# Patient Record
Sex: FEMALE | Race: WHITE | NOT HISPANIC OR LATINO | Employment: UNEMPLOYED | URBAN - METROPOLITAN AREA
[De-identification: names, ages, dates, MRNs, and addresses within clinical notes are randomized per-mention and may not be internally consistent; named-entity substitution may affect disease eponyms.]

---

## 2019-08-20 ENCOUNTER — OFFICE VISIT (OUTPATIENT)
Dept: FAMILY MEDICINE CLINIC | Facility: CLINIC | Age: 54
End: 2019-08-20
Payer: COMMERCIAL

## 2019-08-20 VITALS
RESPIRATION RATE: 16 BRPM | OXYGEN SATURATION: 96 % | HEART RATE: 60 BPM | WEIGHT: 107.2 LBS | SYSTOLIC BLOOD PRESSURE: 118 MMHG | DIASTOLIC BLOOD PRESSURE: 80 MMHG | TEMPERATURE: 98.2 F | HEIGHT: 60 IN | BODY MASS INDEX: 21.05 KG/M2

## 2019-08-20 DIAGNOSIS — Z00.00 WELL ADULT EXAM: ICD-10-CM

## 2019-08-20 DIAGNOSIS — Z13.9 ENCOUNTER FOR HEALTH-RELATED SCREENING: ICD-10-CM

## 2019-08-20 DIAGNOSIS — M19.90 ARTHRITIS: ICD-10-CM

## 2019-08-20 DIAGNOSIS — Z12.11 SCREEN FOR COLON CANCER: Primary | ICD-10-CM

## 2019-08-20 DIAGNOSIS — Z12.39 SCREENING FOR BREAST CANCER: ICD-10-CM

## 2019-08-20 DIAGNOSIS — G56.03 BILATERAL CARPAL TUNNEL SYNDROME: ICD-10-CM

## 2019-08-20 PROBLEM — Z98.890 STATUS POST LEFT BREAST LUMPECTOMY: Status: ACTIVE | Noted: 2019-08-20

## 2019-08-20 PROBLEM — K59.01 SLOW TRANSIT CONSTIPATION: Status: ACTIVE | Noted: 2019-08-20

## 2019-08-20 PROBLEM — F31.81 BIPOLAR 2 DISORDER, MAJOR DEPRESSIVE EPISODE (HCC): Status: ACTIVE | Noted: 2019-08-20

## 2019-08-20 PROCEDURE — 99214 OFFICE O/P EST MOD 30 MIN: CPT | Performed by: NURSE PRACTITIONER

## 2019-08-20 PROCEDURE — 99396 PREV VISIT EST AGE 40-64: CPT | Performed by: NURSE PRACTITIONER

## 2019-08-20 PROCEDURE — 3725F SCREEN DEPRESSION PERFORMED: CPT | Performed by: NURSE PRACTITIONER

## 2019-08-20 RX ORDER — IBUPROFEN 400 MG/1
1 TABLET ORAL EVERY 8 HOURS PRN
COMMUNITY
End: 2020-01-21 | Stop reason: HOSPADM

## 2019-08-20 RX ORDER — SENNA AND DOCUSATE SODIUM 50; 8.6 MG/1; MG/1
3 TABLET, FILM COATED ORAL AS NEEDED
COMMUNITY

## 2019-08-20 NOTE — PROGRESS NOTES
Assessment/Plan:    Bilateral carpal tunnel syndrome  Has a past diagnosis of carpal tunnel syndrome  Currently the symptoms have increased and she would like to be retested and referred  Will order a EMG and upon results will refer to Ortho or neuro  Arthritis  Strong family history of arthritis in her family  Has notable arthritis it both hands, pain in knees and hips  Will do autoimmune testing and will order ibuprofen 400 mg  Instructed to avoid stress her excessive movement of the hands and joints, keep joints warm and to consider physical therapy  Encounter for health-related screening  Has not had labs in several years  Will order routine screenings such as CMP, TSH, lipid panel and CBC  She is to return for results  Also discussed mammogram and Pap smear  Well adult exam  Fairly healthy 53-year-old female that has widespread arthritis and anxiety  Currently she is not in Psychiatry for bipolar but hopes to return soon  Discussed healthy diet exercise and routine health maintenance  She is to avoid risky behaviors and use safety around the home  She is to follow up following tests  Screen for colon cancer  Ordered stool for occult blood for screening  Screening for breast cancer  Will order mammogram for screening  Has a history of left breast lumpectomy  Diagnoses and all orders for this visit:    Screen for colon cancer  -     Ambulatory referral to Colorectal Surgery; Future    Screening for breast cancer  -     Mammo screening bilateral w cad; Future    Bilateral carpal tunnel syndrome  -     EMG 2 Limb Upper Extremity; Future    Encounter for health-related screening  -     TSH, 3rd generation with Free T4 reflex; Future  -     CBC and differential; Future  -     Lipid Panel with Direct LDL reflex; Future  -     Comprehensive metabolic panel; Future    Well adult exam    Arthritis  -     C-reactive protein; Future  -     RF Screen w/ Reflex to Titer;  Future  -     FERNANDO Screen w/ Reflex to Titer/Pattern; Future    Other orders  -     senna-docusate sodium (SENOKOT-S) 8 6-50 mg per tablet; Take 3 tablets by mouth daily at bedtime  -     ibuprofen (MOTRIN) 400 mg tablet; Take 1 tablet by mouth every 8 (eight) hours as needed          Subjective:      Patient ID: Jinny Mast is a 48 y o  female  HPI  Presents to become established with [facility]  She has a history of bipolar disorder and does not take any medication  She currently has carpel tunnel  The hands feel hot and prickly  The pain radiates up her arms to the elbows  She drops things, holds things but can't feel it  Her sensation is distorted and feels like a hum  The following portions of the patient's history were reviewed and updated as appropriate:   She  has no past medical history on file  She   Patient Active Problem List    Diagnosis Date Noted    Screening for breast cancer 08/21/2019    Screen for colon cancer 08/21/2019    Well adult exam 08/20/2019    Bipolar 2 disorder, major depressive episode ([facility] [address] ) 08/20/2019    Bilateral carpal tunnel syndrome 08/20/2019    Status post left breast lumpectomy 08/20/2019    Arthritis 08/20/2019    Slow transit constipation 08/20/2019    Encounter for health-related screening 08/20/2019     She  has no past surgical history on file  Her family history is not on file  She  reports that she has been smoking  She has been smoking about 0 50 packs per day  She has never used smokeless tobacco  She reports that she drank alcohol  She reports that she has current or past drug history  Drug: Marijuana  Current Outpatient Medications   Medication Sig Dispense Refill    ibuprofen (MOTRIN) 400 mg tablet Take 1 tablet by mouth every 8 (eight) hours as needed      senna-docusate sodium (SENOKOT-S) 8 6-50 mg per tablet Take 3 tablets by mouth daily at bedtime       No current facility-administered medications for this visit        Current Outpatient Medications on File Prior to Visit   Medication Sig    ibuprofen (MOTRIN) 400 mg tablet Take 1 tablet by mouth every 8 (eight) hours as needed    senna-docusate sodium (SENOKOT-S) 8 6-50 mg per tablet Take 3 tablets by mouth daily at bedtime     No current facility-administered medications on file prior to visit  She has No Known Allergies       Review of Systems   Constitutional: Negative  HENT: Negative  Eyes: Negative  Respiratory: Negative  Cardiovascular: Negative  Gastrointestinal: Positive for constipation  Genitourinary: Negative  Musculoskeletal: Positive for arthralgias  Bilateral hand numbness with pain  Skin: Negative  Allergic/Immunologic: Negative  Neurological: Positive for weakness and numbness  Hematological: Negative  Psychiatric/Behavioral: The patient is nervous/anxious  Objective:      /80   Pulse 60   Temp 98 2 °F (36 8 °C) (Tympanic)   Resp 16   Ht 5' (1 524 m)   Wt 48 6 kg (107 lb 3 2 oz)   SpO2 96%   BMI 20 94 kg/m²          Physical Exam   Constitutional: She is oriented to person, place, and time  She appears well-developed and well-nourished  HENT:   Head: Normocephalic  Right Ear: External ear normal    Left Ear: External ear normal    Nose: Nose normal    Mouth/Throat: Oropharynx is clear and moist    Eyes: Conjunctivae and EOM are normal    Neck: Normal range of motion  Neck supple  No thyromegaly present  Cardiovascular: Normal rate, regular rhythm, normal heart sounds and intact distal pulses  Pulmonary/Chest: Effort normal and breath sounds normal    Abdominal: Soft  Bowel sounds are normal    Musculoskeletal: Normal range of motion  She exhibits tenderness (hands, knees and hips) and deformity (hands)  Lymphadenopathy:     She has no cervical adenopathy  Neurological: She is alert and oriented to person, place, and time  She has normal strength and normal reflexes  A sensory deficit is present     Positive Tinsel Test  Skin: Skin is warm and dry  Capillary refill takes less than 2 seconds  Psychiatric: Judgment and thought content normal  Her mood appears anxious  Her speech is rapid and/or pressured  She is hyperactive   Cognition and memory are normal

## 2019-08-21 PROBLEM — Z12.11 SCREEN FOR COLON CANCER: Status: ACTIVE | Noted: 2019-08-21

## 2019-08-21 PROBLEM — Z12.39 SCREENING FOR BREAST CANCER: Status: ACTIVE | Noted: 2019-08-21

## 2019-08-21 NOTE — ASSESSMENT & PLAN NOTE
Fairly healthy 51-year-old female that has widespread arthritis and anxiety  Currently she is not in Psychiatry for bipolar but hopes to return soon  Discussed healthy diet exercise and routine health maintenance  She is to avoid risky behaviors and use safety around the home  She is to follow up following tests

## 2019-08-21 NOTE — ASSESSMENT & PLAN NOTE
Has not had labs in several years  Will order routine screenings such as CMP, TSH, lipid panel and CBC  She is to return for results  Also discussed mammogram and Pap smear

## 2019-08-21 NOTE — ASSESSMENT & PLAN NOTE
Strong family history of arthritis in her family  Has notable arthritis it both hands, pain in knees and hips  Will do autoimmune testing and will order ibuprofen 400 mg  Instructed to avoid stress her excessive movement of the hands and joints, keep joints warm and to consider physical therapy

## 2019-08-21 NOTE — ASSESSMENT & PLAN NOTE
Has a past diagnosis of carpal tunnel syndrome  Currently the symptoms have increased and she would like to be retested and referred  Will order a EMG and upon results will refer to Ortho or neuro

## 2019-08-30 ENCOUNTER — TELEPHONE (OUTPATIENT)
Dept: FAMILY MEDICINE CLINIC | Facility: CLINIC | Age: 54
End: 2019-08-30

## 2019-08-30 NOTE — TELEPHONE ENCOUNTER
Pt called and left message stating she seen Krystin Miranda two weeks ago and is scheduled for an EMG on 09/11/19 for her hands  However she is still having the burning/fire feeling that is not stopping  She is not sure what she can do  Pt also stated she needs a referral for psych

## 2019-09-03 DIAGNOSIS — G56.03 BILATERAL CARPAL TUNNEL SYNDROME: ICD-10-CM

## 2019-09-03 DIAGNOSIS — F31.81 BIPOLAR 2 DISORDER, MAJOR DEPRESSIVE EPISODE (HCC): Primary | ICD-10-CM

## 2019-09-03 RX ORDER — GABAPENTIN 100 MG/1
100 CAPSULE ORAL 3 TIMES DAILY
Qty: 90 CAPSULE | Refills: 0 | Status: SHIPPED | OUTPATIENT
Start: 2019-09-03 | End: 2019-10-31 | Stop reason: SDUPTHER

## 2019-09-05 ENCOUNTER — TELEPHONE (OUTPATIENT)
Dept: FAMILY MEDICINE CLINIC | Facility: CLINIC | Age: 54
End: 2019-09-05

## 2019-09-11 ENCOUNTER — PROCEDURE VISIT (OUTPATIENT)
Dept: NEUROLOGY | Facility: CLINIC | Age: 54
End: 2019-09-11
Payer: COMMERCIAL

## 2019-09-11 DIAGNOSIS — G56.03 BILATERAL CARPAL TUNNEL SYNDROME: ICD-10-CM

## 2019-09-11 PROCEDURE — 95886 MUSC TEST DONE W/N TEST COMP: CPT | Performed by: PHYSICAL MEDICINE & REHABILITATION

## 2019-09-11 PROCEDURE — 95911 NRV CNDJ TEST 9-10 STUDIES: CPT | Performed by: PHYSICAL MEDICINE & REHABILITATION

## 2019-09-11 NOTE — PROGRESS NOTES
EMG 2 Limb Upper Extremity     Date/Time 9/11/2019 11:12 AM     Performed by  Joon Cortez MD     Authorized by CAROLINE Bhatti      Universal Protocol Consent: Verbal consent obtained  Risks and benefits: risks, benefits and alternatives were discussed  Consent given by: patient  Patient understanding: patient states understanding of the procedure being performed  Patient consent: the patient's understanding of the procedure matches consent given  Patient identity confirmed: verbally with patient             EMG REPORT   82-year-old female presents with  bilateral hand numbness  and tingling  radiating up her arms to the elbows  The left   median motor terminal latency was prolonged at 5 7 milliseconds with normal compound motor action potential amplitude and normal conduction velocity across the wrist   The  right median motor terminal latency was prolonged at 6 7 milliseconds with a normal compound motor action potential amplitude and a normal conduction velocity across the wrist   The bilateral ulnar motor conduction velocities and compound muscle action potentials were normal with normal distal latencies across the wrists  The left  median sensory peak latency was prolonged at 5 0 milliseconds with a low sensory action potential amplitude of 6 microvolt  The right median  sensory peak latency was prolonged at 6 5 milliseconds with a low sensory action potential amplitude of 4 microvolt  The bilateral radial and ulnar sensory conduction velocity and sensory action potentials were also normal with normal distal latencies across the wrists  The right median palmar evoked response was prolonged by 3 4 milliseconds as compared to the right ulnar palmar evoked response to the same distance  The left median palmar evoked response was prolonged by 0 8 milliseconds as compared to the left ulnar palmar evoked response at the same distance      The left and right median and ulnar F wave latencies were within normal limits  Concentric needle EMG  was performed on various proximal and distal muscles of the bilateral upper extremities including deltoid, biceps, triceps, pronator teres, apb, FDI and low cervical paraspinals  There was no evidence of active denervation any other muscles tested  Moderate decreased recruitment was noted in the bilateral abductor pollicis brevis  Early recruited motor units appear normal  with recruitment patterns being full or full for effort  in the remaining muscles tested  INTERPRETATION: There  is electrophysiologic evidence of a:    1  Bilateral moderate median nerve compression neuropathy at the wrist with demyelinative changes, consistent with a diagnosis of carpal tunnel syndrome  2   There is no evidence of a cervical radiculopathy or ulnar neuropathy bilaterally  Clinical correlation is recommended       VERNON Burns

## 2019-09-12 ENCOUNTER — TELEPHONE (OUTPATIENT)
Dept: FAMILY MEDICINE CLINIC | Facility: CLINIC | Age: 54
End: 2019-09-12

## 2019-09-13 NOTE — TELEPHONE ENCOUNTER
Patient's results of EMG is bilateral carpal tunnel syndrome  She can be referred to either neurosurgeon or Orthopedics or a hand surgeon to repair this    Put in referral for whichever the patient wants to go to thank you

## 2019-09-13 NOTE — TELEPHONE ENCOUNTER
Tried calling pt and was unable to lm because no voicemail was set up  I called her  listed as alternate pt contact and lm for her to return our call   I didn't give no other medical results

## 2019-09-30 ENCOUNTER — TELEPHONE (OUTPATIENT)
Dept: FAMILY MEDICINE CLINIC | Facility: CLINIC | Age: 54
End: 2019-09-30

## 2019-09-30 ENCOUNTER — APPOINTMENT (OUTPATIENT)
Dept: LAB | Age: 54
End: 2019-09-30
Payer: COMMERCIAL

## 2019-09-30 DIAGNOSIS — M19.90 ARTHRITIS: ICD-10-CM

## 2019-09-30 DIAGNOSIS — Z13.9 ENCOUNTER FOR HEALTH-RELATED SCREENING: ICD-10-CM

## 2019-09-30 DIAGNOSIS — G56.03 BILATERAL CARPAL TUNNEL SYNDROME: Primary | ICD-10-CM

## 2019-09-30 LAB
BASOPHILS # BLD AUTO: 0.06 THOUSANDS/ΜL (ref 0–0.1)
BASOPHILS NFR BLD AUTO: 1 % (ref 0–1)
CRP SERPL QL: <3 MG/L
EOSINOPHIL # BLD AUTO: 0.17 THOUSAND/ΜL (ref 0–0.61)
EOSINOPHIL NFR BLD AUTO: 2 % (ref 0–6)
ERYTHROCYTE [DISTWIDTH] IN BLOOD BY AUTOMATED COUNT: 14.6 % (ref 11.6–15.1)
HCT VFR BLD AUTO: 41.8 % (ref 34.8–46.1)
HGB BLD-MCNC: 13.3 G/DL (ref 11.5–15.4)
IMM GRANULOCYTES # BLD AUTO: 0.01 THOUSAND/UL (ref 0–0.2)
IMM GRANULOCYTES NFR BLD AUTO: 0 % (ref 0–2)
LYMPHOCYTES # BLD AUTO: 2.38 THOUSANDS/ΜL (ref 0.6–4.47)
LYMPHOCYTES NFR BLD AUTO: 30 % (ref 14–44)
MCH RBC QN AUTO: 30.3 PG (ref 26.8–34.3)
MCHC RBC AUTO-ENTMCNC: 31.8 G/DL (ref 31.4–37.4)
MCV RBC AUTO: 95 FL (ref 82–98)
MONOCYTES # BLD AUTO: 0.58 THOUSAND/ΜL (ref 0.17–1.22)
MONOCYTES NFR BLD AUTO: 7 % (ref 4–12)
NEUTROPHILS # BLD AUTO: 4.66 THOUSANDS/ΜL (ref 1.85–7.62)
NEUTS SEG NFR BLD AUTO: 60 % (ref 43–75)
NRBC BLD AUTO-RTO: 0 /100 WBCS
PLATELET # BLD AUTO: 247 THOUSANDS/UL (ref 149–390)
PMV BLD AUTO: 11.2 FL (ref 8.9–12.7)
RBC # BLD AUTO: 4.39 MILLION/UL (ref 3.81–5.12)
TSH SERPL DL<=0.05 MIU/L-ACNC: 1.98 UIU/ML (ref 0.36–3.74)
WBC # BLD AUTO: 7.86 THOUSAND/UL (ref 4.31–10.16)

## 2019-09-30 PROCEDURE — 86038 ANTINUCLEAR ANTIBODIES: CPT

## 2019-09-30 PROCEDURE — 85025 COMPLETE CBC W/AUTO DIFF WBC: CPT

## 2019-09-30 PROCEDURE — 36415 COLL VENOUS BLD VENIPUNCTURE: CPT

## 2019-09-30 PROCEDURE — 86430 RHEUMATOID FACTOR TEST QUAL: CPT

## 2019-09-30 PROCEDURE — 86140 C-REACTIVE PROTEIN: CPT

## 2019-09-30 PROCEDURE — 84443 ASSAY THYROID STIM HORMONE: CPT

## 2019-09-30 NOTE — TELEPHONE ENCOUNTER
Has appt October 30  Needs doctor to doctor referral faxed to  [phone]    She will call around to see where she can get wrist splints and will let us know

## 2019-09-30 NOTE — TELEPHONE ENCOUNTER
Patient returned call regarding her EMG  Gave her results and she had a name of Dr Tricia Garza she wanted to see      She had no further info on him so I obtained phone and address and she will call for appt and see if [facility]    If so then she will call with fax number and we will fax doctor to doctor referral

## 2019-10-01 LAB — RHEUMATOID FACT SER QL LA: NEGATIVE

## 2019-10-02 LAB — RYE IGE QN: NEGATIVE

## 2019-10-30 ENCOUNTER — OFFICE VISIT (OUTPATIENT)
Dept: OBGYN CLINIC | Facility: HOSPITAL | Age: 54
End: 2019-10-30
Payer: COMMERCIAL

## 2019-10-30 VITALS
WEIGHT: 104 LBS | SYSTOLIC BLOOD PRESSURE: 147 MMHG | DIASTOLIC BLOOD PRESSURE: 90 MMHG | BODY MASS INDEX: 20.42 KG/M2 | HEIGHT: 60 IN | HEART RATE: 69 BPM

## 2019-10-30 DIAGNOSIS — G56.01 CARPAL TUNNEL SYNDROME OF RIGHT WRIST: Primary | ICD-10-CM

## 2019-10-30 DIAGNOSIS — G56.02 CARPAL TUNNEL SYNDROME ON LEFT: ICD-10-CM

## 2019-10-30 PROCEDURE — 99243 OFF/OP CNSLTJ NEW/EST LOW 30: CPT | Performed by: ORTHOPAEDIC SURGERY

## 2019-10-30 RX ORDER — LIDOCAINE HYDROCHLORIDE AND EPINEPHRINE 10; 10 MG/ML; UG/ML
20 INJECTION, SOLUTION INFILTRATION; PERINEURAL ONCE
Status: CANCELLED | OUTPATIENT
Start: 2019-10-30 | End: 2019-10-30

## 2019-10-30 NOTE — PROGRESS NOTES
ASSESSMENT/PLAN:    Assessment:   Carpal Tunnel Syndrome  Bilateral   Right worse than left  Plan:   Patient has tried and failed conservative treatments up to this time and she wishes to proceed with surgical procedure due to continued symptoms  She wishes to perform right-sided carpal tunnel release performed left due to worsening symptoms  Follow Up: After Surgery    To Do Next Visit:  Sutures out and Begin therapy    Operative Discussions:  Endoscopic Carpal Tunnel Release: The anatomy and physiology of carpal tunnel syndrome was discussed with the patient today  Increase pressure localized under the transverse carpal ligament can cause pain, numbness, tingling, or dysesthesias within the median nerve distribution as well as feelings of fatigue, clumsiness, or awkwardness  These symptoms typically occur at night and worse in the morning upon waking  Eventually, untreated carpal tunnel syndrome can result in weakness and permanent loss of muscle within the thenar compartment of the hand  Treatment options were discussed with the patient  Conservative treatment includes nocturnal resting splints to keep the nerve in a neutral position, ergonomic changes within the work or home environment, activity modification, and tendon gliding exercises  Steroid injections within the carpal canal can help a majority of patients, however this is often self-limited in a majority of patients  Surgical intervention to divide the transverse carpal ligament typically results in a long-lasting relief of the patient's complaints, with the recurrence rate of less than 1%  The patient has elected to undergo an endoscopic carpal tunnel release  The 2 incision technique was discussed with the patient, which results in approximately a two-week less recovery time, and less wound complications    In the postoperative period, light activities are allowed immediately, driving is allowed when narcotic medication has stopped, and the bandages may be removed and incision may get wet after 2 days  Heavy activities (lifting more than approximately 10 pounds) will be allowed after follow up appointment in 1-2 weeks  While the pain and discomfort in the hands generally improves rapidly, the numbness and tingling as well as the strength will slowly improve over weeks to months depending on the severity of the carpal tunnel syndrome  Pillar pain was discussed with the patient, which is typically a common but self-limiting condition  The risks of bleeding and infection from the surgery are less than 1%  Risk of recurrence is approximately 0 5%  The risks of nerve injury or nerve damage or damage to the blood vessels is approximately 1 in 1200  The patient has an understanding of the above mentioned discussion  The risks and benefits of the procedure were explained to the patient, which include, but are not limited to: Bleeding, infection, recurrence, pain, scar, damage to tendons, damage to nerves, and damage to blood vessels, failure to give desired results and complications related to anesthesia   These risks, along with alternative conservative treatment options, and postoperative protocols were voiced back and understood by the patient   All questions were answered to the patient's satisfaction   The patient agrees to comply with a standard postoperative protocol, and is willing to proceed   Education was provided via written and auditory forms   There were no barriers to learning   Written handouts regarding wound care, incision and scar care, and general preoperative information was provided to the patient   Prior to surgery, the patient may be requested to stop all anti-inflammatory medications   Prophylactic aspirin, Plavix, and Coumadin may be allowed to be continued   Medications including vitamin E , ginkgo, and fish oil are requested to be stopped approximately one week prior to surgery   Hypertensive medications and beta blockers, if taken, should be continued  _____________________________________________________  CHIEF COMPLAINT:  Chief Complaint   Patient presents with    Right Hand - Pain, Numbness    Left Hand - Pain, Numbness         SUBJECTIVE:  Lisa Wadsworth is a 48y o  year old female who presents with Pain  Moderate  Intermittant  Electric and Burning and Numbness to the bilateral wrist, hand, index finger, ring finger and thumb  Right worse than Left This started several years ago as Chronic  Radiation: None  Previous Treatments: Resume activities as tolerated, NSAIDs, activity modification and bracing with only partial relief  Associated symptoms: None    PAST MEDICAL HISTORY:  History reviewed  No pertinent past medical history  PAST SURGICAL HISTORY:  History reviewed  No pertinent surgical history  FAMILY HISTORY:  History reviewed  No pertinent family history  SOCIAL HISTORY:  Social History     Tobacco Use    Smoking status: Current Every Day Smoker     Packs/day: 0 50    Smokeless tobacco: Never Used   Substance Use Topics    Alcohol use: Not Currently    Drug use: Yes     Types: Marijuana     Comment: CBD OIL       MEDICATIONS:    Current Outpatient Medications:     gabapentin (NEURONTIN) 100 mg capsule, Take 1 capsule (100 mg total) by mouth 3 (three) times a day, Disp: 90 capsule, Rfl: 0    ibuprofen (MOTRIN) 400 mg tablet, Take 1 tablet by mouth every 8 (eight) hours as needed, Disp: , Rfl:     senna-docusate sodium (SENOKOT-S) 8 6-50 mg per tablet, Take 3 tablets by mouth daily at bedtime, Disp: , Rfl:     ALLERGIES:  No Known Allergies    REVIEW OF SYSTEMS:  Pertinent items are noted in HPI  A comprehensive review of systems was negative      LABS:  HgA1c: No results found for: HGBA1C  BMP:   Lab Results   Component Value Date    CALCIUM 9 2 06/15/2018     06/15/2018    K 3 9 06/15/2018    CO2 24 06/15/2018     06/15/2018    BUN 14 06/15/2018    CREATININE 0 60 06/15/2018 _____________________________________________________  PHYSICAL EXAMINATION:  General: well developed and well nourished, alert, oriented times 3 and appears comfortable  Psychiatric: Normal  HEENT: Trachea Midline, No torticollis  Cardiovascular: No discernable arrhythmia  Pulmonary: No wheezing or stridor  Skin: No masses, erythema, lacerations, fluctation, ulcerations  Neurovascular: Decreased Sensation to  the Median Nerve, Motor Intact to the Median, Ulnar, Radial Nerve and Pulses Intact    MUSCULOSKELETAL EXAMINATION:  Bilateral hands: 5/5 intrinsics, 5/5 AIN, 5/5 wrist extension (full ROM), 5/5 wrist flexion (full ROM)  4/5 APB  Positive Phalen's test, Positive Tinel's test, Positive Durkins compression test    _____________________________________________________  STUDIES REVIEWED:  EMG:   Bilateral moderate median nerve compression neuropathy at the wrist consistent with a diagnosis carpal tunnel syndrome  There is no evidence of cervical radiculopathy or ulnar neuropathy bilaterally        PROCEDURES PERFORMED:  Procedures  No Procedures performed today     Scribe Attestation    I,:   Edwina Armendariz am acting as a scribe while in the presence of the attending physician :        I,:   Geronimo Gaytan MD personally performed the services described in this documentation    as scribed in my presence :

## 2019-10-30 NOTE — H&P
ASSESSMENT/PLAN:    Assessment:   Carpal Tunnel Syndrome  Bilateral   Right worse than left  Plan:   Patient has tried and failed conservative treatments up to this time and she wishes to proceed with surgical procedure due to continued symptoms  She wishes to perform right-sided carpal tunnel release performed left due to worsening symptoms  Follow Up: After Surgery    To Do Next Visit:  Sutures out and Begin therapy    Operative Discussions:  Endoscopic Carpal Tunnel Release: The anatomy and physiology of carpal tunnel syndrome was discussed with the patient today  Increase pressure localized under the transverse carpal ligament can cause pain, numbness, tingling, or dysesthesias within the median nerve distribution as well as feelings of fatigue, clumsiness, or awkwardness  These symptoms typically occur at night and worse in the morning upon waking  Eventually, untreated carpal tunnel syndrome can result in weakness and permanent loss of muscle within the thenar compartment of the hand  Treatment options were discussed with the patient  Conservative treatment includes nocturnal resting splints to keep the nerve in a neutral position, ergonomic changes within the work or home environment, activity modification, and tendon gliding exercises  Steroid injections within the carpal canal can help a majority of patients, however this is often self-limited in a majority of patients  Surgical intervention to divide the transverse carpal ligament typically results in a long-lasting relief of the patient's complaints, with the recurrence rate of less than 1%  The patient has elected to undergo an endoscopic carpal tunnel release  The 2 incision technique was discussed with the patient, which results in approximately a two-week less recovery time, and less wound complications    In the postoperative period, light activities are allowed immediately, driving is allowed when narcotic medication has stopped, and the bandages may be removed and incision may get wet after 2 days  Heavy activities (lifting more than approximately 10 pounds) will be allowed after follow up appointment in 1-2 weeks  While the pain and discomfort in the hands generally improves rapidly, the numbness and tingling as well as the strength will slowly improve over weeks to months depending on the severity of the carpal tunnel syndrome  Pillar pain was discussed with the patient, which is typically a common but self-limiting condition  The risks of bleeding and infection from the surgery are less than 1%  Risk of recurrence is approximately 0 5%  The risks of nerve injury or nerve damage or damage to the blood vessels is approximately 1 in 1200  The patient has an understanding of the above mentioned discussion  The risks and benefits of the procedure were explained to the patient, which include, but are not limited to: Bleeding, infection, recurrence, pain, scar, damage to tendons, damage to nerves, and damage to blood vessels, failure to give desired results and complications related to anesthesia   These risks, along with alternative conservative treatment options, and postoperative protocols were voiced back and understood by the patient   All questions were answered to the patient's satisfaction   The patient agrees to comply with a standard postoperative protocol, and is willing to proceed   Education was provided via written and auditory forms   There were no barriers to learning   Written handouts regarding wound care, incision and scar care, and general preoperative information was provided to the patient   Prior to surgery, the patient may be requested to stop all anti-inflammatory medications   Prophylactic aspirin, Plavix, and Coumadin may be allowed to be continued   Medications including vitamin E , ginkgo, and fish oil are requested to be stopped approximately one week prior to surgery   Hypertensive medications and beta blockers, if taken, should be continued  _____________________________________________________  CHIEF COMPLAINT:  Chief Complaint   Patient presents with    Right Hand - Pain, Numbness    Left Hand - Pain, Numbness         SUBJECTIVE:  Terry Frankel is a 48y o  year old female who presents with Pain  Moderate  Intermittant  Electric and Burning and Numbness to the bilateral wrist, hand, index finger, ring finger and thumb  Right worse than Left This started several years ago as Chronic  Radiation: None  Previous Treatments: Resume activities as tolerated, NSAIDs, activity modification and bracing with only partial relief  Associated symptoms: None    PAST MEDICAL HISTORY:  History reviewed  No pertinent past medical history  PAST SURGICAL HISTORY:  History reviewed  No pertinent surgical history  FAMILY HISTORY:  History reviewed  No pertinent family history  SOCIAL HISTORY:  Social History     Tobacco Use    Smoking status: Current Every Day Smoker     Packs/day: 0 50    Smokeless tobacco: Never Used   Substance Use Topics    Alcohol use: Not Currently    Drug use: Yes     Types: Marijuana     Comment: CBD OIL       MEDICATIONS:    Current Outpatient Medications:     gabapentin (NEURONTIN) 100 mg capsule, Take 1 capsule (100 mg total) by mouth 3 (three) times a day, Disp: 90 capsule, Rfl: 0    ibuprofen (MOTRIN) 400 mg tablet, Take 1 tablet by mouth every 8 (eight) hours as needed, Disp: , Rfl:     senna-docusate sodium (SENOKOT-S) 8 6-50 mg per tablet, Take 3 tablets by mouth daily at bedtime, Disp: , Rfl:     ALLERGIES:  No Known Allergies    REVIEW OF SYSTEMS:  Pertinent items are noted in HPI  A comprehensive review of systems was negative      LABS:  HgA1c: No results found for: HGBA1C  BMP:   Lab Results   Component Value Date    CALCIUM 9 2 06/15/2018     06/15/2018    K 3 9 06/15/2018    CO2 24 06/15/2018     06/15/2018    BUN 14 06/15/2018    CREATININE 0 60 06/15/2018 _____________________________________________________  PHYSICAL EXAMINATION:  General: well developed and well nourished, alert, oriented times 3 and appears comfortable  Psychiatric: Normal  HEENT: Trachea Midline, No torticollis  Cardiovascular: No discernable arrhythmia  Pulmonary: No wheezing or stridor  Skin: No masses, erythema, lacerations, fluctation, ulcerations  Neurovascular: Decreased Sensation to  the Median Nerve, Motor Intact to the Median, Ulnar, Radial Nerve and Pulses Intact    MUSCULOSKELETAL EXAMINATION:  Bilateral hands: 5/5 intrinsics, 5/5 AIN, 5/5 wrist extension (full ROM), 5/5 wrist flexion (full ROM)  4/5 APB  Positive Phalen's test, Positive Tinel's test, Positive Durkins compression test    _____________________________________________________  STUDIES REVIEWED:  EMG:   Bilateral moderate median nerve compression neuropathy at the wrist consistent with a diagnosis carpal tunnel syndrome  There is no evidence of cervical radiculopathy or ulnar neuropathy bilaterally        PROCEDURES PERFORMED:  Procedures  No Procedures performed today     Scribe Attestation    I,:   Melvin Devries am acting as a scribe while in the presence of the attending physician :        I,:   Eri Lawler MD personally performed the services described in this documentation    as scribed in my presence :

## 2019-10-31 DIAGNOSIS — G56.03 BILATERAL CARPAL TUNNEL SYNDROME: ICD-10-CM

## 2019-11-05 RX ORDER — GABAPENTIN 100 MG/1
100 CAPSULE ORAL 3 TIMES DAILY
Qty: 90 CAPSULE | Refills: 0 | Status: SHIPPED | OUTPATIENT
Start: 2019-11-05 | End: 2019-11-27 | Stop reason: SDUPTHER

## 2019-11-25 ENCOUNTER — TELEPHONE (OUTPATIENT)
Dept: FAMILY MEDICINE CLINIC | Facility: CLINIC | Age: 54
End: 2019-11-25

## 2019-11-25 NOTE — TELEPHONE ENCOUNTER
PC from pt, states she has a broken rib & would like to know what she can do for this? Is there a belt or something she can get for support? I suggested the pt sched an appt  She did not want to, she just wanted me to send a message to the nurses  Please advise pt [phone]

## 2019-11-25 NOTE — TELEPHONE ENCOUNTER
I spoke with pt and advise her to be seen either in our office , urgent care or ER if severe pain or SOB  Pt states the pain is not that severe and will try to find a ride and call tomorrow to sched appt

## 2019-11-27 DIAGNOSIS — G56.03 BILATERAL CARPAL TUNNEL SYNDROME: ICD-10-CM

## 2019-11-27 NOTE — TELEPHONE ENCOUNTER
Patient called requesting a refill on gabapentin 100mg to [facility] in [place]  Patient stated she is suppose to take 1 cap TID but she increased it herself to 2 cap BID   Please advise  Patient is having carpal tunnel 1/21/20  Last seen 8/20/19

## 2019-11-29 ENCOUNTER — APPOINTMENT (OUTPATIENT)
Dept: RADIOLOGY | Age: 54
End: 2019-11-29
Payer: COMMERCIAL

## 2019-11-29 ENCOUNTER — OFFICE VISIT (OUTPATIENT)
Dept: URGENT CARE | Age: 54
End: 2019-11-29
Payer: COMMERCIAL

## 2019-11-29 VITALS
TEMPERATURE: 97.7 F | HEART RATE: 96 BPM | OXYGEN SATURATION: 97 % | HEIGHT: 60 IN | WEIGHT: 105 LBS | DIASTOLIC BLOOD PRESSURE: 86 MMHG | BODY MASS INDEX: 20.62 KG/M2 | RESPIRATION RATE: 22 BRPM | SYSTOLIC BLOOD PRESSURE: 152 MMHG

## 2019-11-29 DIAGNOSIS — Z76.0 MEDICATION REFILL: ICD-10-CM

## 2019-11-29 DIAGNOSIS — R07.81 RIB PAIN: Primary | ICD-10-CM

## 2019-11-29 DIAGNOSIS — R07.81 RIB PAIN: ICD-10-CM

## 2019-11-29 PROCEDURE — 99203 OFFICE O/P NEW LOW 30 MIN: CPT | Performed by: PHYSICIAN ASSISTANT

## 2019-11-29 PROCEDURE — G0382 LEV 3 HOSP TYPE B ED VISIT: HCPCS | Performed by: PHYSICIAN ASSISTANT

## 2019-11-29 PROCEDURE — 71111 X-RAY EXAM RIBS/CHEST4/> VWS: CPT

## 2019-11-29 PROCEDURE — 99283 EMERGENCY DEPT VISIT LOW MDM: CPT | Performed by: PHYSICIAN ASSISTANT

## 2019-11-29 RX ORDER — GABAPENTIN 100 MG/1
100 CAPSULE ORAL 3 TIMES DAILY
Qty: 15 CAPSULE | Refills: 0 | Status: SHIPPED | OUTPATIENT
Start: 2019-11-29 | End: 2020-04-28 | Stop reason: SDUPTHER

## 2019-11-29 NOTE — PROGRESS NOTES
[facility]        NAME: Rosalba Rao is a 48 y o  female  : 1965    MRN: [id number]  DATE: 2019  TIME: 2:41 PM    /86   Pulse 96   Temp 97 7 °F (36 5 °C) (Tympanic)   Resp 22   Ht 5' (1 524 m)   Wt 47 6 kg (105 lb)   SpO2 97%   BMI 20 51 kg/m²     Assessment and Plan   Rib pain [R07 81]  1  Rib pain  gabapentin (NEURONTIN) 100 mg capsule    CANCELED: XR ribs bilateral 3 vw   2  Medication refill  gabapentin (NEURONTIN) 100 mg capsule         Patient Instructions       Follow up with PCP in 3-5 days  Proceed to  ER if symptoms worsen  Chief Complaint     Chief Complaint   Patient presents with    Chest Pain     Pt reports injuring her right ribs 3 weeks ago while raking leaves  Pt reports injuring left sided ribs while vacuuming and c/o left-sided rib pain  Pt has been taking Motrin for the pain  History of Present Illness       Pt with history of left rib pain since waling into a vacuum several days ago  Pt with right rib pain from fall several weeks ago     Chest Pain    This is a new problem  The current episode started in the past 7 days  The problem occurs constantly  The problem has been unchanged  The pain is present in the lateral region  The pain is at a severity of 3/10  The pain is mild  The quality of the pain is described as sharp  The pain does not radiate  Pertinent negatives include no abdominal pain, back pain, claudication, cough, diaphoresis, dizziness, exertional chest pressure, fever, headaches, hemoptysis, irregular heartbeat, leg pain, lower extremity edema, malaise/fatigue, nausea, near-syncope, numbness, orthopnea, palpitations, PND, shortness of breath, sputum production, syncope, vomiting or weakness  The pain is aggravated by nothing  She has tried nothing for the symptoms  The treatment provided no relief  There are no known risk factors  Review of Systems   Review of Systems   Constitutional: Negative    Negative for diaphoresis, fever and malaise/fatigue  HENT: Negative  Eyes: Negative  Respiratory: Negative  Negative for cough, hemoptysis, sputum production and shortness of breath  Cardiovascular: Positive for chest pain  Negative for palpitations, orthopnea, claudication, syncope, PND and near-syncope  Gastrointestinal: Negative  Negative for abdominal pain, nausea and vomiting  Endocrine: Negative  Genitourinary: Negative  Musculoskeletal: Negative  Negative for back pain  Skin: Negative  Allergic/Immunologic: Negative  Neurological: Negative  Negative for dizziness, weakness, numbness and headaches  Hematological: Negative  Psychiatric/Behavioral: Negative  All other systems reviewed and are negative  Current Medications       Current Outpatient Medications:     gabapentin (NEURONTIN) 100 mg capsule, Take 1 capsule (100 mg total) by mouth 3 (three) times a day, Disp: 90 capsule, Rfl: 0    gabapentin (NEURONTIN) 100 mg capsule, Take 1 capsule (100 mg total) by mouth 3 (three) times a day, Disp: 15 capsule, Rfl: 0    ibuprofen (MOTRIN) 400 mg tablet, Take 1 tablet by mouth every 8 (eight) hours as needed, Disp: , Rfl:     senna-docusate sodium (SENOKOT-S) 8 6-50 mg per tablet, Take 3 tablets by mouth daily at bedtime, Disp: , Rfl:     Current Allergies     Allergies as of 11/29/2019    (No Known Allergies)            The following portions of the patient's history were reviewed and updated as appropriate: allergies, current medications, past family history, past medical history, past social history, past surgical history and problem list      History reviewed  No pertinent past medical history  History reviewed  No pertinent surgical history  History reviewed  No pertinent family history  Medications have been verified          Objective   /86   Pulse 96   Temp 97 7 °F (36 5 °C) (Tympanic)   Resp 22   Ht 5' (1 524 m)   Wt 47 6 kg (105 lb)   SpO2 97% BMI 20 51 kg/m²        Physical Exam     Physical Exam   Constitutional: She is oriented to person, place, and time  She appears well-developed and well-nourished  HENT:   Head: Normocephalic and atraumatic  Right Ear: External ear normal    Left Ear: External ear normal    Nose: Nose normal    Mouth/Throat: Oropharynx is clear and moist    Eyes: Pupils are equal, round, and reactive to light  Conjunctivae and EOM are normal    Neck: Normal range of motion  Neck supple  Cardiovascular: Normal rate, regular rhythm, normal heart sounds and normal pulses  Pulmonary/Chest: Effort normal and breath sounds normal    Right lower ribs  Mid axillary  no ecchymosis no swelling     Left lower rib mid clavicular line  No luq no ruq pain    Abdominal: Soft  Bowel sounds are normal    Musculoskeletal: Normal range of motion  Neurological: She is alert and oriented to person, place, and time  Skin: Skin is warm  Capillary refill takes less than 2 seconds  Psychiatric: She has a normal mood and affect  Her behavior is normal    Nursing note and vitals reviewed

## 2019-11-29 NOTE — PATIENT INSTRUCTIONS
Medicine Refill   WHAT YOU NEED TO KNOW:   You may have been given a prescription in the emergency department for a few days of your medicine  It is important to refill your medicine before you completely run out  You need to follow up with your healthcare provider for a full prescription within the next few days  You will not be given additional refills in the emergency department  DISCHARGE INSTRUCTIONS:   Follow up with your healthcare provider:  Contact your healthcare provider before  you are completely out of medicine  Write down your questions so you remember to ask them during your visits  Refill tips:  Your medicine will treat your condition if you take the medicine regularly  Prevent missed doses by doing the following:  · Keep a chart of your medicine  Include all of your current medicines  Write down the name and strength of each medicine, the prescription number, and the number of refills  Also write down the dates of your refills  Ask your pharmacy or insurance provider for other ways to help you keep track of your medicines  · Refill medicines a few days before you run out  This will decrease any problems that will prevent you from getting your medicines on time  Problems include a closed pharmacy, or the pharmacy may have to contact your healthcare provider  · If you know you are going to be traveling, refill your medicines before you leave  You may not be able to get refills if you do not use your local pharmacy  You may need to call your insurance provider to make them aware of your travels  © 2017 [address] [employer] Information is for End User's use only and may not be sold, redistributed or otherwise used for commercial purposes  All illustrations and images included in CareNotes® are the copyrighted property of [employer]  or [employer]  The above information is an  only   It is not intended as medical advice for individual conditions or treatments  Talk to your doctor, nurse or pharmacist before following any medical regimen to see if it is safe and effective for you  Rib Contusion   WHAT YOU NEED TO KNOW:   A rib contusion is a bruise on one or more of your ribs  DISCHARGE INSTRUCTIONS:   Return to the emergency department if:   · You have increased chest pain  · You have shortness of breath  · You start to cough up blood  · Your pain does not improve with pain medicine  Contact your healthcare provider if:   · You have a cough  · You have a fever  · You have questions or concerns about your condition or care  Medicines: You may need any of the following:  · NSAIDs , such as ibuprofen, help decrease swelling, pain, and fever  This medicine is available with or without a doctor's order  NSAIDs can cause stomach bleeding or kidney problems in certain people  If you take blood thinner medicine, always ask if NSAIDs are safe for you  Always read the medicine label and follow directions  Do not give these medicines to children under 10months of age without direction from your child's healthcare provider  · Prescription pain medicine  may be given  Ask how to take this medicine safely  · Take your medicine as directed  Contact your healthcare provider if you think your medicine is not helping or if you have side effects  Tell him of her if you are allergic to any medicine  Keep a list of the medicines, vitamins, and herbs you take  Include the amounts, and when and why you take them  Bring the list or the pill bottles to follow-up visits  Carry your medicine list with you in case of an emergency  Deep breathing:   · To help prevent pneumonia, take 10 deep breaths every hour, even when you wake up during the night  Brace your ribs with your hands or a pillow while you take deep breaths or cough  This will help decrease your pain  · You may need to use an incentive spirometer to help you take deeper breaths   Put the plastic piece into your mouth and take a very deep breath  Hold your breath as long as you can  Then let out your breath  Do this 10 times in a row every hour while you are awake  Rest:  Rest your ribs to decrease swelling and allow the injury to heal faster  Avoid activities that may cause more pain or damage to your ribs  As your pain decreases, begin movements slowly  Ice:  Ice helps decrease swelling and pain  Ice may also help prevent tissue damage  Use an ice pack or put crushed ice in a plastic bag  Cover it with a towel and place it on your bruised area for 15 to 20 minutes every hour as directed  Follow up with your healthcare provider as directed:  Write down your questions so you remember to ask them during your visits  © 2017 [address] [facility] Information is for End User's use only and may not be sold, redistributed or otherwise used for commercial purposes  All illustrations and images included in CareNotes® are the copyrighted property of A D [employer] M [employer] Inc  or [employer]  The above information is an  only  It is not intended as medical advice for individual conditions or treatments  Talk to your doctor, nurse or pharmacist before following any medical regimen to see if it is safe and effective for you

## 2019-11-30 RX ORDER — GABAPENTIN 100 MG/1
200 CAPSULE ORAL 2 TIMES DAILY
Qty: 112 CAPSULE | Refills: 1 | Status: SHIPPED | OUTPATIENT
Start: 2019-11-30 | End: 2020-08-18

## 2019-12-30 ENCOUNTER — TELEPHONE (OUTPATIENT)
Dept: OBGYN CLINIC | Facility: HOSPITAL | Age: 54
End: 2019-12-30

## 2019-12-30 NOTE — TELEPHONE ENCOUNTER
Patient is calling because she has 2 surgeries scheduled on 1/21/20 & 1/28/20  Patient is stating that she scheduled the surgeries so that she will be awake but now she is worried that she will feel it too much  She doesn't know how dead Dr Benji Hall is going to make her hand  Patient is stating that she needs a date that she can change her mind if she needs to because she is starting to worry too much that she is going be in a lot of pain during the surgery  Patient is stating that she knows that she would then have to get blood work if she decides to be put to sleep instead  Please advise      [phone]

## 2020-01-03 ENCOUNTER — TELEPHONE (OUTPATIENT)
Dept: FAMILY MEDICINE CLINIC | Facility: CLINIC | Age: 55
End: 2020-01-03

## 2020-01-03 ENCOUNTER — HOSPITAL ENCOUNTER (EMERGENCY)
Facility: HOSPITAL | Age: 55
Discharge: HOME | End: 2020-01-03
Attending: EMERGENCY MEDICINE
Payer: COMMERCIAL

## 2020-01-03 VITALS
SYSTOLIC BLOOD PRESSURE: 141 MMHG | HEART RATE: 87 BPM | RESPIRATION RATE: 18 BRPM | OXYGEN SATURATION: 99 % | BODY MASS INDEX: 21.2 KG/M2 | DIASTOLIC BLOOD PRESSURE: 92 MMHG | TEMPERATURE: 98.6 F | HEIGHT: 60 IN | WEIGHT: 108 LBS

## 2020-01-03 DIAGNOSIS — R09.89 SENSATION OF FOREIGN BODY IN THROAT: Primary | ICD-10-CM

## 2020-01-03 PROCEDURE — 99283 EMERGENCY DEPT VISIT LOW MDM: CPT

## 2020-01-03 PROCEDURE — 99283 EMERGENCY DEPT VISIT LOW MDM: CPT | Performed by: PHYSICIAN ASSISTANT

## 2020-01-03 RX ORDER — FAMOTIDINE 20 MG/1
20 TABLET, FILM COATED ORAL ONCE
Status: COMPLETED | OUTPATIENT
Start: 2020-01-03 | End: 2020-01-03

## 2020-01-03 RX ORDER — ONDANSETRON 4 MG/1
4 TABLET, ORALLY DISINTEGRATING ORAL ONCE
Status: COMPLETED | OUTPATIENT
Start: 2020-01-03 | End: 2020-01-03

## 2020-01-03 RX ORDER — MAGNESIUM HYDROXIDE/ALUMINUM HYDROXICE/SIMETHICONE 120; 1200; 1200 MG/30ML; MG/30ML; MG/30ML
30 SUSPENSION ORAL ONCE
Status: COMPLETED | OUTPATIENT
Start: 2020-01-03 | End: 2020-01-03

## 2020-01-03 RX ORDER — LIDOCAINE HYDROCHLORIDE 20 MG/ML
15 SOLUTION OROPHARYNGEAL ONCE
Status: COMPLETED | OUTPATIENT
Start: 2020-01-03 | End: 2020-01-03

## 2020-01-03 RX ADMIN — FAMOTIDINE 20 MG: 20 TABLET ORAL at 11:32

## 2020-01-03 RX ADMIN — ONDANSETRON 4 MG: 4 TABLET, ORALLY DISINTEGRATING ORAL at 11:32

## 2020-01-03 RX ADMIN — ALUMINUM HYDROXIDE, MAGNESIUM HYDROXIDE, AND SIMETHICONE 30 ML: 200; 200; 20 SUSPENSION ORAL at 11:33

## 2020-01-03 RX ADMIN — LIDOCAINE HYDROCHLORIDE 15 ML: 20 SOLUTION ORAL; TOPICAL at 11:34

## 2020-01-03 NOTE — ED NOTES
Patient is resting comfortably   Pt standing by door, awaiting discharge as he needs to get to an appointment upstairs     Frank Saunders RN  01/03/20 [id number]

## 2020-01-03 NOTE — ED PROVIDER NOTES
History  Chief Complaint   Patient presents with    Medical Problem     patient reports swaling hard pretzles on 12/31, states that it cut her throat all the way down and pain radiates into her esophagus  reports she is able to eat and drink without issue,      Patient is a 49 y/o female presenting to the ED for evaluation of foreign body sensation in throat  Pt states on 12/31 she ate a hard pretzels when she felt it scratch her throat  Pt still with sensation x2 days later, but has been able to tolerate liquids and solids without difficulty or pain  Patient states she has never had anything like this before  Pt has not tried anything for sx  Pt without chest pain, SOB, drooling, difficulty swallowing, abdominal pain, nausea, vomiting  Prior to Admission Medications   Prescriptions Last Dose Informant Patient Reported? Taking?   gabapentin (NEURONTIN) 100 mg capsule 1/3/2020 at Unknown time  No Yes   Sig: Take 2 capsules (200 mg total) by mouth 2 (two) times a day   gabapentin (NEURONTIN) 100 mg capsule   No No   Sig: Take 1 capsule (100 mg total) by mouth 3 (three) times a day   ibuprofen (MOTRIN) 400 mg tablet 1/2/2020 at Unknown time  Yes Yes   Sig: Take 1 tablet by mouth every 8 (eight) hours as needed   senna-docusate sodium (SENOKOT-S) 8 6-50 mg per tablet   Yes No   Sig: Take 3 tablets by mouth daily at bedtime      Facility-Administered Medications: None       Past Medical History:   Diagnosis Date    Hypertension        History reviewed  No pertinent surgical history  History reviewed  No pertinent family history  I have reviewed and agree with the history as documented      Social History     Tobacco Use    Smoking status: Current Every Day Smoker     Packs/day: 0 50    Smokeless tobacco: Never Used   Substance Use Topics    Alcohol use: Not Currently    Drug use: Yes     Types: Marijuana     Comment: CBD OIL        Review of Systems   HENT:        Foreign body sensation   All other systems reviewed and are negative  Physical Exam  Physical Exam   Constitutional: She is oriented to person, place, and time  She appears well-developed and well-nourished  HENT:   Head: Normocephalic and atraumatic  Right Ear: External ear normal    Left Ear: External ear normal    Nose: Nose normal    Eyes: Conjunctivae are normal    Neck: Normal range of motion  Neck supple  Cardiovascular: Normal rate, regular rhythm and intact distal pulses  Pulmonary/Chest: Effort normal and breath sounds normal  No respiratory distress  She has no wheezes  She has no rales  Abdominal: Soft  Normal appearance and bowel sounds are normal  She exhibits no distension  There is no tenderness  There is no rigidity, no rebound and no guarding  Musculoskeletal: Normal range of motion  She exhibits no tenderness  Neurological: She is alert and oriented to person, place, and time  Skin: Skin is warm and dry  No rash noted  Psychiatric: She has a normal mood and affect   Her behavior is normal        Vital Signs  ED Triage Vitals [01/03/20 0954]   Temperature Pulse Respirations Blood Pressure SpO2   98 6 °F (37 °C) 97 20 147/89 99 %      Temp src Heart Rate Source Patient Position - Orthostatic VS BP Location FiO2 (%)   -- Monitor Sitting Left arm --      Pain Score       8           Vitals:    01/03/20 0954 01/03/20 1114   BP: 147/89 141/92   Pulse: 97 87   Patient Position - Orthostatic VS: Sitting Sitting         Visual Acuity      ED Medications  Medications   ondansetron (ZOFRAN-ODT) dispersible tablet 4 mg (4 mg Oral Given 1/3/20 1132)   famotidine (PEPCID) tablet 20 mg (20 mg Oral Given 1/3/20 1132)   aluminum-magnesium hydroxide-simethicone (MYLANTA) 200-200-20 mg/5 mL oral suspension 30 mL (30 mL Oral Given 1/3/20 1133)   Lidocaine Viscous HCl (XYLOCAINE) 2 % mucosal solution 15 mL (15 mL Swish & Swallow Given 1/3/20 1134)       Diagnostic Studies  Results Reviewed     None                 No orders to display              Procedures  Procedures         ED Course                               MDM  Number of Diagnoses or Management Options  Sensation of foreign body in throat: new and requires workup  Diagnosis management comments: Patient is a 47 y/o female presenting to the ED for evaluation of foreign body sensation in throat  Pt states on 12/31 she ate a hard pretzels when she felt it scratch her throat  Pt still with sensation x2 days later, but has been able to tolerate liquids and solids without difficulty or pain  No focal findings on examination  Zofran, viscous lidocaine, pepcid and mylanta given in ED with improvement in symptoms  Information given to patient for outpatient GI follow-up for foreign body sensation  Pt very well appearing, non-toxic, afebrile, well hydrated, tolerating secretions, liquids and solids and oxygen saturation 99% on RA  Strict return precautions given  Pt verbalizes understanding and agrees with plan  The management plan was discussed in detail with the patient at bedside and all questions were answered  Prior to discharge, I provided both verbal and written instructions  I discussed with the patient the signs and symptoms for which to return to the emergency department  All questions were answered and patient was comfortable with the plan of care and discharged to home  The patient verbalized understanding of our discussion and plan of care, and agrees to return to the Emergency Department for concerns and progression of illness            Disposition  Final diagnoses:   Sensation of foreign body in throat     Time reflects when diagnosis was documented in both MDM as applicable and the Disposition within this note     Time User Action Codes Description Comment    1/3/2020 11:37 AM Bouchra Cervantes Add [R09 89] Sensation of foreign body in throat       ED Disposition     ED Disposition Condition Date/Time Comment    Discharge Stable Fri Jackson 3, 2020 11:37 AM Kelsey Downing discharge to home/self care  Follow-up Information     Follow up With Specialties Details Why Contact Info Additional [facility] Schedule an appointment as soon as possible for a visit   [address] [id number]  [facility], [place] [address], [address], [place], [place], [address]          Discharge Medication List as of 1/3/2020 11:38 AM      CONTINUE these medications which have NOT CHANGED    Details   !! gabapentin (NEURONTIN) 100 mg capsule Take 2 capsules (200 mg total) by mouth 2 (two) times a day, Starting Sat 11/30/2019, Normal      ibuprofen (MOTRIN) 400 mg tablet Take 1 tablet by mouth every 8 (eight) hours as needed, Historical Med      !! gabapentin (NEURONTIN) 100 mg capsule Take 1 capsule (100 mg total) by mouth 3 (three) times a day, Starting Fri 11/29/2019, Print      senna-docusate sodium (SENOKOT-S) 8 6-50 mg per tablet Take 3 tablets by mouth daily at bedtime, Historical Med       !! - Potential duplicate medications found  Please discuss with provider  No discharge procedures on file      ED Provider  Electronically Signed by           Sherine David PA-C  01/03/20 3148

## 2020-01-03 NOTE — ED NOTES
States that she still has pain and feels like something is lodged in her stomach  States that she feels pain in her adomen and the discomfort shifts from side to side       Stef Lind RN  01/03/20 5746

## 2020-01-03 NOTE — TELEPHONE ENCOUNTER
Pc from pt states she feels like she has a pretzel lodged in her chest  I advised her to go to the ER  Pt agreed

## 2020-01-06 ENCOUNTER — TELEPHONE (OUTPATIENT)
Dept: PSYCHIATRY | Facility: CLINIC | Age: 55
End: 2020-01-06
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
## 2020-01-06 NOTE — TELEPHONE ENCOUNTER
----- Message from Tejal Mckeon sent at 11/21/2019 11:22 AM EST -----  Regarding: intake has pa medical assistance  Unable to schedule with dr Nancy Rodriguez due to insurance  Can intake please reach out to Gail Hylton and schedule an appointment please

## 2020-01-07 ENCOUNTER — TRANSCRIBE ORDERS (OUTPATIENT)
Dept: ADMINISTRATIVE | Facility: HOSPITAL | Age: 55
End: 2020-01-07

## 2020-01-07 ENCOUNTER — OFFICE VISIT (OUTPATIENT)
Dept: FAMILY MEDICINE CLINIC | Facility: CLINIC | Age: 55
End: 2020-01-07
Payer: COMMERCIAL

## 2020-01-07 ENCOUNTER — HOSPITAL ENCOUNTER (OUTPATIENT)
Dept: RADIOLOGY | Facility: IMAGING CENTER | Age: 55
Discharge: HOME | End: 2020-01-07
Payer: COMMERCIAL

## 2020-01-07 VITALS
DIASTOLIC BLOOD PRESSURE: 60 MMHG | OXYGEN SATURATION: 97 % | SYSTOLIC BLOOD PRESSURE: 118 MMHG | WEIGHT: 107 LBS | RESPIRATION RATE: 16 BRPM | HEIGHT: 60 IN | BODY MASS INDEX: 21.01 KG/M2 | HEART RATE: 80 BPM | TEMPERATURE: 97.7 F

## 2020-01-07 DIAGNOSIS — R06.2 WHEEZING: ICD-10-CM

## 2020-01-07 DIAGNOSIS — Z00.00 WELL ADULT EXAM: Primary | ICD-10-CM

## 2020-01-07 PROCEDURE — 71046 X-RAY EXAM CHEST 2 VIEWS: CPT

## 2020-01-07 PROCEDURE — 99396 PREV VISIT EST AGE 40-64: CPT | Performed by: FAMILY MEDICINE

## 2020-01-07 RX ORDER — ALBUTEROL SULFATE 90 UG/1
2 AEROSOL, METERED RESPIRATORY (INHALATION) EVERY 6 HOURS PRN
Qty: 1 INHALER | Refills: 2 | Status: SHIPPED | OUTPATIENT
Start: 2020-01-07 | End: 2020-08-24 | Stop reason: SDUPTHER

## 2020-01-07 NOTE — ASSESSMENT & PLAN NOTE
She was given prescription for ProAir inhaler as needed  I will check chest x-ray  I will consider pulmonary function test and referral to pulmonologist for possible COPD  It was discussed about smoking cessation

## 2020-01-07 NOTE — PROGRESS NOTES
Assessment/Plan:  Well adult exam  It was discussed about immunizations, diet, exercise and safety measures  Wheezing  She was given prescription for ProAir inhaler as needed  I will check chest x-ray  I will consider pulmonary function test and referral to pulmonologist for possible COPD  It was discussed about smoking cessation  Diagnoses and all orders for this visit:    Well adult exam    Wheezing  -     XR chest pa & lateral; Future  -     albuterol (PROAIR HFA) 90 mcg/act inhaler; Inhale 2 puffs every 6 (six) hours as needed for wheezing          There are no Patient Instructions on file for this visit  Return in about 6 months (around 7/7/2020)  Subjective:      Patient ID: Elisha Horn is a 48 y o  female  Chief Complaint   Patient presents with    Physical Exam    Follow-up     [facility]       She is here today for physical   She has history of carpal tunnel and she is getting surgery at the end of the month  She is on Neurontin for neuropathy  She is a smoker  She complained of wheezing  The following portions of the patient's history were reviewed and updated as appropriate: allergies, current medications, past family history, past medical history, past social history, past surgical history and problem list     Review of Systems   Constitutional: Negative for chills and fever  HENT: Negative for trouble swallowing  Eyes: Negative for visual disturbance  Respiratory: Positive for wheezing  Negative for cough and shortness of breath  Cardiovascular: Negative for chest pain, palpitations and leg swelling  Gastrointestinal: Negative for abdominal pain, constipation and diarrhea  Endocrine: Negative for cold intolerance and heat intolerance  Genitourinary: Negative for difficulty urinating and dysuria  Musculoskeletal: Negative for gait problem  Skin: Negative for rash  Neurological: Negative for dizziness, tremors, seizures and headaches  Hematological: Negative for adenopathy  Psychiatric/Behavioral: Negative for behavioral problems  Current Outpatient Medications   Medication Sig Dispense Refill    gabapentin (NEURONTIN) 100 mg capsule Take 2 capsules (200 mg total) by mouth 2 (two) times a day 112 capsule 1    ibuprofen (MOTRIN) 400 mg tablet Take 1 tablet by mouth every 8 (eight) hours as needed      senna-docusate sodium (SENOKOT-S) 8 6-50 mg per tablet Take 3 tablets by mouth daily at bedtime      albuterol (PROAIR HFA) 90 mcg/act inhaler Inhale 2 puffs every 6 (six) hours as needed for wheezing 1 Inhaler 2    gabapentin (NEURONTIN) 100 mg capsule Take 1 capsule (100 mg total) by mouth 3 (three) times a day (Patient not taking: Reported on 1/7/2020) 15 capsule 0     No current facility-administered medications for this visit  Objective:    /60 (BP Location: Left arm, Patient Position: Sitting, Cuff Size: Adult)   Pulse 80   Temp 97 7 °F (36 5 °C) (Tympanic)   Resp 16   Ht 5' (1 524 m)   Wt 48 5 kg (107 lb)   SpO2 97%   BMI 20 90 kg/m²        Physical Exam   Constitutional: She is oriented to person, place, and time  She appears well-developed and well-nourished  HENT:   Head: Normocephalic and atraumatic  Eyes: Pupils are equal, round, and reactive to light  EOM are normal    Neck: Normal range of motion  Neck supple  Cardiovascular: Normal rate, regular rhythm and normal heart sounds  Pulmonary/Chest: Effort normal  She has wheezes  Abdominal: Soft  Bowel sounds are normal    Musculoskeletal: Normal range of motion  She exhibits no edema  Lymphadenopathy:     She has no cervical adenopathy  Neurological: She is alert and oriented to person, place, and time  No cranial nerve deficit  Skin: Skin is warm  Psychiatric: She has a normal mood and affect  Nursing note and vitals reviewed               Kenyatta Chappell MD

## 2020-01-08 ENCOUNTER — TELEPHONE (OUTPATIENT)
Dept: FAMILY MEDICINE CLINIC | Facility: CLINIC | Age: 55
End: 2020-01-08

## 2020-01-08 NOTE — TELEPHONE ENCOUNTER
I went on pdmp web site and there is no record of her being on this from 1/7/18 to present   I did call [facility] to see if she ever had this filled and they also did not have any record of this medication on her profile

## 2020-01-08 NOTE — TELEPHONE ENCOUNTER
Pc from pts had an appt yesterday for got to ask for a script for Ativan as needed [facility] [employer]  Pt also asking about cxr resutls  Pt ph #[phone]

## 2020-01-08 NOTE — LETTER
January 14, 2020     [address] [facility] [address]      Dear Ms Sanchezanderson:    Below are the results from your recent visit:    No results found from the In [employer]  The test results show that your current treatment is working  Please {Therapies; lab letter directions:77876}  We recommend that you repeat the above test(s) in {Numbers; 1-10:08308} {Time; units w/plural:11}  If you have any questions or concerns, please don't hesitate to call           Sincerely,        Amor Matthews

## 2020-01-08 NOTE — LETTER
January 14, 2020     [address] [facility] [address]      Dear Ms Portillo: We tried contacting you by phone but were unable to leave message  Your recent chest x-ray results were normal     If you have any questions or concerns, please don't hesitate to call           Sincerely,        Sylwia Mejia MD

## 2020-01-09 ENCOUNTER — APPOINTMENT (OUTPATIENT)
Dept: LAB | Facility: CLINIC | Age: 55
End: 2020-01-09
Payer: COMMERCIAL

## 2020-01-09 DIAGNOSIS — Z13.9 ENCOUNTER FOR HEALTH-RELATED SCREENING: ICD-10-CM

## 2020-01-09 LAB
ALBUMIN SERPL BCP-MCNC: 3.9 G/DL (ref 3.5–5)
ALP SERPL-CCNC: 60 U/L (ref 46–116)
ALT SERPL W P-5'-P-CCNC: 29 U/L (ref 12–78)
ANION GAP SERPL CALCULATED.3IONS-SCNC: 5 MMOL/L (ref 4–13)
AST SERPL W P-5'-P-CCNC: 10 U/L (ref 5–45)
BILIRUB SERPL-MCNC: 0.43 MG/DL (ref 0.2–1)
BUN SERPL-MCNC: 14 MG/DL (ref 5–25)
CALCIUM SERPL-MCNC: 9.4 MG/DL (ref 8.3–10.1)
CHLORIDE SERPL-SCNC: 112 MMOL/L (ref 100–108)
CHOLEST SERPL-MCNC: 194 MG/DL (ref 50–200)
CO2 SERPL-SCNC: 29 MMOL/L (ref 21–32)
CREAT SERPL-MCNC: 0.69 MG/DL (ref 0.6–1.3)
GFR SERPL CREATININE-BSD FRML MDRD: 100 ML/MIN/1.73SQ M
GLUCOSE P FAST SERPL-MCNC: 111 MG/DL (ref 65–99)
HDLC SERPL-MCNC: 75 MG/DL
LDLC SERPL CALC-MCNC: 100 MG/DL (ref 0–100)
POTASSIUM SERPL-SCNC: 3.6 MMOL/L (ref 3.5–5.3)
PROT SERPL-MCNC: 7.2 G/DL (ref 6.4–8.2)
SODIUM SERPL-SCNC: 146 MMOL/L (ref 136–145)
TRIGL SERPL-MCNC: 93 MG/DL

## 2020-01-09 PROCEDURE — 36415 COLL VENOUS BLD VENIPUNCTURE: CPT

## 2020-01-09 PROCEDURE — 80053 COMPREHEN METABOLIC PANEL: CPT

## 2020-01-09 PROCEDURE — 80061 LIPID PANEL: CPT
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
## 2020-01-14 ENCOUNTER — APPOINTMENT (OUTPATIENT)
Dept: RADIOLOGY | Facility: CLINIC | Age: 55
End: 2020-01-14
Payer: COMMERCIAL

## 2020-01-14 ENCOUNTER — OFFICE VISIT (OUTPATIENT)
Dept: URGENT CARE | Facility: CLINIC | Age: 55
End: 2020-01-14
Payer: COMMERCIAL

## 2020-01-14 VITALS
SYSTOLIC BLOOD PRESSURE: 144 MMHG | RESPIRATION RATE: 18 BRPM | TEMPERATURE: 100.9 F | DIASTOLIC BLOOD PRESSURE: 102 MMHG | HEART RATE: 107 BPM | HEIGHT: 60 IN | OXYGEN SATURATION: 98 % | BODY MASS INDEX: 20.81 KG/M2 | WEIGHT: 106 LBS

## 2020-01-14 DIAGNOSIS — R05.9 COUGH: ICD-10-CM

## 2020-01-14 DIAGNOSIS — R68.89 FLU-LIKE SYMPTOMS: Primary | ICD-10-CM

## 2020-01-14 PROCEDURE — 99203 OFFICE O/P NEW LOW 30 MIN: CPT | Performed by: PHYSICIAN ASSISTANT

## 2020-01-14 PROCEDURE — 71046 X-RAY EXAM CHEST 2 VIEWS: CPT

## 2020-01-14 PROCEDURE — 94640 AIRWAY INHALATION TREATMENT: CPT | Performed by: PHYSICIAN ASSISTANT

## 2020-01-14 PROCEDURE — G0382 LEV 3 HOSP TYPE B ED VISIT: HCPCS | Performed by: PHYSICIAN ASSISTANT

## 2020-01-14 PROCEDURE — 99283 EMERGENCY DEPT VISIT LOW MDM: CPT | Performed by: PHYSICIAN ASSISTANT

## 2020-01-14 RX ORDER — OSELTAMIVIR PHOSPHATE 75 MG/1
75 CAPSULE ORAL 2 TIMES DAILY
Qty: 10 CAPSULE | Refills: 0 | Status: SHIPPED | OUTPATIENT
Start: 2020-01-14 | End: 2020-01-19

## 2020-01-14 RX ORDER — IPRATROPIUM BROMIDE AND ALBUTEROL SULFATE 2.5; .5 MG/3ML; MG/3ML
3 SOLUTION RESPIRATORY (INHALATION) ONCE
Status: COMPLETED | OUTPATIENT
Start: 2020-01-14 | End: 2020-01-14

## 2020-01-14 RX ORDER — PREDNISONE 10 MG/1
TABLET ORAL
Qty: 26 TABLET | Refills: 0 | Status: SHIPPED | OUTPATIENT
Start: 2020-01-14 | End: 2020-04-16

## 2020-01-14 RX ADMIN — IPRATROPIUM BROMIDE AND ALBUTEROL SULFATE 3 ML: 2.5; .5 SOLUTION RESPIRATORY (INHALATION) at 15:49

## 2020-01-14 NOTE — PROGRESS NOTES
[facility]    NAME: Francia Scheuermann is a 48 y o  female  : 1965    MRN: [id number]  DATE: 2020  TIME: 3:56 PM    Assessment and Plan   Flu-like symptoms [R68 89]  1  Flu-like symptoms  oseltamivir (TAMIFLU) 75 mg capsule    predniSONE 10 mg tablet   2  Cough  ipratropium-albuterol (DUO-NEB) 0 5-2 5 mg/3 mL inhalation solution 3 mL    XR chest pa & lateral   Mini neb  Performed by: Moises Piña PA-C  Authorized by: Moises Piña PA-C     Number of treatments:  1  Treatment 1:   Pre-Procedure     Symptoms:  Cough and wheezing    Lung Sounds:  Wheezing    SP02:  98    Medication Administered:  Duoneb - Albuterol 2 5 mg/Atrovent 0 5 mg  Post-Procedure     Symptoms:  Wheezing and cough    Lung sounds:  Wheezing left lower lobe    SP02:  99          Patient Instructions   Patient Instructions   Infection appears viral   Recommend symptomatic treatment  Can take ibuprofen or tylenol as needed for pain or fever  Over the counter cough and cold medications to help with symptoms  Use salt water gargles for sore throat and throat lozenges  Cough drops as needed  Wash hands frequently to prevent the spread of infection  If not improving over the next 7-10 days, follow up with PCP  Symptoms may persist for 10-14 days  Increase fluid intake  Prednisone and tamiflu as directed  Albuterol inhaler as needed  Chief Complaint     Chief Complaint   Patient presents with    Cough     Pt c/o cough, chest congestion, fever, chills, nausea, headache, and body aches x 2 days  History of Present Illness   53-year-old female here with complaint of flu-like symptoms for the last 2 days  Has body aches, fever, chills  Dry Cough and chest tightness  No sore throat  Does report having nasal congestion  Review of Systems   Review of Systems   Constitutional: Positive for chills and fever  Negative for appetite change  HENT: Positive for congestion   Negative for ear discharge, ear pain, facial swelling, postnasal drip, sinus pressure, sneezing and sore throat  Respiratory: Positive for cough and wheezing  Negative for shortness of breath  Gastrointestinal: Positive for nausea  Negative for diarrhea and vomiting  Musculoskeletal: Positive for myalgias  Neurological: Positive for headaches  Current Medications     Current Outpatient Medications:     gabapentin (NEURONTIN) 100 mg capsule, Take 2 capsules (200 mg total) by mouth 2 (two) times a day, Disp: 112 capsule, Rfl: 1    albuterol (PROAIR HFA) 90 mcg/act inhaler, Inhale 2 puffs every 6 (six) hours as needed for wheezing (Patient not taking: Reported on 1/14/2020), Disp: 1 Inhaler, Rfl: 2    gabapentin (NEURONTIN) 100 mg capsule, Take 1 capsule (100 mg total) by mouth 3 (three) times a day (Patient not taking: Reported on 1/14/2020), Disp: 15 capsule, Rfl: 0    ibuprofen (MOTRIN) 400 mg tablet, Take 1 tablet by mouth every 8 (eight) hours as needed, Disp: , Rfl:     oseltamivir (TAMIFLU) 75 mg capsule, Take 1 capsule (75 mg total) by mouth 2 (two) times a day for 5 days, Disp: 10 capsule, Rfl: 0    predniSONE 10 mg tablet, Take 3 tabs BID X 2 days, 2 tabs BID X 2 days, 1 tab BID X 2 days, 1 tab daily X 2 days, Disp: 26 tablet, Rfl: 0    senna-docusate sodium (SENOKOT-S) 8 6-50 mg per tablet, Take 3 tablets by mouth daily at bedtime, Disp: , Rfl:   No current facility-administered medications for this visit  Current Allergies     Allergies as of 01/14/2020    (No Known Allergies)          The following portions of the patient's history were reviewed and updated as appropriate: allergies, current medications, past family history, past medical history, past social history, past surgical history and problem list    Past Medical History:   Diagnosis Date    Hypertension      History reviewed  No pertinent surgical history  History reviewed  No pertinent family history    Social History     Socioeconomic History  Marital status: /Civil Union     Spouse name: Not on file    Number of children: Not on file    Years of education: Not on file    Highest education level: Not on file   Occupational History    Not on file   Social Needs    Financial resource strain: Not on file    Food insecurity:     Worry: Not on file     Inability: Not on file    Transportation needs:     Medical: Not on file     Non-medical: Not on file   Tobacco Use    Smoking status: Current Every Day Smoker     Packs/day: 0 50     Types: Cigarettes    Smokeless tobacco: Never Used   Substance and Sexual Activity    Alcohol use: Not Currently    Drug use: Yes     Types: Marijuana     Comment: CBD OIL    Sexual activity: Yes     Partners: Male   Lifestyle    Physical activity:     Days per week: Not on file     Minutes per session: Not on file    Stress: Not on file   Relationships    Social connections:     Talks on phone: Not on file     Gets together: Not on file     Attends Gnosticism service: Not on file     Active member of club or organization: Not on file     Attends meetings of clubs or organizations: Not on file     Relationship status: Not on file    Intimate partner violence:     Fear of current or ex partner: Not on file     Emotionally abused: Not on file     Physically abused: Not on file     Forced sexual activity: Not on file   Other Topics Concern    Not on file   Social History Narrative    Not on file     Medications have been verified  Objective   BP (!) 144/102 (BP Location: Right arm, Patient Position: Sitting, Cuff Size: Standard)   Pulse (!) 107   Temp (!) 100 9 °F (38 3 °C) (Tympanic)   Resp 18   Ht 5' (1 524 m)   Wt 48 1 kg (106 lb)   SpO2 98%   BMI 20 70 kg/m²      Physical Exam   Physical Exam   Constitutional: She appears well-developed and well-nourished  No distress  HENT:   Head: Normocephalic and atraumatic     Right Ear: Tympanic membrane normal    Left Ear: Tympanic membrane normal  Nose: Mucosal edema present  No rhinorrhea  Right sinus exhibits no maxillary sinus tenderness and no frontal sinus tenderness  Left sinus exhibits no maxillary sinus tenderness and no frontal sinus tenderness  Mouth/Throat: Oropharynx is clear and moist  No oropharyngeal exudate, posterior oropharyngeal edema or posterior oropharyngeal erythema  Eyes: Conjunctivae are normal    Cardiovascular: Normal rate, regular rhythm and normal heart sounds  No murmur heard  Pulmonary/Chest: Effort normal  She has wheezes  Nursing note and vitals reviewed

## 2020-01-14 NOTE — PATIENT INSTRUCTIONS
Infection appears viral   Recommend symptomatic treatment  Can take ibuprofen or tylenol as needed for pain or fever  Over the counter cough and cold medications to help with symptoms  Use salt water gargles for sore throat and throat lozenges  Cough drops as needed  Wash hands frequently to prevent the spread of infection  If not improving over the next 7-10 days, follow up with PCP  Symptoms may persist for 10-14 days  Increase fluid intake  Prednisone and tamiflu as directed  Albuterol inhaler as needed

## 2020-01-21 ENCOUNTER — HOSPITAL ENCOUNTER (OUTPATIENT)
Facility: HOSPITAL | Age: 55
Setting detail: OUTPATIENT SURGERY
Discharge: HOME | End: 2020-01-21
Attending: ORTHOPAEDIC SURGERY | Admitting: ORTHOPAEDIC SURGERY
Payer: COMMERCIAL

## 2020-01-21 VITALS
BODY MASS INDEX: 20.42 KG/M2 | HEIGHT: 60 IN | SYSTOLIC BLOOD PRESSURE: 146 MMHG | DIASTOLIC BLOOD PRESSURE: 88 MMHG | TEMPERATURE: 99.2 F | HEART RATE: 98 BPM | WEIGHT: 104 LBS | RESPIRATION RATE: 20 BRPM | OXYGEN SATURATION: 96 %

## 2020-01-21 DIAGNOSIS — G56.01 CARPAL TUNNEL SYNDROME OF RIGHT WRIST: Primary | ICD-10-CM

## 2020-01-21 PROCEDURE — 29848 WRIST ENDOSCOPY/SURGERY: CPT | Performed by: ORTHOPAEDIC SURGERY

## 2020-01-21 PROCEDURE — 99024 POSTOP FOLLOW-UP VISIT: CPT | Performed by: ORTHOPAEDIC SURGERY

## 2020-01-21 RX ORDER — MAGNESIUM HYDROXIDE 1200 MG/15ML
LIQUID ORAL AS NEEDED
Status: DISCONTINUED | OUTPATIENT
Start: 2020-01-21 | End: 2020-01-21 | Stop reason: HOSPADM

## 2020-01-21 RX ORDER — HYDROCODONE BITARTRATE AND ACETAMINOPHEN 5; 325 MG/1; MG/1
1 TABLET ORAL EVERY 6 HOURS PRN
Qty: 5 TABLET | Refills: 0 | Status: SHIPPED | OUTPATIENT
Start: 2020-01-21 | End: 2020-01-26

## 2020-01-21 RX ORDER — SENNOSIDES 8.6 MG
650 CAPSULE ORAL EVERY 8 HOURS
Qty: 15 TABLET | Refills: 0 | Status: ON HOLD | OUTPATIENT
Start: 2020-01-21 | End: 2020-01-28 | Stop reason: SDUPTHER

## 2020-01-21 RX ORDER — NAPROXEN SODIUM 220 MG
220 TABLET ORAL 2 TIMES DAILY WITH MEALS
Qty: 10 TABLET | Refills: 0 | Status: ON HOLD | OUTPATIENT
Start: 2020-01-21 | End: 2020-01-28 | Stop reason: SDUPTHER

## 2020-01-21 RX ADMIN — SODIUM BICARBONATE: 84 INJECTION, SOLUTION INTRAVENOUS at 11:52

## 2020-01-21 NOTE — OP NOTE
OPERATIVE REPORT  PATIENT NAME: Jp Lou  :  1965  MRN: [id number]  Pt Location: [facility]    SURGERY DATE: 20    Surgeon(s) and Role:     * Jorge Soni MD - Primary     * Mali Bridges MD - Assisting    Pre-Op Diagnosis:  Carpal tunnel syndrome of right wrist [G56 01]    Post-Op Diagnosis Codes:     * Carpal tunnel syndrome of right wrist [G56 01]    Procedure(s):  RELEASE CARPAL TUNNEL ENDOSCOPIC (Right)    Specimen(s):  * No orders in the log *    Estimated Blood Loss:   Minimal      Anesthesia Type:   Local    Operative Indications: The patient has a history of Carpal Tunnel Syndrome  right that was recalcitrant to conservative management  The decision was made to bring the patient to the operating room for Endoscopic Carpal Tunnel Release  right  Risks of the procedure were explained which include, but are not limited to bleeding; infection; damage to nerves, arteries,veins, tendons; scar; pain; need for reoperation; failure to give desired result; and risks of anaesthesia  All questions were answered to satisfaction and they were willing to proceed  Operative Findings:  Right carpal tunnel syndrome    Complications:   None    Procedure and Technique:  After the patient, site, and procedure were identified, the patient was brought into the operating room in a supine position  Local anaesthesia was adminstered in the preoperative holding area  A tourniquet was not used  The  right upper extremity was then prepped and drapped in a normal, sterile, orthopedic fashion  After reconfirmation of the patient, site, and surgical procedure, which was agreed upon by the entire surgical team, attention was turned to the right wrist   The sites of the proximal and distal incisions were marked    The rosie of the proximal incision was placed horizontally at the midline of the wrist   The distal incision rosie was longitudinal extending distally from the point of intersection of the line between the long finger and ring finger and the line along the distal border of the fully abducted thumb  The proximal incision was performed  Subcutaneous tissues were dissected  Then the transverse volar antebrachial fascia was perforated with a scalpel  The edges of the skin incision where retracted and the forearm fascia was incised for approximately 1 5 cm proximally with care taken to identify and protect the median nerve  Retractors were used to inspect the transverse carpal ligament distally  A curved Bailon dissector was used to glide under the transverse carpal ligament and superficial to the median nerve with confirmation via the washboard feeling  Then the curved Bailon was pushed into the palm toward the distal incision site  When the location of the distal skin rosie was adequate, the distal incision was made  Then with retraction of the skin, further dissection and perforation of the palmar fascia was performed with the use of tenotomy scissors  The curved Bailon was guided from proximal to distal out the distal incisions without any twisting to allow for dilation of the tract  The curved Bailon was removed, and the cannula for the camera was inserted along the same tract, making sure to keep the alignment post on the cannula perpendicular to the plane of the hand without twisting  Then while keeping the wrist in extension, and holding the cannula of the camera in place, the wrist was placed on the hyperextension board  The scope was inserted distally, and a cotton-tip applicator was used proximally to clean the tract as well as the scope  A curved cutting knife was introduced from proximal to distal while keeping visualization with the use of the camera  Without twisting of the canula, the knife was used to cut the transverse carpal ligament completely, making sure there were no remnant fibers  Then after this was accomplished, the hand was removed from the extension block    Three maneuvers were used to confirm the full release of the transverse carpal ligament  First, the ease of twisting the trocar of the camera confirmed the release of the ligament  Second, the curved Bailon was introduced to make sure there were no remnant fibers that could be felt palmarly  Third, the scope was introduced again to visualize that the whole ligament was released proximally to distally  Additional confirmation of full release included retraction and inspection in the distal and proximal incisions to make sure there were no remnant fibers distally or proximally respectively  At the completion of the procedure, hemostasis was obtained with cautery and direct pressure  The wounds were copiously irrigated with sterile solution  The wounds were closed with Prolene  Sterile dressings were applied, including Xeroform, gauze, tweeners, webril, ACE  Please note, all sponge, needle, and instrument counts were correct prior to closure  Loupe magnification was utilized  The patient tolerated the procedure well       I was present for the entire procedure    Patient Disposition:  APU and hemodynamically stable    SIGNATURE: Lilly Savage MD  DATE: 01/21/20  TIME: 12:39 PM

## 2020-01-21 NOTE — H&P
H&P Exam - Orthopedics   Adelaida Myers 48 y o  female MRN: [id number]  Unit/Bed#:     Assessment/Plan   Assessment:  B/L CTS  Plan:  R ECTR today followed by L ECTR to be performed at a later date    History of Present Illness   HPI:  Adelaida Myers is a 48 y o  female who presents with continued c/o b/l hand n/t to the thumb, index and ring fingers for several years  Right> Left  NSAIDs and bracing have only provided partial relief  Historical Information  Review Of Systems:   · Skin: Normal  · Neuro: See HPI  · Musculoskeletal: See HPI  · 14 point review of systems negative except as stated above     Past Medical History:   Past Medical History:   Diagnosis Date    Hypertension        Past Surgical History:   No past surgical history on file  Family History:  Family history reviewed and non-contributory  No family history on file      Social History:  Social History     Socioeconomic History    Marital status: /Civil Union     Spouse name: Not on file    Number of children: Not on file    Years of education: Not on file    Highest education level: Not on file   Occupational History    Not on file   Social Needs    Financial resource strain: Not on file    Food insecurity:     Worry: Not on file     Inability: Not on file    Transportation needs:     Medical: Not on file     Non-medical: Not on file   Tobacco Use    Smoking status: Current Every Day Smoker     Packs/day: 0 50     Types: Cigarettes    Smokeless tobacco: Never Used   Substance and Sexual Activity    Alcohol use: Not Currently    Drug use: Yes     Types: Marijuana     Comment: CBD OIL    Sexual activity: Yes     Partners: Male   Lifestyle    Physical activity:     Days per week: Not on file     Minutes per session: Not on file    Stress: Not on file   Relationships    Social connections:     Talks on phone: Not on file     Gets together: Not on file     Attends Taoist service: Not on file     Active member of club or organization: Not on file     Attends meetings of clubs or organizations: Not on file     Relationship status: Not on file    Intimate partner violence:     Fear of current or ex partner: Not on file     Emotionally abused: Not on file     Physically abused: Not on file     Forced sexual activity: Not on file   Other Topics Concern    Not on file   Social History Narrative    Not on file       Allergies:   No Known Allergies        Labs:  0   Lab Value Date/Time    HCT 41 8 09/30/2019 1136    HCT 42 0 06/15/2018 2220    HGB 13 3 09/30/2019 1136    HGB 14 6 06/15/2018 2220    WBC 7 86 09/30/2019 1136    WBC 8 0 06/15/2018 2220    CRP <3 0 09/30/2019 1136       Meds:  No current facility-administered medications for this encounter  Current Outpatient Medications:     albuterol (PROAIR HFA) 90 mcg/act inhaler, Inhale 2 puffs every 6 (six) hours as needed for wheezing (Patient not taking: Reported on 1/14/2020), Disp: 1 Inhaler, Rfl: 2    gabapentin (NEURONTIN) 100 mg capsule, Take 2 capsules (200 mg total) by mouth 2 (two) times a day, Disp: 112 capsule, Rfl: 1    gabapentin (NEURONTIN) 100 mg capsule, Take 1 capsule (100 mg total) by mouth 3 (three) times a day (Patient not taking: Reported on 1/14/2020), Disp: 15 capsule, Rfl: 0    ibuprofen (MOTRIN) 400 mg tablet, Take 1 tablet by mouth every 8 (eight) hours as needed, Disp: , Rfl:     predniSONE 10 mg tablet, Take 3 tabs BID X 2 days, 2 tabs BID X 2 days, 1 tab BID X 2 days, 1 tab daily X 2 days, Disp: 26 tablet, Rfl: 0    senna-docusate sodium (SENOKOT-S) 8 6-50 mg per tablet, Take 3 tablets by mouth daily at bedtime, Disp: , Rfl:     Blood Culture:   No results found for: BLOODCX    Wound Culture:   No results found for: WOUNDCULT    Ins and Outs:  No intake/output data recorded  Physical Exam  There were no vitals taken for this visit  There were no vitals taken for this visit    Gen: Alert and oriented to person, place, time  HEENT: EOMI, eyes clear, moist mucus membranes, hearing intact  Respiratory: Bilateral chest rise   No audible wheezing found  Cardiovascular: Regular Rate and Rhythm  Abdomen: soft nontender/nondistended  Ortho Exam: FROM b/l wrists  Neuro Exam: B/L carpal tunnels with + Tinel's, + Durkan's compression test, +Phalen's test, + APB weakness    Lab Results: Reviewed  Imaging: Reviewed

## 2020-01-21 NOTE — H&P (VIEW-ONLY)
H&P Exam - Orthopedics   Geneva Harmon 48 y o  female MRN: [id number]  Unit/Bed#:     Assessment/Plan   Assessment:  B/L CTS  Plan:  R ECTR today followed by L ECTR to be performed at a later date    History of Present Illness   HPI:  Geneva Harmon is a 48 y o  female who presents with continued c/o b/l hand n/t to the thumb, index and ring fingers for several years  Right> Left  NSAIDs and bracing have only provided partial relief  Historical Information  Review Of Systems:   · Skin: Normal  · Neuro: See HPI  · Musculoskeletal: See HPI  · 14 point review of systems negative except as stated above     Past Medical History:   Past Medical History:   Diagnosis Date    Hypertension        Past Surgical History:   No past surgical history on file  Family History:  Family history reviewed and non-contributory  No family history on file      Social History:  Social History     Socioeconomic History    Marital status: /Civil Union     Spouse name: Not on file    Number of children: Not on file    Years of education: Not on file    Highest education level: Not on file   Occupational History    Not on file   Social Needs    Financial resource strain: Not on file    Food insecurity:     Worry: Not on file     Inability: Not on file    Transportation needs:     Medical: Not on file     Non-medical: Not on file   Tobacco Use    Smoking status: Current Every Day Smoker     Packs/day: 0 50     Types: Cigarettes    Smokeless tobacco: Never Used   Substance and Sexual Activity    Alcohol use: Not Currently    Drug use: Yes     Types: Marijuana     Comment: CBD OIL    Sexual activity: Yes     Partners: Male   Lifestyle    Physical activity:     Days per week: Not on file     Minutes per session: Not on file    Stress: Not on file   Relationships    Social connections:     Talks on phone: Not on file     Gets together: Not on file     Attends Caodaism service: Not on file     Active member of club or organization: Not on file     Attends meetings of clubs or organizations: Not on file     Relationship status: Not on file    Intimate partner violence:     Fear of current or ex partner: Not on file     Emotionally abused: Not on file     Physically abused: Not on file     Forced sexual activity: Not on file   Other Topics Concern    Not on file   Social History Narrative    Not on file       Allergies:   No Known Allergies        Labs:  0   Lab Value Date/Time    HCT 41 8 09/30/2019 1136    HCT 42 0 06/15/2018 2220    HGB 13 3 09/30/2019 1136    HGB 14 6 06/15/2018 2220    WBC 7 86 09/30/2019 1136    WBC 8 0 06/15/2018 2220    CRP <3 0 09/30/2019 1136       Meds:  No current facility-administered medications for this encounter  Current Outpatient Medications:     albuterol (PROAIR HFA) 90 mcg/act inhaler, Inhale 2 puffs every 6 (six) hours as needed for wheezing (Patient not taking: Reported on 1/14/2020), Disp: 1 Inhaler, Rfl: 2    gabapentin (NEURONTIN) 100 mg capsule, Take 2 capsules (200 mg total) by mouth 2 (two) times a day, Disp: 112 capsule, Rfl: 1    gabapentin (NEURONTIN) 100 mg capsule, Take 1 capsule (100 mg total) by mouth 3 (three) times a day (Patient not taking: Reported on 1/14/2020), Disp: 15 capsule, Rfl: 0    ibuprofen (MOTRIN) 400 mg tablet, Take 1 tablet by mouth every 8 (eight) hours as needed, Disp: , Rfl:     predniSONE 10 mg tablet, Take 3 tabs BID X 2 days, 2 tabs BID X 2 days, 1 tab BID X 2 days, 1 tab daily X 2 days, Disp: 26 tablet, Rfl: 0    senna-docusate sodium (SENOKOT-S) 8 6-50 mg per tablet, Take 3 tablets by mouth daily at bedtime, Disp: , Rfl:     Blood Culture:   No results found for: BLOODCX    Wound Culture:   No results found for: WOUNDCULT    Ins and Outs:  No intake/output data recorded  Physical Exam  There were no vitals taken for this visit  There were no vitals taken for this visit    Gen: Alert and oriented to person, place, time  HEENT: EOMI, eyes clear, moist mucus membranes, hearing intact  Respiratory: Bilateral chest rise   No audible wheezing found  Cardiovascular: Regular Rate and Rhythm  Abdomen: soft nontender/nondistended  Ortho Exam: FROM b/l wrists  Neuro Exam: B/L carpal tunnels with + Tinel's, + Durkan's compression test, +Phalen's test, + APB weakness    Lab Results: Reviewed  Imaging: Reviewed

## 2020-01-28 ENCOUNTER — TELEPHONE (OUTPATIENT)
Dept: OBGYN CLINIC | Facility: HOSPITAL | Age: 55
End: 2020-01-28

## 2020-01-28 ENCOUNTER — HOSPITAL ENCOUNTER (OUTPATIENT)
Facility: HOSPITAL | Age: 55
Setting detail: OUTPATIENT SURGERY
Discharge: HOME | End: 2020-01-28
Attending: ORTHOPAEDIC SURGERY | Admitting: ORTHOPAEDIC SURGERY
Payer: COMMERCIAL

## 2020-01-28 VITALS
HEART RATE: 98 BPM | WEIGHT: 104 LBS | DIASTOLIC BLOOD PRESSURE: 92 MMHG | RESPIRATION RATE: 18 BRPM | BODY MASS INDEX: 20.42 KG/M2 | OXYGEN SATURATION: 97 % | TEMPERATURE: 97 F | SYSTOLIC BLOOD PRESSURE: 145 MMHG | HEIGHT: 60 IN

## 2020-01-28 DIAGNOSIS — G56.01 CARPAL TUNNEL SYNDROME OF RIGHT WRIST: ICD-10-CM

## 2020-01-28 DIAGNOSIS — G56.02 CARPAL TUNNEL SYNDROME ON LEFT: Primary | ICD-10-CM

## 2020-01-28 PROCEDURE — 29848 WRIST ENDOSCOPY/SURGERY: CPT | Performed by: ORTHOPAEDIC SURGERY

## 2020-01-28 RX ORDER — LIDOCAINE HYDROCHLORIDE AND EPINEPHRINE 10; 10 MG/ML; UG/ML
20 INJECTION, SOLUTION INFILTRATION; PERINEURAL ONCE
Status: DISCONTINUED | OUTPATIENT
Start: 2020-01-28 | End: 2020-01-28 | Stop reason: HOSPADM

## 2020-01-28 RX ORDER — NAPROXEN SODIUM 220 MG
220 TABLET ORAL 2 TIMES DAILY WITH MEALS
Qty: 10 TABLET | Refills: 0 | Status: SHIPPED | OUTPATIENT
Start: 2020-01-28 | End: 2020-04-16

## 2020-01-28 RX ORDER — SENNOSIDES 8.6 MG
650 CAPSULE ORAL EVERY 8 HOURS
Qty: 15 TABLET | Refills: 0 | Status: SHIPPED | OUTPATIENT
Start: 2020-01-28 | End: 2021-04-09

## 2020-01-28 RX ORDER — HYDROCODONE BITARTRATE AND ACETAMINOPHEN 5; 325 MG/1; MG/1
1 TABLET ORAL EVERY 6 HOURS PRN
Status: ON HOLD | COMMUNITY
End: 2020-01-28 | Stop reason: SDUPTHER

## 2020-01-28 RX ORDER — HYDROCODONE BITARTRATE AND ACETAMINOPHEN 5; 325 MG/1; MG/1
1 TABLET ORAL EVERY 6 HOURS PRN
Qty: 5 TABLET | Refills: 0 | Status: SHIPPED | OUTPATIENT
Start: 2020-01-28 | End: 2020-02-07

## 2020-01-28 RX ADMIN — SODIUM BICARBONATE: 84 INJECTION, SOLUTION INTRAVENOUS at 07:48

## 2020-01-28 NOTE — TELEPHONE ENCOUNTER
No     Norco one pill only once every 6 hours as needed is my protocol  They only get 5 pills  Never 2  Most hand surgeons never give narcotics for carpal tunnel - 2 every 4 is a huge overkill  Naproxen 220 mg every 12 hours for 5 days even if no pain  Tylenol 650 extended release every 8 hours for 5 days even if no pain  This is given to patients prior to surgery and explained by the nurses and written down in post op instructions  I    I agree with the elevation and ice  Please call patient and correct      Greta Pritchard

## 2020-01-28 NOTE — OP NOTE
OPERATIVE REPORT  PATIENT NAME: Iban Goins  :  1965  MRN: [id number]  Pt Location: [facility]    SURGERY DATE: 20    Surgeon(s) and Role:     * Stephen Brock MD - Primary    Pre-Op Diagnosis:  Carpal tunnel syndrome on left [G56 02]    Post-Op Diagnosis Codes:     * Carpal tunnel syndrome on left [G56 02]    Procedure(s):  RELEASE CARPAL TUNNEL ENDOSCOPIC (Left)    Specimen(s):  * No orders in the log *    Estimated Blood Loss:   Minimal      Anesthesia Type:   Local    Operative Indications: The patient has a history of Carpal Tunnel Syndrome  left that was recalcitrant to conservative management  The decision was made to bring the patient to the operating room for Endoscopic Carpal Tunnel Release  left  Risks of the procedure were explained which include, but are not limited to bleeding; infection; damage to nerves, arteries,veins, tendons; scar; pain; need for reoperation; failure to give desired result; and risks of anaesthesia  All questions were answered to satisfaction and they were willing to proceed  Operative Findings:  Left carpal tunnel syndrome    Complications:   None    Procedure and Technique:  After the patient, site, and procedure were identified, the patient was brought into the operating room in a supine position  Local anaesthesia was adminstered in the preoperative holding area  A tourniquet was not used  The  left upper extremity was then prepped and drapped in a normal, sterile, orthopedic fashion  After reconfirmation of the patient, site, and surgical procedure, which was agreed upon by the entire surgical team, attention was turned to the left wrist   The sites of the proximal and distal incisions were marked    The rosie of the proximal incision was placed horizontally at the midline of the wrist   The distal incision rosie was longitudinal extending distally from the point of intersection of the line between the long finger and ring finger and the line along the distal border of the fully abducted thumb  The proximal incision was performed  Subcutaneous tissues were dissected  Then the transverse volar antebrachial fascia was perforated with a scalpel  The edges of the skin incision where retracted and the forearm fascia was incised for approximately 1 5 cm proximally with care taken to identify and protect the median nerve  Retractors were used to inspect the transverse carpal ligament distally  A curved Bailon dissector was used to glide under the transverse carpal ligament and superficial to the median nerve with confirmation via the washboard feeling  Then the curved Bailon was pushed into the palm toward the distal incision site  When the location of the distal skin rosie was adequate, the distal incision was made  Then with retraction of the skin, further dissection and perforation of the palmar fascia was performed with the use of tenotomy scissors  The curved Bailon was guided from proximal to distal out the distal incisions without any twisting to allow for dilation of the tract  The curved Bailon was removed, and the cannula for the camera was inserted along the same tract, making sure to keep the alignment post on the cannula perpendicular to the plane of the hand without twisting  Then while keeping the wrist in extension, and holding the cannula of the camera in place, the wrist was placed on the hyperextension board  The scope was inserted distally, and a cotton-tip applicator was used proximally to clean the tract as well as the scope  A curved cutting knife was introduced from proximal to distal while keeping visualization with the use of the camera  Without twisting of the canula, the knife was used to cut the transverse carpal ligament completely, making sure there were no remnant fibers  Then after this was accomplished, the hand was removed from the extension block    Three maneuvers were used to confirm the full release of the transverse carpal ligament  First, the ease of twisting the trocar of the camera confirmed the release of the ligament  Second, the curved Bailon was introduced to make sure there were no remnant fibers that could be felt palmarly  Third, the scope was introduced again to visualize that the whole ligament was released proximally to distally  Additional confirmation of full release included retraction and inspection in the distal and proximal incisions to make sure there were no remnant fibers distally or proximally respectively  At the completion of the procedure, hemostasis was obtained with cautery and direct pressure  The wounds were copiously irrigated with sterile solution  The wounds were closed with Prolene  Sterile dressings were applied, including Xeroform, gauze, tweeners, webril, ACE  Please note, all sponge, needle, and instrument counts were correct prior to closure  Loupe magnification was utilized  The patient tolerated the procedure well       I was present for the entire procedure and A qualified resident physician was not available    Patient Disposition:  APU and hemodynamically stable    SIGNATURE: Lilly Savage MD  DATE: 01/28/20  TIME: 8:24 AM

## 2020-01-28 NOTE — INTERVAL H&P NOTE
H&P reviewed  After examining the patient I find no changes in the patients condition since the H&P had been written  Patient doing well after right endoscopic carpal tunnel release  Numbness and tingling has improved   Patient for left endoscopic carpal tunnel release today under local

## 2020-01-28 NOTE — TELEPHONE ENCOUNTER
Dr Conor Parr patient - Krystyna Rocha today    Patient calling that her palmar area to base of thumb is hurting her  Patient states she did not start pain medication yet because she did not have any pain with her last surgery  I advised that she should start her pain medication regime as directed  Advised to check cap refill and WNL, ice 20 min on 20 min off, loosen outer ace wrap if seems too tight, elevate above the heart  Call office if pain level does not improve  Advised okay to take Norco 2 tabs every 4 hrs x 2 doses then go back to 1 every 4 hrs  Patient verbalized understanding

## 2020-01-29 NOTE — TELEPHONE ENCOUNTER
I spoke to patient and she states her pain is at a 4-5 now, it has calmed down  Had a large amount of serrous drainage from wrist incision yesterday which has resolved  Dressing is dry and intact  She is taking the Naproxen BID with food and tylenol 650mg every 8 hrs  This is what she is using for pain control  She has taken only 1/2 Norco 2 x since yesterday  She is only taking this for sleep  She will not and did not double up  Pain is controlled

## 2020-02-04 ENCOUNTER — OFFICE VISIT (OUTPATIENT)
Dept: CARDIOLOGY CLINIC | Facility: CLINIC | Age: 55
End: 2020-02-04
Payer: COMMERCIAL

## 2020-02-04 VITALS
BODY MASS INDEX: 20.03 KG/M2 | WEIGHT: 102 LBS | HEIGHT: 60 IN | DIASTOLIC BLOOD PRESSURE: 70 MMHG | HEART RATE: 102 BPM | SYSTOLIC BLOOD PRESSURE: 122 MMHG

## 2020-02-04 DIAGNOSIS — R00.2 PALPITATIONS: Primary | ICD-10-CM

## 2020-02-04 PROCEDURE — 99243 OFF/OP CNSLTJ NEW/EST LOW 30: CPT | Performed by: INTERNAL MEDICINE

## 2020-02-04 PROCEDURE — 93000 ELECTROCARDIOGRAM COMPLETE: CPT | Performed by: INTERNAL MEDICINE

## 2020-02-04 NOTE — PROGRESS NOTES
Cardiology Follow Up    Geneva Harmon  1965  [id number]  [facility] [place]  [address] [facility] [address]  [phone]  [phone]    No diagnosis found  Discussion: History of palpitations without syncope or near-syncope  ECG notable for possible WPW (short MS, NSST changes, no obvious delta wave)  Echocardiogram in 48 hour Holter monitor was ordered  She was urged to discontinue smoking  Cardiovascular History:  Ms Kailee Laws has a longstanding history of palpitations  There is no history of syncope or near-syncope  There is no history of angina or congestive heart failure  At the time of her initial visit in 1/20, ECG showed possible WPW, with a short MS (112 msec) increased voltage and NSST-T changes without a delta wave  She does have probable COPD  At the time of her initial visit in 2/20, a 48 hour Holter monitor and an echocardiogram were ordered  Atherosclerotic risk factors are notable for a long-standing smoking history  She does not have diabetes, hypertension, or dyslipidemia  A lipid panel in 1/20 disclosed total cholesterol 194, , HDL 75, triglycerides 93       Patient Active Problem List   Diagnosis    Well adult exam    Bipolar 2 disorder, major depressive episode ([facility] [address] )    Bilateral carpal tunnel syndrome    Status post left breast lumpectomy    Arthritis    Slow transit constipation    Encounter for health-related screening    Screening for breast cancer    Screen for colon cancer    Carpal tunnel syndrome of right wrist    Carpal tunnel syndrome on left    Wheezing     Past Medical History:   Diagnosis Date    Hypertension      Social History     Socioeconomic History    Marital status: /Civil Union     Spouse name: Not on file    Number of children: Not on file    Years of education: Not on file    Highest education level: Not on file   Occupational History    Not on file   Social Needs    Financial resource strain: Not on file    Food insecurity:     Worry: Not on file     Inability: Not on file    Transportation needs:     Medical: Not on file     Non-medical: Not on file   Tobacco Use    Smoking status: Current Every Day Smoker     Packs/day: 0 50     Types: Cigarettes    Smokeless tobacco: Never Used   Substance and Sexual Activity    Alcohol use: Not Currently    Drug use: Yes     Types: Marijuana     Comment: CBD OIL    Sexual activity: Yes     Partners: Male   Lifestyle    Physical activity:     Days per week: Not on file     Minutes per session: Not on file    Stress: Not on file   Relationships    Social connections:     Talks on phone: Not on file     Gets together: Not on file     Attends Church service: Not on file     Active member of club or organization: Not on file     Attends meetings of clubs or organizations: Not on file     Relationship status: Not on file    Intimate partner violence:     Fear of current or ex partner: Not on file     Emotionally abused: Not on file     Physically abused: Not on file     Forced sexual activity: Not on file   Other Topics Concern    Not on file   Social History Narrative    Not on file      No family history on file    Past Surgical History:   Procedure Laterality Date    IL WRIST Chris Revering LIG Right 1/21/2020    Procedure: RELEASE CARPAL TUNNEL ENDOSCOPIC;  Surgeon: Alison Posey MD;  Location: [facility];  Service: Orthopedics    IL WRIST Chris Revering LIG Left 1/28/2020    Procedure: RELEASE CARPAL TUNNEL ENDOSCOPIC;  Surgeon: Alison Posey MD;  Location: [facility];  Service: Orthopedics       Current Outpatient Medications:     acetaminophen (TYLENOL 8 HOUR) 650 mg CR tablet, Take 1 tablet (650 mg total) by mouth every 8 (eight) hours, Disp: 15 tablet, Rfl: 0    albuterol (PROAIR HFA) 90 mcg/act inhaler, Inhale 2 puffs every 6 (six) hours as needed for wheezing, Disp: 1 Inhaler, Rfl: 2    gabapentin (NEURONTIN) 100 mg capsule, Take 2 capsules (200 mg total) by mouth 2 (two) times a day, Disp: 112 capsule, Rfl: 1    gabapentin (NEURONTIN) 100 mg capsule, Take 1 capsule (100 mg total) by mouth 3 (three) times a day (Patient not taking: Reported on 1/14/2020), Disp: 15 capsule, Rfl: 0    HYDROcodone-acetaminophen (NORCO) 5-325 mg per tablet, Take 1 tablet by mouth every 6 (six) hours as needed for pain for up to 10 daysMax Daily Amount: 4 tablets, Disp: 5 tablet, Rfl: 0    naproxen sodium (ALEVE) 220 MG tablet, Take 1 tablet (220 mg total) by mouth 2 (two) times a day with meals for 5 days, Disp: 10 tablet, Rfl: 0    predniSONE 10 mg tablet, Take 3 tabs BID X 2 days, 2 tabs BID X 2 days, 1 tab BID X 2 days, 1 tab daily X 2 days, Disp: 26 tablet, Rfl: 0    senna-docusate sodium (SENOKOT-S) 8 6-50 mg per tablet, Take 3 tablets by mouth daily at bedtime, Disp: , Rfl:   No Known Allergies    Labs:  Appointment on 01/09/2020   Component Date Value    Cholesterol 01/09/2020 194     Triglycerides 01/09/2020 93     HDL, Direct 01/09/2020 75     LDL Calculated 01/09/2020 100     Sodium 01/09/2020 146*    Potassium 01/09/2020 3 6     Chloride 01/09/2020 112*    CO2 01/09/2020 29     ANION GAP 01/09/2020 5     BUN 01/09/2020 14     Creatinine 01/09/2020 0 69     Glucose, Fasting 01/09/2020 111*    Calcium 01/09/2020 9 4     AST 01/09/2020 10     ALT 01/09/2020 29     Alkaline Phosphatase 01/09/2020 60     Total Protein 01/09/2020 7 2     Albumin 01/09/2020 3 9     Total Bilirubin 01/09/2020 0 43     eGFR 01/09/2020 100    Appointment on 09/30/2019   Component Date Value    TSH 3RD GENERATON 09/30/2019 1 980     WBC 09/30/2019 7 86     RBC 09/30/2019 4 39     Hemoglobin 09/30/2019 13 3     Hematocrit 09/30/2019 41 8     MCV 09/30/2019 95     MCH 09/30/2019 30 3     MCHC 09/30/2019 31 8     RDW 09/30/2019 14 6     MPV 09/30/2019 11 2     Platelets 29/80/5042 247     nRBC 09/30/2019 0     Neutrophils Relative 09/30/2019 60     Immat GRANS % 09/30/2019 0     Lymphocytes Relative 09/30/2019 30     Monocytes Relative 09/30/2019 7     Eosinophils Relative 09/30/2019 2     Basophils Relative 09/30/2019 1     Neutrophils Absolute 09/30/2019 4 66     Immature Grans Absolute 09/30/2019 0 01     Lymphocytes Absolute 09/30/2019 2 38     Monocytes Absolute 09/30/2019 0 58     Eosinophils Absolute 09/30/2019 0 17     Basophils Absolute 09/30/2019 0 06     CRP 09/30/2019 <3 0     Rheumatoid Factor 09/30/2019 Negative     FERNANDO 09/30/2019 Negative      Imaging: Xr Chest Pa & Lateral    Result Date: 1/14/2020  Narrative: CHEST INDICATION:   Productive cough and chest tightness with wheezing  Shortness of breath and fever for 1 5 days  COMPARISON:  1/7/2020 EXAM PERFORMED/VIEWS:  XR CHEST PA & LATERAL  The frontal view was performed utilizing dual energy radiographic technique  FINDINGS: Lung volumes are normal   No focal consolidation  No effusion  No pneumothorax  Cardiomediastinal silhouette is normal   Normal pulmonary vasculature  Bones are unremarkable  Impression: Normal chest  Workstation performed: [id number]     Xr Chest Pa & Lateral    Result Date: 1/9/2020  Narrative: CHEST INDICATION:   R06 2: Wheezing  COMPARISON:  Chest radiograph from 11/29/2019 as part of a rib series  EXAM PERFORMED/VIEWS:  XR CHEST PA & LATERAL FINDINGS: Cardiomediastinal silhouette appears unremarkable  The lungs are clear  No pneumothorax or pleural effusion  Osseous structures appear within normal limits for patient age  Impression: No acute cardiopulmonary disease  Workstation performed: [id number]       Review of Systems:  Review of Systems   Constitution: Negative  HENT: Negative  Eyes: Negative  Cardiovascular: Negative  Respiratory: Negative  Endocrine: Negative  Hematologic/Lymphatic: Negative  Skin: Negative  Musculoskeletal: Negative  Recent bilateral carpal tunnel surgery  Gastrointestinal: Negative  Genitourinary: Negative  Neurological: Negative  Psychiatric/Behavioral: Negative  Allergic/Immunologic: Negative  All other systems reviewed and are negative  There were no vitals filed for this visit  Weight (last 2 days)     None          Physical Exam:  Physical Exam   Constitutional: She is oriented to person, place, and time  She appears well-developed and well-nourished  No distress  HENT:   Head: Normocephalic and atraumatic  Eyes: Conjunctivae and EOM are normal  No scleral icterus  Neck: Normal range of motion  Neck supple  No JVD present  No tracheal deviation present  Cardiovascular: Normal rate, regular rhythm, normal heart sounds and intact distal pulses  Exam reveals no gallop and no friction rub  No murmur heard  Pulmonary/Chest: Effort normal and breath sounds normal  No stridor  No respiratory distress  She has no wheezes  She has no rales  She exhibits no tenderness  Abdominal: Soft  Bowel sounds are normal  She exhibits no distension  There is no tenderness  Musculoskeletal: Normal range of motion  She exhibits no edema or tenderness  Neurological: She is alert and oriented to person, place, and time  No cranial nerve deficit  Coordination normal    Skin: Skin is warm and dry  She is not diaphoretic  No erythema  Psychiatric: She has a normal mood and affect  Her behavior is normal  Judgment and thought content normal    Vitals reviewed        Yaa Clayton MD

## 2020-02-05 ENCOUNTER — TELEPHONE (OUTPATIENT)
Dept: OBGYN CLINIC | Facility: HOSPITAL | Age: 55
End: 2020-02-05
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
## 2020-02-26 ENCOUNTER — TELEPHONE (OUTPATIENT)
Dept: PSYCHIATRY | Facility: CLINIC | Age: 55
End: 2020-02-26

## 2020-03-03 ENCOUNTER — CLINICAL SUPPORT (OUTPATIENT)
Dept: CARDIOLOGY CLINIC | Facility: CLINIC | Age: 55
End: 2020-03-03
Payer: COMMERCIAL

## 2020-03-03 DIAGNOSIS — R00.2 PALPITATIONS: ICD-10-CM

## 2020-03-03 PROCEDURE — 0298T PR EXT ECG > 48HR TO 21 DAY REVIEW AND INTERPRETATN: CPT | Performed by: INTERNAL MEDICINE
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
## 2020-03-13 ENCOUNTER — OFFICE VISIT (OUTPATIENT)
Dept: PSYCHIATRY | Facility: CLINIC | Age: 55
End: 2020-03-13
Payer: COMMERCIAL

## 2020-03-13 VITALS
SYSTOLIC BLOOD PRESSURE: 148 MMHG | HEIGHT: 60 IN | HEART RATE: 83 BPM | BODY MASS INDEX: 20.37 KG/M2 | DIASTOLIC BLOOD PRESSURE: 91 MMHG

## 2020-03-13 DIAGNOSIS — F41.1 GENERALIZED ANXIETY DISORDER: ICD-10-CM

## 2020-03-13 DIAGNOSIS — F41.0 PANIC ATTACKS: ICD-10-CM

## 2020-03-13 DIAGNOSIS — F43.12 CHRONIC POST-TRAUMATIC STRESS DISORDER (PTSD): ICD-10-CM

## 2020-03-13 DIAGNOSIS — F31.81 MODERATE MIXED BIPOLAR II DISORDER (HCC): Primary | ICD-10-CM

## 2020-03-13 DIAGNOSIS — F10.11 ALCOHOL ABUSE, IN REMISSION: ICD-10-CM

## 2020-03-13 DIAGNOSIS — F12.10 MILD TETRAHYDROCANNABINOL (THC) ABUSE: ICD-10-CM

## 2020-03-13 PROCEDURE — 90791 PSYCH DIAGNOSTIC EVALUATION: CPT | Performed by: PHYSICIAN ASSISTANT

## 2020-03-13 NOTE — BH TREATMENT PLAN
TREATMENT PLAN (Medication Management Only)        [facility]    Name and Date of Birth:  Elisha Horn 48 y o  1965  Date of Treatment Plan: March 13, 2020  Diagnosis/Diagnoses:    1  Moderate mixed bipolar II disorder (HCC)    2  Generalized anxiety disorder    3  Chronic post-traumatic stress disorder (PTSD)    4  Panic attacks    5  Mild tetrahydrocannabinol (THC) abuse    6  Alcohol abuse, in remission      Strengths/Personal Resources for Self-Care: "I'm compassionate, Friendly, Good conversate"  Area/Areas of need (in own words): "I'm into something and I can't stop, it's a craft"  1  Long Term Goal: "Happiness, being with my family"  Target Date: 2 months - 5/13/2020  Person/Persons responsible for completion of goal: Alcira Day   2  Short Term Objective (s) - How will we reach this goal?:   A  Provider new recommended medication/dosage changes and/or continue medication(s): Pt is having moderate to severe Bipolar II mixed Sxs, CAROL, PTSD and THC abuse with ETOH abuse in remission  Tx options discussed and Pt adamantly states she does not want any medicine Tx at this time and only ever wanted psychotherapy when she came here  I advised she pursue THC legally which she intends to do in the near future  I strongly advised against THC from the street  She adamantly denies any SI intent or plan and verbally contracts for safety with safety plan that she will talk to her friends and utilized crisis and warm lines--(#s given)  Pt is refusing medications against medical advice and I explained my rationale for wanting to medicate her  She was polite and verbalized understanding and will consider it  She is willing to follow up for at least a few more visits to monitor her status and will then make further decision on medication and further follow up in the future    Pt does not appear a danger to self or others at present and does not fit criteria for involuntary admission  She declines the PHP due to transportation  Pt states she has [place] service but does not want to use it for this  She is on probation for her DUI but anticipates getting her 's license back soon  Treatment plan done and Pt accepts the plan  No medications at present  F/U intake for psychotherapy --as sched 3/24/2020  Get CBC with diff, TSH, UDS  Return 9-12 weeks, the sooner available appt (availability is tight)  Call me in 1 month to let me know how she is doing  Can call any time sooner prn     Target Date: 3-6 months  Person/Persons Responsible for Completion of Goal: Willie Roberson   Progress Towards Goals: stable, starting treatment  Treatment Modality: Psychotherapy is scheduled  Review due 90 to 120 days from date of this plan: 4-6 months  Expected length of service: ongoing treatment  My Physician/PA/NP and I have developed this plan together and I agree to work on the goals and objectives  I understand the treatment goals that were developed for my treatment

## 2020-04-01 ENCOUNTER — TELEPHONE (OUTPATIENT)
Dept: CARDIOLOGY CLINIC | Facility: CLINIC | Age: 55
End: 2020-04-01

## 2020-04-07 ENCOUNTER — TELEMEDICINE (OUTPATIENT)
Dept: CARDIOLOGY CLINIC | Facility: CLINIC | Age: 55
End: 2020-04-07
Payer: COMMERCIAL

## 2020-04-07 VITALS
BODY MASS INDEX: 20.37 KG/M2 | DIASTOLIC BLOOD PRESSURE: 79 MMHG | HEART RATE: 97 BPM | HEIGHT: 60 IN | SYSTOLIC BLOOD PRESSURE: 145 MMHG

## 2020-04-07 DIAGNOSIS — R00.2 PALPITATIONS: Primary | ICD-10-CM

## 2020-04-07 PROCEDURE — 99213 OFFICE O/P EST LOW 20 MIN: CPT | Performed by: INTERNAL MEDICINE

## 2020-04-16 ENCOUNTER — OFFICE VISIT (OUTPATIENT)
Dept: FAMILY MEDICINE CLINIC | Facility: CLINIC | Age: 55
End: 2020-04-16
Payer: COMMERCIAL

## 2020-04-16 VITALS
SYSTOLIC BLOOD PRESSURE: 148 MMHG | HEIGHT: 60 IN | HEART RATE: 96 BPM | RESPIRATION RATE: 16 BRPM | TEMPERATURE: 97.9 F | BODY MASS INDEX: 21.68 KG/M2 | DIASTOLIC BLOOD PRESSURE: 100 MMHG | OXYGEN SATURATION: 98 % | WEIGHT: 110.4 LBS

## 2020-04-16 DIAGNOSIS — M25.512 CHRONIC LEFT SHOULDER PAIN: Primary | ICD-10-CM

## 2020-04-16 DIAGNOSIS — M25.542 ARTHRALGIA OF BOTH HANDS: ICD-10-CM

## 2020-04-16 DIAGNOSIS — R73.9 HYPERGLYCEMIA: ICD-10-CM

## 2020-04-16 DIAGNOSIS — E78.49 OTHER HYPERLIPIDEMIA: ICD-10-CM

## 2020-04-16 DIAGNOSIS — I10 ESSENTIAL HYPERTENSION: ICD-10-CM

## 2020-04-16 DIAGNOSIS — G89.29 CHRONIC LEFT SHOULDER PAIN: Primary | ICD-10-CM

## 2020-04-16 DIAGNOSIS — M25.541 ARTHRALGIA OF BOTH HANDS: ICD-10-CM

## 2020-04-16 PROCEDURE — 99214 OFFICE O/P EST MOD 30 MIN: CPT | Performed by: FAMILY MEDICINE

## 2020-04-16 PROCEDURE — 3077F SYST BP >= 140 MM HG: CPT | Performed by: FAMILY MEDICINE

## 2020-04-16 PROCEDURE — 3008F BODY MASS INDEX DOCD: CPT | Performed by: FAMILY MEDICINE

## 2020-04-16 PROCEDURE — 3080F DIAST BP >= 90 MM HG: CPT | Performed by: FAMILY MEDICINE

## 2020-04-16 RX ORDER — METHOCARBAMOL 750 MG/1
750 TABLET, FILM COATED ORAL
Qty: 30 TABLET | Refills: 0 | Status: SHIPPED | OUTPATIENT
Start: 2020-04-16 | End: 2020-08-18

## 2020-04-16 RX ORDER — NAPROXEN 500 MG/1
500 TABLET ORAL 2 TIMES DAILY WITH MEALS
Qty: 60 TABLET | Refills: 0 | Status: SHIPPED | OUTPATIENT
Start: 2020-04-16 | End: 2020-08-18

## 2020-04-16 RX ORDER — METHOCARBAMOL 750 MG/1
750 TABLET, FILM COATED ORAL
Qty: 30 TABLET | Refills: 0 | Status: SHIPPED | OUTPATIENT
Start: 2020-04-16 | End: 2020-04-16 | Stop reason: SDUPTHER

## 2020-04-16 RX ORDER — NAPROXEN 500 MG/1
500 TABLET ORAL 2 TIMES DAILY WITH MEALS
Qty: 60 TABLET | Refills: 0 | Status: SHIPPED | OUTPATIENT
Start: 2020-04-16 | End: 2020-04-16 | Stop reason: SDUPTHER

## 2020-04-17 ENCOUNTER — TELEPHONE (OUTPATIENT)
Dept: FAMILY MEDICINE CLINIC | Facility: CLINIC | Age: 55
End: 2020-04-17

## 2020-04-22 ENCOUNTER — APPOINTMENT (OUTPATIENT)
Dept: RADIOLOGY | Facility: CLINIC | Age: 55
End: 2020-04-22
Payer: COMMERCIAL

## 2020-04-22 DIAGNOSIS — M25.512 CHRONIC LEFT SHOULDER PAIN: ICD-10-CM

## 2020-04-22 DIAGNOSIS — G89.29 CHRONIC LEFT SHOULDER PAIN: ICD-10-CM

## 2020-04-22 PROCEDURE — 73030 X-RAY EXAM OF SHOULDER: CPT

## 2020-04-27 ENCOUNTER — TELEPHONE (OUTPATIENT)
Dept: FAMILY MEDICINE CLINIC | Facility: CLINIC | Age: 55
End: 2020-04-27

## 2020-04-28 ENCOUNTER — TELEPHONE (OUTPATIENT)
Dept: FAMILY MEDICINE CLINIC | Facility: CLINIC | Age: 55
End: 2020-04-28

## 2020-04-28 DIAGNOSIS — R07.81 RIB PAIN: ICD-10-CM

## 2020-04-28 DIAGNOSIS — Z76.0 MEDICATION REFILL: ICD-10-CM

## 2020-04-28 RX ORDER — GABAPENTIN 100 MG/1
100 CAPSULE ORAL 3 TIMES DAILY
Qty: 30 CAPSULE | Refills: 1 | Status: SHIPPED | OUTPATIENT
Start: 2020-04-28 | End: 2020-08-24 | Stop reason: SDUPTHER

## 2020-06-11 ENCOUNTER — DOCUMENTATION (OUTPATIENT)
Dept: PSYCHIATRY | Facility: CLINIC | Age: 55
End: 2020-06-11

## 2020-08-11 ENCOUNTER — HOSPITAL ENCOUNTER (OUTPATIENT)
Dept: NON INVASIVE DIAGNOSTICS | Facility: HOSPITAL | Age: 55
Discharge: HOME | End: 2020-08-11
Payer: COMMERCIAL

## 2020-08-11 DIAGNOSIS — R00.2 PALPITATIONS: ICD-10-CM

## 2020-08-11 PROCEDURE — 93306 TTE W/DOPPLER COMPLETE: CPT

## 2020-08-11 PROCEDURE — 93306 TTE W/DOPPLER COMPLETE: CPT | Performed by: INTERNAL MEDICINE

## 2020-08-18 ENCOUNTER — OFFICE VISIT (OUTPATIENT)
Dept: FAMILY MEDICINE CLINIC | Facility: CLINIC | Age: 55
End: 2020-08-18
Payer: COMMERCIAL

## 2020-08-18 VITALS
OXYGEN SATURATION: 98 % | SYSTOLIC BLOOD PRESSURE: 134 MMHG | WEIGHT: 99.9 LBS | TEMPERATURE: 98.3 F | HEART RATE: 84 BPM | RESPIRATION RATE: 18 BRPM | DIASTOLIC BLOOD PRESSURE: 82 MMHG | HEIGHT: 60 IN | BODY MASS INDEX: 19.61 KG/M2

## 2020-08-18 DIAGNOSIS — R73.9 HYPERGLYCEMIA: ICD-10-CM

## 2020-08-18 DIAGNOSIS — Z12.39 SCREENING FOR BREAST CANCER: ICD-10-CM

## 2020-08-18 DIAGNOSIS — J02.9 SORE THROAT: ICD-10-CM

## 2020-08-18 DIAGNOSIS — Z83.49 FAMILY HISTORY OF THYROID DISEASE: ICD-10-CM

## 2020-08-18 DIAGNOSIS — E78.49 OTHER HYPERLIPIDEMIA: ICD-10-CM

## 2020-08-18 DIAGNOSIS — Z12.11 SCREEN FOR COLON CANCER: ICD-10-CM

## 2020-08-18 DIAGNOSIS — K21.9 GASTROESOPHAGEAL REFLUX DISEASE WITHOUT ESOPHAGITIS: Primary | ICD-10-CM

## 2020-08-18 DIAGNOSIS — F41.1 GENERALIZED ANXIETY DISORDER: ICD-10-CM

## 2020-08-18 DIAGNOSIS — F43.12 CHRONIC POST-TRAUMATIC STRESS DISORDER (PTSD): ICD-10-CM

## 2020-08-18 PROBLEM — G56.03 BILATERAL CARPAL TUNNEL SYNDROME: Status: RESOLVED | Noted: 2019-08-20 | Resolved: 2020-08-18

## 2020-08-18 PROBLEM — G56.01 CARPAL TUNNEL SYNDROME OF RIGHT WRIST: Status: RESOLVED | Noted: 2019-10-30 | Resolved: 2020-08-18

## 2020-08-18 PROBLEM — G56.02 CARPAL TUNNEL SYNDROME ON LEFT: Status: RESOLVED | Noted: 2019-10-30 | Resolved: 2020-08-18

## 2020-08-18 PROCEDURE — 3725F SCREEN DEPRESSION PERFORMED: CPT | Performed by: NURSE PRACTITIONER

## 2020-08-18 PROCEDURE — 3008F BODY MASS INDEX DOCD: CPT | Performed by: NURSE PRACTITIONER

## 2020-08-18 PROCEDURE — 3079F DIAST BP 80-89 MM HG: CPT | Performed by: NURSE PRACTITIONER

## 2020-08-18 PROCEDURE — 3075F SYST BP GE 130 - 139MM HG: CPT | Performed by: NURSE PRACTITIONER

## 2020-08-18 PROCEDURE — 99203 OFFICE O/P NEW LOW 30 MIN: CPT | Performed by: NURSE PRACTITIONER

## 2020-08-18 RX ORDER — OMEPRAZOLE 20 MG/1
20 CAPSULE, DELAYED RELEASE ORAL
Qty: 30 CAPSULE | Refills: 5 | Status: SHIPPED | OUTPATIENT
Start: 2020-08-18 | End: 2021-05-11 | Stop reason: SDUPTHER
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
## 2020-08-20 ENCOUNTER — APPOINTMENT (OUTPATIENT)
Dept: RADIOLOGY | Facility: MEDICAL CENTER | Age: 55
End: 2020-08-20
Payer: COMMERCIAL

## 2020-08-20 ENCOUNTER — APPOINTMENT (OUTPATIENT)
Dept: LAB | Facility: MEDICAL CENTER | Age: 55
End: 2020-08-20
Payer: COMMERCIAL

## 2020-08-20 ENCOUNTER — CONSULT (OUTPATIENT)
Dept: RHEUMATOLOGY | Facility: CLINIC | Age: 55
End: 2020-08-20
Payer: COMMERCIAL

## 2020-08-20 VITALS
WEIGHT: 99 LBS | HEIGHT: 60 IN | BODY MASS INDEX: 19.44 KG/M2 | TEMPERATURE: 99.3 F | SYSTOLIC BLOOD PRESSURE: 140 MMHG | HEART RATE: 89 BPM | DIASTOLIC BLOOD PRESSURE: 90 MMHG

## 2020-08-20 DIAGNOSIS — S39.92XS INJURY OF COCCYX, SEQUELA: ICD-10-CM

## 2020-08-20 DIAGNOSIS — M25.541 ARTHRALGIA OF BOTH HANDS: ICD-10-CM

## 2020-08-20 DIAGNOSIS — Z83.49 FAMILY HISTORY OF THYROID DISEASE: ICD-10-CM

## 2020-08-20 DIAGNOSIS — M25.542 ARTHRALGIA OF BOTH HANDS: ICD-10-CM

## 2020-08-20 DIAGNOSIS — M79.10 MYALGIA: ICD-10-CM

## 2020-08-20 DIAGNOSIS — R73.9 HYPERGLYCEMIA: ICD-10-CM

## 2020-08-20 DIAGNOSIS — M19.042 OSTEOARTHRITIS OF BOTH HANDS, UNSPECIFIED OSTEOARTHRITIS TYPE: Primary | ICD-10-CM

## 2020-08-20 DIAGNOSIS — M19.041 OSTEOARTHRITIS OF BOTH HANDS, UNSPECIFIED OSTEOARTHRITIS TYPE: Primary | ICD-10-CM

## 2020-08-20 LAB
25(OH)D3 SERPL-MCNC: 30.9 NG/ML (ref 30–100)
ALBUMIN SERPL BCP-MCNC: 4.1 G/DL (ref 3.5–5)
ALP SERPL-CCNC: 45 U/L (ref 46–116)
ALT SERPL W P-5'-P-CCNC: 23 U/L (ref 12–78)
ANION GAP SERPL CALCULATED.3IONS-SCNC: 4 MMOL/L (ref 4–13)
AST SERPL W P-5'-P-CCNC: 15 U/L (ref 5–45)
BILIRUB SERPL-MCNC: 0.38 MG/DL (ref 0.2–1)
BUN SERPL-MCNC: 12 MG/DL (ref 5–25)
CALCIUM SERPL-MCNC: 9.1 MG/DL (ref 8.3–10.1)
CHLORIDE SERPL-SCNC: 109 MMOL/L (ref 100–108)
CO2 SERPL-SCNC: 29 MMOL/L (ref 21–32)
CREAT SERPL-MCNC: 0.61 MG/DL (ref 0.6–1.3)
ERYTHROCYTE [SEDIMENTATION RATE] IN BLOOD: 4 MM/HOUR (ref 0–29)
GFR SERPL CREATININE-BSD FRML MDRD: 105 ML/MIN/1.73SQ M
GLUCOSE SERPL-MCNC: 87 MG/DL (ref 65–140)
POTASSIUM SERPL-SCNC: 4 MMOL/L (ref 3.5–5.3)
PROT SERPL-MCNC: 7.1 G/DL (ref 6.4–8.2)
SODIUM SERPL-SCNC: 142 MMOL/L (ref 136–145)
TSH SERPL DL<=0.05 MIU/L-ACNC: 1.32 UIU/ML (ref 0.36–3.74)

## 2020-08-20 PROCEDURE — 86200 CCP ANTIBODY: CPT | Performed by: INTERNAL MEDICINE

## 2020-08-20 PROCEDURE — 72220 X-RAY EXAM SACRUM TAILBONE: CPT

## 2020-08-20 PROCEDURE — 82306 VITAMIN D 25 HYDROXY: CPT | Performed by: INTERNAL MEDICINE

## 2020-08-20 PROCEDURE — 80053 COMPREHEN METABOLIC PANEL: CPT

## 2020-08-20 PROCEDURE — 73130 X-RAY EXAM OF HAND: CPT

## 2020-08-20 PROCEDURE — 36415 COLL VENOUS BLD VENIPUNCTURE: CPT | Performed by: INTERNAL MEDICINE

## 2020-08-20 PROCEDURE — 84443 ASSAY THYROID STIM HORMONE: CPT

## 2020-08-20 PROCEDURE — 3008F BODY MASS INDEX DOCD: CPT | Performed by: NURSE PRACTITIONER

## 2020-08-20 PROCEDURE — 85652 RBC SED RATE AUTOMATED: CPT | Performed by: INTERNAL MEDICINE

## 2020-08-20 PROCEDURE — 99244 OFF/OP CNSLTJ NEW/EST MOD 40: CPT | Performed by: INTERNAL MEDICINE

## 2020-08-20 NOTE — PATIENT INSTRUCTIONS
Continue over the counter ibuprofen or naproxen as needed  Do labs today  Do hand x-rays today    Return to clinic as needed    Osteoarthritis, Ambulatory Care   GENERAL INFORMATION:   Osteoarthritis  occurs when cartilage (tissue that cushions a joint) wears away slowly and causes the bones to rub together  Osteoarthritis (OA) is a long-term condition that often affects the hands, neck, lower back, knees, and hips  OA is also called arthrosis or degenerative joint disease  Common symptoms include the following:   · Joint pain that gets worse when you move the joint     · Joint stiffness that decreases after you move the joint     · Decreased range of movement     · Hard, bony enlargement on your fingers or toes    · A grinding or cracking sound when you move your joint  Seek immediate care for the following symptoms:   · Severe pain    · Not able to move your joint  Treatment for osteoarthritis  may include any of the following:  · Acetaminophen  is used to decrease pain  It is available without a doctor's order  Ask how much to take and how often to take it  Follow directions  Acetaminophen can cause liver damage if not taken correctly  · NSAIDs  help decrease swelling and pain or fever  This medicine is available with or without a doctor's order  NSAIDs can cause stomach bleeding or kidney problems in certain people  If you take blood thinner medicine, always ask your healthcare provider if NSAIDs are safe for you  Always read the medicine label and follow directions  · Capsaicin cream  may help decrease pain in your joint  · Prescription pain medicine  may be given to decrease severe pain if other medicines do not work  Take the medicine as directed  Do not wait until the pain is severe before you take your medicine  · A steroid injection  may be given if your symptoms get worse  · Physical therapy  may be ordered by your healthcare provider   A physical therapist teaches you exercises to help improve movement and strength, and to decrease pain in your joints  · Surgery  may be needed if other treatments do not work  Manage osteoarthritis   · Stay active  Physical activity may reduce your pain and improve your ability to do daily activities  Avoid activities that cause pain  Ask your healthcare provider what type of exercise would be best for you  · Maintain a healthy weight  This helps decrease the strain on the joints in your back, hips, knees, ankles, and feet  Ask your healthcare provider how much you should weigh  Ask him to help you create a weight loss plan if you are overweight  · Use heat or ice  on your joints as directed  Heat and ice help decrease pain, swelling, and muscle spasms  Use a heating pad on a low setting or take a warm bath  Use an ice pack, or put crushed ice in a plastic bag  Cover it with a towel  · Massage  the muscles around the joint to relieve pain and stiffness  · Use a cane, crutches, or a walker  to protect and relieve pressure on your ankle, knee, and hip joints  You may also be prescribed shoe inserts to decrease pressure in your joints  · Wear flat or low-heeled shoes  This will help decrease pain and reduce pressure on your ankle, knee, and hip joints  Follow up with your healthcare provider as directed:  Write down your questions so you remember to ask them during your visits  CARE AGREEMENT:   You have the right to help plan your care  Learn about your health condition and how it may be treated  Discuss treatment options with your caregivers to decide what care you want to receive  You always have the right to refuse treatment  The above information is an  only  It is not intended as medical advice for individual conditions or treatments  Talk to your doctor, nurse or pharmacist before following any medical regimen to see if it is safe and effective for you    © 2014 [employer] is for End User's use only and may not be sold, redistributed or otherwise used for commercial purposes  All illustrations and images included in CareNotes® are the copyrighted property of [employer]  or [employer]

## 2020-08-20 NOTE — PROGRESS NOTES
Assessment and Plan: Denzil Kocher is a 48 y o   female who presents as a Rheumatology consult referred by Flip Flores MD for evaluation of bilateral hand pain  Ordered bilateral hand x-rays, which show significant OA changes at several DIPs bilaterally  Further inflammatory arthritis work-up ordered below returned unremarkable  Patient mainly has severe osteoarthritis of her hands and does not seem to have inflammatory arthritis; can continue OTC ibuprofen or naproxen as needed  Prescription NSAIDs can be prescribed if her joint pain becomes uncontrolled in the future  Her sacrum x-rays ordered show mild SI joint degenerative changes, and no tailbone fracture  Plan:  Diagnoses and all orders for this visit:    Osteoarthritis of both hands, unspecified osteoarthritis type    Arthralgia of both hands  -     Ambulatory referral to Rheumatology  -     Sedimentation rate, automated  -     Cancel: Vitamin D 25 hydroxy  -     Cyclic citrul peptide antibody, IgG  -     XR hand 3+ vw right; Future  -     XR hand 3+ vw left; Future  -     Vitamin D 25 hydroxy    Injury of coccyx, sequela  -     XR sacrum and coccyx; Future    Myalgia  -     Vitamin D 25 hydroxy    Follow-up plan: Return to clinic as needed      HPI  Sriram Ruelas is a 50 y o   female who presents as a Rheumatology consult referred by Flip Flores MD for evaluation of bilateral hand pain  Patient had bilateral carpal tunnel surgery in 2/2020  Has been using OTC ibuprofen or naproxen as needed for hand pain  Review of Systems  Review of Systems   Constitutional: Negative for chills, fatigue, fever and unexpected weight change  HENT: Negative for mouth sores and trouble swallowing  Eyes: Negative for pain and visual disturbance  Respiratory: Negative for cough and shortness of breath  Cardiovascular: Negative for chest pain and leg swelling     Gastrointestinal: Negative for abdominal pain, blood in stool, constipation, diarrhea and nausea  Musculoskeletal: Positive for arthralgias, back pain, joint swelling and neck pain  Negative for myalgias  Skin: Negative for color change and rash  Neurological: Negative for weakness and numbness  Hematological: Negative for adenopathy  Psychiatric/Behavioral: Negative for sleep disturbance  Allergies  Allergies   Allergen Reactions    Ativan [Lorazepam] Confusion      Patient stated her friend told her  She wasn't herself         Home Medications    Current Outpatient Medications:     acetaminophen (TYLENOL 8 HOUR) 650 mg CR tablet, Take 1 tablet (650 mg total) by mouth every 8 (eight) hours, Disp: 15 tablet, Rfl: 0    omeprazole (PriLOSEC) 20 mg delayed release capsule, Take 1 capsule (20 mg total) by mouth daily before breakfast, Disp: 30 capsule, Rfl: 5    senna-docusate sodium (SENOKOT-S) 8 6-50 mg per tablet, Take 3 tablets by mouth daily at bedtime, Disp: , Rfl:     albuterol (ProAir HFA) 90 mcg/act inhaler, Inhale 2 puffs every 6 (six) hours as needed for wheezing, Disp: 1 Inhaler, Rfl: 2    gabapentin (NEURONTIN) 100 mg capsule, Take 1 capsule (100 mg total) by mouth 3 (three) times a day, Disp: 180 capsule, Rfl: 1    Past Medical History  Past Medical History:   Diagnosis Date    Bilateral carpal tunnel syndrome 8/20/2019    Carpal tunnel syndrome of right wrist 10/30/2019    Added automatically from request for surgery [id number]    Hypertension        Past Surgical History   Past Surgical History:   Procedure Laterality Date    IA WRIST ARTHROSCOP,RELEASE Dalbert Stewart LIG Right 1/21/2020    Procedure: RELEASE CARPAL TUNNEL ENDOSCOPIC;  Surgeon: Stephen Brock MD;  Location: [facility];  Service: Orthopedics    IA WRIST Tyrone Adams LIG Left 1/28/2020    Procedure: RELEASE CARPAL TUNNEL ENDOSCOPIC;  Surgeon: Stephen Brock MD;  Location: [facility];  Service: Orthopedics       Family History    Family History   Problem Relation Age of Onset    Alcohol abuse Mother     Eating disorder Mother         anorexia and bulimia per Pt    Hodgkin's lymphoma Father     Colon cancer Maternal Grandmother     Alcohol abuse Maternal Grandfather     Colon cancer Paternal Grandmother     Anxiety disorder Paternal Aunt     Multiple sclerosis Cousin    maternal cousin - multiple sclerosis  Maternal grandmother - Rheumatoid arthritis      Social History  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[employer], 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; stopped working in 2018  Social History     Substance and Sexual Activity   Alcohol Use Not Currently    Comment: H/O ETOH abuse in approx 2010--for a period of 2 years  She quit on her own without any h/o withdrawal or rehab  Social History     Substance and Sexual Activity   Drug Use Yes    Types: Marijuana    Comment: Smokes THC several times a day  Also uses CBD OIL at times  Used cocaine and methamphetamine  fairly steadily for a period of approx 1 year approx 36 years ago  Tried LSD, Mushrooms  No h/o rehab     Social History     Tobacco Use   Smoking Status Current Every Day Smoker    Packs/day: 0 50    Types: Cigarettes   Smokeless Tobacco Never Used   currently smokes 15 cigarettes a day, has smoked for 40 years; also smokes weed      Objective:  Vitals:    08/20/20 1338 08/20/20 1401   BP: (!) 161/102 140/90   BP Location: Left arm Left arm   Patient Position: Sitting Sitting   Cuff Size: Standard Standard   Pulse: 89    Temp: 99 3 °F (37 4 °C)    TempSrc: Temporal    Weight: 44 9 kg (99 lb)    Height: 5' (1 524 m)        Physical Exam  Constitutional:       General: She is not in acute distress  Appearance: She is well-developed  HENT:      Head: Normocephalic and atraumatic  Eyes:      General: Lids are normal  No scleral icterus  Conjunctiva/sclera: Conjunctivae normal    Neck:      Musculoskeletal: Neck supple  No muscular tenderness  Thyroid: No thyromegaly  Cardiovascular:      Rate and Rhythm: Normal rate and regular rhythm  Heart sounds: S1 normal and S2 normal  No murmur  No friction rub  Pulmonary:      Effort: Pulmonary effort is normal  No tachypnea or respiratory distress  Breath sounds: Normal breath sounds  No wheezing, rhonchi or rales  Musculoskeletal:         General: Deformity present  No tenderness  Comments: Heberden's nodes present   Lymphadenopathy:      Head:      Right side of head: No submental or submandibular adenopathy  Left side of head: No submental or submandibular adenopathy  Cervical: No cervical adenopathy  Skin:     General: Skin is warm and dry  Findings: No rash  Nails: There is no clubbing  Neurological:      Mental Status: She is alert  Sensory: No sensory deficit  Psychiatric:         Behavior: Behavior normal  Behavior is cooperative  Reviewed labs and imaging  Imaging:   Bilateral Hand x-rays 8/20/20 - severe DIP OA changes bilaterally    Sacrum and Coccyx x-rays 8/20/20  Mild bilateral sacroiliac joint degenerative change  Labs:   Appointment on 08/20/2020   Component Date Value Ref Range Status    TSH 3RD GENERATON 08/20/2020 1 320  0 358 - 3 740 uIU/mL Final    The recommended reference ranges for TSH during pregnancy are as follows:   First trimester 0 1 to 2 5 uIU/mL   Second trimester  0 2 to 3 0 uIU/mL   Third trimester 0 3 to 3 0 uIU/m    Note: Normal ranges may not apply to patients who are transgender, non-binary, or whose legal sex, sex at birth, and gender identity differ  Using supplements with high doses of biotin 20 to more than 300 times greater than the adequate daily intake for adults of 30 mcg/day as established by the [place] [employer], can cause falsely depress results      Sodium 08/20/2020 142  136 - 145 mmol/L Final    Potassium 08/20/2020 4 0  3 5 - 5 3 mmol/L Final    Chloride 08/20/2020 109* 100 - 108 mmol/L Final    CO2 08/20/2020 29  21 - 32 mmol/L Final    ANION GAP 08/20/2020 4  4 - 13 mmol/L Final  BUN 08/20/2020 12  5 - 25 mg/dL Final    Creatinine 08/20/2020 0 61  0 60 - 1 30 mg/dL Final    Standardized to IDMS reference method    Glucose 08/20/2020 87  65 - 140 mg/dL Final    If the patient is fasting, the ADA then defines impaired fasting glucose as > 100 mg/dL and diabetes as > or equal to 123 mg/dL  Specimen collection should occur prior to Sulfasalazine administration due to the potential for falsely depressed results  Specimen collection should occur prior to Sulfapyridine administration due to the potential for falsely elevated results   Calcium 08/20/2020 9 1  8 3 - 10 1 mg/dL Final    AST 08/20/2020 15  5 - 45 U/L Final    Specimen collection should occur prior to Sulfasalazine administration due to the potential for falsely depressed results   ALT 08/20/2020 23  12 - 78 U/L Final    Specimen collection should occur prior to Sulfasalazine and/or Sulfapyridine administration due to the potential for falsely depressed results   Alkaline Phosphatase 08/20/2020 45* 46 - 116 U/L Final    Total Protein 08/20/2020 7 1  6 4 - 8 2 g/dL Final    Albumin 08/20/2020 4 1  3 5 - 5 0 g/dL Final    Total Bilirubin 08/20/2020 0 38  0 20 - 1 00 mg/dL Final    Use of this assay is not recommended for patients undergoing treatment with eltrombopag due to the potential for falsely elevated results      eGFR 08/20/2020 105  ml/min/1 73sq m Final   Consult on 08/20/2020   Component Date Value Ref Range Status    Sed Rate 08/20/2020 4  0 - 29 mm/hour Final    Cyclic Citrullin Peptide Ab 08/20/2020 3  0 - 19 units Final                              Negative               <20                            Weak positive      20 - 39                            Moderate positive  40 - 59                            Strong positive        >59    Vit D, 25-Hydroxy 08/20/2020 30 9  30 0 - 100 0 ng/mL Final   Appointment on 01/09/2020   Component Date Value Ref Range Status    Cholesterol 01/09/2020 194  50 - 200 mg/dL Final      Cholesterol:       Desirable         <200 mg/dl       Borderline         200-239 mg/dl       High              >239           Triglycerides 01/09/2020 93  <=150 mg/dL Final      Triglyceride:     Normal          <150 mg/dl     Borderline High 150-199 mg/dl     High            200-499 mg/dl        Very High       >499 mg/dl    Specimen collection should occur prior to N-Acetylcysteine or Metamizole administration due to the potential for falsely depressed results   HDL, Direct 01/09/2020 75  >=40 mg/dL Final      HDL Cholesterol:       Low     <41 mg/dL  Specimen collection should occur prior to Metamizole administration due to the potential for falsley depressed results   LDL Calculated 01/09/2020 100  0 - 100 mg/dL Final      This screening LDL is a calculated result  It does not have the accuracy of the Direct Measured LDL in the monitoring of patients with hyperlipidemia and/or statin therapy  Direct Measure LDL (QVN722) must be ordered separately in these patients  LDL Cholesterol:     Optimal           <100 mg/dl     Near Optimal      100-129 mg/dl     Above Optimal       Borderline High 130-159 mg/dl       High            160-189 mg/dl       Very High       >189 mg/dl           Sodium 01/09/2020 146* 136 - 145 mmol/L Final    Potassium 01/09/2020 3 6  3 5 - 5 3 mmol/L Final    Chloride 01/09/2020 112* 100 - 108 mmol/L Final    CO2 01/09/2020 29  21 - 32 mmol/L Final    ANION GAP 01/09/2020 5  4 - 13 mmol/L Final    BUN 01/09/2020 14  5 - 25 mg/dL Final    Creatinine 01/09/2020 0 69  0 60 - 1 30 mg/dL Final    Standardized to IDMS reference method    Glucose, Fasting 01/09/2020 111* 65 - 99 mg/dL Final      Specimen collection should occur prior to Sulfasalazine administration due to the potential for falsely depressed results  Specimen collection should occur prior to Sulfapyridine administration due to the potential for falsely elevated results      Calcium 01/09/2020 9 4  8 3 - 10 1 mg/dL Final    AST 01/09/2020 10  5 - 45 U/L Final      Specimen collection should occur prior to Sulfasalazine administration due to the potential for falsely depressed results   ALT 01/09/2020 29  12 - 78 U/L Final      Specimen collection should occur prior to Sulfasalazine and/or Sulfapyridine administration due to the potential for falsely depressed results   Alkaline Phosphatase 01/09/2020 60  46 - 116 U/L Final    Total Protein 01/09/2020 7 2  6 4 - 8 2 g/dL Final    Albumin 01/09/2020 3 9  3 5 - 5 0 g/dL Final    Total Bilirubin 01/09/2020 0 43  0 20 - 1 00 mg/dL Final    eGFR 01/09/2020 100  ml/min/1 73sq m Final   Appointment on 09/30/2019   Component Date Value Ref Range Status    TSH 3RD GENERATON 09/30/2019 1 980  0 358 - 3 740 uIU/mL Final      The recommended reference ranges for TSH during pregnancy are as follows:   First trimester 0 1 to 2 5 uIU/mL   Second trimester  0 2 to 3 0 uIU/mL   Third trimester 0 3 to 3 0 uIU/m    Note: Normal ranges may not apply to patients who are transgender, non-binary, or whose legal sex, sex at birth, and gender identity differ  Using supplements with high doses of biotin 20 to more than 300 times greater than the adequate daily intake for adults of 30 mcg/day as established by the [employer] [employer], can cause falsely depress results      WBC 09/30/2019 7 86  4 31 - 10 16 Thousand/uL Final    RBC 09/30/2019 4 39  3 81 - 5 12 Million/uL Final    Hemoglobin 09/30/2019 13 3  11 5 - 15 4 g/dL Final    Hematocrit 09/30/2019 41 8  34 8 - 46 1 % Final    MCV 09/30/2019 95  82 - 98 fL Final    MCH 09/30/2019 30 3  26 8 - 34 3 pg Final    MCHC 09/30/2019 31 8  31 4 - 37 4 g/dL Final    RDW 09/30/2019 14 6  11 6 - 15 1 % Final    MPV 09/30/2019 11 2  8 9 - 12 7 fL Final    Platelets 28/01/8912 247  149 - 390 Thousands/uL Final    nRBC 09/30/2019 0  /100 WBCs Final    Neutrophils Relative 09/30/2019 60  43 - 75 % Final    Immat GRANS % 09/30/2019 0  0 - 2 % Final    Lymphocytes Relative 09/30/2019 30  14 - 44 % Final    Monocytes Relative 09/30/2019 7  4 - 12 % Final    Eosinophils Relative 09/30/2019 2  0 - 6 % Final    Basophils Relative 09/30/2019 1  0 - 1 % Final    Neutrophils Absolute 09/30/2019 4 66  1 85 - 7 62 Thousands/µL Final    Immature Grans Absolute 09/30/2019 0 01  0 00 - 0 20 Thousand/uL Final    Lymphocytes Absolute 09/30/2019 2 38  0 60 - 4 47 Thousands/µL Final    Monocytes Absolute 09/30/2019 0 58  0 17 - 1 22 Thousand/µL Final    Eosinophils Absolute 09/30/2019 0 17  0 00 - 0 61 Thousand/µL Final    Basophils Absolute 09/30/2019 0 06  0 00 - 0 10 Thousands/µL Final    CRP 09/30/2019 <3 0  <3 0 mg/L Final    Rheumatoid Factor 09/30/2019 Negative  Negative Final    FERNANDO 09/30/2019 Negative  Negative Final

## 2020-08-22 LAB — CCP IGA+IGG SERPL IA-ACNC: 3 UNITS (ref 0–19)

## 2020-08-24 DIAGNOSIS — R07.81 RIB PAIN: ICD-10-CM

## 2020-08-24 DIAGNOSIS — Z76.0 MEDICATION REFILL: ICD-10-CM

## 2020-08-24 DIAGNOSIS — R06.2 WHEEZING: ICD-10-CM

## 2020-08-24 RX ORDER — ALBUTEROL SULFATE 90 UG/1
2 AEROSOL, METERED RESPIRATORY (INHALATION) EVERY 6 HOURS PRN
Qty: 1 INHALER | Refills: 2 | Status: SHIPPED | OUTPATIENT
Start: 2020-08-24 | End: 2021-05-11 | Stop reason: SDUPTHER

## 2020-08-24 RX ORDER — GABAPENTIN 100 MG/1
100 CAPSULE ORAL 3 TIMES DAILY
Qty: 180 CAPSULE | Refills: 1 | Status: SHIPPED | OUTPATIENT
Start: 2020-08-24 | End: 2021-05-11 | Stop reason: SDUPTHER

## 2020-09-09 ENCOUNTER — APPOINTMENT (OUTPATIENT)
Dept: LAB | Facility: CLINIC | Age: 55
End: 2020-09-09
Payer: COMMERCIAL

## 2020-09-09 DIAGNOSIS — R73.9 HYPERGLYCEMIA: ICD-10-CM

## 2020-09-09 DIAGNOSIS — E78.49 OTHER HYPERLIPIDEMIA: ICD-10-CM

## 2020-09-09 LAB
CHOLEST SERPL-MCNC: 181 MG/DL (ref 50–200)
EST. AVERAGE GLUCOSE BLD GHB EST-MCNC: 114 MG/DL
HBA1C MFR BLD: 5.6 %
HDLC SERPL-MCNC: 88 MG/DL
LDLC SERPL CALC-MCNC: 87 MG/DL (ref 0–100)
TRIGL SERPL-MCNC: 31 MG/DL

## 2020-09-09 PROCEDURE — 83036 HEMOGLOBIN GLYCOSYLATED A1C: CPT

## 2020-09-09 PROCEDURE — 80061 LIPID PANEL: CPT

## 2020-09-09 PROCEDURE — 36415 COLL VENOUS BLD VENIPUNCTURE: CPT

## 2020-09-10 ENCOUNTER — TELEPHONE (OUTPATIENT)
Dept: FAMILY MEDICINE CLINIC | Facility: CLINIC | Age: 55
End: 2020-09-10

## 2020-09-16 ENCOUNTER — TELEPHONE (OUTPATIENT)
Dept: PSYCHIATRY | Facility: CLINIC | Age: 55
End: 2020-09-16

## 2020-10-29 ENCOUNTER — HOSPITAL ENCOUNTER (OUTPATIENT)
Dept: ULTRASOUND IMAGING | Facility: HOSPITAL | Age: 55
Discharge: HOME | End: 2020-10-29
Payer: COMMERCIAL

## 2020-10-29 DIAGNOSIS — J02.9 SORE THROAT: ICD-10-CM

## 2020-10-29 PROCEDURE — 76536 US EXAM OF HEAD AND NECK: CPT

## 2020-11-19 ENCOUNTER — TELEMEDICINE (OUTPATIENT)
Dept: FAMILY MEDICINE CLINIC | Facility: CLINIC | Age: 55
End: 2020-11-19
Payer: COMMERCIAL

## 2020-11-19 DIAGNOSIS — I10 ESSENTIAL HYPERTENSION: ICD-10-CM

## 2020-11-19 DIAGNOSIS — R73.01 ELEVATED FASTING GLUCOSE: Primary | ICD-10-CM

## 2020-11-19 DIAGNOSIS — J02.9 SORE THROAT: ICD-10-CM

## 2020-11-19 DIAGNOSIS — M19.90 ARTHRITIS: ICD-10-CM

## 2020-11-19 PROCEDURE — 99213 OFFICE O/P EST LOW 20 MIN: CPT | Performed by: NURSE PRACTITIONER

## 2021-04-09 ENCOUNTER — OFFICE VISIT (OUTPATIENT)
Dept: FAMILY MEDICINE CLINIC | Facility: CLINIC | Age: 56
End: 2021-04-09
Payer: COMMERCIAL

## 2021-04-09 ENCOUNTER — APPOINTMENT (OUTPATIENT)
Dept: LAB | Facility: CLINIC | Age: 56
End: 2021-04-09
Payer: COMMERCIAL

## 2021-04-09 VITALS
OXYGEN SATURATION: 100 % | TEMPERATURE: 97.7 F | WEIGHT: 112.2 LBS | RESPIRATION RATE: 20 BRPM | HEIGHT: 60 IN | DIASTOLIC BLOOD PRESSURE: 88 MMHG | BODY MASS INDEX: 22.03 KG/M2 | SYSTOLIC BLOOD PRESSURE: 138 MMHG | HEART RATE: 102 BPM

## 2021-04-09 DIAGNOSIS — M19.042 PRIMARY OSTEOARTHRITIS OF BOTH HANDS: ICD-10-CM

## 2021-04-09 DIAGNOSIS — F41.1 GENERALIZED ANXIETY DISORDER: ICD-10-CM

## 2021-04-09 DIAGNOSIS — R73.01 ELEVATED FASTING GLUCOSE: ICD-10-CM

## 2021-04-09 DIAGNOSIS — Z00.00 ANNUAL PHYSICAL EXAM: Primary | ICD-10-CM

## 2021-04-09 DIAGNOSIS — M19.041 PRIMARY OSTEOARTHRITIS OF BOTH HANDS: ICD-10-CM

## 2021-04-09 DIAGNOSIS — I73.00 RAYNAUD'S DISEASE WITHOUT GANGRENE: ICD-10-CM

## 2021-04-09 DIAGNOSIS — J31.2 CHRONIC SORE THROAT: ICD-10-CM

## 2021-04-09 DIAGNOSIS — K21.9 GASTROESOPHAGEAL REFLUX DISEASE WITHOUT ESOPHAGITIS: ICD-10-CM

## 2021-04-09 LAB
ALBUMIN SERPL BCP-MCNC: 3.8 G/DL (ref 3.5–5)
ALP SERPL-CCNC: 56 U/L (ref 46–116)
ALT SERPL W P-5'-P-CCNC: 21 U/L (ref 12–78)
ANION GAP SERPL CALCULATED.3IONS-SCNC: 4 MMOL/L (ref 4–13)
AST SERPL W P-5'-P-CCNC: 17 U/L (ref 5–45)
BILIRUB SERPL-MCNC: 0.38 MG/DL (ref 0.2–1)
BUN SERPL-MCNC: 14 MG/DL (ref 5–25)
CALCIUM SERPL-MCNC: 9 MG/DL (ref 8.3–10.1)
CHLORIDE SERPL-SCNC: 107 MMOL/L (ref 100–108)
CO2 SERPL-SCNC: 29 MMOL/L (ref 21–32)
CREAT SERPL-MCNC: 0.62 MG/DL (ref 0.6–1.3)
EST. AVERAGE GLUCOSE BLD GHB EST-MCNC: 120 MG/DL
GFR SERPL CREATININE-BSD FRML MDRD: 103 ML/MIN/1.73SQ M
GLUCOSE SERPL-MCNC: 106 MG/DL (ref 65–140)
HBA1C MFR BLD: 5.8 %
POTASSIUM SERPL-SCNC: 4 MMOL/L (ref 3.5–5.3)
PROT SERPL-MCNC: 6.8 G/DL (ref 6.4–8.2)
SODIUM SERPL-SCNC: 140 MMOL/L (ref 136–145)

## 2021-04-09 PROCEDURE — 99396 PREV VISIT EST AGE 40-64: CPT | Performed by: NURSE PRACTITIONER

## 2021-04-09 PROCEDURE — 80053 COMPREHEN METABOLIC PANEL: CPT

## 2021-04-09 PROCEDURE — 36415 COLL VENOUS BLD VENIPUNCTURE: CPT

## 2021-04-09 PROCEDURE — 4004F PT TOBACCO SCREEN RCVD TLK: CPT | Performed by: NURSE PRACTITIONER

## 2021-04-09 PROCEDURE — 99214 OFFICE O/P EST MOD 30 MIN: CPT | Performed by: NURSE PRACTITIONER

## 2021-04-09 PROCEDURE — 3008F BODY MASS INDEX DOCD: CPT | Performed by: NURSE PRACTITIONER

## 2021-04-09 PROCEDURE — 3725F SCREEN DEPRESSION PERFORMED: CPT | Performed by: NURSE PRACTITIONER

## 2021-04-09 PROCEDURE — 83036 HEMOGLOBIN GLYCOSYLATED A1C: CPT

## 2021-04-09 NOTE — PROGRESS NOTES
ADULT ANNUAL [facility]    NAME: Morris Asher  AGE: 50 y o  SEX: female  : 1965     DATE: 2021     Assessment and Plan:     Problem List Items Addressed This Visit        Musculoskeletal and Integument    Primary osteoarthritis of both hands      Other Visit Diagnoses     Annual physical exam    -  Primary    Raynaud's disease without gangrene        Chronic sore throat        Relevant Orders    Ambulatory Referral to Otolaryngology          Immunizations and preventive care screenings were discussed with patient today  Appropriate education was printed on patient's after visit summary  Counseling:  Alcohol/drug use: discussed moderation in alcohol intake, the recommendations for healthy alcohol use, and avoidance of illicit drug use  Dental Health: discussed importance of regular tooth brushing, flossing, and dental visits  Sexual health: discussed sexually transmitted diseases, partner selection, use of condoms, avoidance of unintended pregnancy, and contraceptive alternatives  · Exercise: the importance of regular exercise/physical activity was discussed  Recommend exercise 3-5 times per week for at least 30 minutes  BMI Counseling: Body mass index is 21 91 kg/m²  The BMI is above normal  Nutrition recommendations include encouraging healthy choices of fruits and vegetables, consuming healthier snacks, limiting drinks that contain sugar, moderation in carbohydrate intake, increasing intake of lean protein, reducing intake of saturated and trans fat and reducing intake of cholesterol  Exercise recommendations include exercising 3-5 times per week  Return in 1 year (on 2022)  Chief Complaint:     Chief Complaint   Patient presents with    Annual Exam      History of Present Illness:     Adult Annual Physical   Patient here for a comprehensive physical exam  The patient reports no problems      Diet and Physical Activity  · Diet/Nutrition: poor diet, frequent junk food, consuming 3-5 servings of fruits/vegetables daily, adequate fiber intake and adequate whole grain intake  · Exercise: walking and less than 30 minutes on average  Depression Screening  PHQ-9 Depression Screening    PHQ-9:   Frequency of the following problems over the past two weeks:      Little interest or pleasure in doing things: 3 - nearly every day  Feeling down, depressed, or hopeless: 2 - more than half the days  Trouble falling or staying asleep, or sleeping too much: 3 - nearly every day  Feeling tired or having little energy: 1 - several days  Poor appetite or overeatin - more than half the days  Feeling bad about yourself - or that you are a failure or have let yourself or your family down: 3 - nearly every day  Trouble concentrating on things, such as reading the newspaper or watching television: 3 - nearly every day  Moving or speaking so slowly that other people could have noticed  Or the opposite - being so fidgety or restless that you have been moving around a lot more than usual: 1 - several days  Thoughts that you would be better off dead, or of hurting yourself in some way: 0 - not at all  PHQ-2 Score: 5  PHQ-9 Score: 18       General Health  · Sleep: sleeps well and gets 4-6 hours of sleep on average  · Hearing: normal - bilateral   · Vision: vision problems: worsening vision- wears bifocal contacts and glasses, most recent eye exam <1 year ago and wears glasses and contacts  · Dental: regular dental visits  /GYN Health  · Patient is: postmenopausal  · Last menstrual period: last period was 11 years ago  Review of Systems:     Review of Systems   Constitutional: Negative for activity change, appetite change, chills, fatigue and fever  HENT: Positive for sore throat  Negative for congestion, ear pain, nosebleeds and rhinorrhea  Eyes: Negative for photophobia, pain, redness and visual disturbance  Respiratory: Negative for cough, shortness of breath and wheezing  Cardiovascular: Negative  Negative for chest pain  Gastrointestinal: Positive for nausea  Negative for abdominal pain, constipation, diarrhea and vomiting  Endocrine: Negative  Genitourinary: Negative for difficulty urinating, dysuria and flank pain  Musculoskeletal: Negative  Skin: Negative for color change and rash  Neurological: Negative for dizziness, weakness, numbness and headaches  Hematological: Negative for adenopathy  Psychiatric/Behavioral: Negative for agitation and confusion  The patient is not nervous/anxious         Past Medical History:     Past Medical History:   Diagnosis Date    Bilateral carpal tunnel syndrome 8/20/2019    Carpal tunnel syndrome of right wrist 10/30/2019    Added automatically from request for surgery [id number]    Hypertension       Past Surgical History:     Past Surgical History:   Procedure Laterality Date    DC WRIST [place] LIG Right 1/21/2020    Procedure: RELEASE CARPAL TUNNEL ENDOSCOPIC;  Surgeon: Andre Orellana MD;  [facility];  Service: Orthopedics    DC WRIST [place] LIG Left 1/28/2020    Procedure: RELEASE CARPAL TUNNEL ENDOSCOPIC;  Surgeon: Andre Orellana MD;  Location[facility];  Service: Orthopedics      Social History:        Social History     Socioeconomic History    Marital status: Legally      Spouse name: None    Number of children: 2    Years of education: None    Highest education level: None   Occupational History    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     Comment: from 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, then again in 2017 but had to leave both times due to emotional reasons   Social Needs    Financial resource strain: None    Food insecurity     Worry: None     Inability: None    Transportation needs     Medical: None     Non-medical: None   Tobacco Use    Smoking status: Current Every Day Smoker     Packs/day: 0 50 Types: Cigarettes    Smokeless tobacco: Never Used   Substance and Sexual Activity    Alcohol use: Not Currently     Comment: H/O ETOH abuse in approx 2010--for a period of 2 years  She quit on her own without any h/o withdrawal or rehab   Drug use: Yes     Types: Marijuana     Comment: Smokes THC several times a day  Also uses CBD OIL at times  Used cocaine and methamphetamine  fairly steadily for a period of approx 1 year approx 36 years ago  Tried LSD, Mushrooms  No h/o rehab    Sexual activity: Yes     Partners: Male   Lifestyle    Physical activity     Days per week: None     Minutes per session: None    Stress: None   Relationships    Social connections     Talks on phone: None     Gets together: None     Attends Zoroastrianism service: None     Active member of club or organization: None     Attends meetings of clubs or organizations: None     Relationship status: None    Intimate partner violence     Fear of current or ex partner: None     Emotionally abused: None     Physically abused: None     Forced sexual activity: None   Other Topics Concern    None   Social History Narrative    Home: lives with 2 best friends  Education:    Pt states she always got Ds and Fs in school and skipped classes often, but there were no formally diagnosed learning disabilities  Pt was in alternative schooling  She learned "Nothing" there and they didn't teach much  Pt was then placed back in regular classes but could not keep up  Pt reached childhood milestones on time as far as she knows       Highest grade completed 11th grade    Had some training through [facility] but no actual certificate      Family History:     Family History   Problem Relation Age of Onset    Alcohol abuse Mother     Eating disorder Mother         anorexia and bulimia per Pt    Hodgkin's lymphoma Father     Colon cancer Maternal Grandmother     Alcohol abuse Maternal Grandfather     Colon cancer Paternal Grandmother     Anxiety disorder Paternal Aunt     Multiple sclerosis Cousin       Current Medications:     Current Outpatient Medications   Medication Sig Dispense Refill    acetaminophen (TYLENOL 8 HOUR) 650 mg CR tablet Take 1 tablet (650 mg total) by mouth every 8 (eight) hours (Patient taking differently: Take 650 mg by mouth as needed ) 15 tablet 0    albuterol (ProAir HFA) 90 mcg/act inhaler Inhale 2 puffs every 6 (six) hours as needed for wheezing 1 Inhaler 2    omeprazole (PriLOSEC) 20 mg delayed release capsule Take 1 capsule (20 mg total) by mouth daily before breakfast 30 capsule 5    senna-docusate sodium (SENOKOT-S) 8 6-50 mg per tablet Take 3 tablets by mouth daily at bedtime      gabapentin (NEURONTIN) 100 mg capsule Take 1 capsule (100 mg total) by mouth 3 (three) times a day (Patient not taking: Reported on 4/9/2021) 180 capsule 1     No current facility-administered medications for this visit  Allergies: Allergies   Allergen Reactions    Ativan [Lorazepam] Confusion      Patient stated her friend told her  She wasn't herself  Physical Exam:     /88   Pulse 102   Temp 97 7 °F (36 5 °C)   Resp 20   Ht 5' (1 524 m)   Wt 50 9 kg (112 lb 3 2 oz)   SpO2 100%   BMI 21 91 kg/m²     Physical Exam  Vitals signs and nursing note reviewed  Constitutional:       General: She is not in acute distress  Appearance: She is well-developed  She is not ill-appearing or diaphoretic  HENT:      Head: Normocephalic and atraumatic  Right Ear: Hearing, tympanic membrane and ear canal normal       Left Ear: Hearing, tympanic membrane and ear canal normal       Nose: Nose normal       Mouth/Throat:      Pharynx: Uvula midline  Eyes:      General: Lids are normal       Conjunctiva/sclera: Conjunctivae normal    Cardiovascular:      Rate and Rhythm: Normal rate and regular rhythm  Heart sounds: Normal heart sounds, S1 normal and S2 normal  No murmur  No gallop      Pulmonary:      Effort: Pulmonary effort is normal  No respiratory distress  Breath sounds: Normal breath sounds  Musculoskeletal: Normal range of motion  General: No tenderness  Lymphadenopathy:      Cervical: No cervical adenopathy  Skin:     General: Skin is warm  Capillary Refill: Capillary refill takes less than 2 seconds  Findings: No rash  Neurological:      Mental Status: She is alert  Psychiatric:         Behavior: Behavior normal  Behavior is cooperative  Thought Content:  Thought content normal          Judgment: Judgment normal           CAROLINE Kennedy  [facility]

## 2021-04-09 NOTE — PROGRESS NOTES
[facility]        NAME: Maury Paz is a 50 y o  female  : 1965    MRN: [id number]  DATE: 2021  TIME: 2:08 PM    Assessment and Plan   Annual physical exam [D93 28]  1  Annual physical exam     2  Primary osteoarthritis of both hands     3  Raynaud's disease without gangrene     4  Chronic sore throat  Ambulatory Referral to Otolaryngology   5  Generalized anxiety disorder     6  Gastroesophageal reflux disease without esophagitis       1  Annual physical exam      2  Primary osteoarthritis of both hands  Tylenol as needed    3  Raynaud's disease without gangrene  Keep hands warm  Call the office if worsening    4  Chronic sore throat  See ENT  - Ambulatory Referral to Otolaryngology; Future    5  Generalized anxiety disorder  Follow up with psychiatry  6  Gastroesophageal reflux disease without esophagitis  Continue omeprazole daily  Patient Instructions     Patient Instructions       Wellness Visit for Adults   AMBULATORY CARE:   A wellness visit  is when you see your healthcare provider to get screened for health problems  Your healthcare provider will also give you advice on how to stay healthy  Write down your questions so you remember to ask them  Ask your healthcare provider how often you should have a wellness visit  What happens at a wellness visit:  Your healthcare provider will ask about your health, and your family history of health problems  This includes high blood pressure, heart disease, and cancer  He or she will ask if you have symptoms that concern you, if you smoke, and about your mood  You may also be asked about your intake of medicines, supplements, food, and alcohol  Any of the following may be done:  · Your weight  will be checked  Your height may also be checked so your body mass index (BMI) can be calculated  Your BMI shows if you are at a healthy weight  · Your blood pressure  and heart rate will be checked   Your temperature may also be checked  · Blood and urine tests  may be done  Blood tests may be done to check your cholesterol levels  Abnormal cholesterol levels increase your risk for heart disease and stroke  You may also need a blood or urine test to check for diabetes if you are at increased risk  Urine tests may be done to look for signs of an infection or kidney disease  · A physical exam  includes checking your heartbeat and lungs with a stethoscope  Your healthcare provider may also check your skin to look for sun damage  · Screening tests  may be recommended  A screening test is done to check for diseases that may not cause symptoms  The screening tests you may need depend on your age, gender, family history, and lifestyle habits  For example, colorectal screening may be recommended if you are 48years old or older  Screening tests you need if you are a woman:   · A Pap smear  is used to screen for cervical cancer  Pap smears are usually done every 3 to 5 years depending on your age  You may need them more often if you have had abnormal Pap smear test results in the past  Ask your healthcare provider how often you should have a Pap smear  · A mammogram  is an x-ray of your breasts to screen for breast cancer  Experts recommend mammograms every 2 years starting at age 48 years  You may need a mammogram at age 52 years or younger if you have an increased risk for breast cancer  Talk to your healthcare provider about when you should start having mammograms and how often you need them  Vaccines you may need:   · Get an influenza vaccine  every year  The influenza vaccine protects you from the flu  Several types of viruses cause the flu  The viruses change over time, so new vaccines are made each year  · Get a tetanus-diphtheria (Td) booster vaccine  every 10 years  This vaccine protects you against tetanus and diphtheria  Tetanus is a severe infection that may cause painful muscle spasms and lockjaw   Diphtheria is a severe bacterial infection that causes a thick covering in the back of your mouth and throat  · Get a human papillomavirus (HPV) vaccine  if you are female and aged 23 to 32 or male 23 to 24 and never received it  This vaccine protects you from HPV infection  HPV is the most common infection spread by sexual contact  HPV may also cause vaginal, penile, and anal cancers  · Get a pneumococcal vaccine  if you are aged 72 years or older  The pneumococcal vaccine is an injection given to protect you from pneumococcal disease  Pneumococcal disease is an infection caused by pneumococcal bacteria  The infection may cause pneumonia, meningitis, or an ear infection  · Get a shingles vaccine  if you are 60 or older, even if you have had shingles before  The shingles vaccine is an injection to protect you from the varicella-zoster virus  This is the same virus that causes chickenpox  Shingles is a painful rash that develops in people who had chickenpox or have been exposed to the virus  How to eat healthy:  My Plate is a model for planning healthy meals  It shows the types and amounts of foods that should go on your plate  Fruits and vegetables make up about half of your plate, and grains and protein make up the other half  A serving of dairy is included on the side of your plate  The amount of calories and serving sizes you need depends on your age, gender, weight, and height  Examples of healthy foods are listed below:  · Eat a variety of vegetables  such as dark green, red, and orange vegetables  You can also include canned vegetables low in sodium (salt) and frozen vegetables without added butter or sauces  · Eat a variety of fresh fruits , canned fruit in 100% juice, frozen fruit, and dried fruit  · Include whole grains  At least half of the grains you eat should be whole grains   Examples include whole-wheat bread, wheat pasta, brown rice, and whole-grain cereals such as oatmeal     · Eat a variety of protein foods such as seafood (fish and shellfish), lean meat, and poultry without skin (turkey and chicken)  Examples of lean meats include pork leg, shoulder, or tenderloin, and beef round, sirloin, tenderloin, and extra lean ground beef  Other protein foods include eggs and egg substitutes, beans, peas, soy products, nuts, and seeds  · Choose low-fat dairy products such as skim or 1% milk or low-fat yogurt, cheese, and cottage cheese  · Limit unhealthy fats  such as butter, hard margarine, and shortening  Exercise:  Exercise at least 30 minutes per day on most days of the week  Some examples of exercise include walking, biking, dancing, and swimming  You can also fit in more physical activity by taking the stairs instead of the elevator or parking farther away from stores  Include muscle strengthening activities 2 days each week  Regular exercise provides many health benefits  It helps you manage your weight, and decreases your risk for type 2 diabetes, heart disease, stroke, and high blood pressure  Exercise can also help improve your mood  Ask your healthcare provider about the best exercise plan for you  General health and safety guidelines:   · Do not smoke  Nicotine and other chemicals in cigarettes and cigars can cause lung damage  Ask your healthcare provider for information if you currently smoke and need help to quit  E-cigarettes or smokeless tobacco still contain nicotine  Talk to your healthcare provider before you use these products  · Limit alcohol  A drink of alcohol is 12 ounces of beer, 5 ounces of wine, or 1½ ounces of liquor  · Lose weight, if needed  Being overweight increases your risk of certain health conditions  These include heart disease, high blood pressure, type 2 diabetes, and certain types of cancer  · Protect your skin  Do not sunbathe or use tanning beds  Use sunscreen with a SPF 15 or higher  Apply sunscreen at least 15 minutes before you go outside  Reapply sunscreen every 2 hours  Wear protective clothing, hats, and sunglasses when you are outside  · Drive safely  Always wear your seatbelt  Make sure everyone in your car wears a seatbelt  A seatbelt can save your life if you are in an accident  Do not use your cell phone when you are driving  This could distract you and cause an accident  Pull over if you need to make a call or send a text message  · Practice safe sex  Use latex condoms if are sexually active and have more than one partner  Your healthcare provider may recommend screening tests for sexually transmitted infections (STIs)  · Wear helmets, lifejackets, and protective gear  Always wear a helmet when you ride a bike or motorcycle, go skiing, or play sports that could cause a head injury  Wear protective equipment when you play sports  Wear a lifejacket when you are on a boat or doing water sports  © Copyright [address] Information is for End User's use only and may not be sold, redistributed or otherwise used for commercial purposes  All illustrations and images included in CareNotes® are the copyrighted property of A D A M , Inc  or [address] [place] [address]  The above information is an  only  It is not intended as medical advice for individual conditions or treatments  Talk to your doctor, nurse or pharmacist before following any medical regimen to see if it is safe and effective for you  Chief Complaint     Chief Complaint   Patient presents with    Annual Exam         History of Present Illness       Patient here for annual physical with multiple complaints  Cold white fingers  Reports that when it is cold out either her fingers or toe with turn white at times and sometimes just one of them and take awhile to turn back to the Normal pink color  Reports that she has dealt with a chronic sore throat but it has been getting worse over the last few months  Denies worsening in the last few months  Patient reports that is feels like a sea urchin in her throat that gets worse as the day goes on, does smoke every day  Reports nodules on her finger joints of both hands, does state that she has hand pain but it is tolerable  Continues with anxiety and PTSD, has not found a psychologist, reports the [facility] office does not take her insurance  Wondering about ADHD  States she needs a psychiatrist and psychologist  Has not looked since her visit 8 months ago  After I was done with the visit the patient asked the MA if I would send in Ativan  Patient has allergy to ativan  I am referring her to psychiatry at this time  She will call with name of psychiatist that is covered by her insurance  Review of Systems   Review of Systems   Constitutional: Negative for fatigue and fever  Respiratory: Negative  Negative for chest tightness, shortness of breath and wheezing  Cardiovascular: Negative  Negative for chest pain and palpitations  Gastrointestinal: Negative  Negative for abdominal distention, abdominal pain, diarrhea and nausea  Musculoskeletal: Negative for arthralgias and myalgias  Skin: Positive for color change (in fingers and toes  )  Neurological: Negative  Negative for dizziness, light-headedness, numbness and headaches  Psychiatric/Behavioral: Negative  Negative for decreased concentration and dysphoric mood         PHQ-9 Depression Screening    PHQ-9:   Frequency of the following problems over the past two weeks:      Little interest or pleasure in doing things: 3 - nearly every day  Feeling down, depressed, or hopeless: 2 - more than half the days  Trouble falling or staying asleep, or sleeping too much: 3 - nearly every day  Feeling tired or having little energy: 1 - several days  Poor appetite or overeatin - more than half the days  Feeling bad about yourself - or that you are a failure or have let yourself or your family down: 3 - nearly every day  Trouble concentrating on things, such as reading the newspaper or watching television: 3 - nearly every day  Moving or speaking so slowly that other people could have noticed   Or the opposite - being so fidgety or restless that you have been moving around a lot more than usual: 1 - several days  Thoughts that you would be better off dead, or of hurting yourself in some way: 0 - not at all  PHQ-2 Score: 5  PHQ-9 Score: 18        Current Medications       Current Outpatient Medications:     albuterol (ProAir HFA) 90 mcg/act inhaler, Inhale 2 puffs every 6 (six) hours as needed for wheezing, Disp: 1 Inhaler, Rfl: 2    omeprazole (PriLOSEC) 20 mg delayed release capsule, Take 1 capsule (20 mg total) by mouth daily before breakfast, Disp: 30 capsule, Rfl: 5    senna-docusate sodium (SENOKOT-S) 8 6-50 mg per tablet, Take 3 tablets by mouth daily at bedtime, Disp: , Rfl:     gabapentin (NEURONTIN) 100 mg capsule, Take 1 capsule (100 mg total) by mouth 3 (three) times a day (Patient not taking: Reported on 4/9/2021), Disp: 180 capsule, Rfl: 1    Current Allergies     Allergies as of 04/09/2021 - Reviewed 04/09/2021   Allergen Reaction Noted    Ativan [lorazepam] Confusion 04/07/2020            The following portions of the patient's history were reviewed and updated as appropriate: allergies, current medications, past family history, past medical history, past social history, past surgical history and problem list      Past Medical History:   Diagnosis Date    Bilateral carpal tunnel syndrome 8/20/2019    Carpal tunnel syndrome of right wrist 10/30/2019    Added automatically from request for surgery [id number]    Hypertension        Past Surgical History:   Procedure Laterality Date    [place] WRIST [place] LIG Right 1/21/2020    Procedure: RELEASE CARPAL TUNNEL ENDOSCOPIC;  Surgeon: Kael Mccollum MD;  [facility];  Service: Orthopedics    [place] WRIST [place] LIG Left 1/28/2020    Procedure: RELEASE CARPAL TUNNEL ENDOSCOPIC;  Surgeon: Gwen Em MD;  [facility];  Service: Orthopedics       Family History   Problem Relation Age of Onset    Alcohol abuse Mother     Eating disorder Mother         anorexia and bulimia per Pt    Hodgkin's lymphoma Father     Colon cancer Maternal Grandmother     Alcohol abuse Maternal Grandfather     Colon cancer Paternal Grandmother     Anxiety disorder Paternal Aunt     Multiple sclerosis Cousin          Medications have been verified          Objective   /88   Pulse 102   Temp 97 7 °F (36 5 °C)   Resp 20   Ht 5' (1 524 m)   Wt 50 9 kg (112 lb 3 2 oz)   SpO2 100%   BMI 21 91 kg/m²        Physical Exam     Physical Exam see annual exam

## 2021-05-11 DIAGNOSIS — K21.9 GASTROESOPHAGEAL REFLUX DISEASE WITHOUT ESOPHAGITIS: ICD-10-CM

## 2021-05-11 DIAGNOSIS — Z76.0 MEDICATION REFILL: ICD-10-CM

## 2021-05-11 DIAGNOSIS — R07.81 RIB PAIN: ICD-10-CM

## 2021-05-11 DIAGNOSIS — R06.2 WHEEZING: ICD-10-CM

## 2021-05-11 RX ORDER — GABAPENTIN 100 MG/1
100 CAPSULE ORAL 3 TIMES DAILY
Qty: 180 CAPSULE | Refills: 1 | Status: SHIPPED | OUTPATIENT
Start: 2021-05-11 | End: 2022-01-03

## 2021-05-11 RX ORDER — OMEPRAZOLE 20 MG/1
20 CAPSULE, DELAYED RELEASE ORAL
Qty: 30 CAPSULE | Refills: 5 | Status: SHIPPED | OUTPATIENT
Start: 2021-05-11 | End: 2022-01-13 | Stop reason: SDUPTHER

## 2021-05-11 RX ORDER — ALBUTEROL SULFATE 90 UG/1
2 AEROSOL, METERED RESPIRATORY (INHALATION) EVERY 6 HOURS PRN
Qty: 18 G | Refills: 2 | Status: SHIPPED | OUTPATIENT
Start: 2021-05-11 | End: 2022-04-19

## 2021-05-11 NOTE — TELEPHONE ENCOUNTER
Patient requesting refill(s) of:  Albuterol Inhaler     Last filled: 1 inhaler 2 refills   Last appt: 4/9/21  Next appt: 4/15/22  Pharmacy: [facility]     Patient requesting refill(s) of: Gabapentin     Last filled: 8/24/20 180 capsules 1 refill   Last appt: 4/9/21  Next appt: 4/15/22  Pharmacy: [facility]     Patient requesting refill(s) of: Omeprazole     Last filled: 8/18/20 30 capsule 5 refills   Last appt: 4/9/21  Next appt: 4/15/22  Pharmacy: [facility]

## 2021-05-18 ENCOUNTER — OFFICE VISIT (OUTPATIENT)
Dept: FAMILY MEDICINE CLINIC | Facility: CLINIC | Age: 56
End: 2021-05-18
Payer: COMMERCIAL

## 2021-05-18 ENCOUNTER — APPOINTMENT (OUTPATIENT)
Dept: RADIOLOGY | Facility: CLINIC | Age: 56
End: 2021-05-18
Payer: COMMERCIAL

## 2021-05-18 VITALS
OXYGEN SATURATION: 99 % | SYSTOLIC BLOOD PRESSURE: 140 MMHG | TEMPERATURE: 98.5 F | WEIGHT: 110.6 LBS | DIASTOLIC BLOOD PRESSURE: 80 MMHG | BODY MASS INDEX: 21.71 KG/M2 | HEIGHT: 60 IN | HEART RATE: 85 BPM | RESPIRATION RATE: 19 BRPM

## 2021-05-18 DIAGNOSIS — M94.0 COSTOCHONDRITIS, ACUTE: ICD-10-CM

## 2021-05-18 DIAGNOSIS — F41.1 GENERALIZED ANXIETY DISORDER: ICD-10-CM

## 2021-05-18 DIAGNOSIS — R73.03 PRE-DIABETES: ICD-10-CM

## 2021-05-18 DIAGNOSIS — R07.81 RIB PAIN ON RIGHT SIDE: ICD-10-CM

## 2021-05-18 DIAGNOSIS — Z12.4 SCREENING FOR CERVICAL CANCER: Primary | ICD-10-CM

## 2021-05-18 PROCEDURE — 71101 X-RAY EXAM UNILAT RIBS/CHEST: CPT

## 2021-05-18 PROCEDURE — 99214 OFFICE O/P EST MOD 30 MIN: CPT | Performed by: NURSE PRACTITIONER

## 2021-05-18 PROCEDURE — 3725F SCREEN DEPRESSION PERFORMED: CPT | Performed by: NURSE PRACTITIONER

## 2021-05-18 NOTE — PROGRESS NOTES
[facility]        NAME: Padmaja Fletcher is a 50 y o  female  : 1965    MRN: [id number]  DATE: May 18, 2021  TIME: 2:36 PM    Assessment and Plan   Screening for cervical cancer [Z12 4]  1  Screening for cervical cancer  Ambulatory referral to Obstetrics / Gynecology   2  Rib pain on right side  XR ribs right w pa chest min 3 views   3  Pre-diabetes  HEMOGLOBIN A1C W/ EAG ESTIMATION    Comprehensive metabolic panel   4  Costochondritis, acute     5  Generalized anxiety disorder       1  Screening for cervical cancer   Will schedule visit  - Ambulatory referral to Obstetrics / Gynecology; Future    2  Rib pain on right side  Patient anxious and wants xray  Suspect costochondritis  - XR ribs right w pa chest min 3 views; Future    3  Pre-diabetes  Last A1c was 5 8  Will have patient follow up in 6 months, get labs before next visit to assess her prediabetes  Will get mammo scheduled, did not want the office to schedule for her    Patient Instructions     There are no Patient Instructions on file for this visit  Chief Complaint     Chief Complaint   Patient presents with    Follow-up     Pain in lung         History of Present Illness       Patient here today for an acute visit with c/o pain in her lung starting 5 days ago  Reports she had her lighter in her bra and had pain  In the anterior right chest pain comes and goes lasts about 1-2 min  Has not the pain today  Denies SOB,dizzness, lightheadedness, palpitations  Does not get worse with anything  Smokes about 9-12 cigarettes a day  Uses albuterol inhaler every 2-3 days  Has smoked since she was in 6th grade and about 1/2 PPD  Reports that someone went to Rehab and was tested for TB     has been raking and sweeping  Review of Systems   Review of Systems   Constitutional: Negative  Negative for fatigue and fever  Respiratory: Negative  Negative for chest tightness and shortness of breath  Cardiovascular: Negative  Negative for chest pain and palpitations  Neurological: Positive for dizziness  Negative for light-headedness and headaches  PHQ-9 Depression Screening    PHQ-9:   Frequency of the following problems over the past two weeks:      Little interest or pleasure in doing things: 3 - nearly every day  Feeling down, depressed, or hopeless: 3 - nearly every day  Trouble falling or staying asleep, or sleeping too much: 2 - more than half the days  Feeling tired or having little energy: 2 - more than half the days  Poor appetite or overeatin - more than half the days  Feeling bad about yourself - or that you are a failure or have let yourself or your family down: 3 - nearly every day  Trouble concentrating on things, such as reading the newspaper or watching television: 1 - several days  Moving or speaking so slowly that other people could have noticed   Or the opposite - being so fidgety or restless that you have been moving around a lot more than usual: 2 - more than half the days  Thoughts that you would be better off dead, or of hurting yourself in some way: 3 - nearly every day  PHQ-2 Score: 6  PHQ-9 Score: 21        Current Medications       Current Outpatient Medications:     albuterol (ProAir HFA) 90 mcg/act inhaler, Inhale 2 puffs every 6 (six) hours as needed for wheezing, Disp: 18 g, Rfl: 2    gabapentin (NEURONTIN) 100 mg capsule, Take 1 capsule (100 mg total) by mouth 3 (three) times a day, Disp: 180 capsule, Rfl: 1    omeprazole (PriLOSEC) 20 mg delayed release capsule, Take 1 capsule (20 mg total) by mouth daily before breakfast, Disp: 30 capsule, Rfl: 5    senna-docusate sodium (SENOKOT-S) 8 6-50 mg per tablet, Take 3 tablets by mouth daily at bedtime, Disp: , Rfl:     Current Allergies     Allergies as of 2021 - Reviewed 2021   Allergen Reaction Noted    Ativan [lorazepam] Confusion 2020            The following portions of the patient's history were reviewed and updated as appropriate: allergies, current medications, past family history, past medical history, past social history, past surgical history and problem list      Past Medical History:   Diagnosis Date    Bilateral carpal tunnel syndrome 8/20/2019    Carpal tunnel syndrome of right wrist 10/30/2019    Added automatically from request for surgery [id number]    Hypertension        Past Surgical History:   Procedure Laterality Date    [place] WRIST [place] LIG Right 1/21/2020    Procedure: RELEASE CARPAL TUNNEL ENDOSCOPIC;  Surgeon: Elisabeth Castro MD;  [facility];  Service: Orthopedics    PA WRIST [place] LIG Left 1/28/2020    Procedure: RELEASE CARPAL TUNNEL ENDOSCOPIC;  Surgeon: Elisabeth Castro MD;  [facility];  Service: Orthopedics       Family History   Problem Relation Age of Onset    Alcohol abuse Mother     Eating disorder Mother         anorexia and bulimia per Pt    Hodgkin's lymphoma Father     Colon cancer Maternal Grandmother     Alcohol abuse Maternal Grandfather     Colon cancer Paternal Grandmother     Anxiety disorder Paternal Aunt     Multiple sclerosis Cousin          Medications have been verified  Objective   /80 (BP Location: Left arm, Patient Position: Sitting, Cuff Size: Standard)   Pulse 85   Temp 98 5 °F (36 9 °C)   Resp 19   Ht 5' (1 524 m)   Wt 50 2 kg (110 lb 9 6 oz)   SpO2 99%   BMI 21 60 kg/m²        Physical Exam     Physical Exam  Vitals signs and nursing note reviewed  Constitutional:       General: She is not in acute distress  Appearance: She is well-developed  She is not ill-appearing  HENT:      Head: Normocephalic and atraumatic  Neck:      Musculoskeletal: No neck rigidity  Trachea: No tracheal deviation  Cardiovascular:      Rate and Rhythm: Normal rate and regular rhythm     Pulmonary:      Effort: Pulmonary effort is normal  No tachypnea, accessory muscle usage or respiratory distress  Breath sounds: No stridor  Rhonchi present  No decreased breath sounds or wheezing  Musculoskeletal:        Arms:    Neurological:      Mental Status: She is alert and oriented to person, place, and time  Psychiatric:         Speech: Speech normal          Behavior: Behavior normal  Behavior is cooperative

## 2021-05-27 ENCOUNTER — TELEPHONE (OUTPATIENT)
Dept: FAMILY MEDICINE CLINIC | Facility: CLINIC | Age: 56
End: 2021-05-27

## 2021-05-27 NOTE — TELEPHONE ENCOUNTER
Patient called looking for results on her Xrays  She said she is still having pain in her lungs  Please advice

## 2021-05-27 NOTE — TELEPHONE ENCOUNTER
See xray results  Xray is normal  O2 level was normal when she was in the office and did not have any wheezing  Would recommend stopping smoking

## 2021-06-01 NOTE — TELEPHONE ENCOUNTER
Attempted to contact patient  No answer and VM not set up  This is 3rd attempt to contact patient without success  Will mail letter to patient's home

## 2021-06-02 ENCOUNTER — TELEPHONE (OUTPATIENT)
Dept: FAMILY MEDICINE CLINIC | Facility: CLINIC | Age: 56
End: 2021-06-02

## 2021-06-02 NOTE — TELEPHONE ENCOUNTER
Patient called and spoke with Thad Humphrey over [place]  She wanted debby to now that her Cough is now productive and yellow in color      She ask if she need to be seen or send over an antibiotic to the pharmacy    Please advise    Phone: [phone]

## 2021-06-02 NOTE — TELEPHONE ENCOUNTER
He xray was normal and yellow mucous does not always mean a bacterial infection  I would have her take mucinex

## 2021-06-11 ENCOUNTER — OFFICE VISIT (OUTPATIENT)
Dept: OBGYN CLINIC | Facility: CLINIC | Age: 56
End: 2021-06-11
Payer: COMMERCIAL

## 2021-06-11 VITALS
HEIGHT: 60 IN | WEIGHT: 112 LBS | DIASTOLIC BLOOD PRESSURE: 86 MMHG | BODY MASS INDEX: 21.99 KG/M2 | SYSTOLIC BLOOD PRESSURE: 148 MMHG

## 2021-06-11 DIAGNOSIS — N89.8 VAGINAL DRYNESS: ICD-10-CM

## 2021-06-11 DIAGNOSIS — N89.8 VAGINAL DISCHARGE: ICD-10-CM

## 2021-06-11 DIAGNOSIS — F41.1 GENERALIZED ANXIETY DISORDER: ICD-10-CM

## 2021-06-11 DIAGNOSIS — Z12.4 SCREENING FOR MALIGNANT NEOPLASM OF CERVIX: ICD-10-CM

## 2021-06-11 DIAGNOSIS — Z01.419 ROUTINE GYNECOLOGICAL EXAMINATION: Primary | ICD-10-CM

## 2021-06-11 DIAGNOSIS — Z12.4 SCREENING FOR CERVICAL CANCER: ICD-10-CM

## 2021-06-11 PROCEDURE — 99386 PREV VISIT NEW AGE 40-64: CPT | Performed by: ADVANCED PRACTICE MIDWIFE

## 2021-06-11 PROCEDURE — 87491 CHLMYD TRACH DNA AMP PROBE: CPT | Performed by: ADVANCED PRACTICE MIDWIFE

## 2021-06-11 PROCEDURE — 87591 N.GONORRHOEAE DNA AMP PROB: CPT | Performed by: ADVANCED PRACTICE MIDWIFE

## 2021-06-11 PROCEDURE — 3008F BODY MASS INDEX DOCD: CPT | Performed by: ADVANCED PRACTICE MIDWIFE

## 2021-06-11 PROCEDURE — 87624 HPV HI-RISK TYP POOLED RSLT: CPT | Performed by: ADVANCED PRACTICE MIDWIFE

## 2021-06-11 PROCEDURE — 4004F PT TOBACCO SCREEN RCVD TLK: CPT | Performed by: ADVANCED PRACTICE MIDWIFE

## 2021-06-11 PROCEDURE — 0503F POSTPARTUM CARE VISIT: CPT | Performed by: ADVANCED PRACTICE MIDWIFE

## 2021-06-11 PROCEDURE — G0145 SCR C/V CYTO,THINLAYER,RESCR: HCPCS | Performed by: ADVANCED PRACTICE MIDWIFE

## 2021-06-11 NOTE — PATIENT INSTRUCTIONS
Vaginal Atrophy   WHAT YOU NEED TO KNOW:   Vaginal atrophy is a condition that causes thinning, drying, and inflammation of vaginal tissue  This condition is caused by decreased levels of estrogen (a female sex hormone)  Vaginal atrophy can increase your risk for vaginal and urinary tract infections  Vaginal atrophy can worsen over time if not treated  DISCHARGE INSTRUCTIONS:   Contact your healthcare provider if:   · You have a foul-smelling odor coming from your vagina  · You have a thick, cheese-like discharge from your vagina  · You have itching, swelling, or redness in your vagina  · You have pain or burning when you urinate  · Your urine smells bad  · Your symptoms do not improve, or they get worse  · You have questions or concerns about your condition or care  Medicines:   · Over-the counter vaginal moisturizers  can help reduce dryness  Your healthcare provider may recommend that you use a vaginal moisturizer several times each week and during sex  Only use creams that are made for vaginal use  Do  not  use petroleum jelly  Lubricants can be used during sex to decrease pain and discomfort  · Estrogen  may help decrease dryness  It may also lower your risk of vaginal infections if you are going through menopause  It can also help to relieve urinary symptoms  Estrogen may be prescribed in the form of a cream, tablet, or ring  These medicines can be applied or inserted into the vagina  Estrogen can also be prescribed in the form of a pill  · Take your medicine as directed  Contact your healthcare provider if you think your medicine is not helping or if you have side effects  Tell him of her if you are allergic to any medicine  Keep a list of the medicines, vitamins, and herbs you take  Include the amounts, and when and why you take them  Bring the list or the pill bottles to follow-up visits  Carry your medicine list with you in case of an emergency      Follow up with your healthcare provider as directed:  Write down your questions so you remember to ask them during your visits  © Copyright [address] Information is for End User's use only and may not be sold, redistributed or otherwise used for commercial purposes  All illustrations and images included in CareNotes® are the copyrighted property of [employer]  or [employer]   The above information is an  only  It is not intended as medical advice for individual conditions or treatments  Talk to your doctor, nurse or pharmacist before following any medical regimen to see if it is safe and effective for you      Replens or YES vaginal moisturizer - at night 1-3 times per week  Coconut Oil nightly to outer area
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
## 2021-06-12 LAB
C TRACH DNA SPEC QL NAA+PROBE: NEGATIVE
N GONORRHOEA DNA SPEC QL NAA+PROBE: NEGATIVE

## 2021-06-15 LAB
HPV HR 12 DNA CVX QL NAA+PROBE: POSITIVE
HPV16 DNA CVX QL NAA+PROBE: NEGATIVE
HPV18 DNA CVX QL NAA+PROBE: NEGATIVE

## 2021-06-16 LAB
LAB AP GYN PRIMARY INTERPRETATION: NORMAL
Lab: NORMAL

## 2021-06-17 ENCOUNTER — TELEPHONE (OUTPATIENT)
Dept: OBGYN CLINIC | Facility: MEDICAL CENTER | Age: 56
End: 2021-06-17

## 2021-06-22 ENCOUNTER — TELEPHONE (OUTPATIENT)
Dept: FAMILY MEDICINE CLINIC | Facility: CLINIC | Age: 56
End: 2021-06-22

## 2021-06-22 NOTE — TELEPHONE ENCOUNTER
Patient called wanting to ask Yesika Jordan about a rash she is getting under her Arm, ( thinks its from Her Deodorant) red dot rash and very itchy and burns when she itches  Not sure what to do, offered appointment but wanted me to put note in first any questions call her at [phone]

## 2021-06-22 NOTE — TELEPHONE ENCOUNTER
Spoke with patient, she will stop using the deoderant, and will wash and apply cool compress  She will call us tomorrow if it gets any worse

## 2021-06-22 NOTE — TELEPHONE ENCOUNTER
If she thinks its from the deodorant then she should stop using this  Its hard to say if it really is a contact dermatitis from the deodorant without seeing it  Would wash with warm water and soap and apply cool compresses  If worsening or not improving then I would recommend she come in for a visit

## 2021-07-14 ENCOUNTER — OFFICE VISIT (OUTPATIENT)
Dept: FAMILY MEDICINE CLINIC | Facility: CLINIC | Age: 56
End: 2021-07-14
Payer: COMMERCIAL

## 2021-07-14 VITALS
SYSTOLIC BLOOD PRESSURE: 134 MMHG | RESPIRATION RATE: 20 BRPM | HEART RATE: 94 BPM | OXYGEN SATURATION: 98 % | WEIGHT: 109.6 LBS | TEMPERATURE: 98.1 F | BODY MASS INDEX: 21.52 KG/M2 | DIASTOLIC BLOOD PRESSURE: 86 MMHG | HEIGHT: 60 IN

## 2021-07-14 DIAGNOSIS — F31.81 MODERATE MIXED BIPOLAR II DISORDER (HCC): Primary | ICD-10-CM

## 2021-07-14 DIAGNOSIS — F41.0 PANIC ATTACKS: ICD-10-CM

## 2021-07-14 DIAGNOSIS — F41.1 GENERALIZED ANXIETY DISORDER: ICD-10-CM

## 2021-07-14 DIAGNOSIS — F43.12 CHRONIC POST-TRAUMATIC STRESS DISORDER (PTSD): ICD-10-CM

## 2021-07-14 PROCEDURE — 3725F SCREEN DEPRESSION PERFORMED: CPT | Performed by: NURSE PRACTITIONER

## 2021-07-14 PROCEDURE — 99213 OFFICE O/P EST LOW 20 MIN: CPT | Performed by: NURSE PRACTITIONER

## 2021-07-14 PROCEDURE — 4004F PT TOBACCO SCREEN RCVD TLK: CPT | Performed by: NURSE PRACTITIONER

## 2021-07-14 PROCEDURE — 3008F BODY MASS INDEX DOCD: CPT | Performed by: NURSE PRACTITIONER

## 2021-07-14 NOTE — PROGRESS NOTES
[facility]        NAME: Abrahan Horton is a 50 y o  female  : 1965    MRN: [id number]  DATE: 2021  TIME: 2:59 PM    Assessment and Plan   Moderate mixed bipolar II disorder ([facility] [address] ) [F31 [id number]]  1  Moderate mixed bipolar II disorder [facility])  Ambulatory referral to Psychiatry   2  Chronic post-traumatic stress disorder (PTSD)  Ambulatory referral to Psychiatry   3  Generalized anxiety disorder  Ambulatory referral to Psychiatry   4  Panic attacks  Ambulatory referral to Psychiatry       1  Moderate mixed bipolar II disorder [facility])  Needs evaluation by a psychiatrist    Has SSI papers  Discussed that I do not fill these out for psychiatric conditions  - Ambulatory referral to Psychiatry; Future    2  Chronic post-traumatic stress disorder (PTSD)    - Ambulatory referral to Psychiatry; Future    3  Generalized anxiety disorder    - Ambulatory referral to Psychiatry; Future    4  Panic attacks    - Ambulatory referral to Psychiatry; Future    Patient Instructions     There are no Patient Instructions on file for this visit  Chief Complaint     Chief Complaint   Patient presents with    discuss SSI papers         History of Present Illness       Patient here for acute visit to discuss SSI  Wants SSI forms for her psychiatric conditions  Does not currently have a psychiatrist   Has had a psychiatrist in the past but reports she cannot return to that psychiatrist  Dose have run on thoughts  Very anxious  Review of Systems   Review of Systems   Constitutional: Negative for fatigue, fever and unexpected weight change  Psychiatric/Behavioral: Positive for decreased concentration and dysphoric mood  Negative for behavioral problems and sleep disturbance  The patient is nervous/anxious and is hyperactive          PHQ-9 Depression Screening    PHQ-9:   Frequency of the following problems over the past two weeks:      Little interest or pleasure in doing things: 3 - nearly every day  Feeling down, depressed, or hopeless: 3 - nearly every day  Trouble falling or staying asleep, or sleeping too much: 3 - nearly every day  Feeling tired or having little energy: 3 - nearly every day  Poor appetite or overeating: 3 - nearly every day  Feeling bad about yourself - or that you are a failure or have let yourself or your family down: 3 - nearly every day  Trouble concentrating on things, such as reading the newspaper or watching television: 3 - nearly every day  Moving or speaking so slowly that other people could have noticed   Or the opposite - being so fidgety or restless that you have been moving around a lot more than usual: 3 - nearly every day  Thoughts that you would be better off dead, or of hurting yourself in some way: 3 - nearly every day  PHQ-2 Score: 6  PHQ-9 Score: 27        Current Medications       Current Outpatient Medications:     albuterol (ProAir HFA) 90 mcg/act inhaler, Inhale 2 puffs every 6 (six) hours as needed for wheezing, Disp: 18 g, Rfl: 2    Cetirizine HCl 10 MG TBDP, , Disp: , Rfl:     gabapentin (NEURONTIN) 100 mg capsule, Take 1 capsule (100 mg total) by mouth 3 (three) times a day, Disp: 180 capsule, Rfl: 1    omeprazole (PriLOSEC) 20 mg delayed release capsule, Take 1 capsule (20 mg total) by mouth daily before breakfast, Disp: 30 capsule, Rfl: 5    senna-docusate sodium (SENOKOT-S) 8 6-50 mg per tablet, Take 3 tablets by mouth daily at bedtime, Disp: , Rfl:     Current Allergies     Allergies as of 07/14/2021    (No Known Allergies)            The following portions of the patient's history were reviewed and updated as appropriate: allergies, current medications, past family history, past medical history, past social history, past surgical history and problem list      Past Medical History:   Diagnosis Date    Bilateral carpal tunnel syndrome 8/20/2019    Carpal tunnel syndrome of right wrist 10/30/2019    Added automatically from request for surgery [id number]    Hypertension        Past Surgical History:   Procedure Laterality Date    BREAST BIOPSY      OOPHORECTOMY Right     MT WRIST Josue [place] LIG Right 1/21/2020    Procedure: RELEASE CARPAL TUNNEL ENDOSCOPIC;  Surgeon: Chauncey Anne MD;  [facility];  Service: Orthopedics    MT WRIST Josue Boozer LIG Left 1/28/2020    Procedure: RELEASE CARPAL TUNNEL ENDOSCOPIC;  Surgeon: Chauncey Anne MD;  [facility];  Service: Orthopedics       Family History   Problem Relation Age of Onset    Alcohol abuse Mother     Eating disorder Mother         anorexia and bulimia per Pt    Hodgkin's lymphoma Father     Colon cancer Maternal Grandmother     Alcohol abuse Maternal Grandfather     Colon cancer Paternal Grandmother     Anxiety disorder Paternal Aunt     Multiple sclerosis Cousin          Medications have been verified  Objective   /86   Pulse 94   Temp 98 1 °F (36 7 °C)   Resp 20   Ht 5' (1 524 m)   Wt 49 7 kg (109 lb 9 6 oz)   LMP  (LMP Unknown)   SpO2 98%   BMI 21 40 kg/m²        Physical Exam     Physical Exam  Vitals and nursing note reviewed  Constitutional:       General: She is not in acute distress  Appearance: She is well-developed  She is not ill-appearing  HENT:      Head: Normocephalic and atraumatic  Neck:      Trachea: No tracheal deviation  Pulmonary:      Effort: Pulmonary effort is normal  No tachypnea, accessory muscle usage or respiratory distress  Breath sounds: No stridor  Musculoskeletal:      Cervical back: No rigidity  Neurological:      Mental Status: She is alert and oriented to person, place, and time  Psychiatric:         Mood and Affect: Mood is anxious  Speech: Speech is tangential          Behavior: Behavior is hyperactive  Behavior is cooperative  Thought Content:  Thought content normal

## 2021-07-20 ENCOUNTER — TELEPHONE (OUTPATIENT)
Dept: PSYCHIATRY | Facility: CLINIC | Age: 56
End: 2021-07-20

## 2021-08-09 ENCOUNTER — TELEPHONE (OUTPATIENT)
Dept: PSYCHIATRY | Facility: CLINIC | Age: 56
End: 2021-08-09

## 2021-08-09 NOTE — TELEPHONE ENCOUNTER
Pt called to say it was over 3 weeks since she called  I spoke to [facility] and she was going to take the call   I placed Seun Grey on hold

## 2021-09-03 ENCOUNTER — HOSPITAL ENCOUNTER (OUTPATIENT)
Dept: MAMMOGRAPHY | Facility: HOSPITAL | Age: 56
Discharge: HOME | End: 2021-09-03
Payer: COMMERCIAL

## 2021-09-03 VITALS — BODY MASS INDEX: 21.4 KG/M2 | WEIGHT: 109 LBS | HEIGHT: 60 IN

## 2021-09-03 DIAGNOSIS — Z12.39 SCREENING FOR BREAST CANCER: ICD-10-CM

## 2021-09-03 PROCEDURE — 77067 SCR MAMMO BI INCL CAD: CPT

## 2021-09-03 PROCEDURE — 77063 BREAST TOMOSYNTHESIS BI: CPT

## 2021-09-17 ENCOUNTER — TELEPHONE (OUTPATIENT)
Dept: PULMONOLOGY | Facility: CLINIC | Age: 56
End: 2021-09-17

## 2021-09-20 ENCOUNTER — CONSULT (OUTPATIENT)
Dept: PULMONOLOGY | Facility: CLINIC | Age: 56
End: 2021-09-20
Payer: COMMERCIAL

## 2021-09-20 VITALS
RESPIRATION RATE: 18 BRPM | HEART RATE: 94 BPM | DIASTOLIC BLOOD PRESSURE: 72 MMHG | SYSTOLIC BLOOD PRESSURE: 136 MMHG | WEIGHT: 108.8 LBS | OXYGEN SATURATION: 99 % | TEMPERATURE: 98.4 F | BODY MASS INDEX: 21.36 KG/M2 | HEIGHT: 60 IN

## 2021-09-20 DIAGNOSIS — R06.00 DYSPNEA ON EXERTION: ICD-10-CM

## 2021-09-20 DIAGNOSIS — Z72.0 TOBACCO ABUSE: Primary | ICD-10-CM

## 2021-09-20 DIAGNOSIS — R06.2 WHEEZING: Primary | ICD-10-CM

## 2021-09-20 DIAGNOSIS — R06.2 WHEEZING: ICD-10-CM

## 2021-09-20 PROCEDURE — 99244 OFF/OP CNSLTJ NEW/EST MOD 40: CPT | Performed by: INTERNAL MEDICINE

## 2021-09-20 RX ORDER — TIOTROPIUM BROMIDE INHALATION SPRAY 3.12 UG/1
2 SPRAY, METERED RESPIRATORY (INHALATION) DAILY
Qty: 4 G | Refills: 5 | Status: SHIPPED | OUTPATIENT
Start: 2021-09-20 | End: 2022-04-04

## 2021-09-20 RX ORDER — NICOTINE 21 MG/24HR
1 PATCH, TRANSDERMAL 24 HOURS TRANSDERMAL EVERY 24 HOURS
Qty: 28 PATCH | Refills: 0 | Status: SHIPPED | OUTPATIENT
Start: 2021-09-20 | End: 2022-03-09

## 2021-09-20 NOTE — PROGRESS NOTES
Pulmonary Consultation   Mary Singh 50 y o  female MRN: [id number]  9/20/2021      Assessment:  Wheezing/dyspnea on exertion   · Likely due to obstructive lung disease/COPD given wheezing on exam with deep breathing, significant tobacco abuse history   · No prior history of COPD or asthma as a formal diagnosis   · Smokes marijuana daily, 1 pack per day currently    Plan:   · Start [place] therapy with [facility]  · Reviewed the proper inhaler use techniques  · Continue p r n  albuterol prescribed by PCP  · Will obtain complete pulmonary function test/bronchodilator response and 6mwt  · Check TTE, last 1 year ago reported mildly reduced EF   · Will check noncontrast CT chest given the ongoing/chronic cough/STEPHEN      Active marijuana/tobacco abuse  · Counseled extensively  · Agreeable to start NRT, ordered nicotine patches 14 mg   · Referral to smoking cessation program      Return in about 3 months (around 12/20/2021)  History of Present Illness     New Consult for:  Concern for dyspnea on exertion/wheezing      Background  50 y o  female with a h/o HTN, reflux, bipolar disorder active tobacco abuse, marijuana use, osteoarthritis  Patient presented today for evaluation by Pulmonary for wheezing/dyspnea on exertion and cough  Symptoms started approximately 3 years ago, slowly progressive dyspnea on exertion, such as walking up hill/1 flight of stairs  Almost all with associated with wheezing/chest congestion and minimally productive cough of whitish sputum  Also symptoms with stress/anxiety attacks  She smoked 42 pack year, currently 1 pack per day  Admits to daily use of marijuana/by smoking for about 25 years  States that it helps her relief in her an anxiety  She used to be heavy drinker for 1 year, quit heavy alcohol intake 10 years ago  No history of lung disorders, no prior formal diagnosis of COPD or asthma  No worsening of symptoms with exposures to fumes, dusts, or pollen    No history of hospitalization or steroid use  She underwent a TTE 1 year ago that showed mildly reduced EF, no other abnormalities reported  Denies prior history of CAD or MI  Review of Systems  As per hpi, all other systems reviewed and were negative  Social history  Lives and [place] with roommates  Born and raised in [place]  Forty-two pack year tobacco abuse history, currently smokes 1 pack per day  Smokes marijuana daily for 25 years  Does not drink alcohol current  Used to work as a home health care, 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   Currently on disability since 2012 due to psychiatric issues  Studies:  Imaging and other studies: I have personally reviewed pertinent films in PACS      Pulmonary function testing:   Non in file    EKG, Pathology, and Other Studies: I have personally reviewed pertinent reports  TTE 08/11/2020-EF 45-50%/mildly reduced    Normal RV size and function      Historical Information   Past Medical History:   Diagnosis Date    Bilateral carpal tunnel syndrome 8/20/2019    Carpal tunnel syndrome of right wrist 10/30/2019    Added automatically from request for surgery [id number]    Hypertension      Past Surgical History:   Procedure Laterality Date    BREAST BIOPSY Left 20yrs ago benign    OOPHORECTOMY Right     CT WRIST [place] LIG Right 1/21/2020    Procedure: RELEASE CARPAL TUNNEL ENDOSCOPIC;  Surgeon: Adeola Melara MD;  [facility];  Service: Orthopedics    CT WRIST [place] LIG Left 1/28/2020    Procedure: RELEASE CARPAL TUNNEL ENDOSCOPIC;  Surgeon: Adeola Melara MD;  [facility];  Service: Orthopedics     Family History   Problem Relation Age of Onset    Alcohol abuse Mother     Eating disorder Mother         anorexia and bulimia per Pt    Hodgkin's lymphoma Father     No Known Problems Sister     Colon cancer Maternal Grandmother     Alcohol abuse Maternal Grandfather     Colon cancer Paternal Grandmother     Anxiety disorder Paternal Aunt     Multiple sclerosis Cousin     No Known Problems Daughter     Breast cancer Maternal Aunt     No Known Problems Maternal Aunt     No Known Problems Maternal Aunt     No Known Problems Paternal Aunt          Medications/Allergies: Reviewed    Vitals: Blood pressure 136/72, pulse 94, temperature 98 4 °F (36 9 °C), temperature source Tympanic, resp  rate 18, height 5' (1 524 m), weight 49 4 kg (108 lb 12 8 oz), SpO2 99 %  Body mass index is 21 25 kg/m²  Oxygen Therapy  SpO2: 99 %  Oxygen Therapy: None (Room air)      Physical Exam  Body mass index is 21 25 kg/m²  Gen: not in acute distress, thin  Neck/Eyes: supple, no adenopathy, PERRLA  Ear: normal appearance, no significant hearing impairment  Nose:  normal nasal mucosa, no drainage  Mouth:  unremarkable/normal appearance of lips, teeth and gums  Oropharynx: mucosa is moist, no focal lesions or erythema  Salivary glands: soft nontender  Chest: normal respiratory efforts, bilateral end expiratory wheeze with deep breathing, more on the right up to mid lung field  CV: RRR, no murmurs appreciated, no edema  Abdomen: soft, non tender, normal bowel sounds  Extremities:  No observed deformity, no digital clubbing  Skin: unremarkable  Neuro: AAO X3, no focal motor deficit        Labs:  Lab Results   Component Value Date    WBC 7 86 09/30/2019    HGB 13 3 09/30/2019    HCT 41 8 09/30/2019    MCV 95 09/30/2019     09/30/2019     Lab Results   Component Value Date    CALCIUM 9 0 04/09/2021     06/15/2018    K 4 0 04/09/2021    CO2 29 04/09/2021     04/09/2021    BUN 14 04/09/2021    CREATININE 0 62 04/09/2021     No results found for: IGE  Lab Results   Component Value Date    ALT 21 04/09/2021    AST 17 04/09/2021    ALKPHOS 56 04/09/2021    BILITOT 0 3 06/15/2018           Portions of the record may have been created with voice recognition software    Occasional wrong word or "sound a like" substitutions may have occurred due to the inherent limitations of voice recognition software  Read the chart carefully and recognize, using context, where substitutions have occurred    [facility]

## 2021-10-20 ENCOUNTER — OFFICE VISIT (OUTPATIENT)
Dept: PSYCHIATRY | Facility: CLINIC | Age: 56
End: 2021-10-20
Payer: COMMERCIAL

## 2021-10-20 DIAGNOSIS — F31.61 BIPOLAR DISORDER, CURRENT EPISODE MIXED, MILD (HCC): Primary | ICD-10-CM

## 2021-10-20 DIAGNOSIS — F43.10 PTSD (POST-TRAUMATIC STRESS DISORDER): ICD-10-CM

## 2021-10-20 DIAGNOSIS — F41.1 GENERALIZED ANXIETY DISORDER: ICD-10-CM

## 2021-10-20 PROCEDURE — 90792 PSYCH DIAG EVAL W/MED SRVCS: CPT | Performed by: PSYCHIATRY & NEUROLOGY

## 2021-10-20 RX ORDER — RISPERIDONE 0.25 MG/1
0.25 TABLET, FILM COATED ORAL 2 TIMES DAILY
Qty: 30 TABLET | Refills: 2 | Status: SHIPPED | OUTPATIENT
Start: 2021-10-20 | End: 2022-01-02

## 2021-10-20 RX ORDER — LAMOTRIGINE 25 MG/1
25 TABLET ORAL DAILY
Qty: 70 TABLET | Refills: 1 | Status: SHIPPED | OUTPATIENT
Start: 2021-10-20 | End: 2022-01-02

## 2021-10-25 ENCOUNTER — HOSPITAL ENCOUNTER (OUTPATIENT)
Dept: NON INVASIVE DIAGNOSTICS | Facility: HOSPITAL | Age: 56
Discharge: HOME | End: 2021-10-25
Attending: INTERNAL MEDICINE
Payer: COMMERCIAL

## 2021-10-25 VITALS
WEIGHT: 108 LBS | HEART RATE: 90 BPM | DIASTOLIC BLOOD PRESSURE: 72 MMHG | BODY MASS INDEX: 21.2 KG/M2 | HEIGHT: 60 IN | SYSTOLIC BLOOD PRESSURE: 136 MMHG

## 2021-10-25 DIAGNOSIS — R06.2 WHEEZING: ICD-10-CM

## 2021-10-25 DIAGNOSIS — R06.00 DYSPNEA ON EXERTION: ICD-10-CM

## 2021-10-25 DIAGNOSIS — Z72.0 TOBACCO ABUSE: ICD-10-CM

## 2021-10-25 LAB
AORTIC ROOT: 2.7 CM
APICAL FOUR CHAMBER EJECTION FRACTION: 57 %
ASCENDING AORTA: 3.1 CM
E WAVE DECELERATION TIME: 128 MS
FRACTIONAL SHORTENING: 32 % (ref 28–44)
INTERVENTRICULAR SEPTUM IN DIASTOLE (PARASTERNAL SHORT AXIS VIEW): 1 CM
LEFT INTERNAL DIMENSION IN SYSTOLE: 3.2 CM (ref 2.1–4)
LEFT VENTRICULAR INTERNAL DIMENSION IN DIASTOLE: 4.7 CM (ref 3.59–5.34)
LEFT VENTRICULAR POSTERIOR WALL IN END DIASTOLE: 1 CM
LEFT VENTRICULAR STROKE VOLUME: 61 ML
LV EF: 54 %
MV E'TISSUE VEL-SEP: 6 CM/S
MV PEAK A VEL: 0.69 M/S
MV PEAK E VEL: 57 CM/S
MV STENOSIS PRESSURE HALF TIME: 0 MS
RIGHT VENTRICLE ID DIMENSION: 3 CM
SL CV PED ECHO LEFT VENTRICLE DIASTOLIC VOLUME (MOD BIPLANE) 2D: 100 ML
SL CV PED ECHO LEFT VENTRICLE SYSTOLIC VOLUME (MOD BIPLANE) 2D: 40 ML
TR PEAK VELOCITY: 1.7 M/S
TRICUSPID VALVE PEAK REGURGITATION VELOCITY: 1.71 M/S
TRICUSPID VALVE S': 0.6 CM/S
TV PEAK GRADIENT: 12 MMHG
Z-SCORE OF LEFT VENTRICULAR DIMENSION IN END SYSTOLE: 0.71

## 2021-10-25 PROCEDURE — 93306 TTE W/DOPPLER COMPLETE: CPT

## 2021-10-25 PROCEDURE — 93356 MYOCRD STRAIN IMG SPCKL TRCK: CPT

## 2021-10-25 PROCEDURE — 93306 TTE W/DOPPLER COMPLETE: CPT | Performed by: INTERNAL MEDICINE

## 2021-10-27 ENCOUNTER — OFFICE VISIT (OUTPATIENT)
Dept: OBGYN CLINIC | Facility: CLINIC | Age: 56
End: 2021-10-27
Payer: COMMERCIAL

## 2021-10-27 VITALS
WEIGHT: 117 LBS | DIASTOLIC BLOOD PRESSURE: 70 MMHG | BODY MASS INDEX: 22.09 KG/M2 | SYSTOLIC BLOOD PRESSURE: 130 MMHG | HEIGHT: 61 IN

## 2021-10-27 DIAGNOSIS — N89.8 VAGINAL DRYNESS: ICD-10-CM

## 2021-10-27 DIAGNOSIS — R87.810 PAPANICOLAOU SMEAR OF CERVIX WITH POSITIVE HIGH RISK HUMAN PAPILLOMA VIRUS (HPV) TEST: Primary | ICD-10-CM

## 2021-10-27 PROCEDURE — 99213 OFFICE O/P EST LOW 20 MIN: CPT | Performed by: ADVANCED PRACTICE MIDWIFE

## 2021-10-29 ENCOUNTER — TELEPHONE (OUTPATIENT)
Dept: PULMONOLOGY | Facility: CLINIC | Age: 56
End: 2021-10-29

## 2021-10-29 DIAGNOSIS — R06.00 DYSPNEA ON EXERTION: Primary | ICD-10-CM

## 2021-11-01 ENCOUNTER — TELEPHONE (OUTPATIENT)
Dept: PULMONOLOGY | Facility: CLINIC | Age: 56
End: 2021-11-01

## 2021-11-01 DIAGNOSIS — R06.2 WHEEZING: ICD-10-CM

## 2021-11-01 DIAGNOSIS — R06.00 DYSPNEA ON EXERTION: Primary | ICD-10-CM

## 2021-11-02 ENCOUNTER — TELEPHONE (OUTPATIENT)
Dept: PSYCHIATRY | Facility: CLINIC | Age: 56
End: 2021-11-02

## 2021-11-02 ENCOUNTER — APPOINTMENT (OUTPATIENT)
Dept: NON INVASIVE DIAGNOSTICS | Facility: HOSPITAL | Age: 56
End: 2021-11-02
Attending: INTERNAL MEDICINE
Payer: COMMERCIAL

## 2021-11-02 ENCOUNTER — APPOINTMENT (OUTPATIENT)
Dept: RADIOLOGY | Facility: CLINIC | Age: 56
End: 2021-11-02
Payer: COMMERCIAL

## 2021-11-02 ENCOUNTER — APPOINTMENT (OUTPATIENT)
Dept: CT IMAGING | Facility: HOSPITAL | Age: 56
End: 2021-11-02
Attending: INTERNAL MEDICINE
Payer: COMMERCIAL

## 2021-11-02 DIAGNOSIS — R06.2 WHEEZING: ICD-10-CM

## 2021-11-02 PROCEDURE — 71046 X-RAY EXAM CHEST 2 VIEWS: CPT

## 2021-11-15 ENCOUNTER — TELEPHONE (OUTPATIENT)
Dept: PSYCHIATRY | Facility: CLINIC | Age: 56
End: 2021-11-15

## 2021-11-22 ENCOUNTER — HOSPITAL ENCOUNTER (OUTPATIENT)
Dept: CT IMAGING | Facility: HOSPITAL | Age: 56
Discharge: HOME | End: 2021-11-22
Attending: INTERNAL MEDICINE
Payer: COMMERCIAL

## 2021-11-22 DIAGNOSIS — R06.00 DYSPNEA ON EXERTION: ICD-10-CM

## 2021-11-22 PROCEDURE — G1004 CDSM NDSC: HCPCS

## 2021-11-22 PROCEDURE — 71250 CT THORAX DX C-: CPT

## 2021-12-17 ENCOUNTER — OFFICE VISIT (OUTPATIENT)
Dept: PULMONOLOGY | Facility: CLINIC | Age: 56
End: 2021-12-17
Payer: COMMERCIAL

## 2021-12-17 VITALS
BODY MASS INDEX: 23.07 KG/M2 | HEIGHT: 61 IN | HEART RATE: 89 BPM | SYSTOLIC BLOOD PRESSURE: 142 MMHG | RESPIRATION RATE: 16 BRPM | WEIGHT: 122.2 LBS | OXYGEN SATURATION: 97 % | DIASTOLIC BLOOD PRESSURE: 72 MMHG | TEMPERATURE: 98.2 F

## 2021-12-17 DIAGNOSIS — F17.210 CIGARETTE NICOTINE DEPENDENCE WITHOUT COMPLICATION: ICD-10-CM

## 2021-12-17 DIAGNOSIS — J43.2 CENTRILOBULAR EMPHYSEMA (HCC): Primary | ICD-10-CM

## 2021-12-17 DIAGNOSIS — R91.8 PULMONARY NODULES: ICD-10-CM

## 2021-12-17 PROCEDURE — 99214 OFFICE O/P EST MOD 30 MIN: CPT | Performed by: PHYSICIAN ASSISTANT

## 2021-12-21 ENCOUNTER — TELEPHONE (OUTPATIENT)
Dept: PULMONOLOGY | Facility: CLINIC | Age: 56
End: 2021-12-21

## 2021-12-28 ENCOUNTER — HOSPITAL ENCOUNTER (OUTPATIENT)
Dept: PULMONOLOGY | Facility: HOSPITAL | Age: 56
Discharge: HOME | End: 2021-12-28
Attending: INTERNAL MEDICINE
Payer: COMMERCIAL

## 2021-12-28 DIAGNOSIS — R06.2 WHEEZING: ICD-10-CM

## 2021-12-28 DIAGNOSIS — R06.00 DYSPNEA ON EXERTION: ICD-10-CM

## 2021-12-28 DIAGNOSIS — Z72.0 TOBACCO ABUSE: ICD-10-CM

## 2021-12-28 PROCEDURE — 94060 EVALUATION OF WHEEZING: CPT

## 2021-12-28 PROCEDURE — 94729 DIFFUSING CAPACITY: CPT | Performed by: INTERNAL MEDICINE

## 2021-12-28 PROCEDURE — 94761 N-INVAS EAR/PLS OXIMETRY MLT: CPT

## 2021-12-28 PROCEDURE — 94729 DIFFUSING CAPACITY: CPT

## 2021-12-28 PROCEDURE — 94060 EVALUATION OF WHEEZING: CPT | Performed by: INTERNAL MEDICINE

## 2021-12-28 PROCEDURE — 94726 PLETHYSMOGRAPHY LUNG VOLUMES: CPT | Performed by: INTERNAL MEDICINE

## 2021-12-28 PROCEDURE — 94618 PULMONARY STRESS TESTING: CPT | Performed by: INTERNAL MEDICINE

## 2021-12-28 PROCEDURE — 94760 N-INVAS EAR/PLS OXIMETRY 1: CPT

## 2021-12-28 PROCEDURE — 94726 PLETHYSMOGRAPHY LUNG VOLUMES: CPT

## 2021-12-28 RX ORDER — ALBUTEROL SULFATE 2.5 MG/3ML
2.5 SOLUTION RESPIRATORY (INHALATION) ONCE
Status: COMPLETED | OUTPATIENT
Start: 2021-12-28 | End: 2021-12-28

## 2021-12-28 RX ADMIN — ALBUTEROL SULFATE 2.5 MG: 2.5 SOLUTION RESPIRATORY (INHALATION) at 13:52

## 2022-01-02 DIAGNOSIS — F31.61 BIPOLAR DISORDER, CURRENT EPISODE MIXED, MILD (HCC): ICD-10-CM

## 2022-01-02 DIAGNOSIS — Z76.0 MEDICATION REFILL: ICD-10-CM

## 2022-01-02 DIAGNOSIS — R07.81 RIB PAIN: ICD-10-CM

## 2022-01-02 RX ORDER — LAMOTRIGINE 25 MG/1
TABLET ORAL
Qty: 70 TABLET | Refills: 1 | Status: SHIPPED | OUTPATIENT
Start: 2022-01-02 | End: 2022-01-12

## 2022-01-02 RX ORDER — RISPERIDONE 0.25 MG/1
TABLET, FILM COATED ORAL
Qty: 30 TABLET | Refills: 2 | Status: SHIPPED | OUTPATIENT
Start: 2022-01-02 | End: 2022-01-12 | Stop reason: SDUPTHER

## 2022-01-03 RX ORDER — GABAPENTIN 100 MG/1
CAPSULE ORAL
Qty: 180 CAPSULE | Refills: 1 | Status: SHIPPED | OUTPATIENT
Start: 2022-01-03

## 2022-01-03 NOTE — TELEPHONE ENCOUNTER
Patient requesting refill(s) of: Gabapentin     Last filled: 5/11/21 180 caps 1 refill   Last appt: 7/14/21  Next appt: 1/13/22  Pharmacy: [facility]
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
## 2022-01-12 ENCOUNTER — OFFICE VISIT (OUTPATIENT)
Dept: PSYCHIATRY | Facility: CLINIC | Age: 57
End: 2022-01-12
Payer: COMMERCIAL

## 2022-01-12 VITALS — BODY MASS INDEX: 23.92 KG/M2 | WEIGHT: 126.6 LBS | SYSTOLIC BLOOD PRESSURE: 140 MMHG | DIASTOLIC BLOOD PRESSURE: 73 MMHG

## 2022-01-12 DIAGNOSIS — F31.61 BIPOLAR DISORDER, CURRENT EPISODE MIXED, MILD (HCC): ICD-10-CM

## 2022-01-12 DIAGNOSIS — F41.1 GENERALIZED ANXIETY DISORDER: Primary | ICD-10-CM

## 2022-01-12 DIAGNOSIS — F10.11 ALCOHOL ABUSE, IN REMISSION: ICD-10-CM

## 2022-01-12 DIAGNOSIS — F12.10 MILD TETRAHYDROCANNABINOL (THC) ABUSE: ICD-10-CM

## 2022-01-12 DIAGNOSIS — F43.10 PTSD (POST-TRAUMATIC STRESS DISORDER): ICD-10-CM

## 2022-01-12 PROBLEM — M25.541 ARTHRALGIA OF BOTH HANDS: Status: RESOLVED | Noted: 2020-04-16 | Resolved: 2022-01-12

## 2022-01-12 PROBLEM — M25.542 ARTHRALGIA OF BOTH HANDS: Status: RESOLVED | Noted: 2020-04-16 | Resolved: 2022-01-12

## 2022-01-12 PROBLEM — F41.0 PANIC ATTACKS: Status: RESOLVED | Noted: 2020-03-13 | Resolved: 2022-01-12

## 2022-01-12 PROBLEM — K59.01 SLOW TRANSIT CONSTIPATION: Status: RESOLVED | Noted: 2019-08-20 | Resolved: 2022-01-12

## 2022-01-12 PROBLEM — Z12.39 SCREENING FOR BREAST CANCER: Status: RESOLVED | Noted: 2019-08-21 | Resolved: 2022-01-12

## 2022-01-12 PROBLEM — R00.2 PALPITATIONS: Status: RESOLVED | Noted: 2020-02-04 | Resolved: 2022-01-12

## 2022-01-12 PROBLEM — Z13.9 ENCOUNTER FOR HEALTH-RELATED SCREENING: Status: RESOLVED | Noted: 2019-08-20 | Resolved: 2022-01-12

## 2022-01-12 PROBLEM — R73.9 HYPERGLYCEMIA: Status: RESOLVED | Noted: 2020-04-16 | Resolved: 2022-01-12

## 2022-01-12 PROBLEM — M25.512 CHRONIC LEFT SHOULDER PAIN: Status: RESOLVED | Noted: 2020-04-16 | Resolved: 2022-01-12

## 2022-01-12 PROBLEM — J02.9 SORE THROAT: Status: RESOLVED | Noted: 2020-08-18 | Resolved: 2022-01-12

## 2022-01-12 PROBLEM — M19.041 PRIMARY OSTEOARTHRITIS OF BOTH HANDS: Status: RESOLVED | Noted: 2019-08-20 | Resolved: 2022-01-12

## 2022-01-12 PROBLEM — M19.042 PRIMARY OSTEOARTHRITIS OF BOTH HANDS: Status: RESOLVED | Noted: 2019-08-20 | Resolved: 2022-01-12

## 2022-01-12 PROBLEM — Z12.11 SCREEN FOR COLON CANCER: Status: RESOLVED | Noted: 2019-08-21 | Resolved: 2022-01-12

## 2022-01-12 PROBLEM — G89.29 CHRONIC LEFT SHOULDER PAIN: Status: RESOLVED | Noted: 2020-04-16 | Resolved: 2022-01-12

## 2022-01-12 PROBLEM — Z00.00 WELL ADULT EXAM: Status: RESOLVED | Noted: 2019-08-20 | Resolved: 2022-01-12

## 2022-01-12 PROBLEM — R06.2 WHEEZING: Status: RESOLVED | Noted: 2020-01-07 | Resolved: 2022-01-12

## 2022-01-12 PROCEDURE — 99214 OFFICE O/P EST MOD 30 MIN: CPT | Performed by: PSYCHIATRY & NEUROLOGY

## 2022-01-12 RX ORDER — RISPERIDONE 0.25 MG/1
0.25 TABLET, FILM COATED ORAL 2 TIMES DAILY
Qty: 30 TABLET | Refills: 2 | Status: SHIPPED | OUTPATIENT
Start: 2022-01-12 | End: 2022-03-09

## 2022-01-12 RX ORDER — LAMOTRIGINE 25 MG/1
75 TABLET ORAL 2 TIMES DAILY
Qty: 70 TABLET | Refills: 1 | Status: SHIPPED | OUTPATIENT
Start: 2022-01-12 | End: 2022-01-21 | Stop reason: SDUPTHER

## 2022-01-12 NOTE — PATIENT INSTRUCTIONS
The following are recommendations to improve your sleep:  - decrease the amount of coffee and/or caffeinated drinks you have and to not take any after 1:00 p m   - if you smoke, decrease her nicotine use in the afternoon  - avoid using electronics at bedtime   - avoid using electronics when in bed  - go to bed at the same time consistently every day and do your best to wake up at the same time every morning, even if you do not have school or work the next day  - get in bed only when you are tired enough and ready to sleep   - avoid watching TV in bed  - get in bed only for sleep, and avoid eating in bed or doing other activities that may impact your sleep  - we encourage you to use white noise or apps such as Edai" or meditation apps at bedtime  - if you get anxious at night about things you need to get done the next day, write it on a piece of paper and dedicate time in the morning to think about these tasks  * avoid the use of alcohol and /or marijuana

## 2022-01-13 ENCOUNTER — OFFICE VISIT (OUTPATIENT)
Dept: FAMILY MEDICINE CLINIC | Facility: CLINIC | Age: 57
End: 2022-01-13
Payer: COMMERCIAL

## 2022-01-13 VITALS
DIASTOLIC BLOOD PRESSURE: 80 MMHG | TEMPERATURE: 97.6 F | HEART RATE: 91 BPM | HEIGHT: 61 IN | BODY MASS INDEX: 23.94 KG/M2 | OXYGEN SATURATION: 100 % | SYSTOLIC BLOOD PRESSURE: 130 MMHG | WEIGHT: 126.8 LBS | RESPIRATION RATE: 18 BRPM

## 2022-01-13 DIAGNOSIS — M25.561 ACUTE PAIN OF RIGHT KNEE: Primary | ICD-10-CM

## 2022-01-13 DIAGNOSIS — Z23 ENCOUNTER FOR IMMUNIZATION: ICD-10-CM

## 2022-01-13 DIAGNOSIS — K21.9 GASTROESOPHAGEAL REFLUX DISEASE WITHOUT ESOPHAGITIS: ICD-10-CM

## 2022-01-13 PROCEDURE — 99214 OFFICE O/P EST MOD 30 MIN: CPT | Performed by: NURSE PRACTITIONER

## 2022-01-13 PROCEDURE — 90682 RIV4 VACC RECOMBINANT DNA IM: CPT

## 2022-01-13 PROCEDURE — 90471 IMMUNIZATION ADMIN: CPT

## 2022-01-13 RX ORDER — OMEPRAZOLE 20 MG/1
20 CAPSULE, DELAYED RELEASE ORAL 2 TIMES DAILY
Qty: 60 CAPSULE | Refills: 5 | Status: SHIPPED | OUTPATIENT
Start: 2022-01-13

## 2022-01-13 NOTE — PROGRESS NOTES
[facility]        NAME: Saundra Bell is a 49 y o  female  : 1965    MRN: [id number]  DATE: 2022  TIME: 1:34 PM    Assessment and Plan   Acute pain of right knee [M25 561]  1  Acute pain of right knee     2  Gastroesophageal reflux disease without esophagitis  omeprazole (PriLOSEC) 20 mg delayed release capsule   1  Gastroesophageal reflux disease without esophagitis  Avoid triggers  Increase omeprazole to 20mg BID  Taking daily right now  - omeprazole (PriLOSEC) 20 mg delayed release capsule; Take 1 capsule (20 mg total) by mouth 2 (two) times a day  Dispense: 60 capsule; Refill: 5    2  Acute pain of right knee   Will rest, try ibuprofen in the morning  Pain improves the more she moves  Can try tylenol  Patient Instructions     There are no Patient Instructions on file for this visit  Chief Complaint     Chief Complaint   Patient presents with    GERD    knee pian    Flu Vaccine     pt wants to know about covid booster         History of Present Illness       Patient here with c/o stomach issues and acid reflux  has been getting nausea daily, feels acidi reflux, "throwing up in her mouth and feels like acid" happened when she bent forward  Reports she avoids red sauce mostly, drinking some carbonated beverages but drinking everyday,orange juice, nausea happens in the evening  Knee pain- right knee started hurting about 4 days ago  Sits cross legged in her bed and when she gets up she initially has pain  Usually worse in the morning  Review of Systems   Review of Systems   Constitutional: Negative  Negative for fatigue and fever  Respiratory: Negative for shortness of breath and wheezing  Cardiovascular: Negative for chest pain and palpitations  Gastrointestinal: Positive for nausea  Negative for abdominal pain and vomiting  Musculoskeletal: Positive for arthralgias (right knee pain)  Skin: Negative    Negative for rash        PHQ-2/9 Depression Screening    Little interest or pleasure in doing things: 3 - nearly every day  Feeling down, depressed, or hopeless: 3 - nearly every day  Trouble falling or staying asleep, or sleeping too much: 1 - several days  Feeling tired or having little energy: 1 - several days  Poor appetite or overeatin - several days  Feeling bad about yourself - or that you are a failure or have let yourself or your family down: 3 - nearly every day  Trouble concentrating on things, such as reading the newspaper or watching television: 3 - nearly every day  Moving or speaking so slowly that other people could have noticed   Or the opposite - being so fidgety or restless that you have been moving around a lot more than usual: 2 - more than half the days  Thoughts that you would be better off dead, or of hurting yourself in some way: 3 - nearly every day  PHQ-2 Score: 6  PHQ-2 Interpretation: POSITIVE depression screen  PHQ-9 Score: 20   PHQ-9 Interpretation: Severe depression         Current Medications       Current Outpatient Medications:     albuterol (ProAir HFA) 90 mcg/act inhaler, Inhale 2 puffs every 6 (six) hours as needed for wheezing, Disp: 18 g, Rfl: 2    Cetirizine HCl 10 MG TBDP, , Disp: , Rfl:     gabapentin (NEURONTIN) 100 mg capsule, take 1 capsule by mouth three times a day, Disp: 180 capsule, Rfl: 1    lamoTRIgine (LaMICtal) 25 mg tablet, Take 3 tablets (75 mg total) by mouth 2 (two) times a day Take 75 mg twice a day for 1 week, and then increase to 100 mg twice a day, Disp: 70 tablet, Rfl: 1    omeprazole (PriLOSEC) 20 mg delayed release capsule, Take 1 capsule (20 mg total) by mouth 2 (two) times a day, Disp: 60 capsule, Rfl: 5    risperiDONE (RisperDAL) 0 25 mg tablet, Take 1 tablet (0 25 mg total) by mouth 2 (two) times a day, Disp: 30 tablet, Rfl: 2    senna-docusate sodium (SENOKOT-S) 8 6-50 mg per tablet, Take 3 tablets by mouth daily at bedtime, Disp: , Rfl:    tiotropium (Spiriva Respimat) 2 5 MCG/ACT AERS inhaler, Inhale 2 puffs daily, Disp: 4 g, Rfl: 5    nicotine (NICODERM CQ) 14 mg/24hr TD 24 hr patch, Place 1 patch on the skin every 24 hours (Patient not taking: Reported on 12/17/2021 ), Disp: 28 patch, Rfl: 0    Current Allergies     Allergies as of 01/13/2022    (No Known Allergies)            The following portions of the patient's history were reviewed and updated as appropriate: allergies, current medications, past family history, past medical history, past social history, past surgical history and problem list      Past Medical History:   Diagnosis Date    Bilateral carpal tunnel syndrome 8/20/2019    Carpal tunnel syndrome of right wrist 10/30/2019    Added automatically from request for surgery [id number]    Hypertension        Past Surgical History:   Procedure Laterality Date    BREAST BIOPSY Left 20yrs ago benign    OOPHORECTOMY Right     DE WRIST ARTHROSCOP,RELEASE Lavonne Heaps LIG Right 1/21/2020    Procedure: RELEASE CARPAL TUNNEL ENDOSCOPIC;  Surgeon: Navin Mckeon MD;  Location: [facility];  Service: Orthopedics    DE WRIST Kathy Endow LIG Left 1/28/2020    Procedure: RELEASE CARPAL TUNNEL ENDOSCOPIC;  Surgeon: Navin Mckeon MD;  Location: [facility];  Service: Orthopedics       Family History   Problem Relation Age of Onset    Alcohol abuse Mother     Eating disorder Mother         anorexia and bulimia per Pt    Hodgkin's lymphoma Father     No Known Problems Sister     Colon cancer Maternal Grandmother     Alcohol abuse Maternal Grandfather     Colon cancer Paternal Grandmother     Anxiety disorder Paternal Aunt     Multiple sclerosis Cousin     No Known Problems Daughter     Breast cancer Maternal Aunt     No Known Problems Maternal Aunt     No Known Problems Maternal Aunt     No Known Problems Paternal Aunt          Medications have been verified          Objective   /80   Pulse 91   Temp 97 6 °F (36 4 °C)   Resp 18   Ht 5' 1" (1 549 m)   Wt 57 5 kg (126 lb 12 8 oz)   LMP  (LMP Unknown)   SpO2 100%   BMI 23 96 kg/m²        Physical Exam     Physical Exam  Vitals and nursing note reviewed  Constitutional:       General: She is not in acute distress  Appearance: She is well-developed  She is not ill-appearing  HENT:      Head: Normocephalic and atraumatic  Neck:      Trachea: No tracheal deviation  Pulmonary:      Effort: Pulmonary effort is normal  No tachypnea, accessory muscle usage or respiratory distress  Breath sounds: No stridor  Abdominal:      General: Bowel sounds are normal  There is no distension  Palpations: Abdomen is soft  Tenderness: There is no abdominal tenderness  Musculoskeletal:      Cervical back: No rigidity  Right knee: No swelling or deformity  Normal range of motion  No tenderness  Left knee: Normal  No swelling or deformity  Normal range of motion  No tenderness  Comments: + pain with movement after sitting for long periods of time   Neurological:      Mental Status: She is alert and oriented to person, place, and time  Psychiatric:         Speech: Speech normal          Behavior: Behavior normal  Behavior is cooperative

## 2022-01-18 ENCOUNTER — TELEPHONE (OUTPATIENT)
Dept: FAMILY MEDICINE CLINIC | Facility: CLINIC | Age: 57
End: 2022-01-18

## 2022-01-18 DIAGNOSIS — R52 BODY ACHES: ICD-10-CM

## 2022-01-18 DIAGNOSIS — R43.0 LOSS OF SMELL: ICD-10-CM

## 2022-01-18 DIAGNOSIS — R09.81 CONGESTION OF NASAL SINUS: Primary | ICD-10-CM

## 2022-01-18 DIAGNOSIS — R53.83 OTHER FATIGUE: ICD-10-CM

## 2022-01-18 DIAGNOSIS — R05.9 COUGH: ICD-10-CM

## 2022-01-18 DIAGNOSIS — R51.9 NONINTRACTABLE HEADACHE, UNSPECIFIED CHRONICITY PATTERN, UNSPECIFIED HEADACHE TYPE: ICD-10-CM

## 2022-01-18 DIAGNOSIS — R43.2 LOSS OF TASTE: ICD-10-CM

## 2022-01-18 NOTE — TELEPHONE ENCOUNTER
Pt called and has the following symptoms    Sinus congestion (cannot breath through her nose at all) so blocked  Cannot taste or smell    No fever, no sore throat  Yes fatigue  Massive headache   Neck pain  Body aches  Cough (copd)    Taking OTC     Please advise    Phone [phone]

## 2022-01-18 NOTE — TELEPHONE ENCOUNTER
Called and spoke with patient  States in the last two days, she started with congestion, loss of taste and smell, headache, body aches, and cough  Denies any other symptoms  Reports she is vaccinated for covid with booster  Agreeable to covid test  Will come tomorrow as she does not have ride today       Informed of instructions upon arrival

## 2022-01-19 PROCEDURE — U0003 INFECTIOUS AGENT DETECTION BY NUCLEIC ACID (DNA OR RNA); SEVERE ACUTE RESPIRATORY SYNDROME CORONAVIRUS 2 (SARS-COV-2) (CORONAVIRUS DISEASE [COVID-19]), AMPLIFIED PROBE TECHNIQUE, MAKING USE OF HIGH THROUGHPUT TECHNOLOGIES AS DESCRIBED BY CMS-2020-01-R: HCPCS | Performed by: NURSE PRACTITIONER

## 2022-01-19 PROCEDURE — U0005 INFEC AGEN DETEC AMPLI PROBE: HCPCS | Performed by: NURSE PRACTITIONER

## 2022-01-20 LAB — SARS-COV-2 RNA RESP QL NAA+PROBE: NEGATIVE

## 2022-01-21 DIAGNOSIS — F31.61 BIPOLAR DISORDER, CURRENT EPISODE MIXED, MILD (HCC): ICD-10-CM

## 2022-01-21 RX ORDER — LAMOTRIGINE 25 MG/1
75 TABLET ORAL 2 TIMES DAILY
Qty: 70 TABLET | Refills: 1 | Status: SHIPPED | OUTPATIENT
Start: 2022-01-21 | End: 2022-02-15

## 2022-01-21 NOTE — TELEPHONE ENCOUNTER
Was able to speak with Meli Lew  Reviewed the pharmacy did process the medication with a small co-pay and Dr Irene Reed would like her to continue taking 75 mg BID until next appointment  She did ask if the dose could be one pill  Informed Meli Lew her current dose is not available in 1 pill  She verbalized understanding

## 2022-01-21 NOTE — TELEPHONE ENCOUNTER
Pt stated that provider increased dosage of medication during last visit on 1/12 and needs a refill due to being out  Pharmacy stated that Pt cannot receive a refill until 1/29         lamoTRIgine (LaMICtal) 25 mg tablet

## 2022-02-09 ENCOUNTER — TELEPHONE (OUTPATIENT)
Dept: PSYCHIATRY | Facility: CLINIC | Age: 57
End: 2022-02-09

## 2022-02-15 DIAGNOSIS — F31.61 BIPOLAR DISORDER, CURRENT EPISODE MIXED, MILD (HCC): ICD-10-CM

## 2022-02-15 RX ORDER — LAMOTRIGINE 25 MG/1
TABLET ORAL
Qty: 70 TABLET | Refills: 1 | Status: SHIPPED | OUTPATIENT
Start: 2022-02-15 | End: 2022-03-09

## 2022-02-28 NOTE — PSYCH
MEDICATION MANAGEMENT NOTE        [facility]      Name and Date of Birth:  Neetu Sandoval 49 y o  1965 MRN: [id number]    Date of Visit: March 9, 2022    Reason for Visit: Follow-up visit for medication management     SUBJECTIVE:    Abby  a 49 y  o  female with a past psychiatric history significant for Anxiety, Bipolar d/o, OCD, eating disorder and PTSD, who was under the care of a psychiatrist 5-8 years ago  She  a year ago after being  for 38 years  Patient has Cluster B personality traits evidenced on our first visit  Patient states continues using THC daily  She denies alcohol in 4 years      Patient was started on Lamictal, working on being tapered and up to 50 mg BID, and started on Risperidone 0 25 mg BID  Patient was offered Prazosin, however, was resistant to being on medications  After inquiring about BZD, she was clarified that we would not support BZD prescriptions treatment for her at this time      Patient reports is on the following medications:  - Gabapentin: 200 mg Qam and 100 mg QHS for neuropathy  - Lamictal: 75 mg BID and prescribed tapering to 100 mg BID   - Risperidone: 0 25 mg BID    She was recommended ot pursue DBT, and reports has not been called yet  Patient describes her mood as "ok" and "not upset about things as much; I have things under better control"  She states she is estranged from her daughter and grandkids, but that she finds she I able to 'not cry' as much about their situation compared to before  She states she is sleeping 6 hours/d, and can fall back asleep when she wakes up  She adds she takes a nap during the day too, even though we recommended her not to  She states she is concerned about gaining weight despite not having a change in appetite      Neetu Sandoval is tolerating current medical regimen at current dose, and relays is experiencing feeling sleepy/sedated since started on Risperidone  Disability paperwork was filled and faxed on our prior visit  Smitha Willis is tolerating current medical regimen at current dose, and denies side effects to current medications  Patient relays passive death wishes intermittently, and follows by stating she does not want to die, and is scared of pain  Patient denies suicidal ideation since last time seen in the office  Patient denies symptoms consistent with dashawn, panic attacks or PTSD  Current Rating Scores:       CAROL-7 Flowsheet Screening      Most Recent Value   Over the last 2 weeks, how often have you been bothered by any of the following problems? Feeling nervous, anxious, or on edge 3   Not being able to stop or control worrying 3   Worrying too much about different things 3   Trouble relaxing 2   Being so restless that it is hard to sit still 1   Becoming easily annoyed or irritable 2   Feeling afraid as if something awful might happen 3   CAROL-7 Total Score 17           PHQ-9 Depression Screening    Little interest or pleasure in doing things: 2 - more than half the days  Feeling down, depressed, or hopeless: 3 - nearly every day  Trouble falling or staying asleep, or sleeping too much: 2 - more than half the days  Feeling tired or having little energy: 1 - several days  Poor appetite or overeatin - more than half the days  Feeling bad about yourself - or that you are a failure or have let yourself or your family down: 3 - nearly every day  Trouble concentrating on things, such as reading the newspaper or watching television: 3 - nearly every day  Moving or speaking so slowly that other people could have noticed   Or the opposite - being so fidgety or restless that you have been moving around a lot more than usual: 2 - more than half the days  Thoughts that you would be better off dead, or of hurting yourself in some way: 2 - more than half the days  PHQ-9 Score: 20  Score Interpretation: Severe depression          Review Of Systems:      Constitutional negative   ENT negative   Cardiovascular negative   Respiratory negative   Gastrointestinal negative   Genitourinary negative   Musculoskeletal negative   Integumentary negative   Neurological negative   Endocrine negative   Other Symptoms none, all other systems are negative       Past Psychiatric History: Unchanged from prior evaluation  - Information that is bolded has been updated  Substance Abuse History:   Information that is bolded has been updated  Traumatic History: unchanged information from previous note  - Information that is bolded has been updated  Past Medical History:    Past Medical History:   Diagnosis Date    Bilateral carpal tunnel syndrome 8/20/2019    Carpal tunnel syndrome of right wrist 10/30/2019    Added automatically from request for surgery [id number]    Hypertension      Past Medical History Pertinent Negatives:   Diagnosis Date Noted    Abnormal Pap smear of cervix 06/11/2021    Cancer ([facility] [address] ) 01/14/2020     Past Surgical History:   Procedure Laterality Date    BREAST BIOPSY Left 20yrs ago benign    OOPHORECTOMY Right     ID WRIST Eppie Ku LIG Right 1/21/2020    Procedure: RELEASE CARPAL TUNNEL ENDOSCOPIC;  Surgeon: Rossy Collins MD;  [facility]: [facility];  Service: Orthopedics    ID WRIST Eppie Ku LIG Left 1/28/2020    Procedure: RELEASE CARPAL TUNNEL ENDOSCOPIC;  Surgeon: Rossy Collins MD;  [facility];  Service: Orthopedics     No Known Allergies    Substance Abuse History:    Social History     Substance and Sexual Activity   Alcohol Use Not Currently    Comment: H/O ETOH abuse in approx 2010--for a period of 2 years  She quit on her own without any h/o withdrawal or rehab  Social History     Substance and Sexual Activity   Drug Use Yes    Types: Marijuana    Comment: Smokes THC several times a day  Also uses CBD OIL at times     Used cocaine and methamphetamine fairly steadily for a period of approx 1 year approx 36 years ago  Tried LSD, Mushrooms  No h/o rehab       Social History:    Social History     Socioeconomic History    Marital status: Legally      Spouse name: Not on file    Number of children: 2    Years of education: Not on file    Highest education level: Not on file   Occupational History    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     Comment: from 2000 - 2012, then again in 2017 but had to leave both times due to emotional reasons   Tobacco Use    Smoking status: Current Every Day Smoker     Packs/day: 0 50     Years: 42 00     Pack years: 21 00     Types: Cigarettes     Start date: 1979    Smokeless tobacco: Never Used   Vaping Use    Vaping Use: Never used   Substance and Sexual Activity    Alcohol use: Not Currently     Comment: H/O ETOH abuse in approx 2010--for a period of 2 years  She quit on her own without any h/o withdrawal or rehab   Drug use: Yes     Types: Marijuana     Comment: Smokes THC several times a day  Also uses CBD OIL at times  Used cocaine and methamphetamine  fairly steadily for a period of approx 1 year approx 36 years ago  Tried LSD, Mushrooms  No h/o rehab    Sexual activity: Yes     Partners: Male   Other Topics Concern    Not on file   Social History Narrative    Home: lives with 2 best friends  Education:    Pt states she always got Ds and Fs in school and skipped classes often, but there were no formally diagnosed learning disabilities  Pt was in alternative schooling  She learned "Nothing" there and they didn't teach much  Pt was then placed back in regular classes but could not keep up  Pt reached childhood milestones on time as far as she knows       Highest grade completed 11th grade    Had some training through [employer] [facility] [employer] but no actual certificate     Social Determinants of Health     Financial Resource Strain: Not on file   Food Insecurity: Not on file   Transportation Needs: Not on file Physical Activity: Not on file   Stress: Not on file   Social Connections: Not on file   Intimate Partner Violence: Not on file   Housing Stability: Not on file       unchanged since last evaluation  Family Psychiatric History:     Unchanged from initial evaluation     History Review: The following portions of the patient's history were reviewed and updated as appropriate: allergies, current medications, past family history, past medical history, past social history, past surgical history and problem list          OBJECTIVE:     Vital signs in last 24 hours: There were no vitals filed for this visit      Mental Status Evaluation:    Appearance age appropriate, casually dressed, looks stated age, piercings, tattooed   Behavior normal, pleasant, cooperative, appears anxious   Speech normal rate, normal volume, normal pitch, fluent, clear, coherent, hypertalkative   Mood anxious   Affect labile   Thought Processes organized, logical, normal rate of thoughts, normal abstract reasoning   Associations circumstantial associations   Thought Content no overt delusions   Perceptual Disturbances: no auditory hallucinations, no visual hallucinations   Abnormal Thoughts  Risk Potential Suicidal ideation - None  Homicidal ideation - None  Potential for aggression - No   Orientation oriented to person, place, time/date and situation   Memory recent and remote memory grossly intact   Consciousness alert and awake   Attention Span Concentration Span attention span and concentration are age appropriate   Intellect appears to be of average intelligence   Insight partial   Judgement partial   Muscle Strength and  Gait normal muscle strength and normal muscle tone, normal gait and normal balance   Motor activity no abnormal movements   Language no difficulty naming common objects, no difficulty repeating a phrase, no difficulty writing a sentence   Fund of Knowledge adequate knowledge of current events  adequate fund of knowledge regarding past history  adequate fund of knowledge regarding vocabulary    Pain none   Pain Scale 0       Laboratory Results: I have personally reviewed all pertinent laboratory/tests results    Most Recent Labs:   Lab Results   Component Value Date    WBC 7 86 09/30/2019    RBC 4 39 09/30/2019    HGB 13 3 09/30/2019    HCT 41 8 09/30/2019     09/30/2019    RDW 14 6 09/30/2019    NEUTROABS 4 66 09/30/2019     06/15/2018    K 4 0 04/09/2021     04/09/2021    CO2 29 04/09/2021    BUN 14 04/09/2021    CREATININE 0 62 04/09/2021    CALCIUM 9 0 04/09/2021    AST 17 04/09/2021    ALT 21 04/09/2021    ALKPHOS 56 04/09/2021    PROT 6 4 06/15/2018    BILITOT 0 3 06/15/2018    CHOLESTEROL 181 09/09/2020    TRIG 31 09/09/2020    HDL 88 09/09/2020    LDLCALC 87 09/09/2020    FZJ5WPEJSJKC 1 320 08/20/2020       Suicide/Homicide Risk Assessment:     Risk of Harm to Self:  · The following ratings are based on assessment at the time of the interview  · Demographic risk factors include: ,  status, age: over 48 or older  · Historical Risk Factors include: chronic psychiatric problems, substance use, history of abuse, history of impulsive behaviors, history of traumatic experiences  · Recent Specific Risk Factors include: diagnosis of mood disorder, current anxiety symptoms, passive death wishes  · Protective Factors: no current suicidal ideation, able to manage anger well, access to mental health treatment, being a parent, contact with caregivers, cultural beliefs discouraging suicide, good health, having a desire to live, healthy fear of risky behaviors and pain, restricted access to lethal means  · Weapons: none   The following steps have been taken to ensure weapons are properly secured: not applicable  · Based on today's assessment, Marie presents the following risk of harm to self: low  · Upon direct inquiry denies suicidal ideation at this time      Risk of Harm to Others:  · The following ratings are based on assessment at the time of the interview  · Demographic Risk Factors include: unemployed  · Historical Risk Factors include: none  · Recent Specific Risk Factors include: noncompliance with treatment, abusing substances, concomitant mood disorder, multiple stressors  · Protective Factors: no current homicidal ideation, ability to adapt to change, being a parent, compliant with mental health treatment, contact with caregivers, cultural beliefs, moral system, personal beliefs, resilience, restricted access to lethal means, safe and stable living environment  · Weapons: none  The following steps have been taken to ensure weapons are properly secured: not applicable  · Based on today's assessment, Marie presents the following risk of harm to others: low     The following interventions are recommended: contracts for safety at present - agrees to go to ED if feeling unsafe, contracts for safety at present - agrees to call Crisis Intervention Service if feeling unsafe  Although patient's acute lethality risk is LOW, long-term/chronic lethality risk is mildly elevated given multiple stressors, financial strain and being isolated from her family  In addition, she presents mood d/o and marked anxiety, and low self esteem  However, at the current moment, Marie is future-oriented, forward-thinking, and demonstrates ability to act in a self-preserving manner as evidenced by volitionally presenting to the clinic today, seeking treatment  Additionally, Marie sits throughout the assessment wearing personal protective gear (ie mask) in the context of an ongoing viral pandemic, suggesting a will and desire to live  At this juncture, inpatient hospitalization is not currently warranted  To mitigate future risk, patient should adhere to treatment recommendations, avoid alcohol/illicit substance use, utilize community-based resources and familiar support, and prioritize mental health treatment        DSM-5 Diagnoses:      1  ) Bipolar d/o mixed type  2 ) Anxiety generalized  3 ) PTSD  4 ) cluster B traits  5 ) THC use       Assessment/Plan:     Katie Chang a 50 y  o  female with a past psychiatric history significant for Anxiety, Bipolar d/o, OCD, eating disorder and PTSD, who was under the care of a psychiatrist 5-8 years ago  She  a year ago after being  for 38 years  Patient has Cluster B personality traits evidenced on our first visit  Patient states continues using THC daily  She denies alcohol in 4 years      Patient was started on Lamictal, working on being tapered and up to 50 mg BID, and started on Risperidone 0 25 mg BID  Patient was offered Prazosin, however, was resistant to being on medications  After inquiring about BZD, she was clarified that we would not support BZD prescriptions treatment for her at this time      Patient reports is on the following medications:  - Gabapentin: 200 mg Qam and 100 mg QHS for neuropathy  - Lamictal: 75 mg BID and prescribed tapering to 100 mg BID   - Risperidone: 0 25 mg BID    She was recommended ot pursue DBT, and reports has not been called yet  Patient describes her mood as "ok" and "not upset about things as much; I have things under better control"  She states she is estranged from her daughter and grandkids, but that she finds she I able to 'not cry' as much about their situation compared to before  She states she is sleeping 6 hours/d, and can fall back asleep when she wakes up  She adds she takes a nap during the day too, even though we recommended her not to  She states she is concerned about gaining weight despite not having a change in appetite  Yossi Nielson is tolerating current medical regimen at current dose, and relays is experiencing feeling sleepy/sedated since started on Risperidone  Disability paperwork was filled and faxed on our prior visit      Yossi Nielson is tolerating current medical regimen at current dose, and denies side effects to current medications  Patient relays passive death wishes intermittently, and follows by stating she does not want to die, and is scared of pain  Patient denies suicidal ideation since last time seen in the office  Patient denies symptoms consistent with dashawn, panic attacks or PTSD  Treatment Recommendations/Precautions:    Patient was seen and visited with Dr Margo Thompson  Discussed treatment options and appropriaste therapeutic doses with patient  Will increase Risperidone to 1 mg QHS  PARQ completed including sedation, agitation, akathisia, TD, dystonic reactions, NMS, EPS, GI distress, dizziness, risk of metabolic syndrome, orthostatic hypotension and cardiovascular risks such as QT prolongation, increased prolactin    Will increase Lamictal to 200 mg/d  Lamotrigine PARQ completed including dizziness, headaches, ataxia, vision problems, somnolence, sleep changes, cognitive difficulties, rash (including Smart-Eugene rash), and others, risk of teratogenicity for females  Will start patient on Fluoxetine ot address mood, anxiety, PTSD and reassured patient that Fluoxetine has the most weight neurtral properties  PARQ completed including serotonin syndrome, SIADH, worsening depression, suicidality, induction of dashawn, GI upset, headaches, activation, sexual side effects, sedation, potential drug interactions, and others  Discussed at length the influence of THC and mood and anxiety, as well as it's psychotic properties  Encouraged patient to discontinue THC use  Patient was not receptive to education at this time  Provided patient a referral for Smoking Cessation  Staff messaged Linda Toussaint to address if patient is a good candidate for the intensive DBT program with her      Medication management every 4 weeks  Referral for individual psychotherapy  Aware of need to follow up with family physician for medical issues  Aware of 24 hour and weekend coverage for urgent situations accessed by calling [facility] main practice number    Medications Risks/Benefits      Risks, Benefits And Possible Side Effects Of Medications:    Risks, benefits, and possible side effects of medications explained to Ana Maria Low and she verbalizes understanding and agreement for treatment  Controlled Medication Discussion:     Not applicable    Psychotherapy Provided:     Individual psychotherapy provided: Yes  Counseling was provided during the session today for 18 minutes  Medication changes discussed with Ana Maria Low  Medication education provided to Ana Maria Low       Treatment Plan:    Completed and signed during the session: Not applicable - Treatment Plan not due at this session      Mark Espinoza MD 03/09/22

## 2022-03-09 ENCOUNTER — OFFICE VISIT (OUTPATIENT)
Dept: PSYCHIATRY | Facility: CLINIC | Age: 57
End: 2022-03-09
Payer: COMMERCIAL

## 2022-03-09 DIAGNOSIS — F31.61 BIPOLAR DISORDER, CURRENT EPISODE MIXED, MILD (HCC): ICD-10-CM

## 2022-03-09 DIAGNOSIS — F43.10 PTSD (POST-TRAUMATIC STRESS DISORDER): ICD-10-CM

## 2022-03-09 DIAGNOSIS — F17.219 CIGARETTE NICOTINE DEPENDENCE WITH NICOTINE-INDUCED DISORDER: ICD-10-CM

## 2022-03-09 DIAGNOSIS — F10.11 ALCOHOL ABUSE, IN REMISSION: ICD-10-CM

## 2022-03-09 DIAGNOSIS — F41.1 GENERALIZED ANXIETY DISORDER: ICD-10-CM

## 2022-03-09 DIAGNOSIS — F31.81 MODERATE MIXED BIPOLAR II DISORDER (HCC): Primary | ICD-10-CM

## 2022-03-09 DIAGNOSIS — F12.10 MILD TETRAHYDROCANNABINOL (THC) ABUSE: ICD-10-CM

## 2022-03-09 PROCEDURE — 99214 OFFICE O/P EST MOD 30 MIN: CPT | Performed by: STUDENT IN AN ORGANIZED HEALTH CARE EDUCATION/TRAINING PROGRAM

## 2022-03-09 RX ORDER — LAMOTRIGINE 200 MG/1
200 TABLET ORAL DAILY
Qty: 90 TABLET | Refills: 0 | Status: SHIPPED | OUTPATIENT
Start: 2022-03-09 | End: 2022-07-06

## 2022-03-09 RX ORDER — FLUOXETINE 10 MG/1
10 CAPSULE ORAL DAILY
Qty: 30 CAPSULE | Refills: 1 | Status: SHIPPED | OUTPATIENT
Start: 2022-03-09 | End: 2022-04-27

## 2022-03-09 RX ORDER — RISPERIDONE 1 MG/1
1 TABLET, FILM COATED ORAL
Qty: 30 TABLET | Refills: 1 | Status: SHIPPED | OUTPATIENT
Start: 2022-03-09 | End: 2022-05-16

## 2022-03-10 ENCOUNTER — TELEPHONE (OUTPATIENT)
Dept: FAMILY MEDICINE CLINIC | Facility: CLINIC | Age: 57
End: 2022-03-10

## 2022-03-10 ENCOUNTER — TELEPHONE (OUTPATIENT)
Dept: PSYCHIATRY | Facility: CLINIC | Age: 57
End: 2022-03-10

## 2022-03-10 NOTE — TELEPHONE ENCOUNTER
----- Message from Neto Gastelum [facility] sent at 3/10/2022 10:25 AM EST -----  Regarding:  Pt  Morning, Aaron [employer]! Chava Macias wants to start the Brief Intervention Program and chooses Wed 2pm to be her slot. Please, give her a call in the afternoon, she asks, as she has a call before noon to attend to. She will let you know when exactly is she is ready to start. Thanks so much!   JODY

## 2022-03-11 ENCOUNTER — TELEPHONE (OUTPATIENT)
Dept: FAMILY MEDICINE CLINIC | Facility: CLINIC | Age: 57
End: 2022-03-11

## 2022-03-11 NOTE — TELEPHONE ENCOUNTER
Attempted to contact patient to obtain more information / schedule appointment but no answer and VM is not set up

## 2022-03-11 NOTE — TELEPHONE ENCOUNTER
PT called and stated that she has not improvement in her sinuses    She is so congested that she cant breath through her nose  She has used all OTC medication and nothing is helping    She was asking if some medication can be call in    covid negative 3 weeks ago    On[facility] [employer]    Please advise    Phone: [phone]

## 2022-03-16 ENCOUNTER — TELEPHONE (OUTPATIENT)
Dept: PSYCHIATRY | Facility: CLINIC | Age: 57
End: 2022-03-16

## 2022-03-16 NOTE — TELEPHONE ENCOUNTER
I just cancelled the appointment- there is not enough time for the assessment, but I will call her to help her relax!

## 2022-03-16 NOTE — TELEPHONE ENCOUNTER
PT called very upset, stating she is having trouble getting into the visit and she really needs this appointment  Informed pt that is might be in email, pt checked email and it was asking for a code but pt did not have a code  Pt is also checking text message   Please call for assistant

## 2022-03-18 ENCOUNTER — OFFICE VISIT (OUTPATIENT)
Dept: PULMONOLOGY | Facility: CLINIC | Age: 57
End: 2022-03-18
Payer: COMMERCIAL

## 2022-03-18 VITALS
DIASTOLIC BLOOD PRESSURE: 88 MMHG | RESPIRATION RATE: 16 BRPM | BODY MASS INDEX: 24.56 KG/M2 | HEART RATE: 78 BPM | OXYGEN SATURATION: 98 % | WEIGHT: 130 LBS | SYSTOLIC BLOOD PRESSURE: 148 MMHG

## 2022-03-18 DIAGNOSIS — F17.210 CIGARETTE NICOTINE DEPENDENCE WITHOUT COMPLICATION: ICD-10-CM

## 2022-03-18 DIAGNOSIS — J44.1 COPD WITH ACUTE EXACERBATION (HCC): ICD-10-CM

## 2022-03-18 DIAGNOSIS — J43.2 CENTRILOBULAR EMPHYSEMA (HCC): Primary | ICD-10-CM

## 2022-03-18 DIAGNOSIS — R09.82 PND (POST-NASAL DRIP): ICD-10-CM

## 2022-03-18 DIAGNOSIS — R91.8 PULMONARY NODULES: ICD-10-CM

## 2022-03-18 PROCEDURE — 99215 OFFICE O/P EST HI 40 MIN: CPT | Performed by: PHYSICIAN ASSISTANT

## 2022-03-18 RX ORDER — FLUTICASONE PROPIONATE 50 MCG
1 SPRAY, SUSPENSION (ML) NASAL DAILY
Qty: 16 G | Refills: 2 | Status: SHIPPED | OUTPATIENT
Start: 2022-03-18

## 2022-03-18 RX ORDER — POLYETHYLENE GLYCOL 3350 17 G
4 POWDER IN PACKET (EA) ORAL AS NEEDED
Qty: 100 EACH | Refills: 5 | Status: SHIPPED | OUTPATIENT
Start: 2022-03-18

## 2022-03-18 RX ORDER — PREDNISONE 10 MG/1
40 TABLET ORAL DAILY
Qty: 20 TABLET | Refills: 0 | Status: SHIPPED | OUTPATIENT
Start: 2022-03-18 | End: 2022-04-14

## 2022-03-18 RX ORDER — OMEGA-3S/DHA/EPA/FISH OIL/D3 300MG-1000
400 CAPSULE ORAL DAILY
COMMUNITY

## 2022-03-18 RX ORDER — AZITHROMYCIN 250 MG/1
TABLET, FILM COATED ORAL
Qty: 6 TABLET | Refills: 0 | Status: SHIPPED | OUTPATIENT
Start: 2022-03-18 | End: 2022-03-22

## 2022-03-18 NOTE — ASSESSMENT & PLAN NOTE
Smoking cessation encouraged  Discussed pharmacotherapies in behavioral modifications for approximately 10 minutes during office visit  Recommend patient use Nicoderm patch Q 24 hours transdermally  Side effects reviewed  For breakthrough cravings recommended addition nicotine replacement lozenges 4 mg p o  Q 2 hour p r n     Will be due for CT chest lung nodule follow-up in November 2022  After as long as everything is stable would initiate lung cancer screening CT annually

## 2022-03-18 NOTE — ASSESSMENT & PLAN NOTE
In addition to the above, will add prednisone 40 mg p o  Daily x5 days and Z-Agustin given active wheezing heard on exam today  Patient was instructed to call if she does not note significant improvement after completion steroids and antibiotics

## 2022-03-18 NOTE — ASSESSMENT & PLAN NOTE
Very mild centrilobular emphysema seen on CT imaging  No chronic obstruction on pulmonary function test   Despite this will continue to treat with Spiriva 2 5 mcg 2 puffs daily  No refills needed at this time  Encouraged increase use of albuterol HFA 2 puffs q 6 hours p r n  While acutely ill  Up-to-date on COVID vaccinations  Encouraged COVID booster

## 2022-03-18 NOTE — ASSESSMENT & PLAN NOTE
Bilateral pulmonary nodules seen on CT chest are measuring sub 5 mm  Given high risk status will continue follow-up with noncontrast CT chest in 1 years time due November 2022

## 2022-03-18 NOTE — PROGRESS NOTES
Pulmonary Follow Up Note   Nina Murrieta 49 y o  female MRN: [id number]  3/18/2022      Assessment:    Centrilobular emphysema ([facility] )  Very mild centrilobular emphysema seen on CT imaging  No chronic obstruction on pulmonary function test   Despite this will continue to treat with Spiriva 2 5 mcg 2 puffs daily  No refills needed at this time  Encouraged increase use of albuterol HFA 2 puffs q 6 hours p r n  While acutely ill  Up-to-date on COVID vaccinations  Encouraged COVID booster  COPD with acute exacerbation ([facility] )  In addition to the above, will add prednisone 40 mg p o  Daily x5 days and Z-Agustin given active wheezing heard on exam today  Patient was instructed to call if she does not note significant improvement after completion steroids and antibiotics  PND (post-nasal drip)  Trial Flonase 1 spray each nostril daily  Continue cetirizine 10 mg p o  Daily before bed  Cigarette nicotine dependence without complication  Smoking cessation encouraged  Discussed pharmacotherapies in behavioral modifications for approximately 10 minutes during office visit  Recommend patient use Nicoderm patch Q 24 hours transdermally  Side effects reviewed  For breakthrough cravings recommended addition nicotine replacement lozenges 4 mg p o  Q 2 hour p r n     Will be due for CT chest lung nodule follow-up in November 2022  After as long as everything is stable would initiate lung cancer screening CT annually  Pulmonary nodules  Bilateral pulmonary nodules seen on CT chest are measuring sub 5 mm  Given high risk status will continue follow-up with noncontrast CT chest in 1 years time due November 2022  Plan:    Diagnoses and all orders for this visit:    Centrilobular emphysema ([facility] )    COPD with acute exacerbation ([facility] )  -     azithromycin (ZITHROMAX) 250 mg tablet; Take 2 tablets today then 1 tablet daily x 4 days  -     predniSONE 10 mg tablet;  Take 4 tablets (40 mg total) by mouth daily    Cigarette nicotine dependence without complication  -     nicotine polacrilex (COMMIT) 4 MG lozenge; Apply 1 lozenge (4 mg total) to the mouth or throat as needed for smoking cessation    PND (post-nasal drip)  -     fluticasone (FLONASE) 50 mcg/act nasal spray; 1 spray into each nostril daily    Pulmonary nodules    Other orders  -     cholecalciferol (VITAMIN D3) 400 units tablet; Take 400 Units by mouth daily        No follow-ups on file  History of Present Illness   HPI:  Yahir Cortes is a 49 y o  female who presents the office today for routine follow-up  Patient was last seen in our office in December for centrilobular emphysema, nicotine dependence, nodules  In that time frame she has not required systemic steroids, antibiotics or been hospitalized for solid illness  CC is sore throat for the last 2 months  Other pertinent symptoms include rhinorrhea, congestion, PND, hoarse voice  Using Mucinex and Sinex with only minimal relief  Continues to complain of SOB, STEPHEN, cough productive of tan sputum  Wheeze and cough are slightly worse in the last two weeks  Smokes 12-13 cigarettes/day  Patient is maintained on Spiriva 2 5 mcg 2 puffs daily  Uses her rescue inhaler very rarely  Review of Systems   All other systems reviewed and are negative        Historical Information   Past Medical History:   Diagnosis Date    Bilateral carpal tunnel syndrome 8/20/2019    Carpal tunnel syndrome of right wrist 10/30/2019    Added automatically from request for surgery [id number]    Hypertension      Past Surgical History:   Procedure Laterality Date    BREAST BIOPSY Left 20yrs ago benign    OOPHORECTOMY Right     [place] WRIST Eleazar Charles LIG Right 1/21/2020    Procedure: RELEASE CARPAL TUNNEL ENDOSCOPIC;  Surgeon: Dorene Bob MD;  [facility];  Service: Orthopedics    AK WRIST Eleazar Charles LIG Left 1/28/2020    Procedure: RELEASE CARPAL TUNNEL ENDOSCOPIC;  Surgeon: Otis Young MD;  [facility];  Service: Orthopedics     Family History   Problem Relation Age of Onset    Alcohol abuse Mother     Eating disorder Mother         anorexia and bulimia per Pt    Hodgkin's lymphoma Father     No Known Problems Sister     Colon cancer Maternal Grandmother     Alcohol abuse Maternal Grandfather     Colon cancer Paternal Grandmother     Anxiety disorder Paternal Aunt     Multiple sclerosis Cousin     No Known Problems Daughter     Breast cancer Maternal Aunt     No Known Problems Maternal Aunt     No Known Problems Maternal Aunt     No Known Problems Paternal Aunt        Social History     Tobacco Use   Smoking Status Current Every Day Smoker    Packs/day: 0 50    Years: 42 00    Pack years: 21 00    Types: Cigarettes    Start date: 1979   Smokeless Tobacco Never Used         Meds/Allergies     Current Outpatient Medications:     albuterol (ProAir HFA) 90 mcg/act inhaler, Inhale 2 puffs every 6 (six) hours as needed for wheezing, Disp: 18 g, Rfl: 2    Cetirizine HCl 10 MG TBDP, , Disp: , Rfl:     cholecalciferol (VITAMIN D3) 400 units tablet, Take 400 Units by mouth daily, Disp: , Rfl:     FLUoxetine (PROzac) 10 mg capsule, Take 1 capsule (10 mg total) by mouth daily, Disp: 30 capsule, Rfl: 1    gabapentin (NEURONTIN) 100 mg capsule, take 1 capsule by mouth three times a day, Disp: 180 capsule, Rfl: 1    lamoTRIgine (LaMICtal) 200 MG tablet, Take 1 tablet (200 mg total) by mouth daily, Disp: 90 tablet, Rfl: 0    omeprazole (PriLOSEC) 20 mg delayed release capsule, Take 1 capsule (20 mg total) by mouth 2 (two) times a day, Disp: 60 capsule, Rfl: 5    risperiDONE (RisperDAL) 1 mg tablet, Take 1 tablet (1 mg total) by mouth daily at bedtime, Disp: 30 tablet, Rfl: 1    senna-docusate sodium (SENOKOT-S) 8 6-50 mg per tablet, Take 3 tablets by mouth daily at bedtime, Disp: , Rfl:     tiotropium (Spiriva Respimat) 2 5 MCG/ACT AERS inhaler, Inhale 2 puffs daily, Disp: 4 g, Rfl: 5    azithromycin (ZITHROMAX) 250 mg tablet, Take 2 tablets today then 1 tablet daily x 4 days, Disp: 6 tablet, Rfl: 0    fluticasone (FLONASE) 50 mcg/act nasal spray, 1 spray into each nostril daily, Disp: 16 g, Rfl: 2    nicotine polacrilex (COMMIT) 4 MG lozenge, Apply 1 lozenge (4 mg total) to the mouth or throat as needed for smoking cessation, Disp: 100 each, Rfl: 5    predniSONE 10 mg tablet, Take 4 tablets (40 mg total) by mouth daily, Disp: 20 tablet, Rfl: 0  No Known Allergies    Vitals: Blood pressure 148/88, pulse 78, resp  rate 16, weight 59 kg (130 lb), SpO2 98 %  Body mass index is 24 56 kg/m²  Oxygen Therapy  SpO2: 98 %    Physical Exam  Physical Exam  Vitals reviewed  Constitutional:       Appearance: Normal appearance  She is well-developed  HENT:      Head: Normocephalic and atraumatic  Nose: Nose normal       Mouth/Throat:      Mouth: Mucous membranes are moist       Pharynx: Oropharynx is clear  Eyes:      Extraocular Movements: Extraocular movements intact  Cardiovascular:      Rate and Rhythm: Normal rate and regular rhythm  Pulses: Normal pulses  Heart sounds: Normal heart sounds  No murmur heard  Pulmonary:      Effort: Pulmonary effort is normal  No respiratory distress  Breath sounds: No stridor  Wheezing present  No rhonchi or rales  Abdominal:      Palpations: Abdomen is soft  Tenderness: There is no abdominal tenderness  Musculoskeletal:         General: No swelling or tenderness  Normal range of motion  Cervical back: Normal range of motion and neck supple  Skin:     General: Skin is warm and dry  Capillary Refill: Capillary refill takes less than 2 seconds  Neurological:      General: No focal deficit present  Mental Status: She is alert and oriented to person, place, and time  Mental status is at baseline     Psychiatric:         Mood and Affect: Mood normal          Behavior: Behavior normal  Thought Content: Thought content normal          Judgment: Judgment normal          Labs: I have personally reviewed pertinent lab results  , ABG: No results found for: PHART, CKV6JWX, PO2ART, QPS3YOQ, J9YQHNPR, BEART, SOURCE, BNP: No results found for: BNP, CBC: No results found for: WBC, HGB, HCT, MCV, PLT, ADJUSTEDWBC, MCH, MCHC, RDW, MPV, NRBC, CMP: No results found for: SODIUM, K, CL, CO2, ANIONGAP, BUN, CREATININE, GLUCOSE, CALCIUM, AST, ALT, ALKPHOS, PROT, BILITOT, EGFR, PT/INR: No results found for: PT, INR, Troponin: No results found for: TROPONINI  Lab Results   Component Value Date    WBC 7 86 09/30/2019    HGB 13 3 09/30/2019    HCT 41 8 09/30/2019    MCV 95 09/30/2019     09/30/2019     Lab Results   Component Value Date    CALCIUM 9 0 04/09/2021     06/15/2018    K 4 0 04/09/2021    CO2 29 04/09/2021     04/09/2021    BUN 14 04/09/2021    CREATININE 0 62 04/09/2021     No results found for: IGE  Lab Results   Component Value Date    ALT 21 04/09/2021    AST 17 04/09/2021    ALKPHOS 56 04/09/2021    BILITOT 0 3 06/15/2018       Imaging and other studies: I have personally reviewed pertinent reports  and I have personally reviewed pertinent films in PACS     CT chest without contrast 11/22/2021  Tiny sub 5 mm pulmonary nodules bilaterally noted on CT  Recommend repeat CT chest in 1 year  Mild apical predominant centrilobular emphysema  Mild bronchial wall thickening  Pulmonary function testing:  Performed 11/28/2021  FEV1/FVC ratio 75%   FEV1 89% predicted  FVC 95% predicted  no response to bronchodilators  TLC 85 % predicted  RV 67% predicted  DLCO corrected for hemoglobin 102 % predicted  Normal spirometry per in no significant response to bronchodilators  Normal lung volumes  Normal diffusion capacity  6 minute walk:  Patient ambulated for 488 m in 6 minutes without desaturations

## 2022-03-23 ENCOUNTER — TELEPHONE (OUTPATIENT)
Dept: PSYCHIATRY | Facility: CLINIC | Age: 57
End: 2022-03-23

## 2022-03-23 ENCOUNTER — TELEMEDICINE (OUTPATIENT)
Dept: BEHAVIORAL/MENTAL HEALTH CLINIC | Facility: CLINIC | Age: 57
End: 2022-03-23
Payer: COMMERCIAL

## 2022-03-23 DIAGNOSIS — F17.210 CIGARETTE NICOTINE DEPENDENCE WITHOUT COMPLICATION: ICD-10-CM

## 2022-03-23 DIAGNOSIS — F10.11 ALCOHOL ABUSE, IN REMISSION: ICD-10-CM

## 2022-03-23 DIAGNOSIS — F31.81 MODERATE MIXED BIPOLAR II DISORDER (HCC): Primary | ICD-10-CM

## 2022-03-23 DIAGNOSIS — F41.1 GENERALIZED ANXIETY DISORDER: ICD-10-CM

## 2022-03-23 DIAGNOSIS — F43.10 PTSD (POST-TRAUMATIC STRESS DISORDER): ICD-10-CM

## 2022-03-23 PROCEDURE — 90791 PSYCH DIAGNOSTIC EVALUATION: CPT | Performed by: COUNSELOR

## 2022-03-23 NOTE — TELEPHONE ENCOUNTER
PT lvm stating she was unable to connect with provider  Reached back out to pt and she stated she was able to connect
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
## 2022-03-27 DIAGNOSIS — R06.00 DYSPNEA ON EXERTION: ICD-10-CM

## 2022-03-27 DIAGNOSIS — R06.2 WHEEZING: ICD-10-CM

## 2022-03-27 DIAGNOSIS — Z72.0 TOBACCO ABUSE: ICD-10-CM

## 2022-03-30 ENCOUNTER — TELEMEDICINE (OUTPATIENT)
Dept: BEHAVIORAL/MENTAL HEALTH CLINIC | Facility: CLINIC | Age: 57
End: 2022-03-30
Payer: COMMERCIAL

## 2022-03-30 DIAGNOSIS — F43.10 PTSD (POST-TRAUMATIC STRESS DISORDER): ICD-10-CM

## 2022-03-30 DIAGNOSIS — F10.11 ALCOHOL ABUSE, IN REMISSION: ICD-10-CM

## 2022-03-30 DIAGNOSIS — F31.81 MODERATE MIXED BIPOLAR II DISORDER (HCC): ICD-10-CM

## 2022-03-30 DIAGNOSIS — F41.1 GENERALIZED ANXIETY DISORDER: Primary | ICD-10-CM

## 2022-03-30 PROCEDURE — 90834 PSYTX W PT 45 MINUTES: CPT | Performed by: COUNSELOR
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
## 2022-04-04 RX ORDER — TIOTROPIUM BROMIDE INHALATION SPRAY 3.12 UG/1
SPRAY, METERED RESPIRATORY (INHALATION)
Qty: 4 G | Refills: 5 | Status: SHIPPED | OUTPATIENT
Start: 2022-04-04 | End: 2022-07-18

## 2022-04-06 ENCOUNTER — TELEMEDICINE (OUTPATIENT)
Dept: BEHAVIORAL/MENTAL HEALTH CLINIC | Facility: CLINIC | Age: 57
End: 2022-04-06
Payer: COMMERCIAL

## 2022-04-06 DIAGNOSIS — F43.10 PTSD (POST-TRAUMATIC STRESS DISORDER): ICD-10-CM

## 2022-04-06 DIAGNOSIS — F41.1 GENERALIZED ANXIETY DISORDER: ICD-10-CM

## 2022-04-06 DIAGNOSIS — F31.81 MODERATE MIXED BIPOLAR II DISORDER (HCC): Primary | ICD-10-CM

## 2022-04-06 PROCEDURE — 90834 PSYTX W PT 45 MINUTES: CPT | Performed by: COUNSELOR

## 2022-04-06 NOTE — PSYCH
Virtual Regular Visit    Verification of patient location:    Patient is located in the following state in which I hold an active license [place]      Assessment/Plan:    Problem List Items Addressed This Visit        Other    Moderate mixed bipolar II disorder (HCC) - Primary    Generalized anxiety disorder    PTSD (post-traumatic stress disorder)          Goals addressed in session: Goal 1 and Goal 2          Reason for visit is No chief complaint on file  [facility]    Provider located at [address] [place] [id number] [phone]      Recent Visits  Date Type Provider Dept   03/30/22 [address] [place] [address] [facility]   Showing recent visits within past 7 days and meeting all other requirements  Future Appointments  No visits were found meeting these conditions  Showing future appointments within next 150 days and meeting all other requirements       The patient was identified by name and date of birth  Sriram Ruelas was informed that this is a telemedicine visit and that the visit is being conducted through M-[employer] and patient was informed this is a secure, HIPAA-complaint platform  She agrees to proceed     My office door was closed  No one else was in the room  She acknowledged consent and understanding of privacy and security of the video platform  The patient has agreed to participate and understands they can discontinue the visit at any time  Patient is aware this is a billable service  Subjective  Sriram Ruelas is a 49 y o  female  Data: Tiara Ross could not work through establishing connections with [employer] and was directed through Teams again, where she also had some difficulties   Tiara Ross discussed the recommendation about more intensive level of care due to her emotions challenging her with high intensity on a daily base recently and her need to talk to someone a lot more often than Outpatient program's availability  She refused by saying she does not drive, has no vehicle, and has nobody to drive her/cannot afford paying for transportation  Virginia Andrade also admitted that she did not take much notes the last time and did not practice the skills presented then  The Mindfulness part was necessary to be repeated  Virginia Andrade was observably difficult to motivate to consider a different approach to emotions  She made an effort, but she made a request to stay in therapy after the 6 individual sessions  Assessment: Virginia Andrade was very emotional and talkative  She was resentful towards new approach to interpersonal communication  Plan: Virginia Andrade agrees to practice the [employer] skills and document        HPI     Past Medical History:   Diagnosis Date    Bilateral carpal tunnel syndrome 8/20/2019    Carpal tunnel syndrome of right wrist 10/30/2019    Added automatically from request for surgery [id number]    Hypertension        Past Surgical History:   Procedure Laterality Date    BREAST BIOPSY Left 20yrs ago benign    OOPHORECTOMY Right     [place] WRIST Linda Camp LIG Right 1/21/2020    Procedure: RELEASE CARPAL TUNNEL ENDOSCOPIC;  Surgeon: Favio Gordon MD;  [facility];  Service: Orthopedics    [place] WRIST Linda Camp LIG Left 1/28/2020    Procedure: RELEASE CARPAL TUNNEL ENDOSCOPIC;  Surgeon: Favio Gordon MD;  [facility];  Service: Orthopedics       Current Outpatient Medications   Medication Sig Dispense Refill    albuterol (ProAir HFA) 90 mcg/act inhaler Inhale 2 puffs every 6 (six) hours as needed for wheezing 18 g 2    Cetirizine HCl 10 MG TBDP       cholecalciferol (VITAMIN D3) 400 units tablet Take 400 Units by mouth daily      FLUoxetine (PROzac) 10 mg capsule Take 1 capsule (10 mg total) by mouth daily 30 capsule 1    fluticasone (FLONASE) 50 mcg/act nasal spray 1 spray into each nostril daily 16 g 2    gabapentin (NEURONTIN) 100 mg capsule take 1 capsule by mouth three times a day 180 capsule 1    lamoTRIgine (LaMICtal) 200 MG tablet Take 1 tablet (200 mg total) by mouth daily 90 tablet 0    nicotine polacrilex (COMMIT) 4 MG lozenge Apply 1 lozenge (4 mg total) to the mouth or throat as needed for smoking cessation 100 each 5    omeprazole (PriLOSEC) 20 mg delayed release capsule Take 1 capsule (20 mg total) by mouth 2 (two) times a day 60 capsule 5    predniSONE 10 mg tablet Take 4 tablets (40 mg total) by mouth daily 20 tablet 0    risperiDONE (RisperDAL) 1 mg tablet Take 1 tablet (1 mg total) by mouth daily at bedtime 30 tablet 1    senna-docusate sodium (SENOKOT-S) 8 6-50 mg per tablet Take 3 tablets by mouth daily at bedtime      Spiriva Respimat 2 5 MCG/ACT AERS inhaler inhale 2 puffs by mouth and INTO THE LUNGS once daily 4 g 5     No current facility-administered medications for this visit  No Known Allergies    Review of Systems    Video Exam    There were no vitals filed for this visit  Physical Exam     I spent 48 minutes directly with the patient during this visit    VIRTUAL VISIT DISCLAIMER    Megan Marroquin verbally agrees to participate in [employer]  Pt is aware that [employer] could be limited without vital signs or the ability to perform a full hands-on physical Elina Gilbert understands she or the provider may request at any time to terminate the video visit and request the patient to seek care or treatment in person

## 2022-04-13 ENCOUNTER — TELEMEDICINE (OUTPATIENT)
Dept: BEHAVIORAL/MENTAL HEALTH CLINIC | Facility: CLINIC | Age: 57
End: 2022-04-13
Payer: COMMERCIAL

## 2022-04-13 DIAGNOSIS — F43.10 PTSD (POST-TRAUMATIC STRESS DISORDER): ICD-10-CM

## 2022-04-13 DIAGNOSIS — F41.1 GENERALIZED ANXIETY DISORDER: ICD-10-CM

## 2022-04-13 DIAGNOSIS — F31.81 MODERATE MIXED BIPOLAR II DISORDER (HCC): Primary | ICD-10-CM

## 2022-04-13 PROCEDURE — 90834 PSYTX W PT 45 MINUTES: CPT | Performed by: COUNSELOR

## 2022-04-13 NOTE — PSYCH
Virtual Regular Visit    Verification of patient location:    Patient is located in the following state in which I hold an active license [place]      Assessment/Plan:    Problem List Items Addressed This Visit        Other    Moderate mixed bipolar II disorder (HCC) - Primary    Generalized anxiety disorder    PTSD (post-traumatic stress disorder)          Goals addressed in session: Goal 1 and Goal 2          Reason for visit is No chief complaint on file  Encounter provider Alo Engel    Provider located at [address] [place] [id number] [phone]      Recent Visits  Date Type Provider Dept   04/06/22 [address] [place] [address] [facility]   Showing recent visits within past 7 days and meeting all other requirements  Future Appointments  No visits were found meeting these conditions  Showing future appointments within next 150 days and meeting all other requirements       The patient was identified by name and date of birth  Niurka Rao was informed that this is a telemedicine visit and that the visit is being conducted through[employer] Teams and patient was informed that this is a secure, HIPAA-compliant platform  She agrees to proceed     My office door was closed  No one else was in the room  She acknowledged consent and understanding of privacy and security of the video platform  The patient has agreed to participate and understands they can discontinue the visit at any time  Patient is aware this is a billable service  Subjective  Niurka Rao is a 49 y o  female  Data: Caleb Bustillo reported anger issues but also could not remember to describe it  She discussed being mad and handling it through practicing Core Mindfulness skills  She could not use any of the Epic links and could not answer her phone for awhile  She used her Teams link   She received corrective feedback and the next portion of the BIP- Intro to Distress Tolerance, Avoidance, and Distraction  A higher level of care was recommended, and Samara Church provided her permission to address this with her PCP  Samara Church said that her roommate and herself see the same PCP, and that her roommate is preparing for a Partial MH Program that they may both attend, so that Samara Church could use the transportation(she does not drive, has no vehicle and cannot afford paying a taxi)  Assessment: Samara Church was as usual emotional and with difficulties focusing and comprehending the radically different approach  She was over-talkative yet managed to document the information and the skills and showed commitment to practice  Plan: Samara Church will practice distraction skills and document her experience with turning it into avoidance and will document to be able to discuss any struggle  She will continue receiving the portions of the BIP during weekly sessions        HPI     Past Medical History:   Diagnosis Date    Bilateral carpal tunnel syndrome 8/20/2019    Carpal tunnel syndrome of right wrist 10/30/2019    Added automatically from request for surgery [id number]    Hypertension        Past Surgical History:   Procedure Laterality Date    BREAST BIOPSY Left 20yrs ago benign    OOPHORECTOMY Right     [place] WRIST Cliffton Abida LIG Right 1/21/2020    Procedure: RELEASE CARPAL TUNNEL ENDOSCOPIC;  Surgeon: Felicity Cranker, MD;  [facility];  Service: Orthopedics    [place] WRIST Clif[place]on Abida LIG Left 1/28/2020    Procedure: RELEASE CARPAL TUNNEL ENDOSCOPIC;  Surgeon: Felicity Cranker, MD;  [facility];  Service: Orthopedics       Current Outpatient Medications   Medication Sig Dispense Refill    albuterol (ProAir HFA) 90 mcg/act inhaler Inhale 2 puffs every 6 (six) hours as needed for wheezing 18 g 2    Cetirizine HCl 10 MG TBDP       cholecalciferol (VITAMIN D3) 400 units tablet Take 400 Units by mouth daily      FLUoxetine (PROzac) 10 mg capsule Take 1 capsule (10 mg total) by mouth daily 30 capsule 1    fluticasone (FLONASE) 50 mcg/act nasal spray 1 spray into each nostril daily 16 g 2    gabapentin (NEURONTIN) 100 mg capsule take 1 capsule by mouth three times a day 180 capsule 1    lamoTRIgine (LaMICtal) 200 MG tablet Take 1 tablet (200 mg total) by mouth daily 90 tablet 0    nicotine polacrilex (COMMIT) 4 MG lozenge Apply 1 lozenge (4 mg total) to the mouth or throat as needed for smoking cessation 100 each 5    omeprazole (PriLOSEC) 20 mg delayed release capsule Take 1 capsule (20 mg total) by mouth 2 (two) times a day 60 capsule 5    predniSONE 10 mg tablet Take 4 tablets (40 mg total) by mouth daily 20 tablet 0    risperiDONE (RisperDAL) 1 mg tablet Take 1 tablet (1 mg total) by mouth daily at bedtime 30 tablet 1    senna-docusate sodium (SENOKOT-S) 8 6-50 mg per tablet Take 3 tablets by mouth daily at bedtime      Spiriva Respimat 2 5 MCG/ACT AERS inhaler inhale 2 puffs by mouth and INTO THE LUNGS once daily 4 g 5     No current facility-administered medications for this visit  No Known Allergies    Review of Systems    Video Exam    There were no vitals filed for this visit  Physical Exam     I spent 45 minutes directly with the patient during this visit    VIRTUAL VISIT DISCLAIMER    Jennifer Romo verbally agrees to participate in [employer]  Pt is aware that [employer] could be limited without vital signs or the ability to perform a full hands-on physical Safia Tameka understands she or the provider may request at any time to terminate the video visit and request the patient to seek care or treatment in person

## 2022-04-15 ENCOUNTER — OFFICE VISIT (OUTPATIENT)
Dept: FAMILY MEDICINE CLINIC | Facility: CLINIC | Age: 57
End: 2022-04-15
Payer: COMMERCIAL

## 2022-04-15 VITALS
WEIGHT: 128 LBS | TEMPERATURE: 97.8 F | OXYGEN SATURATION: 99 % | HEART RATE: 87 BPM | DIASTOLIC BLOOD PRESSURE: 90 MMHG | BODY MASS INDEX: 24.17 KG/M2 | HEIGHT: 61 IN | SYSTOLIC BLOOD PRESSURE: 150 MMHG

## 2022-04-15 DIAGNOSIS — M25.561 CHRONIC PAIN OF RIGHT KNEE: ICD-10-CM

## 2022-04-15 DIAGNOSIS — M54.2 NECK PAIN: ICD-10-CM

## 2022-04-15 DIAGNOSIS — J30.2 SEASONAL ALLERGIES: ICD-10-CM

## 2022-04-15 DIAGNOSIS — G89.29 CHRONIC PAIN OF RIGHT KNEE: ICD-10-CM

## 2022-04-15 DIAGNOSIS — Z00.00 ANNUAL PHYSICAL EXAM: Primary | ICD-10-CM

## 2022-04-15 DIAGNOSIS — R20.0 NUMBNESS IN RIGHT LEG: ICD-10-CM

## 2022-04-15 PROCEDURE — 99396 PREV VISIT EST AGE 40-64: CPT | Performed by: NURSE PRACTITIONER

## 2022-04-15 PROCEDURE — 99213 OFFICE O/P EST LOW 20 MIN: CPT | Performed by: NURSE PRACTITIONER

## 2022-04-15 RX ORDER — PREDNISONE 10 MG/1
TABLET ORAL
Qty: 12 TABLET | Refills: 0 | Status: SHIPPED | OUTPATIENT
Start: 2022-04-15 | End: 2022-04-21

## 2022-04-15 RX ORDER — LEVOCETIRIZINE DIHYDROCHLORIDE 5 MG/1
5 TABLET, FILM COATED ORAL EVERY EVENING
Qty: 30 TABLET | Refills: 4 | Status: SHIPPED | OUTPATIENT
Start: 2022-04-15

## 2022-04-15 NOTE — PATIENT INSTRUCTIONS

## 2022-04-15 NOTE — PROGRESS NOTES
ADULT ANNUAL [facility]    NAME: Stephanie Agarwal  AGE: 49 y o  SEX: female  : 1965     DATE: 4/15/2022     Assessment and Plan:     Problem List Items Addressed This Visit     None      Visit Diagnoses     Annual physical exam    -  Primary    Numbness in right leg        Chronic pain of right knee              Immunizations and preventive care screenings were discussed with patient today  Appropriate education was printed on patient's after visit summary  Counseling:  Alcohol/drug use: discussed moderation in alcohol intake, the recommendations for healthy alcohol use, and avoidance of illicit drug use  · Exercise: the importance of regular exercise/physical activity was discussed  Recommend exercise 3-5 times per week for at least 30 minutes  Return in 1 year (on 4/15/2023)  Chief Complaint:     Chief Complaint   Patient presents with    Annual Exam     Pt has complaints of allergies  Sore throat   Leg Pain     Right leg pain   Cough     Has pain in head when coughing, feels like shes in a wind tunnel  History of Present Illness:     Adult Annual Physical   Patient here for a comprehensive physical exam  The patient reports no problems  Diet and Physical Activity  · Diet/Nutrition: well balanced diet, frequent junk food and consuming 3-5 servings of fruits/vegetables daily  · Exercise: walking and 3-4 times a week on average        Depression Screening  PHQ-2/9 Depression Screening    Little interest or pleasure in doing things: 3 - nearly every day  Feeling down, depressed, or hopeless: 3 - nearly every day  Trouble falling or staying asleep, or sleeping too much: 3 - nearly every day  Feeling tired or having little energy: 0 - not at all  Poor appetite or overeatin - more than half the days  Feeling bad about yourself - or that you are a failure or have let yourself or your family down: 3 - nearly every day  Trouble concentrating on things, such as reading the newspaper or watching television: 3 - nearly every day  Moving or speaking so slowly that other people could have noticed  Or the opposite - being so fidgety or restless that you have been moving around a lot more than usual: 3 - nearly every day  Thoughts that you would be better off dead, or of hurting yourself in some way: 0 - not at all  PHQ-2 Score: 6  PHQ-2 Interpretation: POSITIVE depression screen  PHQ-9 Score: 20   PHQ-9 Interpretation: Severe depression        General Health  · Sleep: sleeps well and gets 4-6 hours of sleep on average  · Hearing: normal - bilateral   · Vision: goes for regular eye exams, most recent eye exam <1 year ago, wears glasses and wears contacts  · Dental:  full dentures  /GYN Health  · Patient is: postmenopausal       Review of Systems:   See acute visit  Review of Systems   Constitutional: Negative         Past Medical History:     Past Medical History:   Diagnosis Date    Bilateral carpal tunnel syndrome 8/20/2019    Carpal tunnel syndrome of right wrist 10/30/2019    Added automatically from request for surgery [id number]    Hypertension       Past Surgical History:     Past Surgical History:   Procedure Laterality Date    BREAST BIOPSY Left 20yrs ago benign    OOPHORECTOMY Right     TN WRIST Chris Revering LIG Right 1/21/2020    Procedure: RELEASE CARPAL TUNNEL ENDOSCOPIC;  Surgeon: Alison Posey MD;  [facility];  Service: Orthopedics    TN WRIST Chris Revering LIG Left 1/28/2020    Procedure: RELEASE CARPAL TUNNEL ENDOSCOPIC;  Surgeon: Alison Posey MD;  [facility];  Service: Orthopedics      Social History:     Social History     Socioeconomic History    Marital status:      Spouse name: None    Number of children: 2    Years of education: None    Highest education level: None   Occupational History    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     Comment: from 2000 - 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, then again in 2017 but had to leave both times due to emotional reasons   Tobacco Use    Smoking status: Current Every Day Smoker     Packs/day: 0 50     Years: 42 00     Pack years: 21 00     Types: Cigarettes     Start date: 1979    Smokeless tobacco: Never Used   Vaping Use    Vaping Use: Never used   Substance and Sexual Activity    Alcohol use: Not Currently     Comment: H/O ETOH abuse in approx 2010--for a period of 2 years  She quit on her own without any h/o withdrawal or rehab   Drug use: Yes     Types: Marijuana     Comment: Smokes THC several times a day  Also uses CBD OIL at times  Used cocaine and methamphetamine  fairly steadily for a period of approx 1 year approx 36 years ago  Tried LSD, Mushrooms  No h/o rehab    Sexual activity: Yes     Partners: Male   Other Topics Concern    None   Social History Narrative    Home: lives with 2 best friends  Education:    Pt states she always got Ds and Fs in school and skipped classes often, but there were no formally diagnosed learning disabilities  Pt was in alternative schooling  She learned "Nothing" there and they didn't teach much  Pt was then placed back in regular classes but could not keep up  Pt reached childhood milestones on time as far as she knows       Highest grade completed 11th grade    Had some training through [employer] but no actual certificate     Social Determinants of Health     Financial Resource Strain: Not on file   Food Insecurity: Not on file   Transportation Needs: Not on file   Physical Activity: Not on file   Stress: Not on file   Social Connections: Not on file   Intimate Partner Violence: Not on file   Housing Stability: Not on file      Family History:     Family History   Problem Relation Age of Onset    Alcohol abuse Mother     Eating disorder Mother         anorexia and bulimia per Pt    Hodgkin's lymphoma Father     No Known Problems Sister     Colon cancer Maternal Grandmother     Alcohol abuse Maternal Grandfather     Colon cancer Paternal Grandmother     Anxiety disorder Paternal Aunt     Multiple sclerosis Cousin     No Known Problems Daughter     Breast cancer Maternal Aunt     No Known Problems Maternal Aunt     No Known Problems Maternal Aunt     No Known Problems Paternal Aunt       Current Medications:     Current Outpatient Medications   Medication Sig Dispense Refill    albuterol (ProAir HFA) 90 mcg/act inhaler Inhale 2 puffs every 6 (six) hours as needed for wheezing 18 g 2    Cetirizine HCl 10 MG TBDP       cholecalciferol (VITAMIN D3) 400 units tablet Take 400 Units by mouth daily      FLUoxetine (PROzac) 10 mg capsule Take 1 capsule (10 mg total) by mouth daily 30 capsule 1    fluticasone (FLONASE) 50 mcg/act nasal spray 1 spray into each nostril daily 16 g 2    gabapentin (NEURONTIN) 100 mg capsule take 1 capsule by mouth three times a day 180 capsule 1    lamoTRIgine (LaMICtal) 200 MG tablet Take 1 tablet (200 mg total) by mouth daily 90 tablet 0    nicotine polacrilex (COMMIT) 4 MG lozenge Apply 1 lozenge (4 mg total) to the mouth or throat as needed for smoking cessation 100 each 5    omeprazole (PriLOSEC) 20 mg delayed release capsule Take 1 capsule (20 mg total) by mouth 2 (two) times a day 60 capsule 5    risperiDONE (RisperDAL) 1 mg tablet Take 1 tablet (1 mg total) by mouth daily at bedtime 30 tablet 1    senna-docusate sodium (SENOKOT-S) 8 6-50 mg per tablet Take 3 tablets by mouth daily at bedtime      Spiriva Respimat 2 5 MCG/ACT AERS inhaler inhale 2 puffs by mouth and INTO THE LUNGS once daily 4 g 5     No current facility-administered medications for this visit        Allergies:     No Known Allergies   Physical Exam:     /90   Pulse 87   Temp 97 8 °F (36 6 °C)   Ht 5' 1" (1 549 m)   Wt 58 1 kg (128 lb)   LMP  (LMP Unknown)   SpO2 99%   BMI 24 19 kg/m²     Physical Exam see acute visit    Renetta Sellers [address] [facility]

## 2022-04-15 NOTE — PROGRESS NOTES
[facility]        NAME: Shirley Leija is a 49 y o  female  : 1965    MRN: [id number]  DATE: April 15, 2022  TIME: 4:36 PM    Assessment and Plan   Annual physical exam [V12 04]  0  Annual physical exam     2  Numbness in right leg  predniSONE 10 mg tablet   3  Chronic pain of right knee  XR knee 3 vw right non injury   4  Neck pain  XR spine cervical complete 4 or 5 vw non injury   5  Seasonal allergies  levocetirizine (XYZAL) 5 MG tablet   1  Annual physical exam      2  Numbness in right leg  Suspect nerve pain to posterior leg  Will trial prednisone  - predniSONE 10 mg tablet; Take 3 tablets (30 mg total) by mouth daily for 2 days, THEN 2 tablets (20 mg total) daily for 2 days, THEN 1 tablet (10 mg total) daily for 2 days  Dispense: 12 tablet; Refill: 0    3  Chronic pain of right knee   imaging of right knee pain as it has been ongoing for months  - XR knee 3 vw right non injury; Future    4  Neck pain  Worsening neck pain intermittently  would like an xray  - XR spine cervical complete 4 or 5 vw non injury; Future    5  Seasonal allergies  Stop allegra and start xyzal   Continue flonase    - levocetirizine (XYZAL) 5 MG tablet; Take 1 tablet (5 mg total) by mouth every evening  Dispense: 30 tablet; Refill: 4        Patient Instructions     Patient Instructions       Wellness Visit for Adults   AMBULATORY CARE:   A wellness visit  is when you see your healthcare provider to get screened for health problems  Your healthcare provider will also give you advice on how to stay healthy  Write down your questions so you remember to ask them  Ask your healthcare provider how often you should have a wellness visit  What happens at a wellness visit:  Your healthcare provider will ask about your health, and your family history of health problems  This includes high blood pressure, heart disease, and cancer   He or she will ask if you have symptoms that concern you, if you smoke, and about your mood  You may also be asked about your intake of medicines, supplements, food, and alcohol  Any of the following may be done:  · Your weight  will be checked  Your height may also be checked so your body mass index (BMI) can be calculated  Your BMI shows if you are at a healthy weight  · Your blood pressure  and heart rate will be checked  Your temperature may also be checked  · Blood and urine tests  may be done  Blood tests may be done to check your cholesterol levels  Abnormal cholesterol levels increase your risk for heart disease and stroke  You may also need a blood or urine test to check for diabetes if you are at increased risk  Urine tests may be done to look for signs of an infection or kidney disease  · A physical exam  includes checking your heartbeat and lungs with a stethoscope  Your healthcare provider may also check your skin to look for sun damage  · Screening tests  may be recommended  A screening test is done to check for diseases that may not cause symptoms  The screening tests you may need depend on your age, gender, family history, and lifestyle habits  For example, colorectal screening may be recommended if you are 48years old or older  Screening tests you need if you are a woman:   · A Pap smear  is used to screen for cervical cancer  Pap smears are usually done every 3 to 5 years depending on your age  You may need them more often if you have had abnormal Pap smear test results in the past  Ask your healthcare provider how often you should have a Pap smear  · A mammogram  is an x-ray of your breasts to screen for breast cancer  Experts recommend mammograms every 2 years starting at age 48 years  You may need a mammogram at age 52 years or younger if you have an increased risk for breast cancer  Talk to your healthcare provider about when you should start having mammograms and how often you need them      Vaccines you may need:   · Get an influenza vaccine  every year  The influenza vaccine protects you from the flu  Several types of viruses cause the flu  The viruses change over time, so new vaccines are made each year  · Get a tetanus-diphtheria (Td) booster vaccine  every 10 years  This vaccine protects you against tetanus and diphtheria  Tetanus is a severe infection that may cause painful muscle spasms and lockjaw  Diphtheria is a severe bacterial infection that causes a thick covering in the back of your mouth and throat  · Get a human papillomavirus (HPV) vaccine  if you are female and aged 23 to 32 or male 23 to 24 and never received it  This vaccine protects you from HPV infection  HPV is the most common infection spread by sexual contact  HPV may also cause vaginal, penile, and anal cancers  · Get a pneumococcal vaccine  if you are aged 72 years or older  The pneumococcal vaccine is an injection given to protect you from pneumococcal disease  Pneumococcal disease is an infection caused by pneumococcal bacteria  The infection may cause pneumonia, meningitis, or an ear infection  · Get a shingles vaccine  if you are 60 or older, even if you have had shingles before  The shingles vaccine is an injection to protect you from the varicella-zoster virus  This is the same virus that causes chickenpox  Shingles is a painful rash that develops in people who had chickenpox or have been exposed to the virus  How to eat healthy:  My Plate is a model for planning healthy meals  It shows the types and amounts of foods that should go on your plate  Fruits and vegetables make up about half of your plate, and grains and protein make up the other half  A serving of dairy is included on the side of your plate  The amount of calories and serving sizes you need depends on your age, gender, weight, and height  Examples of healthy foods are listed below:  · Eat a variety of vegetables  such as dark green, red, and orange vegetables   You can also include canned vegetables low in sodium (salt) and frozen vegetables without added butter or sauces  · Eat a variety of fresh fruits , canned fruit in 100% juice, frozen fruit, and dried fruit  · Include whole grains  At least half of the grains you eat should be whole grains  Examples include whole-wheat bread, wheat pasta, brown rice, and whole-grain cereals such as oatmeal     · Eat a variety of protein foods such as seafood (fish and shellfish), lean meat, and poultry without skin (turkey and chicken)  Examples of lean meats include pork leg, shoulder, or tenderloin, and beef round, sirloin, tenderloin, and extra lean ground beef  Other protein foods include eggs and egg substitutes, beans, peas, soy products, nuts, and seeds  · Choose low-fat dairy products such as skim or 1% milk or low-fat yogurt, cheese, and cottage cheese  · Limit unhealthy fats  such as butter, hard margarine, and shortening  Exercise:  Exercise at least 30 minutes per day on most days of the week  Some examples of exercise include walking, biking, dancing, and swimming  You can also fit in more physical activity by taking the stairs instead of the elevator or parking farther away from stores  Include muscle strengthening activities 2 days each week  Regular exercise provides many health benefits  It helps you manage your weight, and decreases your risk for type 2 diabetes, heart disease, stroke, and high blood pressure  Exercise can also help improve your mood  Ask your healthcare provider about the best exercise plan for you  General health and safety guidelines:   · Do not smoke  Nicotine and other chemicals in cigarettes and cigars can cause lung damage  Ask your healthcare provider for information if you currently smoke and need help to quit  E-cigarettes or smokeless tobacco still contain nicotine  Talk to your healthcare provider before you use these products  · Limit alcohol    A drink of alcohol is 12 ounces of beer, 5 ounces of wine, or 1½ ounces of liquor  · Lose weight, if needed  Being overweight increases your risk of certain health conditions  These include heart disease, high blood pressure, type 2 diabetes, and certain types of cancer  · Protect your skin  Do not sunbathe or use tanning beds  Use sunscreen with a SPF 15 or higher  Apply sunscreen at least 15 minutes before you go outside  Reapply sunscreen every 2 hours  Wear protective clothing, hats, and sunglasses when you are outside  · Drive safely  Always wear your seatbelt  Make sure everyone in your car wears a seatbelt  A seatbelt can save your life if you are in an accident  Do not use your cell phone when you are driving  This could distract you and cause an accident  Pull over if you need to make a call or send a text message  · Practice safe sex  Use latex condoms if are sexually active and have more than one partner  Your healthcare provider may recommend screening tests for sexually transmitted infections (STIs)  · Wear helmets, lifejackets, and protective gear  Always wear a helmet when you ride a bike or motorcycle, go skiing, or play sports that could cause a head injury  Wear protective equipment when you play sports  Wear a lifejacket when you are on a boat or doing water sports  © Copyright [employer] 2022 Information is for End User's use only and may not be sold, redistributed or otherwise used for commercial purposes  All illustrations and images included in CareNotes® are the copyrighted property of A D A M , Inc  or [facility]   The above information is an  only  It is not intended as medical advice for individual conditions or treatments  Talk to your doctor, nurse or pharmacist before following any medical regimen to see if it is safe and effective for you  Chief Complaint     Chief Complaint   Patient presents with    Annual Exam     Pt has complaints of allergies  Sore throat       Leg Pain Right leg pain   Cough     Has pain in head when coughing, feels like shes in a wind tunnel  History of Present Illness       Patient here for annual physical with c/o right posterior thigh numbness from buttock to knee  Reports that she thinks it happened after she fell asleep "Lorna style"  Bent over her legs  Started 2 weeks ago and has not gotten better or worse  Neck pain ongoing for many months  Has neck pain when coughing  Reports there is a sound in her head, sharp pain when she moves her neck in a certain way, then hears wind in her ears  Review of Systems   Review of Systems   Constitutional: Negative for fatigue and fever  Respiratory: Positive for cough  Negative for shortness of breath and wheezing  Cardiovascular: Negative  Negative for chest pain and palpitations  Musculoskeletal: Positive for arthralgias and neck pain  Knee pain   Neurological: Negative  Negative for dizziness and light-headedness  PHQ-2/9 Depression Screening    Little interest or pleasure in doing things: 3 - nearly every day  Feeling down, depressed, or hopeless: 3 - nearly every day  Trouble falling or staying asleep, or sleeping too much: 3 - nearly every day  Feeling tired or having little energy: 0 - not at all  Poor appetite or overeatin - more than half the days  Feeling bad about yourself - or that you are a failure or have let yourself or your family down: 3 - nearly every day  Trouble concentrating on things, such as reading the newspaper or watching television: 3 - nearly every day  Moving or speaking so slowly that other people could have noticed   Or the opposite - being so fidgety or restless that you have been moving around a lot more than usual: 3 - nearly every day  Thoughts that you would be better off dead, or of hurting yourself in some way: 0 - not at all  PHQ-2 Score: 6  PHQ-2 Interpretation: POSITIVE depression screen  PHQ-9 Score: 20   PHQ-9 Interpretation: Severe depression         Current Medications       Current Outpatient Medications:     albuterol (ProAir HFA) 90 mcg/act inhaler, Inhale 2 puffs every 6 (six) hours as needed for wheezing, Disp: 18 g, Rfl: 2    cholecalciferol (VITAMIN D3) 400 units tablet, Take 400 Units by mouth daily, Disp: , Rfl:     FLUoxetine (PROzac) 10 mg capsule, Take 1 capsule (10 mg total) by mouth daily, Disp: 30 capsule, Rfl: 1    fluticasone (FLONASE) 50 mcg/act nasal spray, 1 spray into each nostril daily, Disp: 16 g, Rfl: 2    gabapentin (NEURONTIN) 100 mg capsule, take 1 capsule by mouth three times a day, Disp: 180 capsule, Rfl: 1    lamoTRIgine (LaMICtal) 200 MG tablet, Take 1 tablet (200 mg total) by mouth daily, Disp: 90 tablet, Rfl: 0    nicotine polacrilex (COMMIT) 4 MG lozenge, Apply 1 lozenge (4 mg total) to the mouth or throat as needed for smoking cessation, Disp: 100 each, Rfl: 5    omeprazole (PriLOSEC) 20 mg delayed release capsule, Take 1 capsule (20 mg total) by mouth 2 (two) times a day, Disp: 60 capsule, Rfl: 5    risperiDONE (RisperDAL) 1 mg tablet, Take 1 tablet (1 mg total) by mouth daily at bedtime, Disp: 30 tablet, Rfl: 1    senna-docusate sodium (SENOKOT-S) 8 6-50 mg per tablet, Take 3 tablets by mouth daily at bedtime, Disp: , Rfl:     Spiriva Respimat 2 5 MCG/ACT AERS inhaler, inhale 2 puffs by mouth and INTO THE LUNGS once daily, Disp: 4 g, Rfl: 5    levocetirizine (XYZAL) 5 MG tablet, Take 1 tablet (5 mg total) by mouth every evening, Disp: 30 tablet, Rfl: 4    predniSONE 10 mg tablet, Take 3 tablets (30 mg total) by mouth daily for 2 days, THEN 2 tablets (20 mg total) daily for 2 days, THEN 1 tablet (10 mg total) daily for 2 days  , Disp: 12 tablet, Rfl: 0    Current Allergies     Allergies as of 04/15/2022    (No Known Allergies)            The following portions of the patient's history were reviewed and updated as appropriate: allergies, current medications, past family history, past medical history, past social history, past surgical history and problem list      Past Medical History:   Diagnosis Date    Bilateral carpal tunnel syndrome 8/20/2019    Carpal tunnel syndrome of right wrist 10/30/2019    Added automatically from request for surgery [id number]    Hypertension        Past Surgical History:   Procedure Laterality Date    BREAST BIOPSY Left 20yrs ago benign    OOPHORECTOMY Right     GA WRIST ARTHROSCOP,RELEASE Anmol Bass LIG Right 1/21/2020    Procedure: RELEASE CARPAL TUNNEL ENDOSCOPIC;  Surgeon: Donal Munroe MD;  [facility];  Service: Orthopedics    GA WRIST Elier Lucian LIG Left 1/28/2020    Procedure: RELEASE CARPAL TUNNEL ENDOSCOPIC;  Surgeon: Donal Munroe MD;  [facility];  Service: Orthopedics       Family History   Problem Relation Age of Onset    Alcohol abuse Mother     Eating disorder Mother         anorexia and bulimia per Pt    Hodgkin's lymphoma Father     No Known Problems Sister     Colon cancer Maternal Grandmother     Alcohol abuse Maternal Grandfather     Colon cancer Paternal Grandmother     Anxiety disorder Paternal Aunt     Multiple sclerosis Cousin     No Known Problems Daughter     Breast cancer Maternal Aunt     No Known Problems Maternal Aunt     No Known Problems Maternal Aunt     No Known Problems Paternal Aunt          Medications have been verified  Objective   /90   Pulse 87   Temp 97 8 °F (36 6 °C)   Ht 5' 1" (1 549 m)   Wt 58 1 kg (128 lb)   LMP  (LMP Unknown)   SpO2 99%   BMI 24 19 kg/m²        Physical Exam     Physical Exam  Vitals and nursing note reviewed  Constitutional:       General: She is not in acute distress  Appearance: She is well-developed  She is not ill-appearing  HENT:      Head: Normocephalic and atraumatic  Neck:      Trachea: No tracheal deviation  Cardiovascular:      Rate and Rhythm: Normal rate and regular rhythm     Pulmonary: Effort: Pulmonary effort is normal  No tachypnea, accessory muscle usage or respiratory distress  Breath sounds: No stridor  Musculoskeletal:      Cervical back: Normal range of motion  No rigidity  Muscular tenderness present  Right knee: Normal       Left knee: Normal    Neurological:      Mental Status: She is alert and oriented to person, place, and time  Psychiatric:         Speech: Speech normal          Behavior: Behavior normal  Behavior is cooperative

## 2022-04-18 DIAGNOSIS — R06.2 WHEEZING: ICD-10-CM

## 2022-04-19 RX ORDER — ALBUTEROL SULFATE 90 UG/1
AEROSOL, METERED RESPIRATORY (INHALATION)
Qty: 8.5 G | Refills: 2 | Status: SHIPPED | OUTPATIENT
Start: 2022-04-19

## 2022-04-20 ENCOUNTER — TELEMEDICINE (OUTPATIENT)
Dept: BEHAVIORAL/MENTAL HEALTH CLINIC | Facility: CLINIC | Age: 57
End: 2022-04-20
Payer: COMMERCIAL

## 2022-04-20 DIAGNOSIS — F31.81 MODERATE MIXED BIPOLAR II DISORDER (HCC): Primary | ICD-10-CM

## 2022-04-20 DIAGNOSIS — F41.1 GENERALIZED ANXIETY DISORDER: ICD-10-CM

## 2022-04-20 DIAGNOSIS — F43.10 PTSD (POST-TRAUMATIC STRESS DISORDER): ICD-10-CM

## 2022-04-20 PROCEDURE — 90834 PSYTX W PT 45 MINUTES: CPT | Performed by: COUNSELOR

## 2022-04-20 NOTE — PSYCH
Virtual Regular Visit    Verification of patient location:    Patient is located in the following state in which I hold an active license [place]      Assessment/Plan:    Problem List Items Addressed This Visit        Other    Moderate mixed bipolar II disorder (HCC) - Primary    Generalized anxiety disorder    PTSD (post-traumatic stress disorder)          Goals addressed in session: Goal 1 and Goal 2          Reason for visit is No chief complaint on file  Encounter provider Mellie Rubinstein, [facility]    Provider located at [address] [place] [id number] [phone]      Recent Visits  Date Type Provider Dept   04/15/22 Office Visit [facility]   04/13/22 [address] [facility] Therapist [facility]   Showing recent visits within past 7 days and meeting all other requirements  Future Appointments  No visits were found meeting these conditions  Showing future appointments within next 150 days and meeting all other requirements       The patient was identified by name and date of birth  Karime Montero was informed that this is a telemedicine visit and that the visit is being conducted throughMicrosoft Teams and patient was informed that this is a secure, HIPAA-compliant platform  She agrees to proceed     My office door was closed  No one else was in the room  She acknowledged consent and understanding of privacy and security of the video platform  The patient has agreed to participate and understands they can discontinue the visit at any time  Patient is aware this is a billable service  Subjective  Karime Montero is a 49 y o  female  Data: Gold Curtis discussed her inability to use technology in a manner different than emotional and received corrective feedback  She shared her experience in thinking about distraction and avoidance   She then received the next designated portion of the BIP- ACCEPTS and Self-Soothing crisis survival skill  Acronym and the pertinent skills under its description  Assessment: Guy Collazo was again stressed from using technology to attend her session, and the need of higher level of care was discussed again with some resistance from her part  Plan: Guy Collazo agrees to practice the seven acceptance skills and to share her experience the next session before received the IMPROVE set of skills + another crisis survival skill  HPI     Past Medical History:   Diagnosis Date    Bilateral carpal tunnel syndrome 8/20/2019    Carpal tunnel syndrome of right wrist 10/30/2019    Added automatically from request for surgery [id number]    Hypertension        Past Surgical History:   Procedure Laterality Date    BREAST BIOPSY Left 20yrs ago benign    OOPHORECTOMY Right     IN WRIST Ofelia Rands LIG Right 1/21/2020    Procedure: RELEASE CARPAL TUNNEL ENDOSCOPIC;  Surgeon: Oleksandr Sage MD;  [facility];  Service: Orthopedics    IN WRIST Ofelia Rands LIG Left 1/28/2020    Procedure: RELEASE CARPAL TUNNEL ENDOSCOPIC;  Surgeon: Oleksandr Sage MD;  [facility];  Service: Orthopedics       Current Outpatient Medications   Medication Sig Dispense Refill    albuterol (PROVENTIL HFA,VENTOLIN HFA) 90 mcg/act inhaler inhale 2 puffs by mouth and INTO THE LUNGS every 6 hours IF NEEDED FOR WHEEZING   8 5 g 2    cholecalciferol (VITAMIN D3) 400 units tablet Take 400 Units by mouth daily      FLUoxetine (PROzac) 10 mg capsule Take 1 capsule (10 mg total) by mouth daily 30 capsule 1    fluticasone (FLONASE) 50 mcg/act nasal spray 1 spray into each nostril daily 16 g 2    gabapentin (NEURONTIN) 100 mg capsule take 1 capsule by mouth three times a day 180 capsule 1    lamoTRIgine (LaMICtal) 200 MG tablet Take 1 tablet (200 mg total) by mouth daily 90 tablet 0    levocetirizine (XYZAL) 5 MG tablet Take 1 tablet (5 mg total) by mouth every evening 30 tablet 4    nicotine polacrilex (COMMIT) 4 MG lozenge Apply 1 lozenge (4 mg total) to the mouth or throat as needed for smoking cessation 100 each 5    omeprazole (PriLOSEC) 20 mg delayed release capsule Take 1 capsule (20 mg total) by mouth 2 (two) times a day 60 capsule 5    predniSONE 10 mg tablet Take 3 tablets (30 mg total) by mouth daily for 2 days, THEN 2 tablets (20 mg total) daily for 2 days, THEN 1 tablet (10 mg total) daily for 2 days  12 tablet 0    risperiDONE (RisperDAL) 1 mg tablet Take 1 tablet (1 mg total) by mouth daily at bedtime 30 tablet 1    senna-docusate sodium (SENOKOT-S) 8 6-50 mg per tablet Take 3 tablets by mouth daily at bedtime      Spiriva Respimat 2 5 MCG/ACT AERS inhaler inhale 2 puffs by mouth and INTO THE LUNGS once daily 4 g 5     No current facility-administered medications for this visit  No Known Allergies    Review of Systems    Video Exam    There were no vitals filed for this visit  Physical Exam     I spent 50 minutes directly with the patient during this visit    VIRTUAL VISIT DISCLAIMER    Justuskm Trish verbally agrees to participate in [facility]  Pt is aware that [facility] could be limited without vital signs or the ability to perform a full hands-on physical Julian Moustapha understands she or the provider may request at any time to terminate the video visit and request the patient to seek care or treatment in person

## 2022-04-21 NOTE — PSYCH
MEDICATION MANAGEMENT NOTE        [facility]      Name and Date of Birth:  Dora Long 49 y o  1965 MRN: [id number]    Date of Visit: 2022    Reason for Visit: Follow-up visit for medication management     SUBJECTIVE:    Jonny Kennedy a 49 y  o  female with a past psychiatric history significant for Anxiety, Bipolar d/o, OCD, eating disorder and PTSD, who was under the care of a psychiatrist 5-8 years ago  She  1,5 years ago after being  for 45 years  Patient has Cluster B personality traits evidenced on our first visit  Patient states continues using THC daily  She denies alcohol in 4 years      Patient was started on Lamictal, and started on Risperidone  Patient was offered Prazosin, however, was resistant to being on medications  After inquiring about BZD, it was clarified that we would not support BZD prescriptions treatment for her at this time  Patient's Risperidone was optimized, and patient was started on Fluoxetine for mood and anxiety      Patient reports is on the following medications:  - Gabapentin: 100 mg Qam and 200 mg QHS for neuropathy  - Lamictal: 200 mg/d  - Risperidone: 1 mg QHS  - Prozac: 10 mg/d     She was recommended ot pursue DBT, which she completed with Elliot Diaz  She reports she has increased sleep and increased appetite  She reports is feeling depressed since yesterday  She relays she ruminates over being estranged from her daughter and grandchildren  She also often thinks about the loss of her son, who  in a MVA in   In addition, her G-dmother  a few weeks ago       Patient reports she struggles with thoughts of being estranged from her daughter and grandkids, especially during the holiday season, such as Easter, Anatoly, [place], etc  She constantly worries about "getting worse in summertime because I used ot spend so much time with them, and now I am not allowed to see them, my ex- is saying horrible things about me to my daughter"  Patient reports passive death wishes, but states she would "never do it" and that "I am scared to die" and "I don't want to die"  On direct inquiry, patient denies suicidal ideation since last time seen in the office  Patient denies symptoms consistent with dashawn, panic attacks or PTSD  Current Rating Scores:       CAROL-7 Flowsheet Screening      Most Recent Value   Over the last 2 weeks, how often have you been bothered by any of the following problems? Feeling nervous, anxious, or on edge 3   Not being able to stop or control worrying 2   Worrying too much about different things 3   Trouble relaxing 3   Being so restless that it is hard to sit still 2   Becoming easily annoyed or irritable 3   Feeling afraid as if something awful might happen 3   CAROL-7 Total Score 19           PHQ-9 Depression Screening    Little interest or pleasure in doing things: 3 - nearly every day  Feeling down, depressed, or hopeless: 3 - nearly every day  Trouble falling or staying asleep, or sleeping too much: 2 - more than half the days  Feeling tired or having little energy: 2 - more than half the days  Poor appetite or overeating: 3 - nearly every day  Feeling bad about yourself - or that you are a failure or have let yourself or your family down: 3 - nearly every day  Trouble concentrating on things, such as reading the newspaper or watching television: 3 - nearly every day  Moving or speaking so slowly that other people could have noticed   Or the opposite - being so fidgety or restless that you have been moving around a lot more than usual: 2 - more than half the days  Thoughts that you would be better off dead, or of hurting yourself in some way: 3 - nearly every day  PHQ-9 Score: 24  Score Interpretation: Severe depression          Review Of Systems:      Constitutional negative   ENT negative   Cardiovascular negative   Respiratory negative Gastrointestinal negative   Genitourinary negative   Musculoskeletal negative   Integumentary negative   Neurological negative   Endocrine negative   Other Symptoms none, all other systems are negative       Past Psychiatric History: Unchanged from prior evaluation  - Information that is bolded has been updated  Substance Abuse History:   Information that is bolded has been updated  Traumatic History: unchanged information from previous note  - Information that is bolded has been updated  Past Medical History:    Past Medical History:   Diagnosis Date    Bilateral carpal tunnel syndrome 8/20/2019    Carpal tunnel syndrome of right wrist 10/30/2019    Added automatically from request for surgery [id number]    Hypertension      Past Medical History Pertinent Negatives:   Diagnosis Date Noted    Abnormal Pap smear of cervix 06/11/2021    Cancer ([facility] [address] ) 01/14/2020     Past Surgical History:   Procedure Laterality Date    BREAST BIOPSY Left 20yrs ago benign    OOPHORECTOMY Right     DC WRIST Chyrl Reji LIG Right 1/21/2020    Procedure: RELEASE CARPAL TUNNEL ENDOSCOPIC;  Surgeon: Volodymyr Bird MD;  [facility];  Service: Orthopedics    DC WRIST Chyrl Reji LIG Left 1/28/2020    Procedure: RELEASE CARPAL TUNNEL ENDOSCOPIC;  Surgeon: Volodymyr Bird MD;  [facility];  Service: Orthopedics     No Known Allergies    Substance Abuse History:    Social History     Substance and Sexual Activity   Alcohol Use Not Currently    Comment: H/O ETOH abuse in approx 2010--for a period of 2 years  She quit on her own without any h/o withdrawal or rehab  Social History     Substance and Sexual Activity   Drug Use Yes    Types: Marijuana    Comment: Smokes THC several times a day  Also uses CBD OIL at times  Used cocaine and methamphetamine  fairly steadily for a period of approx 1 year approx 36 years ago  Tried LSD, Mushrooms    No h/o rehab Social History:    Social History     Socioeconomic History    Marital status:      Spouse name: Not on file    Number of children: 2    Years of education: Not on file    Highest education level: Not on file   Occupational History    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     Comment: from 2000 - 2012, then again in 2017 but had to leave both times due to emotional reasons   Tobacco Use    Smoking status: Current Every Day Smoker     Packs/day: 0 50     Years: 42 00     Pack years: 21 00     Types: Cigarettes     Start date: 1979    Smokeless tobacco: Never Used   Vaping Use    Vaping Use: Never used   Substance and Sexual Activity    Alcohol use: Not Currently     Comment: H/O ETOH abuse in approx 2010--for a period of 2 years  She quit on her own without any h/o withdrawal or rehab   Drug use: Yes     Types: Marijuana     Comment: Smokes THC several times a day  Also uses CBD OIL at times  Used cocaine and methamphetamine  fairly steadily for a period of approx 1 year approx 36 years ago  Tried LSD, Mushrooms  No h/o rehab    Sexual activity: Yes     Partners: Male   Other Topics Concern    Not on file   Social History Narrative    Home: lives with 2 best friends  Education:    Pt states she always got Ds and Fs in school and skipped classes often, but there were no formally diagnosed learning disabilities  Pt was in alternative schooling  She learned "Nothing" there and they didn't teach much  Pt was then placed back in regular classes but could not keep up  Pt reached childhood milestones on time as far as she knows       Highest grade completed 11th grade    Had some training through [employer] but no actual certificate     Social Determinants of Health     Financial Resource Strain: Not on file   Food Insecurity: Not on file   Transportation Needs: Not on file   Physical Activity: Not on file   Stress: Not on file   Social Connections: Not on file   Intimate Partner Violence: Not on file   Housing Stability: Not on file       Unchanged since last evaluation  Family Psychiatric History:     Unchanged from initial evaluation     History Review: The following portions of the patient's history were reviewed and updated as appropriate: allergies, current medications, past family history, past medical history, past social history, past surgical history and problem list          OBJECTIVE:     Vital signs in last 24 hours: There were no vitals filed for this visit      Mental Status Evaluation:    Appearance age appropriate, casually dressed, looks older than stated age   Behavior pleasant, cooperative, appears anxious   Speech normal rate, normal volume, normal pitch, fluent, clear, coherent, hypertalkative   Mood depressed   Affect mood-congruent   Thought Processes normal rate of thoughts, normal abstract reasoning, circumstantial   Associations circumstantial associations   Thought Content no overt delusions   Perceptual Disturbances: no auditory hallucinations, no visual hallucinations   Abnormal Thoughts  Risk Potential Suicidal ideation - None  Homicidal ideation - None  Potential for aggression - No   Orientation oriented to person, place, time/date and situation   Memory recent and remote memory grossly intact   Consciousness alert and awake   Attention Span Concentration Span attention span and concentration are age appropriate   Intellect appears to be of average intelligence   Insight fair   Judgement fair   Muscle Strength and  Gait normal muscle strength and normal muscle tone, normal gait and normal balance   Motor activity no abnormal movements   Language no difficulty naming common objects, no difficulty repeating a phrase, no difficulty writing a sentence   Fund of Knowledge adequate knowledge of current events  adequate fund of knowledge regarding past history  adequate fund of knowledge regarding vocabulary    Pain none   Pain Scale 0       Laboratory Results: I have personally reviewed all pertinent laboratory/tests results    Most Recent Labs:   Lab Results   Component Value Date    WBC 7 86 09/30/2019    RBC 4 39 09/30/2019    HGB 13 3 09/30/2019    HCT 41 8 09/30/2019     09/30/2019    RDW 14 6 09/30/2019    NEUTROABS 4 66 09/30/2019     06/15/2018    K 4 0 04/09/2021     04/09/2021    CO2 29 04/09/2021    BUN 14 04/09/2021    CREATININE 0 62 04/09/2021    CALCIUM 9 0 04/09/2021    AST 17 04/09/2021    ALT 21 04/09/2021    ALKPHOS 56 04/09/2021    PROT 6 4 06/15/2018    BILITOT 0 3 06/15/2018    CHOLESTEROL 181 09/09/2020    TRIG 31 09/09/2020    HDL 88 09/09/2020    LDLCALC 87 09/09/2020    SXP7IYHIZVTG 1 320 08/20/2020         Suicide/Homicide Risk Assessment:     Risk of Harm to Self:  · The following ratings are based on assessment at the time of the interview  · Demographic risk factors include: ,  status, age: over 48 or older  · Historical Risk Factors include: chronic psychiatric problems, substance use, history of abuse, history of impulsive behaviors, history of traumatic experiences  · Recent Specific Risk Factors include: diagnosis of mood disorder, current anxiety symptoms, passive death wishes  · Protective Factors: no current suicidal ideation, able to manage anger well, access to mental health treatment, being a parent, contact with caregivers, cultural beliefs discouraging suicide, good health, having a desire to live, healthy fear of risky behaviors and pain, restricted access to lethal means  · Weapons: none  The following steps have been taken to ensure weapons are properly secured: not applicable  · Based on today's assessment, Marie presents the following risk of harm to self: low  · Upon direct inquiry denies suicidal ideation at this time      Risk of Harm to Others:  · The following ratings are based on assessment at the time of the interview  · Demographic Risk Factors include: unemployed    · Historical Risk Factors include: none  · Recent Specific Risk Factors include: noncompliance with treatment, abusing substances, concomitant mood disorder, multiple stressors  · Protective Factors: no current homicidal ideation, ability to adapt to change, being a parent, compliant with mental health treatment, contact with caregivers, cultural beliefs, moral system, personal beliefs, resilience, restricted access to lethal means, safe and stable living environment  · Weapons: none  The following steps have been taken to ensure weapons are properly secured: not applicable  · Based on today's assessment, Marie presents the following risk of harm to others: low     The following interventions are recommended: contracts for safety at present - agrees to go to ED if feeling unsafe, contracts for safety at present - agrees to call Crisis Intervention Service if feeling unsafe  Although patient's acute lethality risk is LOW, long-term/chronic lethality risk is mildly elevated given multiple stressors, financial strain and being isolated from her family  In addition, she presents mood d/o and marked anxiety, and low self esteem  However, at the current moment, Marie is future-oriented, forward-thinking, and demonstrates ability to act in a self-preserving manner as evidenced by volitionally presenting to the clinic today, seeking treatment  Additionally, Marie sits throughout the assessment wearing personal protective gear (ie mask) in the context of an ongoing viral pandemic, suggesting a will and desire to live  At this juncture, inpatient hospitalization is not currently warranted   To mitigate future risk, patient should adhere to treatment recommendations, avoid alcohol/illicit substance use, utilize community-based resources and familiar support, and prioritize mental health treatment          DSM-5 Diagnoses:      1 ) Mood disorder, major depressive disorder, severe, recurrent  2 ) Anxiety generalized  3 ) PTSD  4 ) cluster B traits  5 ) THC use      Assessment/Plan:     Da Rea a 49 y  o  female with a past psychiatric history significant for Anxiety, Bipolar d/o, OCD, eating disorder and PTSD, who was under the care of a psychiatrist 5-8 years ago  She  1,5 years ago after being  for 45 years  Patient has Cluster B personality traits evidenced on our first visit  Patient states continues using THC daily  She denies alcohol in 4 years      Patient was started on Lamictal, and started on Risperidone 0 25 mg BID  Patient was offered Prazosin, however, was resistant to being on medications  After inquiring about BZD, she was clarified that we would not support BZD prescriptions treatment for her at this time  Patient's Risperidone was optimized, and patient was started on Fluoxetine for mood and anxiety      Patient reports is on the following medications:  - Gabapentin: 100 mg Qam and 200 mg QHS for neuropathy  - Lamictal: 200 mg/d  - Risperidone: 1 mg QHS  - Prozac: 10 mg/d     She was recommended ot pursue DBT, which she completed with Sabino Alvarenga  She reports she has increased sleep and increased appetite  She reports is feeling depressed since yesterday  She relays she ruminates over being estranged from her daughter and grandchildren  She also often thinks about the loss of her son, who  in a MVA in   In addition, her G-dmother  a few weeks ago  Patient reports she struggles with thoughts of being estranged from her daughter and grandkids, especially during the holiday season, such as Easter, Thanksgiving, [place], etc  She constantly worries about "getting worse in summertime because I used ot spend so much time with them, and now I am not allowed to see them, my ex- is saying horrible things about me to my daughter"  Patient reports passive death wishes, but states she would "never do it" and that "I am scared to die" and "I don't want to die"      On direct inquiry, patient denies suicidal ideation since last time seen in the office  Patient denies symptoms consistent with dashawn, panic attacks or PTSD  Treatment Recommendations/Precautions:    Patient was discussed and visited with Dr Evelin Marsh  Will increase Fluoxetine to 20 mg/d to address mood, anxiety, PTSD and reassured patient that Fluoxetine has the most weight neurtral properties  PARQ completed including serotonin syndrome, SIADH, worsening depression, suicidality, induction of dashawn, GI upset, headaches, activation, sexual side effects, sedation, potential drug interactions, and others  Will continue Lamictal at 200 mg/d  Lamotrigine PARQ completed including dizziness, headaches, ataxia, vision problems, somnolence, sleep changes, cognitive difficulties, rash (including Smart-Eugene rash), and others, risk of teratogenicity for females  Will increase Risperidone to 1 mg QHS  PARQ completed including sedation, agitation, akathisia, TD, dystonic reactions, NMS, EPS, GI distress, dizziness, risk of metabolic syndrome, orthostatic hypotension and cardiovascular risks such as QT prolongation, increased prolactin  Discussed at length the influence of THC and mood and anxiety, as well as it's psychotic properties  Encouraged patient to discontinue THC use  Patient was not receptive to education at this time        Patient was offered PHP referral, which patient states she will think about  Staff message sent Alem Alfaro for referral     Discussed safety plan at length          Medication management every 6 weeks  Referral for individual psychotherapy  No longer sees a therapist  Follows with family physician for glucose and lipid monitoring due to current therapy with antipsychotic medication  Aware of 24 hour and weekend coverage for urgent situations accessed by calling [facility] main practice number  Referral to Partial Hospitalization Program    Medications Risks/Benefits      Risks, Benefits And Possible Side Effects Of Medications:    Risks, benefits, and possible side effects of medications explained to Jacob and she verbalizes understanding and agreement for treatment  Controlled Medication Discussion:     Not applicable    Psychotherapy Provided:     Individual psychotherapy provided: Yes     Treatment Plan:    Completed and signed during the session: Not applicable - Treatment Plan not due at this session    Note Share Disclaimer:      This note was not shared with the patient due to this is a psychotherapy note    Micah Vega MD 04/27/22

## 2022-04-27 ENCOUNTER — OFFICE VISIT (OUTPATIENT)
Dept: PSYCHIATRY | Facility: CLINIC | Age: 57
End: 2022-04-27

## 2022-04-27 DIAGNOSIS — F41.1 GENERALIZED ANXIETY DISORDER: ICD-10-CM

## 2022-04-27 DIAGNOSIS — F31.81 MODERATE MIXED BIPOLAR II DISORDER (HCC): Primary | ICD-10-CM

## 2022-04-27 DIAGNOSIS — F43.10 PTSD (POST-TRAUMATIC STRESS DISORDER): ICD-10-CM

## 2022-04-27 RX ORDER — FLUOXETINE HYDROCHLORIDE 20 MG/1
20 CAPSULE ORAL DAILY
Qty: 30 CAPSULE | Refills: 2 | Status: SHIPPED | OUTPATIENT
Start: 2022-04-27 | End: 2022-07-26 | Stop reason: SDUPTHER

## 2022-04-28 ENCOUNTER — TELEPHONE (OUTPATIENT)
Dept: PSYCHIATRY | Facility: CLINIC | Age: 57
End: 2022-04-28

## 2022-04-28 ENCOUNTER — TELEPHONE (OUTPATIENT)
Dept: PSYCHOLOGY | Facility: CLINIC | Age: 57
End: 2022-04-28

## 2022-04-28 ENCOUNTER — TELEPHONE (OUTPATIENT)
Dept: FAMILY MEDICINE CLINIC | Facility: CLINIC | Age: 57
End: 2022-04-28

## 2022-04-28 DIAGNOSIS — M79.89 LEG SWELLING: Primary | ICD-10-CM

## 2022-04-28 NOTE — TELEPHONE ENCOUNTER
Tried to call patient to offer PHP but noct picking up and no voicemail option  Can try later      Corrinne April

## 2022-04-28 NOTE — TELEPHONE ENCOUNTER
Spoke with patient  She does have some leg swelling  She was sitting with legs crossed and can still see the in dent  SOB is slightly worse then normal but she thinks it is anxiety  Haven recommends elevating legs, drinking water, avoiding salt and having labs done tomorrow  Notified patient  She verbalized understanding

## 2022-04-29 ENCOUNTER — APPOINTMENT (OUTPATIENT)
Dept: LAB | Facility: CLINIC | Age: 57
End: 2022-04-29
Payer: COMMERCIAL

## 2022-04-29 ENCOUNTER — DOCUMENTATION (OUTPATIENT)
Dept: BEHAVIORAL/MENTAL HEALTH CLINIC | Facility: CLINIC | Age: 57
End: 2022-04-29

## 2022-04-29 DIAGNOSIS — F31.61 BIPOLAR DISORDER, CURRENT EPISODE MIXED, MILD (HCC): ICD-10-CM

## 2022-04-29 DIAGNOSIS — F41.1 GENERALIZED ANXIETY DISORDER: ICD-10-CM

## 2022-04-29 DIAGNOSIS — R73.03 PRE-DIABETES: ICD-10-CM

## 2022-04-29 DIAGNOSIS — M79.89 LEG SWELLING: ICD-10-CM

## 2022-04-29 LAB
25(OH)D3 SERPL-MCNC: 50.4 NG/ML (ref 30–100)
ALBUMIN SERPL BCP-MCNC: 3.5 G/DL (ref 3.5–5)
ALP SERPL-CCNC: 53 U/L (ref 46–116)
ALT SERPL W P-5'-P-CCNC: 23 U/L (ref 12–78)
ANION GAP SERPL CALCULATED.3IONS-SCNC: 1 MMOL/L (ref 4–13)
AST SERPL W P-5'-P-CCNC: 14 U/L (ref 5–45)
BILIRUB SERPL-MCNC: 0.25 MG/DL (ref 0.2–1)
BUN SERPL-MCNC: 16 MG/DL (ref 5–25)
CALCIUM SERPL-MCNC: 8.9 MG/DL (ref 8.3–10.1)
CHLORIDE SERPL-SCNC: 111 MMOL/L (ref 100–108)
CHOLEST SERPL-MCNC: 175 MG/DL
CO2 SERPL-SCNC: 28 MMOL/L (ref 21–32)
CREAT SERPL-MCNC: 0.7 MG/DL (ref 0.6–1.3)
EST. AVERAGE GLUCOSE BLD GHB EST-MCNC: 117 MG/DL
GFR SERPL CREATININE-BSD FRML MDRD: 98 ML/MIN/1.73SQ M
GLUCOSE P FAST SERPL-MCNC: 104 MG/DL (ref 65–99)
HBA1C MFR BLD: 5.7 %
HDLC SERPL-MCNC: 67 MG/DL
LDLC SERPL CALC-MCNC: 96 MG/DL (ref 0–100)
NONHDLC SERPL-MCNC: 108 MG/DL
NT-PROBNP SERPL-MCNC: 107 PG/ML
POTASSIUM SERPL-SCNC: 3.9 MMOL/L (ref 3.5–5.3)
PROT SERPL-MCNC: 6.4 G/DL (ref 6.4–8.2)
SODIUM SERPL-SCNC: 140 MMOL/L (ref 136–145)
TRIGL SERPL-MCNC: 60 MG/DL
TSH SERPL DL<=0.05 MIU/L-ACNC: 2.32 UIU/ML (ref 0.45–4.5)

## 2022-04-29 PROCEDURE — 80061 LIPID PANEL: CPT

## 2022-04-29 PROCEDURE — 36415 COLL VENOUS BLD VENIPUNCTURE: CPT

## 2022-04-29 PROCEDURE — 84443 ASSAY THYROID STIM HORMONE: CPT

## 2022-04-29 PROCEDURE — 83880 ASSAY OF NATRIURETIC PEPTIDE: CPT

## 2022-04-29 PROCEDURE — 83036 HEMOGLOBIN GLYCOSYLATED A1C: CPT

## 2022-04-29 PROCEDURE — 80053 COMPREHEN METABOLIC PANEL: CPT

## 2022-04-29 PROCEDURE — 82306 VITAMIN D 25 HYDROXY: CPT

## 2022-04-29 NOTE — PROGRESS NOTES
Bekah Bronson was given calls, yesterday and today, to attempt to contact her and to inform her that she still has appointments within her [facility] Program  Her phone is not set up to receive calls  The message said  Bekah Audie refused Partial MH programs several times after strong recommendations and notes to her PCP after Patient's permission

## 2022-05-02 ENCOUNTER — TELEPHONE (OUTPATIENT)
Dept: FAMILY MEDICINE CLINIC | Facility: CLINIC | Age: 57
End: 2022-05-02

## 2022-05-02 NOTE — TELEPHONE ENCOUNTER
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

## 2022-05-03 NOTE — TELEPHONE ENCOUNTER
We need more information and need to know why she is requesting disability  I also need to discuss this paperwork with Shawn Henderson and see if she will complete it  Attempted to contact patient  No answer and VM not set up  Form placed in [place]'s folder at [facility]

## 2022-05-04 ENCOUNTER — TELEPHONE (OUTPATIENT)
Dept: FAMILY MEDICINE CLINIC | Facility: CLINIC | Age: 57
End: 2022-05-04

## 2022-05-04 ENCOUNTER — TELEMEDICINE (OUTPATIENT)
Dept: BEHAVIORAL/MENTAL HEALTH CLINIC | Facility: CLINIC | Age: 57
End: 2022-05-04
Payer: COMMERCIAL

## 2022-05-04 DIAGNOSIS — F31.81 MODERATE MIXED BIPOLAR II DISORDER (HCC): Primary | ICD-10-CM

## 2022-05-04 DIAGNOSIS — F43.10 PTSD (POST-TRAUMATIC STRESS DISORDER): ICD-10-CM

## 2022-05-04 DIAGNOSIS — F41.1 GENERALIZED ANXIETY DISORDER: ICD-10-CM

## 2022-05-04 PROCEDURE — 90834 PSYTX W PT 45 MINUTES: CPT | Performed by: COUNSELOR

## 2022-05-04 NOTE — PSYCH
Virtual Regular Visit    Verification of patient location:    Patient is located in the following state in which I hold an active license [place]      Assessment/Plan:    Problem List Items Addressed This Visit        Other    Moderate mixed bipolar II disorder (HCC) - Primary    Generalized anxiety disorder    PTSD (post-traumatic stress disorder)          Goals addressed in session: Goal 1 and Goal 2          Reason for visit is No chief complaint on file  [facility]    Provider located at [address] [place] [id number] [phone]      Recent Visits  No visits were found meeting these conditions  Showing recent visits within past 7 days and meeting all other requirements  Future Appointments  No visits were found meeting these conditions  Showing future appointments within next 150 days and meeting all other requirements       The patient was identified by name and date of birth  Niurka Rao was informed that this is a telemedicine visit and that the visit is being conducted throughMicrosoft Teams and patient was informed that this is a secure, HIPAA-compliant platform  She agrees to proceed     My office door was closed  No one else was in the room  She acknowledged consent and understanding of privacy and security of the video platform  The patient has agreed to participate and understands they can discontinue the visit at any time  Patient is aware this is a billable service  Subjective  Niurka Rao is a 49 y o  female  Data: Caleb Bustillo started earlier her session and was welcomed back after misunderstanding that she completed her program  She conveyed recognition of the issue and shared her experience from the last two weeks   She then documented the next designated portion of the psychoeducation - IMPROVE acronym and Pros&Cons crisis survival skill were introduced and was observed taking notes  Assessment: Guy Collazo was talkative, a bit defensive yet understanding and managed to listen and follow up  She had very relevant questions and understanding after feedback and more information with examples that helped her with insight  Plan: Guy Collazo will attend the last individual session next week to receive another skills from Distress Tolerance (Acceptance & Turning the Mind) and the Intro to Emotion Regulation along with PLEASE acronym and Acting Opposite  HPI     Past Medical History:   Diagnosis Date    Bilateral carpal tunnel syndrome 8/20/2019    Carpal tunnel syndrome of right wrist 10/30/2019    Added automatically from request for surgery [id number]    Hypertension        Past Surgical History:   Procedure Laterality Date    BREAST BIOPSY Left 20yrs ago benign    OOPHORECTOMY Right     MI WRIST Ofelia Rands LIG Right 1/21/2020    Procedure: RELEASE CARPAL TUNNEL ENDOSCOPIC;  Surgeon: Oleksandr Sage MD;  [facility];  Service: Orthopedics    MI WRIST Ofelia Rands LIG Left 1/28/2020    Procedure: RELEASE CARPAL TUNNEL ENDOSCOPIC;  Surgeon: Oleksandr Sage MD;  [facility];  Service: Orthopedics       Current Outpatient Medications   Medication Sig Dispense Refill    albuterol (PROVENTIL HFA,VENTOLIN HFA) 90 mcg/act inhaler inhale 2 puffs by mouth and INTO THE LUNGS every 6 hours IF NEEDED FOR WHEEZING   8 5 g 2    cholecalciferol (VITAMIN D3) 400 units tablet Take 400 Units by mouth daily      FLUoxetine (PROzac) 20 mg capsule Take 1 capsule (20 mg total) by mouth daily 30 capsule 2    fluticasone (FLONASE) 50 mcg/act nasal spray 1 spray into each nostril daily 16 g 2    gabapentin (NEURONTIN) 100 mg capsule take 1 capsule by mouth three times a day 180 capsule 1    lamoTRIgine (LaMICtal) 200 MG tablet Take 1 tablet (200 mg total) by mouth daily 90 tablet 0    levocetirizine (XYZAL) 5 MG tablet Take 1 tablet (5 mg total) by mouth every evening 30 tablet 4    nicotine polacrilex (COMMIT) 4 MG lozenge Apply 1 lozenge (4 mg total) to the mouth or throat as needed for smoking cessation 100 each 5    omeprazole (PriLOSEC) 20 mg delayed release capsule Take 1 capsule (20 mg total) by mouth 2 (two) times a day 60 capsule 5    risperiDONE (RisperDAL) 1 mg tablet Take 1 tablet (1 mg total) by mouth daily at bedtime 30 tablet 1    senna-docusate sodium (SENOKOT-S) 8 6-50 mg per tablet Take 3 tablets by mouth daily at bedtime      Spiriva Respimat 2 5 MCG/ACT AERS inhaler inhale 2 puffs by mouth and INTO THE LUNGS once daily 4 g 5     No current facility-administered medications for this visit  No Known Allergies    Review of Systems    Video Exam    There were no vitals filed for this visit  Physical Exam     I spent 47 minutes directly with the patient during this visit    VIRTUAL VISIT DISCLAIMER    Enrique Murphy verbally agrees to participate in [employer]  Pt is aware that [employer] could be limited without vital signs or the ability to perform a full hands-on physical Olga Thomas understands she or the provider may request at any time to terminate the video visit and request the patient to seek care or treatment in person

## 2022-05-04 NOTE — PROGRESS NOTES
Cardiology Follow Up    Jimmy Arenas  1965  [id number]  [facility]  [facility]  [facility] [address] [place], [place] [address]   [phone]    1  Leg edema     2  Tobacco abuse     3  IFG (impaired fasting glucose)         Interval History:  Ms Jimmy Arenas presents to our office with a  History of jovan LE edema after sitting [facility] style and leaning over  According to Omar Barajas one week ago she was sitting "[facility] style" for a period of time  Omar Barajas went to get up and noted jovan Tibial edema R> L  Edema resolved on its own in 3 days  Omar Barajas admits to palpitations couple of times a week  She denies CP, lightheadedness or dizziness  Medical History  Palpitations  Pre DM 5 7 on 4/29/22 4/29/22 , TG 60, HDL 67, LDL 96  Tobacco abuse 12- 13 cigarettes a day     3/03/20 2 day 3 hours Extended Holter monitor: non sustained PSVT  10/25/21 TTE: LV Cavity size mildly increased wall thickness, low normal to borderline reduced left ventricular systolic function with mild global hypokinesis  LVEF 50%  Normal RV size systolic function  No significant valvular disease    Patient Active Problem List   Diagnosis    Moderate mixed bipolar II disorder (HCC)    Status post left breast lumpectomy    Generalized anxiety disorder    PTSD (post-traumatic stress disorder)    Alcohol abuse, in remission    Mild tetrahydrocannabinol (THC) abuse    Essential hypertension    Other hyperlipidemia    Gastroesophageal reflux disease without esophagitis    Centrilobular emphysema (HCC)    Cigarette nicotine dependence without complication    Pulmonary nodules    COPD with acute exacerbation ([address] )    PND (post-nasal drip)     Past Medical History:   Diagnosis Date    Bilateral carpal tunnel syndrome 8/20/2019    Carpal tunnel syndrome of right wrist 10/30/2019    Added automatically from request for surgery [id number]    Hypertension Social History     Socioeconomic History    Marital status:      Spouse name: Not on file    Number of children: 2    Years of education: Not on file    Highest education level: Not on file   Occupational History    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     Comment: from 2000 - 2012, then again in 2017 but had to leave both times due to emotional reasons   Tobacco Use    Smoking status: Current Every Day Smoker     Packs/day: 0 50     Years: 42 00     Pack years: 21 00     Types: Cigarettes     Start date: 1979    Smokeless tobacco: Never Used   Vaping Use    Vaping Use: Never used   Substance and Sexual Activity    Alcohol use: Not Currently     Comment: H/O ETOH abuse in approx 2010--for a period of 2 years  She quit on her own without any h/o withdrawal or rehab   Drug use: Yes     Types: Marijuana     Comment: Smokes THC several times a day  Also uses CBD OIL at times  Used cocaine and methamphetamine  fairly steadily for a period of approx 1 year approx 36 years ago  Tried LSD, Mushrooms  No h/o rehab    Sexual activity: Yes     Partners: Male   Other Topics Concern    Not on file   Social History Narrative    Home: lives with 2 best friends  Education:    Pt states she always got Ds and Fs in school and skipped classes often, but there were no formally diagnosed learning disabilities  Pt was in alternative schooling  She learned "Nothing" there and they didn't teach much  Pt was then placed back in regular classes but could not keep up  Pt reached childhood milestones on time as far as she knows       Highest grade completed 11th grade    Had some training through [employer] [facility] [employer] [facility] but no actual certificate     Social Determinants of Health     Financial Resource Strain: Not on file   Food Insecurity: Not on file   Transportation Needs: Not on file   Physical Activity: Not on file   Stress: Not on file   Social Connections: Not on file   Intimate Partner Violence: Not on file Housing Stability: Not on file      Family History   Problem Relation Age of Onset    Alcohol abuse Mother     Eating disorder Mother         anorexia and bulimia per Pt    Hodgkin's lymphoma Father     No Known Problems Sister     Colon cancer Maternal Grandmother     Alcohol abuse Maternal Grandfather     Colon cancer Paternal Grandmother     Anxiety disorder Paternal Aunt     Multiple sclerosis Cousin     No Known Problems Daughter     Breast cancer Maternal Aunt     No Known Problems Maternal Aunt     No Known Problems Maternal Aunt     No Known Problems Paternal Aunt      Past Surgical History:   Procedure Laterality Date    BREAST BIOPSY Left 20yrs ago benign    OOPHORECTOMY Right     MN WRIST ARTHROSCOP,RELEASE Niru Chang LIG Right 1/21/2020    Procedure: RELEASE CARPAL TUNNEL ENDOSCOPIC;  Surgeon: Patricia Garcia MD;  [facility];  Service: Orthopedics    MN WRIST Enrique Ha LIG Left 1/28/2020    Procedure: RELEASE CARPAL TUNNEL ENDOSCOPIC;  Surgeon: Patricia Garcia MD;  [facility];  Service: Orthopedics       Current Outpatient Medications:     albuterol (PROVENTIL HFA,VENTOLIN HFA) 90 mcg/act inhaler, inhale 2 puffs by mouth and INTO THE LUNGS every 6 hours IF NEEDED FOR WHEEZING , Disp: 8 5 g, Rfl: 2    cholecalciferol (VITAMIN D3) 400 units tablet, Take 400 Units by mouth daily, Disp: , Rfl:     FLUoxetine (PROzac) 20 mg capsule, Take 1 capsule (20 mg total) by mouth daily, Disp: 30 capsule, Rfl: 2    fluticasone (FLONASE) 50 mcg/act nasal spray, 1 spray into each nostril daily, Disp: 16 g, Rfl: 2    gabapentin (NEURONTIN) 100 mg capsule, take 1 capsule by mouth three times a day, Disp: 180 capsule, Rfl: 1    lamoTRIgine (LaMICtal) 200 MG tablet, Take 1 tablet (200 mg total) by mouth daily, Disp: 90 tablet, Rfl: 0    levocetirizine (XYZAL) 5 MG tablet, Take 1 tablet (5 mg total) by mouth every evening, Disp: 30 tablet, Rfl: 4    nicotine polacrilex (COMMIT) 4 MG lozenge, Apply 1 lozenge (4 mg total) to the mouth or throat as needed for smoking cessation, Disp: 100 each, Rfl: 5    omeprazole (PriLOSEC) 20 mg delayed release capsule, Take 1 capsule (20 mg total) by mouth 2 (two) times a day, Disp: 60 capsule, Rfl: 5    risperiDONE (RisperDAL) 1 mg tablet, Take 1 tablet (1 mg total) by mouth daily at bedtime, Disp: 30 tablet, Rfl: 1    senna-docusate sodium (SENOKOT-S) 8 6-50 mg per tablet, Take 3 tablets by mouth daily at bedtime, Disp: , Rfl:     Spiriva Respimat 2 5 MCG/ACT AERS inhaler, inhale 2 puffs by mouth and INTO THE LUNGS once daily, Disp: 4 g, Rfl: 5  No Known Allergies    Labs:  Appointment on 04/29/2022   Component Date Value    Hemoglobin A1C 04/29/2022 5 7*    EAG 04/29/2022 117     Sodium 04/29/2022 140     Potassium 04/29/2022 3 9     Chloride 04/29/2022 111*    CO2 04/29/2022 28     ANION GAP 04/29/2022 1*    BUN 04/29/2022 16     Creatinine 04/29/2022 0 70     Glucose, Fasting 04/29/2022 104*    Calcium 04/29/2022 8 9     AST 04/29/2022 14     ALT 04/29/2022 23     Alkaline Phosphatase 04/29/2022 53     Total Protein 04/29/2022 6 4     Albumin 04/29/2022 3 5     Total Bilirubin 04/29/2022 0 25     eGFR 04/29/2022 98     TSH 3RD GENERATON 04/29/2022 2 320     Vit D, 25-Hydroxy 04/29/2022 50 4     Cholesterol 04/29/2022 175     Triglycerides 04/29/2022 60     HDL, Direct 04/29/2022 67     LDL Calculated 04/29/2022 96     Non-HDL-Chol (CHOL-HDL) 04/29/2022 108     NT-proBNP 04/29/2022 107      Imaging: No results found  Review of Systems:  Review of Systems   Psychiatric/Behavioral: The patient is nervous/anxious  All other systems reviewed and are negative  Physical Exam:  Physical Exam  Vitals reviewed  Constitutional:       Appearance: Normal appearance  HENT:      Head: Normocephalic  Cardiovascular:      Rate and Rhythm: Regular rhythm  Tachycardia present     Pulmonary:      Effort: Pulmonary effort is normal       Breath sounds: Normal breath sounds  Abdominal:      General: Bowel sounds are normal       Palpations: Abdomen is soft  Musculoskeletal:         General: Normal range of motion  Cervical back: Normal range of motion and neck supple  Right lower leg: No edema  Left lower leg: No edema  Skin:     General: Skin is warm and dry  Capillary Refill: Capillary refill takes less than 2 seconds  Neurological:      General: No focal deficit present  Mental Status: She is alert and oriented to person, place, and time  Psychiatric:         Mood and Affect: Mood normal          Behavior: Behavior normal          Discussion/Summary:  1  Benjamin LE edema reported now resolved on PE   LE edema possibly due to poor circulation sitting [facility] style  Possible eating Pizza the night prior  BP and HR controlled,  LVEF 50% with grade 1 DD  Instructed on a 2gm sodium diet, avoid sitting [facility] style and call our office if benjamin LE edema returns  2  Tobacco abuse 12- 13 cigarettes a day   3   IFG/ Pre DM 5 7 on 4/29/22 instructed to avoid concentrated sweets and limit carbohydrates

## 2022-05-04 NOTE — TELEPHONE ENCOUNTER
Patient returned phone call  Informed we are unable to fill out forms but will be able to send over records  Verbalized understanding

## 2022-05-04 NOTE — TELEPHONE ENCOUNTER
Pt called office requesting review of lab results done 4/29/2022  Pt informed PCP out of office until the morning but will send note

## 2022-05-04 NOTE — TELEPHONE ENCOUNTER
Attempted to contact patient again  No answer and unable to leave voicemail  I will attempt to reach out [email or web address]  We need to inform patient we can send medical records but Melisawaqas Brayden will not be filling out paperwork that was sent over by Hallie Otero as she is  unable to  This paperwork needs to be filled out by a specialist who is able to support diagnosis

## 2022-05-05 ENCOUNTER — OFFICE VISIT (OUTPATIENT)
Dept: CARDIOLOGY CLINIC | Facility: CLINIC | Age: 57
End: 2022-05-05
Payer: COMMERCIAL

## 2022-05-05 VITALS
DIASTOLIC BLOOD PRESSURE: 86 MMHG | BODY MASS INDEX: 24.55 KG/M2 | HEIGHT: 61 IN | WEIGHT: 130 LBS | SYSTOLIC BLOOD PRESSURE: 141 MMHG | HEART RATE: 102 BPM | OXYGEN SATURATION: 99 %

## 2022-05-05 DIAGNOSIS — R73.01 IFG (IMPAIRED FASTING GLUCOSE): ICD-10-CM

## 2022-05-05 DIAGNOSIS — Z72.0 TOBACCO ABUSE: ICD-10-CM

## 2022-05-05 DIAGNOSIS — R60.0 LEG EDEMA: Primary | ICD-10-CM

## 2022-05-05 PROCEDURE — 99215 OFFICE O/P EST HI 40 MIN: CPT | Performed by: NURSE PRACTITIONER

## 2022-05-05 NOTE — TELEPHONE ENCOUNTER
Records request was sent to [facility] 5/1/22

## 2022-05-09 ENCOUNTER — TELEPHONE (OUTPATIENT)
Dept: CARDIOLOGY CLINIC | Facility: CLINIC | Age: 57
End: 2022-05-09

## 2022-05-09 NOTE — TELEPHONE ENCOUNTER
Pt called on Friday 5/6/22 asking if it was ok for her to take   D-Ribos supplement, advised her that you are back on Monday, and would sent a message  Please advise

## 2022-05-11 ENCOUNTER — TELEMEDICINE (OUTPATIENT)
Dept: BEHAVIORAL/MENTAL HEALTH CLINIC | Facility: CLINIC | Age: 57
End: 2022-05-11
Payer: COMMERCIAL

## 2022-05-11 DIAGNOSIS — F41.1 GENERALIZED ANXIETY DISORDER: ICD-10-CM

## 2022-05-11 DIAGNOSIS — F43.10 PTSD (POST-TRAUMATIC STRESS DISORDER): ICD-10-CM

## 2022-05-11 DIAGNOSIS — F31.81 MODERATE MIXED BIPOLAR II DISORDER (HCC): Primary | ICD-10-CM

## 2022-05-11 PROCEDURE — 90834 PSYTX W PT 45 MINUTES: CPT | Performed by: COUNSELOR

## 2022-05-11 NOTE — PSYCH
Virtual Regular Visit    Verification of patient location:    Patient is located in the following state in which I hold an active license [place]      Assessment/Plan:    Problem List Items Addressed This Visit        Other    Moderate mixed bipolar II disorder (HCC) - Primary    Generalized anxiety disorder    PTSD (post-traumatic stress disorder)          Goals addressed in session: Goal 1          Reason for visit is No chief complaint on file  Encounter provider Cecy Salguero [facility]    Provider located at [address] [place] [id number] [phone]      Recent Visits  Date Type Provider Dept   05/04/22 [address] [address] [facility] Therapist Heriberto   Showing recent visits within past 7 days and meeting all other requirements  Future Appointments  No visits were found meeting these conditions  Showing future appointments within next 150 days and meeting all other requirements       The patient was identified by name and date of birth  Tawny Ivory was informed that this is a telemedicine visit and that the visit is being conducted throughMicrosoft Teams and patient was informed that this is a secure, HIPAA-compliant platform  She agrees to proceed     My office door was closed  No one else was in the room  She acknowledged consent and understanding of privacy and security of the video platform  The patient has agreed to participate and understands they can discontinue the visit at any time  Patient is aware this is a billable service  Subjective  Tawny Ivory is a 49 y o  female  Data: David Reina used Teams link to join her session due to inability to manage the other links and follow the directions to use them   David Reina discussed using the introduced skills and received the last sets of skills from Distress Tolerance and Emotion Regulation modules designated for the individual part of the program  She documented Radical Acceptance,     Assessment: Marino Bumpers appeared and two skills to help using it as well as PLEASE from Emotion Regulation along with Acting Opposite  Plan: Marino Bumpers wants to stay and work on her emotions through regular therapy and won't join any group sessions due to feeling uncomfortable among others  She will work on a new [place] and goals twice a week for some time  HPI     Past Medical History:   Diagnosis Date    Bilateral carpal tunnel syndrome 8/20/2019    Carpal tunnel syndrome of right wrist 10/30/2019    Added automatically from request for surgery [id number]    Hypertension        Past Surgical History:   Procedure Laterality Date    BREAST BIOPSY Left 20yrs ago benign    OOPHORECTOMY Right     VT WRIST Mikael Heritage LIG Right 1/21/2020    Procedure: RELEASE CARPAL TUNNEL ENDOSCOPIC;  Surgeon: Fady Baugh MD;  [facility];  Service: Orthopedics    VT WRIST [place] Heritage LIG Left 1/28/2020    Procedure: RELEASE CARPAL TUNNEL ENDOSCOPIC;  Surgeon: Fady Baugh MD;  [facility];  Service: Orthopedics       Current Outpatient Medications   Medication Sig Dispense Refill    albuterol (PROVENTIL HFA,VENTOLIN HFA) 90 mcg/act inhaler inhale 2 puffs by mouth and INTO THE LUNGS every 6 hours IF NEEDED FOR WHEEZING   8 5 g 2    cholecalciferol (VITAMIN D3) 400 units tablet Take 400 Units by mouth daily      FLUoxetine (PROzac) 20 mg capsule Take 1 capsule (20 mg total) by mouth daily 30 capsule 2    fluticasone (FLONASE) 50 mcg/act nasal spray 1 spray into each nostril daily 16 g 2    gabapentin (NEURONTIN) 100 mg capsule take 1 capsule by mouth three times a day 180 capsule 1    lamoTRIgine (LaMICtal) 200 MG tablet Take 1 tablet (200 mg total) by mouth daily 90 tablet 0    levocetirizine (XYZAL) 5 MG tablet Take 1 tablet (5 mg total) by mouth every evening 30 tablet 4    nicotine polacrilex (COMMIT) 4 MG lozenge Apply 1 lozenge (4 mg total) to the mouth or throat as needed for smoking cessation (Patient not taking: Reported on 5/5/2022 ) 100 each 5    omeprazole (PriLOSEC) 20 mg delayed release capsule Take 1 capsule (20 mg total) by mouth 2 (two) times a day 60 capsule 5    risperiDONE (RisperDAL) 1 mg tablet Take 1 tablet (1 mg total) by mouth daily at bedtime 30 tablet 1    senna-docusate sodium (SENOKOT-S) 8 6-50 mg per tablet Take 3 tablets by mouth as needed        Spiriva Respimat 2 5 MCG/ACT AERS inhaler inhale 2 puffs by mouth and INTO THE LUNGS once daily 4 g 5     No current facility-administered medications for this visit  No Known Allergies    Review of Systems    Video Exam    There were no vitals filed for this visit  Physical Exam     I spent 45 minutes directly with the patient during this visit    VIRTUAL VISIT DISCLAIMER    Chelsy Ac verbally agrees to participate in [employer]  Pt is aware that [employer] could be limited without vital signs or the ability to perform a full hands-on physical Saadia Cljanuary understands she or the provider may request at any time to terminate the video visit and request the patient to seek care or treatment in person

## 2022-05-15 DIAGNOSIS — F31.61 BIPOLAR DISORDER, CURRENT EPISODE MIXED, MILD (HCC): ICD-10-CM

## 2022-05-16 ENCOUNTER — TELEPHONE (OUTPATIENT)
Dept: PSYCHIATRY | Facility: CLINIC | Age: 57
End: 2022-05-16

## 2022-05-16 RX ORDER — RISPERIDONE 1 MG/1
TABLET, FILM COATED ORAL
Qty: 30 TABLET | Refills: 1 | Status: SHIPPED | OUTPATIENT
Start: 2022-05-16 | End: 2022-07-14

## 2022-05-16 NOTE — TELEPHONE ENCOUNTER
Medical record request was received from [address] [place] [address] for time frame of 10/1/2021 to present  Will be placed in resident mailbox by the end of the day to be completed by Dr Eder Arenas

## 2022-05-19 ENCOUNTER — TELEMEDICINE (OUTPATIENT)
Dept: BEHAVIORAL/MENTAL HEALTH CLINIC | Facility: CLINIC | Age: 57
End: 2022-05-19
Payer: COMMERCIAL

## 2022-05-19 DIAGNOSIS — F31.81 MODERATE MIXED BIPOLAR II DISORDER (HCC): Primary | ICD-10-CM

## 2022-05-19 DIAGNOSIS — F41.1 GENERALIZED ANXIETY DISORDER: ICD-10-CM

## 2022-05-19 DIAGNOSIS — F43.10 PTSD (POST-TRAUMATIC STRESS DISORDER): ICD-10-CM

## 2022-05-19 PROCEDURE — 90834 PSYTX W PT 45 MINUTES: CPT | Performed by: COUNSELOR

## 2022-05-19 NOTE — PSYCH
Virtual Regular Visit    Verification of patient location:    Patient is located in the following state in which I hold an active license [place]      Assessment/Plan:    Problem List Items Addressed This Visit        Other    Moderate mixed bipolar II disorder (HCC) - Primary    Generalized anxiety disorder    PTSD (post-traumatic stress disorder)          Goals addressed in session: Goal 1          Reason for visit is No chief complaint on file  [facility]    Provider located at [address] [place] [address] Alysha Teresa [facility] [id number] [phone]      Recent Visits  No visits were found meeting these conditions  Showing recent visits within past 7 days and meeting all other requirements  Today's Visits  Date Type Provider Dept   05/19/22 229 [facility] today's visits and meeting all other requirements  Future Appointments  No visits were found meeting these conditions  Showing future appointments within next 150 days and meeting all other requirements       The patient was identified by name and date of birth  Jacky Rascon was informed that this is a telemedicine visit and that the visit is being conducted throughMicrosoft Teams and patient was informed that this is a secure, HIPAA-compliant platform  She agrees to proceed     My office door was closed  No one else was in the room  She acknowledged consent and understanding of privacy and security of the video platform  The patient has agreed to participate and understands they can discontinue the visit at any time  Patient is aware this is a billable service  Subjective  Jacky Rascon is a 49 y o  female  Data: Kandice Perla battled again with [employer] links and could not make it  She got upset again, like before, and was offered the TEAMS link   She could not not make her audio to work and used her phone for audio  She discussed her ex-susband's birthday that brought her turmoil  She then discussed her new [place]  Person-Centered, CBT, and DBT-based techniques were used throughout the session to support Pt's effort to plan her new therapeutic goals  Assessment: Danitza Ramsay appeared again very emotional but managed to listen and to focus on positive things- the friends that have taken her in and provide a roof over her head  Plan: Danitza Ramsay agrees to work on her emotions through thought replacement and positive self-affirmations  She will communicate with friends on a daily basis and concentrate on intresting stories that keep her focused on things she likes  She will continue trying to use her Core Mindfulness skills to separate from emotions when possible  Psychotherapy Provided: Individual Psychotherapy 42 minutes     Length of time in session: 42 minutes, follow up in 1 week    Encounter Diagnosis     ICD-10-CM    1  Moderate mixed bipolar II disorder (HCC)  F31 81    2  PTSD (post-traumatic stress disorder)  F43 10    3  Generalized anxiety disorder  F41 1        Goals addressed in session: Goal 1 and Goal 2     Pain:      none    0    Current suicide risk : Low       Behavioral Health Treatment Plan [facility]: Diagnosis and Treatment Plan explained to Mallory Lee relates understanding diagnosis and is agreeable to Treatment Plan  Yes      Date of Initial Treatment Plan: 3/23/22   Date of Current Treatment Plan: 5/19/22     Treatment Plan Number 2      Strengths/Personal Resources for Self Care: Danitza Ramsay is willing to continue working on reducing anxiety and depression, as results from her traumatic past and current family estrangements        Diagnosis:   1  Moderate mixed bipolar II disorder (HCC)      2  Generalized anxiety disorder      3  PTSD (post-traumatic stress disorder)      4  Alcohol abuse, in remission      5   Cigarette nicotine dependence without complication            Area of Needs: Mood        Long Term Goal 1: Adecrease depression to better daily functioning, rate through [facility] monthly for evidence of symptom change      Target Date: Nov 18, 2022   Completion Date: TBD         Short Term Objectives for Goal 1: Rolando more coping skills from CBT and DBT to build on her learned skills, responsible for which is Marie, Psychiatrist, and Therapist      Long Term Goal 2: decrease anxiety to better daily functioning, rate thorugh GAD7 monthly for evidence of symptom change      Target Date: Nov 18, 2022  Completion Date: as TBD     Short Term Objectives for Goal 2: Brigidaarkiersten more coping skills to handle emotions from CBT and DBT, repsonsible for which is Marie, Psychiatrist, and Therapist           Long Term Goal # 3: N/A      Target Date: N/A  Completion Date: N/A     Short Term Objectives for Goal 3: N/A     GOAL 1: Modality: Individual 4x per month   Completion Date TBD     GOAL 2: Modality: Individual 4x per month   Completion Date TBD      GOAL 3: Modality: Individual NAx per month   Completion Date BRANDO Driver, 1965,   Psychotherapy Provided: Individual Psychotherapy 44 minutes     Length of time in session: 44 minutes, follow up in 1 week    Encounter Diagnosis     ICD-10-CM    1  Moderate mixed bipolar II disorder (HCC)  F31 81    2  PTSD (post-traumatic stress disorder)  F43 10    3  Generalized anxiety disorder  F41 1        Goals addressed in session: Goal 1     Pain:      none    0    Current suicide risk : Low     Behavioral Health Treatment Plan [facility]: Diagnosis and Treatment Plan explained to [facility] relates understanding diagnosis and is agreeable to Treatment Plan  Yes provided her verbal agreement to this [address] at 10:49 am, on May 19, 2022, due to virtual format         HPI     Past Medical History:   Diagnosis Date    Bilateral carpal tunnel syndrome 8/20/2019    Carpal tunnel syndrome of right wrist 10/30/2019    Added automatically from request for surgery [id number]    Hypertension        Past Surgical History:   Procedure Laterality Date    BREAST BIOPSY Left 20yrs ago benign    OOPHORECTOMY Right     [place] WRIST Ebbie Aw LIG Right 1/21/2020    Procedure: RELEASE CARPAL TUNNEL ENDOSCOPIC;  Surgeon: Dash Ramirez MD;  [facility];  Service: Orthopedics    [place] WRIST Ebbie Aw LIG Left 1/28/2020    Procedure: RELEASE CARPAL TUNNEL ENDOSCOPIC;  Surgeon: Dash Ramirez MD;  [facility];  Service: Orthopedics       Current Outpatient Medications   Medication Sig Dispense Refill    albuterol (PROVENTIL HFA,VENTOLIN HFA) 90 mcg/act inhaler inhale 2 puffs by mouth and INTO THE LUNGS every 6 hours IF NEEDED FOR WHEEZING   8 5 g 2    cholecalciferol (VITAMIN D3) 400 units tablet Take 400 Units by mouth daily      FLUoxetine (PROzac) 20 mg capsule Take 1 capsule (20 mg total) by mouth daily 30 capsule 2    fluticasone (FLONASE) 50 mcg/act nasal spray 1 spray into each nostril daily 16 g 2    gabapentin (NEURONTIN) 100 mg capsule take 1 capsule by mouth three times a day 180 capsule 1    lamoTRIgine (LaMICtal) 200 MG tablet Take 1 tablet (200 mg total) by mouth daily 90 tablet 0    levocetirizine (XYZAL) 5 MG tablet Take 1 tablet (5 mg total) by mouth every evening 30 tablet 4    nicotine polacrilex (COMMIT) 4 MG lozenge Apply 1 lozenge (4 mg total) to the mouth or throat as needed for smoking cessation (Patient not taking: Reported on 5/5/2022 ) 100 each 5    omeprazole (PriLOSEC) 20 mg delayed release capsule Take 1 capsule (20 mg total) by mouth 2 (two) times a day 60 capsule 5    risperiDONE (RisperDAL) 1 mg tablet take 1 tablet by mouth at bedtime 30 tablet 1    senna-docusate sodium (SENOKOT-S) 8 6-50 mg per tablet Take 3 tablets by mouth as needed        Spiriva Respimat 2 5 MCG/ACT AERS inhaler inhale 2 puffs by mouth and INTO THE LUNGS once daily 4 g 5     No current facility-administered medications for this visit  No Known Allergies    Review of Systems    Video Exam    There were no vitals filed for this visit  Physical Exam     I spent 42 minutes directly with the patient during this visit    VIRTUAL VISIT DISCLAIMER    Jennifer Demetrius verbally agrees to participate in [employer]  Pt is aware that [employer] could be limited without vital signs or the ability to perform a full hands-on physical Montgomery Tameka understands she or the provider may request at any time to terminate the video visit and request the patient to seek care or treatment in person

## 2022-05-24 ENCOUNTER — APPOINTMENT (OUTPATIENT)
Dept: RADIOLOGY | Facility: CLINIC | Age: 57
End: 2022-05-24
Payer: COMMERCIAL

## 2022-05-24 DIAGNOSIS — G89.29 CHRONIC PAIN OF RIGHT KNEE: ICD-10-CM

## 2022-05-24 DIAGNOSIS — M54.2 NECK PAIN: ICD-10-CM

## 2022-05-24 DIAGNOSIS — M25.561 CHRONIC PAIN OF RIGHT KNEE: ICD-10-CM

## 2022-05-24 PROCEDURE — 72050 X-RAY EXAM NECK SPINE 4/5VWS: CPT

## 2022-05-24 PROCEDURE — 73562 X-RAY EXAM OF KNEE 3: CPT

## 2022-05-25 ENCOUNTER — TELEMEDICINE (OUTPATIENT)
Dept: BEHAVIORAL/MENTAL HEALTH CLINIC | Facility: CLINIC | Age: 57
End: 2022-05-25
Payer: COMMERCIAL

## 2022-05-25 DIAGNOSIS — F31.81 MODERATE MIXED BIPOLAR II DISORDER (HCC): Primary | ICD-10-CM

## 2022-05-25 DIAGNOSIS — F41.1 GENERALIZED ANXIETY DISORDER: ICD-10-CM

## 2022-05-25 DIAGNOSIS — F43.10 PTSD (POST-TRAUMATIC STRESS DISORDER): ICD-10-CM

## 2022-05-25 PROCEDURE — 90834 PSYTX W PT 45 MINUTES: CPT | Performed by: COUNSELOR

## 2022-05-25 NOTE — PSYCH
Virtual Regular Visit    Verification of patient location:    Patient is located in the following state in which I hold an active license [place]      Assessment/Plan:    Problem List Items Addressed This Visit        Other    Moderate mixed bipolar II disorder (HCC) - Primary    Generalized anxiety disorder    PTSD (post-traumatic stress disorder)          Goals addressed in session: Goal 1 and Goal 2          Reason for visit is No chief complaint on file  Encounter provider Sabino Alvarenga [facility]    Provider located at [address] [place] [id number] [phone]      Recent Visits  Date Type Provider Dept   05/19/22 229 [place] [address] [facility]   Showing recent visits within past 7 days and meeting all other requirements  Future Appointments  No visits were found meeting these conditions  Showing future appointments within next 150 days and meeting all other requirements       The patient was identified by name and date of birth  Niurka Roa was informed that this is a telemedicine visit and that the visit is being conducted throughEpic Embedded and patient was informed this is a secure, HIPAA-complaint platform  She agrees to proceed     My office door was closed  No one else was in the room  She acknowledged consent and understanding of privacy and security of the video platform  The patient has agreed to participate and understands they can discontinue the visit at any time  Patient is aware this is a billable service  Subjective  Niurka Rao is a 49 y o  female      Data: Caleb Bustilol discussed her conflict about her daughter and ex - she shared that she wants "this resolved" and "cannot talk about this" due to being very upset  Person-Centered, DBT-based, and choice/control methods were used to encourage Pt's effort to think outside her habits and routine  Assessment: Tiara Ross appeared talkative, emotional, and attentive  She maintained sufficient eye contact  Tiara Ross was upset with accessing her traumatic memories at first but after education about muscle relaxation and pairing it with traumatic memories  Plan: Tiara Ross will attend weekly therapy and share her feelings, express her anger, and process her conflicts  She will use muscle relaxation before accessing her traumatic memories, so that her nervous system can learn a different approach that would provide a better physical response to trauma  HPI     Past Medical History:   Diagnosis Date    Bilateral carpal tunnel syndrome 8/20/2019    Carpal tunnel syndrome of right wrist 10/30/2019    Added automatically from request for surgery [id number]    Hypertension        Past Surgical History:   Procedure Laterality Date    BREAST BIOPSY Left 20yrs ago benign    OOPHORECTOMY Right     [place] WRIST Belinda Eisenmenger LIG Right 1/21/2020    Procedure: RELEASE CARPAL TUNNEL ENDOSCOPIC;  Surgeon: Rhett Carolina MD;  [facility];  Service: Orthopedics    [place] WRIST Belinda Eisenmenger LIG Left 1/28/2020    Procedure: RELEASE CARPAL TUNNEL ENDOSCOPIC;  Surgeon: Rhett Carolina MD;  [facility];  Service: Orthopedics       Current Outpatient Medications   Medication Sig Dispense Refill    albuterol (PROVENTIL HFA,VENTOLIN HFA) 90 mcg/act inhaler inhale 2 puffs by mouth and INTO THE LUNGS every 6 hours IF NEEDED FOR WHEEZING   8 5 g 2    cholecalciferol (VITAMIN D3) 400 units tablet Take 400 Units by mouth daily      FLUoxetine (PROzac) 20 mg capsule Take 1 capsule (20 mg total) by mouth daily 30 capsule 2    fluticasone (FLONASE) 50 mcg/act nasal spray 1 spray into each nostril daily 16 g 2    gabapentin (NEURONTIN) 100 mg capsule take 1 capsule by mouth three times a day 180 capsule 1    lamoTRIgine (LaMICtal) 200 MG tablet Take 1 tablet (200 mg total) by mouth daily 90 tablet 0    levocetirizine (XYZAL) 5 MG tablet Take 1 tablet (5 mg total) by mouth every evening 30 tablet 4    nicotine polacrilex (COMMIT) 4 MG lozenge Apply 1 lozenge (4 mg total) to the mouth or throat as needed for smoking cessation (Patient not taking: Reported on 5/5/2022 ) 100 each 5    omeprazole (PriLOSEC) 20 mg delayed release capsule Take 1 capsule (20 mg total) by mouth 2 (two) times a day 60 capsule 5    risperiDONE (RisperDAL) 1 mg tablet take 1 tablet by mouth at bedtime 30 tablet 1    senna-docusate sodium (SENOKOT-S) 8 6-50 mg per tablet Take 3 tablets by mouth as needed        Spiriva Respimat 2 5 MCG/ACT AERS inhaler inhale 2 puffs by mouth and INTO THE LUNGS once daily 4 g 5     No current facility-administered medications for this visit  No Known Allergies    Review of Systems    Video Exam    There were no vitals filed for this visit  Physical Exam     I spent 45 minutes directly with the patient during this visit    VIRTUAL VISIT DISCLAIMER    Shaila Martínez verbally agrees to participate in [employer]  Pt is aware that [employer] could be limited without vital signs or the ability to perform a full hands-on physical Torrey Gomez understands she or the provider may request at any time to terminate the video visit and request the patient to seek care or treatment in person

## 2022-05-27 ENCOUNTER — TELEMEDICINE (OUTPATIENT)
Dept: BEHAVIORAL/MENTAL HEALTH CLINIC | Facility: CLINIC | Age: 57
End: 2022-05-27
Payer: COMMERCIAL

## 2022-05-27 DIAGNOSIS — F31.81 MODERATE MIXED BIPOLAR II DISORDER (HCC): ICD-10-CM

## 2022-05-27 DIAGNOSIS — F41.1 GENERALIZED ANXIETY DISORDER: ICD-10-CM

## 2022-05-27 DIAGNOSIS — F43.10 PTSD (POST-TRAUMATIC STRESS DISORDER): Primary | ICD-10-CM

## 2022-05-27 PROCEDURE — 90853 GROUP PSYCHOTHERAPY: CPT | Performed by: COUNSELOR

## 2022-05-27 NOTE — PSYCH
Data: Gala Nam attended the Relaxation group and received psychoeducation first about deferent breathing techniques and the benefits to the wellbeing and the nervous systems response  All attendees were shown how to include their diaphragms in their breathing and given clues that they could pay attention to make sure they are including that important part  Apart of psychoeducation and DBT-based approach, a group approach was used to introduce all attendees, break the ice, and construct a good atmosphere for the purpose  Assessment: Gala Nam was attentive, present, focused, and dedicated  Followed through guidance through the exercises and benefitted from them, evidence for which was the given feedback  Gala Nam was emotional though did not dominate with much time over others during the session  Plan: Gala Nam will practice deep breathing or some of the alternative techniques presented to counter anxiety  She wants to attend to next group and be able to take part in the combined breathing exercises with muscle constriction and relaxation and be able to learn progressive muscle relaxation to build on the simple breathing techniques learned in the beginning  Virtual Regular Visit    Verification of patient location:    Patient is located in the following state in which I hold an active license [place]      Assessment/Plan:    Problem List Items Addressed This Visit        Other    Moderate mixed bipolar II disorder (HCC)    Generalized anxiety disorder    PTSD (post-traumatic stress disorder) - Primary          Goals addressed in session: Goal 1 and Goal 2          Reason for visit is No chief complaint on file         Encounter provider Keiry Warren, [address] [facility]    Provider located at [address] [place] [id number] [phone]      Recent Visits  Date Type Provider Dept   05/25/22 Telemedicine Keiry Warren, [facility]   Showing recent visits within past 7 days and meeting all other requirements  Future Appointments  No visits were found meeting these conditions  Showing future appointments within next 150 days and meeting all other requirements       The patient was identified by name and date of birth  Joe Nelson was informed that this is a telemedicine visit and that the visit is being conducted throughMicrosoft Teams and patient was informed that this is a secure, HIPAA-compliant platform  She agrees to proceed     My office door was closed  No one else was in the room  She acknowledged consent and understanding of privacy and security of the video platform  The patient has agreed to participate and understands they can discontinue the visit at any time  Patient is aware this is a billable service  Subjective  Joe Nelson is a 49 y o  female  HPI     Past Medical History:   Diagnosis Date    Bilateral carpal tunnel syndrome 8/20/2019    Carpal tunnel syndrome of right wrist 10/30/2019    Added automatically from request for surgery [id number]    Hypertension        Past Surgical History:   Procedure Laterality Date    BREAST BIOPSY Left 20yrs ago benign    OOPHORECTOMY Right     [place] WRIST Reyna Cornelio LIG Right 1/21/2020    Procedure: RELEASE CARPAL TUNNEL ENDOSCOPIC;  Surgeon: Senthil Corey MD;  [facility];  Service: Orthopedics    [place] WRIST Reyna Cornelio LIG Left 1/28/2020    Procedure: RELEASE CARPAL TUNNEL ENDOSCOPIC;  Surgeon: Senthil Corey MD;  [facility];  Service: Orthopedics       Current Outpatient Medications   Medication Sig Dispense Refill    albuterol (PROVENTIL HFA,VENTOLIN HFA) 90 mcg/act inhaler inhale 2 puffs by mouth and INTO THE LUNGS every 6 hours IF NEEDED FOR WHEEZING   8 5 g 2    cholecalciferol (VITAMIN D3) 400 units tablet Take 400 Units by mouth daily      FLUoxetine (PROzac) 20 mg capsule Take 1 capsule (20 mg total) by mouth daily 30 capsule 2    fluticasone (FLONASE) 50 mcg/act nasal spray 1 spray into each nostril daily 16 g 2    gabapentin (NEURONTIN) 100 mg capsule take 1 capsule by mouth three times a day 180 capsule 1    lamoTRIgine (LaMICtal) 200 MG tablet Take 1 tablet (200 mg total) by mouth daily 90 tablet 0    levocetirizine (XYZAL) 5 MG tablet Take 1 tablet (5 mg total) by mouth every evening 30 tablet 4    nicotine polacrilex (COMMIT) 4 MG lozenge Apply 1 lozenge (4 mg total) to the mouth or throat as needed for smoking cessation (Patient not taking: Reported on 5/5/2022 ) 100 each 5    omeprazole (PriLOSEC) 20 mg delayed release capsule Take 1 capsule (20 mg total) by mouth 2 (two) times a day 60 capsule 5    risperiDONE (RisperDAL) 1 mg tablet take 1 tablet by mouth at bedtime 30 tablet 1    senna-docusate sodium (SENOKOT-S) 8 6-50 mg per tablet Take 3 tablets by mouth as needed        Spiriva Respimat 2 5 MCG/ACT AERS inhaler inhale 2 puffs by mouth and INTO THE LUNGS once daily 4 g 5     No current facility-administered medications for this visit  No Known Allergies    Review of Systems    Video Exam    There were no vitals filed for this visit  Physical Exam     I spent 55 minutes directly with the patient during this visit    VIRTUAL VISIT DISCLAIMER    Chelsy Ac verbally agrees to participate in [employer]  Pt is aware that [employer] could be limited without vital signs or the ability to perform a full hands-on physical Saadia Prasad understands she or the provider may request at any time to terminate the video visit and request the patient to seek care or treatment in person

## 2022-06-01 ENCOUNTER — TELEMEDICINE (OUTPATIENT)
Dept: BEHAVIORAL/MENTAL HEALTH CLINIC | Facility: CLINIC | Age: 57
End: 2022-06-01
Payer: COMMERCIAL

## 2022-06-01 ENCOUNTER — TELEPHONE (OUTPATIENT)
Dept: PSYCHIATRY | Facility: CLINIC | Age: 57
End: 2022-06-01

## 2022-06-01 DIAGNOSIS — F43.10 PTSD (POST-TRAUMATIC STRESS DISORDER): ICD-10-CM

## 2022-06-01 DIAGNOSIS — F41.1 GENERALIZED ANXIETY DISORDER: ICD-10-CM

## 2022-06-01 DIAGNOSIS — F31.81 MODERATE MIXED BIPOLAR II DISORDER (HCC): Primary | ICD-10-CM

## 2022-06-01 PROCEDURE — 90834 PSYTX W PT 45 MINUTES: CPT | Performed by: COUNSELOR

## 2022-06-01 NOTE — PSYCH
Virtual Regular Visit    Verification of patient location:    Patient is located in the following state in which I hold an active license [place]      Assessment/Plan:    Problem List Items Addressed This Visit        Other    Moderate mixed bipolar II disorder (HCC) - Primary    Generalized anxiety disorder    PTSD (post-traumatic stress disorder)          Goals addressed in session: Goal 1 and Goal 2          Reason for visit is No chief complaint on file  Encounter provider Marge Aiken [facility]    Provider located at [address] [place] [id number] [phone]      Recent Visits  Date Type Provider Dept   05/27/22 229 [facility], [address] [place] [address]   05/25/22 229 [facility], [address] [facility] Therapist [facility]   Showing recent visits within past 7 days and meeting all other requirements  Future Appointments  No visits were found meeting these conditions  Showing future appointments within next 150 days and meeting all other requirements       The patient was identified by name and date of birth  Lor Brown was informed that this is a telemedicine visit and that the visit is being conducted throughMicrosoft Teams and patient was informed that this is a secure, HIPAA-compliant platform  She agrees to proceed     My office door was closed  No one else was in the room  She acknowledged consent and understanding of privacy and security of the video platform  The patient has agreed to participate and understands they can discontinue the visit at any time  Patient is aware this is a billable service  Subjective  Lor Brown is a 49 y o  female      Data: Rudy Gongora discussed her holiday long weekend and her Person-Centered, DBT-based, and TF-CBT techniques were used to allow the Pt to express herself and support rewiring process- she was given psychoeducation about due to having clarifying questions about learned skills and needed to boost her understanding  Jacob spoke about her sister and her wrongdoing she believes she did in her life lying about Jacob and her issues  She reported using Radical Acceptance is the most difficult when it comes to her loved ones' biases  Assessment: Jacob was notably emotional and better regulating her emotions- reportedly, she managed to think about her Core Mindfulness skills and use them, include thoughts and awareness about avoidance and incorporate the insight into her routine  It appears sister's past attitudes and behaviors are triggers for Jacob  Plan: Jacob wants to continue with the Relaxation group and with her weekly individual therapy where she will express her conflicts with her daughter and ex  as well as will process further the loss of her son  She will use Mindfulness and her other learned DBT skill sets that are relevant to her needs  HPI     Past Medical History:   Diagnosis Date    Bilateral carpal tunnel syndrome 8/20/2019    Carpal tunnel syndrome of right wrist 10/30/2019    Added automatically from request for surgery [id number]    Hypertension        Past Surgical History:   Procedure Laterality Date    BREAST BIOPSY Left 20yrs ago benign    OOPHORECTOMY Right     [place] WRIST [place]  LIG Right 1/21/2020    Procedure: RELEASE CARPAL TUNNEL ENDOSCOPIC;  Surgeon: Alana Colon MD;  [facility];  Service: Orthopedics    [place] WRIST [place]  LIG Left 1/28/2020    Procedure: RELEASE CARPAL TUNNEL ENDOSCOPIC;  Surgeon: Alana Colon MD;  [facility];  Service: Orthopedics       Current Outpatient Medications   Medication Sig Dispense Refill    albuterol (PROVENTIL HFA,VENTOLIN HFA) 90 mcg/act inhaler inhale 2 puffs by mouth and INTO THE LUNGS every 6 hours IF NEEDED FOR WHEEZING   8 5 g 2    cholecalciferol (VITAMIN D3) 400 units tablet Take 400 Units by mouth daily      FLUoxetine (PROzac) 20 mg capsule Take 1 capsule (20 mg total) by mouth daily 30 capsule 2    fluticasone (FLONASE) 50 mcg/act nasal spray 1 spray into each nostril daily 16 g 2    gabapentin (NEURONTIN) 100 mg capsule take 1 capsule by mouth three times a day 180 capsule 1    lamoTRIgine (LaMICtal) 200 MG tablet Take 1 tablet (200 mg total) by mouth daily 90 tablet 0    levocetirizine (XYZAL) 5 MG tablet Take 1 tablet (5 mg total) by mouth every evening 30 tablet 4    nicotine polacrilex (COMMIT) 4 MG lozenge Apply 1 lozenge (4 mg total) to the mouth or throat as needed for smoking cessation (Patient not taking: Reported on 5/5/2022 ) 100 each 5    omeprazole (PriLOSEC) 20 mg delayed release capsule Take 1 capsule (20 mg total) by mouth 2 (two) times a day 60 capsule 5    risperiDONE (RisperDAL) 1 mg tablet take 1 tablet by mouth at bedtime 30 tablet 1    senna-docusate sodium (SENOKOT-S) 8 6-50 mg per tablet Take 3 tablets by mouth as needed        Spiriva Respimat 2 5 MCG/ACT AERS inhaler inhale 2 puffs by mouth and INTO THE LUNGS once daily 4 g 5     No current facility-administered medications for this visit  No Known Allergies    Review of Systems    Video Exam    There were no vitals filed for this visit  Physical Exam     I spent 45 minutes directly with the patient during this visit    VIRTUAL VISIT DISCLAIMER    Yair Forbes verbally agrees to participate in [facility]  Pt is aware that [facility] could be limited without vital signs or the ability to perform a full hands-on physical [facility] understands she or the provider may request at any time to terminate the video visit and request the patient to seek care or treatment in person

## 2022-06-03 ENCOUNTER — TELEPHONE (OUTPATIENT)
Dept: PSYCHIATRY | Facility: CLINIC | Age: 57
End: 2022-06-03

## 2022-06-03 ENCOUNTER — TELEMEDICINE (OUTPATIENT)
Dept: BEHAVIORAL/MENTAL HEALTH CLINIC | Facility: CLINIC | Age: 57
End: 2022-06-03

## 2022-06-03 DIAGNOSIS — F31.81 MODERATE MIXED BIPOLAR II DISORDER (HCC): ICD-10-CM

## 2022-06-03 DIAGNOSIS — F43.10 PTSD (POST-TRAUMATIC STRESS DISORDER): Primary | ICD-10-CM

## 2022-06-03 DIAGNOSIS — F41.1 GENERALIZED ANXIETY DISORDER: ICD-10-CM

## 2022-06-03 NOTE — TELEPHONE ENCOUNTER
Hi, this patient called and wanted to reschedule her appt 6/9/22  Please give a call back @ [phone]   Thank you

## 2022-06-08 ENCOUNTER — TELEMEDICINE (OUTPATIENT)
Dept: BEHAVIORAL/MENTAL HEALTH CLINIC | Facility: CLINIC | Age: 57
End: 2022-06-08
Payer: COMMERCIAL

## 2022-06-08 DIAGNOSIS — F31.81 MODERATE MIXED BIPOLAR II DISORDER (HCC): Primary | ICD-10-CM

## 2022-06-08 DIAGNOSIS — F43.10 PTSD (POST-TRAUMATIC STRESS DISORDER): ICD-10-CM

## 2022-06-08 DIAGNOSIS — F41.1 GENERALIZED ANXIETY DISORDER: ICD-10-CM

## 2022-06-08 PROCEDURE — 90834 PSYTX W PT 45 MINUTES: CPT | Performed by: COUNSELOR

## 2022-06-08 NOTE — PSYCH
Virtual Regular Visit    Verification of patient location:    Patient is located in the following state in which I hold an active license [place]      Assessment/Plan:    Problem List Items Addressed This Visit        Other    Moderate mixed bipolar II disorder (HCC) - Primary    Generalized anxiety disorder    PTSD (post-traumatic stress disorder)          Goals addressed in session: Goal 1 and Goal 2          Reason for visit is No chief complaint on file  Encounter provider Karyl Dave, [address] [facility]    Provider located at [address] [place] [address] [phone]      Recent Visits  Date Type Provider Dept   06/01/22 229 [facility] Therapist [place]   Showing recent visits within past 7 days and meeting all other requirements  Future Appointments  No visits were found meeting these conditions  Showing future appointments within next 150 days and meeting all other requirements       The patient was identified by name and date of birth  Chelsy Ac was informed that this is a telemedicine visit and that the visit is being conducted throughMicrosoft Teams and patient was informed that this is a secure, HIPAA-compliant platform  She agrees to proceed     My office door was closed  No one else was in the room  She acknowledged consent and understanding of privacy and security of the video platform  The patient has agreed to participate and understands they can discontinue the visit at any time  Patient is aware this is a billable service  Subjective  Chelsy Ac is a 49 y o  female      Data: Rakel León discussed her being unusually tired and was educated about depression's symptoms and how it creates the downward spiral ad feed itself, etc  Person-Centered, DBT-based, Positive Psychology, and CBT techniques are most of the time the best approaches to meet Pt's need to learn and repeat and persist in countering her symptoms and prevailing over their long-time cycle  Katy Mcwilliams marked moderate depression this time, though it felt for her severe  Assessment: Katy Mcwilliams appeared very emotional, expressive, descriptive, and animated  She managed to focus on the brought up topics and gain insight about her life's path and the traumatic events she got through  Katy Mcwilliams maintained sufficient eye contact and had relevant thought content  Plan: Katy Mcwilliams will continue focus on the positive interactions with her best friend and her family everyday, will apply the gratitude prayer in the beginning of every morning, listing a few things that went well before sleep  HPI     Past Medical History:   Diagnosis Date    Bilateral carpal tunnel syndrome 8/20/2019    Carpal tunnel syndrome of right wrist 10/30/2019    Added automatically from request for surgery [id number]    Hypertension        Past Surgical History:   Procedure Laterality Date    BREAST BIOPSY Left 20yrs ago benign    OOPHORECTOMY Right     [place] WRIST [place] LIG Right 1/21/2020    Procedure: RELEASE CARPAL TUNNEL ENDOSCOPIC;  Surgeon: Denise Beverly MD;  [facility];  Service: Orthopedics    GA WRIST [place] LIG Left 1/28/2020    Procedure: RELEASE CARPAL TUNNEL ENDOSCOPIC;  Surgeon: Denise Beverly MD;  [facility];  Service: Orthopedics       Current Outpatient Medications   Medication Sig Dispense Refill    albuterol (PROVENTIL HFA,VENTOLIN HFA) 90 mcg/act inhaler inhale 2 puffs by mouth and INTO THE LUNGS every 6 hours IF NEEDED FOR WHEEZING   8 5 g 2    cholecalciferol (VITAMIN D3) 400 units tablet Take 400 Units by mouth daily      FLUoxetine (PROzac) 20 mg capsule Take 1 capsule (20 mg total) by mouth daily 30 capsule 2    fluticasone (FLONASE) 50 mcg/act nasal spray 1 spray into each nostril daily 16 g 2    gabapentin (NEURONTIN) 100 mg capsule take 1 capsule by mouth three times a day 180 capsule 1    lamoTRIgine (LaMICtal) 200 MG tablet Take 1 tablet (200 mg total) by mouth daily 90 tablet 0    levocetirizine (XYZAL) 5 MG tablet Take 1 tablet (5 mg total) by mouth every evening 30 tablet 4    nicotine polacrilex (COMMIT) 4 MG lozenge Apply 1 lozenge (4 mg total) to the mouth or throat as needed for smoking cessation (Patient not taking: Reported on 5/5/2022 ) 100 each 5    omeprazole (PriLOSEC) 20 mg delayed release capsule Take 1 capsule (20 mg total) by mouth 2 (two) times a day 60 capsule 5    risperiDONE (RisperDAL) 1 mg tablet take 1 tablet by mouth at bedtime 30 tablet 1    senna-docusate sodium (SENOKOT-S) 8 6-50 mg per tablet Take 3 tablets by mouth as needed        Spiriva Respimat 2 5 MCG/ACT AERS inhaler inhale 2 puffs by mouth and INTO THE LUNGS once daily 4 g 5     No current facility-administered medications for this visit  No Known Allergies    Review of Systems    Video Exam    There were no vitals filed for this visit  Physical Exam     I spent 45 minutes directly with the patient during this visit    VIRTUAL VISIT DISCLAIMER    Perez Zimmerman verbally agrees to participate in [employer]  Pt is aware that [employer] could be limited without vital signs or the ability to perform a full hands-on physical Broderick Boast understands she or the provider may request at any time to terminate the video visit and request the patient to seek care or treatment in person

## 2022-06-10 ENCOUNTER — TELEMEDICINE (OUTPATIENT)
Dept: BEHAVIORAL/MENTAL HEALTH CLINIC | Facility: CLINIC | Age: 57
End: 2022-06-10
Payer: COMMERCIAL

## 2022-06-10 DIAGNOSIS — F31.81 MODERATE MIXED BIPOLAR II DISORDER (HCC): ICD-10-CM

## 2022-06-10 DIAGNOSIS — F43.10 PTSD (POST-TRAUMATIC STRESS DISORDER): Primary | ICD-10-CM

## 2022-06-10 DIAGNOSIS — F41.1 GENERALIZED ANXIETY DISORDER: ICD-10-CM

## 2022-06-10 PROCEDURE — 90853 GROUP PSYCHOTHERAPY: CPT | Performed by: COUNSELOR

## 2022-06-10 NOTE — PSYCH
Virtual Regular Visit    Verification of patient location:    Patient is located in the following state in which I hold an active license [place]      Assessment/Plan:    Problem List Items Addressed This Visit        Other    Moderate mixed bipolar II disorder (HCC)    Generalized anxiety disorder    PTSD (post-traumatic stress disorder) - Primary          Goals addressed in session: Goal 1 and Goal 2          Reason for visit is No chief complaint on file  [facility]    Provider located at [address] [place] [id number] [phone]      Recent Visits  Date Type Provider Dept   06/08/22 229 [place] [address] [facility] Therapist [facility]   Showing recent visits within past 7 days and meeting all other requirements  Today's Visits  Date Type Provider Dept   06/10/22 229 [facility], [address] [place] [address]   Showing today's visits and meeting all other requirements  Future Appointments  No visits were found meeting these conditions  Showing future appointments within next 150 days and meeting all other requirements       The patient was identified by name and date of birth  Sujata Joseph was informed that this is a telemedicine visit and that the visit is being conducted throughMicrosoft Teams and patient was informed that this is a secure, HIPAA-compliant platform  She agrees to proceed     My office door was closed  No one else was in the room  She acknowledged consent and understanding of privacy and security of the video platform  The patient has agreed to participate and understands they can discontinue the visit at any time  Patient is aware this is a billable service  Subjective  Sujata Joseph is a 49 y o  female       Data: Danitza Ramsay attended the Relaxation group and received psychoeducation about body scan, breathing, muscle relaxation, and the benefits to the wellbeing and the nervous systems response  Attendees were guided through a body scan and participated in the exercise willingly, after thy shared they experiences with stress and countering stress throughout the last week  Apart of psychoeducation and DBT-based approach, a group approach was used to support the cohesion and foster an authentic, safe, serene environment for the purpose  Assessment: Wally Guerrero was attentive, present, focused, and dedicated  Followed through guidance through the exercises and benefitted from them, evidence for which was the given feedback  Plan: Wally Guerrreo will practice deep breathing or some of the alternative breathing techniques presented to counter anxiety along with muscle constriction and relaxation with inhalation and exhalation, respectively  She will include body scan when possible to identify her body signs, She will to attend to next group and be able to take part in guided meditation to build on the breathing techniques learned in the beginning            HPI     Past Medical History:   Diagnosis Date    Bilateral carpal tunnel syndrome 8/20/2019    Carpal tunnel syndrome of right wrist 10/30/2019    Added automatically from request for surgery [id number]    Hypertension        Past Surgical History:   Procedure Laterality Date    BREAST BIOPSY Left 20yrs ago benign    OOPHORECTOMY Right     [place] WRIST Colie Risen LIG Right 1/21/2020    Procedure: RELEASE CARPAL TUNNEL ENDOSCOPIC;  Surgeon: Abrahan Serrano MD;  [facility];  Service: Orthopedics    RI WRIST Colie Risen LIG Left 1/28/2020    Procedure: RELEASE CARPAL TUNNEL ENDOSCOPIC;  Surgeon: Abrahan Serrano MD;  [facility];  Service: Orthopedics       Current Outpatient Medications   Medication Sig Dispense Refill    albuterol (PROVENTIL HFA,VENTOLIN HFA) 90 mcg/act inhaler inhale 2 puffs by mouth and INTO THE LUNGS every 6 hours IF NEEDED FOR WHEEZING  8 5 g 2    cholecalciferol (VITAMIN D3) 400 units tablet Take 400 Units by mouth daily      FLUoxetine (PROzac) 20 mg capsule Take 1 capsule (20 mg total) by mouth daily 30 capsule 2    fluticasone (FLONASE) 50 mcg/act nasal spray 1 spray into each nostril daily 16 g 2    gabapentin (NEURONTIN) 100 mg capsule take 1 capsule by mouth three times a day 180 capsule 1    lamoTRIgine (LaMICtal) 200 MG tablet Take 1 tablet (200 mg total) by mouth daily 90 tablet 0    levocetirizine (XYZAL) 5 MG tablet Take 1 tablet (5 mg total) by mouth every evening 30 tablet 4    nicotine polacrilex (COMMIT) 4 MG lozenge Apply 1 lozenge (4 mg total) to the mouth or throat as needed for smoking cessation (Patient not taking: Reported on 5/5/2022 ) 100 each 5    omeprazole (PriLOSEC) 20 mg delayed release capsule Take 1 capsule (20 mg total) by mouth 2 (two) times a day 60 capsule 5    risperiDONE (RisperDAL) 1 mg tablet take 1 tablet by mouth at bedtime 30 tablet 1    senna-docusate sodium (SENOKOT-S) 8 6-50 mg per tablet Take 3 tablets by mouth as needed        Spiriva Respimat 2 5 MCG/ACT AERS inhaler inhale 2 puffs by mouth and INTO THE LUNGS once daily 4 g 5     No current facility-administered medications for this visit  No Known Allergies    Review of Systems    Video Exam    There were no vitals filed for this visit  Physical Exam     I spent 55 minutes directly with the patient during this visit    VIRTUAL VISIT DISCLAIMER    Aysha Cook verbally agrees to participate in [employer]  Pt is aware that [employer] could be limited without vital signs or the ability to perform a full hands-on physical Darien Lanes understands she or the provider may request at any time to terminate the video visit and request the patient to seek care or treatment in person

## 2022-06-14 NOTE — PSYCH
No Call  No Show  No Charge    Jennifer Romo no showed 06/14/22 appointment , this dorcas was tentative  Treatment Plan not due at this session

## 2022-06-16 ENCOUNTER — TELEMEDICINE (OUTPATIENT)
Dept: BEHAVIORAL/MENTAL HEALTH CLINIC | Facility: CLINIC | Age: 57
End: 2022-06-16
Payer: COMMERCIAL

## 2022-06-16 DIAGNOSIS — F43.10 PTSD (POST-TRAUMATIC STRESS DISORDER): Primary | ICD-10-CM

## 2022-06-16 DIAGNOSIS — F41.1 GENERALIZED ANXIETY DISORDER: ICD-10-CM

## 2022-06-16 DIAGNOSIS — F31.81 MODERATE MIXED BIPOLAR II DISORDER (HCC): ICD-10-CM

## 2022-06-16 PROCEDURE — 90834 PSYTX W PT 45 MINUTES: CPT | Performed by: COUNSELOR

## 2022-06-16 NOTE — PSYCH
Virtual Regular Visit    Verification of patient location:    Patient is located in the following state in which I hold an active license [place]      Assessment/Plan:    Problem List Items Addressed This Visit        Other    Moderate mixed bipolar II disorder (HCC)    Generalized anxiety disorder    PTSD (post-traumatic stress disorder) - Primary          Goals addressed in session: Goal 1 and Goal 2          Reason for visit is No chief complaint on file  [facility]    Provider located at [address] [place] [address] [phone]      Recent Visits  Date Type Provider Dept   06/10/22 229 [facility] Therapist [place]   Showing recent visits within past 7 days and meeting all other requirements  Future Appointments  No visits were found meeting these conditions  Showing future appointments within next 150 days and meeting all other requirements       The patient was identified by name and date of birth  Shirley Leija was informed that this is a telemedicine visit and that the visit is being conducted throughMicrosoft Teams and patient was informed that this is a secure, HIPAA-compliant platform  She agrees to proceed     My office door was closed  No one else was in the room  She acknowledged consent and understanding of privacy and security of the video platform  The patient has agreed to participate and understands they can discontinue the visit at any time  Patient is aware this is a billable service  Subjective  Shirley Leija is a 49 y o  female  Data: Jesus Antoine discussed having a yard sale, her medications, and her mood lately   Person-Centered, Mindfulness, and CBT techniques were combined to help Jesus Antoine work on reshaping her mindset and strengthen her positive self-talk, keep her grounded in the here-and-now when she gest upset and sinking down  Assessment: Reynaldo Ramirez was very positive, talkative, friendly, and expressive  She was able to focus on the brought up topics and had relevant thought content  She was able to express some past experience and limit her dwelling in it  Plan: Reynaldo Ramirez will continue weekly individual and group therapy to continue learning different response to negative experience  She will use her Core Mindfulness skills and persist when her symptoms persist, to substitute negative thoughts with affirmations to strengthen her positive mindset  HPI     Past Medical History:   Diagnosis Date    Bilateral carpal tunnel syndrome 8/20/2019    Carpal tunnel syndrome of right wrist 10/30/2019    Added automatically from request for surgery [id number]    Hypertension        Past Surgical History:   Procedure Laterality Date    BREAST BIOPSY Left 20yrs ago benign    OOPHORECTOMY Right     [place] WRIST Ebbie Aw LIG Right 1/21/2020    Procedure: RELEASE CARPAL TUNNEL ENDOSCOPIC;  Surgeon: Dash Ramirez MD;  [facility];  Service: Orthopedics    [place] WRIST Ebbie Aw LIG Left 1/28/2020    Procedure: RELEASE CARPAL TUNNEL ENDOSCOPIC;  Surgeon: Dash Ramirez MD;  [facility];  Service: Orthopedics       Current Outpatient Medications   Medication Sig Dispense Refill    albuterol (PROVENTIL HFA,VENTOLIN HFA) 90 mcg/act inhaler inhale 2 puffs by mouth and INTO THE LUNGS every 6 hours IF NEEDED FOR WHEEZING   8 5 g 2    cholecalciferol (VITAMIN D3) 400 units tablet Take 400 Units by mouth daily      FLUoxetine (PROzac) 20 mg capsule Take 1 capsule (20 mg total) by mouth daily 30 capsule 2    fluticasone (FLONASE) 50 mcg/act nasal spray 1 spray into each nostril daily 16 g 2    gabapentin (NEURONTIN) 100 mg capsule take 1 capsule by mouth three times a day 180 capsule 1    lamoTRIgine (LaMICtal) 200 MG tablet Take 1 tablet (200 mg total) by mouth daily 90 tablet 0    levocetirizine (XYZAL) 5 MG tablet Take 1 tablet (5 mg total) by mouth every evening 30 tablet 4    nicotine polacrilex (COMMIT) 4 MG lozenge Apply 1 lozenge (4 mg total) to the mouth or throat as needed for smoking cessation (Patient not taking: Reported on 5/5/2022 ) 100 each 5    omeprazole (PriLOSEC) 20 mg delayed release capsule Take 1 capsule (20 mg total) by mouth 2 (two) times a day 60 capsule 5    risperiDONE (RisperDAL) 1 mg tablet take 1 tablet by mouth at bedtime 30 tablet 1    senna-docusate sodium (SENOKOT-S) 8 6-50 mg per tablet Take 3 tablets by mouth as needed        Spiriva Respimat 2 5 MCG/ACT AERS inhaler inhale 2 puffs by mouth and INTO THE LUNGS once daily 4 g 5     No current facility-administered medications for this visit  No Known Allergies    Review of Systems    Video Exam    There were no vitals filed for this visit  Physical Exam     I spent 45 minutes directly with the patient during this visit    VIRTUAL VISIT DISCLAIMER    Birgit Reyes verbally agrees to participate in [employer]  Pt is aware that [employer] could be limited without vital signs or the ability to perform a full hands-on physical Danisha Stone understands she or the provider may request at any time to terminate the video visit and request the patient to seek care or treatment in person

## 2022-06-17 ENCOUNTER — TELEMEDICINE (OUTPATIENT)
Dept: BEHAVIORAL/MENTAL HEALTH CLINIC | Facility: CLINIC | Age: 57
End: 2022-06-17
Payer: COMMERCIAL

## 2022-06-17 DIAGNOSIS — F41.1 GENERALIZED ANXIETY DISORDER: Primary | ICD-10-CM

## 2022-06-17 DIAGNOSIS — F31.81 MODERATE MIXED BIPOLAR II DISORDER (HCC): ICD-10-CM

## 2022-06-17 DIAGNOSIS — F43.10 PTSD (POST-TRAUMATIC STRESS DISORDER): ICD-10-CM

## 2022-06-17 PROCEDURE — 90853 GROUP PSYCHOTHERAPY: CPT | Performed by: COUNSELOR

## 2022-06-17 NOTE — PSYCH
Virtual Regular Visit    Verification of patient location:    Patient is located in the following state in which I hold an active license [place]      Assessment/Plan:    Problem List Items Addressed This Visit    None         Goals addressed in session: Goal 1 and Goal 2          Reason for visit is No chief complaint on file  Encounter provider Estiven Barajas, [address]     Provider located at [address] [place] [id number] [phone]      Recent Visits  Date Type Provider Dept   06/16/22 229 [facility],  [address]   06/10/22 229 [facility], [facility] The[facility]ist [facility]   Showing recent visits within past 7 days and meeting all other requirements  Future Appointments  No visits were found meeting these conditions  Showing future appointments within next 150 days and meeting all other requirements       The patient was identified by name and date of birth  Megan Marroquin was informed that this is a telemedicine visit and that the visit is being conducted throughMicrosoft Teams and patient was informed that this is a secure, HIPAA-compliant platform  She agrees to proceed     My office door was closed  No one else was in the room  She acknowledged consent and understanding of privacy and security of the video platform  The patient has agreed to participate and understands they can discontinue the visit at any time  Patient is aware this is a billable service  Subjective  Megan Marroquin is a 49 y o  female  Data: Tray Ng was delayed due to yard sale but attended the Relaxation group and received psychoeducation about guided meditation and imagery, and the benefits to the wellbeing and the nervous systems response   Attendees were guided through several exercises and participated willingly, after they shared their experiences with stress and countering stress throughout the last week  Apart of psychoeducation and DBT-based approach, a group approach was used to support the cohesion and foster an authentic, safe, serene environment for the purpose  Assessment: Olman Fernandez was the most communicative and expressing of all attendees  She was attentive, present, focused, and dedicated  Followed through guidance through the exercises and benefitted from them, evidence for which was the given feedback  Plan: Olman Fernandez will practice deep breathing and other breathing techniques presented to counter anxiety along with progressive muscle relaxation  She will include body scan when possible to identify physical signs and will to attend the next group and be able to take part in further guided exercises on chosen topics and specific focus to build on the breathing techniques learned in the beginning  HPI     Past Medical History:   Diagnosis Date    Bilateral carpal tunnel syndrome 8/20/2019    Carpal tunnel syndrome of right wrist 10/30/2019    Added automatically from request for surgery [id number]    Hypertension        Past Surgical History:   Procedure Laterality Date    BREAST BIOPSY Left 20yrs ago benign    OOPHORECTOMY Right     [place] WRIST [place] LIG Right 1/21/2020    Procedure: RELEASE CARPAL TUNNEL ENDOSCOPIC;  Surgeon: Bridget Coates MD;  [facility];  Service: Orthopedics    [place] WRIST [place] LIG Left 1/28/2020    Procedure: RELEASE CARPAL TUNNEL ENDOSCOPIC;  Surgeon: Bridget Coates MD;  [facility];  Service: Orthopedics       Current Outpatient Medications   Medication Sig Dispense Refill    albuterol (PROVENTIL HFA,VENTOLIN HFA) 90 mcg/act inhaler inhale 2 puffs by mouth and INTO THE LUNGS every 6 hours IF NEEDED FOR WHEEZING   8 5 g 2    cholecalciferol (VITAMIN D3) 400 units tablet Take 400 Units by mouth daily      FLUoxetine (PROzac) 20 mg capsule Take 1 capsule (20 mg total) by mouth daily 30 capsule 2    fluticasone (FLONASE) 50 mcg/act nasal spray 1 spray into each nostril daily 16 g 2    gabapentin (NEURONTIN) 100 mg capsule take 1 capsule by mouth three times a day 180 capsule 1    lamoTRIgine (LaMICtal) 200 MG tablet Take 1 tablet (200 mg total) by mouth daily 90 tablet 0    levocetirizine (XYZAL) 5 MG tablet Take 1 tablet (5 mg total) by mouth every evening 30 tablet 4    nicotine polacrilex (COMMIT) 4 MG lozenge Apply 1 lozenge (4 mg total) to the mouth or throat as needed for smoking cessation (Patient not taking: Reported on 5/5/2022 ) 100 each 5    omeprazole (PriLOSEC) 20 mg delayed release capsule Take 1 capsule (20 mg total) by mouth 2 (two) times a day 60 capsule 5    risperiDONE (RisperDAL) 1 mg tablet take 1 tablet by mouth at bedtime 30 tablet 1    senna-docusate sodium (SENOKOT-S) 8 6-50 mg per tablet Take 3 tablets by mouth as needed        Spiriva Respimat 2 5 MCG/ACT AERS inhaler inhale 2 puffs by mouth and INTO THE LUNGS once daily 4 g 5     No current facility-administered medications for this visit  No Known Allergies    Review of Systems    Video Exam    There were no vitals filed for this visit  Physical Exam     I spent 50 minutes directly with the patient during this visit    VIRTUAL VISIT DISCLAIMER    Sriram Ruelas verbally agrees to participate in [employer]  Pt is aware that [employer] could be limited without vital signs or the ability to perform a full hands-on physical Tracey Fernandez understands she or the provider may request at any time to terminate the video visit and request the patient to seek care or treatment in person

## 2022-06-21 ENCOUNTER — TELEPHONE (OUTPATIENT)
Dept: PSYCHIATRY | Facility: CLINIC | Age: 57
End: 2022-06-21

## 2022-06-23 ENCOUNTER — TELEMEDICINE (OUTPATIENT)
Dept: BEHAVIORAL/MENTAL HEALTH CLINIC | Facility: CLINIC | Age: 57
End: 2022-06-23
Payer: COMMERCIAL

## 2022-06-23 DIAGNOSIS — F31.81 MODERATE MIXED BIPOLAR II DISORDER (HCC): ICD-10-CM

## 2022-06-23 DIAGNOSIS — F41.1 GENERALIZED ANXIETY DISORDER: Primary | ICD-10-CM

## 2022-06-23 DIAGNOSIS — F43.10 PTSD (POST-TRAUMATIC STRESS DISORDER): ICD-10-CM

## 2022-06-23 PROCEDURE — 90834 PSYTX W PT 45 MINUTES: CPT | Performed by: COUNSELOR

## 2022-06-23 NOTE — PSYCH
Virtual Regular Visit    Verification of patient location:    Patient is located in the following state in which I hold an active license [place]      Assessment/Plan:    Problem List Items Addressed This Visit        Other    Moderate mixed bipolar II disorder (HCC)    Generalized anxiety disorder - Primary    PTSD (post-traumatic stress disorder)          Goals addressed in session: Goal 1 and Goal 2          Reason for visit is No chief complaint on file  [facility]    Provider located at [address] [place] [address]  190 [facility] [address] [place] [id number] [phone]      Recent Visits  Date Type Provider Dept   06/17/22 229 [facility], [address] [place] [address]   06/16/22 229 [facility], [address] [facility]ist Her[facility]   Showing recent visits within past 7 days and meeting all other requirements  Future Appointments  No visits were found meeting these conditions  Showing future appointments within next 150 days and meeting all other requirements       The patient was identified by name and date of birth  Willie Lofton was informed that this is a telemedicine visit and that the visit is being conducted throughMicrosoft Teams and patient was informed that this is a secure, HIPAA-compliant platform  She agrees to proceed     My office door was closed  No one else was in the room  She acknowledged consent and understanding of privacy and security of the video platform  The patient has agreed to participate and understands they can discontinue the visit at any time  Patient is aware this is a billable service  Subjective  Willie Lofton is a 49 y o  female  Data: Alex Deleon discussed her forgetfulness and concerns about diminished focus and prevailing inattention   She was reminded how many details she already learned during her sessions and was allowed to express herself  Guy Collazo spoke about her daughter and her ex she lives with still and strongly believes that they are against her  Person-Centered, DBT-based, and TF-CBT techniques were combined to support Pt's effort to express her emotional pain in safe ways and help her narrative gets a shape that serves her better through better insight and deeper understanding  Assessment: Guy Collazo appeared talkative, positive, and friendly  She was able to stay on some topics but had difficulties focusing on brought up subjects that were out of her painful memories  Guy Collazo was not easily redirected  Plan: Guy Collazo to attend Relaxation group tomorrow, as a part of her learning coping skills to reduce stress impact on her everyday life  She will continue weekly therapy where she will continue expressing her conflicts from her trauma and work on resolving them  HPI     Past Medical History:   Diagnosis Date    Bilateral carpal tunnel syndrome 8/20/2019    Carpal tunnel syndrome of right wrist 10/30/2019    Added automatically from request for surgery [id number]    Hypertension        Past Surgical History:   Procedure Laterality Date    BREAST BIOPSY Left 20yrs ago benign    OOPHORECTOMY Right     [place] WRIST Ofelia [place] LIG Right 1/21/2020    Procedure: RELEASE CARPAL TUNNEL ENDOSCOPIC;  Surgeon: Oleksandr Sage MD;  [facility];  Service: Orthopedics    [place] WRIST Ofelia [place] LIG Left 1/28/2020    Procedure: RELEASE CARPAL TUNNEL ENDOSCOPIC;  Surgeon: Oleksandr Sage MD;  [facility];  Service: Orthopedics       Current Outpatient Medications   Medication Sig Dispense Refill    albuterol (PROVENTIL HFA,VENTOLIN HFA) 90 mcg/act inhaler inhale 2 puffs by mouth and INTO THE LUNGS every 6 hours IF NEEDED FOR WHEEZING   8 5 g 2    cholecalciferol (VITAMIN D3) 400 units tablet Take 400 Units by mouth daily      FLUoxetine (PROzac) 20 mg capsule Take 1 capsule (20 mg total) by mouth daily 30 capsule 2    fluticasone (FLONASE) 50 mcg/act nasal spray 1 spray into each nostril daily 16 g 2    gabapentin (NEURONTIN) 100 mg capsule take 1 capsule by mouth three times a day 180 capsule 1    lamoTRIgine (LaMICtal) 200 MG tablet Take 1 tablet (200 mg total) by mouth daily 90 tablet 0    levocetirizine (XYZAL) 5 MG tablet Take 1 tablet (5 mg total) by mouth every evening 30 tablet 4    nicotine polacrilex (COMMIT) 4 MG lozenge Apply 1 lozenge (4 mg total) to the mouth or throat as needed for smoking cessation (Patient not taking: Reported on 5/5/2022 ) 100 each 5    omeprazole (PriLOSEC) 20 mg delayed release capsule Take 1 capsule (20 mg total) by mouth 2 (two) times a day 60 capsule 5    risperiDONE (RisperDAL) 1 mg tablet take 1 tablet by mouth at bedtime 30 tablet 1    senna-docusate sodium (SENOKOT-S) 8 6-50 mg per tablet Take 3 tablets by mouth as needed        Spiriva Respimat 2 5 MCG/ACT AERS inhaler inhale 2 puffs by mouth and INTO THE LUNGS once daily 4 g 5     No current facility-administered medications for this visit  No Known Allergies    Review of Systems    Video Exam    There were no vitals filed for this visit  Physical Exam     I spent 44 minutes directly with the patient during this visit    VIRTUAL VISIT DISCLAIMER    Elham Aj verbally agrees to participate in [facility]  Pt is aware that [facility] could be limited without vital signs or the ability to perform a full hands-on physical Gin Nelson understands she or the provider may request at any time to terminate the video visit and request the patient to seek care or treatment in person

## 2022-06-24 ENCOUNTER — TELEMEDICINE (OUTPATIENT)
Dept: BEHAVIORAL/MENTAL HEALTH CLINIC | Facility: CLINIC | Age: 57
End: 2022-06-24
Payer: COMMERCIAL

## 2022-06-24 DIAGNOSIS — F43.10 PTSD (POST-TRAUMATIC STRESS DISORDER): ICD-10-CM

## 2022-06-24 DIAGNOSIS — F31.81 MODERATE MIXED BIPOLAR II DISORDER (HCC): ICD-10-CM

## 2022-06-24 DIAGNOSIS — F41.1 GENERALIZED ANXIETY DISORDER: Primary | ICD-10-CM

## 2022-06-24 PROCEDURE — 90853 GROUP PSYCHOTHERAPY: CPT | Performed by: COUNSELOR

## 2022-06-24 NOTE — PSYCH
Virtual Regular Visit    Verification of patient location:    Patient is located in the following state in which I hold an active license [place]      Assessment/Plan:    Problem List Items Addressed This Visit        Other    Moderate mixed bipolar II disorder (HCC)    Generalized anxiety disorder - Primary    PTSD (post-traumatic stress disorder)          Goals addressed in session: Goal 1 and Goal 2          Reason for visit is No chief complaint on file  [facility]    Provider located at [address] [place] [id number] [phone]      Recent Visits  Date Type Provider Dept   06/23/22 229 [facility], [address] [place] [address]   06/17/22 229 [facility], [address] [facility] Therapist Heriberto   Showing recent visits within past 7 days and meeting all other requirements  Today's Visits  Date Type Provider Dept   06/24/22 229 [facility], [address] [place] [address]   Showing today's visits and meeting all other requirements  Future Appointments  No visits were found meeting these conditions  Showing future appointments within next 150 days and meeting all other requirements       The patient was identified by name and date of birth  Yair Forbes was informed that this is a telemedicine visit and that the visit is being conducted throughMicrosoft Teams and patient was informed that this is a secure, HIPAA-compliant platform  She agrees to proceed     My office door was closed  The patient was notified the following individuals were present in the room other group members  She acknowledged consent and understanding of privacy and security of the video platform  The patient has agreed to participate and understands they can discontinue the visit at any time      Patient is aware this is a billable service  Subjective  Shirley Leija is a 49 y o  female      Data: Jesus Antoine attended the Relaxation group and received psychoeducation about the used methods, and the benefits to the wellbeing and the nervous systems response  Attendees were guided through several exercises, and they shared their experiences with stress and countering stress throughout week and overall  Apart of CBT and DBT-based approach, a group approach was used to support the cohesion and maintain the authentic, safe, serene environment for the exercises purpose  Assessment: Jesus Antoine was the most talkative and personable member  She followed through guidance and completed the exercises and showed willingness to continue learning and build on the learned was expressed  Plan: Pt will practice deep breathing and other breathing techniques presented to counter anxiety along with progressive muscle relaxation, will include body scan when possible to identify physical signs and will attend the next group to be able to take part in further guided exercises on chosen topics and specific focus to build on the breathing techniques learned in the beginning           HPI     Past Medical History:   Diagnosis Date    Bilateral carpal tunnel syndrome 8/20/2019    Carpal tunnel syndrome of right wrist 10/30/2019    Added automatically from request for surgery [id number]    Hypertension        Past Surgical History:   Procedure Laterality Date    BREAST BIOPSY Left 20yrs ago benign    OOPHORECTOMY Right     [place] WRIST Enrique Bail[place] LIG Right 1/21/2020    Procedure: RELEASE CARPAL TUNNEL ENDOSCOPIC;  Surgeon: Patricia Garcia MD;  [facility];  Service: Orthopedics    [place] WRIST Enrique Bailer LIG Left 1/28/2020    Procedure: RELEASE CARPAL TUNNEL ENDOSCOPIC;  Surgeon: Patricia Garcia MD;  [facility];  Service: Orthopedics       Current Outpatient Medications   Medication Sig Dispense Refill    albuterol (PROVENTIL HFA,VENTOLIN HFA) 90 mcg/act inhaler inhale 2 puffs by mouth and INTO THE LUNGS every 6 hours IF NEEDED FOR WHEEZING  8 5 g 2    cholecalciferol (VITAMIN D3) 400 units tablet Take 400 Units by mouth daily      FLUoxetine (PROzac) 20 mg capsule Take 1 capsule (20 mg total) by mouth daily 30 capsule 2    fluticasone (FLONASE) 50 mcg/act nasal spray 1 spray into each nostril daily 16 g 2    gabapentin (NEURONTIN) 100 mg capsule take 1 capsule by mouth three times a day 180 capsule 1    lamoTRIgine (LaMICtal) 200 MG tablet Take 1 tablet (200 mg total) by mouth daily 90 tablet 0    levocetirizine (XYZAL) 5 MG tablet Take 1 tablet (5 mg total) by mouth every evening 30 tablet 4    nicotine polacrilex (COMMIT) 4 MG lozenge Apply 1 lozenge (4 mg total) to the mouth or throat as needed for smoking cessation (Patient not taking: Reported on 5/5/2022 ) 100 each 5    omeprazole (PriLOSEC) 20 mg delayed release capsule Take 1 capsule (20 mg total) by mouth 2 (two) times a day 60 capsule 5    risperiDONE (RisperDAL) 1 mg tablet take 1 tablet by mouth at bedtime 30 tablet 1    senna-docusate sodium (SENOKOT-S) 8 6-50 mg per tablet Take 3 tablets by mouth as needed        Spiriva Respimat 2 5 MCG/ACT AERS inhaler inhale 2 puffs by mouth and INTO THE LUNGS once daily 4 g 5     No current facility-administered medications for this visit  No Known Allergies    Review of Systems    Video Exam    There were no vitals filed for this visit  Physical Exam     I spent 59 minutes directly with the patient during this visit    VIRTUAL VISIT DISCLAIMER    Shaila Martínez verbally agrees to participate in [employer]  Pt is aware that [employer] could be limited without vital signs or the ability to perform a full hands-on physical Torrey Gomez understands she or the provider may request at any time to terminate the video visit and request the patient to seek care or treatment in person

## 2022-06-30 ENCOUNTER — TELEMEDICINE (OUTPATIENT)
Dept: BEHAVIORAL/MENTAL HEALTH CLINIC | Facility: CLINIC | Age: 57
End: 2022-06-30
Payer: COMMERCIAL

## 2022-06-30 DIAGNOSIS — F31.81 MODERATE MIXED BIPOLAR II DISORDER (HCC): ICD-10-CM

## 2022-06-30 DIAGNOSIS — F43.10 PTSD (POST-TRAUMATIC STRESS DISORDER): ICD-10-CM

## 2022-06-30 DIAGNOSIS — F41.1 GENERALIZED ANXIETY DISORDER: Primary | ICD-10-CM

## 2022-06-30 PROCEDURE — 90834 PSYTX W PT 45 MINUTES: CPT | Performed by: COUNSELOR

## 2022-06-30 NOTE — PSYCH
Virtual Regular Visit    Verification of patient location:    Patient is located in the following state in which I hold an active license [place]      Assessment/Plan:    Problem List Items Addressed This Visit        Other    Moderate mixed bipolar II disorder (HCC)    Generalized anxiety disorder - Primary    PTSD (post-traumatic stress disorder)          Goals addressed in session: Goal 1 and Goal 2          Reason for visit is No chief complaint on file  Encounter provider Estiven Barajas [facility]    Provider located at [address] [place] [id number] [phone]      Recent Visits  Date Type Provider Dept   06/24/22 229 [facility], [address] [place] [address]   06/23/22 229 [facility], [facility] Therapist Heriberto   Showing recent visits within past 7 days and meeting all other requirements  Today's Visits  Date Type Provider Dept   06/30/22 229 [facility], [address] [place] [address]   Showing today's visits and meeting all other requirements  Future Appointments  No visits were found meeting these conditions  Showing future appointments within next 150 days and meeting all other requirements       The patient was identified by name and date of birth  Megan Marroquin was informed that this is a telemedicine visit and that the visit is being conducted throughMicrosoft Teams and patient was informed that this is a secure, HIPAA-compliant platform  She agrees to proceed     My office door was closed  No one else was in the room  She acknowledged consent and understanding of privacy and security of the video platform  The patient has agreed to participate and understands they can discontinue the visit at any time  Patient is aware this is a billable service  Subjective  Megan Marroquin is a 49 y o  female      Data: Katy Mcwilliams discussed "pity-parties" but ending feeling down  She was allowed to express herself  Person-Centered, DBT-based, Positive Psychology, and CBT methods were combined to aid Pt's work on learned techniques to regulate emotions and remind her to teach her nevous system on a different response persistently, with repetition, and with patience  Assessment: Katy Mcwilliams appeared positive, talkative, emotional, and expressive  She maintained sufficient eye contact and had very valid fear sand concerns  Katy Mcwilliams showed great deal of deep insight about her emotional development and the detrimental effects of her ex relastionship  Plan: Katy Mcwilliams will continue with the Relaxation group on Fridays and weekly sessions to express her conflicts form her traumatic relationship with her ex-  She will continue processing her son's loss and work on acceptance and self-forgiveness  HPI     Past Medical History:   Diagnosis Date    Bilateral carpal tunnel syndrome 8/20/2019    Carpal tunnel syndrome of right wrist 10/30/2019    Added automatically from request for surgery [id number]    Hypertension        Past Surgical History:   Procedure Laterality Date    BREAST BIOPSY Left 20yrs ago benign    OOPHORECTOMY Right     [place] WRIST Akira S[place] LIG Right 1/21/2020    Procedure: RELEASE CARPAL TUNNEL ENDOSCOPIC;  Surgeon: Denise Beverly MD;  [facility];  Service: Orthopedics    [place] WRIST Akira Sole LIG Left 1/28/2020    Procedure: RELEASE CARPAL TUNNEL ENDOSCOPIC;  Surgeon: Denise Beverly MD;  [facility];  Service: Orthopedics       Current Outpatient Medications   Medication Sig Dispense Refill    albuterol (PROVENTIL HFA,VENTOLIN HFA) 90 mcg/act inhaler inhale 2 puffs by mouth and INTO THE LUNGS every 6 hours IF NEEDED FOR WHEEZING   8 5 g 2    cholecalciferol (VITAMIN D3) 400 units tablet Take 400 Units by mouth daily      FLUoxetine (PROzac) 20 mg capsule Take 1 capsule (20 mg total) by mouth daily 30 capsule 2    fluticasone (FLONASE) 50 mcg/act nasal spray 1 spray into each nostril daily 16 g 2    gabapentin (NEURONTIN) 100 mg capsule take 1 capsule by mouth three times a day 180 capsule 1    lamoTRIgine (LaMICtal) 200 MG tablet Take 1 tablet (200 mg total) by mouth daily 90 tablet 0    levocetirizine (XYZAL) 5 MG tablet Take 1 tablet (5 mg total) by mouth every evening 30 tablet 4    nicotine polacrilex (COMMIT) 4 MG lozenge Apply 1 lozenge (4 mg total) to the mouth or throat as needed for smoking cessation (Patient not taking: Reported on 5/5/2022 ) 100 each 5    omeprazole (PriLOSEC) 20 mg delayed release capsule Take 1 capsule (20 mg total) by mouth 2 (two) times a day 60 capsule 5    risperiDONE (RisperDAL) 1 mg tablet take 1 tablet by mouth at bedtime 30 tablet 1    senna-docusate sodium (SENOKOT-S) 8 6-50 mg per tablet Take 3 tablets by mouth as needed        Spiriva Respimat 2 5 MCG/ACT AERS inhaler inhale 2 puffs by mouth and INTO THE LUNGS once daily 4 g 5     No current facility-administered medications for this visit  No Known Allergies    Review of Systems    Video Exam    There were no vitals filed for this visit  Physical Exam     I spent 44 minutes directly with the patient during this visit    VIRTUAL VISIT DISCLAIMER    Jennifer Demetrius verbally agrees to participate in [employer]  Pt is aware that [employer] could be limited without vital signs or the ability to perform a full hands-on physical Safia Tameka understands she or the provider may request at any time to terminate the video visit and request the patient to seek care or treatment in person

## 2022-07-01 ENCOUNTER — TELEMEDICINE (OUTPATIENT)
Dept: BEHAVIORAL/MENTAL HEALTH CLINIC | Facility: CLINIC | Age: 57
End: 2022-07-01
Payer: COMMERCIAL

## 2022-07-01 DIAGNOSIS — F41.1 GENERALIZED ANXIETY DISORDER: ICD-10-CM

## 2022-07-01 DIAGNOSIS — F43.10 PTSD (POST-TRAUMATIC STRESS DISORDER): ICD-10-CM

## 2022-07-01 DIAGNOSIS — F31.81 MODERATE MIXED BIPOLAR II DISORDER (HCC): Primary | ICD-10-CM

## 2022-07-01 PROCEDURE — 90853 GROUP PSYCHOTHERAPY: CPT | Performed by: COUNSELOR

## 2022-07-01 NOTE — PSYCH
Virtual Regular Visit    Verification of patient location:    Patient is located in the following state in which I hold an active license [place]      Assessment/Plan:    Problem List Items Addressed This Visit        Other    Moderate mixed bipolar II disorder (HCC) - Primary    Generalized anxiety disorder    PTSD (post-traumatic stress disorder)          Goals addressed in session: Goal 1          Reason for visit is No chief complaint on file  Encounter provider Pipe Aguillon [facility]    Provider located at [address] [place] [id number] [phone]      Recent Visits  Date Type Provider Dept   06/30/22 229 [facility], [address] [place] [address]   06/24/22 229 [facility] [place] [address] [facility] The[facility]ist [facility]   Showing recent visits within past 7 days and meeting all other requirements  Today's Visits  Date Type Provider Dept   07/01/22 229 [facility], [address] [place] [address]   Showing today's visits and meeting all other requirements  Future Appointments  No visits were found meeting these conditions  Showing future appointments within next 150 days and meeting all other requirements       The patient was identified by name and date of birth  Cinthya Martins was informed that this is a telemedicine visit and that the visit is being conducted throughMicrosoft Teams and patient was informed that this is a secure, HIPAA-compliant platform  She agrees to proceed     Other methods to assure confidentiality were taken inform about group settings  The patient was notified the following individuals were present in the room other group participants  She acknowledged consent and understanding of privacy and security of the video platform   The patient has agreed to participate and understands they can discontinue the visit at any time     Patient is aware this is a billable service  Subjective  Catalina Spencer is a 49 y o  female   Data: Emeterio Barker attended the Relaxation group and received more psychoeducation about the used methods, and the benefits to the wellbeing and the nervous systems response  Attendees were guided through several exercises, and they shared their experiences with stress and countering anxiety throughout last week and overall  Apart of CBT and DBT-based approach, a group approach was used to support the cohesion and maintain the authentic, safe, serene environment for the exercises purpose  Assessment: Emeterio Barker was attentive, present, focused  Followed through guidance and completed the exercises  Showed willingness to continue learning and build on the learned was expressed  Plan: Emeterio Barker will practice deep breathing and other breathing techniques presented to counter anxiety along with muscle relaxation, will include body scan when possible to identify physical signs and will attend the next group to be able to take part in further guided exercises on chosen topics and specific focus to build on the breathing techniques learned in the beginning        HPI     Past Medical History:   Diagnosis Date    Bilateral carpal tunnel syndrome 8/20/2019    Carpal tunnel syndrome of right wrist 10/30/2019    Added automatically from request for surgery [id number]    Hypertension        Past Surgical History:   Procedure Laterality Date    BREAST BIOPSY Left 20yrs ago benign    OOPHORECTOMY Right     [place] WRIST Kayce Small LIG Right 1/21/2020    Procedure: RELEASE CARPAL TUNNEL ENDOSCOPIC;  Surgeon: Kelly Mcclelland MD;  [facility];  Service: Orthopedics    SD WRIST Kayce Small LIG Left 1/28/2020    Procedure: RELEASE CARPAL TUNNEL ENDOSCOPIC;  Surgeon: Kelly Mcclelland MD;  [facility];  Service: Orthopedics       Current Outpatient Medications   Medication Sig Dispense Refill    albuterol (PROVENTIL HFA,VENTOLIN HFA) 90 mcg/act inhaler inhale 2 puffs by mouth and INTO THE LUNGS every 6 hours IF NEEDED FOR WHEEZING  8 5 g 2    cholecalciferol (VITAMIN D3) 400 units tablet Take 400 Units by mouth daily      FLUoxetine (PROzac) 20 mg capsule Take 1 capsule (20 mg total) by mouth daily 30 capsule 2    fluticasone (FLONASE) 50 mcg/act nasal spray 1 spray into each nostril daily 16 g 2    gabapentin (NEURONTIN) 100 mg capsule take 1 capsule by mouth three times a day 180 capsule 1    lamoTRIgine (LaMICtal) 200 MG tablet Take 1 tablet (200 mg total) by mouth daily 90 tablet 0    levocetirizine (XYZAL) 5 MG tablet Take 1 tablet (5 mg total) by mouth every evening 30 tablet 4    nicotine polacrilex (COMMIT) 4 MG lozenge Apply 1 lozenge (4 mg total) to the mouth or throat as needed for smoking cessation (Patient not taking: Reported on 5/5/2022 ) 100 each 5    omeprazole (PriLOSEC) 20 mg delayed release capsule Take 1 capsule (20 mg total) by mouth 2 (two) times a day 60 capsule 5    risperiDONE (RisperDAL) 1 mg tablet take 1 tablet by mouth at bedtime 30 tablet 1    senna-docusate sodium (SENOKOT-S) 8 6-50 mg per tablet Take 3 tablets by mouth as needed        Spiriva Respimat 2 5 MCG/ACT AERS inhaler inhale 2 puffs by mouth and INTO THE LUNGS once daily 4 g 5     No current facility-administered medications for this visit  No Known Allergies    Review of Systems    Video Exam    There were no vitals filed for this visit  Physical Exam     I spent 51 minutes directly with the patient during this visit    VIRTUAL VISIT DISCLAIMER    Roma Sonya verbally agrees to participate in [employer]   Pt is aware that [employer] could be limited without vital signs or the ability to perform a full hands-on physical Checo Asencio understands she or the provider may request at any time to terminate the video visit and request the patient to seek care or treatment in person

## 2022-07-02 DIAGNOSIS — F31.61 BIPOLAR DISORDER, CURRENT EPISODE MIXED, MILD (HCC): ICD-10-CM

## 2022-07-05 ENCOUNTER — TELEPHONE (OUTPATIENT)
Dept: PSYCHIATRY | Facility: CLINIC | Age: 57
End: 2022-07-05

## 2022-07-05 DIAGNOSIS — F31.61 BIPOLAR DISORDER, CURRENT EPISODE MIXED, MILD (HCC): ICD-10-CM

## 2022-07-05 NOTE — TELEPHONE ENCOUNTER
Pt needs a call back to reschedule the appt she just made she can not due 7/21/22 with Edu Hunter thank you

## 2022-07-06 RX ORDER — LAMOTRIGINE 200 MG/1
TABLET ORAL
Qty: 90 TABLET | Refills: 0 | Status: SHIPPED | OUTPATIENT
Start: 2022-07-06 | End: 2022-08-25 | Stop reason: SDUPTHER

## 2022-07-06 RX ORDER — LAMOTRIGINE 200 MG/1
200 TABLET ORAL DAILY
Qty: 90 TABLET | Refills: 0 | OUTPATIENT
Start: 2022-07-06 | End: 2022-10-04

## 2022-07-07 ENCOUNTER — TELEMEDICINE (OUTPATIENT)
Dept: BEHAVIORAL/MENTAL HEALTH CLINIC | Facility: CLINIC | Age: 57
End: 2022-07-07
Payer: COMMERCIAL

## 2022-07-07 DIAGNOSIS — F43.10 PTSD (POST-TRAUMATIC STRESS DISORDER): Primary | ICD-10-CM

## 2022-07-07 DIAGNOSIS — F41.1 GENERALIZED ANXIETY DISORDER: ICD-10-CM

## 2022-07-07 DIAGNOSIS — F31.81 MODERATE MIXED BIPOLAR II DISORDER (HCC): ICD-10-CM

## 2022-07-07 PROCEDURE — 90834 PSYTX W PT 45 MINUTES: CPT | Performed by: COUNSELOR

## 2022-07-07 NOTE — BH TREATMENT PLAN
Arnold Hem  1965       Date of Initial Treatment Plan: 3/23/22   Date of Current Treatment Plan: 7/7/22     Treatment Plan Number 2      Strengths/Personal Resources for Self Care: Jose Lincoln is willing to work on reducing anxiety and depression as well as processing her trauma in safer ways, learning another nervous system response with muscle relaxation  Josekristen Lincoln is very responsible and dedicated to her mental health work       Diagnosis:   1  Moderate mixed bipolar II disorder (HCC)      2  Generalized anxiety disorder      3  PTSD (post-traumatic stress disorder)                       Area of Needs: Mood, emotion regulation         Long Term Goal 1: A decrease depression to improve daily functioning, rate through PHQ9 monthly for evidence of symptom change  I want to be able to enjoy my life       Target Date: Jan 6, 2023  Completion Date: TBD         Short Term Objectives for Goal 1: A learn to practice and be consistent with DBT skills application, report exercising at least Core Mindfulness "beware of judgment" everyday, along with current med managament, responsible for which is Jose Lincoln, Paychiatrist, and Therapist        Long Term Goal 2: decrease anxiety to improve daily functioning, rate thorugh GAD7 monthly for evidence of symptom change   I want to be able to relax and stay that way for longer, she shares       Target Date: Jan 6, 2023  Completion Date: TBD     Short Term Objectives for Goal 2: A learn more sets of skills from Freddy Valdez and CBT, practice at least one everyday, along with current med management, responsible for which is Jose Lincoln, Psychiatrist, and Therapist            Long Term Goal # 3: N/A      Target Date: N/A  Completion Date: N/A     Short Term Objectives for Goal 3: N/A     GOAL 1: Modality: Individual 4x per month   Completion Date TBD     GOAL 2: Modality: Individual 4x per month   Completion Date TBD     GOAL 3: Modality: Individual NAx per month   Completion Date NA        Behavioral Health Treatment Plan [facility]: Diagnosis and Treatment Plan explained to Marlyn Kumar relates understanding diagnosis and is agreeable to Treatment Plan          Client Comments : Please share your thoughts, feelings, need and/or experiences regarding your treatment plan: Agreeable to follow through and stay committed to her goals and agreements  Arnold Alonso, 1965, actively participated in the review and update of this treatment plan during a virtual session, using the [employer] Teams platform   Arnold Alonso  provided verbal consent on 7/7/2022 at 3;44 PM  The treatment plan was transcribed into the [employer] Record at a later time

## 2022-07-07 NOTE — PSYCH
Virtual Regular Visit    Verification of patient location:    Patient is located in the following state in which I hold an active license [place]      Assessment/Plan:    Problem List Items Addressed This Visit        Other    Moderate mixed bipolar II disorder (HCC)    Generalized anxiety disorder    PTSD (post-traumatic stress disorder) - Primary          Goals addressed in session: Goal 1 and Goal 2          Reason for visit is No chief complaint on file  Encounter provider FARH[facility]    Provider located at [address] [place] [id number] [phone]      Recent Visits  Date Type Provider Dept   07/01/22 229 [facility], [address] [place] [address]   06/30/22 229 [facility], [facility] Therapist Heriberto   Showing recent visits within past 7 days and meeting all other requirements  Today's Visits  Date Type Provider Dept   07/07/22 229 [facility], [address] [place] [address]   Showing today's visits and meeting all other requirements  Future Appointments  No visits were found meeting these conditions  Showing future appointments within next 150 days and meeting all other requirements       The patient was identified by name and date of birth  Hill Meza was informed that this is a telemedicine visit and that the visit is being conducted throughMicrosoft Teams and patient was informed that this is a secure, HIPAA-compliant platform  She agrees to proceed     My office door was closed  No one else was in the room  She acknowledged consent and understanding of privacy and security of the video platform  The patient has agreed to participate and understands they can discontinue the visit at any time  Patient is aware this is a billable service  Subjective  Hill Meza is a 49 y o  female      Data: Rupali Oneill discussed her [address] and revised her goals  She shared that she would still like to work on handling her emotions but feels "broken "  Rupali Oneill reported "rough" holidays and spending time "to figure things out " Person-Centered, DBT-based, and CBT techniques were combined to help her effort to process in safer ways her trauma but struggled  Assessment: Rupali Oneill was smiling, positive, emotional at times very intense, was reflective on her past but difficulties handling the content  Rupali Oneill showed great deal of responsibility to her goals and emotional work  Plan: Rupali Oneill wants to continue with the Relaxation group on Fridays and to be able to expand on self-care  She will try to remember and practice muscle relaxation before accessing her painful memories to be able to pair the two and learn another response to trauma  HPI     Past Medical History:   Diagnosis Date    Bilateral carpal tunnel syndrome 8/20/2019    Carpal tunnel syndrome of right wrist 10/30/2019    Added automatically from request for surgery [id number]    Hypertension        Past Surgical History:   Procedure Laterality Date    BREAST BIOPSY Left 20yrs ago benign    OOPHORECTOMY Right     [place] WRIST Jinnie Footman LIG Right 1/21/2020    Procedure: RELEASE CARPAL TUNNEL ENDOSCOPIC;  Surgeon: Daksha Bonilla MD;  [facility];  Service: Orthopedics    [place] WRIST Jinnie Footman LIG Left 1/28/2020    Procedure: RELEASE CARPAL TUNNEL ENDOSCOPIC;  Surgeon: Daksha Bonilla MD;  [facility];  Service: Orthopedics       Current Outpatient Medications   Medication Sig Dispense Refill    albuterol (PROVENTIL HFA,VENTOLIN HFA) 90 mcg/act inhaler inhale 2 puffs by mouth and INTO THE LUNGS every 6 hours IF NEEDED FOR WHEEZING   8 5 g 2    cholecalciferol (VITAMIN D3) 400 units tablet Take 400 Units by mouth daily      FLUoxetine (PROzac) 20 mg capsule Take 1 capsule (20 mg total) by mouth daily 30 capsule 2    fluticasone (FLONASE) 50 mcg/act nasal spray 1 spray into each nostril daily 16 g 2    gabapentin (NEURONTIN) 100 mg capsule take 1 capsule by mouth three times a day 180 capsule 1    lamoTRIgine (LaMICtal) 200 MG tablet take 1 tablet by mouth once daily 90 tablet 0    levocetirizine (XYZAL) 5 MG tablet Take 1 tablet (5 mg total) by mouth every evening 30 tablet 4    nicotine polacrilex (COMMIT) 4 MG lozenge Apply 1 lozenge (4 mg total) to the mouth or throat as needed for smoking cessation (Patient not taking: Reported on 5/5/2022 ) 100 each 5    omeprazole (PriLOSEC) 20 mg delayed release capsule Take 1 capsule (20 mg total) by mouth 2 (two) times a day 60 capsule 5    risperiDONE (RisperDAL) 1 mg tablet take 1 tablet by mouth at bedtime 30 tablet 1    senna-docusate sodium (SENOKOT-S) 8 6-50 mg per tablet Take 3 tablets by mouth as needed        Spiriva Respimat 2 5 MCG/ACT AERS inhaler inhale 2 puffs by mouth and INTO THE LUNGS once daily 4 g 5     No current facility-administered medications for this visit  No Known Allergies    Review of Systems    Video Exam    There were no vitals filed for this visit  Physical Exam     I spent 45 minutes directly with the patient during this visit    VIRTUAL VISIT DISCLAIMER    Koko Maria verbally agrees to participate in [employer]  Pt is aware that [employer] could be limited without vital signs or the ability to perform a full hands-on physical Caprice Gibson understands she or the provider may request at any time to terminate the video visit and request the patient to seek care or treatment in person

## 2022-07-08 ENCOUNTER — TELEMEDICINE (OUTPATIENT)
Dept: BEHAVIORAL/MENTAL HEALTH CLINIC | Facility: CLINIC | Age: 57
End: 2022-07-08
Payer: COMMERCIAL

## 2022-07-08 DIAGNOSIS — F31.81 MODERATE MIXED BIPOLAR II DISORDER (HCC): ICD-10-CM

## 2022-07-08 DIAGNOSIS — F41.1 GENERALIZED ANXIETY DISORDER: ICD-10-CM

## 2022-07-08 DIAGNOSIS — F43.10 PTSD (POST-TRAUMATIC STRESS DISORDER): Primary | ICD-10-CM

## 2022-07-08 PROCEDURE — 90853 GROUP PSYCHOTHERAPY: CPT | Performed by: COUNSELOR

## 2022-07-08 NOTE — PSYCH
Virtual Regular Visit    Verification of patient location:    Patient is located in the following state in which I hold an active license [place]      Assessment/Plan:    Problem List Items Addressed This Visit        Other    Moderate mixed bipolar II disorder (HCC)    Generalized anxiety disorder    PTSD (post-traumatic stress disorder) - Primary          Goals addressed in session: Goal 1 and Goal 2          Reason for visit is No chief complaint on file  Encounter provider Melonie [facility]    Provider located at [address] [place] [id number] [phone]      Recent Visits  Date Type Provider Dept   07/07/22 229 [facility], [address] [place] [address]   07/01/22 229 [facility], [facility] Therapist Olman Valentin   Showing recent visits within past 7 days and meeting all other requirements  Today's Visits  Date Type Provider Dept   07/08/22 229 [facility], [address] [place] [address]   Showing today's visits and meeting all other requirements  Future Appointments  No visits were found meeting these conditions  Showing future appointments within next 150 days and meeting all other requirements       The patient was identified by name and date of birth  Roma Hassan was informed that this is a telemedicine visit and that the visit is being conducted throughMicrosoft Teams and patient was informed that this is a secure, HIPAA-compliant platform  She agrees to proceed     My office door was closed  Other methods to assure confidentiality were taken informing about other group members  No one else was in the room  and The patient was notified the following individuals were present in the room the rest of the group attendees  She acknowledged consent and understanding of privacy and security of the video platform   The patient has agreed to participate and understands they can discontinue the visit at any time  Patient is aware this is a billable service  Subjective  Hermelindo Quinteros is a 49 y o  female    Data: Rupali Oneill attended the Relaxation group and was guided along with the other attendees through several exercises- diaphragmatic breathing with progressive muscle relaxation, body scan, mindfulness mediation against anxiety and depression, and connecting to the inner child  Apart of CBT and DBT-based approach, a group approach was used to support the cohesion and maintain the authentic, safe, serene environment for the exercises purpose  Assessment: Rupali Oneill was attentive, present, focused  Followed through guidance and completed the exercises  Showed willingness to continue learning and build on the learned was expressed  Plan: Rupali Oneill agrees to practice deep breathing and other techniques presented to counter anxiety along with muscle relaxation, will include body scan when possible to identify physical signs and will attend the next group to be able to take part in further guided exercises on chosen topics to build on the basic techniques learned in the beginning          HPI     Past Medical History:   Diagnosis Date    Bilateral carpal tunnel syndrome 8/20/2019    Carpal tunnel syndrome of right wrist 10/30/2019    Added automatically from request for surgery [id number]    Hypertension        Past Surgical History:   Procedure Laterality Date    BREAST BIOPSY Left 20yrs ago benign    OOPHORECTOMY Right     [place] WRIST Jinnie Footman LIG Right 1/21/2020    Procedure: RELEASE CARPAL TUNNEL ENDOSCOPIC;  Surgeon: Daksha Bonilla MD;  [facility];  Service: Orthopedics    [place] WRIST Jinnie Footman LIG Left 1/28/2020    Procedure: RELEASE CARPAL TUNNEL ENDOSCOPIC;  Surgeon: Daksha Bonilla MD;  [facility];  Service: Orthopedics       Current Outpatient Medications   Medication Sig Dispense Refill    albuterol (PROVENTIL HFA,VENTOLIN HFA) 90 mcg/act inhaler inhale 2 puffs by mouth and INTO THE LUNGS every 6 hours IF NEEDED FOR WHEEZING  8 5 g 2    cholecalciferol (VITAMIN D3) 400 units tablet Take 400 Units by mouth daily      FLUoxetine (PROzac) 20 mg capsule Take 1 capsule (20 mg total) by mouth daily 30 capsule 2    fluticasone (FLONASE) 50 mcg/act nasal spray 1 spray into each nostril daily 16 g 2    gabapentin (NEURONTIN) 100 mg capsule take 1 capsule by mouth three times a day 180 capsule 1    lamoTRIgine (LaMICtal) 200 MG tablet take 1 tablet by mouth once daily 90 tablet 0    levocetirizine (XYZAL) 5 MG tablet Take 1 tablet (5 mg total) by mouth every evening 30 tablet 4    nicotine polacrilex (COMMIT) 4 MG lozenge Apply 1 lozenge (4 mg total) to the mouth or throat as needed for smoking cessation (Patient not taking: Reported on 5/5/2022 ) 100 each 5    omeprazole (PriLOSEC) 20 mg delayed release capsule Take 1 capsule (20 mg total) by mouth 2 (two) times a day 60 capsule 5    risperiDONE (RisperDAL) 1 mg tablet take 1 tablet by mouth at bedtime 30 tablet 1    senna-docusate sodium (SENOKOT-S) 8 6-50 mg per tablet Take 3 tablets by mouth as needed        Spiriva Respimat 2 5 MCG/ACT AERS inhaler inhale 2 puffs by mouth and INTO THE LUNGS once daily 4 g 5     No current facility-administered medications for this visit  No Known Allergies    Review of Systems    Video Exam    There were no vitals filed for this visit  Physical Exam     I spent 60 minutes directly with the patient during this visit    VIRTUAL VISIT DISCLAIMER    Won Marin verbally agrees to participate in [employer]   Pt is aware that [employer] could be limited without vital signs or the ability to perform a full hands-on physical Anita Braga understands she or the provider may request at any time to terminate the video visit and request the patient to seek care or treatment in person

## 2022-07-12 NOTE — PROGRESS NOTES
[facility] [address] [place] [address]    ASSESSMENT/PLAN: Yossi Nielson is a 49 y o  [id number] who presents for annual gynecologic exam     Encounter for routine gynecologic examination  - Routine well woman exam completed today  - Cervical Cancer Screening: Current ASCCP Guidelines reviewed  Last Pap: 07/13/2022 Normal cytology, Positive HPV other  Next Pap Due: today  - HPV Vaccination status: Not immunized  - STI screening offered including HIV: not indicated based on hx or requested at time of visit  - Breast Cancer Screening: Last Mammogram 09/03/2021, script given  - Colorectal cancer screening was not ordered  - The following were reviewed in today's visit: breast self exam, mammography screening ordered, adequate intake of calcium and vitamin D, exercise, healthy diet and age related changes  - RTO 1 yr    Additional problems addressed at this visit:  1  Routine gynecological examination  -     Liquid-based pap, screening  -     Mammo screening bilateral w 3d & cad; Future; Expected date: 07/13/2022    2  Encounter for screening mammogram for breast cancer  -     Mammo screening bilateral w 3d & cad; Future; Expected date: 07/13/2022    3  HPV in female    follow-up pap smear      CC:  Annual Gynecologic Examination    HPI: Yossi Nielson is a 49 y o  [id number] who presents for annual gynecologic examination  Mili Nazario presents today for gyn exam  No vaginal bleeding since onset of menopause  6/2021 last pap smear- negative cytology, positive HPV, Hx of abnormal pap smear last year was first  Sexually active- no  9/2021 mammogram- normal , and 3/2021 cologuard  Reports 6-7 interrupted hrs of sleep daily, Minimal servings of calcium rich foods daily  Walks daily  1-2 servings of caffeine daily  Does not perform SBE  Concerns: pap smear results           The following portions of the patient's history were reviewed and updated as appropriate: allergies, current medications, past family history, past medical history, obstetric history, gynecologic history, past social history, past surgical history and problem list     Review of Systems   Constitutional: Negative for activity change, appetite change, fatigue and fever  Respiratory: Positive for shortness of breath  Negative for cough  Cardiovascular: Negative for chest pain, palpitations and leg swelling  Gastrointestinal: Negative for constipation and diarrhea  Genitourinary: Negative for difficulty urinating, frequency, vaginal bleeding, vaginal discharge and vaginal pain  Neurological: Negative for light-headedness and headaches  Psychiatric/Behavioral: The patient is nervous/anxious  Objective:  /76   Ht 5' 1" (1 549 m)   Wt 56 4 kg (124 lb 6 4 oz)   LMP  (LMP Unknown)   BMI 23 51 kg/m²    Physical Exam  Vitals reviewed  Constitutional:       Appearance: Normal appearance  HENT:      Head: Normocephalic  Neck:      Thyroid: No thyroid mass or thyroid tenderness  Cardiovascular:      Rate and Rhythm: Normal rate and regular rhythm  Heart sounds: Normal heart sounds  Pulmonary:      Effort: Pulmonary effort is normal       Comments: Expiratory wheeze noted  Chest:   Breasts:      Right: No mass, nipple discharge, skin change, tenderness or axillary adenopathy  Left: No mass, nipple discharge, skin change, tenderness or axillary adenopathy  Abdominal:      General: There is no distension  Palpations: There is no mass  Tenderness: There is no abdominal tenderness  There is no guarding  Genitourinary:     General: Normal vulva  Exam position: Lithotomy position  Labia:         Right: No tenderness or lesion  Left: No tenderness or lesion  Vagina: No vaginal discharge, tenderness, bleeding or lesions  Cervix: No discharge, lesion, erythema or cervical bleeding  Uterus: Normal  Not enlarged and not tender         Adnexa:         Right: No mass, tenderness or fullness  Left: No mass, tenderness or fullness  Musculoskeletal:      Cervical back: Normal range of motion  Lymphadenopathy:      Upper Body:      Right upper body: No axillary adenopathy  Left upper body: No axillary adenopathy  Skin:     General: Skin is warm and dry  Neurological:      Mental Status: She is alert     Psychiatric:         Mood and Affect: Mood normal          Behavior: Behavior normal          Judgment: Judgment normal              Priti Ward CNM  [facility]  07/13/22 4:55 PM

## 2022-07-13 ENCOUNTER — ANNUAL EXAM (OUTPATIENT)
Dept: OBGYN CLINIC | Facility: CLINIC | Age: 57
End: 2022-07-13
Payer: COMMERCIAL

## 2022-07-13 VITALS
HEIGHT: 61 IN | DIASTOLIC BLOOD PRESSURE: 76 MMHG | SYSTOLIC BLOOD PRESSURE: 128 MMHG | BODY MASS INDEX: 23.49 KG/M2 | WEIGHT: 124.4 LBS

## 2022-07-13 DIAGNOSIS — Z01.419 ROUTINE GYNECOLOGICAL EXAMINATION: Primary | ICD-10-CM

## 2022-07-13 DIAGNOSIS — B97.7 HPV IN FEMALE: ICD-10-CM

## 2022-07-13 DIAGNOSIS — Z12.31 ENCOUNTER FOR SCREENING MAMMOGRAM FOR BREAST CANCER: ICD-10-CM

## 2022-07-13 PROCEDURE — G0124 SCREEN C/V THIN LAYER BY MD: HCPCS | Performed by: SPECIALIST

## 2022-07-13 PROCEDURE — G0476 HPV COMBO ASSAY CA SCREEN: HCPCS | Performed by: ADVANCED PRACTICE MIDWIFE

## 2022-07-13 PROCEDURE — 99396 PREV VISIT EST AGE 40-64: CPT | Performed by: ADVANCED PRACTICE MIDWIFE

## 2022-07-13 PROCEDURE — 0503F POSTPARTUM CARE VISIT: CPT | Performed by: ADVANCED PRACTICE MIDWIFE

## 2022-07-13 PROCEDURE — G0145 SCR C/V CYTO,THINLAYER,RESCR: HCPCS | Performed by: SPECIALIST

## 2022-07-14 ENCOUNTER — APPOINTMENT (OUTPATIENT)
Dept: RADIOLOGY | Facility: CLINIC | Age: 57
End: 2022-07-14
Payer: COMMERCIAL

## 2022-07-14 ENCOUNTER — TELEMEDICINE (OUTPATIENT)
Dept: BEHAVIORAL/MENTAL HEALTH CLINIC | Facility: CLINIC | Age: 57
End: 2022-07-14
Payer: COMMERCIAL

## 2022-07-14 ENCOUNTER — OFFICE VISIT (OUTPATIENT)
Dept: OBGYN CLINIC | Facility: CLINIC | Age: 57
End: 2022-07-14
Payer: COMMERCIAL

## 2022-07-14 VITALS
HEIGHT: 61 IN | BODY MASS INDEX: 23.41 KG/M2 | WEIGHT: 124 LBS | HEART RATE: 96 BPM | SYSTOLIC BLOOD PRESSURE: 147 MMHG | DIASTOLIC BLOOD PRESSURE: 84 MMHG

## 2022-07-14 DIAGNOSIS — M17.0 BILATERAL PRIMARY OSTEOARTHRITIS OF KNEE: ICD-10-CM

## 2022-07-14 DIAGNOSIS — F41.1 GENERALIZED ANXIETY DISORDER: ICD-10-CM

## 2022-07-14 DIAGNOSIS — G89.29 CHRONIC PAIN OF RIGHT KNEE: ICD-10-CM

## 2022-07-14 DIAGNOSIS — M25.561 CHRONIC PAIN OF RIGHT KNEE: ICD-10-CM

## 2022-07-14 DIAGNOSIS — F31.81 MODERATE MIXED BIPOLAR II DISORDER (HCC): Primary | ICD-10-CM

## 2022-07-14 DIAGNOSIS — F31.61 BIPOLAR DISORDER, CURRENT EPISODE MIXED, MILD (HCC): ICD-10-CM

## 2022-07-14 DIAGNOSIS — M25.562 CHRONIC PAIN OF LEFT KNEE: ICD-10-CM

## 2022-07-14 DIAGNOSIS — F43.10 PTSD (POST-TRAUMATIC STRESS DISORDER): ICD-10-CM

## 2022-07-14 DIAGNOSIS — G89.29 CHRONIC PAIN OF LEFT KNEE: Primary | ICD-10-CM

## 2022-07-14 DIAGNOSIS — G89.29 CHRONIC PAIN OF LEFT KNEE: ICD-10-CM

## 2022-07-14 DIAGNOSIS — M25.562 CHRONIC PAIN OF LEFT KNEE: Primary | ICD-10-CM

## 2022-07-14 PROCEDURE — 99244 OFF/OP CNSLTJ NEW/EST MOD 40: CPT | Performed by: FAMILY MEDICINE

## 2022-07-14 PROCEDURE — 20610 DRAIN/INJ JOINT/BURSA W/O US: CPT | Performed by: FAMILY MEDICINE

## 2022-07-14 PROCEDURE — 73562 X-RAY EXAM OF KNEE 3: CPT

## 2022-07-14 PROCEDURE — 90834 PSYTX W PT 45 MINUTES: CPT | Performed by: COUNSELOR

## 2022-07-14 RX ORDER — METHYLPREDNISOLONE ACETATE 40 MG/ML
1 INJECTION, SUSPENSION INTRA-ARTICULAR; INTRALESIONAL; INTRAMUSCULAR; SOFT TISSUE
Status: COMPLETED | OUTPATIENT
Start: 2022-07-14 | End: 2022-07-14

## 2022-07-14 RX ORDER — RISPERIDONE 1 MG/1
TABLET, FILM COATED ORAL
Qty: 30 TABLET | Refills: 1 | Status: SHIPPED | OUTPATIENT
Start: 2022-07-14 | End: 2022-07-26

## 2022-07-14 RX ORDER — LIDOCAINE HYDROCHLORIDE 10 MG/ML
4 INJECTION, SOLUTION INFILTRATION; PERINEURAL
Status: COMPLETED | OUTPATIENT
Start: 2022-07-14 | End: 2022-07-14

## 2022-07-14 RX ADMIN — LIDOCAINE HYDROCHLORIDE 4 ML: 10 INJECTION, SOLUTION INFILTRATION; PERINEURAL at 17:13

## 2022-07-14 RX ADMIN — METHYLPREDNISOLONE ACETATE 1 ML: 40 INJECTION, SUSPENSION INTRA-ARTICULAR; INTRALESIONAL; INTRAMUSCULAR; SOFT TISSUE at 17:13

## 2022-07-14 NOTE — LETTER
July 14, 2022     [address]    Patient: Roma Hassan   YOB: 1965   Date of Visit: 7/14/2022       Dear Dr Soraya Serrato:    Thank you for referring Roma Hassan to me for evaluation  Below are my notes for this consultation  If you have questions, please do not hesitate to call me  I look forward to following your patient along with you  Sincerely,        Dariusz Bearden MD        CC: No Recipients  Dariusz Bearden MD  7/14/2022  5:13 PM  Signed  [facility] [place] [address] [facility] [address]  [facility] [address]  [phone]  [phone]      Chief Complaint:  Chief Complaint   Patient presents with    Left Knee - Pain    Right Knee - Pain       Vitals:  /84 (BP Location: Left arm, Patient Position: Sitting, Cuff Size: Standard)   Pulse 96   Ht 5' 1" (1 549 m)   Wt 56 2 kg (124 lb)   LMP  (LMP Unknown)   BMI 23 43 kg/m²     The following portions of the patient's history were reviewed and updated as appropriate: allergies, current medications, past family history, past medical history, past social history, past surgical history, and problem list       Subjective:   Patient ID: Roma Hassan is a 49 y o  female  Here c/o B knee pain  Pain for a few months  Denies any injury/trauma  Ibuprofen  Swells/giving out  No locking  Hurts to sit Lorna style  Hurts to walk  Review of Systems   Constitutional: Negative for fatigue and fever  Respiratory: Negative for shortness of breath  Cardiovascular: Negative for chest pain  Gastrointestinal: Negative for abdominal pain and nausea  Genitourinary: Negative for dysuria  Musculoskeletal: Positive for arthralgias  Skin: Negative for rash and wound  Neurological: Negative for weakness and headaches  Objective:  Right Knee Exam     Tenderness   The patient is experiencing no tenderness       Range of Motion   The patient has normal right knee ROM  Tests   Toni:  Medial - positive Lateral - negative  Varus: negative Valgus: negative    Other   Swelling: none  Effusion: no effusion present      Left Knee Exam     Tenderness   The patient is experiencing tenderness in the medial joint line  Range of Motion   The patient has normal left knee ROM  Tests   Toni:  Medial - positive Lateral - negative  Varus: negative Valgus: negative    Other   Swelling: none  Effusion: no effusion present          Observations   Left Knee   Negative for effusion  Right Knee   Negative for effusion  Physical Exam  Vitals and nursing note reviewed  Constitutional:       Appearance: Normal appearance  She is well-developed  HENT:      Head: Normocephalic  Mouth/Throat:      Mouth: Mucous membranes are moist    Eyes:      Extraocular Movements: Extraocular movements intact  Cardiovascular:      Rate and Rhythm: Normal rate and regular rhythm  Heart sounds: Normal heart sounds  Pulmonary:      Effort: Pulmonary effort is normal       Breath sounds: Normal breath sounds  Abdominal:      General: Bowel sounds are normal       Palpations: Abdomen is soft  Musculoskeletal:         General: Normal range of motion  Cervical back: Normal range of motion  Right knee: No effusion  Instability Tests: Medial Toni test positive  Lateral Toni test negative  Left knee: No effusion  Instability Tests: Medial Toni test positive  Lateral Toni test negative  Skin:     General: Skin is warm and dry  Neurological:      General: No focal deficit present  Mental Status: She is alert and oriented to person, place, and time  Psychiatric:         Mood and Affect: Mood normal          Behavior: Behavior normal          Thought Content:  Thought content normal          I have personally reviewed pertinent films in PACS and my interpretation is XR-  B knee- medial joint space narrowing  mild OA  Large joint arthrocentesis: L knee  Universal Protocol:  Consent: Verbal consent obtained  Risks and benefits: risks, benefits and alternatives were discussed  Consent given by: patient  Time out: Immediately prior to procedure a "time out" was called to verify the correct patient, procedure, equipment, support staff and site/side marked as required  Timeout called at: 7/14/2022 5:11 PM   Site marked: the operative site was marked  Supporting Documentation  Indications: pain   Procedure Details  Location: knee - L knee  Preparation: Patient was prepped and draped in the usual sterile fashion  Needle size: 25 G  Ultrasound guidance: no  Approach: anterolateral  Medications administered: 4 mL lidocaine 1 %; 1 mL methylPREDNISolone acetate 40 mg/mL    Patient tolerance: patient tolerated the procedure well with no immediate complications  Dressing:  Sterile dressing applied    Large joint arthrocentesis: R knee  Universal Protocol:  Consent: Verbal consent obtained  Risks and benefits: risks, benefits and alternatives were discussed  Consent given by: patient  Time out: Immediately prior to procedure a "time out" was called to verify the correct patient, procedure, equipment, support staff and site/side marked as required    Timeout called at: 7/14/2022 5:12 PM   Site marked: the operative site was marked  Supporting Documentation  Indications: pain   Procedure Details  Location: knee - R knee  Preparation: Patient was prepped and draped in the usual sterile fashion  Needle size: 25 G  Ultrasound guidance: no  Approach: anterolateral  Medications administered: 4 mL lidocaine 1 %; 1 mL methylPREDNISolone acetate 40 mg/mL    Patient tolerance: patient tolerated the procedure well with no immediate complications  Dressing:  Sterile dressing applied        Assessment/Plan:  Assessment/Plan   Diagnoses and all orders for this visit:    Chronic pain of left knee  -     XR knee 3 vw left non injury; Future    Chronic pain of right knee        No follow-ups on file       Saundra Tejeda MD

## 2022-07-14 NOTE — PROGRESS NOTES
[facility] [place] [address]  [facility] [id number] [phone]      Chief Complaint:  Chief Complaint   Patient presents with    Left Knee - Pain    Right Knee - Pain       Vitals:  /84 (BP Location: Left arm, Patient Position: Sitting, Cuff Size: Standard)   Pulse 96   Ht 5' 1" (1 549 m)   Wt 56 2 kg (124 lb)   LMP  (LMP Unknown)   BMI 23 43 kg/m²     The following portions of the patient's history were reviewed and updated as appropriate: allergies, current medications, past family history, past medical history, past social history, past surgical history, and problem list       Subjective:   Patient ID: Cinthya Martins is a 49 y o  female  Here c/o B knee pain  Pain for a few months  Denies any injury/trauma  Ibuprofen  Swells/giving out  No locking  Hurts to sit Lorna style  Hurts to walk  Review of Systems   Constitutional: Negative for fatigue and fever  Respiratory: Negative for shortness of breath  Cardiovascular: Negative for chest pain  Gastrointestinal: Negative for abdominal pain and nausea  Genitourinary: Negative for dysuria  Musculoskeletal: Positive for arthralgias  Skin: Negative for rash and wound  Neurological: Negative for weakness and headaches  Objective:  Right Knee Exam     Tenderness   The patient is experiencing no tenderness  Range of Motion   The patient has normal right knee ROM  Tests   Toni:  Medial - positive Lateral - negative  Varus: negative Valgus: negative    Other   Swelling: none  Effusion: no effusion present      Left Knee Exam     Tenderness   The patient is experiencing tenderness in the medial joint line  Range of Motion   The patient has normal left knee ROM      Tests   Toni:  Medial - positive Lateral - negative  Varus: negative Valgus: negative    Other   Swelling: none  Effusion: no effusion present          Observations   Left Knee   Negative for effusion  Right Knee   Negative for effusion  Physical Exam  Vitals and nursing note reviewed  Constitutional:       Appearance: Normal appearance  She is well-developed  HENT:      Head: Normocephalic  Mouth/Throat:      Mouth: Mucous membranes are moist    Eyes:      Extraocular Movements: Extraocular movements intact  Cardiovascular:      Rate and Rhythm: Normal rate and regular rhythm  Heart sounds: Normal heart sounds  Pulmonary:      Effort: Pulmonary effort is normal       Breath sounds: Normal breath sounds  Abdominal:      General: Bowel sounds are normal       Palpations: Abdomen is soft  Musculoskeletal:         General: Normal range of motion  Cervical back: Normal range of motion  Right knee: No effusion  Instability Tests: Medial Toni test positive  Lateral Toni test negative  Left knee: No effusion  Instability Tests: Medial Toni test positive  Lateral Toni test negative  Skin:     General: Skin is warm and dry  Neurological:      General: No focal deficit present  Mental Status: She is alert and oriented to person, place, and time  Psychiatric:         Mood and Affect: Mood normal          Behavior: Behavior normal          Thought Content: Thought content normal          I have personally reviewed pertinent films in PACS and my interpretation is XR-  B knee- medial joint space narrowing  mild OA  Large joint arthrocentesis: L knee  Universal Protocol:  Consent: Verbal consent obtained  Risks and benefits: risks, benefits and alternatives were discussed  Consent given by: patient  Time out: Immediately prior to procedure a "time out" was called to verify the correct patient, procedure, equipment, support staff and site/side marked as required    Timeout called at: 7/14/2022 5:11 PM   Site marked: the operative site was marked  Supporting Documentation  Indications: pain   Procedure Details  Location: knee - L knee  Preparation: Patient was prepped and draped in the usual sterile fashion  Needle size: 25 G  Ultrasound guidance: no  Approach: anterolateral  Medications administered: 4 mL lidocaine 1 %; 1 mL methylPREDNISolone acetate 40 mg/mL    Patient tolerance: patient tolerated the procedure well with no immediate complications  Dressing:  Sterile dressing applied    Large joint arthrocentesis: R knee  Universal Protocol:  Consent: Verbal consent obtained  Risks and benefits: risks, benefits and alternatives were discussed  Consent given by: patient  Time out: Immediately prior to procedure a "time out" was called to verify the correct patient, procedure, equipment, support staff and site/side marked as required  Timeout called at: 7/14/2022 5:12 PM   Site marked: the operative site was marked  Supporting Documentation  Indications: pain   Procedure Details  Location: knee - R knee  Preparation: Patient was prepped and draped in the usual sterile fashion  Needle size: 25 G  Ultrasound guidance: no  Approach: anterolateral  Medications administered: 4 mL lidocaine 1 %; 1 mL methylPREDNISolone acetate 40 mg/mL    Patient tolerance: patient tolerated the procedure well with no immediate complications  Dressing:  Sterile dressing applied        Assessment/Plan:  Assessment/Plan   Diagnoses and all orders for this visit:    Chronic pain of left knee  -     XR knee 3 vw left non injury; Future    Chronic pain of right knee        No follow-ups on file       Silva Dawson MD
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
## 2022-07-15 ENCOUNTER — TELEMEDICINE (OUTPATIENT)
Dept: BEHAVIORAL/MENTAL HEALTH CLINIC | Facility: CLINIC | Age: 57
End: 2022-07-15
Payer: COMMERCIAL

## 2022-07-15 DIAGNOSIS — F43.10 PTSD (POST-TRAUMATIC STRESS DISORDER): ICD-10-CM

## 2022-07-15 DIAGNOSIS — F41.1 GENERALIZED ANXIETY DISORDER: ICD-10-CM

## 2022-07-15 DIAGNOSIS — F31.81 MODERATE MIXED BIPOLAR II DISORDER (HCC): Primary | ICD-10-CM

## 2022-07-15 PROCEDURE — 90853 GROUP PSYCHOTHERAPY: CPT | Performed by: COUNSELOR

## 2022-07-15 NOTE — PSYCH
Virtual Regular Visit    Verification of patient location:    Patient is located in the following state in which I hold an active license [place]      Assessment/Plan:    Problem List Items Addressed This Visit        Other    Moderate mixed bipolar II disorder (HCC) - Primary    Generalized anxiety disorder    PTSD (post-traumatic stress disorder)          Goals addressed in session: Goal 1 and Goal 2          Reason for visit is No chief complaint on file  [facility]    Provider located at [address] [place] [id number] [phone]      Recent Visits  Date Type Provider Dept   07/14/22 229 [facility], [address] [place] [address]   07/08/22 229 [facility], Britt [facility] Therapist Heriberto   Showing recent visits within past 7 days and meeting all other requirements  Today's Visits  Date Type Provider Dept   07/15/22 229 [facility], [address] [place] [address]   Showing today's visits and meeting all other requirements  Future Appointments  No visits were found meeting these conditions  Showing future appointments within next 150 days and meeting all other requirements       The patient was identified by name and date of birth  Arnold Alonso was informed that this is a telemedicine visit and that the visit is being conducted throughMicrosoft Teams and patient was informed that this is a secure, HIPAA-compliant platform  She agrees to proceed     My office door was closed  Other methods to assure confidentiality were taken informing the other group members  No one else was in the room  and The patient was notified the following individuals were present in the room the rest of the group attendees  She acknowledged consent and understanding of privacy and security of the video platform   The patient has agreed to participate and understands they can discontinue the visit at any time  Patient is aware this is a billable service  Subjective  Catalina Spencer is a 49 y o  female    Data: Emeterio Barker attended the Relaxation group and was guided through several exercises- diaphragmatic breathing with progressive muscle relaxation, body scan, mindfulness meditation, and connecting to the inner child  Apart of CBT and DBT-based approach, a group approach was used to support the cohesion and maintain the authentic, safe, serene environment for the exercises purpose  Assessment: Emeterio Barker was attentive, present, focused  Followed through guidance and completed the exercises  Showed willingness to continue learning and build on the learned was expressed  Plan: Emeterio Barker will put effort to practice deep breathing and other techniques presented to counter anxiety along with muscle relaxation, will include body scan when possible and will attend the next group to be able to take part in further guided exercises on chosen topics to build on the basic techniques learned in the beginning          HPI     Past Medical History:   Diagnosis Date    Bilateral carpal tunnel syndrome 08/20/2019    Carpal tunnel syndrome of right wrist 10/30/2019    Added automatically from request for surgery [id number]    COPD (chronic obstructive pulmonary disease) ([address] )     Hypertension        Past Surgical History:   Procedure Laterality Date    BREAST BIOPSY Left 20yrs ago benign    OOPHORECTOMY Right     [place] WRIST Kayce Small LIG Right 1/21/2020    Procedure: RELEASE CARPAL TUNNEL ENDOSCOPIC;  Surgeon: Kelly Mcclelland MD;  [facility];  Service: Orthopedics    [place] WRIST Kayce Small LIG Left 1/28/2020    Procedure: RELEASE CARPAL TUNNEL ENDOSCOPIC;  Surgeon: Kelly Mcclelland MD;  [facility];  Service: Orthopedics       Current Outpatient Medications   Medication Sig Dispense Refill  albuterol (PROVENTIL HFA,VENTOLIN HFA) 90 mcg/act inhaler inhale 2 puffs by mouth and INTO THE LUNGS every 6 hours IF NEEDED FOR WHEEZING  8 5 g 2    cholecalciferol (VITAMIN D3) 400 units tablet Take 400 Units by mouth daily      FLUoxetine (PROzac) 20 mg capsule Take 1 capsule (20 mg total) by mouth daily 30 capsule 2    fluticasone (FLONASE) 50 mcg/act nasal spray 1 spray into each nostril daily 16 g 2    gabapentin (NEURONTIN) 100 mg capsule take 1 capsule by mouth three times a day 180 capsule 1    lamoTRIgine (LaMICtal) 200 MG tablet take 1 tablet by mouth once daily 90 tablet 0    levocetirizine (XYZAL) 5 MG tablet Take 1 tablet (5 mg total) by mouth every evening 30 tablet 4    nicotine polacrilex (COMMIT) 4 MG lozenge Apply 1 lozenge (4 mg total) to the mouth or throat as needed for smoking cessation (Patient not taking: No sig reported) 100 each 5    omeprazole (PriLOSEC) 20 mg delayed release capsule Take 1 capsule (20 mg total) by mouth 2 (two) times a day 60 capsule 5    risperiDONE (RisperDAL) 1 mg tablet take 1 tablet by mouth at bedtime 30 tablet 1    senna-docusate sodium (SENOKOT-S) 8 6-50 mg per tablet Take 3 tablets by mouth as needed        Spiriva Respimat 2 5 MCG/ACT AERS inhaler inhale 2 puffs by mouth and INTO THE LUNGS once daily 4 g 5     No current facility-administered medications for this visit  No Known Allergies    Review of Systems    Video Exam    There were no vitals filed for this visit  Physical Exam     I spent 50 minutes directly with the patient during this visit    VIRTUAL VISIT DISCLAIMER    Koko Maria verbally agrees to participate in [employer]  Pt is aware that [employer] could be limited without vital signs or the ability to perform a full hands-on physical Caprice Gibson understands she or the provider may request at any time to terminate the video visit and request the patient to seek care or treatment in person

## 2022-07-18 ENCOUNTER — OFFICE VISIT (OUTPATIENT)
Dept: PULMONOLOGY | Facility: CLINIC | Age: 57
End: 2022-07-18
Payer: COMMERCIAL

## 2022-07-18 VITALS
WEIGHT: 124 LBS | BODY MASS INDEX: 23.41 KG/M2 | OXYGEN SATURATION: 97 % | RESPIRATION RATE: 16 BRPM | HEART RATE: 88 BPM | HEIGHT: 61 IN | TEMPERATURE: 97.8 F | SYSTOLIC BLOOD PRESSURE: 138 MMHG | DIASTOLIC BLOOD PRESSURE: 86 MMHG

## 2022-07-18 DIAGNOSIS — R91.8 PULMONARY NODULES: ICD-10-CM

## 2022-07-18 DIAGNOSIS — J43.2 CENTRILOBULAR EMPHYSEMA (HCC): Primary | ICD-10-CM

## 2022-07-18 PROCEDURE — 99214 OFFICE O/P EST MOD 30 MIN: CPT | Performed by: INTERNAL MEDICINE

## 2022-07-18 RX ORDER — TIOTROPIUM BROMIDE AND OLODATEROL 3.124; 2.736 UG/1; UG/1
2 SPRAY, METERED RESPIRATORY (INHALATION) DAILY
Qty: 4 G | Refills: 11 | Status: SHIPPED | OUTPATIENT
Start: 2022-07-18

## 2022-07-18 NOTE — PROGRESS NOTES
Pulmonary Follow Up Note   Gabbie Guzman 49 y o  female MRN: [id number]  7/18/2022    Assessment:  Simple chronic bronchitis   · With centrilobular emphysema on chest imaging   · PFT with a normal ratio at 75% in 12/2021  · 1 exacerbation treated with steroid burst in 03/2022  · Still with intermittent daily symptoms of chronic bronchitis including minimally productive cough/wheezing    Plan:   · Step up treatment to Stiolto Respimat, discontinued Spiriva   · Reviewed/reinforced the proper inhaler use technique   · Encouraged to use a p r n  albuterol when having cough, wheezing, or dyspnea    Active tobacco abuse   · Counseled extensively for about 4 minutes   · Already has the nicotine lozenge  · States that she would like to continue cutting down cigarettes on on her own    Pulmonary nodules   · Small, largest at 4 mm at LOGAN   · Given the high risk/tobacco abuse needs 1 year follow-up approximately end of 11/2022    Return in about 5 months (around 12/18/2022)  History of Present Illness     Follow up for:  Emphysema    Background:  49 y o  female with a h/o HTN, reflux, bipolar disorder active tobacco abuse, marijuana use, osteoarthritis  Initially seen by Pulmonary in 09/2021 for ongoing symptoms of chronic bronchitis including wheezing/chest congestion and dyspnea on exertion  Noted about 42 pack year tobacco abuse history, was smoking 1 pack per day at that time also admitted to daily marijuana use 25 ys ago  Quit heavy drinking in 2011     3/18/22 visit-treated for exacerbation with oral steroids/Z-Agustin and postnasal drip  Interval History  Felt better with the steroid burst   Continued to smoke down to 12 cigarettes per day still with intermittent, daily minimally productive cough/chest congestion  Noted dark/greenish sputum  Using Spiriva Respimat  Does not use the p r n  albuterol no more than once a week        Review of Systems  As per hpi, all other systems reviewed and were negative    Studies:    Imaging and other studies: I have personally reviewed pertinent films in PACS    CT chest 11/22/2021-RUL 3 mm nodule  LOGAN 4 mm nodule  Suspect intrapulmonary lymph node  Given the high risk, recommended 1 year follow-up  Pulmonary function testing:   PFT 12/28/2021-ratio 75%, FEV1 2 15 L/89%, FVC 2 87 L, 95%  TLC 85%, RV 67%, DLCO 102%  No BD response  6 minute walk test 12/28/2021-baseline SpO2 96% on room air, heart rate 90  Able to walk 488 m in 6 minutes  And the exercise with SpO2 94% on room air, heart rate 124  EKG, Pathology, and Other Studies: I have personally reviewed pertinent reports  TTE 08/11/2020-EF 45-50%/mildly reduced  Normal RV size and function  TTE 10/25/2021-low normal/borderline EF, mild global hypokinesia  EF 50%  Possible a early diastolic dysfunction, noted increased atrial contraction toward the left ventricular filling  Normal RV size and function        Past medical, surgical, social and family histories reviewed  Medications/Allergies: Reviewed      Vitals: Blood pressure 138/86, pulse 88, temperature 97 8 °F (36 6 °C), resp  rate 16, height 5' 1" (1 549 m), weight 56 2 kg (124 lb), SpO2 97 %  Body mass index is 23 43 kg/m²  Oxygen Therapy  SpO2: 97 %  Oxygen Therapy: None (Room air)      Physical Exam  Body mass index is 23 43 kg/m²     Gen: not in acute distress,   Neck/Eyes: supple, no adenopathy, PERRL  Ear: normal appearance, no significant hearing impairment  Nose:  normal nasal mucosa, no drainage  Mouth:  unremarkable/normal appearance of lips, teeth and gums  Oropharynx: mucosa is moist, no focal lesions or erythema  Salivary glands: soft nontender  Chest: normal respiratory efforts, prolonged expiratory phase, bilateral mild wheeze   CV: RRR, no murmurs appreciated, no edema  Abdomen: soft, non tender  Extremities:  No observed deformity   Skin: unremarkable  Neuro: AAO X3, no focal motor deficit        Labs:  Lab Results   Component Value Date    WBC 7 86 09/30/2019    HGB 13 3 09/30/2019    HCT 41 8 09/30/2019    MCV 95 09/30/2019     09/30/2019     Lab Results   Component Value Date    CALCIUM 8 9 04/29/2022     06/15/2018    K 3 9 04/29/2022    CO2 28 04/29/2022     (H) 04/29/2022    BUN 16 04/29/2022    CREATININE 0 70 04/29/2022     No results found for: IGE  Lab Results   Component Value Date    ALT 23 04/29/2022    AST 14 04/29/2022    ALKPHOS 53 04/29/2022    BILITOT 0 3 06/15/2018           Portions of the record may have been created with voice recognition software  Occasional wrong word or "sound a like" substitutions may have occurred due to the inherent limitations of voice recognition software  Read the chart carefully and recognize, using context, where substitutions have occurred    [facility]

## 2022-07-20 ENCOUNTER — TELEMEDICINE (OUTPATIENT)
Dept: BEHAVIORAL/MENTAL HEALTH CLINIC | Facility: CLINIC | Age: 57
End: 2022-07-20
Payer: COMMERCIAL

## 2022-07-20 DIAGNOSIS — F41.1 GENERALIZED ANXIETY DISORDER: ICD-10-CM

## 2022-07-20 DIAGNOSIS — F43.10 PTSD (POST-TRAUMATIC STRESS DISORDER): ICD-10-CM

## 2022-07-20 DIAGNOSIS — F31.81 MODERATE MIXED BIPOLAR II DISORDER (HCC): Primary | ICD-10-CM

## 2022-07-20 PROCEDURE — 90834 PSYTX W PT 45 MINUTES: CPT | Performed by: COUNSELOR

## 2022-07-20 NOTE — PSYCH
Virtual Regular Visit    Verification of patient location:    Patient is located in the following state in which I hold an active license [place]      Assessment/Plan:    Problem List Items Addressed This Visit        Other    Moderate mixed bipolar II disorder (HCC) - Primary    Generalized anxiety disorder    PTSD (post-traumatic stress disorder)          Goals addressed in session: Goal 1 and Goal 2          Reason for visit is No chief complaint on file  Encounter provider Jessica Sabillon [facility]    Provider located at [address] [place] [id number] [phone]      Recent Visits  Date Type Provider Dept   07/15/22 229 [facility], [address] [place] [address]   07/14/22 229 [facility], [facility]   Showing recent visits within past 7 days and meeting all other requirements  Future Appointments  No visits were found meeting these conditions  Showing future appointments within next 150 days and meeting all other requirements       The patient was identified by name and date of birth  Gabbie Guzman was informed that this is a telemedicine visit and that the visit is being conducted throughMicrosoft Teams and patient was informed that this is a secure, HIPAA-compliant platform  She agrees to proceed     My office door was closed  No one else was in the room  She acknowledged consent and understanding of privacy and security of the video platform  The patient has agreed to participate and understands they can discontinue the visit at any time  Patient is aware this is a billable service  Subjective  Gabbie Guzman is a 49 y o  female  Data: Dev Maharaj discussed being forgetful about appointments and tasks that she remembers for a half an hour ad then it is out of her head and was heard judging herself   Dev Maharaj worked on different types of reminders to find out what best works for her and shared that she would like to discuss with her new Doctor her medications, noticing that since three weeks appx, she was more forgetful than usual, not "herself " Person-Centered, Solution-Focused, and choice/control methods were combined to help her address her concerns and feel better, with more options and choices  Assessment: Meli Lew was emotional, talkative, and expressive  She had relevant thought content and valid fears and concerns  Plan: Meli Lew will continue trying to use her core Mindfulness skills, to be able to handle her emotions better, as well as to identify her best ways to organize and follow through her schedule easier  HPI     Past Medical History:   Diagnosis Date    Bilateral carpal tunnel syndrome 08/20/2019    Carpal tunnel syndrome of right wrist 10/30/2019    Added automatically from request for surgery [id number]    COPD (ch[facility] obstructive [facility] disease) [address] [address] [facility]     Hypertension        Past Surgical History:   Procedure Laterality Date    BREAST BIOPSY Left 20yrs ago benign    OOPHORECTOMY Right     [place] WRIST ARTHROSCOP,RELEASE Colton Carbine LIG Right 1/21/2020    Procedure: RELEASE CARPAL TUNNEL ENDOSCOPIC;  Surgeon: Irina Whiting MD;  [facility];  Service: Orthopedics    [place] WRIST Deborra Levo LIG Left 1/28/2020    Procedure: RELEASE CARPAL TUNNEL ENDOSCOPIC;  Surgeon: Irina Whiting MD;  [facility];  Service: Orthopedics       Current Outpatient Medications   Medication Sig Dispense Refill    albuterol (PROVENTIL HFA,VENTOLIN HFA) 90 mcg/act inhaler inhale 2 puffs by mouth and INTO THE LUNGS every 6 hours IF NEEDED FOR WHEEZING   8 5 g 2    cholecalciferol (VITAMIN D3) 400 units tablet Take 400 Units by mouth daily      FLUoxetine (PROzac) 20 mg capsule Take 1 capsule (20 mg total) by mouth daily 30 capsule 2    fluticasone (FLONASE) 50 mcg/act nasal spray 1 spray into each nostril daily 16 g 2    gabapentin (NEURONTIN) 100 mg capsule take 1 capsule by mouth three times a day 180 capsule 1    lamoTRIgine (LaMICtal) 200 MG tablet take 1 tablet by mouth once daily 90 tablet 0    levocetirizine (XYZAL) 5 MG tablet Take 1 tablet (5 mg total) by mouth every evening 30 tablet 4    nicotine polacrilex (COMMIT) 4 MG lozenge Apply 1 lozenge (4 mg total) to the mouth or throat as needed for smoking cessation (Patient not taking: No sig reported) 100 each 5    omeprazole (PriLOSEC) 20 mg delayed release capsule Take 1 capsule (20 mg total) by mouth 2 (two) times a day 60 capsule 5    risperiDONE (RisperDAL) 1 mg tablet take 1 tablet by mouth at bedtime 30 tablet 1    senna-docusate sodium (SENOKOT-S) 8 6-50 mg per tablet Take 3 tablets by mouth as needed        tiotropium-olodaterol (Stiolto Respimat) 2 5-2 5 MCG/ACT inhaler Inhale 2 puffs daily 4 g 11     No current facility-administered medications for this visit  No Known Allergies    Review of Systems    Video Exam    There were no vitals filed for this visit  Physical Exam     I spent 42 minutes directly with the patient during this visit    VIRTUAL VISIT DISCLAIMER    Maury Paz verbally agrees to participate in [employer]  Pt is aware that [employer] could be limited without vital signs or the ability to perform a full hands-on physical Le Tiffanie understands she or the provider may request at any time to terminate the video visit and request the patient to seek care or treatment in person

## 2022-07-22 ENCOUNTER — TELEMEDICINE (OUTPATIENT)
Dept: BEHAVIORAL/MENTAL HEALTH CLINIC | Facility: CLINIC | Age: 57
End: 2022-07-22
Payer: COMMERCIAL

## 2022-07-22 ENCOUNTER — TELEPHONE (OUTPATIENT)
Dept: OBGYN CLINIC | Facility: MEDICAL CENTER | Age: 57
End: 2022-07-22

## 2022-07-22 DIAGNOSIS — F43.10 PTSD (POST-TRAUMATIC STRESS DISORDER): ICD-10-CM

## 2022-07-22 DIAGNOSIS — F31.81 MODERATE MIXED BIPOLAR II DISORDER (HCC): Primary | ICD-10-CM

## 2022-07-22 DIAGNOSIS — F41.1 GENERALIZED ANXIETY DISORDER: ICD-10-CM

## 2022-07-22 LAB
LAB AP GYN PRIMARY INTERPRETATION: ABNORMAL
Lab: ABNORMAL
PATH INTERP SPEC-IMP: ABNORMAL

## 2022-07-22 PROCEDURE — 90853 GROUP PSYCHOTHERAPY: CPT | Performed by: COUNSELOR

## 2022-07-22 NOTE — TELEPHONE ENCOUNTER
Pt called in for lab results, informed pt of Russ cunningham, but pt had more questions regarding her results  Would like a call back to go over, then will schedule a colpo  Please review thank you

## 2022-07-22 NOTE — PSYCH
Virtual Regular Visit    Verification of patient location:    Patient is located in the following state in which I hold an active license [place]      Assessment/Plan:    Problem List Items Addressed This Visit        Other    Moderate mixed bipolar II disorder (HCC) - Primary    Generalized anxiety disorder    PTSD (post-traumatic stress disorder)          Goals addressed in session: Goal 1 and Goal 2          Reason for visit is No chief complaint on file  [place] [place] Sahil [facility]    [place] located at [address] [place] [id number] [phone]      Recent Visits  Date Type Provider Dept   07/20/22 229 [facility], [address] [place] [address]   07/15/22 229 [facility], [facility] Therapist Heriberto   Showing recent visits within past 7 days and meeting all other requirements  Today's Visits  Date Type Provider Dept   07/22/22 229 [facility], [address] [place] [address]   Showing today's visits and meeting all other requirements  Future Appointments  No visits were found meeting these conditions  Showing future appointments within next 150 days and meeting all other requirements       The patient was identified by name and date of birth  Shane Dias was informed that this is a telemedicine visit and that the visit is being conducted throughMicrosoft Teams and patient was informed that this is a secure, HIPAA-compliant platform  She agrees to proceed     My office door was closed  Other methods to assure confidentiality were taken informing about rest of group members  No one else was in the room  and The patient was notified the following individuals were present in the room conner tena of the attendees  She acknowledged consent and understanding of privacy and security of the video platform   The patient has agreed to participate and understands they can discontinue the visit at any time  Patient is aware this is a billable service  Subjective  Shane Dias is a 49 y o  female    Data: Rebeka Guerrero attended the Relaxation group with delay and interruptions in connection  She was guided through several exercises- diaphragmatic breathing with progressive muscle relaxation, body scan, mindfulness meditation for perceiving the bodily signals in neutral ways, and connecting to and healing the inner child  Apart of CBT and DBT-based approach, a group approach was used to support the cohesion and maintain the authentic, safe, serene environment for the exercises purpose  Assessment: Rebeka Guerrero was attentive, present, focused  Followed through guidance and completed the exercises  Showed willingness to continue learning and build on the learned was expressed  Plan: Rebeka Guerrero will practice deep breathing and other techniques presented to counter anxiety along with muscle relaxation, will include body scan when possible and will attend the next group to be able to take part in further guided exercises on chosen topics to build on the basic techniques learned in the beginning          HPI     Past Medical History:   Diagnosis Date    Bilateral carpal tunnel syndrome 08/20/2019    Carpal tunnel syndrome of right wrist 10/30/2019    Added automatically from request for surgery [id number]    COPD (ch[facility] obstructive [facility] disease) [facility] [address] [facility]     Hypertension        Past Surgical History:   Procedure Laterality Date    BREAST BIOPSY Left 20yrs ago benign    OOPHORECTOMY Right     [place] WRIST Kaila Brownjesi LIG Right 1/21/2020    Procedure: RELEASE CARPAL TUNNEL ENDOSCOPIC;  Surgeon: Blas Morales MD;  [facility];  Service: Orthopedics    [place] WRIST Kaila Campo LIG Left 1/28/2020    Procedure: RELEASE CARPAL TUNNEL ENDOSCOPIC;  Surgeon: Blas Morales MD;  [facility]; Service: Orthopedics       Current Outpatient Medications   Medication Sig Dispense Refill    albuterol (PROVENTIL HFA,VENTOLIN HFA) 90 mcg/act inhaler inhale 2 puffs by mouth and INTO THE LUNGS every 6 hours IF NEEDED FOR WHEEZING  8 5 g 2    cholecalciferol (VITAMIN D3) 400 units tablet Take 400 Units by mouth daily      FLUoxetine (PROzac) 20 mg capsule Take 1 capsule (20 mg total) by mouth daily 30 capsule 2    fluticasone (FLONASE) 50 mcg/act nasal spray 1 spray into each nostril daily 16 g 2    gabapentin (NEURONTIN) 100 mg capsule take 1 capsule by mouth three times a day 180 capsule 1    lamoTRIgine (LaMICtal) 200 MG tablet take 1 tablet by mouth once daily 90 tablet 0    levocetirizine (XYZAL) 5 MG tablet Take 1 tablet (5 mg total) by mouth every evening 30 tablet 4    nicotine polacrilex (COMMIT) 4 MG lozenge Apply 1 lozenge (4 mg total) to the mouth or throat as needed for smoking cessation (Patient not taking: No sig reported) 100 each 5    omeprazole (PriLOSEC) 20 mg delayed release capsule Take 1 capsule (20 mg total) by mouth 2 (two) times a day 60 capsule 5    risperiDONE (RisperDAL) 1 mg tablet take 1 tablet by mouth at bedtime 30 tablet 1    senna-docusate sodium (SENOKOT-S) 8 6-50 mg per tablet Take 3 tablets by mouth as needed        tiotropium-olodaterol (Stiolto Respimat) 2 5-2 5 MCG/ACT inhaler Inhale 2 puffs daily 4 g 11     No current facility-administered medications for this visit  No Known Allergies    Review of Systems    Video Exam    There were no vitals filed for this visit  Physical Exam     I spent 40 minutes directly with the patient during this visit    VIRTUAL VISIT DISCLAIMER    Armando Kwok verbally agrees to participate in [employer]   Pt is aware that [employer] could be limited without vital signs or the ability to perform a full hands-on physical Linda Carl understands she or the provider may request at any time to terminate the video visit and request the patient to seek care or treatment in person

## 2022-07-26 ENCOUNTER — OFFICE VISIT (OUTPATIENT)
Dept: PSYCHIATRY | Facility: CLINIC | Age: 57
End: 2022-07-26

## 2022-07-26 DIAGNOSIS — F41.1 GENERALIZED ANXIETY DISORDER: ICD-10-CM

## 2022-07-26 DIAGNOSIS — F31.32 BIPOLAR AFFECTIVE DISORDER, CURRENTLY DEPRESSED, MODERATE (HCC): Primary | ICD-10-CM

## 2022-07-26 DIAGNOSIS — F43.10 PTSD (POST-TRAUMATIC STRESS DISORDER): ICD-10-CM

## 2022-07-26 RX ORDER — FLUOXETINE HYDROCHLORIDE 20 MG/1
20 CAPSULE ORAL DAILY
Qty: 30 CAPSULE | Refills: 1 | Status: SHIPPED | OUTPATIENT
Start: 2022-07-26 | End: 2022-08-25

## 2022-07-26 RX ORDER — QUETIAPINE FUMARATE 100 MG/1
TABLET, FILM COATED ORAL
Qty: 30 TABLET | Refills: 1 | Status: SHIPPED | OUTPATIENT
Start: 2022-07-26 | End: 2022-08-25

## 2022-07-26 NOTE — BH TREATMENT PLAN
TREATMENT PLAN        [facility]    Name and Date of Birth:  Tabatha Bazzi 49 y o  1965  Date of Treatment Plan: July 26, 2022  Diagnosis/Diagnoses:    1  Bipolar affective disorder, currently depressed, moderate ([address] )    2  PTSD (post-traumatic stress disorder)    3  Generalized anxiety disorder        Strengths/Personal Resources for Self-Care: supportive friends, ability to communicate needs, ability to communicate well, ability to listen, ability to reason, ability to understand psychiatric illness, average or above intelligence, good physical health, good understanding of illness, independence, motivation for treatment, ability to negotiate basic needs, willingness to work on problems    Area/Areas of need: anxiety symptoms, depressive symptoms    Long Term Goal: improve mood  Target Date: 6 months - January 26, 2023  Person/Persons responsible for completion of goal: Gracia Raines and Lisa Redding MD     Short Term Objective (s) - How will we reach this goal?:   1  Take medications as prescribed  2  Attend psychiatry appointments regularly  3  Continue psychotherapy regularly  4  Avoid alcohol   5  Avoid drugs   6  Try breathing exercises  7  Try relaxation techniques  Target Date: 6 months - January 26, 2023  Person/Persons Responsible for Completion of Goal: Gracia Raines     Progress Towards Goals: Continuing treatment    Treatment Modality: medication management every 1-3 months as needed  Review due 180 days from date of this plan: January 22, 2023   Expected length of service: Ongoing treatment    My physician and I have developed this plan together, and I agree to work on the goals and objectives  I understand the treatment goals that were developed for my treatment  The treatment plan was created between Lisa Redding MD and Tabatha Bazzi on 07/26/22 at 2:55 PM but not signed at the time of the visit due to [address] [place] [address]   The plan was reviewed, and verbal consent was given
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
## 2022-07-27 ENCOUNTER — TELEMEDICINE (OUTPATIENT)
Dept: BEHAVIORAL/MENTAL HEALTH CLINIC | Facility: CLINIC | Age: 57
End: 2022-07-27
Payer: COMMERCIAL

## 2022-07-27 DIAGNOSIS — F41.1 GENERALIZED ANXIETY DISORDER: ICD-10-CM

## 2022-07-27 DIAGNOSIS — F43.10 PTSD (POST-TRAUMATIC STRESS DISORDER): ICD-10-CM

## 2022-07-27 DIAGNOSIS — F31.81 MODERATE MIXED BIPOLAR II DISORDER (HCC): Primary | ICD-10-CM

## 2022-07-27 PROCEDURE — 90834 PSYTX W PT 45 MINUTES: CPT | Performed by: COUNSELOR

## 2022-07-27 NOTE — PSYCH
Virtual Regular Visit    Verification of patient location:    Patient is located in the following state in which I hold an active license [place]      Assessment/Plan:    Problem List Items Addressed This Visit        Other    Moderate mixed bipolar II disorder (HCC) - Primary    Generalized anxiety disorder    PTSD (post-traumatic stress disorder)          Goals addressed in session: Goal 1 and Goal 2          Reason for visit is No chief complaint on file  Encounter provider Isiah Lewis [facility]    Provider located at [address] [place] [id number] [phone]      Recent Visits  Date Type Provider Dept   07/22/22 229 [facility], [address] [place] [address]   07/20/22 229 [facility], [address] [facility] The[facility]ist Heriberto   Showing recent visits within past 7 days and meeting all other requirements  Future Appointments  No visits were found meeting these conditions  Showing future appointments within next 150 days and meeting all other requirements       The patient was identified by name and date of birth  Catalina Spencer was informed that this is a telemedicine visit and that the visit is being conducted throughMicrosoft Teams and patient was informed that this is a secure, HIPAA-compliant platform  She agrees to proceed     My office door was closed  No one else was in the room  She acknowledged consent and understanding of privacy and security of the video platform  The patient has agreed to participate and understands they can discontinue the visit at any time  Patient is aware this is a billable service  Subjective  Catalina Spencer is a 49 y o  female  Data: Emeterio Barker discussed her concern about her woman's health after results that her HPV is still detected   She was given psychoeducation about stress, after she shared that her sress is increased and experiences difficulties relaxing  Person-Centered, TF-CBT, DBT-based, and choice/control tools were used to support Pt's work on handling her intense emotions, to be able to take a step back and relax certain group of muscles and then access her traumatic memories  Emeterio Barker marked severe depression and anxiety but denied having plans or intentions of killing herself  She stated that she would not hurt herself  Assessment: Emeterio Barker appeared anxious, sad, yet talkative and present  She was able to focus on the brought up topics and express her conflicts and worries  Plan: Emeterio Barker will continue with weekly therapy where she will work on her trauma and utilization of learned DBT skills  She will attend the Relaxation group weekly and individual sessions weekly to be able to deal with her emotional intensity  HPI     Past Medical History:   Diagnosis Date    Bilateral carpal tunnel syndrome 08/20/2019    Carpal tunnel syndrome of right wrist 10/30/2019    Added automatically from request for surgery [id number]    COPD [facility]chronic obstructive pulmonary disease) ([facility] [address] )     Hypertension        Past Surgical History:   Procedure Laterality Date    BREAST BIOPSY Left 20yrs ago benign    OOPHORECTOMY Right     [place] WRIST ARTHROSCOP,RELEASE Shanna Bulls LIG Right 1/21/2020    Procedure: RELEASE CARPAL TUNNEL ENDOSCOPIC;  Surgeon: Kelly Mcclelland MD;  [facility];  Service: Orthopedics    [place] WRIST Kayce Small LIG Left 1/28/2020    Procedure: RELEASE CARPAL TUNNEL ENDOSCOPIC;  Surgeon: Kelly Mcclelland MD;  [facility];  Service: Orthopedics       Current Outpatient Medications   Medication Sig Dispense Refill    albuterol (PROVENTIL HFA,VENTOLIN HFA) 90 mcg/act inhaler inhale 2 puffs by mouth and INTO THE LUNGS every 6 hours IF NEEDED FOR WHEEZING   8 5 g 2    cholecalciferol (VITAMIN D3) 400 units tablet Take 400 Units by mouth daily      FLUoxetine (PROzac) 20 mg capsule Take 1 capsule (20 mg total) by mouth daily 30 capsule 1    fluticasone (FLONASE) 50 mcg/act nasal spray 1 spray into each nostril daily 16 g 2    gabapentin (NEURONTIN) 100 mg capsule take 1 capsule by mouth three times a day 180 capsule 1    lamoTRIgine (LaMICtal) 200 MG tablet take 1 tablet by mouth once daily 90 tablet 0    levocetirizine (XYZAL) 5 MG tablet Take 1 tablet (5 mg total) by mouth every evening 30 tablet 4    nicotine polacrilex (COMMIT) 4 MG lozenge Apply 1 lozenge (4 mg total) to the mouth or throat as needed for smoking cessation (Patient not taking: No sig reported) 100 each 5    omeprazole (PriLOSEC) 20 mg delayed release capsule Take 1 capsule (20 mg total) by mouth 2 (two) times a day 60 capsule 5    QUEtiapine (SEROquel) 100 mg tablet Take 1 tablet (100 mg total) at night time daily for 5 days, and then take 2 tablets (200 mg total) at nighttime daily afterwards  30 tablet 1    senna-docusate sodium (SENOKOT-S) 8 6-50 mg per tablet Take 3 tablets by mouth as needed        tiotropium-olodaterol (Stiolto Respimat) 2 5-2 5 MCG/ACT inhaler Inhale 2 puffs daily 4 g 11     No current facility-administered medications for this visit  No Known Allergies    Review of Systems    Video Exam    There were no vitals filed for this visit  Physical Exam     I spent 45 minutes directly with the patient during this visit    VIRTUAL VISIT DISCLAIMER    Padmaja Fletcher verbally agrees to participate in [facility]  Pt is aware that [facility] could be limited without vital signs or the ability to perform a full hands-on physical Lethea Sánchez understands she or the provider may request at any time to terminate the video visit and request the patient to seek care or treatment in person

## 2022-07-29 ENCOUNTER — TELEMEDICINE (OUTPATIENT)
Dept: BEHAVIORAL/MENTAL HEALTH CLINIC | Facility: CLINIC | Age: 57
End: 2022-07-29
Payer: COMMERCIAL

## 2022-07-29 DIAGNOSIS — F41.1 GENERALIZED ANXIETY DISORDER: ICD-10-CM

## 2022-07-29 DIAGNOSIS — F43.10 PTSD (POST-TRAUMATIC STRESS DISORDER): ICD-10-CM

## 2022-07-29 DIAGNOSIS — F31.81 MODERATE MIXED BIPOLAR II DISORDER (HCC): Primary | ICD-10-CM

## 2022-07-29 PROCEDURE — 90853 GROUP PSYCHOTHERAPY: CPT | Performed by: COUNSELOR

## 2022-07-29 NOTE — PSYCH
Virtual Regular Visit    Verification of patient location:    Patient is located in the following state in which I hold an active license [place]      Assessment/Plan:    Problem List Items Addressed This Visit        Other    Moderate mixed bipolar II disorder (HCC) - Primary    Generalized anxiety disorder    PTSD (post-traumatic stress disorder)          Goals addressed in session: Goal 1          Reason for visit is No chief complaint on file  Encounter provider Yusef Head, [address] [facility]    Provider located at [address] [place] [id number] [phone]      Recent Visits  Date Type Provider Dept   07/27/22 229 [facility], [address] [facility]   07/22/22 229 [facility], [place] [address] [facility] Heriberto   Showing recent visits within past 7 days and meeting all other requirements  Future Appointments  No visits were found meeting these conditions  Showing future appointments within next 150 days and meeting all other requirements       The patient was identified by name and date of birth  Dora Rey was informed that this is a telemedicine visit and that the visit is being conducted throughMicrosoft Teams and patient was informed that this is a secure, HIPAA-compliant platform  She agrees to proceed     My office door was closed  No one else was in the room  and The patient was notified the following individuals were present in the room the rest of the group members  She acknowledged consent and understanding of privacy and security of the video platform  The patient has agreed to participate and understands they can discontinue the visit at any time  Patient is aware this is a billable service  Subjective  Dora Rey is a 49 y o  female        Data: Didier Rajan attended the Relaxation group and was guided through diaphragmatic breathing with progressive muscle relaxation, body scan (with accent on facial and scalp muscles) , mindfulness relaxation, and guided imagery  Apart of CBT and DBT-based approach, a group approach was used to support the cohesion and maintain the authentic, safe, serene environment for the exercises purpose  Assessment: [facility] was present and focused, followed through guidance and completed the exercises, showed willingness to continue learning and build on the learned  Plan: [facility] will practice deep breathing and other techniques presented to counter anxiety along with muscle relaxation, will include body scan when possible and will attend the next group to be able to take part in further guided exercises on chosen topics to build on the basic techniques learned in the beginning  HPI     Past Medical History:   Diagnosis Date    Bilateral carpal tunnel syndrome 08/20/2019    Carpal tunnel syndrome of right wrist 10/30/2019    Added automatically from request for surgery [id number]    COPD (chronic obstructive pulmonary disease) ([facility] [address] )     Hypertension        Past Surgical History:   Procedure Laterality Date    BREAST BIOPSY Left 20yrs ago benign    OOPHORECTOMY Right     [place] WRIST ARTHROSCOP,RELEASE Corinn Lux LIG Right 1/21/2020    Procedure: RELEASE CARPAL TUNNEL ENDOSCOPIC;  Surgeon: Ebonie Guzman MD;  [facility];  Service: Orthopedics    [place] WRIST [place] LIG Left 1/28/2020    Procedure: RELEASE CARPAL TUNNEL ENDOSCOPIC;  Surgeon: Ebonie Guzman MD;  [facility];  Service: Orthopedics       Current Outpatient Medications   Medication Sig Dispense Refill    albuterol (PROVENTIL HFA,VENTOLIN HFA) 90 mcg/act inhaler inhale 2 puffs by mouth and INTO THE LUNGS every 6 hours IF NEEDED FOR WHEEZING   8 5 g 2    cholecalciferol (VITAMIN D3) 400 units tablet Take 400 Units by mouth daily      FLUoxetine (PROzac) 20 mg capsule Take 1 capsule (20 mg total) by mouth daily 30 capsule 1    fluticasone (FLONASE) 50 mcg/act nasal spray 1 spray into each nostril daily 16 g 2    gabapentin (NEURONTIN) 100 mg capsule take 1 capsule by mouth three times a day 180 capsule 1    lamoTRIgine (LaMICtal) 200 MG tablet take 1 tablet by mouth once daily 90 tablet 0    levocetirizine (XYZAL) 5 MG tablet Take 1 tablet (5 mg total) by mouth every evening 30 tablet 4    nicotine polacrilex (COMMIT) 4 MG lozenge Apply 1 lozenge (4 mg total) to the mouth or throat as needed for smoking cessation (Patient not taking: No sig reported) 100 each 5    omeprazole (PriLOSEC) 20 mg delayed release capsule Take 1 capsule (20 mg total) by mouth 2 (two) times a day 60 capsule 5    QUEtiapine (SEROquel) 100 mg tablet Take 1 tablet (100 mg total) at night time daily for 5 days, and then take 2 tablets (200 mg total) at nighttime daily afterwards  30 tablet 1    senna-docusate sodium (SENOKOT-S) 8 6-50 mg per tablet Take 3 tablets by mouth as needed        tiotropium-olodaterol (Stiolto Respimat) 2 5-2 5 MCG/ACT inhaler Inhale 2 puffs daily 4 g 11     No current facility-administered medications for this visit  No Known Allergies    Review of Systems    Video Exam    There were no vitals filed for this visit  Physical Exam     I spent 59 minutes directly with the patient during this visit    VIRTUAL VISIT DISCLAIMER    Darío Grimaldo verbally agrees to participate in [employer]  Pt is aware that [employer] could be limited without vital signs or the ability to perform a full hands-on physical Delphia Found understands she or the provider may request at any time to terminate the video visit and request the patient to seek care or treatment in person

## 2022-08-03 ENCOUNTER — TELEMEDICINE (OUTPATIENT)
Dept: BEHAVIORAL/MENTAL HEALTH CLINIC | Facility: CLINIC | Age: 57
End: 2022-08-03
Payer: COMMERCIAL

## 2022-08-03 DIAGNOSIS — F31.81 MODERATE MIXED BIPOLAR II DISORDER (HCC): Primary | ICD-10-CM

## 2022-08-03 DIAGNOSIS — F43.10 PTSD (POST-TRAUMATIC STRESS DISORDER): ICD-10-CM

## 2022-08-03 DIAGNOSIS — F41.1 GENERALIZED ANXIETY DISORDER: ICD-10-CM

## 2022-08-03 PROCEDURE — 90834 PSYTX W PT 45 MINUTES: CPT | Performed by: COUNSELOR
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
## 2022-08-05 ENCOUNTER — TELEMEDICINE (OUTPATIENT)
Dept: BEHAVIORAL/MENTAL HEALTH CLINIC | Facility: CLINIC | Age: 57
End: 2022-08-05
Payer: COMMERCIAL

## 2022-08-05 DIAGNOSIS — F41.1 GENERALIZED ANXIETY DISORDER: ICD-10-CM

## 2022-08-05 DIAGNOSIS — F43.10 PTSD (POST-TRAUMATIC STRESS DISORDER): ICD-10-CM

## 2022-08-05 DIAGNOSIS — F31.81 MODERATE MIXED BIPOLAR II DISORDER (HCC): Primary | ICD-10-CM

## 2022-08-05 PROCEDURE — 90853 GROUP PSYCHOTHERAPY: CPT | Performed by: COUNSELOR

## 2022-08-05 NOTE — PSYCH
Virtual Regular Visit    Verification of patient location:    Patient is located in the following state in which I hold an active license [place]      Assessment/Plan:    Problem List Items Addressed This Visit        Other    Moderate mixed bipolar II disorder (HCC) - Primary    Generalized anxiety disorder    PTSD (post-traumatic stress disorder)          Goals addressed in session: Goal 1 and Goal 2          Reason for visit is No chief complaint on file  Encounter provider Jessica Sabillon [facility]    Provider located at [address] [place] [address] [address] [place] [address] [phone]      Recent Visits  Date Type Provider Dept   08/03/22 229 [facility], [address] [place] [address]   07/29/22 229 [facility], [address]  [facility] Therapist Heriberto   Showing recent visits within past 7 days and meeting all other requirements  Today's Visits  Date Type Provider Dept   08/05/22 229 [facility], [address] [place] [address]   Showing today's visits and meeting all other requirements  Future Appointments  No visits were found meeting these conditions  Showing future appointments within next 150 days and meeting all other requirements       The patient was identified by name and date of birth  Gabbie Guzman was informed that this is a telemedicine visit and that the visit is being conducted throughMicrosoft Teams and patient was informed that this is a secure, HIPAA-compliant platform  She agrees to proceed     My office door was closed  The patient was notified the following individuals were present in the room other group members  She acknowledged consent and understanding of privacy and security of the video platform  The patient has agreed to participate and understands they can discontinue the visit at any time      Patient is aware this is a billable service  Subjective  Hermelindo Quinteros is a 49 y o  female    Data: Rupali Oneill attended the Relaxation group and was guided through diaphragmatic breathing with progressive muscle relaxation in the beginning, body scan, mindfulness-based stress reduction, and guided imagery  Apart of the psychoeducation, DBT-based and group approach was used to facilitate the new participants introduction and to support the cohesion and maintain the authentic, safe, serene environment for the exercises purpose  Assessment: Rupali Oneill was present and focused, followed through guidance and completed the exercises, showed willingness to continue learning and build on the learned  Plan: Rupali Oneill will practice deep breathing and other techniques presented to counter anxiety along with muscle relaxation, will include body scan when possible and will attend the next group to be able to take part in further guided exercises on chosen topics to build on the basic techniques learned in the beginning        HPI     Past Medical History:   Diagnosis Date    Bilateral carpal tunnel syndrome 08/20/2019    Carpal tunnel syndrome of right wrist 10/30/2019    Added automatically from request for surgery [id number]    COPD (chronic obstructive pulmonary disease) ([facility] [address] )     Hypertension        Past Surgical History:   Procedure Laterality Date    BREAST BIOPSY Left 20yrs ago benign    OOPHORECTOMY Right     [place] WRIST Jinnie Footman LIG Right 1/21/2020    Procedure: RELEASE CARPAL TUNNEL ENDOSCOPIC;  Surgeon: Daksha Bonilla MD;  [facility];  Service: Orthopedics    OR WRIST Jinnie Footman LIG Left 1/28/2020    Procedure: RELEASE CARPAL TUNNEL ENDOSCOPIC;  Surgeon: Daksha Bonilla MD;  [facility];  Service: Orthopedics       Current Outpatient Medications   Medication Sig Dispense Refill    albuterol (PROVENTIL HFA,VENTOLIN HFA) 90 mcg/act inhaler inhale 2 puffs by mouth and INTO THE LUNGS every 6 hours IF NEEDED FOR WHEEZING  8 5 g 2    cholecalciferol (VITAMIN D3) 400 units tablet Take 400 Units by mouth daily      FLUoxetine (PROzac) 20 mg capsule Take 1 capsule (20 mg total) by mouth daily 30 capsule 1    fluticasone (FLONASE) 50 mcg/act nasal spray 1 spray into each nostril daily 16 g 2    gabapentin (NEURONTIN) 100 mg capsule take 1 capsule by mouth three times a day 180 capsule 1    lamoTRIgine (LaMICtal) 200 MG tablet take 1 tablet by mouth once daily 90 tablet 0    levocetirizine (XYZAL) 5 MG tablet Take 1 tablet (5 mg total) by mouth every evening 30 tablet 4    nicotine polacrilex (COMMIT) 4 MG lozenge Apply 1 lozenge (4 mg total) to the mouth or throat as needed for smoking cessation (Patient not taking: No sig reported) 100 each 5    omeprazole (PriLOSEC) 20 mg delayed release capsule Take 1 capsule (20 mg total) by mouth 2 (two) times a day 60 capsule 5    QUEtiapine (SEROquel) 100 mg tablet Take 1 tablet (100 mg total) at night time daily for 5 days, and then take 2 tablets (200 mg total) at nighttime daily afterwards  30 tablet 1    senna-docusate sodium (SENOKOT-S) 8 6-50 mg per tablet Take 3 tablets by mouth as needed        tiotropium-olodaterol (Stiolto Respimat) 2 5-2 5 MCG/ACT inhaler Inhale 2 puffs daily 4 g 11     No current facility-administered medications for this visit  No Known Allergies    Review of Systems    Video Exam    There were no vitals filed for this visit  Physical Exam     I spent 57 minutes directly with the patient during this visit    VIRTUAL VISIT DISCLAIMER    Jenna Page verbally agrees to participate in [employer]  Pt is aware that [employer] could be limited without vital signs or the ability to perform a full hands-on physical Rusty Kim understands she or the provider may request at any time to terminate the video visit and request the patient to seek care or treatment in person

## 2022-08-08 ENCOUNTER — TELEPHONE (OUTPATIENT)
Dept: PSYCHIATRY | Facility: CLINIC | Age: 57
End: 2022-08-08

## 2022-08-08 NOTE — TELEPHONE ENCOUNTER
Sylvia Waddell called and lm on nursing line stating her "medications are all messed up" She needs to get back on track  [phone]      Nursing called and spoke with Sylvia Waddell  She reports she took the Prozac and Gabapentin last night after stopping for a couple days  She has the Seroquel but has not started this as of yet  We reviewed the direction for the Seroquel and since she has a supply advised she could start tonight with the 1 pill  (100 mg)     She is concerned for the Lamotrigine which she has not taken in a couple days  She said Dr Nicole Stapleton  Told her she would have to start over in MG  She is wondering how he would like her to re-start         Sylvia Waddell- [phone]

## 2022-08-08 NOTE — TELEPHONE ENCOUNTER
If it has been only a couple of days, then its ok to continue taking at current dose  Called patient and let her know  Told her to monitor for development of any rashes and give the office a call if she notices anything

## 2022-08-10 ENCOUNTER — TELEMEDICINE (OUTPATIENT)
Dept: BEHAVIORAL/MENTAL HEALTH CLINIC | Facility: CLINIC | Age: 57
End: 2022-08-10
Payer: COMMERCIAL

## 2022-08-10 DIAGNOSIS — F31.81 MODERATE MIXED BIPOLAR II DISORDER (HCC): ICD-10-CM

## 2022-08-10 DIAGNOSIS — F41.1 GENERALIZED ANXIETY DISORDER: Primary | ICD-10-CM

## 2022-08-10 DIAGNOSIS — F43.10 PTSD (POST-TRAUMATIC STRESS DISORDER): ICD-10-CM

## 2022-08-10 PROCEDURE — 90834 PSYTX W PT 45 MINUTES: CPT | Performed by: COUNSELOR

## 2022-08-10 NOTE — PSYCH
Virtual Regular Visit    Verification of patient location:    Patient is located in the following state in which I hold an active license [place]      Assessment/Plan:    Problem List Items Addressed This Visit        Other    Moderate mixed bipolar II disorder (HCC)    Generalized anxiety disorder - Primary    PTSD (post-traumatic stress disorder)          Goals addressed in session: Goal 1 and Goal 2          Reason for visit is No chief complaint on file  [facility]    Provider located at [address] [place] [id number] [phone]      Recent Visits  Date Type Provider Dept   08/05/22 229 [facility], [address] [place] [address]   08/03/22 229 [facility]   Showing recent visits within past 7 days and meeting all other requirements  Today's Visits  Date Type Provider Dept   08/10/22 229 [facility], [address] [place] [address]   Showing today's visits and meeting all other requirements  Future Appointments  No visits were found meeting these conditions  Showing future appointments within next 150 days and meeting all other requirements       The patient was identified by name and date of birth  Twila Pressley was informed that this is a telemedicine visit and that the [facility] and patient was informed this is a secure, HIPAA-complaint platform  She agrees to proceed     My office door was closed  No one else was in the room  She acknowledged consent and understanding of privacy and security of the video platform  The patient has agreed to participate and understands they can discontinue the visit at any time  Patient is aware this is a billable service  Subjective  Twila Pressley is a 49 y o  female        Data: Ag Ceballos discussed her physical activity - she shared that she was raking even in the hot days and was commended for her determination  She then spoke about other activities and topics to focus on to avoid sinking into negative memories and emotions  Person-Centered, DBT-based, and CBT techniques were combined to support Pt's effort to strengthen mindset around positive self-talk, individualized affirmations, and effective ways to break a downard spiraling  Assessment: Mili Nazario seemed open, expressive, and emotional  She showed great deal of dedication and determination to succeed in her therapy process  Plan: Mili Nazario will continue weekly therapy and describe her learned her DBT skill application, the Core Mindfulness skills and ACCEPTS, in particular  HPI     Past Medical History:   Diagnosis Date    Bilateral carpal tunnel syndrome 08/20/2019    Carpal tunnel syndrome of right wrist 10/30/2019    Added automatically from request for surgery [id number]    COPD (chronic obstructive pulmonary disease) ([facility] [address] [facility]     Hypertension        Past Surgical History:   Procedure Laterality Date    BREAST BIOPSY Left 20yrs ago benign    OOPHORECTOMY Right     [place] WRIST ARTHROSCOP,RELEASE Martnealia Sea LIG Right 1/21/2020    Procedure: RELEASE CARPAL TUNNEL ENDOSCOPIC;  Surgeon: Elisabeth Castro MD;  [facility];  Service: Orthopedics    [place] WRIST Volney Saltness LIG Left 1/28/2020    Procedure: RELEASE CARPAL TUNNEL ENDOSCOPIC;  Surgeon: Elisabeth Castro MD;  [facility];  Service: Orthopedics       Current Outpatient Medications   Medication Sig Dispense Refill    albuterol (PROVENTIL HFA,VENTOLIN HFA) 90 mcg/act inhaler inhale 2 puffs by mouth and INTO THE LUNGS every 6 hours IF NEEDED FOR WHEEZING   8 5 g 2    cholecalciferol (VITAMIN D3) 400 units tablet Take 400 Units by mouth daily      FLUoxetine (PROzac) 20 mg capsule Take 1 capsule (20 mg total) by mouth daily 30 capsule 1    fluticasone (FLONASE) 50 mcg/act nasal spray 1 spray into each nostril daily 16 g 2    gabapentin (NEURONTIN) 100 mg capsule take 1 capsule by mouth three times a day 180 capsule 1    lamoTRIgine (LaMICtal) 200 MG tablet take 1 tablet by mouth once daily 90 tablet 0    levocetirizine (XYZAL) 5 MG tablet Take 1 tablet (5 mg total) by mouth every evening 30 tablet 4    nicotine polacrilex (COMMIT) 4 MG lozenge Apply 1 lozenge (4 mg total) to the mouth or throat as needed for smoking cessation (Patient not taking: No sig reported) 100 each 5    omeprazole (PriLOSEC) 20 mg delayed release capsule Take 1 capsule (20 mg total) by mouth 2 (two) times a day 60 capsule 5    QUEtiapine (SEROquel) 100 mg tablet Take 1 tablet (100 mg total) at night time daily for 5 days, and then take 2 tablets (200 mg total) at nighttime daily afterwards  30 tablet 1    senna-docusate sodium (SENOKOT-S) 8 6-50 mg per tablet Take 3 tablets by mouth as needed        tiotropium-olodaterol (Stiolto Respimat) 2 5-2 5 MCG/ACT inhaler Inhale 2 puffs daily 4 g 11     No current facility-administered medications for this visit  No Known Allergies    Review of Systems    Video Exam    There were no vitals filed for this visit      Physical Exam     I spent 45 minutes directly with the patient during this visit

## 2022-08-12 ENCOUNTER — TELEMEDICINE (OUTPATIENT)
Dept: BEHAVIORAL/MENTAL HEALTH CLINIC | Facility: CLINIC | Age: 57
End: 2022-08-12
Payer: COMMERCIAL

## 2022-08-12 ENCOUNTER — PROCEDURE VISIT (OUTPATIENT)
Dept: OBGYN CLINIC | Facility: CLINIC | Age: 57
End: 2022-08-12
Payer: COMMERCIAL

## 2022-08-12 VITALS
SYSTOLIC BLOOD PRESSURE: 142 MMHG | HEIGHT: 61 IN | BODY MASS INDEX: 23.22 KG/M2 | WEIGHT: 123 LBS | DIASTOLIC BLOOD PRESSURE: 80 MMHG

## 2022-08-12 DIAGNOSIS — F43.10 PTSD (POST-TRAUMATIC STRESS DISORDER): ICD-10-CM

## 2022-08-12 DIAGNOSIS — F41.1 GENERALIZED ANXIETY DISORDER: ICD-10-CM

## 2022-08-12 DIAGNOSIS — R87.610 ASCUS WITH POSITIVE HIGH RISK HPV CERVICAL: Primary | ICD-10-CM

## 2022-08-12 DIAGNOSIS — N76.0 BACTERIAL VAGINOSIS: ICD-10-CM

## 2022-08-12 DIAGNOSIS — R87.810 ASCUS WITH POSITIVE HIGH RISK HPV CERVICAL: Primary | ICD-10-CM

## 2022-08-12 DIAGNOSIS — B96.89 BACTERIAL VAGINOSIS: ICD-10-CM

## 2022-08-12 DIAGNOSIS — F31.81 MODERATE MIXED BIPOLAR II DISORDER (HCC): Primary | ICD-10-CM

## 2022-08-12 LAB
BV WHIFF TEST VAG QL: POSITIVE
CLUE CELLS SPEC QL WET PREP: POSITIVE
PH SMN: 5 [PH]
T VAGINALIS VAG QL WET PREP: NEGATIVE
YEAST VAG QL WET PREP: NEGATIVE

## 2022-08-12 PROCEDURE — 90853 GROUP PSYCHOTHERAPY: CPT | Performed by: COUNSELOR

## 2022-08-12 PROCEDURE — 88305 TISSUE EXAM BY PATHOLOGIST: CPT | Performed by: PATHOLOGY

## 2022-08-12 PROCEDURE — 57456 ENDOCERV CURETTAGE W/SCOPE: CPT | Performed by: OBSTETRICS & GYNECOLOGY

## 2022-08-12 PROCEDURE — 87210 SMEAR WET MOUNT SALINE/INK: CPT | Performed by: OBSTETRICS & GYNECOLOGY

## 2022-08-12 RX ORDER — METRONIDAZOLE 7.5 MG/G
1 GEL VAGINAL
Qty: 70 G | Refills: 0 | Status: SHIPPED | OUTPATIENT
Start: 2022-08-12 | End: 2022-08-17

## 2022-08-12 NOTE — PROGRESS NOTES
Colposcopy     Date/Time 8/12/2022 12:18 PM     Universal Protocol   Consent: Verbal consent obtained  Risks and benefits: risks, benefits and alternatives were discussed  Consent given by: patient  Patient understanding: patient states understanding of the procedure being performed  Patient consent: the patient's understanding of the procedure matches consent given  Procedure consent: procedure consent matches procedure scheduled  Required items: required blood products, implants, devices, and special equipment available       Performed by  Tk Amaro MD     Authorized by Tk Amaro MD        Pre-procedure details     Prepped with: acetic acid       Indication    ASC-US (hpv+)     Procedure Details   Procedure: Colposcopy w/ endocervical curettage      Under satisfactory analgesia the patient was prepped and draped in the dorsal lithotomy position: yes      Forney speculum was placed in the vagina: yes      Under colposcopic examination the transition zone was seen in entirety: yes      Intracervical block was performed: no      Endocervix was curetted using a Kevorkian curette: yes      Tampon inserted: no      Monsel's solution was applied: no      Biopsy(s): no      Specimen to pathology: yes       Post-procedure      Findings comment:  No abnormality noted    Impression comment:  Normal    Patient tolerance of procedure: Tolerated well, no immediate complications     Comments       Vaginal exam notable for pale epithelia with appropriate pliability and rugae  No erythema  Very scant discharge                Pap hx  7/2022 - pap - ASCUS, HPV+ (other)  6/2021 - pap - NILM, HPV+ (other)

## 2022-08-12 NOTE — PSYCH
Virtual Regular Visit    Verification of patient location:    Patient is located in the following state in which I hold an active license [place]      Assessment/Plan:    Problem List Items Addressed This Visit        Other    Moderate mixed bipolar II disorder (HCC) - Primary    Generalized anxiety disorder    PTSD (post-traumatic stress disorder)          Goals addressed in session: Goal 1 and Goal 2          Reason for visit is No chief complaint on file  Enc[facility]    Provider located at [address] [place] [id number] [phone]      Recent Visits  Date Type Provider Dept   08/10/22 229 [facility], [address] [place] [address]   08/05/22 229 [facility], [facility] Therapist Heriberto   Showing recent visits within past 7 days and meeting all other requirements  Today's Visits  Date Type Provider Dept   08/12/22 229 [facility], [address] [place] [address]   Showing today's visits and meeting all other requirements  Future Appointments  No visits were found meeting these conditions  Showing future appointments within next 150 days and meeting all other requirements       The patient was identified by name and date of birth  Cinthya Martins was informed that this is a telemedicine visit and that the visit is being conducted through[employer] Teams and patient was informed that this is a secure, HIPAA-compliant platform  She agrees to proceed     My office door was closed  The patient was notified the following individuals were present in the room other group members   She acknowledged consent and understanding of privacy and security of the video platform  The patient has agreed to participate and understands they can discontinue the visit at any time      Patient is aware this is a billable service  Subjective  Armando Kwok is a 49 y o  female    Data: Pippa Dykes attended the Relaxation group and was guided through diaphragmatic breathing with progressive muscle relaxation in the beginning  Later on, group members were guided through body scan and mindfulness-based stress reduction followed by connecting to the inner child  Apart of the psychoeducation and DBT, and group approach was used to facilitate the new participants introduction and to support the cohesion and maintain the serene environment for the exercises purpose  Assessment: Pippa Dykes followed through guidance and completed the exercises, showed willingness to continue learning and practicing self-care  Plan:Marie will practice deep breathing and other techniques based on each occasion to counter stress and will attend the next group to be able to take part in further guided exercises on chosen topics to build on the techniques learned in the beginning  HPI     Past Medical History:   Diagnosis Date    Bilateral carpal tunnel syndrome 08/20/2019    Carpal tunnel syndrome of right wrist 10/30/2019    Added automatically from request for surgery [id number]    COPD [facility]chronic obstructive pulmonary disease) ([facility] [address] )     Hypertension        Past Surgical History:   Procedure Laterality Date    BREAST BIOPSY Left 20yrs ago benign    OOPHORECTOMY Right     [place] WRIST ARTHROSCOP,RELEASE Paloma Lecher LIG Right 1/21/2020    Procedure: RELEASE CARPAL TUNNEL ENDOSCOPIC;  Surgeon: Leonel Cosme MD;  [facility];  Service: Orthopedics    [place] WRIST Ozzy Madyson LIG Left 1/28/2020    Procedure: RELEASE CARPAL TUNNEL ENDOSCOPIC;  Surgeon: Leonel Cosme MD;  [facility];  Service: Orthopedics       Current Outpatient Medications   Medication Sig Dispense Refill    albuterol (PROVENTIL HFA,VENTOLIN HFA) 90 mcg/act inhaler inhale 2 puffs by mouth and INTO THE LUNGS every 6 hours IF NEEDED FOR WHEEZING   8 5 g 2  cholecalciferol (VITAMIN D3) 400 units tablet Take 400 Units by mouth daily      FLUoxetine (PROzac) 20 mg capsule Take 1 capsule (20 mg total) by mouth daily 30 capsule 1    fluticasone (FLONASE) 50 mcg/act nasal spray 1 spray into each nostril daily 16 g 2    gabapentin (NEURONTIN) 100 mg capsule take 1 capsule by mouth three times a day 180 capsule 1    lamoTRIgine (LaMICtal) 200 MG tablet take 1 tablet by mouth once daily 90 tablet 0    levocetirizine (XYZAL) 5 MG tablet Take 1 tablet (5 mg total) by mouth every evening 30 tablet 4    metroNIDAZOLE (METROGEL) 0 75 % vaginal gel Insert 1 application into the vagina daily at bedtime for 5 days 70 g 0    nicotine polacrilex (COMMIT) 4 MG lozenge Apply 1 lozenge (4 mg total) to the mouth or throat as needed for smoking cessation (Patient not taking: No sig reported) 100 each 5    omeprazole (PriLOSEC) 20 mg delayed release capsule Take 1 capsule (20 mg total) by mouth 2 (two) times a day 60 capsule 5    QUEtiapine (SEROquel) 100 mg tablet Take 1 tablet (100 mg total) at night time daily for 5 days, and then take 2 tablets (200 mg total) at nighttime daily afterwards  30 tablet 1    senna-docusate sodium (SENOKOT-S) 8 6-50 mg per tablet Take 3 tablets by mouth as needed        tiotropium-olodaterol (Stiolto Respimat) 2 5-2 5 MCG/ACT inhaler Inhale 2 puffs daily 4 g 11     No current facility-administered medications for this visit  No Known Allergies    Review of Systems    Video Exam    There were no vitals filed for this visit      Physical Exam     I spent 58 minutes directly with the patient during this visit

## 2022-08-17 ENCOUNTER — TELEPHONE (OUTPATIENT)
Dept: PSYCHIATRY | Facility: CLINIC | Age: 57
End: 2022-08-17

## 2022-08-17 ENCOUNTER — TELEMEDICINE (OUTPATIENT)
Dept: BEHAVIORAL/MENTAL HEALTH CLINIC | Facility: CLINIC | Age: 57
End: 2022-08-17
Payer: COMMERCIAL

## 2022-08-17 DIAGNOSIS — F43.10 PTSD (POST-TRAUMATIC STRESS DISORDER): ICD-10-CM

## 2022-08-17 DIAGNOSIS — F41.1 GENERALIZED ANXIETY DISORDER: ICD-10-CM

## 2022-08-17 DIAGNOSIS — F31.81 MODERATE MIXED BIPOLAR II DISORDER (HCC): Primary | ICD-10-CM

## 2022-08-17 PROCEDURE — 90834 PSYTX W PT 45 MINUTES: CPT | Performed by: COUNSELOR

## 2022-08-17 NOTE — PSYCH
Virtual Regular Visit    Verification of patient location:    Patient is located in the following state in which I hold an active license [place]      Assessment/Plan:    Problem List Items Addressed This Visit        Other    Moderate mixed bipolar II disorder (HCC) - Primary    Generalized anxiety disorder    PTSD (post-traumatic stress disorder)          Goals addressed in session: Goal 1 and Goal 2          Reason for visit is No chief complaint on file  Encounter provider Maritza Paz, [address]     Provider located at [address] [place] [id number] [phone]      Recent Visits  Date Type Provider Dept   08/12/22 229 [facility], [address] [place] [address]   08/10/22 229 [facility] [place]   Showing recent visits within past 7 days and meeting all other requirements  Today's Visits  Date Type Provider Dept   08/17/22 229 [facility], [address] [place] [address]   Showing today's visits and meeting all other requirements  Future Appointments  No visits were found meeting these conditions  Showing future appointments within next 150 days and meeting all other requirements       The patient was identified by name and date of birth  Padmaja Fletcher was informed that this is a telemedicine visit and that the visit is being conducted through[employer] Embedded and patient was informed this is a secure, HIPAA-complaint platform  She agrees to proceed     My office door was closed  No one else was in the room  She acknowledged consent and understanding of privacy and security of the video platform  The patient has agreed to participate and understands they can discontinue the visit at any time  Patient is aware this is a billable service  Subjective  Padmaja Fletcher is a 49 y o  female      Data: Sylvia Waddell reported feeling very tired and sleepy- she spoke about her new medication and that she does not want to increase it, as it was prescribed  Her Doctor was informed, and she was advised to follow his direction and wait for his response  Person-Centered, DBT-based, and TF-CBT techniques were used to support Pt's focus on her most important topics since she was sleepy and could not concentrate very much  Assessment: María Pope was talkative, expressive, descriptive, and animated  She managed to keep her focus and to maintain sufficient eye contact  Plan: María Pope wants to continue weekly therapy where she expresses her struggle with emotions from trauma  She will make every effort to apply Mindfulness and try to separate from emotions and observe them neutrally and will work on her trauma narrative  HPI     Past Medical History:   Diagnosis Date    Bilateral carpal tunnel syndrome 08/20/2019    Carpal tunnel syndrome of right wrist 10/30/2019    Added automatically from request for surgery [id number]    COPD (chronic [facility] pulmonary disease) ([facility] [address] )     Hypertension        Past Surgical History:   Procedure Laterality Date    BREAST BIOPSY Left 20yrs ago benign    OOPHORECTOMY Right     [place] WRIST ARTHROSCOP,RELEASE Sheryle Walthill LIG Right 1/21/2020    Procedure: RELEASE CARPAL TUNNEL ENDOSCOPIC;  Surgeon: Purnima Smith MD;  [facility];  Service: Orthopedics    [place] WRIST Silverio Heman LIG Left 1/28/2020    Procedure: RELEASE CARPAL TUNNEL ENDOSCOPIC;  Surgeon: Purnima Smith MD;  [facility];  Service: Orthopedics       Current Outpatient Medications   Medication Sig Dispense Refill    albuterol (PROVENTIL HFA,VENTOLIN HFA) 90 mcg/act inhaler inhale 2 puffs by mouth and INTO THE LUNGS every 6 hours IF NEEDED FOR WHEEZING   8 5 g 2    cholecalciferol (VITAMIN D3) 400 units tablet Take 400 Units by mouth daily      FLUoxetine (PROzac) 20 mg capsule Take 1 capsule (20 mg total) by mouth daily 30 capsule 1    fluticasone (FLONASE) 50 mcg/act nasal spray 1 spray into each nostril daily 16 g 2    gabapentin (NEURONTIN) 100 mg capsule take 1 capsule by mouth three times a day 180 capsule 1    lamoTRIgine (LaMICtal) 200 MG tablet take 1 tablet by mouth once daily 90 tablet 0    levocetirizine (XYZAL) 5 MG tablet Take 1 tablet (5 mg total) by mouth every evening 30 tablet 4    metroNIDAZOLE (METROGEL) 0 75 % vaginal gel Insert 1 application into the vagina daily at bedtime for 5 days 70 g 0    nicotine polacrilex (COMMIT) 4 MG lozenge Apply 1 lozenge (4 mg total) to the mouth or throat as needed for smoking cessation (Patient not taking: No sig reported) 100 each 5    omeprazole (PriLOSEC) 20 mg delayed release capsule Take 1 capsule (20 mg total) by mouth 2 (two) times a day 60 capsule 5    QUEtiapine (SEROquel) 100 mg tablet Take 1 tablet (100 mg total) at night time daily for 5 days, and then take 2 tablets (200 mg total) at nighttime daily afterwards  30 tablet 1    senna-docusate sodium (SENOKOT-S) 8 6-50 mg per tablet Take 3 tablets by mouth as needed        tiotropium-olodaterol (Stiolto Respimat) 2 5-2 5 MCG/ACT inhaler Inhale 2 puffs daily 4 g 11     No current facility-administered medications for this visit  No Known Allergies    Review of Systems    Video Exam    There were no vitals filed for this visit      Physical Exam     I spent 40 minutes directly with the patient during this visit

## 2022-08-17 NOTE — TELEPHONE ENCOUNTER
Was contacted by Kate Fagan, patient's psychotherapist, who noted that pt was sleepy during therapy session  Pt also reported problems with balance, sluggishness, and falls  Called patient to inquire about symptoms  It appears that the patient rolled off bed once and feeling tired all the time after starting Seroquel  No head strikes or LOC reported  Notes that she was doing okay before starting  Currently taking 100 mg qhs  Taking other medications as prescribed  Advised the patient to stop taking Seroquel at this time and to monitor for further development of symptoms  Encouraged to call the office with any issues or change in status  Patient will be seen on 8/23/22 for medication management

## 2022-08-19 ENCOUNTER — TELEMEDICINE (OUTPATIENT)
Dept: BEHAVIORAL/MENTAL HEALTH CLINIC | Facility: CLINIC | Age: 57
End: 2022-08-19
Payer: COMMERCIAL

## 2022-08-19 DIAGNOSIS — F41.1 GENERALIZED ANXIETY DISORDER: ICD-10-CM

## 2022-08-19 DIAGNOSIS — F31.81 MODERATE MIXED BIPOLAR II DISORDER (HCC): Primary | ICD-10-CM

## 2022-08-19 DIAGNOSIS — F43.10 PTSD (POST-TRAUMATIC STRESS DISORDER): ICD-10-CM

## 2022-08-19 PROCEDURE — 90853 GROUP PSYCHOTHERAPY: CPT | Performed by: COUNSELOR

## 2022-08-19 NOTE — PSYCH
Virtual Regular Visit    Verification of patient location:    Patient is located in the following state in which I hold an active license [place]      Assessment/Plan:    Problem List Items Addressed This Visit        Other    Moderate mixed bipolar II disorder (HCC) - Primary    Generalized anxiety disorder    PTSD (post-traumatic stress disorder)          Goals addressed in session: Goal 1 and Goal 2          Reason for visit is No chief complaint on file  [facility] [place] [facility]    Provider located at [address] [place] [id number] [phone]      Recent Visits  Date Type Provider Dept   08/17/22 229 [facility], [address] [place] [address]   08/12/22 229 [facility], [facility] Therapist Her[place]   Showing recent visits within past 7 days and meeting all other requirements  Today's Visits  Date Type Provider Dept   08/19/22 229 [facility], [address] [place] [address]   Showing today's visits and meeting all other requirements  Future Appointments  No visits were found meeting these conditions  Showing future appointments within next 150 days and meeting all other requirements       The patient was identified by name and date of birth  Catarino Morales was informed that this is a telemedicine visit and that the visit is being conducted throughMicrosoft Teams and patient was informed that this is a secure, HIPAA-compliant platform  She agrees to proceed     My office door was closed  The patient was notified the following individuals were present in the room other group attendees  She acknowledged consent and understanding of privacy and security of the video platform  The patient has agreed to participate and understands they can discontinue the visit at any time      Patient is aware this is a billable service  Subjective  Yossi Nielson is a 49 y o  female    Data: Mili Nazario attended the Relaxation group and was guided through diaphragmatic breathing with progressive muscle relaxation in the beginning  Later, all attendes were guided through body scan and mindfulness-based stress reduction followed by affirmations against anxiety and depression  Apart of the psychoeducation and DBT, and group approach was used to facilitate the new participants introduction and to support the cohesion and maintain the serene environment for the exercises purpose  Assessment: Mili Nazario followed through guidance and completed the exercises, showed willingness to continue learning and practicing self-care  Plan: Mili Nazario will practice deep breathing and other techniques based on each occasion to counter stress and will attend the next group to be able to take part in further guided exercises on chosen topics to build on the techniques learned in the beginning  HPI     Past Medical History:   Diagnosis Date    Bilateral carpal tunnel syndrome 08/20/2019    Carpal tunnel syndrome of right wrist 10/30/2019    Added automatically from request for surgery [id number]    COPD [facility]chronic obstructive pulmonary disease) ([facility] [address] )     Hypertension        Past Surgical History:   Procedure Laterality Date    BREAST BIOPSY Left 20yrs ago benign    OOPHORECTOMY Right     [place] WRIST ARTHROSCOP,RELEASE Marthenia Sea LIG Right 1/21/2020    Procedure: RELEASE CARPAL TUNNEL ENDOSCOPIC;  Surgeon: Elisabeth Castro MD;  [facility];  Service: Orthopedics    [place] WRIST Volney Saltness LIG Left 1/28/2020    Procedure: RELEASE CARPAL TUNNEL ENDOSCOPIC;  Surgeon: Elisabeth Castro MD;  [facility];  Service: Orthopedics       Current Outpatient Medications   Medication Sig Dispense Refill    albuterol (PROVENTIL HFA,VENTOLIN HFA) 90 mcg/act inhaler inhale 2 puffs by mouth and INTO THE LUNGS every 6 hours IF NEEDED FOR WHEEZING  8 5 g 2    cholecalciferol (VITAMIN D3) 400 units tablet Take 400 Units by mouth daily      FLUoxetine (PROzac) 20 mg capsule Take 1 capsule (20 mg total) by mouth daily 30 capsule 1    fluticasone (FLONASE) 50 mcg/act nasal spray 1 spray into each nostril daily 16 g 2    gabapentin (NEURONTIN) 100 mg capsule take 1 capsule by mouth three times a day 180 capsule 1    lamoTRIgine (LaMICtal) 200 MG tablet take 1 tablet by mouth once daily 90 tablet 0    levocetirizine (XYZAL) 5 MG tablet Take 1 tablet (5 mg total) by mouth every evening 30 tablet 4    nicotine polacrilex (COMMIT) 4 MG lozenge Apply 1 lozenge (4 mg total) to the mouth or throat as needed for smoking cessation (Patient not taking: No sig reported) 100 each 5    omeprazole (PriLOSEC) 20 mg delayed release capsule Take 1 capsule (20 mg total) by mouth 2 (two) times a day 60 capsule 5    QUEtiapine (SEROquel) 100 mg tablet Take 1 tablet (100 mg total) at night time daily for 5 days, and then take 2 tablets (200 mg total) at nighttime daily afterwards  30 tablet 1    senna-docusate sodium (SENOKOT-S) 8 6-50 mg per tablet Take 3 tablets by mouth as needed        tiotropium-olodaterol (Stiolto Respimat) 2 5-2 5 MCG/ACT inhaler Inhale 2 puffs daily 4 g 11     No current facility-administered medications for this visit  No Known Allergies    Review of Systems    Video Exam    There were no vitals filed for this visit      Physical Exam     I spent 58 minutes directly with the patient during this visit

## 2022-08-21 NOTE — RESULT ENCOUNTER NOTE
Please call pt with results  Colpo biopsy notable for CIN1 / low grade changes  These are precancerous changes that are very early  They need to be watched closely and I recommend pap in 1yr to monitor  Many will regress with time, but if persistent or progressive, there are treatment options available to help

## 2022-08-23 ENCOUNTER — TELEPHONE (OUTPATIENT)
Dept: PSYCHIATRY | Facility: CLINIC | Age: 57
End: 2022-08-23

## 2022-08-23 NOTE — TELEPHONE ENCOUNTER
Patient called in link is not working in email for [employer] 8/23/2022 @ 10:45 am Please reach out to pt to assist

## 2022-08-25 ENCOUNTER — TELEMEDICINE (OUTPATIENT)
Dept: BEHAVIORAL/MENTAL HEALTH CLINIC | Facility: CLINIC | Age: 57
End: 2022-08-25
Payer: COMMERCIAL

## 2022-08-25 ENCOUNTER — OFFICE VISIT (OUTPATIENT)
Dept: PSYCHIATRY | Facility: CLINIC | Age: 57
End: 2022-08-25
Payer: COMMERCIAL

## 2022-08-25 ENCOUNTER — TELEPHONE (OUTPATIENT)
Dept: PSYCHIATRY | Facility: CLINIC | Age: 57
End: 2022-08-25

## 2022-08-25 DIAGNOSIS — F41.1 GENERALIZED ANXIETY DISORDER: ICD-10-CM

## 2022-08-25 DIAGNOSIS — F12.90 MARIJUANA USE: Chronic | ICD-10-CM

## 2022-08-25 DIAGNOSIS — F43.10 PTSD (POST-TRAUMATIC STRESS DISORDER): ICD-10-CM

## 2022-08-25 DIAGNOSIS — F31.32 BIPOLAR AFFECTIVE DISORDER, CURRENTLY DEPRESSED, MODERATE (HCC): Primary | ICD-10-CM

## 2022-08-25 DIAGNOSIS — Z76.0 MEDICATION REFILL: ICD-10-CM

## 2022-08-25 DIAGNOSIS — F31.81 MODERATE MIXED BIPOLAR II DISORDER (HCC): Primary | ICD-10-CM

## 2022-08-25 PROCEDURE — 99214 OFFICE O/P EST MOD 30 MIN: CPT | Performed by: PSYCHIATRY & NEUROLOGY

## 2022-08-25 PROCEDURE — 90834 PSYTX W PT 45 MINUTES: CPT | Performed by: COUNSELOR

## 2022-08-25 RX ORDER — FLUOXETINE HYDROCHLORIDE 20 MG/1
40 CAPSULE ORAL DAILY
Qty: 30 CAPSULE | Refills: 1 | Status: SHIPPED | OUTPATIENT
Start: 2022-08-25 | End: 2022-09-24

## 2022-08-25 RX ORDER — GABAPENTIN 100 MG/1
100 CAPSULE ORAL 3 TIMES DAILY
Qty: 90 CAPSULE | Refills: 1 | Status: SHIPPED | OUTPATIENT
Start: 2022-08-25

## 2022-08-25 RX ORDER — LAMOTRIGINE 200 MG/1
200 TABLET ORAL DAILY
Qty: 30 TABLET | Refills: 1 | Status: SHIPPED | OUTPATIENT
Start: 2022-08-25 | End: 2022-09-24

## 2022-08-25 NOTE — PSYCH
Virtual Regular Visit    Verification of patient location:    Patient is located in the following state in which I hold an active license [place]      Assessment/Plan:    Problem List Items Addressed This Visit        Other    Moderate mixed bipolar II disorder (HCC) - Primary    Generalized anxiety disorder    PTSD (post-traumatic stress disorder)          Goals addressed in session: Goal 1 and Goal 2          Reason for visit is No chief complaint on file  Encounter provider Sahil Garcia [facility] [place]    Provider located at [address] [place] [id number] [phone]      Recent Visits  Date Type Provider Dept   08/19/22 [address] [place] [address] [facility] [place]   Showing recent visits within past 7 days and meeting all other requirements  Future Appointments  No visits were found meeting these conditions  Showing future appointments within next 150 days and meeting all other requirements       The patient was identified by name and date of birth  Shane Dias was informed that this is a telemedicine visit and that the visit is being conducted throughMicrosoft Teams and patient was informed that this is a secure, HIPAA-compliant platform  She agrees to proceed     My office door was closed  No one else was in the room  She acknowledged consent and understanding of privacy and security of the video platform  The patient has agreed to participate and understands they can discontinue the visit at any time  Patient is aware this is a billable service  Subjective  Shane Dias is a 49 y o  female   Data: Rebeka Guerrero discussed her current improvement after her medication correction, her improved physical state- she feels awake, balanced, and herself  She was allowed to express herself and spoke about her parents, and about her project writing to her grandchildren in journals   The time she spends with them, and Person-Centered, Mindfulness, Positive Psychology, and CBT techniques were used for Pt's work on better mindset to handle emotions, less defeating thoughts, and stronger affirmations  Assessment: Paige Calderon seemed positive, talkative, active, and engaged  She maintained sufficient eye contact and showed good willingness to work on  from emotions and being more mindful to be able to observe neutrally her intense emotions  Plan: Paige Calderon wants to continue weekly therapy and weekly Relaxation group to be able to counter stress effects on her body  She will continue expressing her conflicts and work on her trauma narrative  HPI     Past Medical History:   Diagnosis Date    Bilateral carpal tunnel syndrome 08/20/2019    Carpal tunnel syndrome of right wrist 10/30/2019    Added automatically from request for surgery [id number]    COPD [facility]ch[facility] obstructive pulmonary disease) ([facility] [address] )     Hypertension        Past Surgical History:   Procedure Laterality Date    BREAST BIOPSY Left 20yrs ago benign    OOPHORECTOMY Right     [place] WRIST ARTHROSCOP,RELEASE Delorise Minors LIG Right 1/21/2020    Procedure: RELEASE CARPAL TUNNEL ENDOSCOPIC;  Surgeon: Rissa Rivera MD;  [facility];  Service: Orthopedics    NH WRIST Galdino Adriana LIG Left 1/28/2020    Procedure: RELEASE CARPAL TUNNEL ENDOSCOPIC;  Surgeon: Rissa Rivera MD;  [facility];  Service: Orthopedics       Current Outpatient Medications   Medication Sig Dispense Refill    albuterol (PROVENTIL HFA,VENTOLIN HFA) 90 mcg/act inhaler inhale 2 puffs by mouth and INTO THE LUNGS every 6 hours IF NEEDED FOR WHEEZING   8 5 g 2    cholecalciferol (VITAMIN D3) 400 units tablet Take 400 Units by mouth daily      FLUoxetine (PROzac) 20 mg capsule Take 1 capsule (20 mg total) by mouth daily 30 capsule 1    fluticasone (FLONASE) 50 mcg/act nasal spray 1 spray into each nostril daily 16 g 2    gabapentin (NEURONTIN) 100 mg capsule take 1 capsule by mouth three times a day 180 capsule 1    lamoTRIgine (LaMICtal) 200 MG tablet take 1 tablet by mouth once daily 90 tablet 0    levocetirizine (XYZAL) 5 MG tablet Take 1 tablet (5 mg total) by mouth every evening 30 tablet 4    nicotine polacrilex (COMMIT) 4 MG lozenge Apply 1 lozenge (4 mg total) to the mouth or throat as needed for smoking cessation (Patient not taking: No sig reported) 100 each 5    omeprazole (PriLOSEC) 20 mg delayed release capsule Take 1 capsule (20 mg total) by mouth 2 (two) times a day 60 capsule 5    QUEtiapine (SEROquel) 100 mg tablet Take 1 tablet (100 mg total) at night time daily for 5 days, and then take 2 tablets (200 mg total) at nighttime daily afterwards  30 tablet 1    senna-docusate sodium (SENOKOT-S) 8 6-50 mg per tablet Take 3 tablets by mouth as needed        tiotropium-olodaterol (Stiolto Respimat) 2 5-2 5 MCG/ACT inhaler Inhale 2 puffs daily 4 g 11     No current facility-administered medications for this visit  No Known Allergies    Review of Systems    Video Exam    There were no vitals filed for this visit      Physical Exam     I spent 41 minutes directly with the patient during this visit
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
## 2022-08-26 ENCOUNTER — TELEMEDICINE (OUTPATIENT)
Dept: BEHAVIORAL/MENTAL HEALTH CLINIC | Facility: CLINIC | Age: 57
End: 2022-08-26
Payer: COMMERCIAL

## 2022-08-26 DIAGNOSIS — F43.10 PTSD (POST-TRAUMATIC STRESS DISORDER): ICD-10-CM

## 2022-08-26 DIAGNOSIS — F31.81 MODERATE MIXED BIPOLAR II DISORDER (HCC): Primary | ICD-10-CM

## 2022-08-26 DIAGNOSIS — F41.1 GENERALIZED ANXIETY DISORDER: ICD-10-CM

## 2022-08-26 PROCEDURE — 90853 GROUP PSYCHOTHERAPY: CPT | Performed by: COUNSELOR

## 2022-08-26 NOTE — PSYCH
Virtual Regular Visit    Verification of patient location:    Patient is located in the following state in which I hold an active license [place]      Assessment/Plan:    Problem List Items Addressed This Visit        Other    Moderate mixed bipolar II disorder (HCC) - Primary    Generalized anxiety disorder    PTSD (post-traumatic stress disorder)          Goals addressed in session: Goal 1 and Goal 2          Reason for visit is No chief complaint on file  Encounter provider Juan Blakely [facility]    Provider located at [address] [place] [id number] [phone]      Recent Visits  Date Type Provider Dept   08/25/22 229 [facility], [address] [place] [address]   08/19/22 229 [facility], [facility]   Showing recent visits within past 7 days and meeting all other requirements  Today's Visits  Date Type Provider Dept   08/26/22 229 [facility], [address] [place] [address]   Showing today's visits and meeting all other requirements  Future Appointments  No visits were found meeting these conditions  Showing future appointments within next 150 days and meeting all other requirements       The patient was identified by name and date of birth  Won Marin was informed that this is a telemedicine visit and that the visit is being conducted throughMicrosoft Teams and patient was informed that this is a secure, HIPAA-compliant platform  She agrees to proceed     My office door was closed  The patient was notified the following individuals were present in the room other group attendees  She acknowledged consent and understanding of privacy and security of the video platform  The patient has agreed to participate and understands they can discontinue the visit at any time      Patient is aware this is a billable service  Subjective  Howie Benavidez is a 49 y o  female    Data: Reyes Parikh attended the Relaxation group and was guided through diaphragmatic breathing with progressive muscle relaxation in the beginning  Later, all attendees were guided through body scan and mindfulness-based connection with the inner child and soothing, followed by affirmations for forgiveness, compassion and against anxiety and depression  Apart of the psychoeducation and DBT, and group approach was used to facilitate the new participants introduction and to support the cohesion and maintain the serene environment for the exercises purpose  Assessment: Reyes Parikh followed through guidance and completed the exercises, showed willingness to continue learning and practicing self-care  Plan: Reyes Parikh will practice deep breathing and other techniques based on each occasion to counter stress and will attend the next group to be able to take part in further guided exercises on chosen topics to build on the techniques learned in the beginning          HPI     Past Medical History:   Diagnosis Date    Bilateral carpal tunnel syndrome 08/20/2019    Carpal tunnel syndrome of right wrist 10/30/2019    Added automatically from request for surgery [id number]    COPD (chronic obstructive pulmonary disease) [facility] [address] [facility]     Hypertension        Past Surgical History:   Procedure Laterality Date    BREAST BIOPSY Left 20yrs ago benign    OOPHORECTOMY Right     [place] WRIST [place] LIG Right 1/21/2020    Procedure: RELEASE CARPAL TUNNEL ENDOSCOPIC;  Surgeon: Ebonie Guzman MD;  [facility];  Service: Orthopedics    [place] WRIST [place] LIG Left 1/28/2020    Procedure: RELEASE CARPAL TUNNEL ENDOSCOPIC;  Surgeon: Ebonie Guzman MD;  [facility];  Service: Orthopedics       Current Outpatient Medications   Medication Sig Dispense Refill    albuterol (PROVENTIL HFA,VENTOLIN HFA) 90 mcg/act inhaler inhale 2 puffs by mouth and INTO THE LUNGS every 6 hours IF NEEDED FOR WHEEZING  8 5 g 2    cholecalciferol (VITAMIN D3) 400 units tablet Take 400 Units by mouth daily      FLUoxetine (PROzac) 20 mg capsule Take 2 capsules (40 mg total) by mouth daily 30 capsule 1    fluticasone (FLONASE) 50 mcg/act nasal spray 1 spray into each nostril daily 16 g 2    gabapentin (NEURONTIN) 100 mg capsule Take 1 capsule (100 mg total) by mouth 3 (three) times a day 90 capsule 1    lamoTRIgine (LaMICtal) 200 MG tablet Take 1 tablet (200 mg total) by mouth daily 30 tablet 1    levocetirizine (XYZAL) 5 MG tablet Take 1 tablet (5 mg total) by mouth every evening 30 tablet 4    nicotine polacrilex (COMMIT) 4 MG lozenge Apply 1 lozenge (4 mg total) to the mouth or throat as needed for smoking cessation (Patient not taking: No sig reported) 100 each 5    omeprazole (PriLOSEC) 20 mg delayed release capsule Take 1 capsule (20 mg total) by mouth 2 (two) times a day 60 capsule 5    senna-docusate sodium (SENOKOT-S) 8 6-50 mg per tablet Take 3 tablets by mouth as needed        tiotropium-olodaterol (Stiolto Respimat) 2 5-2 5 MCG/ACT inhaler Inhale 2 puffs daily 4 g 11     No current facility-administered medications for this visit  No Known Allergies    Review of Systems    Video Exam    There were no vitals filed for this visit      Physical Exam     I spent 60 minutes directly with the patient during this visit

## 2022-08-31 ENCOUNTER — TELEMEDICINE (OUTPATIENT)
Dept: BEHAVIORAL/MENTAL HEALTH CLINIC | Facility: CLINIC | Age: 57
End: 2022-08-31
Payer: COMMERCIAL

## 2022-08-31 DIAGNOSIS — F41.1 GENERALIZED ANXIETY DISORDER: ICD-10-CM

## 2022-08-31 DIAGNOSIS — F43.10 PTSD (POST-TRAUMATIC STRESS DISORDER): ICD-10-CM

## 2022-08-31 DIAGNOSIS — F31.81 MODERATE MIXED BIPOLAR II DISORDER (HCC): Primary | ICD-10-CM

## 2022-08-31 PROCEDURE — 90834 PSYTX W PT 45 MINUTES: CPT | Performed by: COUNSELOR

## 2022-08-31 NOTE — PSYCH
Virtual Regular Visit    Verification of patient location:    Patient is located in the following state in which I hold an [place] license [place]      Assessment/Plan:    Problem List Items Addressed This Visit        Other    Moderate mixed bipolar II disorder (HCC) - Primary    Generalized anxiety disorder    PTSD (post-traumatic stress disorder)          Goals addressed in session: Goal 1 and Goal 2          Reason for visit is No chief complaint on file  Encounter provider FARHAN Loaiza [facility]    Provider located at [address] [place] [id number] [phone]      Recent Visits  Date Type Provider Dept   08/26/22 229 [facility], [address] [place] [address]   08/25/22 229 [facility], [facility]ist [place]   Showing recent visits within past 7 days and meeting all other requirements  Today's Visits  Date Type Provider Dept   08/31/22 229 [facility], [address] [place] [address]   Showing today's visits and meeting all other requirements  Future Appointments  No visits were found meeting these conditions  Showing future appointments within next 150 days and meeting all other requirements       The patient was identified by name and date of birth  Twila Pressley was informed that this is a telemedicine visit and that the visit is being conducted throughMicrosoft Teams and patient was informed that this is a secure, HIPAA-compliant platform  She agrees to proceed     My office door was closed  No one else was in the room  She acknowledged consent and understanding of privacy and security of the video platform  The patient has agreed to participate and understands they can discontinue the visit at any time  Patient is aware this is a billable service  Subjective  Twila Pressley is a 49 y o  female       Data: Dotty Sutherland discussed her feelings of emptiness, answers of questions of "why" and "how" and what she does  She was allowed to express herself and was given unconditional support to process some of her trauma and feel safe and reassured  Person-Centered, Mindfulness, Existential, and paradoxical intention were used to support Dotty Sutherland through her process and be able to understand the different approach  Assessment: Dotty Sutherland was emotional and expressive  She was able to process parts of her trauma and focus on paradoxical approach and gain insight from it  Plan: Dotty Sutherland wants to be able to practice paradoxical intention when caugth up in painful memories and emotional pain - to be able to dissolve that pain with unconditional love and compassion  She will use Mindfulness to ground herself into the "here-and-now" and not linger too long in negative thoughts and judgment  HPI     Past Medical History:   Diagnosis Date    Bilateral carpal tunnel syndrome 08/20/2019    Carpal tunnel syndrome of right wrist 10/30/2019    Added automatically from request for surgery [id number]    COPD (chronic obstructive pulmonary disease) ([facility] [address] )     Hypertension        Past Surgical History:   Procedure Laterality Date    BREAST BIOPSY Left 20yrs ago benign    OOPHORECTOMY Right     [place] WRIST ARTHROSCOP,RELEASE Nerise Rocker LIG Right 1/21/2020    Procedure: RELEASE CARPAL TUNNEL ENDOSCOPIC;  Surgeon: Charlette Martin MD;  [facility];  Service: Orthopedics    [place] WRIST Bren[place] LIG Left 1/28/2020    Procedure: RELEASE CARPAL TUNNEL ENDOSCOPIC;  Surgeon: Charlette Martin MD;  [facility];  Service: Orthopedics       Current Outpatient Medications   Medication Sig Dispense Refill    albuterol (PROVENTIL HFA,VENTOLIN HFA) 90 mcg/act inhaler inhale 2 puffs by mouth and INTO THE LUNGS every 6 hours IF NEEDED FOR WHEEZING   8 5 g 2    cholecalciferol (VITAMIN D3) 400 units tablet Take 400 Units by mouth daily  FLUoxetine (PROzac) 20 mg capsule Take 2 capsules (40 mg total) by mouth daily 30 capsule 1    fluticasone (FLONASE) 50 mcg/act nasal spray 1 spray into each nostril daily 16 g 2    gabapentin (NEURONTIN) 100 mg capsule Take 1 capsule (100 mg total) by mouth 3 (three) times a day 90 capsule 1    lamoTRIgine (LaMICtal) 200 MG tablet Take 1 tablet (200 mg total) by mouth daily 30 tablet 1    levocetirizine (XYZAL) 5 MG tablet Take 1 tablet (5 mg total) by mouth every evening 30 tablet 4    nicotine polacrilex (COMMIT) 4 MG lozenge Apply 1 lozenge (4 mg total) to the mouth or throat as needed for smoking cessation (Patient not taking: No sig reported) 100 each 5    omeprazole (PriLOSEC) 20 mg delayed release capsule Take 1 capsule (20 mg total) by mouth 2 (two) times a day 60 capsule 5    senna-docusate sodium (SENOKOT-S) 8 6-50 mg per tablet Take 3 tablets by mouth as needed        tiotropium-olodaterol (Stiolto Respimat) 2 5-2 5 MCG/ACT inhaler Inhale 2 puffs daily 4 g 11     No current facility-administered medications for this visit  No Known Allergies    Review of Systems    Video Exam    There were no vitals filed for this visit      Physical Exam     I spent 43 minutes directly with the patient during this visit

## 2022-09-01 ENCOUNTER — TELEPHONE (OUTPATIENT)
Dept: FAMILY MEDICINE CLINIC | Facility: CLINIC | Age: 57
End: 2022-09-01

## 2022-09-01 NOTE — TELEPHONE ENCOUNTER
Received a fax from [address] [place] [address] scanned to [facility] and sent to [facility] [facility]

## 2022-09-02 ENCOUNTER — TELEPHONE (OUTPATIENT)
Dept: PSYCHIATRY | Facility: CLINIC | Age: 57
End: 2022-09-02

## 2022-09-02 NOTE — TELEPHONE ENCOUNTER
Medical record request was received from [place] [employer] for time frame of 2021 to present   Will be placed in [address] [place] [address] to be completed by     Freedom Lopes

## 2022-09-06 ENCOUNTER — TELEMEDICINE (OUTPATIENT)
Dept: BEHAVIORAL/MENTAL HEALTH CLINIC | Facility: CLINIC | Age: 57
End: 2022-09-06
Payer: COMMERCIAL

## 2022-09-06 DIAGNOSIS — F31.81 MODERATE MIXED BIPOLAR II DISORDER (HCC): ICD-10-CM

## 2022-09-06 DIAGNOSIS — F41.1 GENERALIZED ANXIETY DISORDER: ICD-10-CM

## 2022-09-06 DIAGNOSIS — F43.10 PTSD (POST-TRAUMATIC STRESS DISORDER): Primary | ICD-10-CM

## 2022-09-06 PROCEDURE — 90834 PSYTX W PT 45 MINUTES: CPT | Performed by: COUNSELOR
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
## 2022-09-09 ENCOUNTER — TELEMEDICINE (OUTPATIENT)
Dept: BEHAVIORAL/MENTAL HEALTH CLINIC | Facility: CLINIC | Age: 57
End: 2022-09-09
Payer: COMMERCIAL

## 2022-09-09 DIAGNOSIS — F43.10 PTSD (POST-TRAUMATIC STRESS DISORDER): ICD-10-CM

## 2022-09-09 DIAGNOSIS — F41.1 GENERALIZED ANXIETY DISORDER: Primary | ICD-10-CM

## 2022-09-09 DIAGNOSIS — F31.81 MODERATE MIXED BIPOLAR II DISORDER (HCC): ICD-10-CM

## 2022-09-09 PROCEDURE — 90853 GROUP PSYCHOTHERAPY: CPT | Performed by: COUNSELOR

## 2022-09-09 NOTE — PSYCH
Virtual Regular Visit    Verification of patient location:    Patient is located in the following state in which I hold an active license [place]      Assessment/Plan:    Problem List Items Addressed This Visit        Other    Moderate mixed bipolar II disorder (HCC)    Generalized anxiety disorder - Primary    PTSD (post-traumatic stress disorder)          Goals addressed in session: Goal 1 and Goal 2          Reason for visit is No chief complaint on file  [facility]    Provider located at [address] [place] [id number] [phone]      Recent Visits  Date Type Provider Dept   09/06/22 229 [facility], [address] [place] [address]   09/02/22 Telephone Zander Paul 18 recent visits within past 7 days and meeting all other requirements  Today's Visits  Date Type Provider Dept   09/09/22 229 [facility], [address] [place] [address]   Showing today's visits and meeting all other requirements  Future Appointments  No visits were found meeting these conditions  Showing future appointments within next 150 days and meeting all other requirements       The patient was identified by name and date of birth  Shaista Meier was informed that this is a telemedicine visit and that the visit is being conducted throughMicrosoft Teams and patient was informed that this is a secure, HIPAA-compliant platform  She agrees to proceed     My office door was closed  No one else was in the room  She acknowledged consent and understanding of privacy and security of the video platform  The patient has agreed to participate and understands they can discontinue the visit at any time  Patient is aware this is a billable service  Subjective  Shaista Meier is a 49 y o  female        Data: Ana Maria Low attended the Relaxation group and was guided through diaphragmatic breathing with progressive muscle relaxation in the beginning  Later, all attendees were guided through body scan and mindfulness meditation for chronic pain, followed by affirmations against anxiety and depression  Apart of the psychoeducation and DBT, and group approach was used to facilitate the new participants introduction and to support the cohesion and maintain the serene environment for the exercises purpose  Assessment: Jacob followed through guidance and completed the exercises, showed willingness to continue learning and practicing self-care  Plan: Jacob will practice deep breathing and other techniques based on each occasion to counter stress and will attend the next group to be able to take part in further guided exercises on chosen topics to build on the techniques learned in the beginning  HPI     Past Medical History:   Diagnosis Date    Bilateral carpal tunnel syndrome 08/20/2019    Carpal tunnel syndrome of right wrist 10/30/2019    Added automatically from request for surgery [id number]    COPD (chronic obstructive pulmonary disease) ([facility] [address] [facility]     Hypertension        Past Surgical History:   Procedure Laterality Date    BREAST BIOPSY Left 20yrs ago benign    OOPHORECTOMY Right     [place] WRIST ARTHROSCOP,RELEASE Lenora Leavens LIG Right 1/21/2020    Procedure: RELEASE CARPAL TUNNEL ENDOSCOPIC;  Surgeon: Verónica Reyna MD;  [facility];  Service: Orthopedics    [place] WRIST Mace [place] LIG Left 1/28/2020    Procedure: RELEASE CARPAL TUNNEL ENDOSCOPIC;  Surgeon: Verónica Reyna MD;  [facility];  Service: Orthopedics       Current Outpatient Medications   Medication Sig Dispense Refill    albuterol (PROVENTIL HFA,VENTOLIN HFA) 90 mcg/act inhaler inhale 2 puffs by mouth and INTO THE LUNGS every 6 hours IF NEEDED FOR WHEEZING   8 5 g 2    cholecalciferol (VITAMIN D3) 400 units tablet Take 400 Units by mouth daily      FLUoxetine (PROzac) 20 mg capsule Take 2 capsules (40 mg total) by mouth daily 30 capsule 1    fluticasone (FLONASE) 50 mcg/act nasal spray 1 spray into each nostril daily 16 g 2    gabapentin (NEURONTIN) 100 mg capsule Take 1 capsule (100 mg total) by mouth 3 (three) times a day 90 capsule 1    lamoTRIgine (LaMICtal) 200 MG tablet Take 1 tablet (200 mg total) by mouth daily 30 tablet 1    levocetirizine (XYZAL) 5 MG tablet Take 1 tablet (5 mg total) by mouth every evening 30 tablet 4    nicotine polacrilex (COMMIT) 4 MG lozenge Apply 1 lozenge (4 mg total) to the mouth or throat as needed for smoking cessation (Patient not taking: No sig reported) 100 each 5    omeprazole (PriLOSEC) 20 mg delayed release capsule Take 1 capsule (20 mg total) by mouth 2 (two) times a day 60 capsule 5    senna-docusate sodium (SENOKOT-S) 8 6-50 mg per tablet Take 3 tablets by mouth as needed        tiotropium-olodaterol (Stiolto Respimat) 2 5-2 5 MCG/ACT inhaler Inhale 2 puffs daily 4 g 11     No current facility-administered medications for this visit  No Known Allergies    Review of Systems    Video Exam    There were no vitals filed for this visit      Physical Exam     I spent 60 minutes directly with the patient during this visit

## 2022-09-15 ENCOUNTER — TELEMEDICINE (OUTPATIENT)
Dept: BEHAVIORAL/MENTAL HEALTH CLINIC | Facility: CLINIC | Age: 57
End: 2022-09-15
Payer: COMMERCIAL

## 2022-09-15 DIAGNOSIS — F31.81 MODERATE MIXED BIPOLAR II DISORDER (HCC): ICD-10-CM

## 2022-09-15 DIAGNOSIS — F43.10 PTSD (POST-TRAUMATIC STRESS DISORDER): Primary | ICD-10-CM

## 2022-09-15 DIAGNOSIS — F41.1 GENERALIZED ANXIETY DISORDER: ICD-10-CM

## 2022-09-15 PROCEDURE — 90834 PSYTX W PT 45 MINUTES: CPT | Performed by: COUNSELOR

## 2022-09-15 NOTE — PSYCH
Virtual Regular Visit    Verification of patient location:    Patient is located in the following state in which I hold an active license [place]      Assessment/Plan:    Problem List Items Addressed This Visit        Other    Moderate mixed bipolar II disorder (HCC)    Generalized anxiety disorder    PTSD (post-traumatic stress disorder) - Primary          Goals addressed in session: Goal 1 and Goal 2          Reason for visit is No chief complaint on file  Encounter provider FARHAN Chau [facility]    Provider located at [address] [place] [id number] [phone]      Recent Visits  Date Type Provider Dept   09/09/22 229 [facility] [address] [facility] The[facility]ist [place]   Showing recent visits within past 7 days and meeting all other requirements  Today's Visits  Date Type Provider Dept   09/15/22 229 [facility], [address] [place] [address]   Showing today's visits and meeting all other requirements  Future Appointments  No visits were found meeting these conditions  Showing future appointments within next 150 days and meeting all other requirements       The patient was identified by name and date of birth  Howie Benavidez was informed that this is a telemedicine visit and that the visit is being conducted throughMicrosoft Teams and patient was informed that this is a secure, HIPAA-compliant platform  She agrees to proceed     My office door was closed  No one else was in the room  She acknowledged consent and understanding of privacy and security of the video platform  The patient has agreed to participate and understands they can discontinue the visit at any time  Patient is aware this is a billable service  Subjective  Howie Benavidez is a 49 y o  female       Data: Reyes Parikh discussed her recent experience with traumatic memories and recent news about her daughter and her involvement with [email or web address]  Ag Ceballso got into her feelings' content and meaning and was allowed to express her emotional pain and was encouraged to learn about self-compassion and forgiveness and responding to painful memories with the opposite  She was observed listening attentivelly and with interest  Person-Centered, DBT-based, and TF-CBT techniques are combined to facilitate Pt's continuous narrative and trauma processing  Assessment: Ag Ceballos was tearful, emotional, upset, yet she managed to focus on self-compassion and learning new approaches and tools to counter trauma response  Plan: Ag Ceballos wants to continue learning new approaches to respond to trauma and will join Relaxation Group to end up the week with Mindfulness meditation and Progressive Muscle Relaxation with diaphragmatic breathing  HPI     Past Medical History:   Diagnosis Date    Bilateral carpal tunnel syndrome 08/20/2019    Carpal tunnel syndrome of right wrist 10/30/2019    Added automatically from request for surgery [id number]    COPD (chronic obstructive pulmonary disease) ([facility] [address] )     Hypertension        Past Surgical History:   Procedure Laterality Date    BREAST BIOPSY Left 20yrs ago benign    OOPHORECTOMY Right     [place] WRIST ARTHROSCOP,RELEASE Randa Reveal LIG Right 1/21/2020    Procedure: RELEASE CARPAL TUNNEL ENDOSCOPIC;  Surgeon: Jearldine Goodell, MD;  [facility];  Service: Orthopedics    [place] WRIST Hershall Charla LIG Left 1/28/2020    Procedure: RELEASE CARPAL TUNNEL ENDOSCOPIC;  Surgeon: Jearldine Goodell, MD;  [facility];  Service: Orthopedics       Current Outpatient Medications   Medication Sig Dispense Refill    albuterol (PROVENTIL HFA,VENTOLIN HFA) 90 mcg/act inhaler inhale 2 puffs by mouth and INTO THE LUNGS every 6 hours IF NEEDED FOR WHEEZING   8 5 g 2    cholecalciferol (VITAMIN D3) 400 units tablet Take 400 Units by mouth daily      FLUoxetine (PROzac) 20 mg capsule Take 2 capsules (40 mg total) by mouth daily 30 capsule 1    fluticasone (FLONASE) 50 mcg/act nasal spray 1 spray into each nostril daily 16 g 2    gabapentin (NEURONTIN) 100 mg capsule Take 1 capsule (100 mg total) by mouth 3 (three) times a day 90 capsule 1    lamoTRIgine (LaMICtal) 200 MG tablet Take 1 tablet (200 mg total) by mouth daily 30 tablet 1    levocetirizine (XYZAL) 5 MG tablet Take 1 tablet (5 mg total) by mouth every evening 30 tablet 4    nicotine polacrilex (COMMIT) 4 MG lozenge Apply 1 lozenge (4 mg total) to the mouth or throat as needed for smoking cessation (Patient not taking: No sig reported) 100 each 5    omeprazole (PriLOSEC) 20 mg delayed release capsule Take 1 capsule (20 mg total) by mouth 2 (two) times a day 60 capsule 5    senna-docusate sodium (SENOKOT-S) 8 6-50 mg per tablet Take 3 tablets by mouth as needed        tiotropium-olodaterol (Stiolto Respimat) 2 5-2 5 MCG/ACT inhaler Inhale 2 puffs daily 4 g 11     No current facility-administered medications for this visit  No Known Allergies    Review of Systems    Video Exam    There were no vitals filed for this visit      Physical Exam     I spent 40 minutes directly with the patient during this visit

## 2022-09-16 ENCOUNTER — TELEMEDICINE (OUTPATIENT)
Dept: BEHAVIORAL/MENTAL HEALTH CLINIC | Facility: CLINIC | Age: 57
End: 2022-09-16
Payer: COMMERCIAL

## 2022-09-16 DIAGNOSIS — F31.81 MODERATE MIXED BIPOLAR II DISORDER (HCC): Primary | ICD-10-CM

## 2022-09-16 DIAGNOSIS — F43.10 PTSD (POST-TRAUMATIC STRESS DISORDER): ICD-10-CM

## 2022-09-16 DIAGNOSIS — F41.1 GENERALIZED ANXIETY DISORDER: ICD-10-CM

## 2022-09-16 PROCEDURE — 90853 GROUP PSYCHOTHERAPY: CPT | Performed by: COUNSELOR

## 2022-09-16 NOTE — PSYCH
Virtual Regular Visit    Verification of patient location:    Patient is located in the following state in which I hold an active license [place]      Assessment/Plan:    Problem List Items Addressed This Visit        Other    Moderate mixed bipolar II disorder (HCC) - Primary    Generalized anxiety disorder    PTSD (post-traumatic stress disorder)          Goals addressed in session: Goal 1          Reason for visit is No chief complaint on file  [place] [facility] [place] located at [address] [place] [id number] [phone]      Recent Visits  Date Type Provider Dept   09/15/22 229 [facility], [address] [place] [address]   09/09/22 229 [facility], [address] [facility] Therapist [facility]   Showing recent visits within past 7 days and meeting all other requirements  Future Appointments  No visits were found meeting these conditions  Showing future appointments within next 150 days and meeting all other requirements       The patient was identified by name and date of birth  Fahad Dominguez was informed that this is a telemedicine visit and that the visit is being conducted throughMicrosoft Teams and patient was informed that this is a secure, HIPAA-compliant platform  She agrees to proceed     My office door was closed  The patient was notified the following individuals were present in the room other group attendees  She acknowledged consent and understanding of privacy and security of the video platform  The patient has agreed to participate and understands they can discontinue the visit at any time  Patient is aware this is a billable service  Subjective  Fahad Dominguez is a 49 y o  female       Data: Jacob attended the Relaxation group and was guided through diaphragmatic breathing with progressive muscle relaxation in the beginning  Later, all attendees were guided through body scan and mindfulness meditation for awareness of thoughts and feelings, followed by affirmations for confidence, self-compassion, and good sleep  Apart of the psychoeducation and DBT, and group approach was used to facilitate the new participants introduction and to support the cohesion and maintain the serene environment for the exercises purpose  Assessment: Viet Lopes followed through guidance and completed the exercises, showed willingness to continue learning and practicing self-care  Plan: Viet Lopes will practice deep breathing and other techniques based on each occasion to counter stress and will attend the next group to be able to take part in further guided exercises on chosen topics to build on the techniques learned in the beginning  HPI     Past Medical History:   Diagnosis Date    Bilateral carpal tunnel syndrome 08/20/2019    Carpal tunnel syndrome of right wrist 10/30/2019    Added automatically from request for surgery [id number]    COPD (chronic obstructive pulmonary disease) ([facility] [address] )     Hypertension        Past Surgical History:   Procedure Laterality Date    BREAST BIOPSY Left 20yrs ago benign    OOPHORECTOMY Right     [place] WRIST ARTHROSCOP,RELEASE Tank Beavers LIG Right 1/21/2020    Procedure: RELEASE CARPAL TUNNEL ENDOSCOPIC;  Surgeon: Irene Lanza MD;  [facility];  Service: Orthopedics    [place] WRIST Charmel Praveen LIG Left 1/28/2020    Procedure: RELEASE CARPAL TUNNEL ENDOSCOPIC;  Surgeon: Irene Lanza MD;  [facility];  Service: Orthopedics       Current Outpatient Medications   Medication Sig Dispense Refill    albuterol (PROVENTIL HFA,VENTOLIN HFA) 90 mcg/act inhaler inhale 2 puffs by mouth and INTO THE LUNGS every 6 hours IF NEEDED FOR WHEEZING   8 5 g 2    cholecalciferol (VITAMIN D3) 400 units tablet Take 400 Units by mouth daily      FLUoxetine (PROzac) 20 mg capsule Take 2 capsules (40 mg total) by mouth daily 30 capsule 1    fluticasone (FLONASE) 50 mcg/act nasal spray 1 spray into each nostril daily 16 g 2    gabapentin (NEURONTIN) 100 mg capsule Take 1 capsule (100 mg total) by mouth 3 (three) times a day 90 capsule 1    lamoTRIgine (LaMICtal) 200 MG tablet Take 1 tablet (200 mg total) by mouth daily 30 tablet 1    levocetirizine (XYZAL) 5 MG tablet Take 1 tablet (5 mg total) by mouth every evening 30 tablet 4    nicotine polacrilex (COMMIT) 4 MG lozenge Apply 1 lozenge (4 mg total) to the mouth or throat as needed for smoking cessation (Patient not taking: No sig reported) 100 each 5    omeprazole (PriLOSEC) 20 mg delayed release capsule Take 1 capsule (20 mg total) by mouth 2 (two) times a day 60 capsule 5    senna-docusate sodium (SENOKOT-S) 8 6-50 mg per tablet Take 3 tablets by mouth as needed        tiotropium-olodaterol (Stiolto Respimat) 2 5-2 5 MCG/ACT inhaler Inhale 2 puffs daily 4 g 11     No current facility-administered medications for this visit  No Known Allergies    Review of Systems    Video Exam    There were no vitals filed for this visit      Physical Exam     I spent 58 minutes directly with the patient during this visit

## 2022-09-17 DIAGNOSIS — K21.9 GASTROESOPHAGEAL REFLUX DISEASE WITHOUT ESOPHAGITIS: ICD-10-CM

## 2022-09-17 RX ORDER — OMEPRAZOLE 20 MG/1
CAPSULE, DELAYED RELEASE ORAL
Qty: 60 CAPSULE | Refills: 5 | Status: SHIPPED | OUTPATIENT
Start: 2022-09-17

## 2022-09-22 ENCOUNTER — TELEMEDICINE (OUTPATIENT)
Dept: BEHAVIORAL/MENTAL HEALTH CLINIC | Facility: CLINIC | Age: 57
End: 2022-09-22
Payer: COMMERCIAL

## 2022-09-22 DIAGNOSIS — F43.10 PTSD (POST-TRAUMATIC STRESS DISORDER): ICD-10-CM

## 2022-09-22 DIAGNOSIS — F41.1 GENERALIZED ANXIETY DISORDER: ICD-10-CM

## 2022-09-22 DIAGNOSIS — F31.81 MODERATE MIXED BIPOLAR II DISORDER (HCC): Primary | ICD-10-CM

## 2022-09-22 PROCEDURE — 90834 PSYTX W PT 45 MINUTES: CPT | Performed by: COUNSELOR

## 2022-09-22 NOTE — PSYCH
Virtual Regular Visit    Verification of patient location:    Patient is located in the following state in which I hold an active license [place]      Assessment/Plan:    Problem List Items Addressed This Visit        Other    Moderate mixed bipolar II disorder (HCC) - Primary    Generalized anxiety disorder    PTSD (post-traumatic stress disorder)          Goals addressed in session: Goal 1 and Goal 2          Reason for visit is No chief complaint on file  [facility]    [place] [address] [place] [id number] [phone]      Recent Visits  Date Type Provider Dept   09/16/22 229 [facility], [address] [place] [address]   09/15/22 229 [facility], [facility] Therapist Heriberto   Showing recent visits within past 7 days and meeting all other requirements  Today's Visits  Date Type Provider Dept   09/22/22 229 [facility], [address] [place] [address]   Showing today's visits and meeting all other requirements  Future Appointments  No visits were found meeting these conditions  Showing future appointments within next 150 days and meeting all other requirements       The patient was identified by name and date of birth  Saundra Bell was informed that this is a telemedicine visit and that the visit is being conducted throughMicrosoft Teams and patient was informed that this is a secure, HIPAA-compliant platform  She agrees to proceed     My office door was closed  No one else was in the room  She acknowledged consent and understanding of privacy and security of the video platform  The patient has agreed to participate and understands they can discontinue the visit at any time  Patient is aware this is a billable service  Subjective  Saundra Bell is a 49 y o  female        Data: Gracia Raines stated right from the beginning of the session that she did not want to talk about anything, because she expected to be upset about her daughter and ex, additionally to the gloomy rainy weather outside  She was encouraged to stay and was asked about favorite movies and music  Gracia Raines ended up laughing and sharing different aspects of her life and family that appear curious, humorous, and positive  Person-Centered, philosophical, DBT-based, and Strength-based approach were used to motivate interest in Pt and reveal another side of therapy process that not necessarily digs into problems  Assessment: Gracia Raines was notably sad at first but was able to show an open mind and engage into a conversation about positive sides of therapy that lies on the foundation of strength  Plan: Gracia Raines will join the Relaxation group tomorrow and will attend weekly therapy to be able to continue processing her trauma and when too upset, to focus on strength and positivity         HPI     Past Medical History:   Diagnosis Date    Bilateral carpal tunnel syndrome 08/20/2019    Carpal tunnel syndrome of right wrist 10/30/2019    Added automatically from request for surgery [id number]    COPD (chronic obstructive pulmonary disease) ([facility] [address] )     Hypertension        Past Surgical History:   Procedure Laterality Date    BREAST BIOPSY Left 20yrs ago benign    OOPHORECTOMY Right     [place] WRIST Samantha Thaddeuse LIG Right 1/21/2020    Procedure: RELEASE CARPAL TUNNEL ENDOSCOPIC;  Surgeon: Sanam Villalobos MD;  [facility];  Service: Orthopedics    [place] WRIST Samantha Rave LIG Left 1/28/2020    Procedure: RELEASE CARPAL TUNNEL ENDOSCOPIC;  Surgeon: Sanam Villalobos MD;  [facility];  Service: Orthopedics       Current Outpatient Medications   Medication Sig Dispense Refill    albuterol (PROVENTIL HFA,VENTOLIN HFA) 90 mcg/act inhaler inhale 2 puffs by mouth and INTO THE LUNGS every 6 hours IF NEEDED FOR WHEEZING  8 5 g 2    cholecalciferol (VITAMIN D3) 400 units tablet Take 400 Units by mouth daily      FLUoxetine (PROzac) 20 mg capsule Take 2 capsules (40 mg total) by mouth daily 30 capsule 1    fluticasone (FLONASE) 50 mcg/act nasal spray 1 spray into each nostril daily 16 g 2    gabapentin (NEURONTIN) 100 mg capsule Take 1 capsule (100 mg total) by mouth 3 (three) times a day 90 capsule 1    lamoTRIgine (LaMICtal) 200 MG tablet Take 1 tablet (200 mg total) by mouth daily 30 tablet 1    levocetirizine (XYZAL) 5 MG tablet Take 1 tablet (5 mg total) by mouth every evening 30 tablet 4    nicotine polacrilex (COMMIT) 4 MG lozenge Apply 1 lozenge (4 mg total) to the mouth or throat as needed for smoking cessation (Patient not taking: No sig reported) 100 each 5    omeprazole (PriLOSEC) 20 mg delayed release capsule take 1 capsule by mouth twice a day 60 capsule 5    senna-docusate sodium (SENOKOT-S) 8 6-50 mg per tablet Take 3 tablets by mouth as needed        tiotropium-olodaterol (Stiolto Respimat) 2 5-2 5 MCG/ACT inhaler Inhale 2 puffs daily 4 g 11     No current facility-administered medications for this visit  No Known Allergies    Review of Systems    Video Exam    There were no vitals filed for this visit      Physical Exam     I spent 40 minutes directly with the patient during this visit     START: 12:55 PM  END: 1:35 PM

## 2022-09-23 ENCOUNTER — TELEMEDICINE (OUTPATIENT)
Dept: BEHAVIORAL/MENTAL HEALTH CLINIC | Facility: CLINIC | Age: 57
End: 2022-09-23
Payer: COMMERCIAL

## 2022-09-23 DIAGNOSIS — F43.10 PTSD (POST-TRAUMATIC STRESS DISORDER): ICD-10-CM

## 2022-09-23 DIAGNOSIS — F41.1 GENERALIZED ANXIETY DISORDER: ICD-10-CM

## 2022-09-23 DIAGNOSIS — F31.81 MODERATE MIXED BIPOLAR II DISORDER (HCC): Primary | ICD-10-CM

## 2022-09-23 PROCEDURE — 90853 GROUP PSYCHOTHERAPY: CPT | Performed by: COUNSELOR

## 2022-09-28 ENCOUNTER — TELEMEDICINE (OUTPATIENT)
Dept: BEHAVIORAL/MENTAL HEALTH CLINIC | Facility: CLINIC | Age: 57
End: 2022-09-28
Payer: COMMERCIAL

## 2022-09-28 DIAGNOSIS — F41.1 GENERALIZED ANXIETY DISORDER: ICD-10-CM

## 2022-09-28 DIAGNOSIS — F31.81 MODERATE MIXED BIPOLAR II DISORDER (HCC): ICD-10-CM

## 2022-09-28 DIAGNOSIS — F43.10 PTSD (POST-TRAUMATIC STRESS DISORDER): Primary | ICD-10-CM

## 2022-09-28 PROCEDURE — 90834 PSYTX W PT 45 MINUTES: CPT | Performed by: COUNSELOR

## 2022-09-28 NOTE — PSYCH
Virtual Regular Visit    Verification of patient location:    Patient is located in the following state in which I hold an active license [place]      Assessment/Plan:    Problem List Items Addressed This Visit        Other    Moderate mixed bipolar II disorder (HCC)    Generalized anxiety disorder    PTSD (post-traumatic stress disorder) - Primary          Goals addressed in session: Goal 1 and Goal 2          Reason for visit is No chief complaint on file  Encounter provider Jessica Sabillon, [address]     Provider located at [address] [place] [id number] [phone]      Recent Visits  Date Type Provider Dept   09/23/22 229 [facility], [address] [facility]   09/22/22 229 [facility], [place] [address] [facility] Therapist [facility] [place] [place]   Showing recent visits within past 7 days and meeting all other requirements  Future Appointments  No visits were found meeting these conditions  Showing future appointments within next 150 days and meeting all other requirements       The patient was identified by name and date of birth  Gabbie Guzman was informed that this is a telemedicine visit and that the visit is being conducted throughMicrosoft Teams and patient was informed that this is a secure, HIPAA-compliant platform  She agrees to proceed     My office door was closed  No one else was in the room  She acknowledged consent and understanding of privacy and security of the video platform  The patient has agreed to participate and understands they can discontinue the visit at any time  Patient is aware this is a billable service  Subjective  Gabbie Guzman is a 49 y o  female  Data: Dev Carol Ann started on the phone during her attempts to get on the link and used TEAMS as usual, as she uses that for her group on Fridays   She discussed Person-Centered, DBT-based, and CBT methods are combined to support her work on self-compassion and appreciation, continuous effort to process her trauma and form her narrative, and to learn more skills to deal with distress  Assessment: Miah Da Silva seemed positive though very forgetful and upset about it, yet easily redirected to use self-compassion and be patient with herself  Plan: Miah Da Silva wants to continue weekly therapy to be able to work on her strengths as well as to continue expressing her conflicts  She will attend the Relaxation Group every Friday to provide the needed self-care at the end of the week and use the Mindfulness part in it to strengthen her distress tolerance skills  HPI     Past Medical History:   Diagnosis Date    Bilateral carpal tunnel syndrome 08/20/2019    Carpal tunnel syndrome of right wrist 10/30/2019    Added automatically from request for surgery [id number]    COPD (chronic [facility] pulmonary disease) ([facility] [address] )     Hypertension        Past Surgical History:   Procedure Laterality Date    BREAST BIOPSY Left 20yrs ago benign    OOPHORECTOMY Right     [place] WRIST ARTHROSCOP,RELEASE Karolyn Conradi LIG Right 1/21/2020    Procedure: RELEASE CARPAL TUNNEL ENDOSCOPIC;  Surgeon: Jeny Campo MD;  [facility];  Service: Orthopedics    [place] WRIST Kathy Eglin LIG Left 1/28/2020    Procedure: RELEASE CARPAL TUNNEL ENDOSCOPIC;  Surgeon: Jeny Campo MD;  [facility];  Service: Orthopedics       Current Outpatient Medications   Medication Sig Dispense Refill    albuterol (PROVENTIL HFA,VENTOLIN HFA) 90 mcg/act inhaler inhale 2 puffs by mouth and INTO THE LUNGS every 6 hours IF NEEDED FOR WHEEZING   8 5 g 2    cholecalciferol (VITAMIN D3) 400 units tablet Take 400 Units by mouth daily      FLUoxetine (PROzac) 20 mg capsule Take 2 capsules (40 mg total) by mouth daily 30 capsule 1    fluticasone (FLONASE) 50 mcg/act nasal spray 1 spray into each nostril daily 16 g 2    gabapentin (NEURONTIN) 100 mg capsule Take 1 capsule (100 mg total) by mouth 3 (three) times a day 90 capsule 1    lamoTRIgine (LaMICtal) 200 MG tablet Take 1 tablet (200 mg total) by mouth daily 30 tablet 1    levocetirizine (XYZAL) 5 MG tablet Take 1 tablet (5 mg total) by mouth every evening 30 tablet 4    nicotine polacrilex (COMMIT) 4 MG lozenge Apply 1 lozenge (4 mg total) to the mouth or throat as needed for smoking cessation (Patient not taking: No sig reported) 100 each 5    omeprazole (PriLOSEC) 20 mg delayed release capsule take 1 capsule by mouth twice a day 60 capsule 5    senna-docusate sodium (SENOKOT-S) 8 6-50 mg per tablet Take 3 tablets by mouth as needed        tiotropium-olodaterol (Stiolto Respimat) 2 5-2 5 MCG/ACT inhaler Inhale 2 puffs daily 4 g 11     No current facility-administered medications for this visit  No Known Allergies    Review of Systems    Video Exam    There were no vitals filed for this visit      Physical Exam     I spent 42 minutes directly with the patient during this visit   START: 3:00 PM  END: 3:42 PM

## 2022-09-30 ENCOUNTER — TELEMEDICINE (OUTPATIENT)
Dept: BEHAVIORAL/MENTAL HEALTH CLINIC | Facility: CLINIC | Age: 57
End: 2022-09-30
Payer: COMMERCIAL

## 2022-09-30 DIAGNOSIS — F41.1 GENERALIZED ANXIETY DISORDER: ICD-10-CM

## 2022-09-30 DIAGNOSIS — F43.10 PTSD (POST-TRAUMATIC STRESS DISORDER): Primary | ICD-10-CM

## 2022-09-30 DIAGNOSIS — F31.81 MODERATE MIXED BIPOLAR II DISORDER (HCC): ICD-10-CM

## 2022-09-30 PROCEDURE — 90853 GROUP PSYCHOTHERAPY: CPT | Performed by: COUNSELOR

## 2022-09-30 NOTE — PSYCH
[place] AMBULATORY ENCOUNTER  FAMILY PRACTICE OFFICE VISIT    CHIEF COMPLAINT:    Rash (arm, itch, hurt, 2 weeks)    SUBJECTIVE:    Tremaine Richter is a 19 year old male who presents for bumpy rash on BUE and bumps in linear fashion on pinna/ears bilateral and interdigitally. Itchy and admits scratching, which he admits causes excoriations that can hurt. Admits uses different body washes and works at [employer] and handles a lot of shopping carts. No prior URI. No other family members in household have similar rash. No new pets, plants, or other hygienic products.     Pt has asthma, well controlled. His mother does request refill of his inhaler.    REVIEW OF SYSTEMS:    Constitutional:  Denies fever or chills.    Ears, Nose, Throat:  Denies nasal congestion, sore throat, or sneezing.  Respiratory:  Denies cough, wheezing, or shortness of breath.   Cardiovascular:  Denies chest pain, edema, or palpitations.     Gastrointestinal:  Denies abdominal pain, nausea, vomiting, or diarrhea.  Musculoskeletal:  Deniejoint pain, muscle cramps, or joint swelling.   Integument:  Denies open wounds or jaundice.     OBJECTIVE:  PROBLEM LIST:    Patient Active Problem List   Diagnosis   • Mild persistent asthma without complication   • Attention deficit hyperactivity disorder (ADHD)   • Anxiety     PAST HISTORIES:  I have reviewed the past medical history, family history, social history, medications and allergies listed in the medical record as obtained by my nursing staff and support staff and agree with their documentation.    Past Medical History:   Diagnosis Date   • ADHD (attention deficit hyperactivity disorder)    • Anxiety    • RAD (reactive airway disease)      History reviewed. No pertinent surgical history.  Social History     Tobacco Use   • Smoking status: Never Smoker   • Smokeless tobacco: Never Used   Substance Use Topics   • Alcohol use: Never     Frequency: Never   • Drug use: Never     Family History   Problem  Virtual Regular Visit    Verification of patient location:    Patient is located in the following state in which I hold an active license [place]      Assessment/Plan:    Problem List Items Addressed This Visit        Other    Moderate mixed bipolar II disorder (HCC)    Generalized anxiety disorder    PTSD (post-traumatic stress disorder) - Primary          Goals addressed in session: Goal 1          Reason for visit is No chief complaint on file  Encounter provider Carla Garcia [facility]    Provider located at [address] [place] [id number] [phone]      Recent Visits  Date Type Provider Dept   09/28/22 229 [facility], [address] [place] [address]   09/23/22 229 [facility], [place] [address] [facility]ist [facility]   Showing recent visits within past 7 days and meeting all other requirements  Today's Visits  Date Type Provider Dept   09/30/22 229 [facility], [address] [place] [address]   Showing today's visits and meeting all other requirements  Future Appointments  No visits were found meeting these conditions  Showing future appointments within next 150 days and meeting all other requirements       The patient was identified by name and date of birth  Yahir Cortes was informed that this is a telemedicine visit and that the visit is being conducted throughMicrosoft Teams and patient was informed that this is a secure, HIPAA-compliant platform  She agrees to proceed     My office door was closed  The patient was notified the following individuals were present in the room other group virtual members  She acknowledged consent and understanding of privacy and security of the video platform  The patient has agreed to participate and understands they can discontinue the visit at any time      Patient is aware this is a billable Relation Age of Onset   • Psychiatric Mother    • Asthma Mother    • Anemia Mother      Current Outpatient Medications   Medication Sig   • albuterol 108 (90 Base) MCG/ACT inhaler Inhale 2 puffs into the lungs every 4 hours as needed for Shortness of Breath or Wheezing.   • fluticasone propionate (FLOVENT HFA) 44 MCG/ACT inhaler Inhale 2 puffs into the lungs 2 times daily.   • guanFACINE (INTUNIV) 1 MG TABLET SR 24 HR Take 1 tablet By Mouth every morning   • traZODone (DESYREL) 50 MG tablet Take 1 tablet by mouth at bedtime for sleep   • sertraline (ZOLOFT) 25 MG tablet Take 1 tablet by mouth every morning   • melatonin 5 MG Take 1 tablet by mouth nightly.   • naphazoline-pheniramine (VISINE) 0.025-0.3 % ophthalmic solution Place 1 drop into both eyes 4 times daily.   • triamcinolone (KENALOG) 0.5 % cream Apply topically 2 times daily. For 2 wks.   • diphenhydrAMINE (BENADRYL) 25 MG tablet Take 1 tablet by mouth 2 times daily as needed for Itching.     No current facility-administered medications for this visit.      ALLERGIES:  No Known Allergies    PHYSICAL EXAM:  Visit Vitals  /62 (BP Location: RUE - Right upper extremity, Patient Position: Sitting, Cuff Size: Regular)   Pulse 62   Resp 16   Ht 5' 7.5\" (1.715 m)   Wt 61.7 kg   SpO2 98%   BMI 20.99 kg/m²     Pulse Ox Interpretation:  Within normal limits.  General:   Alert, cooperative, conversive in no acute distress.  Skin:  Warm and dr. Papular, erythematous lesion scattered on BUE and in linear fashion on pinna/ears bilateral and interdigitally of bilateral hands without edema or tenderness.   Cardiovascular: Regular rate.  Respiratory:   Normal respiratory effort.    Lymphatic: No enlarged or tender lymph nodes in neck.   Musculoskeletal:  No deformity or edema.  Neurologic: Speech normal. Mental status intact.  Psychiatric:  Cooperative.  Appropriate mood and affect.    ASSESSMENT:   1. Rash and nonspecific skin eruption    2. Mild persistent asthma  service  Subjective  Burleigh Claude is a 49 y o  female    Data: Anyi Lopez attended the Relaxation group and was guided through the traditional first part of diaphragmatic breathing with progressive muscle relaxation  Next, all attendees were guided through body scan and mindfulness meditation with imagery for connecting and healing the inner child, followed by affirmations against depression and anxiety  Apart of the psychoeducation and DBT, and group approach was used to support the cohesion and maintain the serene environment for the exercises purpose  Assessment: Anyi Lopez followed through guidance and completed the exercises, showed willingness to continue learning and practicing self-care  Plan: Anyi Lopez will practice deep breathing and other techniques based on each occasion to counter stress and will attend the next group to be able to take part in further guided exercises on chosen topics to build on the techniques learned in the beginning        HPI     Past Medical History:   Diagnosis Date    Bilateral carpal tunnel syndrome 08/20/2019    Carpal tunnel syndrome of right wrist 10/30/2019    Added automatically from request for surgery [id number]    COPD [facility]chronic obstructive pulmonary disease) ([facility] [address] )     Hypertension        Past Surgical History:   Procedure Laterality Date    BREAST BIOPSY Left 20yrs ago benign    OOPHORECTOMY Right     [place] WRIST Hetal Soles LIG Right 1/21/2020    Procedure: RELEASE CARPAL TUNNEL ENDOSCOPIC;  Surgeon: Otis Young MD;  [facility];  Service: Orthopedics    GA WRIST Hetal Soles LIG Left 1/28/2020    Procedure: RELEASE CARPAL TUNNEL ENDOSCOPIC;  Surgeon: Otis Young MD;  [facility];  Service: Orthopedics       Current Outpatient Medications   Medication Sig Dispense Refill    albuterol (PROVENTIL HFA,VENTOLIN HFA) 90 mcg/act inhaler inhale 2 puffs by mouth and INTO THE LUNGS every 6 hours IF NEEDED FOR WHEEZING  8 5 g 2    cholecalciferol (VITAMIN D3) 400 units tablet Take 400 Units by mouth daily      FLUoxetine (PROzac) 20 mg capsule Take 2 capsules (40 mg total) by mouth daily 30 capsule 1    fluticasone (FLONASE) 50 mcg/act nasal spray 1 spray into each nostril daily 16 g 2    gabapentin (NEURONTIN) 100 mg capsule Take 1 capsule (100 mg total) by mouth 3 (three) times a day 90 capsule 1    lamoTRIgine (LaMICtal) 200 MG tablet Take 1 tablet (200 mg total) by mouth daily 30 tablet 1    levocetirizine (XYZAL) 5 MG tablet Take 1 tablet (5 mg total) by mouth every evening 30 tablet 4    nicotine polacrilex (COMMIT) 4 MG lozenge Apply 1 lozenge (4 mg total) to the mouth or throat as needed for smoking cessation (Patient not taking: No sig reported) 100 each 5    omeprazole (PriLOSEC) 20 mg delayed release capsule take 1 capsule by mouth twice a day 60 capsule 5    senna-docusate sodium (SENOKOT-S) 8 6-50 mg per tablet Take 3 tablets by mouth as needed        tiotropium-olodaterol (Stiolto Respimat) 2 5-2 5 MCG/ACT inhaler Inhale 2 puffs daily 4 g 11     No current facility-administered medications for this visit  No Known Allergies    Review of Systems    Video Exam    There were no vitals filed for this visit      Physical Exam     I spent 59 minutes directly with the patient during this visit   START: 4:00 PM  END: 4:59 PM without complication      PLAN:    1. Reviewed and discussed differential diagnoses and management.   2. Recommend benadryl 25 mg 1 tab bid prn itching.  3. Counseled on good skin care and protection. Rx triamincolone 2.5% cream.   4. Rx permethrin cream.  5. Refilled flovent and albuterol rescue inhaler. Pick 'n Save.  6. Advised pt to follow up in 3 month for recheck; sooner as needed.    Orders Placed This Encounter   • albuterol 108 (90 Base) MCG/ACT inhaler   • fluticasone propionate (FLOVENT HFA) 44 MCG/ACT inhaler   • triamcinolone (KENALOG) 0.5 % cream   • diphenhydrAMINE (BENADRYL) 25 MG tablet   • permethrin (ACTICIN) 5 % cream     Return in about 3 months (around 7/30/2020) for follow up; sooner as needed.    No future appointments.  Instructions provided as documented in the After Visit Summary.    Discussion summary:  Discussed: above plan and management and warning signs. Medications and their side effects and benefits, if prescribed, were also discussed. Advised patient to return to clinic or contact office as soon as possible if any questions or concerns.  Plan discussed with and agreed to by: patient and his mother.  Disposition: stable.  Discharged: home.    Medical decision making:  The above plan was discussed with patient and/or patient's guardian, who understand(s) and agree(s) to above plan. The total time, including more than 1/2 of time spent on counseling and/or coordination of care was 15 minutes.

## 2022-10-06 ENCOUNTER — TELEMEDICINE (OUTPATIENT)
Dept: BEHAVIORAL/MENTAL HEALTH CLINIC | Facility: CLINIC | Age: 57
End: 2022-10-06
Payer: COMMERCIAL

## 2022-10-06 DIAGNOSIS — F43.10 PTSD (POST-TRAUMATIC STRESS DISORDER): ICD-10-CM

## 2022-10-06 DIAGNOSIS — F31.81 MODERATE MIXED BIPOLAR II DISORDER (HCC): ICD-10-CM

## 2022-10-06 DIAGNOSIS — F41.1 GENERALIZED ANXIETY DISORDER: Primary | ICD-10-CM

## 2022-10-06 PROCEDURE — 90834 PSYTX W PT 45 MINUTES: CPT | Performed by: COUNSELOR

## 2022-10-06 NOTE — PSYCH
Psychotherapy Provided: Individual Psychotherapy 39 minutes   START: 11:56 AM  END: 12:40 PM  Length of time in session: 39 minutes, follow up in 1 week    No diagnosis found  Goals addressed in session: Goal 1     Pain:      none    0    Current suicide risk : Low     Data:     Behavioral Health Treatment Plan  Luke: Diagnosis and Treatment Plan explained to Maciel Arnodl relates understanding diagnosis and is agreeable to Treatment Plan  Yes     Virtual Regular Visit    Verification of patient location:    Patient is located in the following state in which I hold an active license [place]      Assessment/Plan:    Problem List Items Addressed This Visit        Other    Moderate mixed bipolar II disorder (HCC)    Generalized anxiety disorder - Primary    PTSD (post-traumatic stress disorder)          Goals addressed in session: Goal 1 and Goal 2          Reason for visit is No chief complaint on file  Encounter provider Yusef Head [facility]    Provider located at [address] [place] [id number] [phone]      Recent Visits  Date Type Provider Dept   09/30/22 229 [facility], [address] [facility] Therapist [place]   Showing recent visits within past 7 days and meeting all other requirements  Today's Visits  Date Type Provider Dept   10/06/22 229 [address]   Showing today's visits and meeting all other requirements  Future Appointments  No visits were found meeting these conditions  Showing future appointments within next 150 days and meeting all other requirements       The patient was identified by name and date of birth  Dora Simss was informed that this is a telemedicine visit and that the visit is being conducted throughMicrosoft Teams and patient was informed that this is a secure, HIPAA-compliant platform   She agrees to proceed     My office door was closed  No one else was in the room  She acknowledged consent and understanding of privacy and security of the video platform  The patient has agreed to participate and understands they can discontinue the visit at any time  Patient is aware this is a billable service  Subjective  Lemuel Esteban is a 49 y o  female   Data: Merline Root discussed her memories from her work, her favorite and not favorite people/professionals, the essence and the meaningful side of it that she misses  Person-Centered, DBT-based, and CBT techniques were combined to support Pt's struggle to be neutral in her stories and handle emotions more effectively  Assessment: Merline Root was very positive, friendly, expressive, and emotional  She was redirected easily to focus on coping skills to use and become more neutral     Plan: Merline Root will continue weekly individual and group sessions to be able to process her trauma and counter stress in better ways, maintain good levels of self-care and self-compassion          HPI     Past Medical History:   Diagnosis Date    Bilateral carpal tunnel syndrome 08/20/2019    Carpal tunnel syndrome of right wrist 10/30/2019    Added automatically from request for surgery [id number]    COPD (chronic obstructive pulmonary disease) ([facility] [address] )     Hypertension        Past Surgical History:   Procedure Laterality Date    BREAST BIOPSY Left 20yrs ago benign    OOPHORECTOMY Right     [place] WRIST Gayle Carls LIG Right 1/21/2020    Procedure: RELEASE CARPAL TUNNEL ENDOSCOPIC;  Surgeon: Ramandeep Snyder MD;  [facility];  Service: Orthopedics    [place] WRIST Gayle Carls LIG Left 1/28/2020    Procedure: RELEASE CARPAL TUNNEL ENDOSCOPIC;  Surgeon: Ramandeep Snyder MD;  [facility];  Service: Orthopedics       Current Outpatient Medications   Medication Sig Dispense Refill    albuterol (PROVENTIL HFA,VENTOLIN HFA) 90 mcg/act inhaler inhale 2 puffs by mouth and INTO THE LUNGS every 6 hours IF NEEDED FOR WHEEZING  8 5 g 2    cholecalciferol (VITAMIN D3) 400 units tablet Take 400 Units by mouth daily      FLUoxetine (PROzac) 20 mg capsule Take 2 capsules (40 mg total) by mouth daily 30 capsule 1    fluticasone (FLONASE) 50 mcg/act nasal spray 1 spray into each nostril daily 16 g 2    gabapentin (NEURONTIN) 100 mg capsule Take 1 capsule (100 mg total) by mouth 3 (three) times a day 90 capsule 1    lamoTRIgine (LaMICtal) 200 MG tablet Take 1 tablet (200 mg total) by mouth daily 30 tablet 1    levocetirizine (XYZAL) 5 MG tablet Take 1 tablet (5 mg total) by mouth every evening 30 tablet 4    nicotine polacrilex (COMMIT) 4 MG lozenge Apply 1 lozenge (4 mg total) to the mouth or throat as needed for smoking cessation (Patient not taking: No sig reported) 100 each 5    omeprazole (PriLOSEC) 20 mg delayed release capsule take 1 capsule by mouth twice a day 60 capsule 5    senna-docusate sodium (SENOKOT-S) 8 6-50 mg per tablet Take 3 tablets by mouth as needed        tiotropium-olodaterol (Stiolto Respimat) 2 5-2 5 MCG/ACT inhaler Inhale 2 puffs daily 4 g 11     No current facility-administered medications for this visit  No Known Allergies    Review of Systems    Video Exam    There were no vitals filed for this visit      Physical Exam     I spent 39 minutes directly with the patient during this visit   START: 11:55 AM  END: 12

## 2022-10-07 ENCOUNTER — TELEMEDICINE (OUTPATIENT)
Dept: BEHAVIORAL/MENTAL HEALTH CLINIC | Facility: CLINIC | Age: 57
End: 2022-10-07
Payer: COMMERCIAL

## 2022-10-07 DIAGNOSIS — F31.81 MODERATE MIXED BIPOLAR II DISORDER (HCC): Primary | ICD-10-CM

## 2022-10-07 DIAGNOSIS — F41.1 GENERALIZED ANXIETY DISORDER: ICD-10-CM

## 2022-10-07 DIAGNOSIS — F43.10 PTSD (POST-TRAUMATIC STRESS DISORDER): ICD-10-CM

## 2022-10-07 PROCEDURE — 90853 GROUP PSYCHOTHERAPY: CPT | Performed by: COUNSELOR

## 2022-10-07 NOTE — PSYCH
Virtual Regular Visit    Verification of patient location:    Patient is located in the following state in which I hold an active license [place]      Assessment/Plan:    Problem List Items Addressed This Visit        Other    Moderate mixed bipolar II disorder (HCC) - Primary    Generalized anxiety disorder    PTSD (post-traumatic stress disorder)          Goals addressed in session: Goal 1 and Goal 2          Reason for visit is No chief complaint on file  Encounter provider Alo Oconnor    Provider located at [address] [place] [id number] [phone]      Recent Visits  Date Type Provider Dept   10/06/22 229 [facility], [address] [place] [address]   09/30/22 229 [facility], [address] [facility] Therapist [place]   Showing recent visits within past 7 days and meeting all other requirements  Today's Visits  Date Type Provider Dept   10/07/22 229 [facility], [address] [place] [address]   Showing today's visits and meeting all other requirements  Future Appointments  No visits were found meeting these conditions  Showing future appointments within next 150 days and meeting all other requirements       The patient was identified by name and date of birth  Neetu Sandoval was informed that this is a telemedicine visit and that the visit is being conducted throughMicrosoft Teams and patient was informed that this is a secure, HIPAA-compliant platform  She agrees to proceed     My office door was closed  Other methods to assure confidentiality were taken informing other group members  The patient was notified the following individuals were present in the room other group attendees  She acknowledged consent and understanding of privacy and security of the video platform   The patient has agreed to participate and understands they can discontinue the visit at any time  Patient is aware this is a billable service  Subjective  Ashley Ortiz is a 49 y o  female    Data: Paige Calderon attended the Relaxation group with a delay and was guided through the traditional first part of diaphragmatic breathing with progressive muscle relaxation  Next, all attendees were guided through body scan and mindfulness meditation with imagery for sitting with difficult emotions and handling pain, followed by affirmations for confidence and self-compassion and against depression and anxiety  Apart of the psychoeducation and DBT, and group approach was used to support the cohesion and maintain the serene environment for the exercises purpose  Assessment: Paige Calderon followed through guidance and completed the exercises, showed willingness to continue learning and practicing self-care  Plan: Paige Calderon will practice deep breathing and other techniques based on each occasion to counter stress and will attend the next group to be able to take part in further guided exercises on chosen topics to build on the techniques learned in the beginning          HPI     Past Medical History:   Diagnosis Date    Bilateral carpal tunnel syndrome 08/20/2019    Carpal tunnel syndrome of right wrist 10/30/2019    Added automatically from request for surgery [id number]    COPD (chronic obstructive pulmonary disease) ([facility] [address] )     Hypertension        Past Surgical History:   Procedure Laterality Date    BREAST BIOPSY Left 20yrs ago benign    OOPHORECTOMY Right     [place] WRIST Galdino Adriana LIG Right 1/21/2020    Procedure: RELEASE CARPAL TUNNEL ENDOSCOPIC;  Surgeon: Rissa Rivera MD;  [facility];  Service: Orthopedics    [place] WRIST Galdino Adriana LIG Left 1/28/2020    Procedure: RELEASE CARPAL TUNNEL ENDOSCOPIC;  Surgeon: Rissa Rivera MD;  [facility];  Service: Orthopedics       Current Outpatient Medications   Medication Sig Dispense Refill    albuterol (PROVENTIL HFA,VENTOLIN HFA) 90 mcg/act inhaler inhale 2 puffs by mouth and INTO THE LUNGS every 6 hours IF NEEDED FOR WHEEZING  8 5 g 2    cholecalciferol (VITAMIN D3) 400 units tablet Take 400 Units by mouth daily      FLUoxetine (PROzac) 20 mg capsule Take 2 capsules (40 mg total) by mouth daily 30 capsule 1    fluticasone (FLONASE) 50 mcg/act nasal spray 1 spray into each nostril daily 16 g 2    gabapentin (NEURONTIN) 100 mg capsule Take 1 capsule (100 mg total) by mouth 3 (three) times a day 90 capsule 1    lamoTRIgine (LaMICtal) 200 MG tablet Take 1 tablet (200 mg total) by mouth daily 30 tablet 1    levocetirizine (XYZAL) 5 MG tablet Take 1 tablet (5 mg total) by mouth every evening 30 tablet 4    nicotine polacrilex (COMMIT) 4 MG lozenge Apply 1 lozenge (4 mg total) to the mouth or throat as needed for smoking cessation (Patient not taking: No sig reported) 100 each 5    omeprazole (PriLOSEC) 20 mg delayed release capsule take 1 capsule by mouth twice a day 60 capsule 5    senna-docusate sodium (SENOKOT-S) 8 6-50 mg per tablet Take 3 tablets by mouth as needed        tiotropium-olodaterol (Stiolto Respimat) 2 5-2 5 MCG/ACT inhaler Inhale 2 puffs daily 4 g 11     No current facility-administered medications for this visit  No Known Allergies    Review of Systems    Video Exam    There were no vitals filed for this visit      Physical Exam     I spent 28 minutes directly with the patient during this visit   START: 4:31 PM  END: 4:59 PM

## 2022-10-13 ENCOUNTER — TELEMEDICINE (OUTPATIENT)
Dept: BEHAVIORAL/MENTAL HEALTH CLINIC | Facility: CLINIC | Age: 57
End: 2022-10-13
Payer: COMMERCIAL

## 2022-10-13 ENCOUNTER — TELEPHONE (OUTPATIENT)
Dept: PSYCHIATRY | Facility: CLINIC | Age: 57
End: 2022-10-13

## 2022-10-13 DIAGNOSIS — F31.81 MODERATE MIXED BIPOLAR II DISORDER (HCC): ICD-10-CM

## 2022-10-13 DIAGNOSIS — F17.210 CIGARETTE NICOTINE DEPENDENCE WITHOUT COMPLICATION: ICD-10-CM

## 2022-10-13 DIAGNOSIS — F43.10 PTSD (POST-TRAUMATIC STRESS DISORDER): Primary | ICD-10-CM

## 2022-10-13 DIAGNOSIS — F41.1 GENERALIZED ANXIETY DISORDER: ICD-10-CM

## 2022-10-13 DIAGNOSIS — F10.11 ALCOHOL ABUSE, IN REMISSION: ICD-10-CM

## 2022-10-13 PROCEDURE — 90834 PSYTX W PT 45 MINUTES: CPT | Performed by: COUNSELOR

## 2022-10-13 NOTE — PSYCH
Virtual Regular Visit    Verification of patient location:    Patient is located in the following state in which I hold an active license [place]      Assessment/Plan:    Problem List Items Addressed This Visit        Other    Moderate mixed bipolar II disorder (HCC)    Generalized anxiety disorder    PTSD (post-traumatic stress disorder) - Primary    Alcohol abuse, in remission    Cigarette nicotine dependence without complication          Goals addressed in session: Goal 1 and Goal 2          Reason for visit is No chief complaint on file  Encounter provider Maritza Paz [facility]    Provider located at [address] [place] [address]  190 [facility] [address]  [phone]      Recent Visits  Date Type Provider Dept   10/07/22 229 [facility], [address] [place] [address]   10/06/22 229 [facility], [address] [facility] The[facility]ist [place]   Showing recent visits within past 7 days and meeting all other requirements  Future Appointments  No visits were found meeting these conditions  Showing future appointments within next 150 days and meeting all other requirements       The patient was identified by name and date of birth  Padmaja Fletcher was informed that this is a telemedicine visit and that the [facility] and patient was informed this is a secure, HIPAA-complaint platform  She agrees to proceed     My office door was closed  No one else was in the room  She acknowledged consent and understanding of privacy and security of the video platform  The patient has agreed to participate and understands they can discontinue the visit at any time  Patient is aware this is a billable service  Subjective  Padmaja Fletcher is a 49 y o  female    Data: Sylvia Waddell needed to try several times before she succeeded getting on the new [place] link   She was directed by this Th and then by [employer] until she was finally seen on video  She discussed her frustration about thinking that her children and grandchildren would forget her  Person-Centered, DBT-based, and existential techniques were combined to encourage Pt to identify her somatic experience, to use her Core Mindfulness skills to deal better with emotions  Assessment: Nadia Landis was emotional, expressive, and talkative  She managed to control her emotions and was redirected to more positive aspects of her memories and to relax her muscles to be able to teach her nervous system a different response  Plan: Nadia Landis will attend weekly individual and group therapy sessions to be able to process her trauma and heal from its impact, teach her different system a different response to stress through Mindfulness, connection to inner child, somatic work and awareness  HPI     Past Medical History:   Diagnosis Date   • Bilateral carpal tunnel syndrome 08/20/2019   • Carpal tunnel syndrome of right wrist 10/30/2019    Added automatically from request for surgery [id number]   • COPD (chronic obstructive pulmonary disease) ([facility] [address] )    • Hypertension        Past Surgical History:   Procedure Laterality Date   • BREAST BIOPSY Left 20yrs ago benign   • OOPHORECTOMY Right    • [place] WRIST Hurtis Cris LIG Right 1/21/2020    Procedure: RELEASE CARPAL TUNNEL ENDOSCOPIC;  Surgeon: Alpesh Bowers MD;  [facility];  Service: Orthopedics   • [place] WRIST Hurtis Cris LIG Left 1/28/2020    Procedure: RELEASE CARPAL TUNNEL ENDOSCOPIC;  Surgeon: Alpesh Bowers MD;  [facility];  Service: Orthopedics       Current Outpatient Medications   Medication Sig Dispense Refill   • albuterol (PROVENTIL HFA,VENTOLIN HFA) 90 mcg/act inhaler inhale 2 puffs by mouth and INTO THE LUNGS every 6 hours IF NEEDED FOR WHEEZING   8 5 g 2   • cholecalciferol (VITAMIN D3) 400 units tablet Take 400 Units by mouth daily     • FLUoxetine (PROzac) 20 mg capsule Take 2 capsules (40 mg total) by mouth daily 30 capsule 1   • fluticasone (FLONASE) 50 mcg/act nasal spray 1 spray into each nostril daily 16 g 2   • gabapentin (NEURONTIN) 100 mg capsule Take 1 capsule (100 mg total) by mouth 3 (three) times a day 90 capsule 1   • lamoTRIgine (LaMICtal) 200 MG tablet Take 1 tablet (200 mg total) by mouth daily 30 tablet 1   • levocetirizine (XYZAL) 5 MG tablet Take 1 tablet (5 mg total) by mouth every evening 30 tablet 4   • nicotine polacrilex (COMMIT) 4 MG lozenge Apply 1 lozenge (4 mg total) to the mouth or throat as needed for smoking cessation (Patient not taking: No sig reported) 100 each 5   • omeprazole (PriLOSEC) 20 mg delayed release capsule take 1 capsule by mouth twice a day 60 capsule 5   • senna-docusate sodium (SENOKOT-S) 8 6-50 mg per tablet Take 3 tablets by mouth as needed       • tiotropium-olodaterol (Stiolto Respimat) 2 5-2 5 MCG/ACT inhaler Inhale 2 puffs daily 4 g 11     No current facility-administered medications for this visit  No Known Allergies    Review of Systems    Video Exam    There were no vitals filed for this visit      Physical Exam     Visit Time    Visit Start Time: 1:14 PM  Visit Stop Time: 1:53 PM  Total Visit Duration: 39 minutes

## 2022-10-14 ENCOUNTER — TELEMEDICINE (OUTPATIENT)
Dept: BEHAVIORAL/MENTAL HEALTH CLINIC | Facility: CLINIC | Age: 57
End: 2022-10-14
Payer: COMMERCIAL

## 2022-10-14 DIAGNOSIS — F31.81 MODERATE MIXED BIPOLAR II DISORDER (HCC): Primary | ICD-10-CM

## 2022-10-14 DIAGNOSIS — F41.1 GENERALIZED ANXIETY DISORDER: ICD-10-CM

## 2022-10-14 DIAGNOSIS — F10.11 ALCOHOL ABUSE, IN REMISSION: ICD-10-CM

## 2022-10-14 DIAGNOSIS — F43.10 PTSD (POST-TRAUMATIC STRESS DISORDER): ICD-10-CM

## 2022-10-14 PROCEDURE — 90853 GROUP PSYCHOTHERAPY: CPT | Performed by: COUNSELOR

## 2022-10-14 NOTE — PSYCH
Virtual Regular Visit    Verification of patient location:    Patient is located in the following state in which I [place] an [place] license [place]      Assessment/Plan:    Problem List Items Addressed This Visit        Other    Moderate mixed bipolar II disorder (HCC) - Primary    Generalized anxiety disorder    PTSD (post-traumatic stress disorder)    Alcohol abuse, in remission          Goals addressed in session: Goal 1 and Goal 2          Reason for visit is No chief complaint on file  Encounter provider Juan Blakely [facility]    Provider located at [address] [place] [id number] [phone]      Recent Visits  Date Type Provider Dept   10/13/22 229 [facility], [address] [place] [address]   10/07/22 229 [facility], [facility]   Showing recent visits within past 7 days and meeting all other requirements  Today's Visits  Date Type Provider Dept   10/14/22 229 [facility]   Showing today's visits and meeting all other requirements  Future Appointments  No visits were found meeting these conditions  Showing future appointments within next 150 days and meeting all other requirements       The patient was identified by name and date of birth  Won Marin was informed that this is a telemedicine visit and that the visit is being conducted through[employer] Teams and patient was informed that this is a secure, HIPAA-compliant platform  She agrees to proceed     My office door was closed  The patient was notified the following individuals were present in the room other group members  She acknowledged consent and understanding of privacy and security of the video platform   The patient has agreed to participate and understands they can discontinue the visit at any time     Patient is aware this is a billable service  Subjective  Shane Dias is a 49 y o  female    Data: Rebeka Guerrero attended the Relaxation group and was guided through the traditional first part of diaphragmatic breathing with progressive muscle relaxation  Next, all attendees were guided through body scan and mindfulness meditation with imagery for awareness of thoughts and feelings as well as self-compassion and hand-warming healing, followed by affirmations for confidence and self-compassion and against depression and anxiety  Apart of the psychoeducation and DBT, and group approach was used to support the cohesion and maintain the serene environment for the exercise’s purpose  Assessment:  Rebeka Guerrero followed through guidance and completed the exercises, showed willingness to continue learning and practicing self-care  Plan:  Rebeka Guerrero will practice deep breathing and other techniques based on each occasion to counter stress and will attend the next group to be able to take part in further guided exercises on chosen topics to build on the techniques learned in the beginning        HPI     Past Medical History:   Diagnosis Date   • Bilateral carpal tunnel syndrome 08/20/2019   • Carpal tunnel syndrome of right wrist 10/30/2019    Added automatically from request for surgery [id number]   • COPD (chronic obstructive pulmonary disease) ([facility] [address] )    • Hypertension        Past Surgical History:   Procedure Laterality Date   • BREAST BIOPSY Left 20yrs ago benign   • OOPHORECTOMY Right    • [place] WRIST Kaila Brownie LIG Right 1/21/2020    Procedure: RELEASE CARPAL TUNNEL ENDOSCOPIC;  Surgeon: Blas Morales MD;  [facility];  Service: Orthopedics   • [place] WRIST Kaila Brownie LIG Left 1/28/2020    Procedure: RELEASE CARPAL TUNNEL ENDOSCOPIC;  Surgeon: Blas Morales MD;  [facility];  Service: Orthopedics       Current Outpatient Medications   Medication Sig Dispense Refill   • albuterol (PROVENTIL HFA,VENTOLIN HFA) 90 mcg/act inhaler inhale 2 puffs by mouth and INTO THE LUNGS every 6 hours IF NEEDED FOR WHEEZING  8 5 g 2   • cholecalciferol (VITAMIN D3) 400 units tablet Take 400 Units by mouth daily     • FLUoxetine (PROzac) 20 mg capsule Take 2 capsules (40 mg total) by mouth daily 30 capsule 1   • fluticasone (FLONASE) 50 mcg/act nasal spray 1 spray into each nostril daily 16 g 2   • gabapentin (NEURONTIN) 100 mg capsule Take 1 capsule (100 mg total) by mouth 3 (three) times a day 90 capsule 1   • lamoTRIgine (LaMICtal) 200 MG tablet Take 1 tablet (200 mg total) by mouth daily 30 tablet 1   • levocetirizine (XYZAL) 5 MG tablet Take 1 tablet (5 mg total) by mouth every evening 30 tablet 4   • nicotine polacrilex (COMMIT) 4 MG lozenge Apply 1 lozenge (4 mg total) to the mouth or throat as needed for smoking cessation (Patient not taking: No sig reported) 100 each 5   • omeprazole (PriLOSEC) 20 mg delayed release capsule take 1 capsule by mouth twice a day 60 capsule 5   • senna-docusate sodium (SENOKOT-S) 8 6-50 mg per tablet Take 3 tablets by mouth as needed       • tiotropium-olodaterol (Stiolto Respimat) 2 5-2 5 MCG/ACT inhaler Inhale 2 puffs daily 4 g 11     No current facility-administered medications for this visit  No Known Allergies    Review of Systems    Video Exam    There were no vitals filed for this visit      Physical Exam     Visit Time    Visit Start Time: 4:00 PM  Visit Stop Time: 4:58 PM  Total Visit Duration: 58 minutes

## 2022-10-20 ENCOUNTER — TELEMEDICINE (OUTPATIENT)
Dept: BEHAVIORAL/MENTAL HEALTH CLINIC | Facility: CLINIC | Age: 57
End: 2022-10-20
Payer: COMMERCIAL

## 2022-10-20 DIAGNOSIS — F17.210 CIGARETTE NICOTINE DEPENDENCE WITHOUT COMPLICATION: ICD-10-CM

## 2022-10-20 DIAGNOSIS — F12.90 MARIJUANA USE: Chronic | ICD-10-CM

## 2022-10-20 DIAGNOSIS — F10.11 ALCOHOL ABUSE, IN REMISSION: ICD-10-CM

## 2022-10-20 DIAGNOSIS — F43.10 PTSD (POST-TRAUMATIC STRESS DISORDER): ICD-10-CM

## 2022-10-20 DIAGNOSIS — F41.1 GENERALIZED ANXIETY DISORDER: ICD-10-CM

## 2022-10-20 DIAGNOSIS — F31.81 MODERATE MIXED BIPOLAR II DISORDER (HCC): Primary | ICD-10-CM

## 2022-10-20 PROCEDURE — 90834 PSYTX W PT 45 MINUTES: CPT | Performed by: COUNSELOR

## 2022-10-20 NOTE — PSYCH
Virtual Regular Visit    Verification of patient location:    Patient is located in the following state in which I hold an [place] license [place]      Assessment/Plan:    Problem List Items Addressed This Visit        Other    Marijuana use (Chronic)    Moderate mixed bipolar II disorder (HCC) - Primary    Generalized anxiety disorder    PTSD (post-traumatic stress disorder)    Alcohol abuse, in remission    Cigarette nicotine dependence without complication          Goals addressed in session: Goal 1 and Goal 2          Reason for visit is No chief complaint on file  [facility]    Provider located at [address] [place] [address]  190 [facility] [address] [phone]      Recent Visits  Date Type Provider Dept   10/14/22 229 [facility], [address] [place] [address]   10/13/22 229 [facility], [place] [address] [facility] Therapist [facility]   Showing recent visits within past 7 days and meeting all other requirements  Future Appointments  No visits were found meeting these conditions  Showing future appointments within next 150 days and meeting all other requirements       The patient was identified by name and date of birth  Trevor Gonzalez was informed that this is a telemedicine visit and that the visit is being conducted through[address]  She agrees to proceed     My office door was closed  No one else was in the room  She acknowledged consent and understanding of privacy and security of the video platform  The patient has agreed to participate and understands they can discontinue the visit at any time  Patient is aware this is a billable service  Subjective  Trevor Gonzalez is a 49 y o  female      Data: Dre Pranav discussed upcoming holidays and her feelings and emotions towards her family members that she is afraid are not going to remember her  She was observe digging deeply into her emotions and becoming upset  Person-Centered, DBT-based, CBT methods help Pt focus on strengths and awareness of what makes her successful that she could use in the future  Assessment: Jose Lincoln sounded positive while talking about her son but negative while remembering her marriage and the loss of her son and her own misery from her   Plan: Jose Lincoln will attend weekly individual and Relaxation Group sessions to be able to express herself and finish each week with unplugging from stress, news, and race  HPI     Past Medical History:   Diagnosis Date   • Bilateral carpal tunnel syndrome 08/20/2019   • Carpal tunnel syndrome of right wrist 10/30/2019    Added automatically from request for surgery [id number]   • COPD (chronic obstructive pulmonary disease) ([facility] [address] )    • Hypertension        Past Surgical History:   Procedure Laterality Date   • BREAST BIOPSY Left 20yrs ago benign   • OOPHORECTOMY Right    • [place] WRIST Mace Hesselbach LIG Right 1/21/2020    Procedure: RELEASE CARPAL TUNNEL ENDOSCOPIC;  Surgeon: Verónica Renya MD;  [facility];  Service: Orthopedics   • [place] WRIST Mace Hesselbach LIG Left 1/28/2020    Procedure: RELEASE CARPAL TUNNEL ENDOSCOPIC;  Surgeon: Verónica Reyna MD;  [facility];  Service: Orthopedics       Current Outpatient Medications   Medication Sig Dispense Refill   • albuterol (PROVENTIL HFA,VENTOLIN HFA) 90 mcg/act inhaler inhale 2 puffs by mouth and INTO THE LUNGS every 6 hours IF NEEDED FOR WHEEZING   8 5 g 2   • cholecalciferol (VITAMIN D3) 400 units tablet Take 400 Units by mouth daily     • FLUoxetine (PROzac) 20 mg capsule Take 2 capsules (40 mg total) by mouth daily 30 capsule 1   • fluticasone (FLONASE) 50 mcg/act nasal spray 1 spray into each nostril daily 16 g 2   • gabapentin (NEURONTIN) 100 mg capsule Take 1 capsule (100 mg total) by mouth 3 (three) times a day 90 capsule 1   • lamoTRIgine (LaMICtal) 200 MG tablet Take 1 tablet (200 mg total) by mouth daily 30 tablet 1   • levocetirizine (XYZAL) 5 MG tablet Take 1 tablet (5 mg total) by mouth every evening 30 tablet 4   • nicotine polacrilex (COMMIT) 4 MG lozenge Apply 1 lozenge (4 mg total) to the mouth or throat as needed for smoking cessation (Patient not taking: No sig reported) 100 each 5   • omeprazole (PriLOSEC) 20 mg delayed release capsule take 1 capsule by mouth twice a day 60 capsule 5   • senna-docusate sodium (SENOKOT-S) 8 6-50 mg per tablet Take 3 tablets by mouth as needed       • tiotropium-olodaterol (Stiolto Respimat) 2 5-2 5 MCG/ACT inhaler Inhale 2 puffs daily 4 g 11     No current facility-administered medications for this visit  No Known Allergies    Review of Systems    Video Exam    There were no vitals filed for this visit      Physical Exam     I spent 42 minutes directly with the patient during this visit     Visit Time    Visit Start Time: 2:01 PM  Visit Stop Time: 2:43 PM  Total Visit Duration: 42 minutes

## 2022-10-21 ENCOUNTER — TELEMEDICINE (OUTPATIENT)
Dept: BEHAVIORAL/MENTAL HEALTH CLINIC | Facility: CLINIC | Age: 57
End: 2022-10-21
Payer: COMMERCIAL

## 2022-10-21 DIAGNOSIS — F31.81 MODERATE MIXED BIPOLAR II DISORDER (HCC): Primary | ICD-10-CM

## 2022-10-21 DIAGNOSIS — F41.1 GENERALIZED ANXIETY DISORDER: ICD-10-CM

## 2022-10-21 DIAGNOSIS — F43.10 PTSD (POST-TRAUMATIC STRESS DISORDER): ICD-10-CM

## 2022-10-21 PROCEDURE — 90853 GROUP PSYCHOTHERAPY: CPT | Performed by: COUNSELOR

## 2022-10-21 NOTE — PSYCH
Virtual Regular Visit    Verification of patient location:    Patient is located in the following state in which [place] an [place]      Assessment/Plan:    Problem List Items Addressed This Visit        Other    Moderate mixed bipolar II disorder (HCC) - Primary    Generalized anxiety disorder    PTSD (post-traumatic stress disorder)          Goals addressed in session: Goal 1 and Goal 2          Reason for visit is No chief complaint on file  Encounter provider [facility]    Provider located at [address] [place] [address]  190 [facility] [address] [place] [address] [phone]      Recent Visits  Date Type Provider Dept   10/20/22 229 [facility], [address] [place] [address]   10/14/22 229 [facility], [place] [address] [facility] Therapist [facility]   Showing recent visits within past 7 days and meeting all other requirements  Today's Visits  Date Type Provider Dept   10/21/22 229 [facility], [address] [place] [address]   Showing today's visits and meeting all other requirements  Future Appointments  No visits were found meeting these conditions  Showing future appointments within next 150 days and meeting all other requirements       The patient was identified by name and date of birth  Otis Rivera was informed that this is a telemedicine visit and that the visit is being conducted [employer]  She agrees to proceed     My office door was closed  The patient was notified the following individuals were present in the room other group members  She acknowledged consent and understanding of privacy and security of the video platform  The patient has agreed to participate and understands they can discontinue the visit at any time  Patient is aware this is a billable service  Subjective  Otis Rivera is a 49 y  o  female  Data: Elizabeth Giles attended the Relaxation group and was guided through the traditional first part of diaphragmatic breathing with progressive muscle relaxation  Next, attendees were guided through body scan and mindfulness meditation with imagery for awareness of pleasant and unpleasant bodily sensations and connecting with the inner child for comfort, followed by affirmations for self-compassion, gratitude, and against depression and anxiety  Apart of the psychoeducation and DBT, and group approach was used to support the cohesion and maintain the serene environment for the exercise’s purpose  Assessment: Elizabeth Giles followed through guidance and completed the exercises, showed willingness to continue learning and practicing self-care  Plan: Elizabeth Giles will practice deep breathing and other techniques based on each occasion to counter stress and will attend the next group to be able to take part in further guided exercises on chosen topics to build on the techniques learned in the beginning          HPI     Past Medical History:   Diagnosis Date   • Bilateral carpal tunnel syndrome 08/20/2019   • Carpal tunnel syndrome of right wrist 10/30/2019    Added automatically from request for surgery [id number]   • COPD (chronic obstructive pulmonary disease) ([facility] [address] )    • Hypertension        Past Surgical History:   Procedure Laterality Date   • BREAST BIOPSY Left 20yrs ago benign   • OOPHORECTOMY Right    • [place] WRIST Paulette Ana LIG Right 1/21/2020    Procedure: RELEASE CARPAL TUNNEL ENDOSCOPIC;  Surgeon: Mary Agosto MD;  [facility];  Service: Orthopedics   • [place] WRIST [address] LIG Left 1/28/2020    Procedure: RELEASE CARPAL TUNNEL ENDOSCOPIC;  Surgeon: Mary Agosto MD;  [facility];  Service: Orthopedics       Current Outpatient Medications   Medication Sig Dispense Refill   • albuterol (PROVENTIL HFA,VENTOLIN HFA) 90 mcg/act inhaler inhale 2 puffs by mouth and INTO THE LUNGS every 6 hours IF NEEDED FOR WHEEZING  8 5 g 2   • cholecalciferol (VITAMIN D3) 400 units tablet Take 400 Units by mouth daily     • FLUoxetine (PROzac) 20 mg capsule Take 2 capsules (40 mg total) by mouth daily 30 capsule 1   • fluticasone (FLONASE) 50 mcg/act nasal spray 1 spray into each nostril daily 16 g 2   • gabapentin (NEURONTIN) 100 mg capsule Take 1 capsule (100 mg total) by mouth 3 (three) times a day 90 capsule 1   • lamoTRIgine (LaMICtal) 200 MG tablet Take 1 tablet (200 mg total) by mouth daily 30 tablet 1   • levocetirizine (XYZAL) 5 MG tablet Take 1 tablet (5 mg total) by mouth every evening 30 tablet 4   • nicotine polacrilex (COMMIT) 4 MG lozenge Apply 1 lozenge (4 mg total) to the mouth or throat as needed for smoking cessation (Patient not taking: No sig reported) 100 each 5   • omeprazole (PriLOSEC) 20 mg delayed release capsule take 1 capsule by mouth twice a day 60 capsule 5   • senna-docusate sodium (SENOKOT-S) 8 6-50 mg per tablet Take 3 tablets by mouth as needed       • tiotropium-olodaterol (Stiolto Respimat) 2 5-2 5 MCG/ACT inhaler Inhale 2 puffs daily 4 g 11     No current facility-administered medications for this visit  No Known Allergies    Review of Systems    Video Exam    There were no vitals filed for this visit      Physical Exam     I spent 59 minutes directly with the patient during this visit     Visit Time    Visit Start Time: 4:01 PM  Visit Stop Time: 5:00 PM  Total Visit Duration: 59 minutes

## 2022-10-21 NOTE — PSYCH
Detail Level: Detailed Virtual Regular Visit    Verification of patient location:    Patient is located in the following state in which I hold an active license [place]      Assessment/Plan:    Problem List Items Addressed This Visit        Other    Moderate mixed bipolar II disorder (HCC) - Primary    Generalized anxiety disorder    PTSD (post-traumatic stress disorder)          Goals addressed in session: Goal 1          Reason for visit is No chief complaint on file  Encounter provider Sahil Garcia [facility]    Provider located at [address] [place] [id number] [phone]      Recent Visits  Date Type Provider Dept   09/22/22 229 [facility], [address] [place] [address]   09/16/22 229 [place] [address] [facility]   Showing recent visits within past 7 days and meeting all other requirements  Future Appointments  No visits were found meeting these conditions  Showing future appointments within next 150 days and meeting all other requirements       The patient was identified by name and date of birth  Shane Dias was informed that this is a telemedicine visit and that the visit is being conducted through[employer] Teams and patient was informed that this is a secure, HIPAA-compliant platform  She agrees to proceed     Other methods to assure confidentiality were taken inform pT for other group members  The patient was notified the following individuals were present in the room other group members2  She acknowledged consent and understanding of privacy and security of the video platform  The patient has agreed to participate and understands they can discontinue the visit at any time  Patient is aware this is a billable service  Subjective  Shane Dias is a 49 y o  female        Data: Rebeka Guerrero attended the Relaxation group and was guided through the traditional first part of diaphragmatic breathing with progressive muscle relaxation  Later, all attendees were guided through body scan and mindfulness meditation with imagery, followed by affirmations against depression and anxiety, for confidence and self-appreciation  Apart of the psychoeducation and DBT, and group approach was used to facilitate the new participants introduction and to support the cohesion and maintain the serene environment for the exercises purpose  Assessment: Dalia Ross followed through guidance and completed the exercises, showed willingness to continue learning and practicing self-care  Plan: Dalia Ross will practice deep breathing and other techniques based on each occasion to counter stress and will attend the next group to be able to take part in further guided exercises on chosen topics to build on the techniques learned in the beginning  HPI     Past Medical History:   Diagnosis Date    Bilateral carpal tunnel syndrome 08/20/2019    Carpal tunnel syndrome of right wrist 10/30/2019    Added automatically from request for surgery [id number]    COPD (chronic obstructive pulmonary disease) ([facility] [place] [address] [facility]     Hypertension        Past Surgical History:   Procedure Laterality Date    BREAST BIOPSY Left 20yrs ago benign    OOPHORECTOMY Right     [place] WRIST ARTHROSCOP,RELEASE Levon Simmers LIG Right 1/21/2020    Procedure: RELEASE CARPAL TUNNEL ENDOSCOPIC;  Surgeon: Andre Orellana MD;  [facility];  Service: Orthopedics    [place] WRIST [place] LIG Left 1/28/2020    Procedure: RELEASE CARPAL TUNNEL ENDOSCOPIC;  Surgeon: Andre Orellana MD;  [facility];  Service: Orthopedics       Current Outpatient Medications   Medication Sig Dispense Refill    albuterol (PROVENTIL HFA,VENTOLIN HFA) 90 mcg/act inhaler inhale 2 puffs by mouth and INTO THE LUNGS every 6 hours IF NEEDED FOR WHEEZING   8 5 g 2    cholecalciferol (VITAMIN D3) 400 units tablet Take 400 Units by mouth daily      FLUoxetine (PROzac) 20 mg capsule Take 2 capsules (40 mg total) by mouth daily 30 capsule 1    fluticasone (FLONASE) 50 mcg/act nasal spray 1 spray into each nostril daily 16 g 2    gabapentin (NEURONTIN) 100 mg capsule Take 1 capsule (100 mg total) by mouth 3 (three) times a day 90 capsule 1    lamoTRIgine (LaMICtal) 200 MG tablet Take 1 tablet (200 mg total) by mouth daily 30 tablet 1    levocetirizine (XYZAL) 5 MG tablet Take 1 tablet (5 mg total) by mouth every evening 30 tablet 4    nicotine polacrilex (COMMIT) 4 MG lozenge Apply 1 lozenge (4 mg total) to the mouth or throat as needed for smoking cessation (Patient not taking: No sig reported) 100 each 5    omeprazole (PriLOSEC) 20 mg delayed release capsule take 1 capsule by mouth twice a day 60 capsule 5    senna-docusate sodium (SENOKOT-S) 8 6-50 mg per tablet Take 3 tablets by mouth as needed        tiotropium-olodaterol (Stiolto Respimat) 2 5-2 5 MCG/ACT inhaler Inhale 2 puffs daily 4 g 11     No current facility-administered medications for this visit  No Known Allergies    Review of Systems    Video Exam    There were no vitals filed for this visit      Physical Exam     I spent 48 minutes directly with the patient during this visit     START: 4:12 PM  END: 5:00 PM

## 2022-10-27 ENCOUNTER — TELEMEDICINE (OUTPATIENT)
Dept: BEHAVIORAL/MENTAL HEALTH CLINIC | Facility: CLINIC | Age: 57
End: 2022-10-27

## 2022-10-27 DIAGNOSIS — F43.10 PTSD (POST-TRAUMATIC STRESS DISORDER): ICD-10-CM

## 2022-10-27 DIAGNOSIS — F31.81 MODERATE MIXED BIPOLAR II DISORDER (HCC): Primary | ICD-10-CM

## 2022-10-27 DIAGNOSIS — F17.210 CIGARETTE NICOTINE DEPENDENCE WITHOUT COMPLICATION: ICD-10-CM

## 2022-10-27 DIAGNOSIS — F41.1 GENERALIZED ANXIETY DISORDER: ICD-10-CM

## 2022-10-27 NOTE — PSYCH
Virtual Regular Visit    Verification of patient location:    Patient is located in the following state in which I hold an active license [place]      Assessment/Plan:    Problem List Items Addressed This Visit        Other    Moderate mixed bipolar II disorder (HCC) - Primary    Generalized anxiety disorder    PTSD (post-traumatic stress disorder)    Cigarette nicotine dependence without complication          Goals addressed in session: Goal 1 and Goal 2          Reason for visit is No chief complaint on file  Encounter provider Moises Torres [facility]    Provider located at [address] [place] [address]  [address] [facility] [address] [phone]      Recent Visits  Date Type Provider Dept   10/21/22 229 [facility], [address] [place] [address]   10/20/22 229 [facility], [place] [address] [facility] The[facility]ist Tiffanie David   Showing recent visits within past 7 days and meeting all other requirements  Future Appointments  No visits were found meeting these conditions  Showing future appointments within next 150 days and meeting all other requirements       The patient was identified by name and date of birth  Gayle Blancas was informed that this is a telemedicine visit and that the visit is being conducted throughthe [employer]  She agrees to proceed     My office door was closed  No one else was in the room  She acknowledged consent and understanding of privacy and security of the video platform  The patient has agreed to participate and understands they can discontinue the visit at any time  Patient is aware this is a billable service  Subjective  Gayle Blancas is a 49 y o  female   Data: Brittni Griffith spoke about her being an empath and was educated about empaths' overload triggers and was encouraged to talk about how she may deal with them   Person-Centered, DBT-based, and CBT techniques were combined to help Pt to focus on her learned Mindfulness skills  Assessment: Stacy Meyer was apparently emotional and upset abruptly when discussing negative thoughts, while managed to be easily redirected to positive and helpful topics  Plan: Stacy Meyer will use Mindfulness when remembers to be a neutral observer and welcome them come and go, without affecting them and without judging them  She will join the Relaxation Group on Fri to be able to exercise and unplug  HPI     Past Medical History:   Diagnosis Date   • Bilateral carpal tunnel syndrome 08/20/2019   • Carpal tunnel syndrome of right wrist 10/30/2019    Added automatically from request for surgery [id number]   • COPD (chronic obstructive pulmonary disease) ([facility] [address] )    • Hypertension        Past Surgical History:   Procedure Laterality Date   • BREAST BIOPSY Left 20yrs ago benign   • OOPHORECTOMY Right    • [place] WRIST Yakov Saaba LIG Right 1/21/2020    Procedure: RELEASE CARPAL TUNNEL ENDOSCOPIC;  Surgeon: Quyen Whatley MD;  [facility];  Service: Orthopedics   • [place] WRIST Yakov Saaba LIG Left 1/28/2020    Procedure: RELEASE CARPAL TUNNEL ENDOSCOPIC;  Surgeon: Quyen Whatley MD;  [facility];  Service: Orthopedics       Current Outpatient Medications   Medication Sig Dispense Refill   • albuterol (PROVENTIL HFA,VENTOLIN HFA) 90 mcg/act inhaler inhale 2 puffs by mouth and INTO THE LUNGS every 6 hours IF NEEDED FOR WHEEZING   8 5 g 2   • cholecalciferol (VITAMIN D3) 400 units tablet Take 400 Units by mouth daily     • FLUoxetine (PROzac) 20 mg capsule Take 2 capsules (40 mg total) by mouth daily 30 capsule 1   • fluticasone (FLONASE) 50 mcg/act nasal spray 1 spray into each nostril daily 16 g 2   • gabapentin (NEURONTIN) 100 mg capsule Take 1 capsule (100 mg total) by mouth 3 (three) times a day 90 capsule 1   • lamoTRIgine (LaMICtal) 200 MG tablet Take 1 tablet (200 mg total) by mouth daily 30 tablet 1   • levocetirizine (XYZAL) 5 MG tablet Take 1 tablet (5 mg total) by mouth every evening 30 tablet 4   • nicotine polacrilex (COMMIT) 4 MG lozenge Apply 1 lozenge (4 mg total) to the mouth or throat as needed for smoking cessation (Patient not taking: No sig reported) 100 each 5   • omeprazole (PriLOSEC) 20 mg delayed release capsule take 1 capsule by mouth twice a day 60 capsule 5   • senna-docusate sodium (SENOKOT-S) 8 6-50 mg per tablet Take 3 tablets by mouth as needed       • tiotropium-olodaterol (Stiolto Respimat) 2 5-2 5 MCG/ACT inhaler Inhale 2 puffs daily 4 g 11     No current facility-administered medications for this visit  No Known Allergies    Review of Systems    Video Exam    There were no vitals filed for this visit      Physical Exam     Visit Time    Visit Start Time: 12:58 PM  Visit Stop Time: 1:40 PM  Total Visit Duration: 42 minutes

## 2022-10-28 ENCOUNTER — TELEMEDICINE (OUTPATIENT)
Dept: BEHAVIORAL/MENTAL HEALTH CLINIC | Facility: CLINIC | Age: 57
End: 2022-10-28

## 2022-10-28 DIAGNOSIS — F41.1 GENERALIZED ANXIETY DISORDER: ICD-10-CM

## 2022-10-28 DIAGNOSIS — F43.10 PTSD (POST-TRAUMATIC STRESS DISORDER): ICD-10-CM

## 2022-10-28 DIAGNOSIS — F31.81 MODERATE MIXED BIPOLAR II DISORDER (HCC): Primary | ICD-10-CM

## 2022-10-28 NOTE — PSYCH
Virtual Regular Visit    Verification of patient location:    Patient is located in the following state in which [place] an [place]      Assessment/Plan:    Problem List Items Addressed This Visit        Other    Moderate mixed bipolar II disorder (HCC) - Primary    Generalized anxiety disorder    PTSD (post-traumatic stress disorder)          Goals addressed in session: Goal 1 and Goal 2          Reason for visit is No chief complaint on file  Encounter provider Dwight [facility]    Provider located at [address] [place] [address] [phone]      Recent Visits  Date Type Provider Dept   10/27/22 229 [facility], [address] [place] [address]   10/21/22 229 [facility]   Showing recent visits within past 7 days and meeting all other requirements  Today's Visits  Date Type Provider Dept   10/28/22 229 [facility], [address] [place] [address]   Showing today's visits and meeting all other requirements  Future Appointments  No visits were found meeting these conditions  Showing future appointments within next 150 days and meeting all other requirements       The patient was identified by name and date of birth  Rhianna Millan was informed that this is a telemedicine visit and that the visit is being conducted [employer]  She agrees to proceed     My office door was closed  The patient was notified the following individuals were present in the room other group members  She acknowledged consent and understanding of privacy and security of the video platform  The patient has agreed to participate and understands they can discontinue the visit at any time  Patient is aware this is a billable service       Subjective  Rhianna Millan is a 49 y o  female      Data: Bia Meraz attended the Relaxation group and was guided through the traditional first part of diaphragmatic breathing with progressive muscle relaxation  Next, attendees were guided through body scan and mindfulness meditation for self-love and appreciation, followed by affirmations for calmness, gratitude, mind and body peace, and balance  Psychoeducation, DBT-based, and group approaches were used to support the cohesion and maintain the serene environment for the exercise’s purpose  Assessment: Marie followed through guidance and completed the exercises, showed willingness to continue learning and practicing self-care  Plan: Marie will practice deep breathing and other techniques based on each occasion to counter stress and will attend the next group to be able to take part in further guided exercises on chosen topics to build on the techniques learned in the beginning  HPI     Past Medical History:   Diagnosis Date   • Bilateral carpal tunnel syndrome 08/20/2019   • Carpal tunnel syndrome of right wrist 10/30/2019    Added automatically from request for surgery [id number]   • COPD (chronic obstructive pulmonary disease) ([facility] [address] )    • Hypertension        Past Surgical History:   Procedure Laterality Date   • BREAST BIOPSY Left 20yrs ago benign   • OOPHORECTOMY Right    • [place] WRIST Rennis Nedra LIG Right 1/21/2020    Procedure: RELEASE CARPAL TUNNEL ENDOSCOPIC;  Surgeon: Ana Cerna MD;  [facility];  Service: Orthopedics   • [place] WRIST Rennis Nedra LIG Left 1/28/2020    Procedure: RELEASE CARPAL TUNNEL ENDOSCOPIC;  Surgeon: Ana Cerna MD;  [facility];  Service: Orthopedics       Current Outpatient Medications   Medication Sig Dispense Refill   • albuterol (PROVENTIL HFA,VENTOLIN HFA) 90 mcg/act inhaler inhale 2 puffs by mouth and INTO THE LUNGS every 6 hours IF NEEDED FOR WHEEZING   8 5 g 2   • cholecalciferol (VITAMIN D3) 400 units tablet Take 400 Units by mouth daily     • FLUoxetine (PROzac) 20 mg capsule Take 2 capsules (40 mg total) by mouth daily 30 capsule 1   • fluticasone (FLONASE) 50 mcg/act nasal spray 1 spray into each nostril daily 16 g 2   • gabapentin (NEURONTIN) 100 mg capsule Take 1 capsule (100 mg total) by mouth 3 (three) times a day 90 capsule 1   • lamoTRIgine (LaMICtal) 200 MG tablet Take 1 tablet (200 mg total) by mouth daily 30 tablet 1   • levocetirizine (XYZAL) 5 MG tablet Take 1 tablet (5 mg total) by mouth every evening 30 tablet 4   • nicotine polacrilex (COMMIT) 4 MG lozenge Apply 1 lozenge (4 mg total) to the mouth or throat as needed for smoking cessation (Patient not taking: No sig reported) 100 each 5   • omeprazole (PriLOSEC) 20 mg delayed release capsule take 1 capsule by mouth twice a day 60 capsule 5   • senna-docusate sodium (SENOKOT-S) 8 6-50 mg per tablet Take 3 tablets by mouth as needed       • tiotropium-olodaterol (Stiolto Respimat) 2 5-2 5 MCG/ACT inhaler Inhale 2 puffs daily 4 g 11     No current facility-administered medications for this visit  No Known Allergies    Review of Systems    Video Exam    There were no vitals filed for this visit      Physical Exam     Visit Time    Visit Start Time: 4:00 PM  Visit Stop Time: 5:00 PM  Total Visit Duration: 60 minutes

## 2022-11-02 DIAGNOSIS — J30.2 SEASONAL ALLERGIES: ICD-10-CM

## 2022-11-02 RX ORDER — LEVOCETIRIZINE DIHYDROCHLORIDE 5 MG/1
TABLET, FILM COATED ORAL
Qty: 30 TABLET | Refills: 4 | Status: SHIPPED | OUTPATIENT
Start: 2022-11-02

## 2022-11-03 ENCOUNTER — TELEMEDICINE (OUTPATIENT)
Dept: BEHAVIORAL/MENTAL HEALTH CLINIC | Facility: CLINIC | Age: 57
End: 2022-11-03

## 2022-11-03 ENCOUNTER — TELEPHONE (OUTPATIENT)
Dept: PSYCHIATRY | Facility: CLINIC | Age: 57
End: 2022-11-03

## 2022-11-03 DIAGNOSIS — F41.1 GENERALIZED ANXIETY DISORDER: ICD-10-CM

## 2022-11-03 DIAGNOSIS — F31.81 MODERATE MIXED BIPOLAR II DISORDER (HCC): Primary | ICD-10-CM

## 2022-11-03 DIAGNOSIS — F43.10 PTSD (POST-TRAUMATIC STRESS DISORDER): ICD-10-CM

## 2022-11-03 DIAGNOSIS — F10.11 ALCOHOL ABUSE, IN REMISSION: ICD-10-CM

## 2022-11-03 DIAGNOSIS — F17.210 CIGARETTE NICOTINE DEPENDENCE WITHOUT COMPLICATION: ICD-10-CM

## 2022-11-03 NOTE — PSYCH
Virtual Regular Visit    Verification of patient location:    Patient is located in the following state in which I hold an [place] license [place]      Assessment/Plan:    Problem List Items Addressed This Visit        Other    Moderate mixed bipolar II disorder ([address] ) - Primary    Generalized anxiety disorder    PTSD (post-traumatic stress disorder)    Alcohol abuse, in remission    Cigarette nicotine dependence without complication          Goals addressed in session: Goal 1 and Goal 2          Reason for visit is   Chief Complaint   Patient presents with   • Virtual Regular Visit        Encounter provider FARHAN Lobo [facility]    Provider located at [address] [place] [address] [facility] [address] [phone]      Recent Visits  Date Type Provider Dept   10/28/22 229 [facility], [address] [place] [address]   10/27/22 229 [facility], [address] [place] [address]   Showing recent visits within past 7 days and meeting all other requirements  Today's Visits  Date Type Provider Dept   11/03/22 229 [facility], [address] [place] [address]   Showing today's visits and meeting all other requirements  Future Appointments  No visits were found meeting these conditions  Showing future appointments within next 150 days and meeting all other requirements       The patient was identified by name and date of birth  Irene Joan was informed that this is a telemedicine visit and that the visit is being conducted throughthe [employer] platform  She agrees to proceed     My office door was closed  No one else was in the room  She acknowledged consent and understanding of privacy and security of the video platform  The patient has agreed to participate and understands they can discontinue the visit at any time  Patient is aware this is a billable service  Subjective  Fe Hadley is a 49 y o  female  Data: Nathanael Hayes discussed her symptoms of sickness and their restrictive impact on her daily functioning  The connection had low quality and had to be established out of epic  It was a shorter session due to sound quality and Pt's symptoms and exhaustion  Person-Centered, DBT-based, and TF-CBT techniques are helpful for Pt's mindset strengthening, her trauma processing, and working on conflicts with past      Assessment: Nathanael Hayes appeared unwilling to talk due to her symptoms but became talkative and expressive  She showed passion in epxressing her beliefs and speaking out for her values  Plan: Nathanael Hayes will continue weekly individual and group therapy to be able to practice her learned Mindfulness skills and relaxation exercises to counter stress and depression  HPI     Past Medical History:   Diagnosis Date   • Bilateral carpal tunnel syndrome 08/20/2019   • Carpal tunnel syndrome of right wrist 10/30/2019    Added automatically from request for surgery [id number]   • COPD (chronic obstructive pulmonary disease) ([facility] [address] )    • Hypertension        Past Surgical History:   Procedure Laterality Date   • BREAST BIOPSY Left 20yrs ago benign   • OOPHORECTOMY Right    • [place] WRIST [place]  LIG Right 1/21/2020    Procedure: RELEASE CARPAL TUNNEL ENDOSCOPIC;  Surgeon: Eugenio Cannon MD;  [facility];  Service: Orthopedics   • [place] WRIST [place]  LIG Left 1/28/2020    Procedure: RELEASE CARPAL TUNNEL ENDOSCOPIC;  Surgeon: Eugenio Cannon MD;  [facility];  Service: Orthopedics       Current Outpatient Medications   Medication Sig Dispense Refill   • levocetirizine (XYZAL) 5 MG tablet take 1 tablet by mouth every evening 30 tablet 4   • albuterol (PROVENTIL HFA,VENTOLIN HFA) 90 mcg/act inhaler inhale 2 puffs by mouth and INTO THE LUNGS every 6 hours IF NEEDED FOR WHEEZING   8 5 g 2   • cholecalciferol (VITAMIN D3) 400 units tablet Take 400 Units by mouth daily     • FLUoxetine (PROzac) 20 mg capsule Take 2 capsules (40 mg total) by mouth daily 30 capsule 1   • fluticasone (FLONASE) 50 mcg/act nasal spray 1 spray into each nostril daily 16 g 2   • gabapentin (NEURONTIN) 100 mg capsule Take 1 capsule (100 mg total) by mouth 3 (three) times a day 90 capsule 1   • lamoTRIgine (LaMICtal) 200 MG tablet Take 1 tablet (200 mg total) by mouth daily 30 tablet 1   • nicotine polacrilex (COMMIT) 4 MG lozenge Apply 1 lozenge (4 mg total) to the mouth or throat as needed for smoking cessation (Patient not taking: No sig reported) 100 each 5   • omeprazole (PriLOSEC) 20 mg delayed release capsule take 1 capsule by mouth twice a day 60 capsule 5   • senna-docusate sodium (SENOKOT-S) 8 6-50 mg per tablet Take 3 tablets by mouth as needed       • tiotropium-olodaterol (Stiolto Respimat) 2 5-2 5 MCG/ACT inhaler Inhale 2 puffs daily 4 g 11     No current facility-administered medications for this visit  No Known Allergies    Review of Systems    Video Exam    There were no vitals filed for this visit       Physical Exam      11/03/22  Start Time: 0215  Stop Time: 0287  Total Visit Time: 24 minutes

## 2022-11-03 NOTE — TELEPHONE ENCOUNTER
Pt called in regards to her appt for today at 2:00 p m  Pt stated that she hasn't received the e-mail for the appt yet  She doesn't have minutes in her phone and if provider need to contact her, please call to phone #[phone]  Meanwhile, pt is still waiting for e-mail

## 2022-11-04 ENCOUNTER — TELEMEDICINE (OUTPATIENT)
Dept: BEHAVIORAL/MENTAL HEALTH CLINIC | Facility: CLINIC | Age: 57
End: 2022-11-04

## 2022-11-04 DIAGNOSIS — F41.1 GENERALIZED ANXIETY DISORDER: ICD-10-CM

## 2022-11-04 DIAGNOSIS — F31.81 MODERATE MIXED BIPOLAR II DISORDER (HCC): Primary | ICD-10-CM

## 2022-11-04 DIAGNOSIS — F43.10 PTSD (POST-TRAUMATIC STRESS DISORDER): ICD-10-CM

## 2022-11-04 NOTE — PSYCH
Virtual Regular Visit    Verification of patient location:    Patient is located in the following state in which I hold an active license [place]      Assessment/Plan:    Problem List Items Addressed This Visit        Other    Moderate mixed bipolar II disorder (HCC) - Primary    Generalized anxiety disorder    PTSD (post-traumatic stress disorder)          Goals addressed in session: Goal 1 and Goal 2          Reason for visit is No chief complaint on file  [facility]    Provider located at [address] [place] [id number] [phone]      Recent Visits  Date Type Provider Dept   11/03/22 [address] [facility], [address] [place] [address]   10/28/22 [address] [facility], [facility] Her[facility]   Showing recent visits within past 7 days and meeting all other requirements  Future Appointments  No visits were found meeting these conditions  Showing future appointments within next 150 days and meeting all other requirements       The patient was identified by name and date of birth  Teddy Mandujano was informed that this is a telemedicine visit and that the visit is being conducted [employer]  She agrees to proceed     My office door was closed  The patient was notified the following individuals were present in the room other group attendees  She acknowledged consent and understanding of privacy and security of the video platform  The patient has agreed to participate and understands they can discontinue the visit at any time  Patient is aware this is a billable service  Subjective  Teddy Mandujano is a 49 y o  female    Data: Juli Paz attended the Relaxation group and was guided through the traditional first part of diaphragmatic breathing with progressive muscle relaxation   Next, attendees were guided through body scan and mindfulness meditation for attending bodily and emotional signals, then connecting to the inner child, followed by affirmations for gratitude, balance, and self-compassion and unconditional support  Psychoeducation, DBT-based, and group approaches were used to support the cohesion and maintain the serene environment for the exercise’s purpose  Assessment: Olivia Thompson followed through guidance and completed the exercises, showed willingness to continue learning and practicing self-care  Plan: Olivia Thompson will practice deep breathing and other techniques based on each occasion to counter stress and will attend the next group to be able to take part in further guided exercises on chosen topics to build on the techniques learned in the beginning  HPI     Past Medical History:   Diagnosis Date   • Bilateral carpal tunnel syndrome 08/20/2019   • Carpal tunnel syndrome of right wrist 10/30/2019    Added automatically from request for surgery [id number]   • COPD (chronic obstructive pulmonary disease) ([facility] [address] )    • Hypertension        Past Surgical History:   Procedure Laterality Date   • BREAST BIOPSY Left 20yrs ago benign   • OOPHORECTOMY Right    • [place] WRIST Jerrald Foster LIG Right 1/21/2020    Procedure: RELEASE CARPAL TUNNEL ENDOSCOPIC;  Surgeon: Blaze Guerrero MD;  [facility];  Service: Orthopedics   • [place] WRIST Jerrald Foster LIG Left 1/28/2020    Procedure: RELEASE CARPAL TUNNEL ENDOSCOPIC;  Surgeon: Blaze Guerrero MD;  [facility];  Service: Orthopedics       Current Outpatient Medications   Medication Sig Dispense Refill   • levocetirizine (XYZAL) 5 MG tablet take 1 tablet by mouth every evening 30 tablet 4   • albuterol (PROVENTIL HFA,VENTOLIN HFA) 90 mcg/act inhaler inhale 2 puffs by mouth and INTO THE LUNGS every 6 hours IF NEEDED FOR WHEEZING   8 5 g 2   • cholecalciferol (VITAMIN D3) 400 units tablet Take 400 Units by mouth daily     • FLUoxetine (PROzac) 20 mg capsule Take 2 capsules (40 mg total) by mouth daily 30 capsule 1   • fluticasone (FLONASE) 50 mcg/act nasal spray 1 spray into each nostril daily 16 g 2   • gabapentin (NEURONTIN) 100 mg capsule Take 1 capsule (100 mg total) by mouth 3 (three) times a day 90 capsule 1   • lamoTRIgine (LaMICtal) 200 MG tablet Take 1 tablet (200 mg total) by mouth daily 30 tablet 1   • nicotine polacrilex (COMMIT) 4 MG lozenge Apply 1 lozenge (4 mg total) to the mouth or throat as needed for smoking cessation (Patient not taking: No sig reported) 100 each 5   • omeprazole (PriLOSEC) 20 mg delayed release capsule take 1 capsule by mouth twice a day 60 capsule 5   • senna-docusate sodium (SENOKOT-S) 8 6-50 mg per tablet Take 3 tablets by mouth as needed       • tiotropium-olodaterol (Stiolto Respimat) 2 5-2 5 MCG/ACT inhaler Inhale 2 puffs daily 4 g 11     No current facility-administered medications for this visit  No Known Allergies    Review of Systems    Video Exam    There were no vitals filed for this visit      Physical Exam     Visit Time    11/04/22  Start Time: 0401  Stop Time: 3240  Total Visit Time: 57 minutes

## 2022-11-08 ENCOUNTER — OFFICE VISIT (OUTPATIENT)
Dept: URGENT CARE | Facility: CLINIC | Age: 57
End: 2022-11-08

## 2022-11-08 ENCOUNTER — APPOINTMENT (OUTPATIENT)
Dept: RADIOLOGY | Facility: CLINIC | Age: 57
End: 2022-11-08

## 2022-11-08 VITALS
SYSTOLIC BLOOD PRESSURE: 146 MMHG | TEMPERATURE: 98 F | WEIGHT: 113 LBS | BODY MASS INDEX: 21.34 KG/M2 | HEART RATE: 90 BPM | OXYGEN SATURATION: 96 % | DIASTOLIC BLOOD PRESSURE: 78 MMHG | RESPIRATION RATE: 18 BRPM | HEIGHT: 61 IN

## 2022-11-08 DIAGNOSIS — J20.9 ACUTE BRONCHITIS, UNSPECIFIED ORGANISM: Primary | ICD-10-CM

## 2022-11-08 DIAGNOSIS — R05.1 ACUTE COUGH: ICD-10-CM

## 2022-11-08 LAB
SARS-COV-2 AG UPPER RESP QL IA: NEGATIVE
VALID CONTROL: NORMAL

## 2022-11-08 RX ORDER — BENZONATATE 200 MG/1
200 CAPSULE ORAL 3 TIMES DAILY PRN
Qty: 20 CAPSULE | Refills: 0 | Status: SHIPPED | OUTPATIENT
Start: 2022-11-08

## 2022-11-08 RX ORDER — AZITHROMYCIN 250 MG/1
TABLET, FILM COATED ORAL
Qty: 6 TABLET | Refills: 0 | Status: SHIPPED | OUTPATIENT
Start: 2022-11-08 | End: 2022-11-12

## 2022-11-08 RX ORDER — PREDNISONE 10 MG/1
TABLET ORAL
Qty: 26 TABLET | Refills: 0 | Status: SHIPPED | OUTPATIENT
Start: 2022-11-08

## 2022-11-08 NOTE — PROGRESS NOTES
[facility]    NAME: Jimmy Arenas is a 49 y o  female  : 1965    MRN: [id number]  DATE: 2022  TIME: 6:33 PM    Assessment and Plan   Acute bronchitis, unspecified organism [J20 9]  1  Acute bronchitis, unspecified organism  azithromycin (ZITHROMAX) 250 mg tablet    predniSONE 10 mg tablet    benzonatate (TESSALON) 200 MG capsule   2  Acute cough  XR chest pa & lateral    Poct Covid 19 Rapid Antigen Test    COVID Only -Office Collect       Patient Instructions     Patient Instructions   I have prescribed an antibiotic for the infection  Please take the antibiotic as prescribed and finish the entire prescription  I recommend that the patient takes an over the counter probiotic or eats yogurt with live cultures in it [place]) to keep good bacteria in the gut and help prevent diarrhea  Wash hands frequently to prevent the spread of infection  Can use over the counter cough and cold medications to help with symptoms  Ibuprofen and/or tylenol as needed for pain or fever  If not improving over the next 7-10 days, follow up with PCP  Chief Complaint     Chief Complaint   Patient presents with   • Sinusitis     Congestion which she thinks is going to her chest  Also thinks she has a fever  Started 2 days ago       History of Present Illness   49year old female with a history of emphysema who presents with productive cough, nasal congestion, SOB, and wheezing x 3 days  She has been coughing up a "yellow" mucus and reports the same drainage is coming from her nose  Her cough is worse at night resulting in difficulty sleeping and fatigue throughout the day  She has been taking nyquil and dayquil but has not been having any relief   night she had a low grade fever of 100 2°F, her temp has been in the 99's since then  She notices her SOB when she is up and moving, but reports that she can always hear the wheezing when she breathes   She has had to use her rescue inhaler 4 times a day since Sunday  Normally, she uses her rescue inhaler once every other day if that  She reports an associated pounding headache across her forehead that she has been managing with ibuprofen  She denies any chest pain but says that she has a burning sensation in her chest when she coughs  She has had a lack of appetite and feelings of nausea but has not thrown up  No sick contacts that she is aware of  Review of Systems   Review of Systems   Constitutional: Positive for appetite change and fever  Negative for chills  HENT: Positive for congestion and postnasal drip  Negative for ear discharge, ear pain, facial swelling, sinus pressure, sneezing and sore throat  Respiratory: Positive for cough and shortness of breath  Negative for wheezing  Cardiovascular: Negative for chest pain  Gastrointestinal: Positive for nausea  Negative for abdominal pain and vomiting  Musculoskeletal: Negative for myalgias  Neurological: Positive for headaches  Negative for light-headedness         Current Medications     Current Outpatient Medications:   •  albuterol (PROVENTIL HFA,VENTOLIN HFA) 90 mcg/act inhaler, inhale 2 puffs by mouth and INTO THE LUNGS every 6 hours IF NEEDED FOR WHEEZING , Disp: 8 5 g, Rfl: 2  •  azithromycin (ZITHROMAX) 250 mg tablet, Take 2 tablets today then 1 tablet daily x 4 days, Disp: 6 tablet, Rfl: 0  •  benzonatate (TESSALON) 200 MG capsule, Take 1 capsule (200 mg total) by mouth 3 (three) times a day as needed for cough, Disp: 20 capsule, Rfl: 0  •  cholecalciferol (VITAMIN D3) 400 units tablet, Take 400 Units by mouth daily, Disp: , Rfl:   •  fluticasone (FLONASE) 50 mcg/act nasal spray, 1 spray into each nostril daily, Disp: 16 g, Rfl: 2  •  gabapentin (NEURONTIN) 100 mg capsule, Take 1 capsule (100 mg total) by mouth 3 (three) times a day, Disp: 90 capsule, Rfl: 1  •  levocetirizine (XYZAL) 5 MG tablet, take 1 tablet by mouth every evening, Disp: 30 tablet, Rfl: 4  •  omeprazole (PriLOSEC) 20 mg delayed release capsule, take 1 capsule by mouth twice a day, Disp: 60 capsule, Rfl: 5  •  predniSONE 10 mg tablet, Take 3 tabs BID X 2 days, 2 tabs BID X 2 days, 1 tab BID X 2 days, 1 tab daily X 2 days, Disp: 26 tablet, Rfl: 0  •  senna-docusate sodium (SENOKOT-S) 8 6-50 mg per tablet, Take 3 tablets by mouth as needed  , Disp: , Rfl:   •  tiotropium-olodaterol (Stiolto Respimat) 2 5-2 5 MCG/ACT inhaler, Inhale 2 puffs daily, Disp: 4 g, Rfl: 11  •  FLUoxetine (PROzac) 20 mg capsule, Take 2 capsules (40 mg total) by mouth daily, Disp: 30 capsule, Rfl: 1  •  lamoTRIgine (LaMICtal) 200 MG tablet, Take 1 tablet (200 mg total) by mouth daily, Disp: 30 tablet, Rfl: 1  •  nicotine polacrilex (COMMIT) 4 MG lozenge, Apply 1 lozenge (4 mg total) to the mouth or throat as needed for smoking cessation (Patient not taking: No sig reported), Disp: 100 each, Rfl: 5    Current Allergies     Allergies as of 11/08/2022   • (No Known Allergies)          The following portions of the patient's history were reviewed and updated as appropriate: allergies, current medications, past family history, past medical history, past social history, past surgical history and problem list    Past Medical History:   Diagnosis Date   • Bilateral carpal tunnel syndrome 08/20/2019   • Carpal tunnel syndrome of right wrist 10/30/2019    Added automatically from request for surgery [id number]   • COPD (chronic obstructive pulmonary disease) ([facility] [address] )    • Hypertension      Past Surgical History:   Procedure Laterality Date   • BREAST BIOPSY Left 20yrs ago benign   • OOPHORECTOMY Right    • [place] WRIST ARTHROSCOP,RELEASE Tenna Joel LIG Right 1/21/2020    Procedure: RELEASE CARPAL TUNNEL ENDOSCOPIC;  Surgeon: Dash Ramirez MD;  [facility];  Service: Orthopedics   • [place] WRIST Ebbie Aw LIG Left 1/28/2020    Procedure: RELEASE CARPAL TUNNEL ENDOSCOPIC;  Surgeon: Dash Ramirez MD;  [facility];  Service: Orthopedics     Family History   Problem Relation Age of Onset   • Alcohol abuse Mother    • Eating disorder Mother         anorexia and bulimia per Pt   • Hodgkin's lymphoma Father    • No Known Problems Sister    • Colon cancer Maternal Grandmother    • Alcohol abuse Maternal Grandfather    • Colon cancer Paternal Grandmother    • Anxiety disorder Paternal Aunt    • Multiple sclerosis Cousin    • No Known Problems Daughter    • Breast cancer Maternal Aunt    • No Known Problems Maternal Aunt    • No Known Problems Maternal Aunt    • No Known Problems Paternal Aunt      Social History     Socioeconomic History   • Marital status:      Spouse name: Not on file   • Number of children: 2   • Years of education: Not on file   • Highest education level: Not on file   Occupational History   • 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     Comment: from 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, then again in 2017 but had to leave both times due to emotional reasons   Tobacco Use   • Smoking status: Current Every Day Smoker     Packs/day: 0 50     Years: 42 00     Pack years: 21 00     Types: Cigarettes     Start date: 1979   • Smokeless tobacco: Never Used   Vaping Use   • Vaping Use: Never used   Substance and Sexual Activity   • Alcohol use: Not Currently     Comment: H/O ETOH abuse in approx 2010--for a period of 2 years  She quit on her own without any h/o withdrawal or rehab  • Drug use: Yes     Types: Marijuana     Comment: Smokes THC several times a day  Also uses CBD OIL at times  Used cocaine and methamphetamine  fairly steadily for a period of approx 1 year approx 36 years ago  Tried LSD, Mushrooms  No h/o rehab   • Sexual activity: Yes     Partners: Male   Other Topics Concern   • Not on file   Social History Narrative    Home: lives with 2 best friends  Education:    Pt states she always got Ds and Fs in school and skipped classes often, but there were no formally diagnosed learning disabilities    Pt was in alternative schooling  She learned "Nothing" there and they didn't teach much  Pt was then placed back in regular classes but could not keep up  Pt reached childhood milestones on time as far as she knows  Highest grade completed 11th grade    Had some training through [employer] but no actual certificate     Social Determinants of Health     Financial Resource Strain: Not on file   Food Insecurity: Not on file   Transportation Needs: Not on file   Physical Activity: Not on file   Stress: Not on file   Social Connections: Not on file   Intimate Partner Violence: Not on file   Housing Stability: Not on file     Medications have been verified  Objective   /78   Pulse 90   Temp 98 °F (36 7 °C)   Resp 18   Ht 5' 1" (1 549 m)   Wt 51 3 kg (113 lb)   LMP  (LMP Unknown)   SpO2 96%   BMI 21 35 kg/m²      Physical Exam   Physical Exam  Constitutional:       General: She is not in acute distress  Appearance: Normal appearance  She is not toxic-appearing  HENT:      Head: Normocephalic and atraumatic  Right Ear: Tympanic membrane and ear canal normal       Left Ear: Tympanic membrane and ear canal normal       Nose:      Right Turbinates: Enlarged  Left Turbinates: Enlarged  Right Sinus: No maxillary sinus tenderness or frontal sinus tenderness  Left Sinus: No maxillary sinus tenderness or frontal sinus tenderness  Mouth/Throat:      Mouth: Mucous membranes are moist       Pharynx: Posterior oropharyngeal erythema present  Cardiovascular:      Rate and Rhythm: Normal rate and regular rhythm  Heart sounds: No murmur heard  No friction rub  No gallop  Pulmonary:      Effort: Prolonged expiration present  Breath sounds: Wheezing and rhonchi present  Comments: Wheezing heard bilaterally on inspiration and expiration  Rhonchi heard bilaterally on end-expiration  Lymphadenopathy:      Cervical: No cervical adenopathy  Neurological:      Mental Status: She is alert

## 2022-11-09 LAB — SARS-COV-2 RNA RESP QL NAA+PROBE: NEGATIVE

## 2022-11-17 ENCOUNTER — TELEMEDICINE (OUTPATIENT)
Dept: BEHAVIORAL/MENTAL HEALTH CLINIC | Facility: CLINIC | Age: 57
End: 2022-11-17

## 2022-11-17 DIAGNOSIS — F10.11 ALCOHOL ABUSE, IN REMISSION: ICD-10-CM

## 2022-11-17 DIAGNOSIS — F41.1 GENERALIZED ANXIETY DISORDER: ICD-10-CM

## 2022-11-17 DIAGNOSIS — F17.210 CIGARETTE NICOTINE DEPENDENCE WITHOUT COMPLICATION: ICD-10-CM

## 2022-11-17 DIAGNOSIS — F43.10 PTSD (POST-TRAUMATIC STRESS DISORDER): ICD-10-CM

## 2022-11-17 DIAGNOSIS — F12.90 MARIJUANA USE: Chronic | ICD-10-CM

## 2022-11-17 DIAGNOSIS — F31.81 MODERATE MIXED BIPOLAR II DISORDER (HCC): Primary | ICD-10-CM

## 2022-11-17 NOTE — PSYCH
Virtual Regular Visit    Verification of patient location:    Patient is located in the following state in which I hold an active license [place]      Assessment/Plan:    Problem List Items Addressed This Visit        Other    Marijuana use (Chronic)    Moderate mixed bipolar II disorder (HCC) - Primary    Generalized anxiety disorder    PTSD (post-traumatic stress disorder)    Alcohol abuse, in remission    Cigarette nicotine dependence without complication       Goals addressed in session: Goal 1 and Goal 2          Reason for visit is No chief complaint on file  Encounter provider FARHAN [facility]    Provider located at [address] [place] [id number] [phone]      Recent Visits  No visits were found meeting these conditions  Showing recent visits within past 7 days and meeting all other requirements  Future Appointments  No visits were found meeting these conditions  Showing future appointments within next 150 days and meeting all other requirements       The patient was identified by name and date of birth  Armando Kwok was informed that this is a telemedicine visit and that the visit is being conducted throughthe [employer] platform  She agrees to proceed     My office door was closed  No one else was in the room  She acknowledged consent and understanding of privacy and security of the video platform  The patient has agreed to participate and understands they can discontinue the visit at any time  Patient is aware this is a billable service  Subjective  Armando Kwok is a 56 y o  female    Data: Pippa Dykes discussed her [address], that her goals are still to work on countering depression and anxiety on a daily basis due to her trauma  Person-Centered, DBT-based, and TF-CBT are used to work on [employer] for a different nervous system response to painful memories and thoughts       Assessment: Sonia Gutierrez seemed as usual emotional, talkative, and engaged  She showed openness and honesty about her past and her struggle with intense emotions as well as willingness to work on that  Plan: Sonia Gutierrez will continue attending weekly individual therapy and weekly Relaxation Group and will work on her trauma processing with Mindfulness and nervous system different response s      HPI     Past Medical History:   Diagnosis Date   • Bilateral carpal tunnel syndrome 08/20/2019   • Carpal tunnel syndrome of right wrist 10/30/2019    Added automatically from request for surgery [id number]   • COPD (chronic obstructive pulmonary disease) ([facility] [address] )    • Hypertension        Past Surgical History:   Procedure Laterality Date   • BREAST BIOPSY Left 20yrs ago benign   • OOPHORECTOMY Right    • [place] WRIST Kathy Endow LIG Right 1/21/2020    Procedure: RELEASE CARPAL TUNNEL ENDOSCOPIC;  Surgeon: Navin Mckeon MD;  [facility];  Service: Orthopedics   • [place] WRIST Kathy Endow LIG Left 1/28/2020    Procedure: RELEASE CARPAL TUNNEL ENDOSCOPIC;  Surgeon: Navin Mckeon MD;  [facility];  Service: Orthopedics       Current Outpatient Medications   Medication Sig Dispense Refill   • albuterol (PROVENTIL HFA,VENTOLIN HFA) 90 mcg/act inhaler inhale 2 puffs by mouth and INTO THE LUNGS every 6 hours IF NEEDED FOR WHEEZING   8 5 g 2   • benzonatate (TESSALON) 200 MG capsule Take 1 capsule (200 mg total) by mouth 3 (three) times a day as needed for cough 20 capsule 0   • cholecalciferol (VITAMIN D3) 400 units tablet Take 400 Units by mouth daily     • FLUoxetine (PROzac) 20 mg capsule Take 2 capsules (40 mg total) by mouth daily 30 capsule 1   • fluticasone (FLONASE) 50 mcg/act nasal spray 1 spray into each nostril daily 16 g 2   • gabapentin (NEURONTIN) 100 mg capsule Take 1 capsule (100 mg total) by mouth 3 (three) times a day 90 capsule 1   • lamoTRIgine (LaMICtal) 200 MG tablet Take 1 tablet (200 mg total) by mouth daily 30 tablet 1   • levocetirizine (XYZAL) 5 MG tablet take 1 tablet by mouth every evening 30 tablet 4   • nicotine polacrilex (COMMIT) 4 MG lozenge Apply 1 lozenge (4 mg total) to the mouth or throat as needed for smoking cessation (Patient not taking: No sig reported) 100 each 5   • omeprazole (PriLOSEC) 20 mg delayed release capsule take 1 capsule by mouth twice a day 60 capsule 5   • predniSONE 10 mg tablet Take 3 tabs BID X 2 days, 2 tabs BID X 2 days, 1 tab BID X 2 days, 1 tab daily X 2 days 26 tablet 0   • senna-docusate sodium (SENOKOT-S) 8 6-50 mg per tablet Take 3 tablets by mouth as needed       • tiotropium-olodaterol (Stiolto Respimat) 2 5-2 5 MCG/ACT inhaler Inhale 2 puffs daily 4 g 11     No current facility-administered medications for this visit  No Known Allergies    Review of Systems    Video Exam    There were no vitals filed for this visit      Physical Exam     11/17/22  Start Time: 0155  Stop Time: 0993  Total Visit Time: 40 minutes

## 2022-11-17 NOTE — BH TREATMENT PLAN
Jie Jv  1965     Date of Initial Treatment Plan: 3/23/22   Date of Current Treatment Plan: 11/17/22     Treatment Plan Number 3      Strengths/Personal Resources for Self Care: Gilma Ambriz is willing to continue working on reducing anxiety and depression, as results from her traumatic past and current family estrangement        Diagnosis:   1  Moderate mixed bipolar II disorder ([address] )      2  Generalized anxiety disorder      3  PTSD (post-traumatic stress disorder)      4  Alcohol abuse, in remission      5  Cigarette nicotine dependence without complication            Area of Needs: Mood, trauma processing         Long Term Goal 1: A decrease depression- I want to feel like I can get up in the morning and things are OK    Target Date: MAY 18, 2023   Completion Date: TBD         Short Term Objectives for Goal 1: A learn persistence to apply coping skills from CBT and DBT and Act Opposite at least once daily to counter depression      Long Term Goal 2: decrease anxiety- I want to feel less stressed      Target Date: MAY 18, 2023  Completion Date: TBD     Short Term Objectives for Goal 2: A join the Relaxation Group every Friday and finish the week in a good mind state to counter stress         GOAL 1: Modality: Individual 4x per month  Completion Date TBD with current medication management, responsible for which is Gilma Ambriz, Psychiatrist, and Therapist       GOAL 2: Modality: Group 4x per month  Completion Date TBD with current medication management, responsible for which is Gilma Ambriz, Psychiatrist, and Therapist    Antoine White relies understanding to this plan and its goals and is committed to continue being dedicated to er process  Jie Carias, 1965, actively participated in the review and update of this treatment plan during a virtual session, using the [employer] platform     Jie Carias  provided verbal consent on 11/17/2022 at 2:35 PM  The treatment plan was transcribed into the [employer] [facility] at a later time

## 2022-11-18 ENCOUNTER — TELEMEDICINE (OUTPATIENT)
Dept: BEHAVIORAL/MENTAL HEALTH CLINIC | Facility: CLINIC | Age: 57
End: 2022-11-18

## 2022-11-18 DIAGNOSIS — F31.81 MODERATE MIXED BIPOLAR II DISORDER (HCC): Primary | ICD-10-CM

## 2022-11-18 DIAGNOSIS — F41.1 GENERALIZED ANXIETY DISORDER: ICD-10-CM

## 2022-11-18 DIAGNOSIS — F43.10 PTSD (POST-TRAUMATIC STRESS DISORDER): ICD-10-CM

## 2022-11-18 NOTE — PSYCH
Virtual Regular Visit    Verification of patient location:    Patient is located in the following state in which I hold an active license [place]      Assessment/Plan:    Problem List Items Addressed This Visit        Other    Moderate mixed bipolar II disorder (HCC) - Primary    Generalized anxiety disorder    PTSD (post-traumatic stress disorder)       Goals addressed in session: Goal 1 and Goal 2          Reason for visit is No chief complaint on file  Encounter provider FARHAN Arce [facility]    Provider located at [address] [place] [id number] [phone]      Recent Visits  Date Type Provider Dept   11/17/22 229 [facility] The[facility]ist Heriberto   Showing recent visits within past 7 days and meeting all other requirements  Future Appointments  No visits were found meeting these conditions  Showing future appointments within next 150 days and meeting all other requirements       The patient was identified by name and date of birth  Smitha Willis was informed that this is a telemedicine visit and that the visit is being conducted [employer]  She agrees to proceed     My office door was closed  The patient was notified the following individuals were present in the room other group members  She acknowledged consent and understanding of privacy and security of the video platform  The patient has agreed to participate and understands they can discontinue the visit at any time  Patient is aware this is a billable service  Subjective  Smitha Willis is a 56 y o  female          HPI     Past Medical History:   Diagnosis Date   • Bilateral carpal tunnel syndrome 08/20/2019   • Carpal tunnel syndrome of right wrist 10/30/2019    Added automatically from request for surgery [id number]   • COPD (chronic obstructive pulmonary disease) (HCC)    • Hypertension Past Surgical History:   Procedure Laterality Date   • BREAST BIOPSY Left 20yrs ago benign   • OOPHORECTOMY Right    • ID WRIST Hershall Charla LIG Right 1/21/2020    Procedure: RELEASE CARPAL TUNNEL ENDOSCOPIC;  Surgeon: Jearldine Goodell, MD;  [facility];  Service: Orthopedics   • ID WRIST Hershall Charla LIG Left 1/28/2020    Procedure: RELEASE CARPAL TUNNEL ENDOSCOPIC;  Surgeon: Jearldine Goodell, MD;  [facility];  Service: Orthopedics       Current Outpatient Medications   Medication Sig Dispense Refill   • albuterol (PROVENTIL HFA,VENTOLIN HFA) 90 mcg/act inhaler inhale 2 puffs by mouth and INTO THE LUNGS every 6 hours IF NEEDED FOR WHEEZING   8 5 g 2   • benzonatate (TESSALON) 200 MG capsule Take 1 capsule (200 mg total) by mouth 3 (three) times a day as needed for cough 20 capsule 0   • cholecalciferol (VITAMIN D3) 400 units tablet Take 400 Units by mouth daily     • FLUoxetine (PROzac) 20 mg capsule Take 2 capsules (40 mg total) by mouth daily 30 capsule 1   • fluticasone (FLONASE) 50 mcg/act nasal spray 1 spray into each nostril daily 16 g 2   • gabapentin (NEURONTIN) 100 mg capsule Take 1 capsule (100 mg total) by mouth 3 (three) times a day 90 capsule 1   • lamoTRIgine (LaMICtal) 200 MG tablet Take 1 tablet (200 mg total) by mouth daily 30 tablet 1   • levocetirizine (XYZAL) 5 MG tablet take 1 tablet by mouth every evening 30 tablet 4   • nicotine polacrilex (COMMIT) 4 MG lozenge Apply 1 lozenge (4 mg total) to the mouth or throat as needed for smoking cessation (Patient not taking: No sig reported) 100 each 5   • omeprazole (PriLOSEC) 20 mg delayed release capsule take 1 capsule by mouth twice a day 60 capsule 5   • predniSONE 10 mg tablet Take 3 tabs BID X 2 days, 2 tabs BID X 2 days, 1 tab BID X 2 days, 1 tab daily X 2 days 26 tablet 0   • senna-docusate sodium (SENOKOT-S) 8 6-50 mg per tablet Take 3 tablets by mouth as needed       • tiotropium-olodaterol (Stiolto Respimat) 2 5-2 5 MCG/ACT inhaler Inhale 2 puffs daily 4 g 11     No current facility-administered medications for this visit  No Known Allergies    Review of Systems    Video Exam    There were no vitals filed for this visit      Physical Exam       11/18/22  Start Time: 0405  Stop Time: 0500  Total Visit Time: 55 minutes

## 2022-11-22 ENCOUNTER — TELEMEDICINE (OUTPATIENT)
Dept: BEHAVIORAL/MENTAL HEALTH CLINIC | Facility: CLINIC | Age: 57
End: 2022-11-22

## 2022-11-22 DIAGNOSIS — F43.10 PTSD (POST-TRAUMATIC STRESS DISORDER): ICD-10-CM

## 2022-11-22 DIAGNOSIS — F31.81 MODERATE MIXED BIPOLAR II DISORDER (HCC): Primary | ICD-10-CM

## 2022-11-22 DIAGNOSIS — F17.210 CIGARETTE NICOTINE DEPENDENCE WITHOUT COMPLICATION: ICD-10-CM

## 2022-11-22 DIAGNOSIS — F41.1 GENERALIZED ANXIETY DISORDER: ICD-10-CM

## 2022-11-22 DIAGNOSIS — F10.11 ALCOHOL ABUSE, IN REMISSION: ICD-10-CM

## 2022-11-22 RX ORDER — FLUOXETINE HYDROCHLORIDE 20 MG/1
CAPSULE ORAL
Qty: 30 CAPSULE | Refills: 1 | Status: SHIPPED | OUTPATIENT
Start: 2022-11-22

## 2022-11-22 NOTE — PSYCH
Virtual Regular Visit    Verification of patient location:    Patient is located in the following state in which I hold an active license [place]      Assessment/Plan:    Problem List Items Addressed This Visit        Other    Moderate mixed bipolar II disorder (HCC) - Primary    Generalized anxiety disorder    PTSD (post-traumatic stress disorder)    Alcohol abuse, in remission    Cigarette nicotine dependence without complication       Goals addressed in session: Goal 1 and Goal 2          Reason for visit is No chief complaint on file  Encounter provider Britt Pereyra, [address] [facility]    Provider located at [address] [place] [id number] [phone]      Recent Visits  Date Type Provider Dept   11/18/22 229 [facility], [address] [place] [address]   11/17/22 229 [facility], [address] [facility] Therapist Heriberto   Showing recent visits within past 7 days and meeting all other requirements  Today's Visits  Date Type Provider Dept   11/22/22 229 [facility], [address] [place] [address]   Showing today's visits and meeting all other requirements  Future Appointments  No visits were found meeting these conditions  Showing future appointments within next 150 days and meeting all other requirements       The patient was identified by name and date of birth  Alexandra Mcghee was informed that this is a telemedicine visit and [facility] the [facility]  She agrees to proceed     My office door was closed  No one else was in the room  She acknowledged consent and understanding of privacy and security of the video platform  The patient has agreed to participate and understands they can discontinue the visit at any time  Patient is aware this is a billable service       Subjective  Alexandra Mcghee is a 56 y o  female    Data: Riley Esqueda discussed still being sick, with congestion, low-level of temperature, and not in a good condition  She shared that she loves raking and show-shoveling, window cleaning, and other activities that most people dislike and shy away from  She was encouraged to focus on these qualities and expand on them  Assessment: Riley Esqueda appeared energetic and talkative despite her condition  She showed great deal of positivity towards unusually pleasant for her activities and looked happier while talking about them  Plan: Riley Esqueda will join the Relaxation Group on Fridays to practice relaxation and mindfulness techniques  She will use Mindfulness to be able to handle emotions better  HPI     Past Medical History:   Diagnosis Date   • Bilateral carpal tunnel syndrome 08/20/2019   • Carpal tunnel syndrome of right wrist 10/30/2019    Added automatically from request for surgery [id number]   • COPD (chronic obstructive pulmonary disease) ([facility] [address] )    • Hypertension        Past Surgical History:   Procedure Laterality Date   • BREAST BIOPSY Left 20yrs ago benign   • OOPHORECTOMY Right    • [place] WRIST Ashia Gao LIG Right 1/21/2020    Procedure: RELEASE CARPAL TUNNEL ENDOSCOPIC;  Surgeon: Padmaja Daniels MD;  [facility];  Service: Orthopedics   • [place] WRIST Ashia Gao LIG Left 1/28/2020    Procedure: RELEASE CARPAL TUNNEL ENDOSCOPIC;  Surgeon: Padmaja Daniels MD;  [facility];  Service: Orthopedics       Current Outpatient Medications   Medication Sig Dispense Refill   • albuterol (PROVENTIL HFA,VENTOLIN HFA) 90 mcg/act inhaler inhale 2 puffs by mouth and INTO THE LUNGS every 6 hours IF NEEDED FOR WHEEZING   8 5 g 2   • benzonatate (TESSALON) 200 MG capsule Take 1 capsule (200 mg total) by mouth 3 (three) times a day as needed for cough 20 capsule 0   • cholecalciferol (VITAMIN D3) 400 units tablet Take 400 Units by mouth daily     • FLUoxetine (PROzac) 20 mg capsule Take 2 capsules (40 mg total) by mouth daily 30 capsule 1   • fluticasone (FLONASE) 50 mcg/act nasal spray 1 spray into each nostril daily 16 g 2   • gabapentin (NEURONTIN) 100 mg capsule Take 1 capsule (100 mg total) by mouth 3 (three) times a day 90 capsule 1   • lamoTRIgine (LaMICtal) 200 MG tablet Take 1 tablet (200 mg total) by mouth daily 30 tablet 1   • levocetirizine (XYZAL) 5 MG tablet take 1 tablet by mouth every evening 30 tablet 4   • nicotine polacrilex (COMMIT) 4 MG lozenge Apply 1 lozenge (4 mg total) to the mouth or throat as needed for smoking cessation (Patient not taking: No sig reported) 100 each 5   • omeprazole (PriLOSEC) 20 mg delayed release capsule take 1 capsule by mouth twice a day 60 capsule 5   • predniSONE 10 mg tablet Take 3 tabs BID X 2 days, 2 tabs BID X 2 days, 1 tab BID X 2 days, 1 tab daily X 2 days 26 tablet 0   • senna-docusate sodium (SENOKOT-S) 8 6-50 mg per tablet Take 3 tablets by mouth as needed       • tiotropium-olodaterol (Stiolto Respimat) 2 5-2 5 MCG/ACT inhaler Inhale 2 puffs daily 4 g 11     No current facility-administered medications for this visit  No Known Allergies    Review of Systems    Video Exam    There were no vitals filed for this visit      Physical Exam     11/22/22  Start Time: 1758  Stop Time: 5807  Total Visit Time: 41 minutes

## 2022-11-25 ENCOUNTER — TELEMEDICINE (OUTPATIENT)
Dept: BEHAVIORAL/MENTAL HEALTH CLINIC | Facility: CLINIC | Age: 57
End: 2022-11-25

## 2022-11-25 DIAGNOSIS — F43.10 PTSD (POST-TRAUMATIC STRESS DISORDER): ICD-10-CM

## 2022-11-25 DIAGNOSIS — F31.81 MODERATE MIXED BIPOLAR II DISORDER (HCC): Primary | ICD-10-CM

## 2022-11-25 NOTE — PSYCH
Virtual Regular Visit    Verification of patient location:    Patient is located in the following state in which [place] an [place]      Assessment/Plan:    Problem List Items Addressed This Visit        Other    Moderate mixed bipolar II disorder ([address] ) - Primary    PTSD (post-traumatic stress disorder)       Goals addressed in session: Goal 1          Reason for visit is No chief complaint on file  [facility]    Provider located at [address] [place] [id number] [phone]      Recent Visits  Date Type Provider Dept   11/22/22 229 [facility], [address] [place] [address]   11/18/22 229 [facility], [facility] [address]   Showing recent visits within past 7 days and meeting all other requirements  Future Appointments  No visits were found meeting these conditions  Showing future appointments within next 150 days and meeting all other requirements       The patient was identified by name and date of birth  Tawny Ivory was informed that this is a telemedicine visit and that the visit is being conducted [employer]  She agrees to proceed     Other methods to assure confidentiality were taken informing about other group participants  The patient was notified the following individuals were present in the room as rosa noticed  She acknowledged consent and understanding of privacy and security of the video platform  The patient has agreed to participate and understands they can discontinue the visit at any time  Patient is aware this is a billable service  Subjective  Tawny Ivory is a 56 y o  female        Data: David Reina attended the Relaxation group and was guided through the traditional first part of diaphragmatic breathing (this time combined with humming in the [place] with progressive muscle relaxation  Next, attendees were guided through body scan and mindfulness meditation for confidence, self-forgiveness, and inner child connection, followed by affirmations with gratitude and against anxiety and depression  Psychoeducation, DBT-based, and group approaches were used to support the cohesion and maintain the serene environment for the exercise’s purpose  Assessment: Shelia Johnson followed through guidance and completed the exercises, showed willingness to continue learning and practicing self-care  Plan: Shelia Johsnon will practice deep breathing and other techniques based on each occasion to counter stress and will attend the next group to be able to take part in further guided exercises on chosen topics to build on the techniques learned in the beginning  HPI     Past Medical History:   Diagnosis Date   • Bilateral carpal tunnel syndrome 08/20/2019   • Carpal tunnel syndrome of right wrist 10/30/2019    Added automatically from request for surgery [id number]   • COPD (chronic obstructive pulmonary disease) ([facility] [address] )    • Hypertension        Past Surgical History:   Procedure Laterality Date   • BREAST BIOPSY Left 20yrs ago benign   • OOPHORECTOMY Right    • [place] WRIST Paulene Ramón LIG Right 1/21/2020    Procedure: RELEASE CARPAL TUNNEL ENDOSCOPIC;  Surgeon: Compa Salinas MD;  [facility];  Service: Orthopedics   • [place] WRIST Paulene Ramón LIG Left 1/28/2020    Procedure: RELEASE CARPAL TUNNEL ENDOSCOPIC;  Surgeon: Compa Salinas MD;  [facility];  Service: Orthopedics       Current Outpatient Medications   Medication Sig Dispense Refill   • albuterol (PROVENTIL HFA,VENTOLIN HFA) 90 mcg/act inhaler inhale 2 puffs by mouth and INTO THE LUNGS every 6 hours IF NEEDED FOR WHEEZING   8 5 g 2   • benzonatate (TESSALON) 200 MG capsule Take 1 capsule (200 mg total) by mouth 3 (three) times a day as needed for cough 20 capsule 0   • cholecalciferol (VITAMIN D3) 400 units tablet Take 400 Units by mouth daily     • FLUoxetine (PROzac) 20 mg capsule take 2 capsules by mouth once daily 30 capsule 1   • fluticasone (FLONASE) 50 mcg/act nasal spray 1 spray into each nostril daily 16 g 2   • gabapentin (NEURONTIN) 100 mg capsule Take 1 capsule (100 mg total) by mouth 3 (three) times a day 90 capsule 1   • lamoTRIgine (LaMICtal) 200 MG tablet Take 1 tablet (200 mg total) by mouth daily 30 tablet 1   • levocetirizine (XYZAL) 5 MG tablet take 1 tablet by mouth every evening 30 tablet 4   • nicotine polacrilex (COMMIT) 4 MG lozenge Apply 1 lozenge (4 mg total) to the mouth or throat as needed for smoking cessation (Patient not taking: No sig reported) 100 each 5   • omeprazole (PriLOSEC) 20 mg delayed release capsule take 1 capsule by mouth twice a day 60 capsule 5   • predniSONE 10 mg tablet Take 3 tabs BID X 2 days, 2 tabs BID X 2 days, 1 tab BID X 2 days, 1 tab daily X 2 days 26 tablet 0   • senna-docusate sodium (SENOKOT-S) 8 6-50 mg per tablet Take 3 tablets by mouth as needed       • tiotropium-olodaterol (Stiolto Respimat) 2 5-2 5 MCG/ACT inhaler Inhale 2 puffs daily 4 g 11     No current facility-administered medications for this visit  No Known Allergies    Review of Systems    Video Exam    There were no vitals filed for this visit      Physical Exam       11/25/22  Start Time: 0400  Stop Time: 1304  Total Visit Time: 56 minutes

## 2022-11-26 DIAGNOSIS — R06.2 WHEEZING: ICD-10-CM

## 2022-11-29 RX ORDER — ALBUTEROL SULFATE 90 UG/1
AEROSOL, METERED RESPIRATORY (INHALATION)
Qty: 8.5 G | Refills: 2 | Status: SHIPPED | OUTPATIENT
Start: 2022-11-29

## 2022-11-30 ENCOUNTER — TELEMEDICINE (OUTPATIENT)
Dept: BEHAVIORAL/MENTAL HEALTH CLINIC | Facility: CLINIC | Age: 57
End: 2022-11-30

## 2022-11-30 DIAGNOSIS — F12.90 MARIJUANA USE: Chronic | ICD-10-CM

## 2022-11-30 DIAGNOSIS — F41.1 GENERALIZED ANXIETY DISORDER: ICD-10-CM

## 2022-11-30 DIAGNOSIS — F31.81 MODERATE MIXED BIPOLAR II DISORDER (HCC): Primary | ICD-10-CM

## 2022-11-30 DIAGNOSIS — F43.10 PTSD (POST-TRAUMATIC STRESS DISORDER): ICD-10-CM

## 2022-11-30 DIAGNOSIS — F17.210 CIGARETTE NICOTINE DEPENDENCE WITHOUT COMPLICATION: ICD-10-CM

## 2022-11-30 NOTE — PSYCH
Virtual Regular Visit    Verification of patient location:    Patient is located in the following state in which I hold an [place] license [place]      Assessment/Plan:    Problem List Items Addressed This Visit        Other    Marijuana use (Chronic)    Moderate mixed bipolar II disorder (HCC) - Primary    Generalized anxiety disorder    PTSD (post-traumatic stress disorder)    Cigarette nicotine dependence without complication       Goals addressed in session: Goal 1 and Goal 2          Reason for visit is No chief complaint on file  Encounter provider Deon Alvarez [facility]    Provider located at [address] [place] [id number] [phone]      Recent Visits  Date Type Provider Dept   11/25/22 229 [facility], [address] [facility] Therapist Heriberto   Showing recent visits within past 7 days and meeting all other requirements  Future Appointments  No visits were found meeting these conditions  Showing future appointments within next 150 days and meeting all other requirements       The patient was identified by name and date of birth  Jose New was informed that this is a telemedicine visit and that [facility]  She agrees to proceed     My office door was closed  No one else was in the room  She acknowledged consent and understanding of privacy and security of the video platform  The patient has agreed to participate and understands they can discontinue the visit at any time  Patient is aware this is a billable service  Subjective  Jose New is a 56 y o  female  Data: Wally Guerrero discussed and addressed her frustration about the SS monthly procedure that she needs to go through again that she reports as discouraging  She was allowed to express herself and talk about other options or outcome   Person-Centered, Mindfulness, and TF-CBT methods were combined to help Pt share her expectations and manifest her statements in a meaningful way  The connection got interrupted and session was shorter  Assessment: Luigi Spencer was very talkative, expressive, open, and friendly  She was able to share her feelings and concerns and identify ways to be more neutral and not allow them affect her  Plan: Luigi Spencer will continue weekly individual and weekly Relaxation Group therapy to be able to regulate emotions and counters stress in effective ways  HPI     Past Medical History:   Diagnosis Date   • Bilateral carpal tunnel syndrome 08/20/2019   • Carpal tunnel syndrome of right wrist 10/30/2019    Added automatically from request for surgery [id number]   • COPD (chronic obstructive pulmonary disease) ([facility] [address] )    • Hypertension        Past Surgical History:   Procedure Laterality Date   • BREAST BIOPSY Left 20yrs ago benign   • OOPHORECTOMY Right    • [place] WRIST Sidonie Shelling LIG Right 1/21/2020    Procedure: RELEASE CARPAL TUNNEL ENDOSCOPIC;  Surgeon: Lilly Savage MD;  [facility];  Service: Orthopedics   • IN WRIST Sidonie Shelling LIG Left 1/28/2020    Procedure: RELEASE CARPAL TUNNEL ENDOSCOPIC;  Surgeon: Lilly Savage MD;  [facility];  Service: Orthopedics       Current Outpatient Medications   Medication Sig Dispense Refill   • albuterol (PROVENTIL HFA,VENTOLIN HFA) 90 mcg/act inhaler inhale 2 puffs by mouth and INTO THE LUNGS every 6 hours IF NEEDED FOR WHEEZING   8 5 g 2   • benzonatate (TESSALON) 200 MG capsule Take 1 capsule (200 mg total) by mouth 3 (three) times a day as needed for cough 20 capsule 0   • cholecalciferol (VITAMIN D3) 400 units tablet Take 400 Units by mouth daily     • FLUoxetine (PROzac) 20 mg capsule take 2 capsules by mouth once daily 30 capsule 1   • fluticasone (FLONASE) 50 mcg/act nasal spray 1 spray into each nostril daily 16 g 2   • gabapentin (NEURONTIN) 100 mg capsule Take 1 capsule (100 mg total) by mouth 3 (three) times a day 90 capsule 1   • lamoTRIgine (LaMICtal) 200 MG tablet Take 1 tablet (200 mg total) by mouth daily 30 tablet 1   • levocetirizine (XYZAL) 5 MG tablet take 1 tablet by mouth every evening 30 tablet 4   • nicotine polacrilex (COMMIT) 4 MG lozenge Apply 1 lozenge (4 mg total) to the mouth or throat as needed for smoking cessation (Patient not taking: No sig reported) 100 each 5   • omeprazole (PriLOSEC) 20 mg delayed release capsule take 1 capsule by mouth twice a day 60 capsule 5   • predniSONE 10 mg tablet Take 3 tabs BID X 2 days, 2 tabs BID X 2 days, 1 tab BID X 2 days, 1 tab daily X 2 days 26 tablet 0   • senna-docusate sodium (SENOKOT-S) 8 6-50 mg per tablet Take 3 tablets by mouth as needed       • tiotropium-olodaterol (Stiolto Respimat) 2 5-2 5 MCG/ACT inhaler Inhale 2 puffs daily 4 g 11     No current facility-administered medications for this visit  No Known Allergies    Review of Systems    Video Exam    There were no vitals filed for this visit      Physical Exam     11/30/22  Start Time: 2209  Stop Time: 2405  Total Visit Time: 31 minutes

## 2022-12-02 ENCOUNTER — TELEMEDICINE (OUTPATIENT)
Dept: BEHAVIORAL/MENTAL HEALTH CLINIC | Facility: CLINIC | Age: 57
End: 2022-12-02

## 2022-12-02 DIAGNOSIS — F41.1 GENERALIZED ANXIETY DISORDER: ICD-10-CM

## 2022-12-02 DIAGNOSIS — F43.10 PTSD (POST-TRAUMATIC STRESS DISORDER): ICD-10-CM

## 2022-12-02 DIAGNOSIS — F31.81 MODERATE MIXED BIPOLAR II DISORDER (HCC): Primary | ICD-10-CM

## 2022-12-02 NOTE — PSYCH
Virtual Regular Visit    Verification of patient location:    Patient is located in the following state in which I hold an active license [place]      Assessment/Plan:    Problem List Items Addressed This Visit        Other    Moderate mixed bipolar II disorder (HCC) - Primary    Generalized anxiety disorder    PTSD (post-traumatic stress disorder)       Goals addressed in session: Goal 1          Reason for visit is No chief complaint on file  Encounter provider Anuradha Hennessy [facility]    [place] at [address] [place] [id number] [phone]      Recent Visits  Date Type Provider Dept   11/30/22 229 [facility], [address] [place] [address]   11/25/22 229 [facility], [facility]   Showing recent visits within past 7 days and meeting all other requirements  Future Appointments  No visits were found meeting these conditions  Showing future appointments within next 150 days and meeting all other requirements       The patient was identified by name and date of birth  Stephanie Agarwal was informed that this is a telemedicine visit and that the visit is being conducted [employer]  She agrees to proceed     My office door was closed  The patient was notified the following individuals were present in the room other group memers  She acknowledged consent and understanding of privacy and security of the video platform  The patient has agreed to participate and understands they can discontinue the visit at any time  Patient is aware this is a billable service  Subjective  Stephanie Agarwal is a 56 y o  female        Data: Swati Bravo attended the Relaxation group and was guided through the traditional first part of diaphragmatic breathing (combined with humming in the [place] and done more separately after) with progressive muscle relaxation  Next, attendees were guided through a body scan and mindfulness meditation for grounding, self-compassion and self-forgiveness, attending to physical and emotional pain, followed by affirmations with gratitude and against anxiety and depression  Psychoeducation, DBT-based, and group approaches were used to support the cohesion and maintain the serene environment for the exercise’s purpose  Assessment: Lurlene Kanner followed through guidance and completed the exercises, showed willingness to continue learning and practicing self-care  Plan: Lurlene Kanner will practice deep breathing and other techniques based on each occasion to counter stress and will attend the next group to be able to take part in further guided exercises on chosen topics to build on the techniques learned in the beginning  HPI     Past Medical History:   Diagnosis Date   • Bilateral carpal tunnel syndrome 08/20/2019   • Carpal tunnel syndrome of right wrist 10/30/2019    Added automatically from request for surgery [id number]   • COPD (chronic obstructive pulmonary disease) ([facility] [address] )    • Hypertension        Past Surgical History:   Procedure Laterality Date   • BREAST BIOPSY Left 20yrs ago benign   • OOPHORECTOMY Right    • MS WRIST Felizardo [place] LIG Right 1/21/2020    Procedure: RELEASE CARPAL TUNNEL ENDOSCOPIC;  Surgeon: Mateusz Pathak MD;  Location: [facility];  Service: Orthopedics   • MS WRIST Felizardo [place] LIG Left 1/28/2020    Procedure: RELEASE CARPAL TUNNEL ENDOSCOPIC;  Surgeon: Mateusz Pathak MD;  Location: [facility];  Service: Orthopedics       Current Outpatient Medications   Medication Sig Dispense Refill   • albuterol (PROVENTIL HFA,VENTOLIN HFA) 90 mcg/act inhaler inhale 2 puffs by mouth and INTO THE LUNGS every 6 hours IF NEEDED FOR WHEEZING   8 5 g 2   • benzonatate (TESSALON) 200 MG capsule Take 1 capsule (200 mg total) by mouth 3 (three) times a day as needed for cough 20 capsule 0   • cholecalciferol (VITAMIN D3) 400 units tablet Take 400 Units by mouth daily     • FLUoxetine (PROzac) 20 mg capsule take 2 capsules by mouth once daily 30 capsule 1   • fluticasone (FLONASE) 50 mcg/act nasal spray 1 spray into each nostril daily 16 g 2   • gabapentin (NEURONTIN) 100 mg capsule Take 1 capsule (100 mg total) by mouth 3 (three) times a day 90 capsule 1   • lamoTRIgine (LaMICtal) 200 MG tablet Take 1 tablet (200 mg total) by mouth daily 30 tablet 1   • levocetirizine (XYZAL) 5 MG tablet take 1 tablet by mouth every evening 30 tablet 4   • nicotine polacrilex (COMMIT) 4 MG lozenge Apply 1 lozenge (4 mg total) to the mouth or throat as needed for smoking cessation (Patient not taking: No sig reported) 100 each 5   • omeprazole (PriLOSEC) 20 mg delayed release capsule take 1 capsule by mouth twice a day 60 capsule 5   • predniSONE 10 mg tablet Take 3 tabs BID X 2 days, 2 tabs BID X 2 days, 1 tab BID X 2 days, 1 tab daily X 2 days 26 tablet 0   • senna-docusate sodium (SENOKOT-S) 8 6-50 mg per tablet Take 3 tablets by mouth as needed       • tiotropium-olodaterol (Stiolto Respimat) 2 5-2 5 MCG/ACT inhaler Inhale 2 puffs daily 4 g 11     No current facility-administered medications for this visit  No Known Allergies    Review of Systems    Video Exam    There were no vitals filed for this visit      Physical Exam       12/02/22  Start Time: 0400  Stop Time: 0500  Total Visit Time: 60 minutes

## 2022-12-05 ENCOUNTER — TELEMEDICINE (OUTPATIENT)
Dept: PSYCHIATRY | Facility: CLINIC | Age: 57
End: 2022-12-05

## 2022-12-05 DIAGNOSIS — F31.32 BIPOLAR AFFECTIVE DISORDER, CURRENTLY DEPRESSED, MODERATE (HCC): Primary | ICD-10-CM

## 2022-12-05 DIAGNOSIS — F10.91 ALCOHOL USE DISORDER IN REMISSION: ICD-10-CM

## 2022-12-05 DIAGNOSIS — F43.10 PTSD (POST-TRAUMATIC STRESS DISORDER): ICD-10-CM

## 2022-12-05 RX ORDER — LAMOTRIGINE 200 MG/1
200 TABLET ORAL DAILY
Qty: 30 TABLET | Refills: 2 | Status: SHIPPED | OUTPATIENT
Start: 2022-12-05 | End: 2023-01-04
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
## 2022-12-08 ENCOUNTER — TELEMEDICINE (OUTPATIENT)
Dept: BEHAVIORAL/MENTAL HEALTH CLINIC | Facility: CLINIC | Age: 57
End: 2022-12-08

## 2022-12-08 DIAGNOSIS — F31.81 MODERATE MIXED BIPOLAR II DISORDER (HCC): Primary | ICD-10-CM

## 2022-12-08 DIAGNOSIS — F41.1 GENERALIZED ANXIETY DISORDER: ICD-10-CM

## 2022-12-08 DIAGNOSIS — F12.90 MARIJUANA USE: Chronic | ICD-10-CM

## 2022-12-08 DIAGNOSIS — F43.10 PTSD (POST-TRAUMATIC STRESS DISORDER): ICD-10-CM

## 2022-12-08 NOTE — PSYCH
Virtual Regular Visit    Verification of patient location:    Patient is located in the following state in which I hold an active license [place]      Assessment/Plan:    Problem List Items Addressed This Visit        Other    Marijuana use (Chronic)    Moderate mixed bipolar II disorder (HCC) - Primary    Generalized anxiety disorder    PTSD (post-traumatic stress disorder)       Goals addressed in session: Goal 1 and Goal 2          Reason for visit is No chief complaint on file  Encounter provider Gabrielle London [facility]    Provider located at [address] [place] [id number] [phone]      Recent Visits  Date Type Provider Dept   12/02/22 [address] [place] [address] [facility] The[facility]ist [facility]   Showing recent visits within past 7 days and meeting all other requirements  Future Appointments  No visits were found meeting these conditions  Showing future appointments within next 150 days and meeting all other requirements       The patient was identified by name and date of birth  Yossi Nielson was informed that this is a telemedicine visit and that the visit is being conducted throughthe gate5 platform  She agrees to proceed     My office door was closed  No one else was in the room  She acknowledged consent and understanding of privacy and security of the video platform  The patient has agreed to participate and understands they can discontinue the visit at any time  Patient is aware this is a billable service  Subjective  Yossi Nielson is a 56 y o  female  Data: Mili Nazario discussed her current consideration about completing PHP and returning back to OP  Person-Centered, DBT-based, and CBT encourage Pt to focus on learned Mindfulness skills and behave more neutrally to avoid getting intensely upset      Assessment: Mili Nazario was receptive, talkative, considering, warm, and expressive  She managed to focus on area of needs and to acknowledge the necessity for higher level of care  Plan: Nadia Landis will talk to [facility]  and will decide to enroll and complete the program  She will still use Mindfulness to deal with intense emotions and will attend her scheduled appointments  HPI     Past Medical History:   Diagnosis Date   • Bilateral carpal tunnel syndrome 08/20/2019   • Carpal tunnel syndrome of right wrist 10/30/2019    Added automatically from request for surgery [id number]   • COPD (chronic obstructive pulmonary disease) ([facility] [address] )    • Hypertension        Past Surgical History:   Procedure Laterality Date   • BREAST BIOPSY Left 20yrs ago benign   • OOPHORECTOMY Right    • [place] WRIST Hurtis Cris LIG Right 1/21/2020    Procedure: RELEASE CARPAL TUNNEL ENDOSCOPIC;  Surgeon: Alpesh Bowers MD;  [facility];  Service: Orthopedics   • [place] WRIST Hurtis Cris LIG Left 1/28/2020    Procedure: RELEASE CARPAL TUNNEL ENDOSCOPIC;  Surgeon: Alpesh Bowers MD;  [facility];  Service: Orthopedics       Current Outpatient Medications   Medication Sig Dispense Refill   • albuterol (PROVENTIL HFA,VENTOLIN HFA) 90 mcg/act inhaler inhale 2 puffs by mouth and INTO THE LUNGS every 6 hours IF NEEDED FOR WHEEZING   8 5 g 2   • benzonatate (TESSALON) 200 MG capsule Take 1 capsule (200 mg total) by mouth 3 (three) times a day as needed for cough 20 capsule 0   • cholecalciferol (VITAMIN D3) 400 units tablet Take 400 Units by mouth daily     • FLUoxetine (PROzac) 20 mg capsule take 2 capsules by mouth once daily 30 capsule 1   • fluticasone (FLONASE) 50 mcg/act nasal spray 1 spray into each nostril daily 16 g 2   • gabapentin (NEURONTIN) 100 mg capsule Take 1 capsule (100 mg total) by mouth 3 (three) times a day 90 capsule 1   • lamoTRIgine (LaMICtal) 200 MG tablet Take 1 tablet (200 mg total) by mouth daily 30 tablet 2   • levocetirizine (XYZAL) 5 MG tablet take 1 tablet by mouth every evening 30 tablet 4   • nicotine polacrilex (COMMIT) 4 MG lozenge Apply 1 lozenge (4 mg total) to the mouth or throat as needed for smoking cessation (Patient not taking: No sig reported) 100 each 5   • omeprazole (PriLOSEC) 20 mg delayed release capsule take 1 capsule by mouth twice a day 60 capsule 5   • predniSONE 10 mg tablet Take 3 tabs BID X 2 days, 2 tabs BID X 2 days, 1 tab BID X 2 days, 1 tab daily X 2 days 26 tablet 0   • senna-docusate sodium (SENOKOT-S) 8 6-50 mg per tablet Take 3 tablets by mouth as needed       • tiotropium-olodaterol (Stiolto Respimat) 2 5-2 5 MCG/ACT inhaler Inhale 2 puffs daily 4 g 11     No current facility-administered medications for this visit  No Known Allergies    Review of Systems    Video Exam    There were no vitals filed for this visit      Physical Exam     12/08/22  Start Time: 5689  Stop Time: 6167  Total Visit Time: 42 minutes

## 2022-12-09 ENCOUNTER — TELEMEDICINE (OUTPATIENT)
Dept: BEHAVIORAL/MENTAL HEALTH CLINIC | Facility: CLINIC | Age: 57
End: 2022-12-09

## 2022-12-09 DIAGNOSIS — F43.10 PTSD (POST-TRAUMATIC STRESS DISORDER): ICD-10-CM

## 2022-12-09 DIAGNOSIS — F41.1 GENERALIZED ANXIETY DISORDER: ICD-10-CM

## 2022-12-09 DIAGNOSIS — F31.81 MODERATE MIXED BIPOLAR II DISORDER (HCC): Primary | ICD-10-CM

## 2022-12-09 NOTE — PSYCH
Patient's daughter calling with questions about how to release records to [facility]. Provided with contact information for records department.    Lenka Rodriges RN     Virtual Regular Visit    Verification of patient location:    Patient is located in the following state in which I hold an active license [place]      Assessment/Plan:    Problem List Items Addressed This Visit        Other    Moderate mixed bipolar II disorder (HCC) - Primary    Generalized anxiety disorder    PTSD (post-traumatic stress disorder)       Goals addressed in session: Goal 1 and Goal 2          Reason for visit is No chief complaint on file  Encounter provider Valorie Osei [facility]    Provider located at [address] [place] [address] [phone]      Recent Visits  Date Type Provider Dept   12/08/22 229 [facility], [address] [place] [address]   12/02/22 229 [facility] Therapist [place]   Showing recent visits within past 7 days and meeting all other requirements  Future Appointments  No visits were found meeting these conditions  Showing future appointments within next 150 days and meeting all other requirements       The patient was identified by name and date of birth  Jie Carias was informed that this is a telemedicine visit and that the visit is being conducted [employer]  She agrees to proceed     My office door was closed  The patient was notified the following individuals were present in the room other group members  She acknowledged consent and understanding of privacy and security of the video platform  The patient has agreed to participate and understands they can discontinue the visit at any time  Patient is aware this is a billable service  Subjective  Jie Carias is a 56 y o  female    Data: Gilma Ambriz attended the Relaxation group and was guided through the traditional first part of diaphragmatic breathing with humming and with progressive muscle relaxation   Next, attendees were guided through a body scan and mindfulness meditation for grounding,  attending to physical pain, and creating a special safe place, followed by affirmations with gratitude and against anxiety and depression  Psychoeducation, DBT-based, and group approaches were used to support the cohesion and maintain the serene environment for the exercise’s purpose  Assessment: Silvana Mclaughlin followed through guidance and completed the exercises, showed willingness to continue learning and practicing self-care  Plan: Silvana Mclaughlin will practice deep breathing and other techniques based on each occasion to counter stress and will attend the next group to be able to take part in further guided exercises on chosen topics to build on the techniques learned in the beginning  HPI     Past Medical History:   Diagnosis Date   • Bilateral carpal tunnel syndrome 08/20/2019   • Carpal tunnel syndrome of right wrist 10/30/2019    Added automatically from request for surgery [id number]   • COPD (chronic obstructive pulmonary disease) ([facility] [address] )    • Hypertension        Past Surgical History:   Procedure Laterality Date   • BREAST BIOPSY Left 20yrs ago benign   • OOPHORECTOMY Right    • [place] WRIST Marion Pod LIG Right 1/21/2020    Procedure: RELEASE CARPAL TUNNEL ENDOSCOPIC;  Surgeon: Gwen Em MD;  [facility];  Service: Orthopedics   • [place] WRIST Marion Pod LIG Left 1/28/2020    Procedure: RELEASE CARPAL TUNNEL ENDOSCOPIC;  Surgeon: Gwen Em MD;  [facility];  Service: Orthopedics       Current Outpatient Medications   Medication Sig Dispense Refill   • albuterol (PROVENTIL HFA,VENTOLIN HFA) 90 mcg/act inhaler inhale 2 puffs by mouth and INTO THE LUNGS every 6 hours IF NEEDED FOR WHEEZING   8 5 g 2   • benzonatate (TESSALON) 200 MG capsule Take 1 capsule (200 mg total) by mouth 3 (three) times a day as needed for cough 20 capsule 0   • cholecalciferol (VITAMIN D3) 400 units tablet Take 400 Units by mouth daily     • FLUoxetine (PROzac) 20 mg capsule take 2 capsules by mouth once daily 30 capsule 1   • fluticasone (FLONASE) 50 mcg/act nasal spray 1 spray into each nostril daily 16 g 2   • gabapentin (NEURONTIN) 100 mg capsule Take 1 capsule (100 mg total) by mouth 3 (three) times a day 90 capsule 1   • lamoTRIgine (LaMICtal) 200 MG tablet Take 1 tablet (200 mg total) by mouth daily 30 tablet 2   • levocetirizine (XYZAL) 5 MG tablet take 1 tablet by mouth every evening 30 tablet 4   • nicotine polacrilex (COMMIT) 4 MG lozenge Apply 1 lozenge (4 mg total) to the mouth or throat as needed for smoking cessation (Patient not taking: No sig reported) 100 each 5   • omeprazole (PriLOSEC) 20 mg delayed release capsule take 1 capsule by mouth twice a day 60 capsule 5   • predniSONE 10 mg tablet Take 3 tabs BID X 2 days, 2 tabs BID X 2 days, 1 tab BID X 2 days, 1 tab daily X 2 days 26 tablet 0   • senna-docusate sodium (SENOKOT-S) 8 6-50 mg per tablet Take 3 tablets by mouth as needed       • tiotropium-olodaterol (Stiolto Respimat) 2 5-2 5 MCG/ACT inhaler Inhale 2 puffs daily 4 g 11     No current facility-administered medications for this visit  No Known Allergies    Review of Systems    Video Exam    There were no vitals filed for this visit      Physical Exam     12/09/22  Start Time: 0400  Stop Time: 0500  Total Visit Time: 60 minutes

## 2022-12-12 ENCOUNTER — TELEPHONE (OUTPATIENT)
Dept: PSYCHOLOGY | Facility: CLINIC | Age: 57
End: 2022-12-12

## 2022-12-12 NOTE — TELEPHONE ENCOUNTER
Tried to call this pt to discuss PHP but pt was unavailable and voicemail has not been set up  Can try later      Gary Junior

## 2022-12-13 ENCOUNTER — TELEMEDICINE (OUTPATIENT)
Dept: BEHAVIORAL/MENTAL HEALTH CLINIC | Facility: CLINIC | Age: 57
End: 2022-12-13

## 2022-12-13 DIAGNOSIS — F17.210 CIGARETTE NICOTINE DEPENDENCE WITHOUT COMPLICATION: ICD-10-CM

## 2022-12-13 DIAGNOSIS — F10.11 ALCOHOL ABUSE, IN REMISSION: ICD-10-CM

## 2022-12-13 DIAGNOSIS — F43.10 PTSD (POST-TRAUMATIC STRESS DISORDER): ICD-10-CM

## 2022-12-13 DIAGNOSIS — F41.1 GENERALIZED ANXIETY DISORDER: ICD-10-CM

## 2022-12-13 DIAGNOSIS — F31.81 MODERATE MIXED BIPOLAR II DISORDER (HCC): Primary | ICD-10-CM

## 2022-12-13 NOTE — PSYCH
Virtual Regular Visit    Verification of patient location:    Patient is located in the following state in which I hold an active license [place]      Assessment/Plan:    Problem List Items Addressed This Visit    None      Goals addressed in session: Goal 1 and Goal 2          Reason for visit is No chief complaint on file  Encounter provider FARHAN Ashford [facility]    Provider located at [address] [place] [address]  [address] [facility] [address]  [phone]      Recent Visits  Date Type Provider Dept   12/09/22 229 [facility], [address] [place] [address]   12/08/22 229 [facility], [place] [address] [facility] Therapist [place]   Showing recent visits within past 7 days and meeting all other requirements  Future Appointments  No visits were found meeting these conditions  Showing future appointments within next 150 days and meeting all other requirements       The patient was identified by name and date of birth  Trevor Gonzalez was informed that this is a telemedicine visit and that the visit is being conducted throughthe [employer]  She agrees to proceed     My office door was closed  No one else was in the room  She acknowledged consent and understanding of privacy and security of the video platform  The patient has agreed to participate and understands they can discontinue the visit at any time  Patient is aware this is a billable service  Subjective  Trevor Gonzalez is a 56 y o  female  Data: Dre Rock appeared in her meeting with a delay and reported that overslept and was getting ready for another appointment  She was prompted to attend her session, and she managed to use the link but did not finish the session due to repeating issues with [address]  Pt was apprehensive using a different link- she had issues previously as well with using different ways to connect  Person-Centered, DBT-based, and TF-CBT helped Pt work on her narrative and express her frustration  Assessment: Rupali Oneill sounded confident, more positive than usual despite the resistance and apprehension with connectivity in the electronic environment, and able to focus on her narrative after redirection from upsetting topics  Plan: Rupali Oneill will attend weekly individual and weekly Relaxation Group to be able to work further on her trauma narrative, process negative memories, and learn a different response to fear  HPI     Past Medical History:   Diagnosis Date   • Bilateral carpal tunnel syndrome 08/20/2019   • Carpal tunnel syndrome of right wrist 10/30/2019    Added automatically from request for surgery [id number]   • COPD (chronic obstructive pulmonary disease) ([facility] [address] )    • Hypertension        Past Surgical History:   Procedure Laterality Date   • BREAST BIOPSY Left 20yrs ago benign   • OOPHORECTOMY Right    • [place] WRIST Jinnie Footman LIG Right 1/21/2020    Procedure: RELEASE CARPAL TUNNEL ENDOSCOPIC;  Surgeon: Daksha Bonilla MD;  [facility];  Service: Orthopedics   • [place] WRIST Jinnie Footman LIG Left 1/28/2020    Procedure: RELEASE CARPAL TUNNEL ENDOSCOPIC;  Surgeon: Daksha Bonilla MD;  [facility];  Service: Orthopedics       Current Outpatient Medications   Medication Sig Dispense Refill   • albuterol (PROVENTIL HFA,VENTOLIN HFA) 90 mcg/act inhaler inhale 2 puffs by mouth and INTO THE LUNGS every 6 hours IF NEEDED FOR WHEEZING   8 5 g 2   • benzonatate (TESSALON) 200 MG capsule Take 1 capsule (200 mg total) by mouth 3 (three) times a day as needed for cough 20 capsule 0   • cholecalciferol (VITAMIN D3) 400 units tablet Take 400 Units by mouth daily     • FLUoxetine (PROzac) 20 mg capsule take 2 capsules by mouth once daily 30 capsule 1   • fluticasone (FLONASE) 50 mcg/act nasal spray 1 spray into each nostril daily 16 g 2   • gabapentin (NEURONTIN) 100 mg capsule Take 1 capsule (100 mg total) by mouth 3 (three) times a day 90 capsule 1   • lamoTRIgine (LaMICtal) 200 MG tablet Take 1 tablet (200 mg total) by mouth daily 30 tablet 2   • levocetirizine (XYZAL) 5 MG tablet take 1 tablet by mouth every evening 30 tablet 4   • nicotine polacrilex (COMMIT) 4 MG lozenge Apply 1 lozenge (4 mg total) to the mouth or throat as needed for smoking cessation (Patient not taking: No sig reported) 100 each 5   • omeprazole (PriLOSEC) 20 mg delayed release capsule take 1 capsule by mouth twice a day 60 capsule 5   • predniSONE 10 mg tablet Take 3 tabs BID X 2 days, 2 tabs BID X 2 days, 1 tab BID X 2 days, 1 tab daily X 2 days 26 tablet 0   • senna-docusate sodium (SENOKOT-S) 8 6-50 mg per tablet Take 3 tablets by mouth as needed       • tiotropium-olodaterol (Stiolto Respimat) 2 5-2 5 MCG/ACT inhaler Inhale 2 puffs daily 4 g 11     No current facility-administered medications for this visit  No Known Allergies    Review of Systems    Video Exam    There were no vitals filed for this visit      Physical Exam     12/13/22  Start Time: 5892  Stop Time: 3569  Total Visit Time: 23 minutes

## 2022-12-16 ENCOUNTER — TELEMEDICINE (OUTPATIENT)
Dept: BEHAVIORAL/MENTAL HEALTH CLINIC | Facility: CLINIC | Age: 57
End: 2022-12-16

## 2022-12-16 DIAGNOSIS — F31.81 MODERATE MIXED BIPOLAR II DISORDER (HCC): Primary | ICD-10-CM

## 2022-12-16 DIAGNOSIS — F41.1 GENERALIZED ANXIETY DISORDER: ICD-10-CM

## 2022-12-16 DIAGNOSIS — F43.10 PTSD (POST-TRAUMATIC STRESS DISORDER): ICD-10-CM

## 2022-12-16 NOTE — PSYCH
Virtual Regular Visit    Verification of patient location:    Patient is located in the following state in which I hold an active license [place]      Assessment/Plan:    Problem List Items Addressed This Visit        Other    Moderate mixed bipolar II disorder (HCC) - Primary    Generalized anxiety disorder    PTSD (post-traumatic stress disorder)       Goals addressed in session: Goal 1 and Goal 2          Reason for visit is No chief complaint on file  Encounter provider FARHAN Freedman [facility]    Provider located at [address] [place] [address] [phone]      Recent Visits  Date Type Provider Dept   12/13/22 229 [facility], [address] [place] [address]   12/09/22 229 [facility], [address] Psychiatric [facility]oc Therapist [place]   Showing recent visits within past 7 days and meeting all other requirements  Future Appointments  No visits were found meeting these conditions  Showing future appointments within next 150 days and meeting all other requirements       The patient was identified by name and date of birth  Armando Kwok was informed that this is a telemedicine visit and that the visit is being conducted [employer]  She agrees to proceed     My office door was closed  The patient was notified the following individuals were present in the room other group members  She acknowledged consent and understanding of privacy and security of the video platform  The patient has agreed to participate and understands they can discontinue the visit at any time  Patient is aware this is a billable service  Subjective  Armando Kwok is a 56 y o  female    Data: Pippa Dykes attended the Relaxation group and was guided through the traditional first part of diaphragmatic breathing with humming and with progressive muscle relaxation   Next, attendees were guided through a brief body scan and mindfulness meditation for giving love and kindness to self and creating self-forgiveness mantra, followed by affirmations for confidence and self-compassion and against anxiety and self-blame  Psychoeducation, DBT-based, and group approaches were used to support the cohesion and maintain the serene environment for the exercise’s purpose  Assessment: Meli Lew followed through guidance and completed the exercises, showed willingness to continue learning and practicing self-care  Plan: Meli Lew will practice deep breathing and other techniques based on each occasion to counter stress and will attend the next group to be able to take part in further guided exercises on chosen topics to build on the techniques learned in the beginning  HPI     Past Medical History:   Diagnosis Date   • Bilateral carpal tunnel syndrome 08/20/2019   • Carpal tunnel syndrome of right wrist 10/30/2019    Added automatically from request for surgery [id number]   • COPD (chronic obstructive pulmonary disease) ([facility] [address] )    • Hypertension        Past Surgical History:   Procedure Laterality Date   • BREAST BIOPSY Left 20yrs ago benign   • OOPHORECTOMY Right    • [place] WRIST Deborra Levo LIG Right 1/21/2020    Procedure: RELEASE CARPAL TUNNEL ENDOSCOPIC;  Surgeon: Irina Whiting MD;  [facility];  Service: Orthopedics   • [place] WRIST Deborra Levo LIG Left 1/28/2020    Procedure: RELEASE CARPAL TUNNEL ENDOSCOPIC;  Surgeon: Irina Whiting MD;  [facility];  Service: Orthopedics       Current Outpatient Medications   Medication Sig Dispense Refill   • albuterol (PROVENTIL HFA,VENTOLIN HFA) 90 mcg/act inhaler inhale 2 puffs by mouth and INTO THE LUNGS every 6 hours IF NEEDED FOR WHEEZING   8 5 g 2   • benzonatate (TESSALON) 200 MG capsule Take 1 capsule (200 mg total) by mouth 3 (three) times a day as needed for cough 20 capsule 0   • cholecalciferol (VITAMIN D3) 400 units tablet Take 400 Units by mouth daily     • FLUoxetine (PROzac) 20 mg capsule take 2 capsules by mouth once daily 30 capsule 1   • fluticasone (FLONASE) 50 mcg/act nasal spray 1 spray into each nostril daily 16 g 2   • gabapentin (NEURONTIN) 100 mg capsule Take 1 capsule (100 mg total) by mouth 3 (three) times a day 90 capsule 1   • lamoTRIgine (LaMICtal) 200 MG tablet Take 1 tablet (200 mg total) by mouth daily 30 tablet 2   • levocetirizine (XYZAL) 5 MG tablet take 1 tablet by mouth every evening 30 tablet 4   • nicotine polacrilex (COMMIT) 4 MG lozenge Apply 1 lozenge (4 mg total) to the mouth or throat as needed for smoking cessation (Patient not taking: No sig reported) 100 each 5   • omeprazole (PriLOSEC) 20 mg delayed release capsule take 1 capsule by mouth twice a day 60 capsule 5   • predniSONE 10 mg tablet Take 3 tabs BID X 2 days, 2 tabs BID X 2 days, 1 tab BID X 2 days, 1 tab daily X 2 days 26 tablet 0   • senna-docusate sodium (SENOKOT-S) 8 6-50 mg per tablet Take 3 tablets by mouth as needed       • tiotropium-olodaterol (Stiolto Respimat) 2 5-2 5 MCG/ACT inhaler Inhale 2 puffs daily 4 g 11     No current facility-administered medications for this visit  No Known Allergies    Review of Systems    Video Exam    There were no vitals filed for this visit      Physical Exam     12/16/22  Start Time: 0400  Stop Time: 0500  Total Visit Time: 60 minutes

## 2022-12-21 ENCOUNTER — HOSPITAL ENCOUNTER (OUTPATIENT)
Dept: MAMMOGRAPHY | Facility: HOSPITAL | Age: 57
Discharge: HOME | End: 2022-12-21

## 2022-12-21 VITALS — BODY MASS INDEX: 21.34 KG/M2 | WEIGHT: 113 LBS | HEIGHT: 61 IN

## 2022-12-21 DIAGNOSIS — Z12.31 ENCOUNTER FOR SCREENING MAMMOGRAM FOR BREAST CANCER: ICD-10-CM

## 2022-12-21 DIAGNOSIS — Z01.419 ROUTINE GYNECOLOGICAL EXAMINATION: ICD-10-CM

## 2022-12-22 ENCOUNTER — TELEMEDICINE (OUTPATIENT)
Dept: BEHAVIORAL/MENTAL HEALTH CLINIC | Facility: CLINIC | Age: 57
End: 2022-12-22

## 2022-12-22 DIAGNOSIS — F41.1 GENERALIZED ANXIETY DISORDER: ICD-10-CM

## 2022-12-22 DIAGNOSIS — F31.81 MODERATE MIXED BIPOLAR II DISORDER (HCC): Primary | ICD-10-CM

## 2022-12-22 DIAGNOSIS — F10.11 ALCOHOL ABUSE, IN REMISSION: ICD-10-CM

## 2022-12-22 DIAGNOSIS — F17.210 CIGARETTE NICOTINE DEPENDENCE WITHOUT COMPLICATION: ICD-10-CM

## 2022-12-22 DIAGNOSIS — F43.10 PTSD (POST-TRAUMATIC STRESS DISORDER): ICD-10-CM

## 2022-12-22 NOTE — PSYCH
Virtual Regular Visit    Verification of patient location:    Patient is located in the following state in which I hold an active license [place]      Assessment/Plan:    Problem List Items Addressed This Visit    None      Goals addressed in session: Goal 1 and Goal 2          Reason for visit is No chief complaint on file  [facility]    Provider located at [address] [place] [id number] [phone]      Recent Visits  Date Type Provider Dept   12/16/22 [address] [place] [address] [facility] Heriberto   Showing recent visits within past 7 days and meeting all other requirements  Future Appointments  No visits were found meeting these conditions  Showing future appointments within next 150 days and meeting all other requirements       The patient was identified by name and date of birth  Rasheed Lara was informed that this is a telemedicine visit and that the visit is being conducted throughthe [employer] platform  She agrees to proceed     My office door was closed  No one else was in the room  She acknowledged consent and understanding of privacy and security of the video platform  The patient has agreed to participate and understands they can discontinue the visit at any time  Patient is aware this is a billable service  Subjective  Rasheed Lara is a 56 y o  female  Data: Ayan Joseph spoke about an aniversary form her son's accident and death and was allowed to express her emotions, to process her pain and work on her trauma narrative  Person-Centered, Mindfulness, and TF-CBT helped Pt organize her narrative around what happened and her emotions  Assessment: Ayan Joseph appeared emotional and intensely upset at times  She though managed to focus on the provided guidance and use her Core Mindfulness skills      Plan: Ayan Joseph will partticipate in weekly individual and weekly Relaxation Group sessions to use the most helpful strategies to relax, use Mindfulness guidance to ground herself and regulate better intense emotions  HPI     Past Medical History:   Diagnosis Date   • Bilateral carpal tunnel syndrome 08/20/2019   • Carpal tunnel syndrome of right wrist 10/30/2019    Added automatically from request for surgery [id number]   • COPD (chronic obstructive pulmonary disease) ([facility] [address] )    • Hypertension        Past Surgical History:   Procedure Laterality Date   • BREAST BIOPSY Left 20yrs ago benign   • OOPHORECTOMY Right    • [place] WRIST [facility] LIG Right 1/21/2020    Procedure: RELEASE CARPAL TUNNEL ENDOSCOPIC;  Surgeon: Linnea Farias MD;  [facility];  Service: Orthopedics   • [place] WRIST [facility] LIG Left 1/28/2020    Procedure: RELEASE CARPAL TUNNEL ENDOSCOPIC;  Surgeon: Linnea Farias MD;  Location: [facility];  Service: Orthopedics       Current Outpatient Medications   Medication Sig Dispense Refill   • albuterol (PROVENTIL HFA,VENTOLIN HFA) 90 mcg/act inhaler inhale 2 puffs by mouth and INTO THE LUNGS every 6 hours IF NEEDED FOR WHEEZING   8 5 g 2   • benzonatate (TESSALON) 200 MG capsule Take 1 capsule (200 mg total) by mouth 3 (three) times a day as needed for cough 20 capsule 0   • cholecalciferol (VITAMIN D3) 400 units tablet Take 400 Units by mouth daily     • FLUoxetine (PROzac) 20 mg capsule take 2 capsules by mouth once daily 30 capsule 1   • fluticasone (FLONASE) 50 mcg/act nasal spray 1 spray into each nostril daily 16 g 2   • gabapentin (NEURONTIN) 100 mg capsule Take 1 capsule (100 mg total) by mouth 3 (three) times a day 90 capsule 1   • lamoTRIgine (LaMICtal) 200 MG tablet Take 1 tablet (200 mg total) by mouth daily 30 tablet 2   • levocetirizine (XYZAL) 5 MG tablet take 1 tablet by mouth every evening 30 tablet 4   • nicotine polacrilex (COMMIT) 4 MG lozenge Apply 1 lozenge (4 mg total) to the mouth or throat as needed for smoking cessation (Patient not taking: No sig reported) 100 each 5   • omeprazole (PriLOSEC) 20 mg delayed release capsule take 1 capsule by mouth twice a day 60 capsule 5   • predniSONE 10 mg tablet Take 3 tabs BID X 2 days, 2 tabs BID X 2 days, 1 tab BID X 2 days, 1 tab daily X 2 days 26 tablet 0   • senna-docusate sodium (SENOKOT-S) 8 6-50 mg per tablet Take 3 tablets by mouth as needed       • tiotropium-olodaterol (Stiolto Respimat) 2 5-2 5 MCG/ACT inhaler Inhale 2 puffs daily 4 g 11     No current facility-administered medications for this visit  No Known Allergies    Review of Systems    Video Exam    There were no vitals filed for this visit      Physical Exam       12/22/22  Start Time: 8049  Stop Time: 7769  Total Visit Time: 47 minutes

## 2022-12-23 ENCOUNTER — TELEPHONE (OUTPATIENT)
Dept: PSYCHOLOGY | Facility: CLINIC | Age: 57
End: 2022-12-23

## 2022-12-23 ENCOUNTER — TELEMEDICINE (OUTPATIENT)
Dept: BEHAVIORAL/MENTAL HEALTH CLINIC | Facility: CLINIC | Age: 57
End: 2022-12-23

## 2022-12-23 DIAGNOSIS — F31.81 MODERATE MIXED BIPOLAR II DISORDER (HCC): Primary | ICD-10-CM

## 2022-12-23 DIAGNOSIS — F41.1 GENERALIZED ANXIETY DISORDER: ICD-10-CM

## 2022-12-23 DIAGNOSIS — F10.11 ALCOHOL ABUSE, IN REMISSION: ICD-10-CM

## 2022-12-23 DIAGNOSIS — F43.10 PTSD (POST-TRAUMATIC STRESS DISORDER): ICD-10-CM

## 2022-12-23 DIAGNOSIS — F17.210 CIGARETTE NICOTINE DEPENDENCE WITHOUT COMPLICATION: ICD-10-CM

## 2022-12-23 NOTE — TELEPHONE ENCOUNTER
Tried to call patient again today to offer PHP but still she was not available and her voicemail has not been set up      Isacc Huerta

## 2022-12-23 NOTE — PSYCH
Virtual Regular Visit    Verification of patient location:    Patient is located in the following state in which I hold an active license [place]      Assessment/Plan:    Problem List Items Addressed This Visit        Other    Moderate mixed bipolar II disorder (HCC) - Primary    Generalized anxiety disorder    PTSD (post-traumatic stress disorder)    Alcohol abuse, in remission    Cigarette nicotine dependence without complication       Goals addressed in session: Goal 1 and Goal 2          Reason for visit is No chief complaint on file  Enc[facility]    Provider located at [address] [place] [address]  [address] [facility] [address] [phone]      Recent Visits  Date Type Provider Dept   12/22/22 229 [facility], [address] [place] [address]   12/16/22 229 [facility] Heriberto   Showing recent visits within past 7 days and meeting all other requirements  Future Appointments  No visits were found meeting these conditions  Showing future appointments within next 150 days and meeting all other requirements       The patient was identified by name and date of birth  Sherryl Lundborg was informed that this is a telemedicine visit and that the visit is being conducted [employer]  She agrees to proceed     My office door was closed  The patient was notified the following individuals were present in the room other group members  She acknowledged consent and understanding of privacy and security of the video platform  The patient has agreed to participate and understands they can discontinue the visit at any time  Patient is aware this is a billable service  Subjective  Sherryl Lundborg is a 56 y o  female        Data: Sussy Garcia attended the Relaxation group and was guided through EFT tapping for anxiety, diaphragmatic breathing with humming and with progressive muscle relaxation  Next, attendees were guided through a brief body scan and mindfulness meditation for being present with negative emotions, observe judging with awareness, self-compassion for failures and mistakes followed by affirmations for self-love, forgiveness, and against anxiety and self-blame  Psychoeducation, DBT-based, and group approaches were used to support the cohesion and maintain the serene environment for the exercise’s purpose  Assessment: Merlyn Leos followed through guidance and completed the exercises, showed willingness to continue learning and practicing self-care  Plan: Merlyn Leos will practice deep breathing and other techniques based on each occasion to counter stress and will attend the next group to be able to take part in further guided exercises on chosen topics to build on the techniques learned in the beginning  HPI     Past Medical History:   Diagnosis Date   • Bilateral carpal tunnel syndrome 08/20/2019   • Carpal tunnel syndrome of right wrist 10/30/2019    Added automatically from request for surgery [id number]   • COPD (chronic obstructive pulmonary disease) (HCC)    • Hypertension        Past Surgical History:   Procedure Laterality Date   • BREAST BIOPSY Left 20yrs ago benign   • OOPHORECTOMY Right    • [place] NDSC WR[facility] SURG W/RLS TRANSVRS CARPL LIGM Right 1/21/2020    Procedure: RELEASE CARPAL TUNNEL ENDOSCOPIC;  Surgeon: Tyson Humphrey MD;  Location: [facility];  Service: Orthopedics   • [place] [facility],2 And 3 S Floors WRST SURG W/RLS TRANSVRS CARPL LIGM Left 1/28/2020    Procedure: RELEASE CARPAL TUNNEL ENDOSCOPIC;  Surgeon: Tyson Humprhey MD;  Location: [facility];  Service: Orthopedics       Current Outpatient Medications   Medication Sig Dispense Refill   • albuterol (PROVENTIL HFA,VENTOLIN HFA) 90 mcg/act inhaler inhale 2 puffs by mouth and INTO THE LUNGS every 6 hours IF NEEDED FOR WHEEZING   8 5 g 2   • benzonatate (TESSALON) 200 MG capsule Take 1 capsule (200 mg total) by mouth 3 (three) times a day as needed for cough 20 capsule 0   • cholecalciferol (VITAMIN D3) 400 units tablet Take 400 Units by mouth daily     • FLUoxetine (PROzac) 20 mg capsule take 2 capsules by mouth once daily 30 capsule 1   • fluticasone (FLONASE) 50 mcg/act nasal spray 1 spray into each nostril daily 16 g 2   • gabapentin (NEURONTIN) 100 mg capsule Take 1 capsule (100 mg total) by mouth 3 (three) times a day 90 capsule 1   • lamoTRIgine (LaMICtal) 200 MG tablet Take 1 tablet (200 mg total) by mouth daily 30 tablet 2   • levocetirizine (XYZAL) 5 MG tablet take 1 tablet by mouth every evening 30 tablet 4   • nicotine polacrilex (COMMIT) 4 MG lozenge Apply 1 lozenge (4 mg total) to the mouth or throat as needed for smoking cessation (Patient not taking: No sig reported) 100 each 5   • omeprazole (PriLOSEC) 20 mg delayed release capsule take 1 capsule by mouth twice a day 60 capsule 5   • predniSONE 10 mg tablet Take 3 tabs BID X 2 days, 2 tabs BID X 2 days, 1 tab BID X 2 days, 1 tab daily X 2 days 26 tablet 0   • senna-docusate sodium (SENOKOT-S) 8 6-50 mg per tablet Take 3 tablets by mouth as needed       • tiotropium-olodaterol (Stiolto Respimat) 2 5-2 5 MCG/ACT inhaler Inhale 2 puffs daily 4 g 11     No current facility-administered medications for this visit  No Known Allergies    Review of Systems    Video Exam    There were no vitals filed for this visit      Physical Exam       12/23/22  Start Time: 0400  Stop Time: 0500  Total Visit Time: 60 minutes

## 2022-12-27 DIAGNOSIS — F41.1 GENERALIZED ANXIETY DISORDER: ICD-10-CM

## 2022-12-27 DIAGNOSIS — F43.10 PTSD (POST-TRAUMATIC STRESS DISORDER): ICD-10-CM

## 2022-12-27 RX ORDER — FLUOXETINE HYDROCHLORIDE 20 MG/1
CAPSULE ORAL
Qty: 30 CAPSULE | Refills: 1 | Status: SHIPPED | OUTPATIENT
Start: 2022-12-27

## 2022-12-28 ENCOUNTER — TELEMEDICINE (OUTPATIENT)
Dept: BEHAVIORAL/MENTAL HEALTH CLINIC | Facility: CLINIC | Age: 57
End: 2022-12-28

## 2022-12-28 DIAGNOSIS — F10.11 ALCOHOL ABUSE, IN REMISSION: ICD-10-CM

## 2022-12-28 DIAGNOSIS — F41.1 GENERALIZED ANXIETY DISORDER: ICD-10-CM

## 2022-12-28 DIAGNOSIS — F31.81 MODERATE MIXED BIPOLAR II DISORDER (HCC): Primary | ICD-10-CM

## 2022-12-28 DIAGNOSIS — F17.210 CIGARETTE NICOTINE DEPENDENCE WITHOUT COMPLICATION: ICD-10-CM

## 2022-12-28 DIAGNOSIS — F12.90 MARIJUANA USE: Chronic | ICD-10-CM

## 2022-12-28 DIAGNOSIS — F43.10 PTSD (POST-TRAUMATIC STRESS DISORDER): ICD-10-CM

## 2022-12-28 NOTE — PSYCH
Virtual Regular Visit    Verification of patient location:    Patient is located in the following state in which [place] an [place] license [place]      Assessment/Plan:    Problem List Items Addressed This Visit        Other    Marijuana use (Chronic)    Moderate mixed bipolar II disorder (HCC) - Primary    Generalized anxiety disorder    PTSD (post-traumatic stress disorder)    Alcohol abuse, in remission    Cigarette nicotine dependence without complication       Goals addressed in session: Goal 1 and Goal 2          Reason for visit is No chief complaint on file  Encounter provider FARHAN Pierce [facility]    Provider located at [address] [place] [id number] [phone]      Recent Visits  Date Type Provider Dept   12/23/22 229 [facility], [address] [place] [address]   12/22/22 229 [facility], [address] [facility] Therapist Heriberto   Showing recent visits within past 7 days and meeting all other requirements  Today's Visits  Date Type Provider Dept   12/28/22 229 [facility], [address] [place] [address]   Showing today's visits and meeting all other requirements  Future Appointments  No visits were found meeting these conditions  Showing future appointments within next 150 days and meeting all other requirements       The patient was identified by name and date of birth  Sonia Denson was informed that this is a telemedicine visit and that the visit is being conducted throughthe [employer] platform  She agrees to proceed     My office door was closed  No one else was in the room  She acknowledged consent and understanding of privacy and security of the video platform  The patient has agreed to participate and understands they can discontinue the visit at any time  Patient is aware this is a billable service  Subjective  Yanira Whitman is a 56 y o  female    Data: Cristina Gibson discussed long-lasting, persisting symptoms of depression that get to worsen when dwelling in memories  She worked with connecting to her inner child and providing unconditional love and support as well as reassurance of steady presence and comfort  Person-Centered, DBT-based, and TF-CBT techniques helped Pt's psychoeducation and additional work on trauma narrative  Cristina Gibson was informed about the new, gentler way of ECT that treats resisting depression and was encouraged to discuss with Psychiatrist in Jan and with PCP  Assessment: Cristina Gibson was very diligent, preparing for her early session and managed to stay on topic, discuss difficult themes, and receive new information with willingness to research and hear more about  Plan: Cristina Gibson put a note for herself to discuss ECT with her Psychiatrist in Jan even though she acknowledges that she feels much better than before, still her persisting intense symptoms are preventing her from normal functioning  She will continue using self-compassion and forgiveness and will work on trauma narrative          HPI     Past Medical History:   Diagnosis Date   • Bilateral carpal tunnel syndrome 08/20/2019   • Carpal tunnel syndrome of right wrist 10/30/2019    Added automatically from request for surgery [id number]   • COPD (chronic obstructive pulmonary disease) (HCC)    • Hypertension        Past Surgical History:   Procedure Laterality Date   • BREAST BIOPSY Left 20yrs ago benign   • OOPHORECTOMY Right    • [place] NDSC WRST SURG W/RLS TRANSVRS CARPL LIGM Right 1/21/2020    Procedure: RELEASE CARPAL TUNNEL ENDOSCOPIC;  Surgeon: Yosef Gloria MD;  Location: [facility];  Service: Orthopedics   • [place] [facility],2 And 3 S Floors WRST SURG W/RLS TRANSVRS CARPL LIGM Left 1/28/2020    Procedure: RELEASE CARPAL TUNNEL ENDOSCOPIC;  Surgeon: Yosef Gloria MD;  Location: [facility];  Service: Orthopedics       Current Outpatient Medications Medication Sig Dispense Refill   • albuterol (PROVENTIL HFA,VENTOLIN HFA) 90 mcg/act inhaler inhale 2 puffs by mouth and INTO THE LUNGS every 6 hours IF NEEDED FOR WHEEZING  8 5 g 2   • benzonatate (TESSALON) 200 MG capsule Take 1 capsule (200 mg total) by mouth 3 (three) times a day as needed for cough 20 capsule 0   • cholecalciferol (VITAMIN D3) 400 units tablet Take 400 Units by mouth daily     • FLUoxetine (PROzac) 20 mg capsule take 2 capsules by mouth once daily 30 capsule 1   • fluticasone (FLONASE) 50 mcg/act nasal spray 1 spray into each nostril daily 16 g 2   • gabapentin (NEURONTIN) 100 mg capsule Take 1 capsule (100 mg total) by mouth 3 (three) times a day 90 capsule 1   • lamoTRIgine (LaMICtal) 200 MG tablet Take 1 tablet (200 mg total) by mouth daily 30 tablet 2   • levocetirizine (XYZAL) 5 MG tablet take 1 tablet by mouth every evening 30 tablet 4   • nicotine polacrilex (COMMIT) 4 MG lozenge Apply 1 lozenge (4 mg total) to the mouth or throat as needed for smoking cessation (Patient not taking: No sig reported) 100 each 5   • omeprazole (PriLOSEC) 20 mg delayed release capsule take 1 capsule by mouth twice a day 60 capsule 5   • predniSONE 10 mg tablet Take 3 tabs BID X 2 days, 2 tabs BID X 2 days, 1 tab BID X 2 days, 1 tab daily X 2 days 26 tablet 0   • senna-docusate sodium (SENOKOT-S) 8 6-50 mg per tablet Take 3 tablets by mouth as needed       • tiotropium-olodaterol (Stiolto Respimat) 2 5-2 5 MCG/ACT inhaler Inhale 2 puffs daily 4 g 11     No current facility-administered medications for this visit  No Known Allergies    Review of Systems    Video Exam    There were no vitals filed for this visit      Physical Exam     12/28/22  Start Time: 6962  Stop Time: 0830  Total Visit Time: 38 minutes

## 2022-12-30 ENCOUNTER — TELEMEDICINE (OUTPATIENT)
Dept: BEHAVIORAL/MENTAL HEALTH CLINIC | Facility: CLINIC | Age: 57
End: 2022-12-30

## 2022-12-30 DIAGNOSIS — F10.11 ALCOHOL ABUSE, IN REMISSION: ICD-10-CM

## 2022-12-30 DIAGNOSIS — F31.81 MODERATE MIXED BIPOLAR II DISORDER (HCC): Primary | ICD-10-CM

## 2022-12-30 DIAGNOSIS — F41.1 GENERALIZED ANXIETY DISORDER: ICD-10-CM

## 2022-12-30 DIAGNOSIS — F17.210 CIGARETTE NICOTINE DEPENDENCE WITHOUT COMPLICATION: ICD-10-CM

## 2022-12-30 DIAGNOSIS — F43.10 PTSD (POST-TRAUMATIC STRESS DISORDER): ICD-10-CM

## 2022-12-30 NOTE — PSYCH
Virtual Regular Visit    Verification of patient location:    Patient is located in the following state in which I [place] an [place]      Assessment/Plan:    Problem List Items Addressed This Visit        Other    Moderate mixed bipolar II disorder (HCC) - Primary    Generalized anxiety disorder    PTSD (post-traumatic stress disorder)    Alcohol abuse, in remission    Cigarette nicotine dependence without complication       Goals addressed in session: Goal 1 and Goal 2          Reason for visit is No chief complaint on file  [facility]    Provider located at [address] [place] [address]  [facility] [address] [phone]      Recent Visits  Date Type Provider Dept   12/28/22 229 [facility], [address] [place] [address]   12/23/22 229 [facility], [address] [facility] Therapist Lee Wong   Showing recent visits within past 7 days and meeting all other requirements  Future Appointments  No visits were found meeting these conditions  Showing future appointments within next 150 days and meeting all other requirements       The patient was identified by name and date of birth  Farhana Travis was informed that this is a telemedicine visit and that the visit is being conducted [employer]  She agrees to proceed     My office door was closed  The patient was notified the following individuals were present in the room other group members  She acknowledged consent and understanding of privacy and security of the video platform  The patient has agreed to participate and understands they can discontinue the visit at any time  Patient is aware this is a billable service  Subjective  Farhana Travis is a 56 y o  female        Data: Jenny Woo attended the Relaxation group and was guided through EFT tapping for anxiety, diaphragmatic breathing with humming and with progressive muscle relaxation  Attendees were guided through a brief body scan and mindfulness meditation self-compassion and self-forgiveness, inner child comforting and supporting, followed by affirmations for self-love and against anxiety and depression  Psychoeducation, DBT-based, and group approaches were used to support the cohesion and maintain the serene environment for the exercise’s purpose  Assessment: Ayan Joseph followed through guidance and completed the exercises, showed willingness to continue learning and practicing self-care  Plan: Ayan Joseph will practice deep breathing and other techniques based on each occasion to counter stress and will attend the next group to be able to take part in further guided exercises on chosen topics to build on the techniques learned in the beginning  HPI     Past Medical History:   Diagnosis Date   • Bilateral carpal tunnel syndrome 08/20/2019   • Carpal tunnel syndrome of right wrist 10/30/2019    Added automatically from request for surgery [id number]   • COPD (chronic obstructive pulmonary disease) (HCC)    • Hypertension        Past Surgical History:   Procedure Laterality Date   • BREAST BIOPSY Left 20yrs ago benign   • OOPHORECTOMY Right    • [place] NDSC WRST SURG W/RLS TRANSVRS CARPL LIGM Right 1/21/2020    Procedure: RELEASE CARPAL TUNNEL ENDOSCOPIC;  Surgeon: Beka Sarah MD;  Location: [facility];  Service: Orthopedics   • [place] [address] [facility],2 And 3 S Floors WRST SURG W/RLS TRANSVRS CARPL LIGM Left 1/28/2020    Procedure: RELEASE CARPAL TUNNEL ENDOSCOPIC;  Surgeon: Beka Sarah MD;  Location: [facility];  Service: Orthopedics       Current Outpatient Medications   Medication Sig Dispense Refill   • albuterol (PROVENTIL HFA,VENTOLIN HFA) 90 mcg/act inhaler inhale 2 puffs by mouth and INTO THE LUNGS every 6 hours IF NEEDED FOR WHEEZING   8 5 g 2   • benzonatate (TESSALON) 200 MG capsule Take 1 capsule (200 mg total) by mouth 3 (three) times a day as needed for cough 20 capsule 0   • cholecalciferol (VITAMIN D3) 400 units tablet Take 400 Units by mouth daily     • FLUoxetine (PROzac) 20 mg capsule take 2 capsules by mouth once daily 30 capsule 1   • fluticasone (FLONASE) 50 mcg/act nasal spray 1 spray into each nostril daily 16 g 2   • gabapentin (NEURONTIN) 100 mg capsule Take 1 capsule (100 mg total) by mouth 3 (three) times a day 90 capsule 1   • lamoTRIgine (LaMICtal) 200 MG tablet Take 1 tablet (200 mg total) by mouth daily 30 tablet 2   • levocetirizine (XYZAL) 5 MG tablet take 1 tablet by mouth every evening 30 tablet 4   • nicotine polacrilex (COMMIT) 4 MG lozenge Apply 1 lozenge (4 mg total) to the mouth or throat as needed for smoking cessation (Patient not taking: No sig reported) 100 each 5   • omeprazole (PriLOSEC) 20 mg delayed release capsule take 1 capsule by mouth twice a day 60 capsule 5   • predniSONE 10 mg tablet Take 3 tabs BID X 2 days, 2 tabs BID X 2 days, 1 tab BID X 2 days, 1 tab daily X 2 days 26 tablet 0   • senna-docusate sodium (SENOKOT-S) 8 6-50 mg per tablet Take 3 tablets by mouth as needed       • tiotropium-olodaterol (Stiolto Respimat) 2 5-2 5 MCG/ACT inhaler Inhale 2 puffs daily 4 g 11     No current facility-administered medications for this visit  No Known Allergies    Review of Systems    Video Exam    There were no vitals filed for this visit      Physical Exam     12/30/22  Start Time: 0400  Stop Time: 0500  Total Visit Time: 60 minutes

## 2023-01-06 ENCOUNTER — TELEMEDICINE (OUTPATIENT)
Dept: BEHAVIORAL/MENTAL HEALTH CLINIC | Facility: CLINIC | Age: 58
End: 2023-01-06

## 2023-01-06 DIAGNOSIS — F17.210 CIGARETTE NICOTINE DEPENDENCE WITHOUT COMPLICATION: ICD-10-CM

## 2023-01-06 DIAGNOSIS — F41.1 GENERALIZED ANXIETY DISORDER: ICD-10-CM

## 2023-01-06 DIAGNOSIS — F12.90 MARIJUANA USE: Chronic | ICD-10-CM

## 2023-01-06 DIAGNOSIS — F43.10 PTSD (POST-TRAUMATIC STRESS DISORDER): ICD-10-CM

## 2023-01-06 DIAGNOSIS — F10.11 ALCOHOL ABUSE, IN REMISSION: ICD-10-CM

## 2023-01-06 DIAGNOSIS — F31.81 MODERATE MIXED BIPOLAR II DISORDER (HCC): Primary | ICD-10-CM

## 2023-01-06 NOTE — PSYCH
Virtual Regular Visit    Verification of patient location:    Patient is located in the following state in which I hold an active license [place]      Assessment/Plan:    Problem List Items Addressed This Visit        Other    Marijuana use (Chronic)    Moderate mixed bipolar II disorder (HCC) - Primary    Generalized anxiety disorder    PTSD (post-traumatic stress disorder)    Alcohol abuse, in remission    Cigarette nicotine dependence without complication       Goals addressed in session: Goal 1 and Goal 2          Reason for visit is No chief complaint on file  Encounter provider Valorie Osei [facility]    Provider located at [address] [place] [id number] [phone]      Recent Visits  Date Type Provider Dept   12/30/22 229 [facility], [address] [facility] Therapist [place]   Showing recent visits within past 7 days and meeting all other requirements  Future Appointments  No visits were found meeting these conditions  Showing future appointments within next 150 days and meeting all other requirements       The patient was identified by name and date of birth  Jie Carias was informed that this is a telemedicine visit and that the visit is being conducted [employer]  She agrees to proceed     My office door was closed  The patient was notified the following individuals were present in the room other group attendees  She acknowledged consent and understanding of privacy and security of the video platform  The patient has agreed to participate and understands they can discontinue the visit at any time  Patient is aware this is a billable service  Subjective  Jie Carias is a 56 y o  female    Data: Gilma Ambriz attended the Relaxation group and was guided through EFT tapping for anxiety, diaphragmatic breathing, humming, and progressive muscle relaxation  Attendees were guided through a body scan for pain and mindfulness meditation with imagery for chronic pain, acceptance, forgiveness, and compassion, followed by affirmations for self-confidence, comfort, and against anxiety and depression  Psychoeducation, DBT-based, and group approaches were used to support the cohesion and maintain the serene environment for the exercise’s purpose  Assessment: Miah Da Silva followed through guidance and completed the exercises, showed willingness to continue learning and practicing self-care  Plan: Miah Da Silva will practice deep breathing and other techniques based on each occasion to counter stress and will attend the next group to be able to take part in further guided exercises on chosen topics to build on the techniques learned in the beginning  HPI     Past Medical History:   Diagnosis Date   • Bilateral carpal tunnel syndrome 08/20/2019   • Carpal tunnel syndrome of right wrist 10/30/2019    Added automatically from request for surgery [id number]   • COPD (chronic obstructive pulmonary disease) (HCC)    • Hypertension        Past Surgical History:   Procedure Laterality Date   • BREAST BIOPSY Left 20yrs ago benign   • OOPHORECTOMY Right    • [place] [facility] WRST SURG W/RLS TRANSVRS CARPL LIGM Right 1/21/2020    Procedure: RELEASE CARPAL TUNNEL ENDOSCOPIC;  Surgeon: Jeny Campo MD;  Location: [facility];  Service: Orthopedics   • [place] [address] Floors WRST SURG W/RLS TRANSVRS CARPL LIGM Left 1/28/2020    Procedure: RELEASE CARPAL TUNNEL ENDOSCOPIC;  Surgeon: Jeny Campo MD;  Location: [facility];  Service: Orthopedics       Current Outpatient Medications   Medication Sig Dispense Refill   • albuterol (PROVENTIL HFA,VENTOLIN HFA) 90 mcg/act inhaler inhale 2 puffs by mouth and INTO THE LUNGS every 6 hours IF NEEDED FOR WHEEZING   8 5 g 2   • benzonatate (TESSALON) 200 MG capsule Take 1 capsule (200 mg total) by mouth 3 (three) times a day as needed for cough 20 capsule 0   • cholecalciferol (VITAMIN D3) 400 units tablet Take 400 Units by mouth daily     • FLUoxetine (PROzac) 20 mg capsule take 2 capsules by mouth once daily 30 capsule 1   • fluticasone (FLONASE) 50 mcg/act nasal spray 1 spray into each nostril daily 16 g 2   • gabapentin (NEURONTIN) 100 mg capsule Take 1 capsule (100 mg total) by mouth 3 (three) times a day 90 capsule 1   • lamoTRIgine (LaMICtal) 200 MG tablet Take 1 tablet (200 mg total) by mouth daily 30 tablet 2   • levocetirizine (XYZAL) 5 MG tablet take 1 tablet by mouth every evening 30 tablet 4   • nicotine polacrilex (COMMIT) 4 MG lozenge Apply 1 lozenge (4 mg total) to the mouth or throat as needed for smoking cessation (Patient not taking: No sig reported) 100 each 5   • omeprazole (PriLOSEC) 20 mg delayed release capsule take 1 capsule by mouth twice a day 60 capsule 5   • predniSONE 10 mg tablet Take 3 tabs BID X 2 days, 2 tabs BID X 2 days, 1 tab BID X 2 days, 1 tab daily X 2 days 26 tablet 0   • senna-docusate sodium (SENOKOT-S) 8 6-50 mg per tablet Take 3 tablets by mouth as needed       • tiotropium-olodaterol (Stiolto Respimat) 2 5-2 5 MCG/ACT inhaler Inhale 2 puffs daily 4 g 11     No current facility-administered medications for this visit  No Known Allergies    Review of Systems    Video Exam    There were no vitals filed for this visit      Physical Exam     01/06/23  Start Time: 0400  Stop Time: 0500  Total Visit Time: 60 minutes

## 2023-01-09 ENCOUNTER — TELEMEDICINE (OUTPATIENT)
Dept: PSYCHIATRY | Facility: CLINIC | Age: 58
End: 2023-01-09

## 2023-01-09 DIAGNOSIS — F31.32 BIPOLAR AFFECTIVE DISORDER, CURRENTLY DEPRESSED, MODERATE (HCC): Primary | ICD-10-CM

## 2023-01-09 DIAGNOSIS — F41.1 GENERALIZED ANXIETY DISORDER: ICD-10-CM

## 2023-01-09 DIAGNOSIS — F12.90 MARIJUANA USE: ICD-10-CM

## 2023-01-09 DIAGNOSIS — F43.10 PTSD (POST-TRAUMATIC STRESS DISORDER): ICD-10-CM

## 2023-01-09 DIAGNOSIS — Z13.0 SCREENING FOR ENDOCRINE, NUTRITIONAL, METABOLIC AND IMMUNITY DISORDER: ICD-10-CM

## 2023-01-09 DIAGNOSIS — Z13.21 SCREENING FOR ENDOCRINE, NUTRITIONAL, METABOLIC AND IMMUNITY DISORDER: ICD-10-CM

## 2023-01-09 DIAGNOSIS — Z13.228 SCREENING FOR ENDOCRINE, NUTRITIONAL, METABOLIC AND IMMUNITY DISORDER: ICD-10-CM

## 2023-01-09 DIAGNOSIS — Z13.29 SCREENING FOR ENDOCRINE, NUTRITIONAL, METABOLIC AND IMMUNITY DISORDER: ICD-10-CM
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
## 2023-01-12 ENCOUNTER — TELEMEDICINE (OUTPATIENT)
Dept: BEHAVIORAL/MENTAL HEALTH CLINIC | Facility: CLINIC | Age: 58
End: 2023-01-12

## 2023-01-12 DIAGNOSIS — F31.81 MODERATE MIXED BIPOLAR II DISORDER (HCC): Primary | ICD-10-CM

## 2023-01-12 DIAGNOSIS — F10.11 ALCOHOL ABUSE, IN REMISSION: ICD-10-CM

## 2023-01-12 DIAGNOSIS — F12.90 MARIJUANA USE: Chronic | ICD-10-CM

## 2023-01-12 DIAGNOSIS — F17.210 CIGARETTE NICOTINE DEPENDENCE WITHOUT COMPLICATION: ICD-10-CM

## 2023-01-12 DIAGNOSIS — F43.10 PTSD (POST-TRAUMATIC STRESS DISORDER): ICD-10-CM

## 2023-01-12 DIAGNOSIS — F41.1 GENERALIZED ANXIETY DISORDER: ICD-10-CM

## 2023-01-12 NOTE — PSYCH
Virtual Regular Visit    Verification of patient location:    Patient is located in the following state in which I hold an active license [place]      Assessment/Plan:    Problem List Items Addressed This Visit        Other    Marijuana use (Chronic)    Moderate mixed bipolar II disorder (HCC) - Primary    Generalized anxiety disorder    PTSD (post-traumatic stress disorder)    Alcohol abuse, in remission    Cigarette nicotine dependence without complication       Goals addressed in session: Goal 1 and Goal 2          Reason for visit is No chief complaint on file  Encounter provider Maritza Paz [facility]    Provider located at [address] [place] [address]  190 [facility] [address] [phone]      Recent Visits  Date Type Provider Dept   01/06/23 [address] [address] [facility]   Showing recent visits within past 7 days and meeting all other requirements  Today's Visits  Date Type Provider Dept   01/12/23 229 [facility], [address] [place] [address]   Showing today's visits and meeting all other requirements  Future Appointments  No visits were found meeting these conditions  Showing future appointments within next 150 days and meeting all other requirements       The patient was identified by name and date of birth  Padmaja Fletcher was informed that this is a telemedicine visit and that the visit is being conducted throughthe [employer] platform  She agrees to proceed     My office door was closed  No one else was in the room  She acknowledged consent and understanding of privacy and security of the video platform  The patient has agreed to participate and understands they can discontinue the visit at any time  Patient is aware this is a billable service  Subjective  Padmaja Fletcher is a 56 y o  female      Data: Sylvia Waddell reported meeting with the new Doctor and the questions she answered and topics they discussed  Person-Centered, DBT-based, and TF-CBT methods helped Pt feel validated and reflected in compassionate way, to be able to reshape her narrative and self-talk, and to focus on the good memories and her gratitude to have such  Assessment: Sona Azul was very upset and had intense emotions throughout the session under the influence of her bitterness and resentment to move on  She showed resistance with the encouragement to focus on the happy memories and her gratitude and to dissolve it with love, through paradoxical intention  Plan: Sona Azul will use Mindfulness when possible to retrieve from notes, to be able to stay more neutral to intense emotions and will work on her trauma narrative  She will attend Fridays' Relaxation Groups to be able to finish the week with self-care, with Mindfulness mediation on letting go and connection with the inner child  HPI     Past Medical History:   Diagnosis Date   • Bilateral carpal tunnel syndrome 08/20/2019   • Carpal tunnel syndrome of right wrist 10/30/2019    Added automatically from request for surgery [id number]   • COPD (chronic obstructive pulmonary disease) (HCC)    • Hypertension        Past Surgical History:   Procedure Laterality Date   • BREAST BIOPSY Left 20yrs ago benign   • OOPHORECTOMY Right    • [place] NDSC WRST SURG W/RLS TRANSVRS CARPL LIGM Right 1/21/2020    Procedure: RELEASE CARPAL TUNNEL ENDOSCOPIC;  Surgeon: Alessandro Chino MD;  [facility];  Service: Orthopedics   • SC [address] [facility],2 And 3 S Floors WRST SURG W/RLS TRANSVRS CARPL LIGM Left 1/28/2020    Procedure: RELEASE CARPAL TUNNEL ENDOSCOPIC;  Surgeon: Alessandro Chino MD;  [facility];  Service: Orthopedics       Current Outpatient Medications   Medication Sig Dispense Refill   • albuterol (PROVENTIL HFA,VENTOLIN HFA) 90 mcg/act inhaler inhale 2 puffs by mouth and INTO THE LUNGS every 6 hours IF NEEDED FOR WHEEZING   8 5 g 2   • benzonatate (TESSALON) 200 MG capsule Take 1 capsule (200 mg total) by mouth 3 (three) times a day as needed for cough 20 capsule 0   • cholecalciferol (VITAMIN D3) 400 units tablet Take 400 Units by mouth daily     • FLUoxetine (PROzac) 20 mg capsule take 2 capsules by mouth once daily 30 capsule 1   • fluticasone (FLONASE) 50 mcg/act nasal spray 1 spray into each nostril daily 16 g 2   • gabapentin (NEURONTIN) 100 mg capsule Take 1 capsule (100 mg total) by mouth 3 (three) times a day 90 capsule 1   • lamoTRIgine (LaMICtal) 200 MG tablet Take 1 tablet (200 mg total) by mouth daily 30 tablet 2   • levocetirizine (XYZAL) 5 MG tablet take 1 tablet by mouth every evening 30 tablet 4   • nicotine polacrilex (COMMIT) 4 MG lozenge Apply 1 lozenge (4 mg total) to the mouth or throat as needed for smoking cessation (Patient not taking: No sig reported) 100 each 5   • omeprazole (PriLOSEC) 20 mg delayed release capsule take 1 capsule by mouth twice a day 60 capsule 5   • predniSONE 10 mg tablet Take 3 tabs BID X 2 days, 2 tabs BID X 2 days, 1 tab BID X 2 days, 1 tab daily X 2 days 26 tablet 0   • senna-docusate sodium (SENOKOT-S) 8 6-50 mg per tablet Take 3 tablets by mouth as needed       • tiotropium-olodaterol (Stiolto Respimat) 2 5-2 5 MCG/ACT inhaler Inhale 2 puffs daily 4 g 11     No current facility-administered medications for this visit  No Known Allergies    Review of Systems    Video Exam    There were no vitals filed for this visit      Physical Exam     01/12/23  Start Time: 5762  Stop Time: 5212  Total Visit Time: 40 minutes

## 2023-01-13 ENCOUNTER — TELEPHONE (OUTPATIENT)
Dept: PSYCHIATRY | Facility: CLINIC | Age: 58
End: 2023-01-13

## 2023-01-13 NOTE — TELEPHONE ENCOUNTER
Patient is calling regarding cancelling an appointment      Date/Time: 1/13 at 4 pm virtually    Reason: pt has flu shot apt    Patient was rescheduled: YES [] NO [x]  If yes, when was Patient reschedule for:     Patient requesting call back to reschedule: YES [] NO [x]

## 2023-01-19 ENCOUNTER — TELEMEDICINE (OUTPATIENT)
Dept: BEHAVIORAL/MENTAL HEALTH CLINIC | Facility: CLINIC | Age: 58
End: 2023-01-19

## 2023-01-19 DIAGNOSIS — F12.90 MARIJUANA USE: Chronic | ICD-10-CM

## 2023-01-19 DIAGNOSIS — F17.210 CIGARETTE NICOTINE DEPENDENCE WITHOUT COMPLICATION: ICD-10-CM

## 2023-01-19 DIAGNOSIS — F43.10 PTSD (POST-TRAUMATIC STRESS DISORDER): ICD-10-CM

## 2023-01-19 DIAGNOSIS — F10.11 ALCOHOL ABUSE, IN REMISSION: ICD-10-CM

## 2023-01-19 DIAGNOSIS — F31.81 MODERATE MIXED BIPOLAR II DISORDER (HCC): Primary | ICD-10-CM

## 2023-01-19 DIAGNOSIS — F41.1 GENERALIZED ANXIETY DISORDER: ICD-10-CM

## 2023-01-19 NOTE — PSYCH
Virtual Regular Visit    Verification of patient location:    Patient is located in the following state in which I hold an active license [place]      Assessment/Plan:    Problem List Items Addressed This Visit        Other    Marijuana use (Chronic)    Moderate mixed bipolar II disorder (HCC) - Primary    Generalized anxiety disorder    PTSD (post-traumatic stress disorder)    Alcohol abuse, in remission    Cigarette nicotine dependence without complication       Goals addressed in session: Goal 1 and Goal 2          Reason for visit is No chief complaint on file  Encounter provider Yaneth Chou [facility]    Provider located at [address] [place] [address]  190 [facility] [address] [phone]      Recent Visits  Date Type Provider Dept   01/13/23 Telephone Yaneth Chou, [address] [facility]   01/12/23 [address] [address] [facility]ist [facility]   Showing recent visits within past 7 days and meeting all other requirements  Today's Visits  Date Type Provider Dept   01/19/23 229 [facility], [address] [place] [address]   Showing today's visits and meeting all other requirements  Future Appointments  No visits were found meeting these conditions  Showing future appointments within next 150 days and meeting all other requirements       The patient was identified by name and date of birth  Ciarra Chairez was informed that this is a telemedicine visit and that the visit is being conducted throughthe [employer] platform  She agrees to proceed     My office door was closed  No one else was in the room  She acknowledged consent and understanding of privacy and security of the video platform  The patient has agreed to participate and understands they can discontinue the visit at any time  Patient is aware this is a billable service       Subjective  Ciarra Chairez is a 56 y o  female  Data: Amarilis Alfodr discussed her conversation with the Doctor and the stress impact combined with trauma  Person-Centered, DBT-based, and TF-CBT help Pt work on her narrative and handle her emotions with Mindfulness to be able to stay with her experience more neutral and be less affected by her intense emotions  Amarilis Alford tried to go over an example from her past where her daughter blamed her that she wasn't there for her while she was and tried hard to deal with her defending herself from that blame and admitted that she could use forgiveness "a little bit "     Assessment: Amarilis Alford seemed on edge, over-talkative, and pressured tone of voice  She presented with some irrational beliefs and thoughts stemming from conspiracy theories  Plan: Amarilis Alford will try and use Mindfulness between sessions to stay more neutral and work with self-compassion and forgiveness to react to trauma in less detrimental ways  Amarilis Alford will go over her notes about Emotion Regulation and Distress Tolerance and will refresh her memory about the discussed skill sets        HPI     Past Medical History:   Diagnosis Date   • Bilateral carpal tunnel syndrome 08/20/2019   • Carpal tunnel syndrome of right wrist 10/30/2019    Added automatically from request for surgery [id number]   • COPD (chronic obstructive pulmonary disease) (HCC)    • Hypertension        Past Surgical History:   Procedure Laterality Date   • BREAST BIOPSY Left 20yrs ago benign   • OOPHORECTOMY Right    • CT NDSC WRST SURG W/RLS TRANSVRS CARPL LIGM Right 1/21/2020    Procedure: RELEASE CARPAL TUNNEL ENDOSCOPIC;  Surgeon: Nasim Darby MD;  [facility]: [facility];  Service: Orthopedics   • CT [address] [facility],2 And 3 S Floors WRST SURG W/RLS TRANSVRS CARPL LIGM Left 1/28/2020    Procedure: RELEASE CARPAL TUNNEL ENDOSCOPIC;  Surgeon: Nasim Darby MD;  [facility];  Service: Orthopedics       Current Outpatient Medications   Medication Sig Dispense Refill   • albuterol ([place] HFA,VENTOLIN HFA) 90 mcg/act inhaler inhale 2 puffs by mouth and INTO THE LUNGS every 6 hours IF NEEDED FOR WHEEZING  8 5 g 2   • benzonatate (TESSALON) 200 MG capsule Take 1 capsule (200 mg total) by mouth 3 (three) times a day as needed for cough 20 capsule 0   • cholecalciferol (VITAMIN D3) 400 units tablet Take 400 Units by mouth daily     • FLUoxetine (PROzac) 20 mg capsule take 2 capsules by mouth once daily 30 capsule 1   • fluticasone (FLONASE) 50 mcg/act nasal spray 1 spray into each nostril daily 16 g 2   • gabapentin (NEURONTIN) 100 mg capsule Take 1 capsule (100 mg total) by mouth 3 (three) times a day 90 capsule 1   • lamoTRIgine (LaMICtal) 200 MG tablet Take 1 tablet (200 mg total) by mouth daily 30 tablet 2   • levocetirizine (XYZAL) 5 MG tablet take 1 tablet by mouth every evening 30 tablet 4   • nicotine polacrilex (COMMIT) 4 MG lozenge Apply 1 lozenge (4 mg total) to the mouth or throat as needed for smoking cessation (Patient not taking: No sig reported) 100 each 5   • omeprazole (PriLOSEC) 20 mg delayed release capsule take 1 capsule by mouth twice a day 60 capsule 5   • predniSONE 10 mg tablet Take 3 tabs BID X 2 days, 2 tabs BID X 2 days, 1 tab BID X 2 days, 1 tab daily X 2 days 26 tablet 0   • senna-docusate sodium (SENOKOT-S) 8 6-50 mg per tablet Take 3 tablets by mouth as needed       • tiotropium-olodaterol (Stiolto Respimat) 2 5-2 5 MCG/ACT inhaler Inhale 2 puffs daily 4 g 11     No current facility-administered medications for this visit  No Known Allergies    Review of Systems    Video Exam    There were no vitals filed for this visit      Physical Exam     01/19/23  Start Time: 1722  Stop Time: 6326  Total Visit Time: 47 minutes

## 2023-01-20 ENCOUNTER — TELEMEDICINE (OUTPATIENT)
Dept: BEHAVIORAL/MENTAL HEALTH CLINIC | Facility: CLINIC | Age: 58
End: 2023-01-20

## 2023-01-20 DIAGNOSIS — F10.11 ALCOHOL ABUSE, IN REMISSION: ICD-10-CM

## 2023-01-20 DIAGNOSIS — F31.81 MODERATE MIXED BIPOLAR II DISORDER (HCC): Primary | ICD-10-CM

## 2023-01-20 DIAGNOSIS — F43.10 PTSD (POST-TRAUMATIC STRESS DISORDER): ICD-10-CM

## 2023-01-20 DIAGNOSIS — F17.210 CIGARETTE NICOTINE DEPENDENCE WITHOUT COMPLICATION: ICD-10-CM

## 2023-01-20 DIAGNOSIS — F41.1 GENERALIZED ANXIETY DISORDER: ICD-10-CM

## 2023-01-20 NOTE — PSYCH
Virtual Regular Visit    Verification of patient location:    Patient is located in the following state in which I hold an active license [place]      Assessment/Plan:    Problem List Items Addressed This Visit        Other    Moderate mixed bipolar II disorder (HCC) - Primary    Generalized anxiety disorder    PTSD (post-traumatic stress disorder)    Alcohol abuse, in remission    Cigarette nicotine dependence without complication       Goals addressed in session: Goal 1 and Goal 2          Reason for visit is No chief complaint on file  Encounter provider Yaneth Chou [facility]    Provider located at [address] [facility]  190 [facility] [address] [phone]      Recent Visits  Date Type Provider Dept   01/19/23 229 [address]   01/13/23 Telephone Yaneth Chou, [address] recent visits within past 7 days and meeting all other requirements  Future Appointments  No visits were found meeting these conditions  Showing future appointments within next 150 days and meeting all other requirements       The patient was identified by name and date of birth  Ciarra Chairez was informed that this is a telemedicine visit and that the visit is being conducted [employer]  She agrees to proceed     My office door was closed  The patient was notified the following individuals were present in the room other group members  She acknowledged consent and understanding of privacy and security of the video platform  The patient has agreed to participate and understands they can discontinue the visit at any time  Patient is aware this is a billable service  Subjective  Ciarra Chairez is a 56 y o  female        Data: Ramirez Villatoro attended the Relaxation group and was guided through EFT tapping for anxiety, diaphragmatic breathing, humming, and progressive muscle relaxation  Furthermore, attendees were guided through mindfulness meditation with imagery for observing difficult moments with awareness, coping with chronic pain and self-judgment, followed by affirmations for self-compassion and against anxiety and depression  Psychoeducation, EFT, DBT-based, and group approach were used to support the cohesion and maintain the serene environment for the exercise’s purpose  Assessment: Demetria Ashbying followed through guidance and completed the exercises, showed willingness to continue learning and practicing self-care  Plan: Clydene Fulling will practice deep breathing and other techniques based on each occasion to counter stress and will attend the next group to be able to take part in further guided exercises on chosen topics to build on the techniques learned in the beginning  HPI     Past Medical History:   Diagnosis Date   • Bilateral carpal tunnel syndrome 08/20/2019   • Carpal tunnel syndrome of right wrist 10/30/2019    Added automatically from request for surgery [id number]   • COPD (chronic obstructive pulmonary disease) (HCC)    • Hypertension        Past Surgical History:   Procedure Laterality Date   • BREAST BIOPSY Left 20yrs ago benign   • OOPHORECTOMY Right    • [place] NDSC WRST SURG W/RLS TRANSVRS CARPL LIGM Right 1/21/2020    Procedure: RELEASE CARPAL TUNNEL ENDOSCOPIC;  Surgeon: Truman Davison MD;  Location[facility];  Service: Orthopedics   • OR [address] [facility],2 And 3 S Floors WRST SURG W/RLS TRANSVRS CARPL LIGM Left 1/28/2020    Procedure: RELEASE CARPAL TUNNEL ENDOSCOPIC;  Surgeon: Truman Davison MD;  [facility];  Service: Orthopedics       Current Outpatient Medications   Medication Sig Dispense Refill   • albuterol (PROVENTIL HFA,VENTOLIN HFA) 90 mcg/act inhaler inhale 2 puffs by mouth and INTO THE LUNGS every 6 hours IF NEEDED FOR WHEEZING   8 5 g 2   • benzonatate (TESSALON) 200 MG capsule Take 1 capsule (200 mg total) by mouth 3 (three) times a day as needed for cough 20 capsule 0   • cholecalciferol (VITAMIN D3) 400 units tablet Take 400 Units by mouth daily     • FLUoxetine (PROzac) 20 mg capsule take 2 capsules by mouth once daily 30 capsule 1   • fluticasone (FLONASE) 50 mcg/act nasal spray 1 spray into each nostril daily 16 g 2   • gabapentin (NEURONTIN) 100 mg capsule Take 1 capsule (100 mg total) by mouth 3 (three) times a day 90 capsule 1   • lamoTRIgine (LaMICtal) 200 MG tablet Take 1 tablet (200 mg total) by mouth daily 30 tablet 2   • levocetirizine (XYZAL) 5 MG tablet take 1 tablet by mouth every evening 30 tablet 4   • nicotine polacrilex (COMMIT) 4 MG lozenge Apply 1 lozenge (4 mg total) to the mouth or throat as needed for smoking cessation (Patient not taking: No sig reported) 100 each 5   • omeprazole (PriLOSEC) 20 mg delayed release capsule take 1 capsule by mouth twice a day 60 capsule 5   • predniSONE 10 mg tablet Take 3 tabs BID X 2 days, 2 tabs BID X 2 days, 1 tab BID X 2 days, 1 tab daily X 2 days 26 tablet 0   • senna-docusate sodium (SENOKOT-S) 8 6-50 mg per tablet Take 3 tablets by mouth as needed       • tiotropium-olodaterol (Stiolto Respimat) 2 5-2 5 MCG/ACT inhaler Inhale 2 puffs daily 4 g 11     No current facility-administered medications for this visit  No Known Allergies    Review of Systems    Video Exam    There were no vitals filed for this visit      Physical Exam     01/20/23  Start Time: 0400  Stop Time: 0500  Total Visit Time: 60 minutes

## 2023-01-25 ENCOUNTER — TELEMEDICINE (OUTPATIENT)
Dept: BEHAVIORAL/MENTAL HEALTH CLINIC | Facility: CLINIC | Age: 58
End: 2023-01-25

## 2023-01-25 DIAGNOSIS — F31.81 MODERATE MIXED BIPOLAR II DISORDER (HCC): Primary | ICD-10-CM

## 2023-01-25 DIAGNOSIS — F12.90 MARIJUANA USE: Chronic | ICD-10-CM

## 2023-01-25 DIAGNOSIS — F43.10 PTSD (POST-TRAUMATIC STRESS DISORDER): ICD-10-CM

## 2023-01-25 DIAGNOSIS — F41.1 GENERALIZED ANXIETY DISORDER: ICD-10-CM

## 2023-01-25 DIAGNOSIS — F17.210 CIGARETTE NICOTINE DEPENDENCE WITHOUT COMPLICATION: ICD-10-CM

## 2023-01-25 DIAGNOSIS — F10.11 ALCOHOL ABUSE, IN REMISSION: ICD-10-CM

## 2023-01-25 NOTE — PSYCH
Virtual Regular Visit    Verification of patient location:    Patient is located in the following state in which I hold an active license [place]      Assessment/Plan:    Problem List Items Addressed This Visit        Other    Marijuana use (Chronic)    Moderate mixed bipolar II disorder (HCC) - Primary    Generalized anxiety disorder    PTSD (post-traumatic stress disorder)    Alcohol abuse, in remission    Cigarette nicotine dependence without complication       Goals addressed in session: Goal 1 and Goal 2          Reason for visit is No chief complaint on file  Encounter provider FARHAN Turpin [facility]    Provider located at [address] [place] [address]  190 [facility] [address] [phone]      Recent Visits  Date Type Provider Dept   01/20/23 229 [facility], [address] [place] [address]   01/19/23 229 [facility], [address]  [facility] Therapist [place]   Showing recent visits within past 7 days and meeting all other requirements  Future Appointments  No visits were found meeting these conditions  Showing future appointments within next 150 days and meeting all other requirements       The patient was identified by name and date of birth  Jorje Stock was informed that this is a telemedicine visit and that the visit is being conducted throughthe [employer] platform  She agrees to proceed     My office door was closed  No one else was in the room  She acknowledged consent and understanding of privacy and security of the video platform  The patient has agreed to participate and understands they can discontinue the visit at any time  Patient is aware this is a billable service  Subjective  Jorje Stock is a 56 y o  female        Data: Dany Naranjo discussed her past week's experience with intense emotions and her struggle regulating her mood when upset and remembering her ex- and her daughter  Person-Centered, DBT-based, existential, choice/control, and TF-CBT techniques helped Pt regulate her emotions and work on her narrative  Assessment: Zahra Jiang sounded positive, friendly, and energetic  She was able to focus on important topics but showed very poor short-term memory  Zahra Jiang was easily distracted yet self-aware about it and prepared to make notes  Plan: Zahra Jiang will use Mindfulness, will sit with her intense emotions, observe them, and identify bodily sensations associated with them and stay neutral  She will attend weekly individual and weekly Relaxation Group to handle stress levels and be able to calm down and go through exercises  Zahra Jiang will journal to keep track of her activities and to have something to refer to when wondering about her recent experience  HPI     Past Medical History:   Diagnosis Date   • Bilateral carpal tunnel syndrome 08/20/2019   • Carpal tunnel syndrome of right wrist 10/30/2019    Added automatically from request for surgery [id number]   • COPD (chronic obstructive pulmonary disease) (HCC)    • Hypertension        Past Surgical History:   Procedure Laterality Date   • BREAST BIOPSY Left 20yrs ago benign   • OOPHORECTOMY Right    • [place] NDSC WRST SURG W/RLS TRANSVRS CARPL LIGM Right 1/21/2020    Procedure: RELEASE CARPAL TUNNEL ENDOSCOPIC;  Surgeon: Gayla Richards MD;  Location: [facility];  Service: Orthopedics   • NE [address] [facility],2 And 3 S Floors WRST SURG W/RLS TRANSVRS CARPL LIGM Left 1/28/2020    Procedure: RELEASE CARPAL TUNNEL ENDOSCOPIC;  Surgeon: Gayla Richards MD;  Location: [facility];  Service: Orthopedics       Current Outpatient Medications   Medication Sig Dispense Refill   • albuterol (PROVENTIL HFA,VENTOLIN HFA) 90 mcg/act inhaler inhale 2 puffs by mouth and INTO THE LUNGS every 6 hours IF NEEDED FOR WHEEZING   8 5 g 2   • benzonatate (TESSALON) 200 MG capsule Take 1 capsule (200 mg total) by mouth 3 (three) times a day as needed for cough 20 capsule 0 • cholecalciferol (VITAMIN D3) 400 units tablet Take 400 Units by mouth daily     • FLUoxetine (PROzac) 20 mg capsule take 2 capsules by mouth once daily 30 capsule 1   • fluticasone (FLONASE) 50 mcg/act nasal spray 1 spray into each nostril daily 16 g 2   • gabapentin (NEURONTIN) 100 mg capsule Take 1 capsule (100 mg total) by mouth 3 (three) times a day 90 capsule 1   • lamoTRIgine (LaMICtal) 200 MG tablet Take 1 tablet (200 mg total) by mouth daily 30 tablet 2   • levocetirizine (XYZAL) 5 MG tablet take 1 tablet by mouth every evening 30 tablet 4   • nicotine polacrilex (COMMIT) 4 MG lozenge Apply 1 lozenge (4 mg total) to the mouth or throat as needed for smoking cessation (Patient not taking: No sig reported) 100 each 5   • omeprazole (PriLOSEC) 20 mg delayed release capsule take 1 capsule by mouth twice a day 60 capsule 5   • predniSONE 10 mg tablet Take 3 tabs BID X 2 days, 2 tabs BID X 2 days, 1 tab BID X 2 days, 1 tab daily X 2 days 26 tablet 0   • senna-docusate sodium (SENOKOT-S) 8 6-50 mg per tablet Take 3 tablets by mouth as needed       • tiotropium-olodaterol (Stiolto Respimat) 2 5-2 5 MCG/ACT inhaler Inhale 2 puffs daily 4 g 11     No current facility-administered medications for this visit  No Known Allergies    Review of Systems    Video Exam    There were no vitals filed for this visit      Physical Exam       01/25/23  Start Time: 2895  Stop Time: 1240  Total Visit Time: 35 minutes

## 2023-01-26 ENCOUNTER — TELEPHONE (OUTPATIENT)
Dept: PSYCHIATRY | Facility: CLINIC | Age: 58
End: 2023-01-26

## 2023-01-26 NOTE — TELEPHONE ENCOUNTER
Medical record request was received from [address] [place] [address] for the time frame of 10/1/2021 to present   Will be placed in resident mailbox to be signed by Dr Felicia Benedict

## 2023-01-26 NOTE — TELEPHONE ENCOUNTER
Another medical record request was received from [facility] for time frame of 1/1/2022 to present   Will be placed in [address] [place] [address] by the end of the day

## 2023-01-27 ENCOUNTER — TELEMEDICINE (OUTPATIENT)
Dept: BEHAVIORAL/MENTAL HEALTH CLINIC | Facility: CLINIC | Age: 58
End: 2023-01-27

## 2023-01-27 DIAGNOSIS — F10.11 ALCOHOL ABUSE, IN REMISSION: ICD-10-CM

## 2023-01-27 DIAGNOSIS — F41.1 GENERALIZED ANXIETY DISORDER: ICD-10-CM

## 2023-01-27 DIAGNOSIS — F43.10 PTSD (POST-TRAUMATIC STRESS DISORDER): ICD-10-CM

## 2023-01-27 DIAGNOSIS — F31.81 MODERATE MIXED BIPOLAR II DISORDER (HCC): Primary | ICD-10-CM

## 2023-01-27 DIAGNOSIS — F12.90 MARIJUANA USE: Chronic | ICD-10-CM

## 2023-01-27 NOTE — PSYCH
Virtual Regular Visit    Verification of patient location:    Patient is located in the following state in which I hold an active license [place]      Assessment/Plan:    Problem List Items Addressed This Visit        Other    Marijuana use (Chronic)    Moderate mixed bipolar II disorder (HCC) - Primary    Generalized anxiety disorder    PTSD (post-traumatic stress disorder)    Alcohol abuse, in remission       Goals addressed in session: Goal 1 and Goal 2          Reason for visit is No chief complaint on file  Encounter provider Erin Shelton [facility]    Provider located at [address] [place] [address]  [address] [facility] [id number] [phone]      Recent Visits  Date Type Provider Dept   01/25/23 229 [facility], [address] [place] [address]   01/20/23 229 [facility]   Showing recent visits within past 7 days and meeting all other requirements  Future Appointments  No visits were found meeting these conditions  Showing future appointments within next 150 days and meeting all other requirements       The patient was identified by name and date of birth  Yanira Whitman was informed that this is a telemedicine visit and that the visit is being conducted [employer]  She agrees to proceed     My office door was closed  The patient was notified the following individuals were present in the room other group members  She acknowledged consent and understanding of privacy and security of the video platform  The patient has agreed to participate and understands they can discontinue the visit at any time  Patient is aware this is a billable service  Subjective  Yanira Whitman is a 56 y o  female        Data: [facility] attended the Relaxation group and was guided through EFT tapping for anxiety, diaphragmatic breathing, humming, and progressive muscle relaxation  Furthermore, attendees were guided through mindfulness meditation with imagery for a quick body scan and attending to unpleasant feelings and providing self-confidence and compassion, followed by affirmations for forgiveness and against anxiety and depression  Psychoeducation, EFT, Mindfulness-based, and group approach were used to support the cohesion and maintain the serene environment for the exercise’s purpose  Assessment:Marie  followed through guidance and completed the exercises, showed willingness to continue learning and practicing self-care  Plan: Dre Eason will practice deep breathing and other techniques based on each occasion to counter stress and will attend the next group to be able to take part in further guided exercises on chosen topics to build on the techniques learned in the beginning  HPI     Past Medical History:   Diagnosis Date   • Bilateral carpal tunnel syndrome 08/20/2019   • Carpal tunnel syndrome of right wrist 10/30/2019    Added automatically from request for surgery [id number]   • COPD (chronic obstructive pulmonary disease) (HCC)    • Hypertension        Past Surgical History:   Procedure Laterality Date   • BREAST BIOPSY Left 20yrs ago benign   • OOPHORECTOMY Right    • [place] NDSC WRST SURG W/RLS TRANSVRS CARPL LIGM Right 1/21/2020    Procedure: RELEASE CARPAL TUNNEL ENDOSCOPIC;  Surgeon: Tramaine Ramon MD;  [facility];  Service: Orthopedics   • [place] [facility],2 And 3 S Floors WRST SURG W/RLS TRANSVRS CARPL LIGM Left 1/28/2020    Procedure: RELEASE CARPAL TUNNEL ENDOSCOPIC;  Surgeon: Tramaine Ramon MD;  Location: [facility];  Service: Orthopedics       Current Outpatient Medications   Medication Sig Dispense Refill   • albuterol (PROVENTIL HFA,VENTOLIN HFA) 90 mcg/act inhaler inhale 2 puffs by mouth and INTO THE LUNGS every 6 hours IF NEEDED FOR WHEEZING   8 5 g 2   • benzonatate (TESSALON) 200 MG capsule Take 1 capsule (200 mg total) by mouth 3 (three) times a day as needed for cough 20 capsule 0   • cholecalciferol (VITAMIN D3) 400 units tablet Take 400 Units by mouth daily     • FLUoxetine (PROzac) 20 mg capsule take 2 capsules by mouth once daily 30 capsule 1   • fluticasone (FLONASE) 50 mcg/act nasal spray 1 spray into each nostril daily 16 g 2   • gabapentin (NEURONTIN) 100 mg capsule Take 1 capsule (100 mg total) by mouth 3 (three) times a day 90 capsule 1   • lamoTRIgine (LaMICtal) 200 MG tablet Take 1 tablet (200 mg total) by mouth daily 30 tablet 2   • levocetirizine (XYZAL) 5 MG tablet take 1 tablet by mouth every evening 30 tablet 4   • nicotine polacrilex (COMMIT) 4 MG lozenge Apply 1 lozenge (4 mg total) to the mouth or throat as needed for smoking cessation (Patient not taking: No sig reported) 100 each 5   • omeprazole (PriLOSEC) 20 mg delayed release capsule take 1 capsule by mouth twice a day 60 capsule 5   • predniSONE 10 mg tablet Take 3 tabs BID X 2 days, 2 tabs BID X 2 days, 1 tab BID X 2 days, 1 tab daily X 2 days 26 tablet 0   • senna-docusate sodium (SENOKOT-S) 8 6-50 mg per tablet Take 3 tablets by mouth as needed       • tiotropium-olodaterol (Stiolto Respimat) 2 5-2 5 MCG/ACT inhaler Inhale 2 puffs daily 4 g 11     No current facility-administered medications for this visit  No Known Allergies    Review of Systems    Video Exam    There were no vitals filed for this visit      Physical Exam     01/27/23  Start Time: 0400  Stop Time: 0500  Total Visit Time: 60 minutes

## 2023-01-30 RX ORDER — FLUOXETINE HYDROCHLORIDE 20 MG/1
CAPSULE ORAL
Qty: 30 CAPSULE | Refills: 1 | Status: SHIPPED | OUTPATIENT
Start: 2023-01-30

## 2023-02-02 ENCOUNTER — TELEMEDICINE (OUTPATIENT)
Dept: BEHAVIORAL/MENTAL HEALTH CLINIC | Facility: CLINIC | Age: 58
End: 2023-02-02

## 2023-02-02 DIAGNOSIS — F43.10 PTSD (POST-TRAUMATIC STRESS DISORDER): ICD-10-CM

## 2023-02-02 DIAGNOSIS — F31.81 MODERATE MIXED BIPOLAR II DISORDER (HCC): Primary | ICD-10-CM

## 2023-02-02 DIAGNOSIS — F10.11 ALCOHOL ABUSE, IN REMISSION: ICD-10-CM

## 2023-02-02 DIAGNOSIS — F17.210 CIGARETTE NICOTINE DEPENDENCE WITHOUT COMPLICATION: ICD-10-CM

## 2023-02-02 DIAGNOSIS — F41.1 GENERALIZED ANXIETY DISORDER: ICD-10-CM

## 2023-02-02 NOTE — PSYCH
Virtual Regular Visit    Verification of patient location:    Patient is located in the following state in which I hold an active license [place]      Assessment/Plan:    Problem List Items Addressed This Visit        Other    Moderate mixed bipolar II disorder (HCC) - Primary    Generalized anxiety disorder    PTSD (post-traumatic stress disorder)    Alcohol abuse, in remission    Cigarette nicotine dependence without complication       Goals addressed in session: Goal 1 and Goal 2          Reason for visit is No chief complaint on file  Encounter provider FARHAN Melgar [facility]    Provider located at [address] [place] [id number] [phone]      Recent Visits  Date Type Provider Dept   01/27/23 229 [facility], [address] [facility] Therapist [place]   Showing recent visits within past 7 days and meeting all other requirements  Future Appointments  No visits were found meeting these conditions  Showing future appointments within next 150 days and meeting all other requirements       The patient was identified by name and date of birth  Farhana Travis was informed that this is a telemedicine visit and that the visit is being conducted throughthe [employer] platform  She agrees to proceed     My office door was closed  No one else was in the room  She acknowledged consent and understanding of privacy and security of the video platform  The patient has agreed to participate and understands they can discontinue the visit at any time  Patient is aware this is a billable service  Subjective  Farhana Travis is a 56 y o  female  Data: Jenny Woo reported having a "rough week "  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 who suffer from serious illnesses and showed compassion and genuine care   Person-Centered, DBT-based, and TF-CBT techniques helped Pt focus on emotion regulation and identify areas of further work  Assessment: Jenny Woo sounded in a good spirits and optimistic  She showed great deal of empathy and genuine care, altruism toward her friends  Plan: Jenny Woo will use Mindfulness and Emotion Regulation skills to handle the intensity better and will attend the Relaxation Group on Fridays to finish the week with self-care  HPI     Past Medical History:   Diagnosis Date   • Bilateral carpal tunnel syndrome 08/20/2019   • Carpal tunnel syndrome of right wrist 10/30/2019    Added automatically from request for surgery [id number]   • COPD (chronic obstructive pulmonary disease) (HCC)    • Hypertension        Past Surgical History:   Procedure Laterality Date   • BREAST BIOPSY Left 20yrs ago benign   • OOPHORECTOMY Right    • [place] NDSC WRST SURG W/RLS TRANSVRS CARPL LIGM Right 1/21/2020    Procedure: RELEASE CARPAL TUNNEL ENDOSCOPIC;  Surgeon: Areli Dickerson MD;  Location: [facility];  Service: Orthopedics   • [place] [facility] [facility],2 And 3 S Floors WRST SURG W/RLS TRANSVRS CARPL LIGM Left 1/28/2020    Procedure: RELEASE CARPAL TUNNEL ENDOSCOPIC;  Surgeon: Areli Dickerson MD;  Location: [facility];  Service: Orthopedics       Current Outpatient Medications   Medication Sig Dispense Refill   • albuterol (PROVENTIL HFA,VENTOLIN HFA) 90 mcg/act inhaler inhale 2 puffs by mouth and INTO THE LUNGS every 6 hours IF NEEDED FOR WHEEZING   8 5 g 2   • benzonatate (TESSALON) 200 MG capsule Take 1 capsule (200 mg total) by mouth 3 (three) times a day as needed for cough 20 capsule 0   • cholecalciferol (VITAMIN D3) 400 units tablet Take 400 Units by mouth daily     • FLUoxetine (PROzac) 20 mg capsule take 2 capsules by mouth once daily 30 capsule 1   • fluticasone (FLONASE) 50 mcg/act nasal spray 1 spray into each nostril daily 16 g 2   • gabapentin (NEURONTIN) 100 mg capsule Take 1 capsule (100 mg total) by mouth 3 (three) times a day 90 capsule 1   • lamoTRIgine (LaMICtal) 200 MG tablet Take 1 tablet (200 mg total) by mouth daily 30 tablet 2   • levocetirizine (XYZAL) 5 MG tablet take 1 tablet by mouth every evening 30 tablet 4   • nicotine polacrilex (COMMIT) 4 MG lozenge Apply 1 lozenge (4 mg total) to the mouth or throat as needed for smoking cessation (Patient not taking: No sig reported) 100 each 5   • omeprazole (PriLOSEC) 20 mg delayed release capsule take 1 capsule by mouth twice a day 60 capsule 5   • predniSONE 10 mg tablet Take 3 tabs BID X 2 days, 2 tabs BID X 2 days, 1 tab BID X 2 days, 1 tab daily X 2 days 26 tablet 0   • senna-docusate sodium (SENOKOT-S) 8 6-50 mg per tablet Take 3 tablets by mouth as needed       • tiotropium-olodaterol (Stiolto Respimat) 2 5-2 5 MCG/ACT inhaler Inhale 2 puffs daily 4 g 11     No current facility-administered medications for this visit  No Known Allergies    Review of Systems    Video Exam    There were no vitals filed for this visit      Physical Exam     02/02/23  Start Time: 0266  Stop Time: 5147  Total Visit Time: 39 minutes Private car

## 2023-02-03 ENCOUNTER — TELEMEDICINE (OUTPATIENT)
Dept: BEHAVIORAL/MENTAL HEALTH CLINIC | Facility: CLINIC | Age: 58
End: 2023-02-03

## 2023-02-03 DIAGNOSIS — F10.11 ALCOHOL ABUSE, IN REMISSION: ICD-10-CM

## 2023-02-03 DIAGNOSIS — F43.10 PTSD (POST-TRAUMATIC STRESS DISORDER): Primary | ICD-10-CM

## 2023-02-03 DIAGNOSIS — F41.1 GENERALIZED ANXIETY DISORDER: ICD-10-CM

## 2023-02-03 DIAGNOSIS — F17.210 CIGARETTE NICOTINE DEPENDENCE WITHOUT COMPLICATION: ICD-10-CM

## 2023-02-03 DIAGNOSIS — F31.81 MODERATE MIXED BIPOLAR II DISORDER (HCC): ICD-10-CM

## 2023-02-03 DIAGNOSIS — F12.90 MARIJUANA USE: Chronic | ICD-10-CM

## 2023-02-03 NOTE — PSYCH
Virtual Regular Visit    Verification of patient location:    Patient is located in the following state in which I hold an [place] license [place]      Assessment/Plan:    Problem List Items Addressed This Visit        Other    Marijuana use (Chronic)    Moderate mixed bipolar II disorder (HCC)    Generalized anxiety disorder    PTSD (post-traumatic stress disorder) - Primary    Alcohol abuse, in remission    Cigarette nicotine dependence without complication       Goals addressed in session: Goal 1          Reason for visit is No chief complaint on file  Encounter provider Sánchez Khalil [facility]    Provider located at [address] [place] [id number] [phone]      Recent Visits  Date Type Provider Dept   02/02/23 229 [facility], [address] [place] [address]   01/27/23 229 [facility], [place] [address] [facility] Therapist [facility]   Showing recent visits within past 7 days and meeting all other requirements  Future Appointments  No visits were found meeting these conditions  Showing future appointments within next 150 days and meeting all other requirements       The patient was identified by name and date of birth  Manda Bass was informed that this is a telemedicine visit and that the visit is being conducted [employer]  She agrees to proceed     My office door was closed  The patient was notified the following individuals were present in the room other group members  She acknowledged consent and understanding of privacy and security of the video platform  The patient has agreed to participate and understands they can discontinue the visit at any time  Patient is aware this is a billable service  Subjective  Manda Bass is a 56 y o  female        Data: Tres Gan attended the Relaxation group and was guided through EFT tapping for anxiety, diaphragmatic breathing, humming, and progressive muscle relaxation  Furthermore, attendees were guided through mindfulness meditation with imagery for a quick body scan and attending to physical and psychological pain, followed by manifestation meditation and affirmations against anxiety and depression  Psychoeducation, EFT, Mindfulness-based, and group approach were used to support the cohesion and maintain the serene environment for the exercise’s purpose  Assessment: Melanie Sacnhez followed through guidance and completed the exercises, showed willingness to continue learning and practicing self-care  Plan: Melanie Sanchez will practice deep breathing and other techniques based on each occasion to counter stress and will attend the next group to be able to take part in further guided exercises on chosen topics to build on the techniques learned in the beginning  HPI     Past Medical History:   Diagnosis Date   • Bilateral carpal tunnel syndrome 08/20/2019   • Carpal tunnel syndrome of right wrist 10/30/2019    Added automatically from request for surgery [id number]   • COPD (chronic obstructive pulmonary disease) (HCC)    • Hypertension        Past Surgical History:   Procedure Laterality Date   • BREAST BIOPSY Left 20yrs ago benign   • OOPHORECTOMY Right    • [place] NDSC WRST SURG W/RLS TRANSVRS CARPL LIGM Right 1/21/2020    Procedure: RELEASE CARPAL TUNNEL ENDOSCOPIC;  Surgeon: Blayne Lezama MD;  Location: [facility];  Service: Orthopedics   • [place] [facility] [facility],2 And 3 S Floors WRST SURG W/RLS TRANSVRS CARPL LIGM Left 1/28/2020    Procedure: RELEASE CARPAL TUNNEL ENDOSCOPIC;  Surgeon: Blayne Lezama MD;  Location: [facility];  Service: Orthopedics       Current Outpatient Medications   Medication Sig Dispense Refill   • albuterol (PROVENTIL HFA,VENTOLIN HFA) 90 mcg/act inhaler inhale 2 puffs by mouth and INTO THE LUNGS every 6 hours IF NEEDED FOR WHEEZING   8 5 g 2   • benzonatate (TESSALON) 200 MG capsule Take 1 capsule (200 mg total) by mouth 3 (three) times a day as needed for cough 20 capsule 0   • cholecalciferol (VITAMIN D3) 400 units tablet Take 400 Units by mouth daily     • FLUoxetine (PROzac) 20 mg capsule take 2 capsules by mouth once daily 30 capsule 1   • fluticasone (FLONASE) 50 mcg/act nasal spray 1 spray into each nostril daily 16 g 2   • gabapentin (NEURONTIN) 100 mg capsule Take 1 capsule (100 mg total) by mouth 3 (three) times a day 90 capsule 1   • lamoTRIgine (LaMICtal) 200 MG tablet Take 1 tablet (200 mg total) by mouth daily 30 tablet 2   • levocetirizine (XYZAL) 5 MG tablet take 1 tablet by mouth every evening 30 tablet 4   • nicotine polacrilex (COMMIT) 4 MG lozenge Apply 1 lozenge (4 mg total) to the mouth or throat as needed for smoking cessation (Patient not taking: No sig reported) 100 each 5   • omeprazole (PriLOSEC) 20 mg delayed release capsule take 1 capsule by mouth twice a day 60 capsule 5   • predniSONE 10 mg tablet Take 3 tabs BID X 2 days, 2 tabs BID X 2 days, 1 tab BID X 2 days, 1 tab daily X 2 days 26 tablet 0   • senna-docusate sodium (SENOKOT-S) 8 6-50 mg per tablet Take 3 tablets by mouth as needed       • tiotropium-olodaterol (Stiolto Respimat) 2 5-2 5 MCG/ACT inhaler Inhale 2 puffs daily 4 g 11     No current facility-administered medications for this visit  No Known Allergies    Review of Systems    Video Exam    There were no vitals filed for this visit      Physical Exam       02/03/23  Start Time: 0400  Stop Time: 0500  Total Visit Time: 60 minutes

## 2023-02-09 ENCOUNTER — TELEMEDICINE (OUTPATIENT)
Dept: BEHAVIORAL/MENTAL HEALTH CLINIC | Facility: CLINIC | Age: 58
End: 2023-02-09

## 2023-02-09 DIAGNOSIS — F41.1 GENERALIZED ANXIETY DISORDER: ICD-10-CM

## 2023-02-09 DIAGNOSIS — F43.10 PTSD (POST-TRAUMATIC STRESS DISORDER): ICD-10-CM

## 2023-02-09 DIAGNOSIS — F31.81 MODERATE MIXED BIPOLAR II DISORDER (HCC): Primary | ICD-10-CM

## 2023-02-09 DIAGNOSIS — F10.11 ALCOHOL ABUSE, IN REMISSION: ICD-10-CM

## 2023-02-09 DIAGNOSIS — F12.90 MARIJUANA USE: Chronic | ICD-10-CM

## 2023-02-09 DIAGNOSIS — F17.210 CIGARETTE NICOTINE DEPENDENCE WITHOUT COMPLICATION: ICD-10-CM

## 2023-02-10 ENCOUNTER — TELEPHONE (OUTPATIENT)
Dept: PSYCHIATRY | Facility: CLINIC | Age: 58
End: 2023-02-10

## 2023-02-10 NOTE — TELEPHONE ENCOUNTER
Two medical records request (Minor Dramelia) wereprocessed 2/8/23  One from Dr Jazmyn Lam and the other from [facility]

## 2023-02-14 ENCOUNTER — TELEMEDICINE (OUTPATIENT)
Dept: BEHAVIORAL/MENTAL HEALTH CLINIC | Facility: CLINIC | Age: 58
End: 2023-02-14

## 2023-02-14 DIAGNOSIS — F43.10 PTSD (POST-TRAUMATIC STRESS DISORDER): ICD-10-CM

## 2023-02-14 DIAGNOSIS — F10.11 ALCOHOL ABUSE, IN REMISSION: ICD-10-CM

## 2023-02-14 DIAGNOSIS — F31.81 MODERATE MIXED BIPOLAR II DISORDER (HCC): Primary | ICD-10-CM

## 2023-02-14 DIAGNOSIS — F41.1 GENERALIZED ANXIETY DISORDER: ICD-10-CM

## 2023-02-14 NOTE — PSYCH
Virtual Regular Visit    Verification of patient location:    Patient is located in the following state in which I hold an [place] license [place]      Assessment/Plan:    Problem List Items Addressed This Visit        Other    Moderate mixed bipolar II disorder (HCC) - Primary    Generalized anxiety disorder    PTSD (post-traumatic stress disorder)    Alcohol abuse, in remission       Goals addressed in session: Goal 1 and Goal 2          Reason for visit is No chief complaint on file  Encounter provider [facility]    Provider located at [address] [place] [id number] [phone]      Recent Visits  Date Type Provider Dept   02/09/23 229 [facility], [address]  [facility] Therapist Heriberto   Showing recent visits within past 7 days and meeting all other requirements  Today's Visits  Date Type Provider Dept   02/14/23 229 [facility], [address] [place] [address]   Showing today's visits and meeting all other requirements  Future Appointments  No visits were found meeting these conditions  Showing future appointments within next 150 days and meeting all other requirements       The patient was identified by name and date of birth  Matty Sher was informed that this is a telemedicine visit and that the visit is being conducted throughthe [employer] platform  She agrees to proceed     My office door was closed  No one else was in the room  She acknowledged consent and understanding of privacy and security of the video platform  The patient has agreed to participate and understands they can discontinue the visit at any time  Patient is aware this is a billable service  Subjective  Matty Sher is a 56 y o  female  Data: Merlyn Dafne reported frustration with her memory issues and handling emotions   Person-Centered, CBT, and DBT-based techniques helped Pt to work on negative self-talk and unique self-soothing phrases, statements to improve thought content and self-talk to produce better emotions and behaviors  Assessment: Chris Orellana seemed very anxious, over-talkative, interrupting, and emotional  She was though very positive  Plan: Chris Orellana will read about EMDR, Brainspotting, and ART and will discuss ginseng supplements with her doctors  She will attend the Relaxation Group on Fridays to complete Mindfulness meditation for helpful topics and practice evidence-based exercises to calm and be at ease  HPI     Past Medical History:   Diagnosis Date   • Bilateral carpal tunnel syndrome 08/20/2019   • Carpal tunnel syndrome of right wrist 10/30/2019    Added automatically from request for surgery [id number]   • COPD (chronic obstructive pulmonary disease) (HCC)    • Hypertension        Past Surgical History:   Procedure Laterality Date   • BREAST BIOPSY Left 20yrs ago benign   • OOPHORECTOMY Right    • [place] NDSC WRST SURG W/RLS TRANSVRS CARPL LIGM Right 1/21/2020    Procedure: RELEASE CARPAL TUNNEL ENDOSCOPIC;  Surgeon: Katia Roldan MD;  Location: [facility];  Service: Orthopedics   • [place] [facility] [facility],2 And 3 S Floors WRST SURG W/RLS TRANSVRS CARPL LIGM Left 1/28/2020    Procedure: RELEASE CARPAL TUNNEL ENDOSCOPIC;  Surgeon: Katia Roldan MD;  Location: [facility];  Service: Orthopedics       Current Outpatient Medications   Medication Sig Dispense Refill   • albuterol (PROVENTIL HFA,VENTOLIN HFA) 90 mcg/act inhaler inhale 2 puffs by mouth and INTO THE LUNGS every 6 hours IF NEEDED FOR WHEEZING   8 5 g 2   • benzonatate (TESSALON) 200 MG capsule Take 1 capsule (200 mg total) by mouth 3 (three) times a day as needed for cough 20 capsule 0   • cholecalciferol (VITAMIN D3) 400 units tablet Take 400 Units by mouth daily     • FLUoxetine (PROzac) 20 mg capsule take 2 capsules by mouth once daily 30 capsule 1   • fluticasone (FLONASE) 50 mcg/act nasal spray 1 spray into each nostril daily 16 g 2   • gabapentin (NEURONTIN) 100 mg capsule Take 1 capsule (100 mg total) by mouth 3 (three) times a day 90 capsule 1   • lamoTRIgine (LaMICtal) 200 MG tablet Take 1 tablet (200 mg total) by mouth daily 30 tablet 2   • levocetirizine (XYZAL) 5 MG tablet take 1 tablet by mouth every evening 30 tablet 4   • nicotine polacrilex (COMMIT) 4 MG lozenge Apply 1 lozenge (4 mg total) to the mouth or throat as needed for smoking cessation (Patient not taking: No sig reported) 100 each 5   • omeprazole (PriLOSEC) 20 mg delayed release capsule take 1 capsule by mouth twice a day 60 capsule 5   • predniSONE 10 mg tablet Take 3 tabs BID X 2 days, 2 tabs BID X 2 days, 1 tab BID X 2 days, 1 tab daily X 2 days 26 tablet 0   • senna-docusate sodium (SENOKOT-S) 8 6-50 mg per tablet Take 3 tablets by mouth as needed       • tiotropium-olodaterol (Stiolto Respimat) 2 5-2 5 MCG/ACT inhaler Inhale 2 puffs daily 4 g 11     No current facility-administered medications for this visit  No Known Allergies    Review of Systems    Video Exam    There were no vitals filed for this visit      Physical Exam     02/14/23  Start Time: 9782  Stop Time: 8248  Total Visit Time: 41 minutes

## 2023-02-17 ENCOUNTER — TELEMEDICINE (OUTPATIENT)
Dept: BEHAVIORAL/MENTAL HEALTH CLINIC | Facility: CLINIC | Age: 58
End: 2023-02-17

## 2023-02-17 DIAGNOSIS — F31.81 MODERATE MIXED BIPOLAR II DISORDER (HCC): Primary | ICD-10-CM

## 2023-02-17 DIAGNOSIS — F10.11 ALCOHOL ABUSE, IN REMISSION: ICD-10-CM

## 2023-02-17 DIAGNOSIS — F43.10 PTSD (POST-TRAUMATIC STRESS DISORDER): ICD-10-CM

## 2023-02-17 DIAGNOSIS — F12.90 MARIJUANA USE: ICD-10-CM

## 2023-02-17 DIAGNOSIS — F41.1 GENERALIZED ANXIETY DISORDER: ICD-10-CM

## 2023-02-17 NOTE — PSYCH
Virtual Regular Visit    Verification of patient location:    Patient is located in the following state in which I hold an active license [place]      Assessment/Plan:    Problem List Items Addressed This Visit        Other    Marijuana use (Chronic)    Moderate mixed bipolar II disorder (HCC) - Primary    Generalized anxiety disorder    PTSD (post-traumatic stress disorder)    Alcohol abuse, in remission       Goals addressed in session: Goal 1 and Goal 2          Reason for visit is No chief complaint on file  Encounter provider Carlos Zun[facility]    Provider located at [address] [place] [id number] [phone]      Recent Visits  Date Type Provider Dept   02/14/23 229 [facility], [address]  [facility]   Showing recent visits within past 7 days and meeting all other requirements  Today's Visits  Date Type Provider Dept   02/17/23 229 [facility], [address] [place] [address]   Showing today's visits and meeting all other requirements  Future Appointments  No visits were found meeting these conditions  Showing future appointments within next 150 days and meeting all other requirements       The patient was identified by name and date of birth  Pineda Amos was informed that this is a telemedicine visit and that the visit is being conducted [employer]  She agrees to proceed     My office door was closed  The patient was notified the following individuals were present in the room other group members  She acknowledged consent and understanding of privacy and security of the video platform  The patient has agreed to participate and understands they can discontinue the visit at any time  Patient is aware this is a billable service  Subjective  Pineda Amos is a 56 y o  female        Data: Rosalinda Gonzalez attended the Relaxation group and was guided through EFT tapping for anxiety, diaphragmatic breathing, humming, and progressive muscle relaxation  Furthermore, attendees were guided through mindfulness meditation with imagery for a quick body scan and attending to difficult emotions, followed by mindfulness meditation for and affirmations against anxiety and depression  Psychoeducation, EFT, Mindfulness-based, and group approach were used to support the cohesion and maintain the serene environment for the exercise’s purpose  Assessment: Rushie Marking followed through guidance and completed the exercises, showed willingness to continue learning and practicing self-care  Plan: Rushie Marking will practice deep breathing and other techniques based on each occasion to counter stress and will attend the next group to be able to take part in further guided exercises on chosen topics to build on the techniques learned in the beginning  HPI     Past Medical History:   Diagnosis Date   • Bilateral carpal tunnel syndrome 08/20/2019   • Carpal tunnel syndrome of right wrist 10/30/2019    Added automatically from request for surgery [id number]   • COPD (chronic obstructive pulmonary disease) (HCC)    • Hypertension        Past Surgical History:   Procedure Laterality Date   • BREAST BIOPSY Left 20yrs ago benign   • OOPHORECTOMY Right    • [place] NDSC WRST SURG W/RLS TRANSVRS CARPL LIGM Right 1/21/2020    Procedure: RELEASE CARPAL TUNNEL ENDOSCOPIC;  Surgeon: Maria Dolores Connolly MD;  [facility]: [facility];  Service: Orthopedics   • [place] [facility],2 And 3 S Floors WRST SURG W/RLS TRANSVRS CARPL LIGM Left 1/28/2020    Procedure: RELEASE CARPAL TUNNEL ENDOSCOPIC;  Surgeon: Maria Dolores Connolly MD;  [facility];  Service: Orthopedics       Current Outpatient Medications   Medication Sig Dispense Refill   • albuterol (PROVENTIL HFA,VENTOLIN HFA) 90 mcg/act inhaler inhale 2 puffs by mouth and INTO THE LUNGS every 6 hours IF NEEDED FOR WHEEZING   8 5 g 2   • benzonatate (TESSALON) 200 MG capsule Take 1 capsule (200 mg total) by mouth 3 (three) times a day as needed for cough 20 capsule 0   • cholecalciferol (VITAMIN D3) 400 units tablet Take 400 Units by mouth daily     • FLUoxetine (PROzac) 20 mg capsule take 2 capsules by mouth once daily 30 capsule 1   • fluticasone (FLONASE) 50 mcg/act nasal spray 1 spray into each nostril daily 16 g 2   • gabapentin (NEURONTIN) 100 mg capsule Take 1 capsule (100 mg total) by mouth 3 (three) times a day 90 capsule 1   • lamoTRIgine (LaMICtal) 200 MG tablet Take 1 tablet (200 mg total) by mouth daily 30 tablet 2   • levocetirizine (XYZAL) 5 MG tablet take 1 tablet by mouth every evening 30 tablet 4   • nicotine polacrilex (COMMIT) 4 MG lozenge Apply 1 lozenge (4 mg total) to the mouth or throat as needed for smoking cessation (Patient not taking: No sig reported) 100 each 5   • omeprazole (PriLOSEC) 20 mg delayed release capsule take 1 capsule by mouth twice a day 60 capsule 5   • predniSONE 10 mg tablet Take 3 tabs BID X 2 days, 2 tabs BID X 2 days, 1 tab BID X 2 days, 1 tab daily X 2 days 26 tablet 0   • senna-docusate sodium (SENOKOT-S) 8 6-50 mg per tablet Take 3 tablets by mouth as needed       • tiotropium-olodaterol (Stiolto Respimat) 2 5-2 5 MCG/ACT inhaler Inhale 2 puffs daily 4 g 11     No current facility-administered medications for this visit  No Known Allergies    Review of Systems    Video Exam    There were no vitals filed for this visit      Physical Exam     02/17/23  Start Time: 0400  Stop Time: 0500  Total Visit Time: 60 minutes

## 2023-02-21 ENCOUNTER — TELEMEDICINE (OUTPATIENT)
Dept: BEHAVIORAL/MENTAL HEALTH CLINIC | Facility: CLINIC | Age: 58
End: 2023-02-21

## 2023-02-21 DIAGNOSIS — F10.11 ALCOHOL ABUSE, IN REMISSION: ICD-10-CM

## 2023-02-21 DIAGNOSIS — F41.1 GENERALIZED ANXIETY DISORDER: ICD-10-CM

## 2023-02-21 DIAGNOSIS — F31.81 MODERATE MIXED BIPOLAR II DISORDER (HCC): Primary | ICD-10-CM

## 2023-02-21 DIAGNOSIS — F17.210 CIGARETTE NICOTINE DEPENDENCE WITHOUT COMPLICATION: ICD-10-CM

## 2023-02-21 DIAGNOSIS — F43.10 PTSD (POST-TRAUMATIC STRESS DISORDER): ICD-10-CM

## 2023-02-21 DIAGNOSIS — F12.90 MARIJUANA USE: ICD-10-CM

## 2023-02-21 NOTE — PSYCH
Virtual Regular Visit    Verification of patient location:    Patient is located in the following state in which I hold an active license [place]      Assessment/Plan:    Problem List Items Addressed This Visit    None      Goals addressed in session: Goal 1 and Goal 2          Reason for visit is No chief complaint on file  [facility]    Provider located at [address] [place] [id number] [phone]      Recent Visits  Date Type Provider Dept   02/17/23 229 [facility], [address] [place] [address]   02/14/23 229 [facility], [place] [address] [facility] [place]   Showing recent visits within past 7 days and meeting all other requirements  Future Appointments  No visits were found meeting these conditions  Showing future appointments within next 150 days and meeting all other requirements       The patient was identified by name and date of birth  Catarino Morales was informed that this is a telemedicine visit and that [facility]  She agrees to proceed     My office door was closed  No one else was in the room  She acknowledged consent and understanding of privacy and security of the video platform  The patient has agreed to participate and understands they can discontinue the visit at any time  Patient is aware this is a billable service  Subjective  Catarino Morales is a 56 y o  female  Data: Miah Da Silva discussed her challenges and difficulties changing her thoughts and beliefs and was encouraged to focus on strengths and what she is good at and likes about herself  She was also encouraged to make reginald due to poor short-term memory  Person-Centered, Mindfulness, and TF-CBT methods helped Pt work on her narrative, thought replacement, and body/sensation experience of emotions  Assessment: Paige Calderon showed very poor short-term memory and did better in putting notes for herself- what has being disucssed during the session  She was optimistic though very often going back in rumination  Plan: Paige Calderon will journal or make short notes about her daily experience to be able to refer back to it  She will use Mindfulness to be a neutral observer of her intense emotions and thoughts   HPI     Past Medical History:   Diagnosis Date   • Bilateral carpal tunnel syndrome 08/20/2019   • Carpal tunnel syndrome of right wrist 10/30/2019    Added automatically from request for surgery [id number]   • COPD (chronic obstructive pulmonary disease) (HCC)    • Hypertension        Past Surgical History:   Procedure Laterality Date   • BREAST BIOPSY Left 20yrs ago benign   • OOPHORECTOMY Right    • [place] NDSC WRST SURG W/RLS TRANSVRS CARPL LIGM Right 1/21/2020    Procedure: RELEASE CARPAL TUNNEL ENDOSCOPIC;  Surgeon: Rissa Rivera MD;  Location: [facility];  Service: Orthopedics   • [place] [address] [facility],2 And 3 S Floors WRST SURG W/RLS TRANSVRS CARPL LIGM Left 1/28/2020    Procedure: RELEASE CARPAL TUNNEL ENDOSCOPIC;  Surgeon: Rissa Rivera MD;  [facility];  Service: Orthopedics       Current Outpatient Medications   Medication Sig Dispense Refill   • albuterol (PROVENTIL HFA,VENTOLIN HFA) 90 mcg/act inhaler inhale 2 puffs by mouth and INTO THE LUNGS every 6 hours IF NEEDED FOR WHEEZING   8 5 g 2   • benzonatate (TESSALON) 200 MG capsule Take 1 capsule (200 mg total) by mouth 3 (three) times a day as needed for cough 20 capsule 0   • cholecalciferol (VITAMIN D3) 400 units tablet Take 400 Units by mouth daily     • FLUoxetine (PROzac) 20 mg capsule take 2 capsules by mouth once daily 30 capsule 1   • fluticasone (FLONASE) 50 mcg/act nasal spray 1 spray into each nostril daily 16 g 2   • gabapentin (NEURONTIN) 100 mg capsule Take 1 capsule (100 mg total) by mouth 3 (three) times a day 90 capsule 1   • lamoTRIgine (LaMICtal) 200 MG tablet Take 1 tablet (200 mg total) by mouth daily 30 tablet 2   • levocetirizine (XYZAL) 5 MG tablet take 1 tablet by mouth every evening 30 tablet 4   • nicotine polacrilex (COMMIT) 4 MG lozenge Apply 1 lozenge (4 mg total) to the mouth or throat as needed for smoking cessation (Patient not taking: No sig reported) 100 each 5   • omeprazole (PriLOSEC) 20 mg delayed release capsule take 1 capsule by mouth twice a day 60 capsule 5   • predniSONE 10 mg tablet Take 3 tabs BID X 2 days, 2 tabs BID X 2 days, 1 tab BID X 2 days, 1 tab daily X 2 days 26 tablet 0   • senna-docusate sodium (SENOKOT-S) 8 6-50 mg per tablet Take 3 tablets by mouth as needed       • tiotropium-olodaterol (Stiolto Respimat) 2 5-2 5 MCG/ACT inhaler Inhale 2 puffs daily 4 g 11     No current facility-administered medications for this visit  No Known Allergies    Review of Systems    Video Exam    There were no vitals filed for this visit      Physical Exam     02/21/23  Start Time: 1200  Stop Time: 9713  Total Visit Time: 39 minutes

## 2023-02-24 ENCOUNTER — TELEMEDICINE (OUTPATIENT)
Dept: BEHAVIORAL/MENTAL HEALTH CLINIC | Facility: CLINIC | Age: 58
End: 2023-02-24

## 2023-02-24 DIAGNOSIS — F10.11 ALCOHOL ABUSE, IN REMISSION: ICD-10-CM

## 2023-02-24 DIAGNOSIS — F12.90 MARIJUANA USE: ICD-10-CM

## 2023-02-24 DIAGNOSIS — F31.81 MODERATE MIXED BIPOLAR II DISORDER (HCC): Primary | ICD-10-CM

## 2023-02-24 DIAGNOSIS — F41.1 GENERALIZED ANXIETY DISORDER: ICD-10-CM

## 2023-02-24 DIAGNOSIS — F43.10 PTSD (POST-TRAUMATIC STRESS DISORDER): ICD-10-CM

## 2023-02-24 NOTE — PSYCH
Virtual Regular Visit    Verification of patient location:    Patient is located in the following state in which I hold an active license [place]      Assessment/Plan:    Problem List Items Addressed This Visit        Other    Marijuana use (Chronic)    Moderate mixed bipolar II disorder (HCC) - Primary    Generalized anxiety disorder    PTSD (post-traumatic stress disorder)    Alcohol abuse, in remission       Goals addressed in session: Goal 1 and Goal 2          Reason for visit is No chief complaint on file  Encounter provider D[facility]    Provider located at [address] [place] [address]  [address] [place] [address] [phone]      Recent Visits  Date Type Provider Dept   02/21/23 229 [facility], [address] [place] [address]   02/17/23 229 [facility], [address] [facility] Therapist Her[place]   Showing recent visits within past 7 days and meeting all other requirements  Today's Visits  Date Type Provider Dept   02/24/23 229 [facility], [address] [place] [address]   Showing today's visits and meeting all other requirements  Future Appointments  No visits were found meeting these conditions  Showing future appointments within next 150 days and meeting all other requirements       The patient was identified by name and date of birth  Burleigh Claude was informed that this is a telemedicine visit and that the visit is being conducted [employer]  She agrees to proceed     Other methods to assure confidentiality were taken informing about other group members  The patient was notified the following individuals were present in the room other group attendees  She acknowledged consent and understanding of privacy and security of the video platform   The patient has agreed to participate and understands they can discontinue the visit at any time  Patient is aware this is a billable service  Delvis Bell is a 56 y o  female    Data: Silvana Mclaughlin attended the Relaxation group and was guided through EFT tapping for anxiety, diaphragmatic breathing, humming, and progressive muscle relaxation  Furthermore, attendees were guided through mindfulness meditation with imagery for a quick body scan and attending to physical pain and uncomfortable emotions, followed by mindfulness meditation for and affirmations for compassion, forgiveness, and self-love  Psychoeducation, EFT, Mindfulness-based, and group approach were used to support the cohesion and maintain the serene environment for the exercise’s purpose  Assessment: Silvana Mclaughlin followed through guidance and completed the exercises, showed willingness to continue learning and practicing self-care  Plan:Marie  will practice deep breathing and other techniques based on each occasion to counter stress and will attend the next group to be able to take part in further guided exercises on chosen topics to build on the techniques learned in the beginning      HPI     Past Medical History:   Diagnosis Date   • Bilateral carpal tunnel syndrome 08/20/2019   • Carpal tunnel syndrome of right wrist 10/30/2019    Added automatically from request for surgery [id number]   • COPD (chronic obstructive pulmonary disease) (HCC)    • Hypertension        Past Surgical History:   Procedure Laterality Date   • BREAST BIOPSY Left 20yrs ago benign   • OOPHORECTOMY Right    • [place] NDSC WRST SURG W/RLS TRANSVRS CARPL LIGM Right 1/21/2020    Procedure: RELEASE CARPAL TUNNEL ENDOSCOPIC;  Surgeon: Gwen Em MD;  Location: [facility];  Service: Orthopedics   • [place] [address] [facility],2 And 3 S Floors WRST SURG W/RLS TRANSVRS CARPL LIGM Left 1/28/2020    Procedure: RELEASE CARPAL TUNNEL ENDOSCOPIC;  Surgeon: Gwen Em MD;  Location: [facility];  Service: Orthopedics       Current Outpatient Medications   Medication Sig Dispense Refill   • albuterol (PROVENTIL HFA,VENTOLIN HFA) 90 mcg/act inhaler inhale 2 puffs by mouth and INTO THE LUNGS every 6 hours IF NEEDED FOR WHEEZING  8 5 g 2   • benzonatate (TESSALON) 200 MG capsule Take 1 capsule (200 mg total) by mouth 3 (three) times a day as needed for cough 20 capsule 0   • cholecalciferol (VITAMIN D3) 400 units tablet Take 400 Units by mouth daily     • FLUoxetine (PROzac) 20 mg capsule take 2 capsules by mouth once daily 30 capsule 1   • fluticasone (FLONASE) 50 mcg/act nasal spray 1 spray into each nostril daily 16 g 2   • gabapentin (NEURONTIN) 100 mg capsule Take 1 capsule (100 mg total) by mouth 3 (three) times a day 90 capsule 1   • lamoTRIgine (LaMICtal) 200 MG tablet Take 1 tablet (200 mg total) by mouth daily 30 tablet 2   • levocetirizine (XYZAL) 5 MG tablet take 1 tablet by mouth every evening 30 tablet 4   • nicotine polacrilex (COMMIT) 4 MG lozenge Apply 1 lozenge (4 mg total) to the mouth or throat as needed for smoking cessation (Patient not taking: No sig reported) 100 each 5   • omeprazole (PriLOSEC) 20 mg delayed release capsule take 1 capsule by mouth twice a day 60 capsule 5   • predniSONE 10 mg tablet Take 3 tabs BID X 2 days, 2 tabs BID X 2 days, 1 tab BID X 2 days, 1 tab daily X 2 days 26 tablet 0   • senna-docusate sodium (SENOKOT-S) 8 6-50 mg per tablet Take 3 tablets by mouth as needed       • tiotropium-olodaterol (Stiolto Respimat) 2 5-2 5 MCG/ACT inhaler Inhale 2 puffs daily 4 g 11     No current facility-administered medications for this visit  No Known Allergies    Review of Systems    Video Exam    There were no vitals filed for this visit      Physical Exam     02/24/23  Start Time: 0400  Stop Time: 0500  Total Visit Time: 60 minutes

## 2023-02-28 ENCOUNTER — TELEMEDICINE (OUTPATIENT)
Dept: BEHAVIORAL/MENTAL HEALTH CLINIC | Facility: CLINIC | Age: 58
End: 2023-02-28

## 2023-02-28 DIAGNOSIS — F12.90 MARIJUANA USE: Chronic | ICD-10-CM

## 2023-02-28 DIAGNOSIS — F10.11 ALCOHOL ABUSE, IN REMISSION: ICD-10-CM

## 2023-02-28 DIAGNOSIS — F43.10 PTSD (POST-TRAUMATIC STRESS DISORDER): ICD-10-CM

## 2023-02-28 DIAGNOSIS — F41.1 GENERALIZED ANXIETY DISORDER: ICD-10-CM

## 2023-02-28 DIAGNOSIS — F17.210 CIGARETTE NICOTINE DEPENDENCE WITHOUT COMPLICATION: ICD-10-CM

## 2023-02-28 DIAGNOSIS — F31.81 MODERATE MIXED BIPOLAR II DISORDER (HCC): Primary | ICD-10-CM

## 2023-02-28 NOTE — PSYCH
Virtual Regular Visit    Verification of patient location:    Patient is located in the following state in which I hold an [place] license [place]      Assessment/Plan:    Problem List Items Addressed This Visit        Other    Marijuana use (Chronic)    Moderate mixed bipolar II disorder (HCC) - Primary    Generalized anxiety disorder    PTSD (post-traumatic stress disorder)    Alcohol abuse, in remission    Cigarette nicotine dependence without complication       Goals addressed in session: Goal 1 and Goal 2          Reason for visit is No chief complaint on file  Encounter provider FARHAN Anderson [facility]    Provider located at [address] [place] [id number] [phone]      Recent Visits  Date Type Provider Dept   02/24/23 229 [facility], [address] [place] [address]   02/21/23 229 [facility], [place] [address] [facility] Therapist Heriberto   Showing recent visits within past 7 days and meeting all other requirements  Future Appointments  No visits were found meeting these conditions  Showing future appointments within next 150 days and meeting all other requirements       The patient was identified by name and date of birth  Mortimer Blunt was informed that this is a telemedicine visit and [facility]  She agrees to proceed     My office door was closed  No one else was in the room  She acknowledged consent and understanding of privacy and security of the video platform  The patient has agreed to participate and understands they can discontinue the visit at any time  Patient is aware this is a billable service  Subjective  Mortimer Blunt is a 56 y o  female  Data: Ousmanesimone Sharpsimon discussed her dislike for people, her intense emotions, and her becoming upset with herself and her emotions   Person-Centered, DBT-based, and TF-CBT techniques, were used to help Pt change some inaccurate and irrational beliefs, work additionally on her trauma narrative and reframing her old statements, and to focus on present  Assessment: Dany Naranjo expressed intense emotions, she was tearful at several occasions during the session but was also able to focus on the gratitude of what she had and learned from her experience  Dany Naranjo still struggles with journaling to help her memory and to follow up with her tasks  Plan: Dany Naranjo will attend her weekly individual and group sessions to be able to work on her trauma and learn more ways to counter stress and be mindful  She will attend weekly Relaxation Group and will exercise EFT Tapping, diaphragmatic breathing, humming, and Mindfulness to be able to achieve peace of mind and be at ease when needed  HPI     Past Medical History:   Diagnosis Date   • Bilateral carpal tunnel syndrome 08/20/2019   • Carpal tunnel syndrome of right wrist 10/30/2019    Added automatically from request for surgery [id number]   • COPD (chronic obstructive pulmonary disease) (HCC)    • Hypertension        Past Surgical History:   Procedure Laterality Date   • BREAST BIOPSY Left 20yrs ago benign   • OOPHORECTOMY Right    • [place] NDSC WRST SURG W/RLS TRANSVRS CARPL LIGM Right 1/21/2020    Procedure: RELEASE CARPAL TUNNEL ENDOSCOPIC;  Surgeon: Antonio Salmon MD;  Location: [facility];  Service: Orthopedics   • [place] [facility],2 And 3 S Floors WRST SURG W/RLS TRANSVRS CARPL LIGM Left 1/28/2020    Procedure: RELEASE CARPAL TUNNEL ENDOSCOPIC;  Surgeon: Antonio Salmon MD;  Location: [facility];  Service: Orthopedics       Current Outpatient Medications   Medication Sig Dispense Refill   • albuterol (PROVENTIL HFA,VENTOLIN HFA) 90 mcg/act inhaler inhale 2 puffs by mouth and INTO THE LUNGS every 6 hours IF NEEDED FOR WHEEZING   8 5 g 2   • benzonatate (TESSALON) 200 MG capsule Take 1 capsule (200 mg total) by mouth 3 (three) times a day as needed for cough 20 capsule 0   • cholecalciferol (VITAMIN D3) 400 units tablet Take 400 Units by mouth daily     • FLUoxetine (PROzac) 20 mg capsule take 2 capsules by mouth once daily 30 capsule 1   • fluticasone (FLONASE) 50 mcg/act nasal spray 1 spray into each nostril daily 16 g 2   • gabapentin (NEURONTIN) 100 mg capsule Take 1 capsule (100 mg total) by mouth 3 (three) times a day 90 capsule 1   • lamoTRIgine (LaMICtal) 200 MG tablet Take 1 tablet (200 mg total) by mouth daily 30 tablet 2   • levocetirizine (XYZAL) 5 MG tablet take 1 tablet by mouth every evening 30 tablet 4   • nicotine polacrilex (COMMIT) 4 MG lozenge Apply 1 lozenge (4 mg total) to the mouth or throat as needed for smoking cessation (Patient not taking: No sig reported) 100 each 5   • omeprazole (PriLOSEC) 20 mg delayed release capsule take 1 capsule by mouth twice a day 60 capsule 5   • predniSONE 10 mg tablet Take 3 tabs BID X 2 days, 2 tabs BID X 2 days, 1 tab BID X 2 days, 1 tab daily X 2 days 26 tablet 0   • senna-docusate sodium (SENOKOT-S) 8 6-50 mg per tablet Take 3 tablets by mouth as needed       • tiotropium-olodaterol (Stiolto Respimat) 2 5-2 5 MCG/ACT inhaler Inhale 2 puffs daily 4 g 11     No current facility-administered medications for this visit  No Known Allergies    Review of Systems    Video Exam    There were no vitals filed for this visit      Physical Exam     02/28/23  Start Time: 0302  Stop Time: 4864  Total Visit Time: 44 minutes

## 2023-03-03 ENCOUNTER — TELEMEDICINE (OUTPATIENT)
Dept: BEHAVIORAL/MENTAL HEALTH CLINIC | Facility: CLINIC | Age: 58
End: 2023-03-03

## 2023-03-03 DIAGNOSIS — F43.10 PTSD (POST-TRAUMATIC STRESS DISORDER): ICD-10-CM

## 2023-03-03 DIAGNOSIS — F41.1 GENERALIZED ANXIETY DISORDER: ICD-10-CM

## 2023-03-03 DIAGNOSIS — F10.11 ALCOHOL ABUSE, IN REMISSION: ICD-10-CM

## 2023-03-03 DIAGNOSIS — F12.90 MARIJUANA USE: Chronic | ICD-10-CM

## 2023-03-03 DIAGNOSIS — F17.210 CIGARETTE NICOTINE DEPENDENCE WITHOUT COMPLICATION: ICD-10-CM

## 2023-03-03 DIAGNOSIS — F31.81 MODERATE MIXED BIPOLAR II DISORDER (HCC): Primary | ICD-10-CM

## 2023-03-03 NOTE — PSYCH
Virtual Regular Visit    Verification of patient location:    Patient is located in the following state in which I hold an active license [place]      Assessment/Plan:    Problem List Items Addressed This Visit        Other    Marijuana use (Chronic)    Moderate mixed bipolar II disorder (HCC) - Primary    Generalized anxiety disorder    PTSD (post-traumatic stress disorder)    Alcohol abuse, in remission    Cigarette nicotine dependence without complication       Goals addressed in session: Goal 1 and Goal 2          Reason for visit is No chief complaint on file  [place] [facility] [address] [place] [id number] [phone]      Recent Visits  Date Type Provider Dept   02/28/23 229 [facility], [address]   02/24/23 229 [facility], [address] [facility]ist [facility]   Showing recent visits within past 7 days and meeting all other requirements  Future Appointments  No visits were found meeting these conditions  Showing future appointments within next 150 days and meeting all other requirements       The patient was identified by name and date of birth  Porsche Serrato was informed that this is a telemedicine visit and that the visit is being conducted [employer]  She agrees to proceed     My office door was closed  The patient was notified the following individuals were present in the room other group members  She acknowledged consent and understanding of privacy and security of the video platform  The patient has agreed to participate and understands they can discontinue the visit at any time  Patient is aware this is a billable service  Subjective  Porsche Serrato is a 56 y o  female        Data: Josee Silver attended the Relaxation group and was guided through [facility] tapping for anxiety, diaphragmatic breathing, humming, and progressive muscle relaxation  Furthermore, attendees were guided through meditation for connection with the inner child, followed by mindfulness meditation for manifesting wishes, and affirmations against anxiety and depression  Psychoeducation, EFT, Mindfulness-based, and group approach were used to support the cohesion and maintain the serene environment for the exercise’s purpose  Assessment: Merlyn Leos followed through guidance and completed the exercises, showed willingness to continue learning and practicing self-care  Plan: Merlyn Leos will practice deep breathing and other techniques based on each occasion to counter stress and will attend the next group to be able to take part in further guided exercises on chosen topics to build on the techniques learned in the beginning  HPI     Past Medical History:   Diagnosis Date   • Bilateral carpal tunnel syndrome 08/20/2019   • Carpal tunnel syndrome of right wrist 10/30/2019    Added automatically from request for surgery [id number]   • COPD (chronic obstructive pulmonary disease) (HCC)    • Hypertension        Past Surgical History:   Procedure Laterality Date   • BREAST BIOPSY Left 20yrs ago benign   • OOPHORECTOMY Right    • [place] NDSC WRST SURG W/RLS TRANSVRS CARPL LIGM Right 1/21/2020    Procedure: RELEASE CARPAL TUNNEL ENDOSCOPIC;  Surgeon: Tyson Humphrey MD;  [facility];  Service: Orthopedics   • [place] [facility],2 And 3 S Floors WRST SURG W/RLS TRANSVRS CARPL LIGM Left 1/28/2020    Procedure: RELEASE CARPAL TUNNEL ENDOSCOPIC;  Surgeon: Tyson Humphrey MD;  [facility];  Service: Orthopedics       Current Outpatient Medications   Medication Sig Dispense Refill   • albuterol (PROVENTIL HFA,VENTOLIN HFA) 90 mcg/act inhaler inhale 2 puffs by mouth and INTO THE LUNGS every 6 hours IF NEEDED FOR WHEEZING   8 5 g 2   • benzonatate (TESSALON) 200 MG capsule Take 1 capsule (200 mg total) by mouth 3 (three) times a day as needed for cough 20 capsule 0   • cholecalciferol (VITAMIN D3) 400 units tablet Take 400 Units by mouth daily     • FLUoxetine (PROzac) 20 mg capsule take 2 capsules by mouth once daily 30 capsule 1   • fluticasone (FLONASE) 50 mcg/act nasal spray 1 spray into each nostril daily 16 g 2   • gabapentin (NEURONTIN) 100 mg capsule Take 1 capsule (100 mg total) by mouth 3 (three) times a day 90 capsule 1   • lamoTRIgine (LaMICtal) 200 MG tablet Take 1 tablet (200 mg total) by mouth daily 30 tablet 2   • levocetirizine (XYZAL) 5 MG tablet take 1 tablet by mouth every evening 30 tablet 4   • nicotine polacrilex (COMMIT) 4 MG lozenge Apply 1 lozenge (4 mg total) to the mouth or throat as needed for smoking cessation (Patient not taking: No sig reported) 100 each 5   • omeprazole (PriLOSEC) 20 mg delayed release capsule take 1 capsule by mouth twice a day 60 capsule 5   • predniSONE 10 mg tablet Take 3 tabs BID X 2 days, 2 tabs BID X 2 days, 1 tab BID X 2 days, 1 tab daily X 2 days 26 tablet 0   • senna-docusate sodium (SENOKOT-S) 8 6-50 mg per tablet Take 3 tablets by mouth as needed       • tiotropium-olodaterol (Stiolto Respimat) 2 5-2 5 MCG/ACT inhaler Inhale 2 puffs daily 4 g 11     No current facility-administered medications for this visit  No Known Allergies    Review of Systems    Video Exam    There were no vitals filed for this visit      Physical Exam       03/03/23  Start Time: 0400  Stop Time: 0500  Total Visit Time: 60 minutes

## 2023-03-06 ENCOUNTER — TELEPHONE (OUTPATIENT)
Dept: FAMILY MEDICINE CLINIC | Facility: CLINIC | Age: 58
End: 2023-03-06

## 2023-03-06 DIAGNOSIS — K21.9 GASTROESOPHAGEAL REFLUX DISEASE WITHOUT ESOPHAGITIS: Primary | ICD-10-CM

## 2023-03-06 NOTE — TELEPHONE ENCOUNTER
Pt called office asking for referral to [facility] for GERD  Needs referral faxed to their office       Does not have fax, but had phone number of [phone] referral?

## 2023-03-07 ENCOUNTER — TELEMEDICINE (OUTPATIENT)
Dept: BEHAVIORAL/MENTAL HEALTH CLINIC | Facility: CLINIC | Age: 58
End: 2023-03-07

## 2023-03-07 DIAGNOSIS — F17.210 CIGARETTE NICOTINE DEPENDENCE WITHOUT COMPLICATION: ICD-10-CM

## 2023-03-07 DIAGNOSIS — F12.90 MARIJUANA USE: Chronic | ICD-10-CM

## 2023-03-07 DIAGNOSIS — F10.11 ALCOHOL ABUSE, IN REMISSION: ICD-10-CM

## 2023-03-07 DIAGNOSIS — F43.10 PTSD (POST-TRAUMATIC STRESS DISORDER): ICD-10-CM

## 2023-03-07 DIAGNOSIS — F31.81 MODERATE MIXED BIPOLAR II DISORDER (HCC): Primary | ICD-10-CM

## 2023-03-07 DIAGNOSIS — F41.1 GENERALIZED ANXIETY DISORDER: ICD-10-CM

## 2023-03-07 NOTE — PSYCH
Virtual Regular Visit    Verification of patient location:    Patient is located in the following state in which I hold an active license [place]      Assessment/Plan:    Problem List Items Addressed This Visit        Other    Marijuana use (Chronic)    Moderate mixed bipolar II disorder (HCC) - Primary    Generalized anxiety disorder    PTSD (post-traumatic stress disorder)    Alcohol abuse, in remission    Cigarette nicotine dependence without complication       Goals addressed in session: Goal 1 and Goal 2          Reason for visit is No chief complaint on file  Encounter provider FARHAN Godinez [facility]    Provider located at [address] [place] [id number] [phone]      Recent Visits  Date Type Provider Dept   03/03/23 229 [facility], [address]   02/28/23 229 [facility] [place]   Showing recent visits within past 7 days and meeting all other requirements  Future Appointments  No visits were found meeting these conditions  Showing future appointments within next 150 days and meeting all other requirements       The patient was identified by name and date of birth  Ciera Daily was informed that this is a telemedicine visit and [facility]  She agrees to proceed     My office door was closed  No one else was in the room  She acknowledged consent and understanding of privacy and security of the video platform  The patient has agreed to participate and understands they can discontinue the visit at any time  Patient is aware this is a billable service  Subjective  Ciera Daily is a 56 y o  female    Data: Zahra Deidre was disconnected from her first call and had to use another link- her camera was not working, and the sound was too far   She discussed her difficulties sleeping and remembering but was given information about what the brain does during nights and why it is important to sleep enough  Person-Centered, DBT-based, and CBT methods helped Pt re-frame her statements and beliefs and focus on learned skills that would improve emotion intensity handling  Assessment: Sarabjit Cooley sounded concerned, nervous at first, but then she gained confidence and managed to focus on her topics  She showed good reliance on strengths and had relevant thought content     Plan: Amarilis Alford will attend her weekly individual and group therapy sessions to be able to have a good level of self-care and to process her trauma through connecting to her inner child and work with self-compassion and forgiveness  HPI     Past Medical History:   Diagnosis Date   • Bilateral carpal tunnel syndrome 08/20/2019   • Carpal tunnel syndrome of right wrist 10/30/2019    Added automatically from request for surgery [id number]   • COPD (chronic obstructive pulmonary disease) (HCC)    • Hypertension        Past Surgical History:   Procedure Laterality Date   • BREAST BIOPSY Left 20yrs ago benign   • OOPHORECTOMY Right    • [place] NDSC WRST SURG W/RLS TRANSVRS CARPL LIGM Right 1/21/2020    Procedure: RELEASE CARPAL TUNNEL ENDOSCOPIC;  Surgeon: Nasim Darby MD;  [facility];  Service: Orthopedics   • [place] [facility] [facility],2 And 3 S Floors WRST SURG W/RLS TRANSVRS CARPL LIGM Left 1/28/2020    Procedure: RELEASE CARPAL TUNNEL ENDOSCOPIC;  Surgeon: Nasim Darby MD;  [facility];  Service: Orthopedics       Current Outpatient Medications   Medication Sig Dispense Refill   • albuterol (PROVENTIL HFA,VENTOLIN HFA) 90 mcg/act inhaler inhale 2 puffs by mouth and INTO THE LUNGS every 6 hours IF NEEDED FOR WHEEZING   8 5 g 2   • benzonatate (TESSALON) 200 MG capsule Take 1 capsule (200 mg total) by mouth 3 (three) times a day as needed for cough 20 capsule 0   • cholecalciferol (VITAMIN D3) 400 units tablet Take 400 Units by mouth daily     • FLUoxetine (PROzac) 20 mg capsule take 2 capsules by mouth once daily 30 capsule 1   • fluticasone (FLONASE) 50 mcg/act nasal spray 1 spray into each nostril daily 16 g 2   • gabapentin (NEURONTIN) 100 mg capsule Take 1 capsule (100 mg total) by mouth 3 (three) times a day 90 capsule 1   • lamoTRIgine (LaMICtal) 200 MG tablet Take 1 tablet (200 mg total) by mouth daily 30 tablet 2   • levocetirizine (XYZAL) 5 MG tablet take 1 tablet by mouth every evening 30 tablet 4   • nicotine polacrilex (COMMIT) 4 MG lozenge Apply 1 lozenge (4 mg total) to the mouth or throat as needed for smoking cessation (Patient not taking: No sig reported) 100 each 5   • omeprazole (PriLOSEC) 20 mg delayed release capsule take 1 capsule by mouth twice a day 60 capsule 5   • predniSONE 10 mg tablet Take 3 tabs BID X 2 days, 2 tabs BID X 2 days, 1 tab BID X 2 days, 1 tab daily X 2 days 26 tablet 0   • senna-docusate sodium (SENOKOT-S) 8 6-50 mg per tablet Take 3 tablets by mouth as needed       • tiotropium-olodaterol (Stiolto Respimat) 2 5-2 5 MCG/ACT inhaler Inhale 2 puffs daily 4 g 11     No current facility-administered medications for this visit  No Known Allergies    Review of Systems    Video Exam    There were no vitals filed for this visit      Physical Exam     03/07/23  Start Time: 0100  Stop Time: 0139  Total Visit Time: 39 minutes

## 2023-03-10 ENCOUNTER — TELEMEDICINE (OUTPATIENT)
Dept: BEHAVIORAL/MENTAL HEALTH CLINIC | Facility: CLINIC | Age: 58
End: 2023-03-10

## 2023-03-10 DIAGNOSIS — F31.81 MODERATE MIXED BIPOLAR II DISORDER (HCC): Primary | ICD-10-CM

## 2023-03-10 DIAGNOSIS — F41.1 GENERALIZED ANXIETY DISORDER: ICD-10-CM

## 2023-03-10 DIAGNOSIS — F43.10 PTSD (POST-TRAUMATIC STRESS DISORDER): ICD-10-CM

## 2023-03-10 DIAGNOSIS — F17.210 CIGARETTE NICOTINE DEPENDENCE WITHOUT COMPLICATION: ICD-10-CM

## 2023-03-10 DIAGNOSIS — F10.11 ALCOHOL ABUSE, IN REMISSION: ICD-10-CM

## 2023-03-10 DIAGNOSIS — F12.90 MARIJUANA USE: Chronic | ICD-10-CM

## 2023-03-10 NOTE — PSYCH
Virtual Regular Visit    Verification of patient location:    Patient is located in the following state in which I hold an active license [place]      Assessment/Plan:    Problem List Items Addressed This Visit        Other    Marijuana use (Chronic)    Moderate mixed bipolar II disorder (HCC) - Primary    Generalized anxiety disorder    PTSD (post-traumatic stress disorder)    Alcohol abuse, in remission    Cigarette nicotine dependence without complication       Goals addressed in session: Goal 1 and Goal 2          Reason for visit is No chief complaint on file  [facility]    Provider located at [address] [place] [id number] [phone]      Recent Visits  Date Type Provider Dept   03/07/23 229 [facility], [address] [place] [address]   03/03/23 229 [facility], [address]  [facility] Therapist [facility]   Showing recent visits within past 7 days and meeting all other requirements  Today's Visits  Date Type Provider Dept   03/10/23 229 [facility], [address] [place] [address]   Showing today's visits and meeting all other requirements  Future Appointments  No visits were found meeting these conditions  Showing future appointments within next 150 days and meeting all other requirements       The patient was identified by name and date of birth  Marty  was informed that this is a telemedicine visit and that the visit is being conducted [employer]  She agrees to proceed     My office door was closed  The patient was notified the following individuals were present in the room other group attendees  She acknowledged consent and understanding of privacy and security of the video platform   The patient has agreed to participate and understands they can discontinue the visit at any time  Patient is aware this is a billable service  Delvis Amos is a 56 y o  female    Data: Rosalinda Gonzalez attended the Relaxation group and was guided through EFT tapping on anxiety, diaphragmatic breathing, humming, and progressive muscle relaxation  Furthermore, attendees were guided through meditation for coping with pain, followed by RAIN acronym for difficult emotions and thoughts, and affirmations against anxiety and depression  Psychoeducation, EFT, Mindfulness-based, and group approach were used to support the cohesion and maintain the serene environment for the exercise’s purpose  Assessment: Rosalinda Gonzalez followed through guidance and completed the exercises, showed willingness to continue learning and practicing self-care  Plan: Rosalinda Gonzalez will practice deep breathing and other techniques based on each occasion to counter stress and will attend the next group to be able to take part in further guided exercises on chosen topics to build on the techniques learned in the beginning      HPI     Past Medical History:   Diagnosis Date   • Bilateral carpal tunnel syndrome 08/20/2019   • Carpal tunnel syndrome of right wrist 10/30/2019    Added automatically from request for surgery [id number]   • COPD (chronic obstructive pulmonary disease) (HCC)    • Hypertension        Past Surgical History:   Procedure Laterality Date   • BREAST BIOPSY Left 20yrs ago benign   • OOPHORECTOMY Right    • [place] NDSC WRST SURG W/RLS TRANSVRS CARPL LIGM Right 1/21/2020    Procedure: RELEASE CARPAL TUNNEL ENDOSCOPIC;  Surgeon: Katelyn Damon MD;  Location: [facility];  Service: Orthopedics   • [place] [facility] [facility],2 And 3 S Floors WRST SURG W/RLS TRANSVRS CARPL LIGM Left 1/28/2020    Procedure: RELEASE CARPAL TUNNEL ENDOSCOPIC;  Surgeon: Katelyn Damon MD;  [facility];  Service: Orthopedics       Current Outpatient Medications   Medication Sig Dispense Refill   • albuterol (PROVENTIL HFA,VENTOLIN HFA) 90 mcg/act inhaler inhale 2 puffs by mouth and INTO THE LUNGS every 6 hours IF NEEDED FOR WHEEZING  8 5 g 2   • benzonatate (TESSALON) 200 MG capsule Take 1 capsule (200 mg total) by mouth 3 (three) times a day as needed for cough 20 capsule 0   • cholecalciferol (VITAMIN D3) 400 units tablet Take 400 Units by mouth daily     • FLUoxetine (PROzac) 20 mg capsule take 2 capsules by mouth once daily 30 capsule 1   • fluticasone (FLONASE) 50 mcg/act nasal spray 1 spray into each nostril daily 16 g 2   • gabapentin (NEURONTIN) 100 mg capsule Take 1 capsule (100 mg total) by mouth 3 (three) times a day 90 capsule 1   • lamoTRIgine (LaMICtal) 200 MG tablet Take 1 tablet (200 mg total) by mouth daily 30 tablet 2   • levocetirizine (XYZAL) 5 MG tablet take 1 tablet by mouth every evening 30 tablet 4   • nicotine polacrilex (COMMIT) 4 MG lozenge Apply 1 lozenge (4 mg total) to the mouth or throat as needed for smoking cessation (Patient not taking: No sig reported) 100 each 5   • omeprazole (PriLOSEC) 20 mg delayed release capsule take 1 capsule by mouth twice a day 60 capsule 5   • predniSONE 10 mg tablet Take 3 tabs BID X 2 days, 2 tabs BID X 2 days, 1 tab BID X 2 days, 1 tab daily X 2 days 26 tablet 0   • senna-docusate sodium (SENOKOT-S) 8 6-50 mg per tablet Take 3 tablets by mouth as needed       • tiotropium-olodaterol (Stiolto Respimat) 2 5-2 5 MCG/ACT inhaler Inhale 2 puffs daily 4 g 11     No current facility-administered medications for this visit  No Known Allergies    Review of Systems    Video Exam    There were no vitals filed for this visit      Physical Exam     03/10/23  Start Time: 0400  Stop Time: 0500  Total Visit Time: 60 minutes

## 2023-03-13 ENCOUNTER — APPOINTMENT (OUTPATIENT)
Dept: LAB | Facility: CLINIC | Age: 58
End: 2023-03-13

## 2023-03-13 DIAGNOSIS — Z13.21 SCREENING FOR ENDOCRINE, NUTRITIONAL, METABOLIC AND IMMUNITY DISORDER: ICD-10-CM

## 2023-03-13 DIAGNOSIS — Z13.29 SCREENING FOR ENDOCRINE, NUTRITIONAL, METABOLIC AND IMMUNITY DISORDER: ICD-10-CM

## 2023-03-13 DIAGNOSIS — Z13.228 SCREENING FOR ENDOCRINE, NUTRITIONAL, METABOLIC AND IMMUNITY DISORDER: ICD-10-CM

## 2023-03-13 DIAGNOSIS — Z13.0 SCREENING FOR ENDOCRINE, NUTRITIONAL, METABOLIC AND IMMUNITY DISORDER: ICD-10-CM

## 2023-03-13 LAB
25(OH)D3 SERPL-MCNC: 53.1 NG/ML (ref 30–100)
ALBUMIN SERPL BCP-MCNC: 3.7 G/DL (ref 3.5–5)
ALP SERPL-CCNC: 49 U/L (ref 46–116)
ALT SERPL W P-5'-P-CCNC: 24 U/L (ref 12–78)
ANION GAP SERPL CALCULATED.3IONS-SCNC: 2 MMOL/L (ref 4–13)
AST SERPL W P-5'-P-CCNC: 20 U/L (ref 5–45)
BASOPHILS # BLD AUTO: 0.05 THOUSANDS/ÂΜL (ref 0–0.1)
BASOPHILS NFR BLD AUTO: 1 % (ref 0–1)
BILIRUB SERPL-MCNC: 0.28 MG/DL (ref 0.2–1)
BUN SERPL-MCNC: 18 MG/DL (ref 5–25)
CALCIUM SERPL-MCNC: 9.2 MG/DL (ref 8.3–10.1)
CHLORIDE SERPL-SCNC: 111 MMOL/L (ref 96–108)
CO2 SERPL-SCNC: 26 MMOL/L (ref 21–32)
CREAT SERPL-MCNC: 0.65 MG/DL (ref 0.6–1.3)
EOSINOPHIL # BLD AUTO: 0.25 THOUSAND/ÂΜL (ref 0–0.61)
EOSINOPHIL NFR BLD AUTO: 3 % (ref 0–6)
ERYTHROCYTE [DISTWIDTH] IN BLOOD BY AUTOMATED COUNT: 14.1 % (ref 11.6–15.1)
GFR SERPL CREATININE-BSD FRML MDRD: 100 ML/MIN/1.73SQ M
GLUCOSE P FAST SERPL-MCNC: 107 MG/DL (ref 65–99)
HCT VFR BLD AUTO: 44.8 % (ref 34.8–46.1)
HGB BLD-MCNC: 14.7 G/DL (ref 11.5–15.4)
IMM GRANULOCYTES # BLD AUTO: 0.01 THOUSAND/UL (ref 0–0.2)
IMM GRANULOCYTES NFR BLD AUTO: 0 % (ref 0–2)
LYMPHOCYTES # BLD AUTO: 1.99 THOUSANDS/ÂΜL (ref 0.6–4.47)
LYMPHOCYTES NFR BLD AUTO: 26 % (ref 14–44)
MCH RBC QN AUTO: 30.3 PG (ref 26.8–34.3)
MCHC RBC AUTO-ENTMCNC: 32.8 G/DL (ref 31.4–37.4)
MCV RBC AUTO: 92 FL (ref 82–98)
MONOCYTES # BLD AUTO: 0.55 THOUSAND/ÂΜL (ref 0.17–1.22)
MONOCYTES NFR BLD AUTO: 7 % (ref 4–12)
NEUTROPHILS # BLD AUTO: 4.79 THOUSANDS/ÂΜL (ref 1.85–7.62)
NEUTS SEG NFR BLD AUTO: 63 % (ref 43–75)
NRBC BLD AUTO-RTO: 0 /100 WBCS
PLATELET # BLD AUTO: 288 THOUSANDS/UL (ref 149–390)
PMV BLD AUTO: 10.1 FL (ref 8.9–12.7)
POTASSIUM SERPL-SCNC: 3.7 MMOL/L (ref 3.5–5.3)
PROT SERPL-MCNC: 6.4 G/DL (ref 6.4–8.4)
RBC # BLD AUTO: 4.85 MILLION/UL (ref 3.81–5.12)
SODIUM SERPL-SCNC: 139 MMOL/L (ref 135–147)
TSH SERPL DL<=0.05 MIU/L-ACNC: 1.89 UIU/ML (ref 0.45–4.5)
WBC # BLD AUTO: 7.64 THOUSAND/UL (ref 4.31–10.16)

## 2023-03-14 ENCOUNTER — OFFICE VISIT (OUTPATIENT)
Dept: PSYCHIATRY | Facility: CLINIC | Age: 58
End: 2023-03-14

## 2023-03-14 ENCOUNTER — SOCIAL WORK (OUTPATIENT)
Dept: BEHAVIORAL/MENTAL HEALTH CLINIC | Facility: CLINIC | Age: 58
End: 2023-03-14

## 2023-03-14 DIAGNOSIS — R06.2 WHEEZING: ICD-10-CM

## 2023-03-14 DIAGNOSIS — F31.32 BIPOLAR AFFECTIVE DISORDER, CURRENTLY DEPRESSED, MODERATE (HCC): Primary | ICD-10-CM

## 2023-03-14 DIAGNOSIS — F43.10 PTSD (POST-TRAUMATIC STRESS DISORDER): ICD-10-CM

## 2023-03-14 DIAGNOSIS — F41.1 GENERALIZED ANXIETY DISORDER: ICD-10-CM

## 2023-03-14 DIAGNOSIS — F17.210 CIGARETTE NICOTINE DEPENDENCE WITHOUT COMPLICATION: ICD-10-CM

## 2023-03-14 DIAGNOSIS — F31.81 MODERATE MIXED BIPOLAR II DISORDER (HCC): Primary | ICD-10-CM

## 2023-03-14 DIAGNOSIS — F12.90 MARIJUANA USE: ICD-10-CM

## 2023-03-14 DIAGNOSIS — F10.11 ALCOHOL ABUSE, IN REMISSION: ICD-10-CM

## 2023-03-14 RX ORDER — BUPROPION HYDROCHLORIDE 150 MG/1
150 TABLET ORAL DAILY
Qty: 30 TABLET | Refills: 2 | Status: SHIPPED | OUTPATIENT
Start: 2023-03-14

## 2023-03-14 RX ORDER — LAMOTRIGINE 200 MG/1
200 TABLET ORAL DAILY
Qty: 30 TABLET | Refills: 2 | Status: SHIPPED | OUTPATIENT
Start: 2023-03-14 | End: 2023-04-13

## 2023-03-14 RX ORDER — ALBUTEROL SULFATE 90 UG/1
AEROSOL, METERED RESPIRATORY (INHALATION)
Qty: 8.5 G | Refills: 2 | Status: SHIPPED | OUTPATIENT
Start: 2023-03-14
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
## 2023-03-16 ENCOUNTER — TELEPHONE (OUTPATIENT)
Dept: PSYCHIATRY | Facility: CLINIC | Age: 58
End: 2023-03-16

## 2023-03-16 NOTE — TELEPHONE ENCOUNTER
Medical record request was received from [address] [place] [address] for time frame of 8/1/2022 to present   Will be placed in resident mailbox to be signed by    Dr Kirsten Brannon

## 2023-03-17 ENCOUNTER — TELEMEDICINE (OUTPATIENT)
Dept: BEHAVIORAL/MENTAL HEALTH CLINIC | Facility: CLINIC | Age: 58
End: 2023-03-17

## 2023-03-17 DIAGNOSIS — F31.81 MODERATE MIXED BIPOLAR II DISORDER (HCC): Primary | ICD-10-CM

## 2023-03-17 DIAGNOSIS — F17.210 CIGARETTE NICOTINE DEPENDENCE WITHOUT COMPLICATION: ICD-10-CM

## 2023-03-17 DIAGNOSIS — F41.1 GENERALIZED ANXIETY DISORDER: ICD-10-CM

## 2023-03-17 DIAGNOSIS — F10.11 ALCOHOL ABUSE, IN REMISSION: ICD-10-CM

## 2023-03-17 DIAGNOSIS — F43.10 PTSD (POST-TRAUMATIC STRESS DISORDER): ICD-10-CM

## 2023-03-17 NOTE — PSYCH
Virtual Regular Visit    Verification of patient location:    Patient is located in the following state in which I hold an active license [place]      Assessment/Plan:    Problem List Items Addressed This Visit        Other    Moderate mixed bipolar II disorder (HCC) - Primary    Generalized anxiety disorder    PTSD (post-traumatic stress disorder)    Alcohol abuse, in remission    Cigarette nicotine dependence without complication       Goals addressed in session: Goal 1 and Goal 2          Reason for visit is No chief complaint on file  Encounter provider Lam Krishnan [facility]    Provider located at [address] [place] [id number] [phone]      Recent Visits  Date Type Provider Dept   03/10/23 [facility] [address] [facility] Therapist Heriberto   Showing recent visits within past 7 days and meeting all other requirements  Future Appointments  No visits were found meeting these conditions  Showing future appointments within next 150 days and meeting all other requirements       The patient was identified by name and date of birth  Josiane Gómez was informed that this is a telemedicine visit and that the visit is being conducted [employer]  She agrees to proceed     My office door was closed  The patient was notified the following individuals were present in the room other group members  She acknowledged consent and understanding of privacy and security of the video platform  The patient has agreed to participate and understands they can discontinue the visit at any time  Patient is aware this is a billable service  Subjective  Josiane Gómez is a 56 y o  female    Data: Kristyn Madrid attended the Relaxation group and was guided through EFT tapping on anxiety, diaphragmatic breathing, humming, and progressive muscle relaxation   Furthermore, attendees were guided through meditation for self-love, self-compassion, compassion for failures and mistakes, letting go of anxiety and blame, and affirmations against anxiety and depression  Psychoeducation, EFT, Mindfulness-based, and group approach were used to support the cohesion and maintain the serene environment for the exercise’s purpose  Assessment: Dre Eason followed through guidance and completed the exercises, showed willingness to continue learning and practicing self-care  Plan: Dre Eason will practice deep breathing and other techniques based on each occasion to counter stress and will attend the next group to be able to take part in further guided exercises on chosen topics to build on the techniques learned in the beginning  HPI     Past Medical History:   Diagnosis Date   • Bilateral carpal tunnel syndrome 08/20/2019   • Carpal tunnel syndrome of right wrist 10/30/2019    Added automatically from request for surgery [id number]   • COPD (chronic obstructive pulmonary disease) (HCC)    • Hypertension        Past Surgical History:   Procedure Laterality Date   • BREAST BIOPSY Left 20yrs ago benign   • OOPHORECTOMY Right    • [place] NDSC WRST SURG W/RLS TRANSVRS CARPL LIGM Right 1/21/2020    Procedure: RELEASE CARPAL TUNNEL ENDOSCOPIC;  Surgeon: Tramaine Ramon MD;  Location: [facility];  Service: Orthopedics   • [place] [address] [facility] 3 [address] Fl[address] WRST SURG W/RLS TRANSVRS CARPL LIGM Left 1/28/2020    Procedure: RELEASE CARPAL TUNNEL ENDOSCOPIC;  Surgeon: Tramaine Ramon MD;  Location: [facility];  Service: Orthopedics       Current Outpatient Medications   Medication Sig Dispense Refill   • albuterol (PROVENTIL HFA,VENTOLIN HFA) 90 mcg/act inhaler inhale 2 puffs by mouth and INTO THE LUNGS every 6 hours IF NEEDED FOR WHEEZING   8 5 g 2   • benzonatate (TESSALON) 200 MG capsule Take 1 capsule (200 mg total) by mouth 3 (three) times a day as needed for cough 20 capsule 0   • buPROPion (Wellbutrin XL) 150 mg 24 hr tablet Take 1 tablet (150 mg total) by mouth daily 30 tablet 2   • cholecalciferol (VITAMIN D3) 400 units tablet Take 400 Units by mouth daily     • FLUoxetine (PROzac) 20 mg capsule take 2 capsules by mouth once daily 30 capsule 1   • fluticasone (FLONASE) 50 mcg/act nasal spray 1 spray into each nostril daily 16 g 2   • lamoTRIgine (LaMICtal) 200 MG tablet Take 1 tablet (200 mg total) by mouth daily 30 tablet 2   • levocetirizine (XYZAL) 5 MG tablet take 1 tablet by mouth every evening 30 tablet 4   • nicotine polacrilex (COMMIT) 4 MG lozenge Apply 1 lozenge (4 mg total) to the mouth or throat as needed for smoking cessation (Patient not taking: No sig reported) 100 each 5   • omeprazole (PriLOSEC) 20 mg delayed release capsule take 1 capsule by mouth twice a day 60 capsule 5   • predniSONE 10 mg tablet Take 3 tabs BID X 2 days, 2 tabs BID X 2 days, 1 tab BID X 2 days, 1 tab daily X 2 days 26 tablet 0   • senna-docusate sodium (SENOKOT-S) 8 6-50 mg per tablet Take 3 tablets by mouth as needed       • tiotropium-olodaterol (Stiolto Respimat) 2 5-2 5 MCG/ACT inhaler Inhale 2 puffs daily 4 g 11     No current facility-administered medications for this visit  No Known Allergies    Review of Systems    Video Exam    There were no vitals filed for this visit      Physical Exam       03/17/23  Start Time: 0400  Stop Time: 0500  Total Visit Time: 60 minutes

## 2023-03-21 ENCOUNTER — TELEMEDICINE (OUTPATIENT)
Dept: BEHAVIORAL/MENTAL HEALTH CLINIC | Facility: CLINIC | Age: 58
End: 2023-03-21

## 2023-03-21 DIAGNOSIS — F43.10 PTSD (POST-TRAUMATIC STRESS DISORDER): ICD-10-CM

## 2023-03-21 DIAGNOSIS — F12.90 MARIJUANA USE: Chronic | ICD-10-CM

## 2023-03-21 DIAGNOSIS — F41.1 GENERALIZED ANXIETY DISORDER: ICD-10-CM

## 2023-03-21 DIAGNOSIS — F31.81 MODERATE MIXED BIPOLAR II DISORDER (HCC): Primary | ICD-10-CM

## 2023-03-21 DIAGNOSIS — F17.210 CIGARETTE NICOTINE DEPENDENCE WITHOUT COMPLICATION: ICD-10-CM

## 2023-03-21 DIAGNOSIS — F10.11 ALCOHOL ABUSE, IN REMISSION: ICD-10-CM

## 2023-03-21 RX ORDER — FLUOXETINE HYDROCHLORIDE 20 MG/1
CAPSULE ORAL
Qty: 30 CAPSULE | Refills: 1 | Status: SHIPPED | OUTPATIENT
Start: 2023-03-21

## 2023-03-21 NOTE — TELEPHONE ENCOUNTER
Medication Refill Request     Name of Medication Prozac  Dose/Frequency 20mg 2 capsules daily  Quantity 30  Verified pharmacy   [x]  Verified ordering Provider   [x]  Does patient have enough for the next 3 days? Yes [] No [x]  Does patient have a follow-up appointment scheduled?  Yes [x] No []   If so when is appointment: 4/11/2023 4pm    Patient called for a refill, there was no refill option on this prescription

## 2023-03-21 NOTE — PSYCH
Virtual Regular Visit    Verification of patient location:    Patient is located in the following state in which I hold an active license [place]      Assessment/Plan:    Problem List Items Addressed This Visit        Other    Marijuana use (Chronic)    Moderate mixed bipolar II disorder (HCC) - Primary    Generalized anxiety disorder    PTSD (post-traumatic stress disorder)    Alcohol abuse, in remission    Cigarette nicotine dependence without complication       Goals addressed in session: Goal 1 and Goal 2          Reason for visit is No chief complaint on file  Encounter provider FARHAN Berg [facility]    Provider located at [address] [place] [id number] [phone]      Recent Visits  Date Type Provider Dept   03/17/23 229 [facility], [address] [facility] The[facility]ist Heriberto   Showing recent visits within past 7 days and meeting all other requirements  Future Appointments  No visits were found meeting these conditions  Showing future appointments within next 150 days and meeting all other requirements       The patient was identified by name and date of birth  Benton Meyers was informed that this is a telemedicine visit and that the visit is being conducted throughthe [employer]  She agrees to proceed     My office door was closed  No one else was in the room  She acknowledged consent and understanding of privacy and security of the video platform  The patient has agreed to participate and understands they can discontinue the visit at any time  Patient is aware this is a billable service  Subjective  Benton Meyers is a 56 y o  female  Data: Dotty Sutherland discussed her new medication, her bad days that made her ruminate and dwell in her negative thoughts that consequently produce very intense emotions   Person-Centered, DBT-based, and CBT methods were used to support Pt's expression, to make good track on her thoughts through notes, and to focus on important focus  Assessment: María Pope sounded very confused, distracted, and over-talkative  She managed to focus on important topics and bring up her concerns  Her mood was congruent with her affect  Marie's thought content was relevant, while her concerns were valid, especially about cognitive decline  Plan: María Pope will attend her weekly individual and weekly group sessions to be able to work on self-care and processing traumatic memories as well as teaching her nervous system another response to stress  HPI     Past Medical History:   Diagnosis Date   • Bilateral carpal tunnel syndrome 08/20/2019   • Carpal tunnel syndrome of right wrist 10/30/2019    Added automatically from request for surgery [id number]   • COPD (chronic obstructive pulmonary disease) (HCC)    • Hypertension        Past Surgical History:   Procedure Laterality Date   • BREAST BIOPSY Left 20yrs ago benign   • OOPHORECTOMY Right    • [place] NDSC WRST SURG W/RLS TRANSVRS CARPL LIGM Right 1/21/2020    Procedure: RELEASE CARPAL TUNNEL ENDOSCOPIC;  Surgeon: Purnima Smith MD;  [facility];  Service: Orthopedics   • [place] [facility],2 And 3 S Floors WRST SURG W/RLS TRANSVRS CARPL LIGM Left 1/28/2020    Procedure: RELEASE CARPAL TUNNEL ENDOSCOPIC;  Surgeon: Purnima Smith MD;  [facility];  Service: Orthopedics       Current Outpatient Medications   Medication Sig Dispense Refill   • albuterol (PROVENTIL HFA,VENTOLIN HFA) 90 mcg/act inhaler inhale 2 puffs by mouth and INTO THE LUNGS every 6 hours IF NEEDED FOR WHEEZING   8 5 g 2   • benzonatate (TESSALON) 200 MG capsule Take 1 capsule (200 mg total) by mouth 3 (three) times a day as needed for cough 20 capsule 0   • buPROPion (Wellbutrin XL) 150 mg 24 hr tablet Take 1 tablet (150 mg total) by mouth daily 30 tablet 2   • cholecalciferol (VITAMIN D3) 400 units tablet Take 400 Units by mouth daily     • FLUoxetine (PROzac) 20 mg capsule take 2 capsules by mouth once daily 30 capsule 1   • fluticasone (FLONASE) 50 mcg/act nasal spray 1 spray into each nostril daily 16 g 2   • lamoTRIgine (LaMICtal) 200 MG tablet Take 1 tablet (200 mg total) by mouth daily 30 tablet 2   • levocetirizine (XYZAL) 5 MG tablet take 1 tablet by mouth every evening 30 tablet 4   • nicotine polacrilex (COMMIT) 4 MG lozenge Apply 1 lozenge (4 mg total) to the mouth or throat as needed for smoking cessation (Patient not taking: No sig reported) 100 each 5   • omeprazole (PriLOSEC) 20 mg delayed release capsule take 1 capsule by mouth twice a day 60 capsule 5   • predniSONE 10 mg tablet Take 3 tabs BID X 2 days, 2 tabs BID X 2 days, 1 tab BID X 2 days, 1 tab daily X 2 days 26 tablet 0   • senna-docusate sodium (SENOKOT-S) 8 6-50 mg per tablet Take 3 tablets by mouth as needed       • tiotropium-olodaterol (Stiolto Respimat) 2 5-2 5 MCG/ACT inhaler Inhale 2 puffs daily 4 g 11     No current facility-administered medications for this visit  No Known Allergies    Review of Systems    Video Exam    There were no vitals filed for this visit      Physical Exam     03/21/23  Start Time: 2200  Stop Time: 1130  Total Visit Time: 35 minutes

## 2023-03-24 ENCOUNTER — TELEMEDICINE (OUTPATIENT)
Dept: BEHAVIORAL/MENTAL HEALTH CLINIC | Facility: CLINIC | Age: 58
End: 2023-03-24

## 2023-03-24 DIAGNOSIS — F17.210 CIGARETTE NICOTINE DEPENDENCE WITHOUT COMPLICATION: ICD-10-CM

## 2023-03-24 DIAGNOSIS — F10.11 ALCOHOL ABUSE, IN REMISSION: ICD-10-CM

## 2023-03-24 DIAGNOSIS — F12.90 MARIJUANA USE: Chronic | ICD-10-CM

## 2023-03-24 DIAGNOSIS — F41.1 GENERALIZED ANXIETY DISORDER: ICD-10-CM

## 2023-03-24 DIAGNOSIS — F31.81 MODERATE MIXED BIPOLAR II DISORDER (HCC): Primary | ICD-10-CM

## 2023-03-24 DIAGNOSIS — F43.10 PTSD (POST-TRAUMATIC STRESS DISORDER): ICD-10-CM

## 2023-03-24 NOTE — PSYCH
Virtual Regular Visit    Verification of patient location:    Patient is located in the following state in which I hold an active license [place]      Assessment/Plan:    Problem List Items Addressed This Visit        Other    Marijuana use (Chronic)    Moderate mixed bipolar II disorder (HCC) - Primary    Generalized anxiety disorder    PTSD (post-traumatic stress disorder)    Alcohol abuse, in remission    Cigarette nicotine dependence without complication       Goals addressed in session: Goal 1 and Goal 2          Reason for visit is No chief complaint on file  [facility]    Provider located at [address] [place] [id number] [phone]      Recent Visits  Date Type Provider Dept   03/21/23 229 [facility], [address] [place] [address]   03/17/23 229 [facility], [facility] Therapist HALIE   Showing recent visits within past 7 days and meeting all other requirements  Future Appointments  No visits were found meeting these conditions  Showing future appointments within next 150 days and meeting all other requirements       The patient was identified by name and date of birth  Otis Rivera was informed that this is a telemedicine visit and that the visit is being conducted [employer]  She agrees to proceed     My office door was closed  The patient was notified the following individuals were present in the room other group members  She acknowledged consent and understanding of privacy and security of the video platform  The patient has agreed to participate and understands they can discontinue the visit at any time  Patient is aware this is a billable service  Subjective  Otis Rivera is a 56 y o  female        Data: María Rianyoandy attended the Relaxation group and was guided through [facility] tapping on anxiety, diaphragmatic breathing, humming, and progressive muscle relaxation  Furthermore, attendees were guided through meditation for attending to difficult emotions and thoughts, letting go of anxiety, breathing anchor for wondering mind, and affirmations for confidence, calmness, and against anxiety and depression  Psychoeducation, EFT, Mindfulness-based, and group approach were used to support the cohesion and maintain the serene environment for the exercise’s purpose  Assessment: Hermelindo Melgar followed through guidance and completed the exercises, showed willingness to continue learning and practicing self-care  Plan: Hermelindo Running will practice deep breathing and other techniques based on each occasion to counter stress and will attend the next group to be able to take part in further guided exercises on chosen topics to build on the techniques learned in the beginning  HPI     Past Medical History:   Diagnosis Date   • Bilateral carpal tunnel syndrome 08/20/2019   • Carpal tunnel syndrome of right wrist 10/30/2019    Added automatically from request for surgery [id number]   • COPD (chronic obstructive pulmonary disease) (HCC)    • Hypertension        Past Surgical History:   Procedure Laterality Date   • BREAST BIOPSY Left 20yrs ago benign   • OOPHORECTOMY Right    • [place] NDSC WRST SURG W/RLS TRANSVRS CARPL LIGM Right 1/21/2020    Procedure: RELEASE CARPAL TUNNEL ENDOSCOPIC;  Surgeon: Shelby Anaya MD;  [facility]: [facility];  Service: Orthopedics   • [place] [facility],2 And 3 S Floors WRST SURG W/RLS TRANSVRS CARPL LIGM Left 1/28/2020    Procedure: RELEASE CARPAL TUNNEL ENDOSCOPIC;  Surgeon: Shelby Anaya MD;  [facility];  Service: Orthopedics       Current Outpatient Medications   Medication Sig Dispense Refill   • albuterol (PROVENTIL HFA,VENTOLIN HFA) 90 mcg/act inhaler inhale 2 puffs by mouth and INTO THE LUNGS every 6 hours IF NEEDED FOR WHEEZING   8 5 g 2   • benzonatate (TESSALON) 200 MG capsule Take 1 capsule (200 mg total) by mouth 3 (three) times a day as needed for cough 20 capsule 0   • buPROPion (Wellbutrin XL) 150 mg 24 hr tablet Take 1 tablet (150 mg total) by mouth daily 30 tablet 2   • cholecalciferol (VITAMIN D3) 400 units tablet Take 400 Units by mouth daily     • FLUoxetine (PROzac) 20 mg capsule take 2 capsules by mouth once daily 30 capsule 1   • fluticasone (FLONASE) 50 mcg/act nasal spray 1 spray into each nostril daily 16 g 2   • lamoTRIgine (LaMICtal) 200 MG tablet Take 1 tablet (200 mg total) by mouth daily 30 tablet 2   • levocetirizine (XYZAL) 5 MG tablet take 1 tablet by mouth every evening 30 tablet 4   • nicotine polacrilex (COMMIT) 4 MG lozenge Apply 1 lozenge (4 mg total) to the mouth or throat as needed for smoking cessation (Patient not taking: No sig reported) 100 each 5   • omeprazole (PriLOSEC) 20 mg delayed release capsule take 1 capsule by mouth twice a day 60 capsule 5   • predniSONE 10 mg tablet Take 3 tabs BID X 2 days, 2 tabs BID X 2 days, 1 tab BID X 2 days, 1 tab daily X 2 days 26 tablet 0   • senna-docusate sodium (SENOKOT-S) 8 6-50 mg per tablet Take 3 tablets by mouth as needed       • tiotropium-olodaterol (Stiolto Respimat) 2 5-2 5 MCG/ACT inhaler Inhale 2 puffs daily 4 g 11     No current facility-administered medications for this visit  No Known Allergies    Review of Systems    Video Exam    There were no vitals filed for this visit      Physical Exam     03/24/23  Start Time: 0400  Stop Time: 0500  Total Visit Time: 60 minutes

## 2023-03-28 ENCOUNTER — TELEMEDICINE (OUTPATIENT)
Dept: BEHAVIORAL/MENTAL HEALTH CLINIC | Facility: CLINIC | Age: 58
End: 2023-03-28

## 2023-03-28 DIAGNOSIS — F43.10 PTSD (POST-TRAUMATIC STRESS DISORDER): ICD-10-CM

## 2023-03-28 DIAGNOSIS — F31.81 MODERATE MIXED BIPOLAR II DISORDER (HCC): Primary | ICD-10-CM

## 2023-03-28 DIAGNOSIS — F17.210 CIGARETTE NICOTINE DEPENDENCE WITHOUT COMPLICATION: ICD-10-CM

## 2023-03-28 DIAGNOSIS — F41.1 GENERALIZED ANXIETY DISORDER: ICD-10-CM

## 2023-03-28 DIAGNOSIS — F12.90 MARIJUANA USE: Chronic | ICD-10-CM

## 2023-03-28 DIAGNOSIS — F10.11 ALCOHOL ABUSE, IN REMISSION: ICD-10-CM

## 2023-03-28 NOTE — PSYCH
Virtual Regular Visit    Verification of patient location:    Patient is located in the following state in which [place] an [place]      Assessment/Plan:    Problem List Items Addressed This Visit        Other    Marijuana use (Chronic)    Moderate mixed bipolar II disorder (HCC) - Primary    Generalized anxiety disorder    PTSD (post-traumatic stress disorder)    Alcohol abuse, in remission    Cigarette nicotine dependence without complication       Goals addressed in session: Goal 1 and Goal 2          Reason for visit is No chief complaint on file  [facility]    Provider located at [address] [place] [id number] [phone]      Recent Visits  Date Type Provider Dept   03/24/23 229 [facility], [address] [place] [address]   03/21/23 229 [facility], [address] [facility] Therapist Heriberto   Showing recent visits within past 7 days and meeting all other requirements  Today's Visits  Date Type Provider Dept   03/28/23 229 [facility], [address] [place] [address]   Showing today's visits and meeting all other requirements  Future Appointments  No visits were found meeting these conditions  Showing future appointments within next 150 days and meeting all other requirements       The patient was identified by name and date of birth  Ramos Mendiola was informed that this is a telemedicine visit and that the visit is being conducted throughthe [employer] platform  She agrees to proceed     My office door was closed  No one else was in the room  She acknowledged consent and understanding of privacy and security of the video platform  The patient has agreed to participate and understands they can discontinue the visit at any time  Patient is aware this is a billable service  Delvis Mendiola is a 56 y o  female   Data: Kenji Tomlinson discussed her memories with her late son and was observed handling her emotions with difficulties but with more strnegths and toools than before  Person-Centered, TF-CBT, and DBT-based techniques helped Pt observe her emotions rather than be affected by them as much as before  She had to make a scheduled call to her SS  and had a shorter session  Assessment: Kenji Tomlinson was talkative, emotional, expressive, as usual  Her affect was congruent with her mood  She had relevant thought content and valid concerns  Plan: Kenji Tomlinson will resume individual therapy in May when Th returns from vacation  HPI     Past Medical History:   Diagnosis Date   • Bilateral carpal tunnel syndrome 08/20/2019   • Carpal tunnel syndrome of right wrist 10/30/2019    Added automatically from request for surgery [id number]   • COPD (chronic obstructive pulmonary disease) (HCC)    • Hypertension        Past Surgical History:   Procedure Laterality Date   • BREAST BIOPSY Left 20yrs ago benign   • OOPHORECTOMY Right    • [place] NDSC WRST SURG W/RLS TRANSVRS CARPL LIGM Right 1/21/2020    Procedure: RELEASE CARPAL TUNNEL ENDOSCOPIC;  Surgeon: Roverto Ren MD;  Location: [facility];  Service: Orthopedics   • [place] [facility],2 And 3 S Floors WRST SURG W/RLS TRANSVRS CARPL LIGM Left 1/28/2020    Procedure: RELEASE CARPAL TUNNEL ENDOSCOPIC;  Surgeon: Roverto Ren MD;  [facility];  Service: Orthopedics       Current Outpatient Medications   Medication Sig Dispense Refill   • albuterol (PROVENTIL HFA,VENTOLIN HFA) 90 mcg/act inhaler inhale 2 puffs by mouth and INTO THE LUNGS every 6 hours IF NEEDED FOR WHEEZING   8 5 g 2   • benzonatate (TESSALON) 200 MG capsule Take 1 capsule (200 mg total) by mouth 3 (three) times a day as needed for cough 20 capsule 0   • buPROPion (Wellbutrin XL) 150 mg 24 hr tablet Take 1 tablet (150 mg total) by mouth daily 30 tablet 2   • cholecalciferol (VITAMIN D3) 400 units tablet Take 400 Units by mouth daily     • FLUoxetine (PROzac) 20 mg capsule take 2 capsules by mouth once daily 30 capsule 1   • fluticasone (FLONASE) 50 mcg/act nasal spray 1 spray into each nostril daily 16 g 2   • lamoTRIgine (LaMICtal) 200 MG tablet Take 1 tablet (200 mg total) by mouth daily 30 tablet 2   • levocetirizine (XYZAL) 5 MG tablet take 1 tablet by mouth every evening 30 tablet 4   • nicotine polacrilex (COMMIT) 4 MG lozenge Apply 1 lozenge (4 mg total) to the mouth or throat as needed for smoking cessation (Patient not taking: No sig reported) 100 each 5   • omeprazole (PriLOSEC) 20 mg delayed release capsule take 1 capsule by mouth twice a day 60 capsule 5   • predniSONE 10 mg tablet Take 3 tabs BID X 2 days, 2 tabs BID X 2 days, 1 tab BID X 2 days, 1 tab daily X 2 days 26 tablet 0   • senna-docusate sodium (SENOKOT-S) 8 6-50 mg per tablet Take 3 tablets by mouth as needed       • tiotropium-olodaterol (Stiolto Respimat) 2 5-2 5 MCG/ACT inhaler Inhale 2 puffs daily 4 g 11     No current facility-administered medications for this visit  No Known Allergies    Review of Systems    Video Exam    There were no vitals filed for this visit      Physical Exam     03/28/23  Start Time: 8113  Stop Time: 0659  Total Visit Time: 21 minutes

## 2023-03-31 ENCOUNTER — TELEMEDICINE (OUTPATIENT)
Dept: BEHAVIORAL/MENTAL HEALTH CLINIC | Facility: CLINIC | Age: 58
End: 2023-03-31

## 2023-03-31 DIAGNOSIS — F31.81 MODERATE MIXED BIPOLAR II DISORDER (HCC): Primary | ICD-10-CM

## 2023-03-31 DIAGNOSIS — F17.210 CIGARETTE NICOTINE DEPENDENCE WITHOUT COMPLICATION: ICD-10-CM

## 2023-03-31 DIAGNOSIS — F10.11 ALCOHOL ABUSE, IN REMISSION: ICD-10-CM

## 2023-03-31 DIAGNOSIS — F12.90 MARIJUANA USE: Chronic | ICD-10-CM

## 2023-03-31 DIAGNOSIS — F43.10 PTSD (POST-TRAUMATIC STRESS DISORDER): ICD-10-CM

## 2023-03-31 DIAGNOSIS — F41.1 GENERALIZED ANXIETY DISORDER: ICD-10-CM

## 2023-03-31 NOTE — PSYCH
Virtual Regular Visit    Verification of patient location:    Patient is located in the following state in which I [place] an [place]      Assessment/Plan:    Problem List Items Addressed This Visit        Other    Marijuana use (Chronic)    Moderate mixed bipolar II disorder (HCC) - Primary    Generalized anxiety disorder    PTSD (post-traumatic stress disorder)    Alcohol abuse, in remission    Cigarette nicotine dependence without complication       Goals addressed in session: Goal 1 and Goal 2          Reason for visit is No chief complaint on file  [facility] provider [facility]od[facility]    [place] located at [address] [place] [address]  [address] [facility] [address] [phone]      Recent Visits  Date Type Provider Dept   03/28/23 229 [facility], [address] [place] [address]   03/24/23 229 [facility], [address] [facility] Therapist [address]   Showing recent visits within past 7 days and meeting all other requirements  Future Appointments  No visits were found meeting these conditions  Showing future appointments within next 150 days and meeting all other requirements       The patient was identified by name and date of birth  Carlos Hidalgo was informed that this is a telemedicine visit and that the visit is being conducted [employer]  She agrees to proceed     My office door was closed  No one else was in the room  She acknowledged consent and understanding of privacy and security of the video platform  The patient has agreed to participate and understands they can discontinue the visit at any time  Patient is aware this is a billable service  Subjective  Carlos Hidalgo is a 56 y o  female        Data: Alison Maxwell attended the Relaxation group and was guided through EFT tapping on anxiety, diaphragmatic breathing, humming, and progressive muscle relaxation  Furthermore, attendees were guided through meditation for dealing with rollercoaster of emotions, letting go of anxious thoughts, and affirmations for self-love, compassion, gratitude, and against anxiety and depression  Psychoeducation, EFT, Mindfulness-based, and group approach were used to support the cohesion and maintain the serene environment for the exercise’s purpose  Assessment: Velia Akron followed through guidance and completed the exercises, showed willingness to continue learning and practicing self-care  Plan: Velia Akron will practice deep breathing and other techniques based on each occasion to counter stress and will attend the next group to be able to take part in further guided exercises on chosen topics to build on the techniques learned in the beginning  HPI     Past Medical History:   Diagnosis Date   • Bilateral carpal tunnel syndrome 08/20/2019   • Carpal tunnel syndrome of right wrist 10/30/2019    Added automatically from request for surgery [id number]   • COPD (chronic obstructive pulmonary disease) (HCC)    • Hypertension        Past Surgical History:   Procedure Laterality Date   • BREAST BIOPSY Left 20yrs ago benign   • OOPHORECTOMY Right    • [place] NDSC WRST SURG W/RLS TRANSVRS CARPL LIGM Right 1/21/2020    Procedure: RELEASE CARPAL TUNNEL ENDOSCOPIC;  Surgeon: Misha Calles MD;  Location: [facility];  Service: Orthopedics   • [place] [address] [facility],2 And 3 S Floors WRST SURG W/RLS TRANSVRS CARPL LIGM Left 1/28/2020    Procedure: RELEASE CARPAL TUNNEL ENDOSCOPIC;  Surgeon: Misha Calles MD;  [facility];  Service: Orthopedics       Current Outpatient Medications   Medication Sig Dispense Refill   • albuterol (PROVENTIL HFA,VENTOLIN HFA) 90 mcg/act inhaler inhale 2 puffs by mouth and INTO THE LUNGS every 6 hours IF NEEDED FOR WHEEZING   8 5 g 2   • benzonatate (TESSALON) 200 MG capsule Take 1 capsule (200 mg total) by mouth 3 (three) times a day as needed for cough 20 capsule 0   • buPROPion (Wellbutrin XL) 150 mg 24 hr tablet Take 1 tablet (150 mg total) by mouth daily 30 tablet 2   • cholecalciferol (VITAMIN D3) 400 units tablet Take 400 Units by mouth daily     • FLUoxetine (PROzac) 20 mg capsule take 2 capsules by mouth once daily 30 capsule 1   • fluticasone (FLONASE) 50 mcg/act nasal spray 1 spray into each nostril daily 16 g 2   • lamoTRIgine (LaMICtal) 200 MG tablet Take 1 tablet (200 mg total) by mouth daily 30 tablet 2   • levocetirizine (XYZAL) 5 MG tablet take 1 tablet by mouth every evening 30 tablet 4   • nicotine polacrilex (COMMIT) 4 MG lozenge Apply 1 lozenge (4 mg total) to the mouth or throat as needed for smoking cessation (Patient not taking: No sig reported) 100 each 5   • omeprazole (PriLOSEC) 20 mg delayed release capsule take 1 capsule by mouth twice a day 60 capsule 5   • predniSONE 10 mg tablet Take 3 tabs BID X 2 days, 2 tabs BID X 2 days, 1 tab BID X 2 days, 1 tab daily X 2 days 26 tablet 0   • senna-docusate sodium (SENOKOT-S) 8 6-50 mg per tablet Take 3 tablets by mouth as needed       • tiotropium-olodaterol (Stiolto Respimat) 2 5-2 5 MCG/ACT inhaler Inhale 2 puffs daily 4 g 11     No current facility-administered medications for this visit  No Known Allergies    Review of Systems    Video Exam    There were no vitals filed for this visit      Physical Exam     03/31/23  Start Time: 0400  Stop Time: 0500  Total Visit Time: 60 minutes

## 2023-04-03 ENCOUNTER — TELEPHONE (OUTPATIENT)
Dept: PSYCHIATRY | Facility: CLINIC | Age: 58
End: 2023-04-03

## 2023-05-02 ENCOUNTER — TELEMEDICINE (OUTPATIENT)
Dept: BEHAVIORAL/MENTAL HEALTH CLINIC | Facility: CLINIC | Age: 58
End: 2023-05-02

## 2023-05-02 DIAGNOSIS — F10.11 ALCOHOL ABUSE, IN REMISSION: ICD-10-CM

## 2023-05-02 DIAGNOSIS — F31.81 MODERATE MIXED BIPOLAR II DISORDER (HCC): Primary | ICD-10-CM

## 2023-05-02 DIAGNOSIS — F12.90 MARIJUANA USE: Chronic | ICD-10-CM

## 2023-05-02 DIAGNOSIS — F43.10 PTSD (POST-TRAUMATIC STRESS DISORDER): ICD-10-CM

## 2023-05-02 DIAGNOSIS — F41.1 GENERALIZED ANXIETY DISORDER: ICD-10-CM

## 2023-05-02 DIAGNOSIS — F17.210 CIGARETTE NICOTINE DEPENDENCE WITHOUT COMPLICATION: ICD-10-CM

## 2023-05-02 NOTE — PSYCH
Virtual Regular Visit    Verification of patient location:    Patient is located at Home in the following state in which I hold an active license [place]      Assessment/Plan:    Problem List Items Addressed This Visit        Other    Moderate mixed bipolar II disorder (HCC) - Primary    Generalized anxiety disorder    PTSD (post-traumatic stress disorder)    Alcohol abuse, in remission    Cigarette nicotine dependence without complication       Goals addressed in session: Goal 1 and Goal 2          Reason for visit is No chief complaint on file  Encounter provider Luisa Malagon [facility]    Provider located at [address] [place] [address]  Isabella Cummings [address] [address] [phone]      Recent Visits  No visits were found meeting these conditions  Showing recent visits within past 7 days and meeting all other requirements  Future Appointments  No visits were found meeting these conditions  Showing future appointments within next 150 days and meeting all other requirements       The patient was identified by name and date of birth  Jmi Horton was informed that this is a telemedicine visit and that the visit is being conducted throughthe [employer] platform  She agrees to proceed     My office door was closed  No one else was in the room  She acknowledged consent and understanding of privacy and security of the video platform  The patient has agreed to participate and understands they can discontinue the visit at any time  Patient is aware this is a billable service  Subjective  Jim Horton is a 56 y o  female  Data: Matt Harris discussed some cosmetic procedures that she did to feel better and shared her excitement as a result   Person-Centered, DBT-based, and CBT strategies were combined to assist Pt use her notes for reminders and reference against forgetfulness and discussed psychological/neurological testing for cognitive changes  Assessment: Sheri Urrutia seemed calmer and happier yet very distracted, foggy, and inattentive  She is noted to do well with notes that counter forgetfulness  Sheri Urrutia will join the Relaxation Group to be able to counter stress and anxiety  Plan: Sheri Urrutia will get back to weekly individual and weekly group sessions to be able to get back on track for self-care levels that are adequate for her numerous sufferings  She will continue expanding on her cognitive tools to counter forgetfulness  HPI     Past Medical History:   Diagnosis Date    Bilateral carpal tunnel syndrome 08/20/2019    Carpal tunnel syndrome of right wrist 10/30/2019    Added automatically from request for surgery [id number]    COPD (chronic obstructive pulmonary disease) ([facility] [address]     Hypertension        Past Surgical History:   Procedure Laterality Date    BREAST BIOPSY Left 20yrs ago benign    OOPHORECTOMY Right     [place] NDSC WRST SURG W/RLS TRANSVRS CARPL LIGM Right 1/21/2020    Procedure: RELEASE CARPAL TUNNEL ENDOSCOPIC;  Surgeon: Joseph Sánchez MD;  [facility];  Service: Orthopedics    [place] [address] [facility],2 And 3 S Floors WRST SURG W/RLS TRANSVRS CARPL LIGM Left 1/28/2020    Procedure: RELEASE CARPAL TUNNEL ENDOSCOPIC;  Surgeon: Joseph Sánchez MD;  [facility];  Service: Orthopedics       Current Outpatient Medications   Medication Sig Dispense Refill    albuterol (PROVENTIL HFA,VENTOLIN HFA) 90 mcg/act inhaler inhale 2 puffs by mouth and INTO THE LUNGS every 6 hours IF NEEDED FOR WHEEZING   8 5 g 2    benzonatate (TESSALON) 200 MG capsule Take 1 capsule (200 mg total) by mouth 3 (three) times a day as needed for cough (Patient not taking: Reported on 4/17/2023) 20 capsule 0    buPROPion (Wellbutrin XL) 150 mg 24 hr tablet Take 1 tablet (150 mg total) by mouth daily 30 tablet 2    cholecalciferol (VITAMIN D3) 400 units tablet Take 400 Units by mouth daily      FLUoxetine (PROzac) 20 mg capsule Take 2 capsules (40 mg total) by mouth daily 60 capsule 2    fluticasone (FLONASE) 50 mcg/act nasal spray 1 spray into each nostril daily 16 g 2    lamoTRIgine (LaMICtal) 200 MG tablet Take 1 tablet (200 mg total) by mouth daily 30 tablet 2    levocetirizine (XYZAL) 5 MG tablet take 1 tablet by mouth every evening 30 tablet 4    naproxen (Naprosyn) 500 mg tablet Take 1 tablet (500 mg total) by mouth 2 (two) times a day with meals 30 tablet 0    nicotine polacrilex (COMMIT) 4 MG lozenge Apply 1 lozenge (4 mg total) to the mouth or throat as needed for smoking cessation (Patient not taking: Reported on 7/13/2022) 100 each 5    omeprazole (PriLOSEC) 20 mg delayed release capsule take 1 capsule by mouth twice a day 60 capsule 5    predniSONE 10 mg tablet Take 3 tabs BID X 2 days, 2 tabs BID X 2 days, 1 tab BID X 2 days, 1 tab daily X 2 days (Patient not taking: Reported on 4/17/2023) 26 tablet 0    senna-docusate sodium (SENOKOT-S) 8 6-50 mg per tablet Take 3 tablets by mouth as needed        tiotropium-olodaterol (Stiolto Respimat) 2 5-2 5 MCG/ACT inhaler Inhale 2 puffs daily 4 g 11     No current facility-administered medications for this visit  No Known Allergies    Review of Systems    Video Exam    There were no vitals filed for this visit      Physical Exam     05/02/23  Start Time: 3560  Stop Time: 8916  Total Visit Time: 50 minutes

## 2023-05-05 ENCOUNTER — TELEMEDICINE (OUTPATIENT)
Dept: BEHAVIORAL/MENTAL HEALTH CLINIC | Facility: CLINIC | Age: 58
End: 2023-05-05

## 2023-05-05 DIAGNOSIS — F17.210 CIGARETTE NICOTINE DEPENDENCE WITHOUT COMPLICATION: ICD-10-CM

## 2023-05-05 DIAGNOSIS — F31.81 MODERATE MIXED BIPOLAR II DISORDER (HCC): Primary | ICD-10-CM

## 2023-05-05 DIAGNOSIS — F43.10 PTSD (POST-TRAUMATIC STRESS DISORDER): ICD-10-CM

## 2023-05-05 DIAGNOSIS — F10.11 ALCOHOL ABUSE, IN REMISSION: ICD-10-CM

## 2023-05-05 DIAGNOSIS — F41.1 GENERALIZED ANXIETY DISORDER: ICD-10-CM

## 2023-05-05 NOTE — PSYCH
Virtual Regular Visit    Verification of patient location:    Patient is located at Home in the following state in which I hold an active license [place]      Assessment/Plan:    Problem List Items Addressed This Visit        Other    Moderate mixed bipolar II disorder (HCC) - Primary    Generalized anxiety disorder    PTSD (post-traumatic stress disorder)    Alcohol abuse, in remission    Cigarette nicotine dependence without complication       Goals addressed in session: Goal 1 and Goal 2          Reason for visit is No chief complaint on file  [facility]    Provider located at [address] [place] [address] [phone]      Recent Visits  Date Type Provider Dept   05/02/23 229 [facility], [address] [facility] The[facility]ist Her[place]erto   Showing recent visits within past 7 days and meeting all other requirements  Future Appointments  No visits were found meeting these conditions  Showing future appointments within next 150 days and meeting all other requirements       The patient was identified by name and date of birth  Jj Calderon was informed that this is a telemedicine visit and that the visit is being conducted throughthe [employer] platform  She agrees to proceed     My office door was closed  No one else was in the room  She acknowledged consent and understanding of privacy and security of the video platform  The patient has agreed to participate and understands they can discontinue the visit at any time  Patient is aware this is a billable service  Subjective  Jj Calderon is a 56 y o  female  Data: Jacob attended the Relaxation group and was guided through EFT tapping on trauma, diaphragmatic breathing, humming, and progressive muscle relaxation   Furthermore, attendees were guided through meditation for dealing with physical and psychological pain, finding inner peace and strength, and affirmations of self-compassion, confidence, and against anxiety and depression  Psychoeducation, EFT, Mindfulness-based, and group approach were used to support the cohesion and maintain the serene environment for the exercises purpose  Assessment: Lizette Mcclellan followed through guidance and completed the exercises, showed willingness to continue learning and practicing self-care in weekly Relaxation Group as well as individual therapy  Plan: Lizette Mcclellan will practice deep breathing and other techniques based on each occasion to counter stress and will attend the next group to be able to take part in further guided exercises on chosen topics to build on the techniques learned in the beginning  HPI     Past Medical History:   Diagnosis Date    Bilateral carpal tunnel syndrome 08/20/2019    Carpal tunnel syndrome of right wrist 10/30/2019    Added automatically from request for surgery [id number]    COPD [facility] obstructive pulmonary disease) ([facility] [address] )     Hypertension        Past Surgical History:   Procedure Laterality Date    BREAST BIOPSY Left 20yrs ago benign    OOPHORECTOMY Right     [place] NDSC WRST SURG W/RLS TRANSVRS CARPL LIGM Right 1/21/2020    Procedure: RELEASE CARPAL TUNNEL ENDOSCOPIC;  Surgeon: Kaitlyn Gustafson MD;  [facility];  Service: Orthopedics    [place] [address] [facility],2 And 3 S Floors WRST SURG W/RLS TRANSVRS CARPL LIGM Left 1/28/2020    Procedure: RELEASE CARPAL TUNNEL ENDOSCOPIC;  Surgeon: Kaitlyn Gustafson MD;  [facility];  Service: Orthopedics       Current Outpatient Medications   Medication Sig Dispense Refill    albuterol (PROVENTIL HFA,VENTOLIN HFA) 90 mcg/act inhaler inhale 2 puffs by mouth and INTO THE LUNGS every 6 hours IF NEEDED FOR WHEEZING   8 5 g 2    benzonatate (TESSALON) 200 MG capsule Take 1 capsule (200 mg total) by mouth 3 (three) times a day as needed for cough (Patient not taking: Reported on 4/17/2023) 20 capsule 0    buPROPion (Wellbutrin XL) 150 mg 24 hr tablet Take 1 tablet (150 mg total) by mouth daily 30 tablet 2    cholecalciferol (VITAMIN D3) 400 units tablet Take 400 Units by mouth daily      FLUoxetine (PROzac) 20 mg capsule Take 2 capsules (40 mg total) by mouth daily 60 capsule 2    fluticasone (FLONASE) 50 mcg/act nasal spray 1 spray into each nostril daily 16 g 2    lamoTRIgine (LaMICtal) 200 MG tablet Take 1 tablet (200 mg total) by mouth daily 30 tablet 2    levocetirizine (XYZAL) 5 MG tablet take 1 tablet by mouth every evening 30 tablet 4    naproxen (Naprosyn) 500 mg tablet Take 1 tablet (500 mg total) by mouth 2 (two) times a day with meals 30 tablet 0    nicotine polacrilex (COMMIT) 4 MG lozenge Apply 1 lozenge (4 mg total) to the mouth or throat as needed for smoking cessation (Patient not taking: Reported on 7/13/2022) 100 each 5    omeprazole (PriLOSEC) 20 mg delayed release capsule take 1 capsule by mouth twice a day 60 capsule 5    predniSONE 10 mg tablet Take 3 tabs BID X 2 days, 2 tabs BID X 2 days, 1 tab BID X 2 days, 1 tab daily X 2 days (Patient not taking: Reported on 4/17/2023) 26 tablet 0    senna-docusate sodium (SENOKOT-S) 8 6-50 mg per tablet Take 3 tablets by mouth as needed        tiotropium-olodaterol (Stiolto Respimat) 2 5-2 5 MCG/ACT inhaler Inhale 2 puffs daily 4 g 11     No current facility-administered medications for this visit  No Known Allergies    Review of Systems    Video Exam    There were no vitals filed for this visit      Physical Exam     05/05/23  Start Time: 0400  Stop Time: 0500  Total Visit Time: 60 minutes

## 2023-05-11 ENCOUNTER — TELEMEDICINE (OUTPATIENT)
Dept: BEHAVIORAL/MENTAL HEALTH CLINIC | Facility: CLINIC | Age: 58
End: 2023-05-11

## 2023-05-11 DIAGNOSIS — F31.81 MODERATE MIXED BIPOLAR II DISORDER (HCC): Primary | ICD-10-CM

## 2023-05-11 DIAGNOSIS — F10.11 ALCOHOL ABUSE, IN REMISSION: ICD-10-CM

## 2023-05-11 DIAGNOSIS — F12.90 MARIJUANA USE: Chronic | ICD-10-CM

## 2023-05-11 DIAGNOSIS — F41.1 GENERALIZED ANXIETY DISORDER: ICD-10-CM

## 2023-05-11 DIAGNOSIS — F17.210 CIGARETTE NICOTINE DEPENDENCE WITHOUT COMPLICATION: ICD-10-CM

## 2023-05-11 DIAGNOSIS — F43.10 PTSD (POST-TRAUMATIC STRESS DISORDER): ICD-10-CM

## 2023-05-11 NOTE — PSYCH
Virtual Regular Visit    Verification of patient location:    Patient is located at Home in the following state in which I hold an [place] license [place]      Assessment/Plan:    Problem List Items Addressed This Visit        Other    Marijuana use (Chronic)    Moderate mixed bipolar II disorder (HCC) - Primary    Generalized anxiety disorder    PTSD (post-traumatic stress disorder)    Alcohol abuse, in remission    Cigarette nicotine dependence without complication       Goals addressed in session: Goal 1 and Goal 2          Reason for visit is No chief complaint on file  Encounter provider FARHAN Ludwig [facility]    Provider located at [address] [place] [address] [phone]      Recent Visits  Date Type Provider Dept   05/05/23 229 [facility], [address] [facility] Therapist Heriberto   Showing recent visits within past 7 days and meeting all other requirements  Today's Visits  Date Type Provider Dept   05/11/23 229 [facility], [address] [place] [address]   Showing today's visits and meeting all other requirements  Future Appointments  No visits were found meeting these conditions  Showing future appointments within next 150 days and meeting all other requirements       The patient was identified by name and date of birth  Shanique Roe was informed that this is a telemedicine visit and that the visit is being conducted throughthe [employer] platform  She agrees to proceed     My office door was closed  No one else was in the room  She acknowledged consent and understanding of privacy and security of the video platform  The patient has agreed to participate and understands they can discontinue the visit at any time  Patient is aware this is a billable service  Subjective  Shanique Roe is a 56 y o  female         HPI     Past Medical History: Diagnosis Date   • Bilateral carpal tunnel syndrome 08/20/2019   • Carpal tunnel syndrome of right wrist 10/30/2019    Added automatically from request for surgery [id number]   • COPD (chronic obstructive pulmonary disease) ([facility] [address] )    • Hypertension        Past Surgical History:   Procedure Laterality Date   • BREAST BIOPSY Left 20yrs ago benign   • OOPHORECTOMY Right    • [place] NDSC WRST SURG W/RLS TRANSVRS CARPL LIGM Right 1/21/2020    Procedure: RELEASE CARPAL TUNNEL ENDOSCOPIC;  Surgeon: Conor Jo MD;  [facility];  Service: Orthopedics   • [place] [address]oors WRST SURG W/RLS TRANSVRS CARPL LIGM Left 1/28/2020    Procedure: RELEASE CARPAL TUNNEL ENDOSCOPIC;  Surgeon: Conor Jo MD;  [facility];  Service: Orthopedics       Current Outpatient Medications   Medication Sig Dispense Refill   • albuterol (PROVENTIL HFA,VENTOLIN HFA) 90 mcg/act inhaler inhale 2 puffs by mouth and INTO THE LUNGS every 6 hours IF NEEDED FOR WHEEZING   8 5 g 2   • benzonatate (TESSALON) 200 MG capsule Take 1 capsule (200 mg total) by mouth 3 (three) times a day as needed for cough (Patient not taking: Reported on 4/17/2023) 20 capsule 0   • buPROPion (Wellbutrin XL) 150 mg 24 hr tablet Take 1 tablet (150 mg total) by mouth daily 30 tablet 2   • cholecalciferol (VITAMIN D3) 400 units tablet Take 400 Units by mouth daily     • FLUoxetine (PROzac) 20 mg capsule Take 2 capsules (40 mg total) by mouth daily 60 capsule 2   • fluticasone (FLONASE) 50 mcg/act nasal spray 1 spray into each nostril daily 16 g 2   • lamoTRIgine (LaMICtal) 200 MG tablet Take 1 tablet (200 mg total) by mouth daily 30 tablet 2   • levocetirizine (XYZAL) 5 MG tablet take 1 tablet by mouth every evening 30 tablet 4   • naproxen (Naprosyn) 500 mg tablet Take 1 tablet (500 mg total) by mouth 2 (two) times a day with meals 30 tablet 0   • nicotine polacrilex (COMMIT) 4 MG lozenge Apply 1 lozenge (4 mg total) to the mouth or throat as needed for smoking cessation (Patient not taking: Reported on 7/13/2022) 100 each 5   • omeprazole (PriLOSEC) 20 mg delayed release capsule take 1 capsule by mouth twice a day 60 capsule 5   • predniSONE 10 mg tablet Take 3 tabs BID X 2 days, 2 tabs BID X 2 days, 1 tab BID X 2 days, 1 tab daily X 2 days (Patient not taking: Reported on 4/17/2023) 26 tablet 0   • senna-docusate sodium (SENOKOT-S) 8 6-50 mg per tablet Take 3 tablets by mouth as needed       • tiotropium-olodaterol (Stiolto Respimat) 2 5-2 5 MCG/ACT inhaler Inhale 2 puffs daily 4 g 11     No current facility-administered medications for this visit  No Known Allergies    Review of Systems    Video Exam    There were no vitals filed for this visit  Physical Exam     Behavioral Health Psychotherapy Progress Note    Psychotherapy Provided: Individual Psychotherapy     1  Moderate mixed bipolar II disorder (HCC)        2  PTSD (post-traumatic stress disorder)        3  Generalized anxiety disorder        4  Cigarette nicotine dependence without complication        5  Marijuana use        6  Alcohol abuse, in remission            Goals addressed in session: Goal 1 and Goal 2     DATA: Oscar Laguna discussed her next visit and stated that she would talk to her friend and landlord Remy Thornton if she could drive her, or if the [employer] would be mailed to her sign  She talked about mood swings, memory issues, and multiple falls form bed during nights with head bleeds  During this session, this clinician used the following therapeutic modalities: Client-centered Therapy, Cognitive Behavioral Therapy, Dialectical Behavior Therapy, Mindfulness-based Strategies and Supportive Psychotherapy    Substance Abuse was not addressed during this session   If the client is diagnosed with a co-occurring substance use disorder, please indicate any changes in the frequency or amount of use: NA  Stage of change for addressing substance use diagnoses: Maintenance    ASSESSMENT:  Kathleen Lang "presents with a Euthymic/ normal mood  her affect is Normal range and intensity, which is congruent, with her mood and the content of the session  The client has made progress on their goals  Lyndon Zamora looked energetic and positive despite her falls during last night  Larry Villalpando presents with a minimal risk of suicide, none risk of self-harm, and high risk of harm to others  For any risk assessment that surpasses a \"low\" rating, a safety plan must be developed  A safety plan was indicated: yes  If yes, describe in detail Lyndon Zamora took precautions to restrict falls from bed- she will try sleeping in the middle and will envelop herself in bedsheets   PLAN: Between sessions, Larry Villalpando will use her ways to prevent more falls from bed  At the next session, the therapist will use Client-centered Therapy, Cognitive Behavioral Therapy, Dialectical Behavior Therapy, Mindfulness-based Strategies and Supportive Psychotherapy to address safety during night sleep and falls from bed, mood swings, memory/cognitive exercises, and sleep hygiene   Behavioral Health Treatment Plan and Discharge Planning: Larry Villalpando is aware of and agrees to continue to work on their treatment plan  They have identified and are working toward their discharge goals   yes    Visit start and stop times:    05/11/23  Start Time: 0155  Stop Time: 5426  Total Visit Time: 40 minutes      "

## 2023-05-12 ENCOUNTER — TELEMEDICINE (OUTPATIENT)
Dept: BEHAVIORAL/MENTAL HEALTH CLINIC | Facility: CLINIC | Age: 58
End: 2023-05-12

## 2023-05-12 DIAGNOSIS — F10.11 ALCOHOL ABUSE, IN REMISSION: ICD-10-CM

## 2023-05-12 DIAGNOSIS — F43.10 PTSD (POST-TRAUMATIC STRESS DISORDER): ICD-10-CM

## 2023-05-12 DIAGNOSIS — F17.210 CIGARETTE NICOTINE DEPENDENCE WITHOUT COMPLICATION: ICD-10-CM

## 2023-05-12 DIAGNOSIS — F31.81 MODERATE MIXED BIPOLAR II DISORDER (HCC): Primary | ICD-10-CM

## 2023-05-12 DIAGNOSIS — F12.90 MARIJUANA USE: Chronic | ICD-10-CM

## 2023-05-12 DIAGNOSIS — F41.1 GENERALIZED ANXIETY DISORDER: ICD-10-CM

## 2023-05-12 DIAGNOSIS — R09.82 PND (POST-NASAL DRIP): ICD-10-CM

## 2023-05-12 RX ORDER — FLUTICASONE PROPIONATE 50 MCG
SPRAY, SUSPENSION (ML) NASAL
Qty: 16 G | Refills: 2 | Status: SHIPPED | OUTPATIENT
Start: 2023-05-12

## 2023-05-12 NOTE — PSYCH
Virtual Regular Visit    Verification of patient location:    Patient is located at Home in the following state in which I hold an active license [place]      Assessment/Plan:    Problem List Items Addressed This Visit        Other    Marijuana use (Chronic)    Moderate mixed bipolar II disorder (HCC) - Primary    Generalized anxiety disorder    PTSD (post-traumatic stress disorder)    Alcohol abuse, in remission    Cigarette nicotine dependence without complication       Goals addressed in session: Goal 1          Reason for visit is No chief complaint on file  Encounter provider Danitza Gallegos [facility]    Provider located at [address] [place] [address]  [address] [facility] [address] [phone]      Recent Visits  Date Type Provider Dept   05/11/23 229 [facility], [address] [facility]   05/05/23 229 [facility], [facility] Therapist Heriberto   Showing recent visits within past 7 days and meeting all other requirements  Future Appointments  No visits were found meeting these conditions  Showing future appointments within next 150 days and meeting all other requirements       The patient was identified by name and date of birth  Henna Lawrence was informed that this is a telemedicine visit and that the visit is being conducted [employer]  She agrees to proceed     My office door was closed  The patient was notified the following individuals were present in the room other group members  She acknowledged consent and understanding of privacy and security of the video platform  The patient has agreed to participate and understands they can discontinue the visit at any time  Patient is aware this is a billable service  Subjective  Henna Lawrence is a 56 y o  female        Data: Anthony Calixto attended the Relaxation group and was guided through EFT tapping on trauma, diaphragmatic breathing, humming, and progressive muscle relaxation  Furthermore, attendees were guided through meditation for dealing with difficult emotions, self-compassion, self-love and kindness, and affirmations for acceptance, gratitude and against anxiety and depression  Psychoeducation, EFT, Mindfulness-based, and group approach were used to support the cohesion and maintain the serene environment for the exercise’s purpose  Assessment: Nnamdi Jones followed through guidance and completed the exercises, showed willingness to continue learning and practicing self-care in weekly Relaxation Group as well as individual therapy  Plan: Nnamdi Jones will practice deep breathing and other techniques based on each occasion to counter stress and will attend the next group to be able to take part in further guided exercises on chosen topics to build on the techniques learned in the beginning  HPI     Past Medical History:   Diagnosis Date   • Bilateral carpal tunnel syndrome 08/20/2019   • Carpal tunnel syndrome of right wrist 10/30/2019    Added automatically from request for surgery [id number]   • COPD (chronic obstructive pulmonary disease) (HCC)    • Hypertension        Past Surgical History:   Procedure Laterality Date   • BREAST BIOPSY Left 20yrs ago benign   • OOPHORECTOMY Right    • [place] NDSC WRST SURG W/RLS TRANSVRS CARPL LIGM Right 1/21/2020    Procedure: RELEASE CARPAL TUNNEL ENDOSCOPIC;  Surgeon: Jackie Dubon MD;  Location: [facility];  Service: Orthopedics   • [place] [facility],2 And 3 S Floors WRST SURG W/RLS TRANSVRS CARPL LIGM Left 1/28/2020    Procedure: RELEASE CARPAL TUNNEL ENDOSCOPIC;  Surgeon: Jackie Dubon MD;  Location: [facility];  Service: Orthopedics       Current Outpatient Medications   Medication Sig Dispense Refill   • albuterol (PROVENTIL HFA,VENTOLIN HFA) 90 mcg/act inhaler inhale 2 puffs by mouth and INTO THE LUNGS every 6 hours IF NEEDED FOR WHEEZING   8 5 g 2   • benzonatate (TESSALON) 200 MG capsule Take 1 capsule (200 mg total) by mouth 3 (three) times a day as needed for cough (Patient not taking: Reported on 4/17/2023) 20 capsule 0   • buPROPion (Wellbutrin XL) 150 mg 24 hr tablet Take 1 tablet (150 mg total) by mouth daily 30 tablet 2   • cholecalciferol (VITAMIN D3) 400 units tablet Take 400 Units by mouth daily     • FLUoxetine (PROzac) 20 mg capsule Take 2 capsules (40 mg total) by mouth daily 60 capsule 2   • fluticasone (FLONASE) 50 mcg/act nasal spray instill 1 spray into each nostril once daily 16 g 2   • lamoTRIgine (LaMICtal) 200 MG tablet Take 1 tablet (200 mg total) by mouth daily 30 tablet 2   • levocetirizine (XYZAL) 5 MG tablet take 1 tablet by mouth every evening 30 tablet 4   • naproxen (Naprosyn) 500 mg tablet Take 1 tablet (500 mg total) by mouth 2 (two) times a day with meals 30 tablet 0   • nicotine polacrilex (COMMIT) 4 MG lozenge Apply 1 lozenge (4 mg total) to the mouth or throat as needed for smoking cessation (Patient not taking: Reported on 7/13/2022) 100 each 5   • omeprazole (PriLOSEC) 20 mg delayed release capsule take 1 capsule by mouth twice a day 60 capsule 5   • predniSONE 10 mg tablet Take 3 tabs BID X 2 days, 2 tabs BID X 2 days, 1 tab BID X 2 days, 1 tab daily X 2 days (Patient not taking: Reported on 4/17/2023) 26 tablet 0   • senna-docusate sodium (SENOKOT-S) 8 6-50 mg per tablet Take 3 tablets by mouth as needed       • tiotropium-olodaterol (Stiolto Respimat) 2 5-2 5 MCG/ACT inhaler Inhale 2 puffs daily 4 g 11     No current facility-administered medications for this visit  No Known Allergies    Review of Systems    Video Exam    There were no vitals filed for this visit      Physical Exam       05/12/23  Start Time: 0400  Stop Time: 0500  Total Visit Time: 60 minutes

## 2023-05-18 ENCOUNTER — TELEMEDICINE (OUTPATIENT)
Dept: BEHAVIORAL/MENTAL HEALTH CLINIC | Facility: CLINIC | Age: 58
End: 2023-05-18

## 2023-05-18 DIAGNOSIS — F17.210 CIGARETTE NICOTINE DEPENDENCE WITHOUT COMPLICATION: ICD-10-CM

## 2023-05-18 DIAGNOSIS — F43.10 PTSD (POST-TRAUMATIC STRESS DISORDER): ICD-10-CM

## 2023-05-18 DIAGNOSIS — F10.11 ALCOHOL ABUSE, IN REMISSION: ICD-10-CM

## 2023-05-18 DIAGNOSIS — F31.81 MODERATE MIXED BIPOLAR II DISORDER (HCC): Primary | ICD-10-CM

## 2023-05-18 DIAGNOSIS — F41.1 GENERALIZED ANXIETY DISORDER: ICD-10-CM

## 2023-05-18 DIAGNOSIS — F12.90 MARIJUANA USE: Chronic | ICD-10-CM

## 2023-05-18 NOTE — BH TREATMENT PLAN
[facility]  1965     Date of Initial Psychotherapy Assessment: 3/23/22   Date of Current Treatment Plan: 05/18/23  Treatment Plan Target Date: TBD  Treatment Plan Expiration Date: 11/17/23    Diagnosis:   1  Moderate mixed bipolar II disorder (HCC)        2  PTSD (post-traumatic stress disorder)        3  Generalized anxiety disorder        4  Cigarette nicotine dependence without complication        5  Alcohol abuse, in remission        6  Marijuana use            Area(s) of Need: Mood, cognitive function, traumatic memories     Long Term Goal 1 (in the client's own words): I want to feel better when I wake up in the morning    Stage of Change: Preparation    Target Date for completion: TBD     Anticipated therapeutic modalities: Person-Centered, DBT, TF-CBT     People identified to complete this goal: Pt  Doctor, Therapist       Objective 1: (identify the means of measuring success in meeting the objective): I will be physically active everyday, will have walks with the dog, draw, journal        Objective 2: (identify the means of measuring success in meeting the objective): Efraín Connors will complete PHQ monthly to rosie symptom change  Long Term Goal 2 (in the client's own words): I want to handle stress better     Stage of Change: Action    Target Date for completion: TBD     Anticipated therapeutic modalities: Person-Centered, DBT, TF-CBT     People identified to complete this goal: Pt, Doctor, Therapist       Objective 1: (identify the means of measuring success in meeting the objective):  I will exercise and use relaxation techniques on a daily basis- deep breathing and will process trauma in weekly sessions       Objective 2: (identify the means of measuring success in meeting the objective): Efraín Connors will complete CAROL monthly to rosie symptom change      I am currently under the care of a [facility] psychiatric provider: yes    My [facility] psychiatric provider is: Dr Laura Roland    I am currently taking psychiatric medications: Yes, as prescribed    I feel that I will be ready for discharge from mental health care when I reach the following (measurable goal/objective): when I don't cry all the time     For children and adults who have a legal guardian:   Has there been any change to custody orders and/or guardianship status? NA  If yes, attach updated documentation  I have created my Crisis Plan and have been offered a copy of this plan    [facility]: Diagnosis and Treatment Plan explained to [address] [place] [address] acknowledges an understanding of their diagnosis  Jodie Lombard agrees to this treatment plan  I have been offered a copy of this Treatment Plan  Yes    Jodie Lombard, 1965, actively participated in the review and update of this treatment plan during a virtual session, using the [employer] platform  Jodie Lombard  provided verbal consent on 5/18/2023 at 2:37 PM  The treatment plan was transcribed into the [employer] (Inspect [employer]) Record at a later time  Pt will make arrangements to come in person to sign the plan when possible, or will sign a printed copy when sent through [address] [place] [address], if she would not come physically to office by the end of the next month

## 2023-05-18 NOTE — BH CRISIS PLAN
"Client Name: Victor Manuel Ricks       Client YOB: 1965  : 1965    Treatment Team (include name and contact information):     Psychotherapist: Dr Nhi Kearns     Psychiatrist: TT   Release of information completed: yes    \" NA   Release of information completed: no    Other (Specify Role): NA    Release of information completed: no    Other (Specify Role): NA   Release of information completed: no    Healthcare Provider  Endy Gilmore[address]  [phone]    Type of Plan   * Child plans (children 15 yo and younger) must be completed and signed by the child's legal guardian   * Plans for all individuals 15 yo and above must be signed by the client  Plan Type: adolescent/adult (14 and over) Initial      My Personal Strengths are (in the client's own words): When I can manage to stop downward spiraling through my learned coping skills    The stressors and triggers that may put me at risk are:  loneliness, feeling a lack of control, being treated unfairly, people (describe - names, etc) ex  and memories about my marriage to him, places (describe) previous home and events (include important dates that might be a trigger) events with my daughter and my son    Coping skills I can use to keep myself calm and safe: Take a shower, Listen to music, Physical activity, Zamora/meditate, Journal and Increased contact with professional supports    Coping skills/supports I can use to maintain abstinence from substance use:   Chioma, determination, willingness     The people that provide me with help and support: (Include name, contact, and how they can help)   Support person #1: Rony Segal    * Phone number: in my phone    * How can they help me? Listen, talk, be there for me   Support person #2:Julia    * Phone number: in my phone    * How can they help me?  Be there, listen, talk to me      In the past, the following has helped me in times of " crisis:    Being in a quiet space, Taking medications, Calling a friend, Taking a walk or exercising, Breathing exercises (or other mindfulness-based activities), Praying or meditating, Using de-escalation tools that I have learned and Listening to music      If it is an emergency and you need immediate help, call 9-1-1    If there is a possibility of danger to yourself or others, call the following crisis hotline resources:     Adult Crisis Numbers  Suicide Prevention Hotline - Dial 9-8-8  [facility]: Enriqueta Mancera 13: R Ananda 56: [address]: [phone]  [facility] [address] [facility] ([facility]): [address] [facility] [address]: [address] [place] [address] Avenue: [address] [place] [address] St: [phone] (daytime)  [phone] (after hours, weekends, holidays)     Child/Adolescent Crisis Numbers   [facility]: Isai Carr 10: [phone]   Barbara Shoulder: [phone]   [address] ([facility]): [phone]    Please note: Some counties do not have a separate number for Child/Adolescent specific crisis  If your county is not listed under Child/Adolescent, please call the adult number for your county     National Talk to Text Line   All Ages - [phone]    In the event your feelings become unmanageable, and you cannot reach your support system, you will call 911 immediately or go to the nearest hospital emergency room

## 2023-05-18 NOTE — PSYCH
Virtual Regular Visit    Verification of patient location:    Patient is located at Home in the following state in which I hold an active license [place]      Assessment/Plan:    Problem List Items Addressed This Visit        Other    Marijuana use (Chronic)    Moderate mixed bipolar II disorder (HCC) - Primary    Generalized anxiety disorder    PTSD (post-traumatic stress disorder)    Alcohol abuse, in remission    Cigarette nicotine dependence without complication       Goals addressed in session: Goal 1 and Goal 2          Reason for visit is No chief complaint on file  Encounter provider Blenda Nageotte, [facility]    Provider located at [address] [place] [id number] [phone]      Recent Visits  Date Type Provider Dept   05/12/23 [facility], [address] [place] [address]   05/11/23 [facility], [facility] Therapist Heriberto   Showing recent visits within past 7 days and meeting all other requirements  Today's Visits  Date Type Provider Dept   05/18/23 [facility], [address] [place] [address]   Showing today's visits and meeting all other requirements  Future Appointments  No visits were found meeting these conditions  Showing future appointments within next 150 days and meeting all other requirements       The patient was identified by name and date of birth  Nedra Gonzalez was informed that this is a telemedicine visit and that the visit is being conducted throughthe [employer] platform  She agrees to proceed     My office door was closed  No one else was in the room  She acknowledged consent and understanding of privacy and security of the video platform  The patient has agreed to participate and understands they can discontinue the visit at any time  Patient is aware this is a billable service  Subjective  Anahi Taylor is a 56 y o  female    HPI     Past Medical History:   Diagnosis Date   • Bilateral carpal tunnel syndrome 08/20/2019   • Carpal tunnel syndrome of right wrist 10/30/2019    Added automatically from request for surgery [id number]   • COPD (chronic obstructive pulmonary disease) (HCC)    • Hypertension        Past Surgical History:   Procedure Laterality Date   • BREAST BIOPSY Left 20yrs ago benign   • OOPHORECTOMY Right    • [place] NDSC WRST SURG W/RLS TRANSVRS CARPL LIGM Right 1/21/2020    Procedure: RELEASE CARPAL TUNNEL ENDOSCOPIC;  Surgeon: Chema Phillips MD;  [facility];  Service: Orthopedics   • [place] [address] Fl[address] WRST SURG W/RLS TRANSVRS CARPL LIGM Left 1/28/2020    Procedure: RELEASE CARPAL TUNNEL ENDOSCOPIC;  Surgeon: Chema Phillips MD;  [facility];  Service: Orthopedics       Current Outpatient Medications   Medication Sig Dispense Refill   • albuterol (PROVENTIL HFA,VENTOLIN HFA) 90 mcg/act inhaler inhale 2 puffs by mouth and INTO THE LUNGS every 6 hours IF NEEDED FOR WHEEZING   8 5 g 2   • benzonatate (TESSALON) 200 MG capsule Take 1 capsule (200 mg total) by mouth 3 (three) times a day as needed for cough (Patient not taking: Reported on 4/17/2023) 20 capsule 0   • buPROPion (Wellbutrin XL) 150 mg 24 hr tablet Take 1 tablet (150 mg total) by mouth daily 30 tablet 2   • cholecalciferol (VITAMIN D3) 400 units tablet Take 400 Units by mouth daily     • FLUoxetine (PROzac) 20 mg capsule Take 2 capsules (40 mg total) by mouth daily 60 capsule 2   • fluticasone (FLONASE) 50 mcg/act nasal spray instill 1 spray into each nostril once daily 16 g 2   • lamoTRIgine (LaMICtal) 200 MG tablet Take 1 tablet (200 mg total) by mouth daily 30 tablet 2   • levocetirizine (XYZAL) 5 MG tablet take 1 tablet by mouth every evening 30 tablet 4   • naproxen (Naprosyn) 500 mg tablet Take 1 tablet (500 mg total) by mouth 2 (two) times a day with meals 30 tablet 0   • nicotine polacrilex (COMMIT) 4 MG lozenge Apply 1 lozenge (4 mg total) to the mouth or throat as needed for smoking cessation (Patient not taking: Reported on 7/13/2022) 100 each 5   • omeprazole (PriLOSEC) 20 mg delayed release capsule take 1 capsule by mouth twice a day 60 capsule 5   • predniSONE 10 mg tablet Take 3 tabs BID X 2 days, 2 tabs BID X 2 days, 1 tab BID X 2 days, 1 tab daily X 2 days (Patient not taking: Reported on 4/17/2023) 26 tablet 0   • senna-docusate sodium (SENOKOT-S) 8 6-50 mg per tablet Take 3 tablets by mouth as needed       • tiotropium-olodaterol (Stiolto Respimat) 2 5-2 5 MCG/ACT inhaler Inhale 2 puffs daily 4 g 11     No current facility-administered medications for this visit  No Known Allergies    Review of Systems    Video Exam    There were no vitals filed for this visit  Physical Exam     Behavioral Health Psychotherapy Progress Note    Psychotherapy Provided: Individual Psychotherapy     1  Moderate mixed bipolar II disorder (HCC)        2  PTSD (post-traumatic stress disorder)        3  Generalized anxiety disorder        4  Cigarette nicotine dependence without complication        5  Alcohol abuse, in remission        6  Marijuana use            Goals addressed in session: Goal 1 and Goal 2     DATA: Oscar Laguna updated her [place] plan, created a Crisis Plan, and discussed recent experience with handling stress and traumatic memories  During this session, this clinician used the following therapeutic modalities: Client-centered Therapy, Cognitive Behavioral Therapy, Dialectical Behavior Therapy, Mindfulness-based Strategies and Supportive Psychotherapy    Substance Abuse was not addressed during this session   If the client is diagnosed with a co-occurring substance use disorder, please indicate any changes in the frequency or amount of use: NA  Stage of change for addressing substance use diagnoses: No substance use/Not applicable    ASSESSMENT:  Kathleen Lang presents with a Euthymic/ normal "mood      her affect is Normal range and intensity, which is congruent, with her mood and the content of the session  The client has made progress on their goals  [facility] seemed confident and determined to follow through her agreements to use journaling and notes to compensate cognitive deficiencies  Mario Tiwari presents with a low risk of suicide, none risk of self-harm, and low risk of harm to others  For any risk assessment that surpasses a \"low\" rating, a safety plan must be developed  A safety plan was indicated: no  If yes, describe in detail NA    PLAN: Between sessions, Mario Tiwari will use making notes and journaling to countering cognitive deficieny  At the next session, the therapist will use Client-centered Therapy, Cognitive Behavioral Therapy, Cognitive Processing Therapy, Dialectical Behavior Therapy, Mindfulness-based Strategies and Supportive Psychotherapy to address self-care, memory issues, cognitive exercises, and relaxation  Behavioral Health Treatment Plan and Discharge Planning: Mario Tiwari is aware of and agrees to continue to work on their treatment plan  They have identified and are working toward their discharge goals   yes    Visit start and stop times:    05/18/23  Start Time: 1355  Stop Time: 1437  Total Visit Time: 42 minutes      "

## 2023-05-19 ENCOUNTER — TELEMEDICINE (OUTPATIENT)
Dept: BEHAVIORAL/MENTAL HEALTH CLINIC | Facility: CLINIC | Age: 58
End: 2023-05-19

## 2023-05-19 DIAGNOSIS — F10.11 ALCOHOL ABUSE, IN REMISSION: ICD-10-CM

## 2023-05-19 DIAGNOSIS — F43.10 PTSD (POST-TRAUMATIC STRESS DISORDER): ICD-10-CM

## 2023-05-19 DIAGNOSIS — F41.1 GENERALIZED ANXIETY DISORDER: ICD-10-CM

## 2023-05-19 DIAGNOSIS — F17.210 CIGARETTE NICOTINE DEPENDENCE WITHOUT COMPLICATION: ICD-10-CM

## 2023-05-19 DIAGNOSIS — F31.81 MODERATE MIXED BIPOLAR II DISORDER (HCC): Primary | ICD-10-CM

## 2023-05-19 DIAGNOSIS — F12.90 MARIJUANA USE: Chronic | ICD-10-CM

## 2023-05-19 NOTE — PSYCH
Virtual Regular Visit    Verification of patient location:    Patient is located at Home in the following state in which I hold an active license [place]      Assessment/Plan:    Problem List Items Addressed This Visit        Other    Marijuana use (Chronic)    Moderate mixed bipolar II disorder (HCC) - Primary    Generalized anxiety disorder    PTSD (post-traumatic stress disorder)    Alcohol abuse, in remission    Cigarette nicotine dependence without complication       Goals addressed in session: Goal 1 and Goal 2          Reason for visit is No chief complaint on file  [facility]    Provider located at [address] [place] [id number] [phone]      Recent Visits  Date Type Provider Dept   05/18/23 229 [facility], [address] [place] [address]   05/12/23 [facility], [facility] Heriberto   Showing recent visits within past 7 days and meeting all other requirements  Future Appointments  No visits were found meeting these conditions  Showing future appointments within next 150 days and meeting all other requirements       The patient was identified by name and date of birth  Chelsea Briscoe was informed that this is a telemedicine visit and that the visit is being conducted [employer]  She agrees to proceed     My office door was closed  The patient was notified the following individuals were present in the room other group members  She acknowledged consent and understanding of privacy and security of the video platform  The patient has agreed to participate and understands they can discontinue the visit at any time  Patient is aware this is a billable service  Subjective  Chelsea Briscoe is a 56 y o  female        Data: Danielle Marley attended the Relaxation group and was guided through [facility] tapping on trauma, diaphragmatic breathing, humming, and progressive muscle relaxation  Furthermore, attendees were guided through meditation for dealing with self-blame and resentment, for self-compassion, connection with the inner child, and affirmations for self-acceptance and against anxiety and depression  Psychoeducation, EFT, Mindfulness-based, and group approach were used to support the cohesion and maintain the serene environment for the exercise’s purpose  Assessment: Flip Salazar followed through guidance and completed the exercises, showed willingness to continue learning and practicing self-care in weekly Relaxation Group as well as individual therapy  Plan: Flip Salazar will practice deep breathing and other techniques based on each occasion to counter stress and will attend the next group to be able to take part in further guided exercises on chosen topics to build on the techniques learned in the beginning  HPI     Past Medical History:   Diagnosis Date   • Bilateral carpal tunnel syndrome 08/20/2019   • Carpal tunnel syndrome of right wrist 10/30/2019    Added automatically from request for surgery [id number]   • COPD (chronic obstructive pulmonary disease) (HCC)    • Hypertension        Past Surgical History:   Procedure Laterality Date   • BREAST BIOPSY Left 20yrs ago benign   • OOPHORECTOMY Right    • [place] NDSC WRST SURG W/RLS TRANSVRS CARPL LIGM Right 1/21/2020    Procedure: RELEASE CARPAL TUNNEL ENDOSCOPIC;  Surgeon: Darian Chin MD;  [facility];  Service: Orthopedics   • [place] [address],2 [address] 3 S Floors WRST SURG W/RLS TRANSVRS CARPL LIGM Left 1/28/2020    Procedure: RELEASE CARPAL TUNNEL ENDOSCOPIC;  Surgeon: Darian Chin MD;  [facility];  Service: Orthopedics       Current Outpatient Medications   Medication Sig Dispense Refill   • albuterol (PROVENTIL HFA,VENTOLIN HFA) 90 mcg/act inhaler inhale 2 puffs by mouth and INTO THE LUNGS every 6 hours IF NEEDED FOR WHEEZING   8 5 g 2   • benzonatate (TESSALON) 200 MG capsule Take 1 capsule (200 mg total) by mouth 3 (three) times a day as needed for cough (Patient not taking: Reported on 4/17/2023) 20 capsule 0   • buPROPion (Wellbutrin XL) 150 mg 24 hr tablet Take 1 tablet (150 mg total) by mouth daily 30 tablet 2   • cholecalciferol (VITAMIN D3) 400 units tablet Take 400 Units by mouth daily     • FLUoxetine (PROzac) 20 mg capsule Take 2 capsules (40 mg total) by mouth daily 60 capsule 2   • fluticasone (FLONASE) 50 mcg/act nasal spray instill 1 spray into each nostril once daily 16 g 2   • lamoTRIgine (LaMICtal) 200 MG tablet Take 1 tablet (200 mg total) by mouth daily 30 tablet 2   • levocetirizine (XYZAL) 5 MG tablet take 1 tablet by mouth every evening 30 tablet 4   • naproxen (Naprosyn) 500 mg tablet Take 1 tablet (500 mg total) by mouth 2 (two) times a day with meals 30 tablet 0   • nicotine polacrilex (COMMIT) 4 MG lozenge Apply 1 lozenge (4 mg total) to the mouth or throat as needed for smoking cessation (Patient not taking: Reported on 7/13/2022) 100 each 5   • omeprazole (PriLOSEC) 20 mg delayed release capsule take 1 capsule by mouth twice a day 60 capsule 5   • predniSONE 10 mg tablet Take 3 tabs BID X 2 days, 2 tabs BID X 2 days, 1 tab BID X 2 days, 1 tab daily X 2 days (Patient not taking: Reported on 4/17/2023) 26 tablet 0   • senna-docusate sodium (SENOKOT-S) 8 6-50 mg per tablet Take 3 tablets by mouth as needed       • tiotropium-olodaterol (Stiolto Respimat) 2 5-2 5 MCG/ACT inhaler Inhale 2 puffs daily 4 g 11     No current facility-administered medications for this visit  No Known Allergies    Review of Systems    Video Exam    There were no vitals filed for this visit      Physical Exam       05/19/23  Start Time: 0400  Stop Time: 0500  Total Visit Time: 60 minutes

## 2023-05-23 ENCOUNTER — TELEPHONE (OUTPATIENT)
Dept: PSYCHIATRY | Facility: CLINIC | Age: 58
End: 2023-05-23

## 2023-05-23 NOTE — TELEPHONE ENCOUNTER
Patient left a voicemail requesting to schedule follow up appointment  Writer attempted to contact patient to assist with rescheduling, could not LVM patient mailbox full

## 2023-05-25 ENCOUNTER — TELEPHONE (OUTPATIENT)
Dept: PSYCHIATRY | Facility: CLINIC | Age: 58
End: 2023-05-25

## 2023-05-25 ENCOUNTER — TELEMEDICINE (OUTPATIENT)
Dept: BEHAVIORAL/MENTAL HEALTH CLINIC | Facility: CLINIC | Age: 58
End: 2023-05-25

## 2023-05-25 DIAGNOSIS — F12.90 MARIJUANA USE: Chronic | ICD-10-CM

## 2023-05-25 DIAGNOSIS — F10.11 ALCOHOL ABUSE, IN REMISSION: ICD-10-CM

## 2023-05-25 DIAGNOSIS — F41.1 GENERALIZED ANXIETY DISORDER: ICD-10-CM

## 2023-05-25 DIAGNOSIS — F43.10 PTSD (POST-TRAUMATIC STRESS DISORDER): ICD-10-CM

## 2023-05-25 DIAGNOSIS — F31.81 MODERATE MIXED BIPOLAR II DISORDER (HCC): Primary | ICD-10-CM

## 2023-05-25 DIAGNOSIS — F17.210 CIGARETTE NICOTINE DEPENDENCE WITHOUT COMPLICATION: ICD-10-CM

## 2023-05-25 NOTE — PSYCH
Virtual Regular Visit    Verification of patient location:    Patient is located at Home in the following state in which I hold an active license [place]      Assessment/Plan:    Problem List Items Addressed This Visit        Other    Marijuana use (Chronic)    Moderate mixed bipolar II disorder (HCC) - Primary    Generalized anxiety disorder    PTSD (post-traumatic stress disorder)    Alcohol abuse, in remission    Cigarette nicotine dependence without complication       Goals addressed in session: Goal 1 and Goal 2          Reason for visit is No chief complaint on file  Encounter provider Trung Leung [facility]    Provider located at [address] [place] [id number] [phone]      Recent Visits  Date Type Provider Dept   05/23/23 Telephone Trung Leung, [address] [facility]   05/19/23 229 [facility], [address] [place] [address]   05/18/23 229 [facility], [facility]ist Her[facility]erto   Showing recent visits within past 7 days and meeting all other requirements  Today's Visits  Date Type Provider Dept   05/25/23 Telephone Trung Leung, [address] [facility]   05/25/23 229 [facility], [address] [place] [address]   Showing today's visits and meeting all other requirements  Future Appointments  No visits were found meeting these conditions  Showing future appointments within next 150 days and meeting all other requirements       The patient was identified by name and date of birth  Danny Barahona was informed that this is a telemedicine visit and that the visit is being conducted throughthe [employer] platform  She agrees to proceed     My office door was closed  No one else was in the room  She acknowledged consent and understanding of privacy and security of the video platform   The patient has agreed to participate and understands they can discontinue the visit at any time  Patient is aware this is a billable service  Subjective  Santos Lei is a 56 y o  female    HPI     Past Medical History:   Diagnosis Date   • Bilateral carpal tunnel syndrome 08/20/2019   • Carpal tunnel syndrome of right wrist 10/30/2019    Added automatically from request for surgery [id number]   • COPD (chronic obstructive pulmonary disease) (HCC)    • Hypertension        Past Surgical History:   Procedure Laterality Date   • BREAST BIOPSY Left 20yrs ago benign   • OOPHORECTOMY Right    • [place] NDSC WRST SURG W/RLS TRANSVRS CARPL LIGM Right 1/21/2020    Procedure: RELEASE CARPAL TUNNEL ENDOSCOPIC;  Surgeon: Ryan Goldsmith MD;  [facility];  Service: Orthopedics   • [place] [address] Fl[address] WRST SURG W/RLS TRANSVRS CARPL LIGM Left 1/28/2020    Procedure: RELEASE CARPAL TUNNEL ENDOSCOPIC;  Surgeon: Ryan Goldsmith MD;  [facility];  Service: Orthopedics       Current Outpatient Medications   Medication Sig Dispense Refill   • albuterol (PROVENTIL HFA,VENTOLIN HFA) 90 mcg/act inhaler inhale 2 puffs by mouth and INTO THE LUNGS every 6 hours IF NEEDED FOR WHEEZING   8 5 g 2   • benzonatate (TESSALON) 200 MG capsule Take 1 capsule (200 mg total) by mouth 3 (three) times a day as needed for cough (Patient not taking: Reported on 4/17/2023) 20 capsule 0   • buPROPion (Wellbutrin XL) 150 mg 24 hr tablet Take 1 tablet (150 mg total) by mouth daily 30 tablet 2   • cholecalciferol (VITAMIN D3) 400 units tablet Take 400 Units by mouth daily     • FLUoxetine (PROzac) 20 mg capsule Take 2 capsules (40 mg total) by mouth daily 60 capsule 2   • fluticasone (FLONASE) 50 mcg/act nasal spray instill 1 spray into each nostril once daily 16 g 2   • lamoTRIgine (LaMICtal) 200 MG tablet Take 1 tablet (200 mg total) by mouth daily 30 tablet 2   • levocetirizine (XYZAL) 5 MG tablet take 1 tablet by mouth every evening 30 tablet 4 • naproxen (Naprosyn) 500 mg tablet Take 1 tablet (500 mg total) by mouth 2 (two) times a day with meals 30 tablet 0   • nicotine polacrilex (COMMIT) 4 MG lozenge Apply 1 lozenge (4 mg total) to the mouth or throat as needed for smoking cessation (Patient not taking: Reported on 7/13/2022) 100 each 5   • omeprazole (PriLOSEC) 20 mg delayed release capsule take 1 capsule by mouth twice a day 60 capsule 5   • predniSONE 10 mg tablet Take 3 tabs BID X 2 days, 2 tabs BID X 2 days, 1 tab BID X 2 days, 1 tab daily X 2 days (Patient not taking: Reported on 4/17/2023) 26 tablet 0   • senna-docusate sodium (SENOKOT-S) 8 6-50 mg per tablet Take 3 tablets by mouth as needed       • tiotropium-olodaterol (Stiolto Respimat) 2 5-2 5 MCG/ACT inhaler Inhale 2 puffs daily 4 g 11     No current facility-administered medications for this visit  No Known Allergies    Review of Systems    Video Exam    There were no vitals filed for this visit  Physical Exam     Behavioral Health Psychotherapy Progress Note    Psychotherapy Provided: Individual Psychotherapy     1  Moderate mixed bipolar II disorder (HCC)        2  PTSD (post-traumatic stress disorder)        3  Generalized anxiety disorder        4  Marijuana use        5  Cigarette nicotine dependence without complication        6  Alcohol abuse, in remission            Goals addressed in session: Goal 1 and Goal 2     DATA: Pat Lozano discussed rushings through her daily tasks and struggling with short term memories that increases  She practiced taking notes and wrote down all her upcoming appointments- she struggled with that too, forgetting details that she just put in notes  Pat Lozano was encouraged to use Mindfulness and not be harsh on herself but compassionate    During this session, this clinician used the following therapeutic modalities: Client-centered Therapy, Cognitive Behavioral Therapy, Dialectical Behavior Therapy, Mindfulness-based Strategies and Supportive "Psychotherapy    Substance Abuse was addressed during this session  If the client is diagnosed with a co-occurring substance use disorder, please indicate any changes in the frequency or amount of use: daily vaping and smoking  Stage of change for addressing substance use diagnoses: Pre-contemplation    ASSESSMENT:  Michael Main presents with a Euthymic/ normal and Anxious mood  her affect is Normal range and intensity, which is congruent, with her mood and the content of the session  The client has made progress on their goals  Jonah Bar seemed a little more confident yet still forgetful about things happened a few seconds ago  Michael Main presents with a minimal risk of suicide, none risk of self-harm, and low risk of harm to others  For any risk assessment that surpasses a \"low\" rating, a safety plan must be developed  A safety plan was indicated: no  If yes, describe in detail NA    PLAN: Between sessions, Michael Main will use notes and stickers to make reminders for herself and her appointments  At the next session, the therapist will use Client-centered Therapy, Cognitive Behavioral Therapy, Cognitive Processing Therapy, Dialectical Behavior Therapy, Mindfulness-based Strategies and Supportive Psychotherapy to address self-care, journaling for memory reference, and therapeutic goals attainment  Behavioral Health Treatment Plan and Discharge Planning: Michael Main is aware of and agrees to continue to work on their treatment plan  They have identified and are working toward their discharge goals   yes    Visit start and stop times:    05/25/23  Start Time: 0210  Stop Time: 0666  Total Visit Time: 39 minutes      "

## 2023-05-25 NOTE — TELEPHONE ENCOUNTER
Pt called in and left a voicemail that she is stuck in traffic and may be a few mins late for her virtual appt at 2 pm  Writer made provider aware

## 2023-05-26 ENCOUNTER — TELEMEDICINE (OUTPATIENT)
Dept: BEHAVIORAL/MENTAL HEALTH CLINIC | Facility: CLINIC | Age: 58
End: 2023-05-26

## 2023-05-26 DIAGNOSIS — F10.11 ALCOHOL ABUSE, IN REMISSION: ICD-10-CM

## 2023-05-26 DIAGNOSIS — F41.1 GENERALIZED ANXIETY DISORDER: ICD-10-CM

## 2023-05-26 DIAGNOSIS — F17.210 CIGARETTE NICOTINE DEPENDENCE WITHOUT COMPLICATION: ICD-10-CM

## 2023-05-26 DIAGNOSIS — F12.90 MARIJUANA USE: Chronic | ICD-10-CM

## 2023-05-26 DIAGNOSIS — F31.81 MODERATE MIXED BIPOLAR II DISORDER (HCC): Primary | ICD-10-CM

## 2023-05-26 DIAGNOSIS — F43.10 PTSD (POST-TRAUMATIC STRESS DISORDER): ICD-10-CM

## 2023-05-26 NOTE — PSYCH
Virtual Regular Visit    Verification of patient location:    Patient is located at Home in the following state in which I hold an active license [place]      Assessment/Plan:    Problem List Items Addressed This Visit        Other    Marijuana use (Chronic)    Moderate mixed bipolar II disorder (HCC) - Primary    Generalized anxiety disorder    PTSD (post-traumatic stress disorder)    Alcohol abuse, in remission    Cigarette nicotine dependence without complication       Goals addressed in session: Goal 1 and Goal 2          Reason for visit is No chief complaint on file  Encounter provider Dontrell [place] [facility]    Provider located at [address] [place] [id number] [phone]      Recent Visits  Date Type Provider Dept   05/25/23 Telephone Dontrell Obed, [address] [facility]   05/25/23 229 [facility], [address] [place] [address]   05/23/23 Telephone Dontrell Obed, [address] [facility]   05/19/23 229 [facility]   Showing recent visits within past 7 days and meeting all other requirements  Future Appointments  No visits were found meeting these conditions  Showing future appointments within next 150 days and meeting all other requirements       The patient was identified by name and date of birth  Arlyn Duckworth was informed that this is a telemedicine visit and that the visit is being conducted [employer]  She agrees to proceed     My office door was closed  The patient was notified the following individuals were present in the room other group members  She acknowledged consent and understanding of privacy and security of the video platform  The patient has agreed to participate and understands they can discontinue the visit at any time      Patient is aware this is a billable service  Subjective  Beatriz Arellano is a 56 y o  female    Data: Dona Diaz attended the Relaxation group and was guided through EFT tapping on trauma, diaphragmatic breathing, humming, and progressive muscle relaxation  Furthermore, attendees were guided through meditation for dealing with judgement, difficult emotions, breaking habits, learning and accepting, and affirmations for self-love, forgiveness, and against anxiety and depression  Psychoeducation, EFT, Mindfulness-based, and group approach were used to support the cohesion and maintain the serene environment for the exercise’s purpose  Assessment: Dona Diaz followed through guidance and completed the exercises, showed willingness to continue learning and practicing self-care in weekly Relaxation Group as well as individual therapy  Plan: Dona Diaz will practice deep breathing and other techniques based on each occasion to counter stress and will attend the next group to be able to take part in further guided exercises on chosen topics to build on the techniques learned in the beginning      HPI     Past Medical History:   Diagnosis Date   • Bilateral carpal tunnel syndrome 08/20/2019   • Carpal tunnel syndrome of right wrist 10/30/2019    Added automatically from request for surgery [id number]   • COPD (chronic obstructive pulmonary disease) (HCC)    • Hypertension        Past Surgical History:   Procedure Laterality Date   • BREAST BIOPSY Left 20yrs ago benign   • OOPHORECTOMY Right    • [place] NDSC WRST SURG W/RLS TRANSVRS CARPL LIGM Right 1/21/2020    Procedure: RELEASE CARPAL TUNNEL ENDOSCOPIC;  Surgeon: Mannie Villalba MD;  [facility];  Service: Orthopedics   • [place] [address] [facility],[address] 3 [address] Floors WRST SURG W/RLS TRANSVRS CARPL LIGM Left 1/28/2020    Procedure: RELEASE CARPAL TUNNEL ENDOSCOPIC;  Surgeon: Mannie Villalba MD;  [facility];  Service: Orthopedics       Current Outpatient Medications   Medication Sig Dispense Refill   • albuterol (PROVENTIL HFA,VENTOLIN HFA) 90 mcg/act inhaler inhale 2 puffs by mouth and INTO THE LUNGS every 6 hours IF NEEDED FOR WHEEZING  8 5 g 2   • benzonatate (TESSALON) 200 MG capsule Take 1 capsule (200 mg total) by mouth 3 (three) times a day as needed for cough (Patient not taking: Reported on 4/17/2023) 20 capsule 0   • buPROPion (Wellbutrin XL) 150 mg 24 hr tablet Take 1 tablet (150 mg total) by mouth daily 30 tablet 2   • cholecalciferol (VITAMIN D3) 400 units tablet Take 400 Units by mouth daily     • FLUoxetine (PROzac) 20 mg capsule Take 2 capsules (40 mg total) by mouth daily 60 capsule 2   • fluticasone (FLONASE) 50 mcg/act nasal spray instill 1 spray into each nostril once daily 16 g 2   • lamoTRIgine (LaMICtal) 200 MG tablet Take 1 tablet (200 mg total) by mouth daily 30 tablet 2   • levocetirizine (XYZAL) 5 MG tablet take 1 tablet by mouth every evening 30 tablet 4   • naproxen (Naprosyn) 500 mg tablet Take 1 tablet (500 mg total) by mouth 2 (two) times a day with meals 30 tablet 0   • nicotine polacrilex (COMMIT) 4 MG lozenge Apply 1 lozenge (4 mg total) to the mouth or throat as needed for smoking cessation (Patient not taking: Reported on 7/13/2022) 100 each 5   • omeprazole (PriLOSEC) 20 mg delayed release capsule take 1 capsule by mouth twice a day 60 capsule 5   • predniSONE 10 mg tablet Take 3 tabs BID X 2 days, 2 tabs BID X 2 days, 1 tab BID X 2 days, 1 tab daily X 2 days (Patient not taking: Reported on 4/17/2023) 26 tablet 0   • senna-docusate sodium (SENOKOT-S) 8 6-50 mg per tablet Take 3 tablets by mouth as needed       • tiotropium-olodaterol (Stiolto Respimat) 2 5-2 5 MCG/ACT inhaler Inhale 2 puffs daily 4 g 11     No current facility-administered medications for this visit  No Known Allergies    Review of Systems    Video Exam    There were no vitals filed for this visit      Physical Exam       05/26/23  Start Time: 0400  Stop Time: 0500  Total Visit Time: 60 minutes

## 2023-05-30 ENCOUNTER — TELEMEDICINE (OUTPATIENT)
Dept: BEHAVIORAL/MENTAL HEALTH CLINIC | Facility: CLINIC | Age: 58
End: 2023-05-30

## 2023-05-30 DIAGNOSIS — F41.1 GENERALIZED ANXIETY DISORDER: ICD-10-CM

## 2023-05-30 DIAGNOSIS — F10.11 ALCOHOL ABUSE, IN REMISSION: ICD-10-CM

## 2023-05-30 DIAGNOSIS — F17.210 CIGARETTE NICOTINE DEPENDENCE WITHOUT COMPLICATION: ICD-10-CM

## 2023-05-30 DIAGNOSIS — F31.81 MODERATE MIXED BIPOLAR II DISORDER (HCC): Primary | ICD-10-CM

## 2023-05-30 DIAGNOSIS — F43.10 PTSD (POST-TRAUMATIC STRESS DISORDER): ICD-10-CM

## 2023-05-30 DIAGNOSIS — F12.90 MARIJUANA USE: Chronic | ICD-10-CM

## 2023-06-01 ENCOUNTER — TELEMEDICINE (OUTPATIENT)
Dept: PSYCHIATRY | Facility: CLINIC | Age: 58
End: 2023-06-01

## 2023-06-01 DIAGNOSIS — Z51.81 THERAPEUTIC DRUG MONITORING: ICD-10-CM

## 2023-06-01 DIAGNOSIS — F31.32 BIPOLAR AFFECTIVE DISORDER, CURRENTLY DEPRESSED, MODERATE (HCC): Primary | ICD-10-CM

## 2023-06-01 DIAGNOSIS — F41.1 GENERALIZED ANXIETY DISORDER: ICD-10-CM

## 2023-06-01 DIAGNOSIS — F43.10 PTSD (POST-TRAUMATIC STRESS DISORDER): ICD-10-CM

## 2023-06-01 RX ORDER — FLUOXETINE HYDROCHLORIDE 20 MG/1
40 CAPSULE ORAL DAILY
Qty: 60 CAPSULE | Refills: 2 | Status: SHIPPED | OUTPATIENT
Start: 2023-06-01 | End: 2023-07-11

## 2023-06-01 RX ORDER — LAMOTRIGINE 200 MG/1
200 TABLET ORAL DAILY
Qty: 30 TABLET | Refills: 2 | Status: SHIPPED | OUTPATIENT
Start: 2023-06-01 | End: 2023-07-01

## 2023-06-01 RX ORDER — BUPROPION HYDROCHLORIDE 150 MG/1
150 TABLET ORAL DAILY
Qty: 30 TABLET | Refills: 2 | Status: SHIPPED | OUTPATIENT
Start: 2023-06-01
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
## 2023-06-06 ENCOUNTER — TELEMEDICINE (OUTPATIENT)
Dept: BEHAVIORAL/MENTAL HEALTH CLINIC | Facility: CLINIC | Age: 58
End: 2023-06-06
Payer: COMMERCIAL

## 2023-06-06 DIAGNOSIS — F17.210 CIGARETTE NICOTINE DEPENDENCE WITHOUT COMPLICATION: ICD-10-CM

## 2023-06-06 DIAGNOSIS — F41.1 GENERALIZED ANXIETY DISORDER: ICD-10-CM

## 2023-06-06 DIAGNOSIS — F10.11 ALCOHOL ABUSE, IN REMISSION: ICD-10-CM

## 2023-06-06 DIAGNOSIS — F12.90 MARIJUANA USE: Chronic | ICD-10-CM

## 2023-06-06 DIAGNOSIS — F31.81 MODERATE MIXED BIPOLAR II DISORDER (HCC): Primary | ICD-10-CM

## 2023-06-06 DIAGNOSIS — F43.10 PTSD (POST-TRAUMATIC STRESS DISORDER): ICD-10-CM

## 2023-06-06 PROCEDURE — 90834 PSYTX W PT 45 MINUTES: CPT | Performed by: COUNSELOR

## 2023-06-06 NOTE — PSYCH
Virtual Regular Visit    Verification of patient location:    Patient is located at Home in the following state in which I hold an active license [place]      Assessment/Plan:    Problem List Items Addressed This Visit        Other    Marijuana use (Chronic)    Moderate mixed bipolar II disorder (HCC) - Primary    Generalized anxiety disorder    PTSD (post-traumatic stress disorder)    Alcohol abuse, in remission    Cigarette nicotine dependence without complication       Goals addressed in session: Goal 1 and Goal 2          Reason for visit is No chief complaint on file  Encounter provider FARHAN [facility]    Provider located at [address] [place] [address]  [address] [address] [phone]      Recent Visits  Date Type Provider Dept   05/30/23 229 [facility], [address] [facility] Therapist Heriberto   Showing recent visits within past 7 days and meeting all other requirements  Today's Visits  Date Type Provider Dept   06/06/23 229 [facility], [address] [place] [address]   Showing today's visits and meeting all other requirements  Future Appointments  No visits were found meeting these conditions  Showing future appointments within next 150 days and meeting all other requirements       The patient was identified by name and date of birth  Sascha Jarvis was informed that this is a telemedicine visit and that the visit is being conducted throughthe [employer] platform  She agrees to proceed     My office door was closed  No one else was in the room  She acknowledged consent and understanding of privacy and security of the video platform  The patient has agreed to participate and understands they can discontinue the visit at any time  Patient is aware this is a billable service  Subjective  Sascha Jarvis is a 56 y o  female          HPI     Past Medical History: Diagnosis Date   • Bilateral carpal tunnel syndrome 08/20/2019   • Carpal tunnel syndrome of right wrist 10/30/2019    Added automatically from request for surgery [id number]   • COPD (chronic obstructive pulmonary disease) ([facility] [address] )    • Hypertension        Past Surgical History:   Procedure Laterality Date   • BREAST BIOPSY Left 20yrs ago benign   • OOPHORECTOMY Right    • [place] NDSC WRST SURG W/RLS TRANSVRS CARPL LIGM Right 1/21/2020    Procedure: RELEASE CARPAL TUNNEL ENDOSCOPIC;  Surgeon: Sriram Mcclain MD;  [facility];  Service: Orthopedics   • [place] [address] WRST SURG W/RLS TRANSVRS CARPL LIGM Left 1/28/2020    Procedure: RELEASE CARPAL TUNNEL ENDOSCOPIC;  Surgeon: Sriram Mcclain MD;  [facility];  Service: Orthopedics       Current Outpatient Medications   Medication Sig Dispense Refill   • albuterol (PROVENTIL HFA,VENTOLIN HFA) 90 mcg/act inhaler inhale 2 puffs by mouth and INTO THE LUNGS every 6 hours IF NEEDED FOR WHEEZING   8 5 g 2   • benzonatate (TESSALON) 200 MG capsule Take 1 capsule (200 mg total) by mouth 3 (three) times a day as needed for cough (Patient not taking: Reported on 4/17/2023) 20 capsule 0   • buPROPion (Wellbutrin XL) 150 mg 24 hr tablet Take 1 tablet (150 mg total) by mouth daily 30 tablet 2   • cholecalciferol (VITAMIN D3) 400 units tablet Take 400 Units by mouth daily     • FLUoxetine (PROzac) 20 mg capsule Take 2 capsules (40 mg total) by mouth daily 60 capsule 2   • fluticasone (FLONASE) 50 mcg/act nasal spray instill 1 spray into each nostril once daily 16 g 2   • lamoTRIgine (LaMICtal) 200 MG tablet Take 1 tablet (200 mg total) by mouth daily 30 tablet 2   • levocetirizine (XYZAL) 5 MG tablet take 1 tablet by mouth every evening 30 tablet 4   • naproxen (Naprosyn) 500 mg tablet Take 1 tablet (500 mg total) by mouth 2 (two) times a day with meals 30 tablet 0   • nicotine polacrilex (COMMIT) 4 MG lozenge Apply 1 lozenge (4 mg total) to the mouth or throat as needed for smoking cessation (Patient not taking: Reported on 7/13/2022) 100 each 5   • omeprazole (PriLOSEC) 20 mg delayed release capsule take 1 capsule by mouth twice a day 60 capsule 5   • predniSONE 10 mg tablet Take 3 tabs BID X 2 days, 2 tabs BID X 2 days, 1 tab BID X 2 days, 1 tab daily X 2 days (Patient not taking: Reported on 4/17/2023) 26 tablet 0   • senna-docusate sodium (SENOKOT-S) 8 6-50 mg per tablet Take 3 tablets by mouth as needed       • tiotropium-olodaterol (Stiolto Respimat) 2 5-2 5 MCG/ACT inhaler Inhale 2 puffs daily 4 g 11     No current facility-administered medications for this visit  No Known Allergies    Review of Systems    Video Exam    There were no vitals filed for this visit  Physical Exam     Behavioral Health Psychotherapy Progress Note    Psychotherapy Provided: Individual Psychotherapy     1  Moderate mixed bipolar II disorder (HCC)        2  Generalized anxiety disorder        3  PTSD (post-traumatic stress disorder)        4  Marijuana use        5  Cigarette nicotine dependence without complication        6  Alcohol abuse, in remission            Goals addressed in session: Goal 1 and Goal 2     DATA: Hong Pelayo discussed her cognitive exercise and failure to put things on notes to be able to refer to her previous experience, and mood swings and guilt coming from failure  Hong Pelaoy talked about handling her memory and trauma issues- about her best ways to focus on meaningful projects/activities to prevent sinking in negative thoughts  During this session, this clinician used the following therapeutic modalities: Client-centered Therapy, Cognitive Behavioral Therapy, Dialectical Behavior Therapy, Mindfulness-based Strategies and Supportive Psychotherapy    Substance Abuse was addressed during this session   If the client is diagnosed with a co-occurring substance use disorder, please indicate any changes in the frequency or amount of use: NA  Stage of change for addressing substance "use diagnoses: Maintenance    ASSESSMENT:  Jayant Nichols presents with a Euthymic/ normal and Anxious mood  her affect is Normal range and intensity, which is congruent, with her mood and the content of the session  The client has made progress on their goals  Angela Chawla seemed more calmer and more focused, with better retention  Jayant Nichols presents with a minimal risk of suicide, none risk of self-harm, and minimal risk of harm to others  For any risk assessment that surpasses a \"low\" rating, a safety plan must be developed  A safety plan was indicated: no  If yes, describe in detail NA    PLAN: Between sessions, Jayant Nichols will use notes and outdoor activities to keep herself active will participate in the weekly Relaxation Group for self-care and stress countering  At the next session, the therapist will use Client-centered Therapy, Cognitive Behavioral Therapy, Dialectical Behavior Therapy, Mindfulness-based Strategies and Supportive Psychotherapy to address self-care, outdoor activities, socialization, Acting Opposite, cognitive exercise and documenting daily experience  Behavioral Health Treatment Plan and Discharge Planning: Jayant Nichols is aware of and agrees to continue to work on their treatment plan  They have identified and are working toward their discharge goals   yes    Visit start and stop times:    06/06/23  Start Time: 1200  Stop Time: 1311  Total Visit Time: 41 minutes      "

## 2023-06-08 ENCOUNTER — TELEPHONE (OUTPATIENT)
Dept: PSYCHIATRY | Facility: CLINIC | Age: 58
End: 2023-06-08

## 2023-06-08 NOTE — TELEPHONE ENCOUNTER
Patient called to cancel same day 6/8 12pm appt due to having to bring her neighbor for dialysis at 1130pm  Tawana Fong has appt 6/9

## 2023-06-09 ENCOUNTER — TELEMEDICINE (OUTPATIENT)
Dept: BEHAVIORAL/MENTAL HEALTH CLINIC | Facility: CLINIC | Age: 58
End: 2023-06-09

## 2023-06-09 DIAGNOSIS — F17.210 CIGARETTE NICOTINE DEPENDENCE WITHOUT COMPLICATION: ICD-10-CM

## 2023-06-09 DIAGNOSIS — F43.10 PTSD (POST-TRAUMATIC STRESS DISORDER): ICD-10-CM

## 2023-06-09 DIAGNOSIS — F10.11 ALCOHOL ABUSE, IN REMISSION: ICD-10-CM

## 2023-06-09 DIAGNOSIS — F41.1 GENERALIZED ANXIETY DISORDER: ICD-10-CM

## 2023-06-09 DIAGNOSIS — F12.90 MARIJUANA USE: Chronic | ICD-10-CM

## 2023-06-09 DIAGNOSIS — F31.81 MODERATE MIXED BIPOLAR II DISORDER (HCC): Primary | ICD-10-CM

## 2023-06-09 NOTE — PSYCH
Virtual Regular Visit    Verification of patient location:    Patient is located at Home in the following state in which I hold an active license [place]      Assessment/Plan:    Problem List Items Addressed This Visit        Other    Marijuana use (Chronic)    Moderate mixed bipolar II disorder (HCC) - Primary    Generalized anxiety disorder    PTSD (post-traumatic stress disorder)    Alcohol abuse, in remission    Cigarette nicotine dependence without complication       Goals addressed in session: Goal 1 and Goal 2          Reason for visit is No chief complaint on file  Encounter provider Mando Jones [facility]    Provider located at [address] [place] [address] [phone]      Recent Visits  Date Type Provider Dept   06/08/23 Telephone Mando Jones, [id number] [facility]   06/06/23 229 [facility], [address] [facility] Therapist Heriberto   Showing recent visits within past 7 days and meeting all other requirements  Future Appointments  No visits were found meeting these conditions  Showing future appointments within next 150 days and meeting all other requirements       The patient was identified by name and date of birth  Theresa Mcbride was informed that this is a telemedicine visit and that the visit is being conducted [employer]  She agrees to proceed     My office door was closed  The patient was notified the following individuals were present in the room other group members  She acknowledged consent and understanding of privacy and security of the video platform  The patient has agreed to participate and understands they can discontinue the visit at any time  Patient is aware this is a billable service  Subjective  Theresa Mcbride is a 56 y o  female        Data: Robert Chen attended the Relaxation group and was guided through EFT tapping on anxiety, diaphragmatic breathing, humming, and progressive muscle relaxation  Furthermore, attendees were guided through mindfulness exercises of body scan for physical pain, meditation for a safe place to cope with pain, and affirmations for loving kindness, compassion, and against anxiety and depression  Psychoeducation, EFT, Mindfulness-based, and group approach were used to support the cohesion and maintain the serene environment for the exercise’s purpose  Assessment: Alexandru Moreira followed through guidance and completed the exercises, showed willingness to continue learning and practicing self-care in weekly Relaxation Group as well as individual therapy  Plan: Alexandru Moreira will practice deep breathing and other techniques based on each occasion to counter stress and will attend the next group to be able to take part in further guided exercises on chosen topics to build on the techniques learned in the beginning  HPI     Past Medical History:   Diagnosis Date   • Bilateral carpal tunnel syndrome 08/20/2019   • Carpal tunnel syndrome of right wrist 10/30/2019    Added automatically from request for surgery [id number]   • COPD (chronic obstructive pulmonary disease) (HCC)    • Hypertension        Past Surgical History:   Procedure Laterality Date   • BREAST BIOPSY Left 20yrs ago benign   • OOPHORECTOMY Right    • [place] NDSC WRST SURG W/RLS TRANSVRS CARPL LIGM Right 1/21/2020    Procedure: RELEASE CARPAL TUNNEL ENDOSCOPIC;  Surgeon: Sriram Mcclain MD;  [facility];  Service: Orthopedics   • [place] Alaina Cook WRST SURG W/RLS TRANSVRS CARPL LIGM Left 1/28/2020    Procedure: RELEASE CARPAL TUNNEL ENDOSCOPIC;  Surgeon: Sriram Mcclain MD;  [facility];  Service: Orthopedics       Current Outpatient Medications   Medication Sig Dispense Refill   • albuterol (PROVENTIL HFA,VENTOLIN HFA) 90 mcg/act inhaler inhale 2 puffs by mouth and INTO THE LUNGS every 6 hours IF NEEDED FOR WHEEZING   8 5 g 2   • benzonatate (TESSALON) 200 MG capsule Take 1 capsule (200 mg total) by mouth 3 (three) times a day as needed for cough (Patient not taking: Reported on 4/17/2023) 20 capsule 0   • buPROPion (Wellbutrin XL) 150 mg 24 hr tablet Take 1 tablet (150 mg total) by mouth daily 30 tablet 2   • cholecalciferol (VITAMIN D3) 400 units tablet Take 400 Units by mouth daily     • FLUoxetine (PROzac) 20 mg capsule Take 2 capsules (40 mg total) by mouth daily 60 capsule 2   • fluticasone (FLONASE) 50 mcg/act nasal spray instill 1 spray into each nostril once daily 16 g 2   • lamoTRIgine (LaMICtal) 200 MG tablet Take 1 tablet (200 mg total) by mouth daily 30 tablet 2   • levocetirizine (XYZAL) 5 MG tablet take 1 tablet by mouth every evening 30 tablet 4   • naproxen (Naprosyn) 500 mg tablet Take 1 tablet (500 mg total) by mouth 2 (two) times a day with meals 30 tablet 0   • nicotine polacrilex (COMMIT) 4 MG lozenge Apply 1 lozenge (4 mg total) to the mouth or throat as needed for smoking cessation (Patient not taking: Reported on 7/13/2022) 100 each 5   • omeprazole (PriLOSEC) 20 mg delayed release capsule take 1 capsule by mouth twice a day 60 capsule 5   • predniSONE 10 mg tablet Take 3 tabs BID X 2 days, 2 tabs BID X 2 days, 1 tab BID X 2 days, 1 tab daily X 2 days (Patient not taking: Reported on 4/17/2023) 26 tablet 0   • senna-docusate sodium (SENOKOT-S) 8 6-50 mg per tablet Take 3 tablets by mouth as needed       • tiotropium-olodaterol (Stiolto Respimat) 2 5-2 5 MCG/ACT inhaler Inhale 2 puffs daily 4 g 11     No current facility-administered medications for this visit  No Known Allergies    Review of Systems    Video Exam    There were no vitals filed for this visit      Physical Exam       06/09/23  Start Time: 0400  Stop Time: 0500  Total Visit Time: 60 minutes

## 2023-06-14 ENCOUNTER — TELEMEDICINE (OUTPATIENT)
Dept: BEHAVIORAL/MENTAL HEALTH CLINIC | Facility: CLINIC | Age: 58
End: 2023-06-14
Payer: COMMERCIAL

## 2023-06-14 DIAGNOSIS — F10.11 ALCOHOL ABUSE, IN REMISSION: ICD-10-CM

## 2023-06-14 DIAGNOSIS — F41.1 GENERALIZED ANXIETY DISORDER: ICD-10-CM

## 2023-06-14 DIAGNOSIS — F17.210 CIGARETTE NICOTINE DEPENDENCE WITHOUT COMPLICATION: ICD-10-CM

## 2023-06-14 DIAGNOSIS — F43.10 PTSD (POST-TRAUMATIC STRESS DISORDER): ICD-10-CM

## 2023-06-14 DIAGNOSIS — F12.90 MARIJUANA USE: Chronic | ICD-10-CM

## 2023-06-14 DIAGNOSIS — F31.81 MODERATE MIXED BIPOLAR II DISORDER (HCC): Primary | ICD-10-CM

## 2023-06-14 PROCEDURE — 90834 PSYTX W PT 45 MINUTES: CPT | Performed by: COUNSELOR

## 2023-06-14 NOTE — PSYCH
Virtual Regular Visit    Verification of patient location:    Patient is located at Home in the following state in which I hold an active license [place]      Assessment/Plan:    Problem List Items Addressed This Visit        Other    Marijuana use (Chronic)    Moderate mixed bipolar II disorder (HCC) - Primary    Generalized anxiety disorder    PTSD (post-traumatic stress disorder)    Alcohol abuse, in remission    Cigarette nicotine dependence without complication       Goals addressed in session: Goal 1 and Goal 2          Reason for visit is No chief complaint on file  Encounter provider Leslee Borges [facility]    Provider located at [address] [place] [address]  [address] [place] [address]  [phone]      Recent Visits  Date Type Provider Dept   06/09/23 229 [facility], [address] [place] [address]   06/08/23 Telephone Leslee Borges, [address] [place] [address] Drive recent visits within past 7 days and meeting all other requirements  Today's Visits  Date Type Provider Dept   06/14/23 229 [facility], [address] [place] [address]   Showing today's visits and meeting all other requirements  Future Appointments  No visits were found meeting these conditions  Showing future appointments within next 150 days and meeting all other requirements       The patient was identified by name and date of birth  Judge Orozco was informed that this is a telemedicine visit and that the visit is being conducted throughthe [employer] platform  She agrees to proceed     My office door was closed  No one else was in the room  She acknowledged consent and understanding of privacy and security of the video platform  The patient has agreed to participate and understands they can discontinue the visit at any time  Patient is aware this is a billable service       Subjective  Mabelene Pain Marsha Ley is a 56 y o  female    HPI     Past Medical History:   Diagnosis Date   • Bilateral carpal tunnel syndrome 08/20/2019   • Carpal tunnel syndrome of right wrist 10/30/2019    Added automatically from request for surgery [id number]   • COPD (chronic obstructive pulmonary disease) (HCC)    • Hypertension        Past Surgical History:   Procedure Laterality Date   • BREAST BIOPSY Left 20yrs ago benign   • OOPHORECTOMY Right    • [place] NDSC WRST SURG W/RLS TRANSVRS CARPL LIGM Right 1/21/2020    Procedure: RELEASE CARPAL TUNNEL ENDOSCOPIC;  Surgeon: Alex Andres MD;  [facility];  Service: Orthopedics   • [place] [address] WRST SURG W/RLS TRANSVRS CARPL LIGM Left 1/28/2020    Procedure: RELEASE CARPAL TUNNEL ENDOSCOPIC;  Surgeon: Alex Andres MD;  [facility];  Service: Orthopedics       Current Outpatient Medications   Medication Sig Dispense Refill   • albuterol (PROVENTIL HFA,VENTOLIN HFA) 90 mcg/act inhaler inhale 2 puffs by mouth and INTO THE LUNGS every 6 hours IF NEEDED FOR WHEEZING   8 5 g 2   • benzonatate (TESSALON) 200 MG capsule Take 1 capsule (200 mg total) by mouth 3 (three) times a day as needed for cough (Patient not taking: Reported on 4/17/2023) 20 capsule 0   • buPROPion (Wellbutrin XL) 150 mg 24 hr tablet Take 1 tablet (150 mg total) by mouth daily 30 tablet 2   • cholecalciferol (VITAMIN D3) 400 units tablet Take 400 Units by mouth daily     • FLUoxetine (PROzac) 20 mg capsule Take 2 capsules (40 mg total) by mouth daily 60 capsule 2   • fluticasone (FLONASE) 50 mcg/act nasal spray instill 1 spray into each nostril once daily 16 g 2   • lamoTRIgine (LaMICtal) 200 MG tablet Take 1 tablet (200 mg total) by mouth daily 30 tablet 2   • levocetirizine (XYZAL) 5 MG tablet take 1 tablet by mouth every evening 30 tablet 4   • naproxen (Naprosyn) 500 mg tablet Take 1 tablet (500 mg total) by mouth 2 (two) times a day with meals 30 tablet 0   • nicotine polacrilex (COMMIT) 4 MG lozenge Apply 1 lozenge (4 mg total) to the mouth or throat as needed for smoking cessation (Patient not taking: Reported on 7/13/2022) 100 each 5   • omeprazole (PriLOSEC) 20 mg delayed release capsule take 1 capsule by mouth twice a day 60 capsule 5   • predniSONE 10 mg tablet Take 3 tabs BID X 2 days, 2 tabs BID X 2 days, 1 tab BID X 2 days, 1 tab daily X 2 days (Patient not taking: Reported on 4/17/2023) 26 tablet 0   • senna-docusate sodium (SENOKOT-S) 8 6-50 mg per tablet Take 3 tablets by mouth as needed       • tiotropium-olodaterol (Stiolto Respimat) 2 5-2 5 MCG/ACT inhaler Inhale 2 puffs daily 4 g 11     No current facility-administered medications for this visit  No Known Allergies    Review of Systems    Video Exam    There were no vitals filed for this visit  Physical Exam     Behavioral Health Psychotherapy Progress Note    Psychotherapy Provided: Individual Psychotherapy     1  Moderate mixed bipolar II disorder (HCC)        2  PTSD (post-traumatic stress disorder)        3  Generalized anxiety disorder        4  Marijuana use        5  Cigarette nicotine dependence without complication        6  Alcohol abuse, in remission            Goals addressed in session: Goal 1 and Goal 2     DATA: Emiliano Barajas discussed some conflicts with Criss Auguste, thought stopping that she practices sometimes, and dealing with intense emotions  She also was encouraged to continue and be more persisting and consistent with putting down in writing her every experience  During this session, this clinician used the following therapeutic modalities: Client-centered Therapy, Cognitive Behavioral Therapy, Dialectical Behavior Therapy, Mindfulness-based Strategies and Supportive Psychotherapy    Substance Abuse was not addressed during this session   If the client is diagnosed with a co-occurring substance use disorder, please indicate any changes in the frequency or amount of use: NA  Stage of change for addressing substance use "diagnoses: Maintenance    ASSESSMENT:  Dhiraj Webber presents with a Euthymic/ normal and Anxious mood  her affect is Normal range and intensity, which is congruent, with her mood and the content of the session  The client has made progress on their goals  Lien Mckinney seemed calmer and more confident due to her landlords did not leave for their planned  Vacation, so she was calm and open, without apprehension  Dhiraj Webber presents with a minimal risk of suicide, none risk of self-harm, and minimal risk of harm to others  For any risk assessment that surpasses a \"low\" rating, a safety plan must be developed  A safety plan was indicated: no  If yes, describe in detail NA    PLAN: Between sessions, Dhiraj Webber will use her communication and Mindfulness skills to regulate emotions better and validate others' viewpoints and opinions  At the next session, the therapist will use Client-centered Therapy, Cognitive Behavioral Therapy, Dialectical Behavior Therapy, Mindfulness-based Strategies and Supportive Psychotherapy to address self-care, cognitive exercises, and dealing with traumatic memories  Behavioral Health Treatment Plan and Discharge Planning: Dhiraj Webber is aware of and agrees to continue to work on their treatment plan  They have identified and are working toward their discharge goals   yes    Visit start and stop times:    06/14/23  Start Time: 0100  Stop Time: 0150  Total Visit Time: 50 minutes    "

## 2023-06-16 ENCOUNTER — TELEMEDICINE (OUTPATIENT)
Dept: BEHAVIORAL/MENTAL HEALTH CLINIC | Facility: CLINIC | Age: 58
End: 2023-06-16
Payer: COMMERCIAL

## 2023-06-16 DIAGNOSIS — F31.81 MODERATE MIXED BIPOLAR II DISORDER (HCC): Primary | ICD-10-CM

## 2023-06-16 DIAGNOSIS — F10.11 ALCOHOL ABUSE, IN REMISSION: ICD-10-CM

## 2023-06-16 DIAGNOSIS — F17.210 CIGARETTE NICOTINE DEPENDENCE WITHOUT COMPLICATION: ICD-10-CM

## 2023-06-16 DIAGNOSIS — F41.1 GENERALIZED ANXIETY DISORDER: ICD-10-CM

## 2023-06-16 DIAGNOSIS — F43.10 PTSD (POST-TRAUMATIC STRESS DISORDER): ICD-10-CM

## 2023-06-16 DIAGNOSIS — F12.90 MARIJUANA USE: Chronic | ICD-10-CM

## 2023-06-16 PROCEDURE — 90853 GROUP PSYCHOTHERAPY: CPT | Performed by: COUNSELOR

## 2023-06-16 NOTE — PSYCH
Virtual Regular Visit    Verification of patient location:    Patient is located at Home in the following state in which I hold an active license [place]      Assessment/Plan:    Problem List Items Addressed This Visit        Other    Marijuana use (Chronic)    Moderate mixed bipolar II disorder (HCC) - Primary    Generalized anxiety disorder    PTSD (post-traumatic stress disorder)    Alcohol abuse, in remission    Cigarette nicotine dependence without complication       Goals addressed in session: Goal 1 and Goal 2          Reason for visit is No chief complaint on file  Encounter provider Sin Hurley [facility]    Provider located at [address] [place] [id number] [phone]      Recent Visits  Date Type Provider Dept   06/14/23 229 [facility], [address] [facility]   06/09/23 [facility] [place] [address] [facility]   Showing recent visits within past 7 days and meeting all other requirements  Future Appointments  No visits were found meeting these conditions  Showing future appointments within next 150 days and meeting all other requirements       The patient was identified by name and date of birth  Tima Graves was informed that this is a telemedicine visit and that the visit is being conducted [employer]  She agrees to proceed     My office door was closed  The patient was notified the following individuals were present in the room other group attendees  She acknowledged consent and understanding of privacy and security of the video platform  The patient has agreed to participate and understands they can discontinue the visit at any time  Patient is aware this is a billable service  Subjective  Tima Graves is a 56 y o  female        Data: Rey Huerta attended the Relaxation group and was guided through EFT tapping on anxiety, diaphragmatic breathing, humming, and progressive muscle relaxation  Furthermore, attendees were guided through mindfulness exercises for connecting to difficult emotions, giving self-compassion for failures and mistakes, forgiveness and acceptance, and affirmations for self-confidence, inner resources and strengths, and against anxiety and depression  Psychoeducation, EFT, Mindfulness-based, and group approach were used to support the cohesion and maintain the serene environment for the exercise’s purpose  Assessment: Mariely Manuel followed through guidance and completed the exercises, showed willingness to continue learning and practicing self-care in weekly Relaxation Group as well as individual therapy  Plan: Mariely Manuel will practice deep breathing and other techniques based on each occasion to counter stress and will attend the next group to be able to take part in further guided exercises on chosen topics to build on the techniques learned in the beginning      HPI     Past Medical History:   Diagnosis Date   • Bilateral carpal tunnel syndrome 08/20/2019   • Carpal tunnel syndrome of right wrist 10/30/2019    Added automatically from request for surgery [id number]   • COPD (chronic obstructive pulmonary disease) (HCC)    • Hypertension        Past Surgical History:   Procedure Laterality Date   • BREAST BIOPSY Left 20yrs ago benign   • OOPHORECTOMY Right    • [place] NDSC WRST SURG W/RLS TRANSVRS CARPL LIGM Right 1/21/2020    Procedure: RELEASE CARPAL TUNNEL ENDOSCOPIC;  Surgeon: Kaila Mercado MD;  [facility];  Service: Orthopedics   • [place] [address] [facility],2 And 3 S Floors WRST SURG W/RLS TRANSVRS CARPL LIGM Left 1/28/2020    Procedure: RELEASE CARPAL TUNNEL ENDOSCOPIC;  Surgeon: Kaila Mercado MD;  [facility];  Service: Orthopedics       Current Outpatient Medications   Medication Sig Dispense Refill   • albuterol (PROVENTIL HFA,VENTOLIN HFA) 90 mcg/act inhaler inhale 2 puffs by mouth and INTO THE LUNGS every 6 hours IF NEEDED FOR WHEEZING  8 5 g 2   • benzonatate (TESSALON) 200 MG capsule Take 1 capsule (200 mg total) by mouth 3 (three) times a day as needed for cough (Patient not taking: Reported on 4/17/2023) 20 capsule 0   • buPROPion (Wellbutrin XL) 150 mg 24 hr tablet Take 1 tablet (150 mg total) by mouth daily 30 tablet 2   • cholecalciferol (VITAMIN D3) 400 units tablet Take 400 Units by mouth daily     • FLUoxetine (PROzac) 20 mg capsule Take 2 capsules (40 mg total) by mouth daily 60 capsule 2   • fluticasone (FLONASE) 50 mcg/act nasal spray instill 1 spray into each nostril once daily 16 g 2   • lamoTRIgine (LaMICtal) 200 MG tablet Take 1 tablet (200 mg total) by mouth daily 30 tablet 2   • levocetirizine (XYZAL) 5 MG tablet take 1 tablet by mouth every evening 30 tablet 4   • naproxen (Naprosyn) 500 mg tablet Take 1 tablet (500 mg total) by mouth 2 (two) times a day with meals 30 tablet 0   • nicotine polacrilex (COMMIT) 4 MG lozenge Apply 1 lozenge (4 mg total) to the mouth or throat as needed for smoking cessation (Patient not taking: Reported on 7/13/2022) 100 each 5   • omeprazole (PriLOSEC) 20 mg delayed release capsule take 1 capsule by mouth twice a day 60 capsule 5   • predniSONE 10 mg tablet Take 3 tabs BID X 2 days, 2 tabs BID X 2 days, 1 tab BID X 2 days, 1 tab daily X 2 days (Patient not taking: Reported on 4/17/2023) 26 tablet 0   • senna-docusate sodium (SENOKOT-S) 8 6-50 mg per tablet Take 3 tablets by mouth as needed       • tiotropium-olodaterol (Stiolto Respimat) 2 5-2 5 MCG/ACT inhaler Inhale 2 puffs daily 4 g 11     No current facility-administered medications for this visit  No Known Allergies    Review of Systems    Video Exam    There were no vitals filed for this visit      Physical Exam       06/16/23  Start Time: 0400  Stop Time: 0500  Total Visit Time: 60 minutes

## 2023-06-19 ENCOUNTER — TELEPHONE (OUTPATIENT)
Dept: PSYCHIATRY | Facility: CLINIC | Age: 58
End: 2023-06-19
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
## 2023-06-19 NOTE — TELEPHONE ENCOUNTER
Patient is calling because she is having a hard time connecting for her virtual appt, writer transferred caller to resident line for assistance

## 2023-06-22 ENCOUNTER — TELEMEDICINE (OUTPATIENT)
Dept: BEHAVIORAL/MENTAL HEALTH CLINIC | Facility: CLINIC | Age: 58
End: 2023-06-22
Payer: COMMERCIAL

## 2023-06-22 DIAGNOSIS — F12.90 MARIJUANA USE: Chronic | ICD-10-CM

## 2023-06-22 DIAGNOSIS — F10.11 ALCOHOL ABUSE, IN REMISSION: ICD-10-CM

## 2023-06-22 DIAGNOSIS — F43.10 PTSD (POST-TRAUMATIC STRESS DISORDER): ICD-10-CM

## 2023-06-22 DIAGNOSIS — F41.1 GENERALIZED ANXIETY DISORDER: ICD-10-CM

## 2023-06-22 DIAGNOSIS — F17.210 CIGARETTE NICOTINE DEPENDENCE WITHOUT COMPLICATION: ICD-10-CM

## 2023-06-22 DIAGNOSIS — F31.81 MODERATE MIXED BIPOLAR II DISORDER (HCC): Primary | ICD-10-CM

## 2023-06-22 PROCEDURE — 90834 PSYTX W PT 45 MINUTES: CPT | Performed by: COUNSELOR

## 2023-06-22 NOTE — PSYCH
Virtual Regular Visit    Verification of patient location:    Patient is located at Home in the following state in which I hold an active license [place]      Assessment/Plan:    Problem List Items Addressed This Visit        Other    Marijuana use (Chronic)    Moderate mixed bipolar II disorder (HCC) - Primary    Generalized anxiety disorder    PTSD (post-traumatic stress disorder)    Alcohol abuse, in remission    Cigarette nicotine dependence without complication       Goals addressed in session: Goal 1 and Goal 2          Reason for visit is No chief complaint on file  Encounter provider FARHAN Escobedo [facility]    Provider located at [address] [place] [id number] [phone]      Recent Visits  Date Type Provider Dept   06/16/23 229 [facility], [address] [facility]ist [place]   Showing recent visits within past 7 days and meeting all other requirements  Today's Visits  Date Type Provider Dept   06/22/23 229 [facility], [address] [place] [address]   Showing today's visits and meeting all other requirements  Future Appointments  No visits were found meeting these conditions  Showing future appointments within next 150 days and meeting all other requirements       The patient was identified by name and date of birth  Sherrell Vasquez was informed that this is a telemedicine visit and that the visit is being conducted throughthe [employer] platform  She agrees to proceed     My office door was closed  No one else was in the room  She acknowledged consent and understanding of privacy and security of the video platform  The patient has agreed to participate and understands they can discontinue the visit at any time  Patient is aware this is a billable service  Subjective  Sherrell Vasquez is a 56 y o  female          HPI     Past Medical History: Diagnosis Date   • Bilateral carpal tunnel syndrome 08/20/2019   • Carpal tunnel syndrome of right wrist 10/30/2019    Added automatically from request for surgery [id number]   • COPD (chronic obstructive pulmonary disease) ([facility] [address] )    • Hypertension        Past Surgical History:   Procedure Laterality Date   • BREAST BIOPSY Left 20yrs ago benign   • OOPHORECTOMY Right    • [place] NDSC WRST SURG W/RLS TRANSVRS CARPL LIGM Right 1/21/2020    Procedure: RELEASE CARPAL TUNNEL ENDOSCOPIC;  Surgeon: Nirmal Cartagena MD;  [facility];  Service: Orthopedics   • [place] [address]oors WRST SURG W/RLS TRANSVRS CARPL LIGM Left 1/28/2020    Procedure: RELEASE CARPAL TUNNEL ENDOSCOPIC;  Surgeon: Nirmal Cartagena MD;  [facility];  Service: Orthopedics       Current Outpatient Medications   Medication Sig Dispense Refill   • albuterol (PROVENTIL HFA,VENTOLIN HFA) 90 mcg/act inhaler inhale 2 puffs by mouth and INTO THE LUNGS every 6 hours IF NEEDED FOR WHEEZING   8 5 g 2   • benzonatate (TESSALON) 200 MG capsule Take 1 capsule (200 mg total) by mouth 3 (three) times a day as needed for cough (Patient not taking: Reported on 4/17/2023) 20 capsule 0   • buPROPion (Wellbutrin XL) 150 mg 24 hr tablet Take 1 tablet (150 mg total) by mouth daily 30 tablet 2   • cholecalciferol (VITAMIN D3) 400 units tablet Take 400 Units by mouth daily     • FLUoxetine (PROzac) 20 mg capsule Take 2 capsules (40 mg total) by mouth daily 60 capsule 2   • fluticasone (FLONASE) 50 mcg/act nasal spray instill 1 spray into each nostril once daily 16 g 2   • lamoTRIgine (LaMICtal) 200 MG tablet Take 1 tablet (200 mg total) by mouth daily 30 tablet 2   • levocetirizine (XYZAL) 5 MG tablet take 1 tablet by mouth every evening 30 tablet 4   • naproxen (Naprosyn) 500 mg tablet Take 1 tablet (500 mg total) by mouth 2 (two) times a day with meals 30 tablet 0   • nicotine polacrilex (COMMIT) 4 MG lozenge Apply 1 lozenge (4 mg total) to the mouth or throat as needed for smoking cessation (Patient not taking: Reported on 7/13/2022) 100 each 5   • omeprazole (PriLOSEC) 20 mg delayed release capsule take 1 capsule by mouth twice a day 60 capsule 5   • predniSONE 10 mg tablet Take 3 tabs BID X 2 days, 2 tabs BID X 2 days, 1 tab BID X 2 days, 1 tab daily X 2 days (Patient not taking: Reported on 4/17/2023) 26 tablet 0   • senna-docusate sodium (SENOKOT-S) 8 6-50 mg per tablet Take 3 tablets by mouth as needed       • tiotropium-olodaterol (Stiolto Respimat) 2 5-2 5 MCG/ACT inhaler Inhale 2 puffs daily 4 g 11     No current facility-administered medications for this visit  No Known Allergies    Review of Systems    Video Exam    There were no vitals filed for this visit  Physical Exam     Behavioral Health Psychotherapy Progress Note    Psychotherapy Provided: Individual Psychotherapy     1  Moderate mixed bipolar II disorder (HCC)        2  PTSD (post-traumatic stress disorder)        3  Generalized anxiety disorder        4  Marijuana use        5  Alcohol abuse, in remission        6  Cigarette nicotine dependence without complication            Goals addressed in session: Goal 1 and Goal 2     DATA: Ramya Gaines discussed difficulties attending her appointments through the right link or format- it triggers her anxiety; she experiences increased heart rate when she needs to go out of the house  She talked about cognitive exericise again and countering depression through socialization and more outdoor time  During this session, this clinician used the following therapeutic modalities: Client-centered Therapy, Cognitive Behavioral Therapy, Dialectical Behavior Therapy, Mindfulness-based Strategies and Supportive Psychotherapy    Substance Abuse was addressed during this session  If the client is diagnosed with a co-occurring substance use disorder, please indicate any changes in the frequency or amount of use: daily smoking   Stage of change for addressing substance use diagnoses: "Pre-contemplation    ASSESSMENT:  Roxi Joe presents with a Euthymic/ normal and Anxious mood  her affect is Normal range and intensity, which is congruent, with her mood and the content of the session  The client has made progress on their goals  Arcadio Parikh seemed anxious, nervous, prompted by some memories Roxi Joe presents with a none risk of suicide, none risk of self-harm, and none risk of harm to others  For any risk assessment that surpasses a \"low\" rating, a safety plan must be developed  A safety plan was indicated: no  If yes, describe in detail NA    PLAN: Between sessions, Roxi Joe will use Mindfulness to be able to attend to intense emotions with non-judgmental ways and neutral obseravtion   At the next session, the therapist will use Client-centered Therapy, Cognitive Behavioral Therapy, Dialectical Behavior Therapy, Mindfulness-based Strategies and Supportive Psychotherapy to address memory issues, anxiety, isolation, and outdoor time  Behavioral Health Treatment Plan and Discharge Planning: Roxi Joe is aware of and agrees to continue to work on their treatment plan  They have identified and are working toward their discharge goals   yes    Visit start and stop times:    06/22/23  Start Time: 0155  Stop Time: 7824  Total Visit Time: 42 minutes      "

## 2023-06-26 ENCOUNTER — TELEMEDICINE (OUTPATIENT)
Dept: PSYCHIATRY | Facility: CLINIC | Age: 58
End: 2023-06-26
Payer: COMMERCIAL

## 2023-06-26 DIAGNOSIS — F43.10 PTSD (POST-TRAUMATIC STRESS DISORDER): ICD-10-CM

## 2023-06-26 DIAGNOSIS — F31.32 BIPOLAR AFFECTIVE DISORDER, CURRENTLY DEPRESSED, MODERATE (HCC): Primary | ICD-10-CM

## 2023-06-26 DIAGNOSIS — F41.1 GENERALIZED ANXIETY DISORDER: ICD-10-CM

## 2023-06-26 PROCEDURE — 99214 OFFICE O/P EST MOD 30 MIN: CPT | Performed by: PSYCHIATRY & NEUROLOGY
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
## 2023-06-27 ENCOUNTER — TELEMEDICINE (OUTPATIENT)
Dept: BEHAVIORAL/MENTAL HEALTH CLINIC | Facility: CLINIC | Age: 58
End: 2023-06-27
Payer: COMMERCIAL

## 2023-06-27 DIAGNOSIS — F31.81 MODERATE MIXED BIPOLAR II DISORDER (HCC): Primary | ICD-10-CM

## 2023-06-27 DIAGNOSIS — F43.10 PTSD (POST-TRAUMATIC STRESS DISORDER): ICD-10-CM

## 2023-06-27 DIAGNOSIS — F10.11 ALCOHOL ABUSE, IN REMISSION: ICD-10-CM

## 2023-06-27 DIAGNOSIS — F17.210 CIGARETTE NICOTINE DEPENDENCE WITHOUT COMPLICATION: ICD-10-CM

## 2023-06-27 DIAGNOSIS — F41.1 GENERALIZED ANXIETY DISORDER: ICD-10-CM

## 2023-06-27 DIAGNOSIS — F12.90 MARIJUANA USE: Chronic | ICD-10-CM

## 2023-06-27 PROCEDURE — 90834 PSYTX W PT 45 MINUTES: CPT | Performed by: COUNSELOR

## 2023-06-27 NOTE — PSYCH
Virtual Regular Visit    Verification of patient location:    Patient is located at Home in the following state in which I hold an active license [place]      Assessment/Plan:    Problem List Items Addressed This Visit        Other    Marijuana use (Chronic)    Moderate mixed bipolar II disorder (HCC) - Primary    Generalized anxiety disorder    PTSD (post-traumatic stress disorder)    Alcohol abuse, in remission    Cigarette nicotine dependence without complication       Goals addressed in session: Goal 1 and Goal 2          Reason for visit is No chief complaint on file  Encounter provider Donna Vargas [facility]    Provider located at [address] [place] [id number] [phone]      Recent Visits  Date Type Provider Dept   06/22/23 229 [facility], [address] [facility] Therapist Heriberto   Showing recent visits within past 7 days and meeting all other requirements  Today's Visits  Date Type Provider Dept   06/27/23 229 [facility], [address] [place] [address]   Showing today's visits and meeting all other requirements  Future Appointments  No visits were found meeting these conditions  Showing future appointments within next 150 days and meeting all other requirements       The patient was identified by name and date of birth  Reshma Shukla was informed that this is a telemedicine visit and that the visit is being conducted throughthe [employer] platform  She agrees to proceed     My office door was closed  No one else was in the room  She acknowledged consent and understanding of privacy and security of the video platform  The patient has agreed to participate and understands they can discontinue the visit at any time  Patient is aware this is a billable service  Subjective  Reshma Shukla is a 56 y o  female          HPI     Past Medical History: Diagnosis Date   • Bilateral carpal tunnel syndrome 08/20/2019   • Carpal tunnel syndrome of right wrist 10/30/2019    Added automatically from request for surgery [id number]   • COPD (chronic obstructive pulmonary disease) ([facility] [address] )    • Hypertension        Past Surgical History:   Procedure Laterality Date   • BREAST BIOPSY Left 20yrs ago benign   • OOPHORECTOMY Right    • [place] NDSC WRST SURG W/RLS TRANSVRS CARPL LIGM Right 1/21/2020    Procedure: RELEASE CARPAL TUNNEL ENDOSCOPIC;  Surgeon: Aleksandra Garcia MD;  [facility];  Service: Orthopedics   • [place] [address] WRST SURG W/RLS TRANSVRS CARPL LIGM Left 1/28/2020    Procedure: RELEASE CARPAL TUNNEL ENDOSCOPIC;  Surgeon: Aleksandra Garcia MD;  [facility];  Service: Orthopedics       Current Outpatient Medications   Medication Sig Dispense Refill   • albuterol (PROVENTIL HFA,VENTOLIN HFA) 90 mcg/act inhaler inhale 2 puffs by mouth and INTO THE LUNGS every 6 hours IF NEEDED FOR WHEEZING   8 5 g 2   • benzonatate (TESSALON) 200 MG capsule Take 1 capsule (200 mg total) by mouth 3 (three) times a day as needed for cough (Patient not taking: Reported on 4/17/2023) 20 capsule 0   • buPROPion (Wellbutrin XL) 150 mg 24 hr tablet Take 1 tablet (150 mg total) by mouth daily 30 tablet 2   • cholecalciferol (VITAMIN D3) 400 units tablet Take 400 Units by mouth daily     • FLUoxetine (PROzac) 20 mg capsule Take 2 capsules (40 mg total) by mouth daily 60 capsule 2   • fluticasone (FLONASE) 50 mcg/act nasal spray instill 1 spray into each nostril once daily 16 g 2   • lamoTRIgine (LaMICtal) 200 MG tablet Take 1 tablet (200 mg total) by mouth daily 30 tablet 2   • levocetirizine (XYZAL) 5 MG tablet take 1 tablet by mouth every evening 30 tablet 4   • naproxen (Naprosyn) 500 mg tablet Take 1 tablet (500 mg total) by mouth 2 (two) times a day with meals 30 tablet 0   • nicotine polacrilex (COMMIT) 4 MG lozenge Apply 1 lozenge (4 mg total) to the mouth or throat as needed for smoking cessation (Patient not taking: Reported on 7/13/2022) 100 each 5   • omeprazole (PriLOSEC) 20 mg delayed release capsule take 1 capsule by mouth twice a day 60 capsule 5   • predniSONE 10 mg tablet Take 3 tabs BID X 2 days, 2 tabs BID X 2 days, 1 tab BID X 2 days, 1 tab daily X 2 days (Patient not taking: Reported on 4/17/2023) 26 tablet 0   • senna-docusate sodium (SENOKOT-S) 8 6-50 mg per tablet Take 3 tablets by mouth as needed       • tiotropium-olodaterol (Stiolto Respimat) 2 5-2 5 MCG/ACT inhaler Inhale 2 puffs daily 4 g 11     No current facility-administered medications for this visit  No Known Allergies    Review of Systems    Video Exam    There were no vitals filed for this visit  Physical Exam     Behavioral Health Psychotherapy Progress Note    Psychotherapy Provided: Individual Psychotherapy     1  Moderate mixed bipolar II disorder (HCC)        2  PTSD (post-traumatic stress disorder)        3  Generalized anxiety disorder        4  Marijuana use        5  Cigarette nicotine dependence without complication        6  Alcohol abuse, in remission            Goals addressed in session: Goal 1 and Goal 2     DATA: Shari Fisher discussed healthier dieting- salt, fat, sugar  She shared her struggle with memory and some of her cognitive exercise to counter her struggle, making notes of her daily experience to be able to refer to her notes and avoid frustration from forgetfulness  During this session, this clinician used the following therapeutic modalities: Client-centered Therapy, Cognitive Behavioral Therapy, Dialectical Behavior Therapy, Motivational Interviewing and Supportive Psychotherapy    Substance Abuse was addressed during this session  If the client is diagnosed with a co-occurring substance use disorder, please indicate any changes in the frequency or amount of use: daily smoking less than a pack   Stage of change for addressing substance use diagnoses: "Pre-contemplation    ASSESSMENT:  Adalberto Salazar presents with a Euthymic/ normal and Anxious mood  her affect is Normal range and intensity, which is congruent, with her mood and the content of the session  The client has made progress on their goals  Jaelyn Hall was very talkative, confident, and friendly  She showed a little better cognition this time than previously  Adalberto Salazar presents with a minimal risk of suicide, minimal risk of self-harm, and none risk of harm to others  For any risk assessment that surpasses a \"low\" rating, a safety plan must be developed  A safety plan was indicated: no  If yes, describe in detail NA    PLAN: Between sessions, Adalberto Salazar will use Mindfulness skills and will attend weekly individual and Group Psychotherapy  At the next session, the therapist will use Client-centered Therapy, Cognitive Behavioral Therapy, Dialectical Behavior Therapy, Mindfulness-based Strategies and Supportive Psychotherapy to address self-care, cognitive exercise, emotion intensity and handling  Behavioral Health Treatment Plan and Discharge Planning: Adalberto Salazar is aware of and agrees to continue to work on their treatment plan  They have identified and are working toward their discharge goals   yes    Visit start and stop times:    06/27/23  Start Time: 0200  Stop Time: 3296  Total Visit Time: 44 minutes      "

## 2023-06-28 ENCOUNTER — TELEPHONE (OUTPATIENT)
Dept: PSYCHIATRY | Facility: CLINIC | Age: 58
End: 2023-06-28

## 2023-06-30 ENCOUNTER — TELEMEDICINE (OUTPATIENT)
Dept: BEHAVIORAL/MENTAL HEALTH CLINIC | Facility: CLINIC | Age: 58
End: 2023-06-30
Payer: COMMERCIAL

## 2023-06-30 DIAGNOSIS — F43.10 PTSD (POST-TRAUMATIC STRESS DISORDER): ICD-10-CM

## 2023-06-30 DIAGNOSIS — F31.81 MODERATE MIXED BIPOLAR II DISORDER (HCC): Primary | ICD-10-CM

## 2023-06-30 DIAGNOSIS — F10.11 ALCOHOL ABUSE, IN REMISSION: ICD-10-CM

## 2023-06-30 DIAGNOSIS — F12.90 MARIJUANA USE: Chronic | ICD-10-CM

## 2023-06-30 DIAGNOSIS — F17.210 CIGARETTE NICOTINE DEPENDENCE WITHOUT COMPLICATION: ICD-10-CM

## 2023-06-30 DIAGNOSIS — F41.1 GENERALIZED ANXIETY DISORDER: ICD-10-CM

## 2023-06-30 PROCEDURE — 90853 GROUP PSYCHOTHERAPY: CPT | Performed by: COUNSELOR

## 2023-06-30 NOTE — PSYCH
Virtual Regular Visit    Verification of patient location:    Patient is located at Home in the following state in which I hold an active license [place]      Assessment/Plan:    Problem List Items Addressed This Visit        Other    Marijuana use (Chronic)    Moderate mixed bipolar II disorder (HCC) - Primary    Generalized anxiety disorder    PTSD (post-traumatic stress disorder)    Alcohol abuse, in remission    Cigarette nicotine dependence without complication       Goals addressed in session: Goal 1 and Goal 2          Reason for visit is No chief complaint on file  [place] [facility] [place] [address] [place] [id number] [phone]      Recent Visits  Date Type Provider Dept   06/28/23 Telephone Geantonietta Lopes, [address] [facility]   06/27/23 229 [facility], [address] [facility] Therapist Bhumika Gallagher   Showing recent visits within past 7 days and meeting all other requirements  Future Appointments  No visits were found meeting these conditions  Showing future appointments within next 150 days and meeting all other requirements       The patient was identified by name and date of birth  June Rocha was informed that this is a telemedicine visit and that the visit is being conducted through [employer] My office door was closed  The patient was notified the following individuals were present in the room other group members  She acknowledged consent and understanding of privacy and security of the video platform  The patient has agreed to participate and understands they can discontinue the visit at any time  Patient is aware this is a billable service  Subjective  June Rocha is a 56 y o  female        Data: Ade Trejo attended the Relaxation group and was guided through EFT tapping on anxiety, diaphragmatic breathing, humming, and progressive muscle relaxation  Furthermore, attendees were guided through mindfulness exercises for connecting with the inner child and re-parenting, self-compassion, and affirmations for self-love, forgiveness, courage, growth, and success  Psychoeducation, EFT, Mindfulness-based, and group approach were used to support the cohesion and maintain the serene environment for the exercise’s purpose  Assessment: Janine Santos  followed through guidance and completed the exercises, showed willingness to continue learning and practicing self-care in weekly Relaxation Group as well as individual therapy  Plan: Janine Santos will practice deep breathing and other techniques based on each occasion to counter stress and will attend the next group to be able to take part in further guided exercises on chosen topics to build on the techniques learned in the beginning  HPI     Past Medical History:   Diagnosis Date   • Bilateral carpal tunnel syndrome 08/20/2019   • Carpal tunnel syndrome of right wrist 10/30/2019    Added automatically from request for surgery [id number]   • COPD (chronic obstructive pulmonary disease) (HCC)    • Hypertension        Past Surgical History:   Procedure Laterality Date   • BREAST BIOPSY Left 20yrs ago benign   • OOPHORECTOMY Right    • [place] NDSC WRST SURG W/RLS TRANSVRS CARPL LIGM Right 1/21/2020    Procedure: RELEASE CARPAL TUNNEL ENDOSCOPIC;  Surgeon: Kacie Barnhart MD;  [facility];  Service: Orthopedics   • [place] [address] [facility],[address] WRST SURG W/RLS TRANSVRS CARPL LIGM Left 1/28/2020    Procedure: RELEASE CARPAL TUNNEL ENDOSCOPIC;  Surgeon: Kacie Barnhart MD;  [facility];  Service: Orthopedics       Current Outpatient Medications   Medication Sig Dispense Refill   • albuterol (PROVENTIL HFA,VENTOLIN HFA) 90 mcg/act inhaler inhale 2 puffs by mouth and INTO THE LUNGS every 6 hours IF NEEDED FOR WHEEZING   8 5 g 2   • benzonatate (TESSALON) 200 MG capsule Take 1 capsule (200 mg total) by mouth 3 (three) times a day as needed for cough (Patient not taking: Reported on 4/17/2023) 20 capsule 0   • buPROPion (Wellbutrin XL) 150 mg 24 hr tablet Take 1 tablet (150 mg total) by mouth daily 30 tablet 2   • cholecalciferol (VITAMIN D3) 400 units tablet Take 400 Units by mouth daily     • FLUoxetine (PROzac) 20 mg capsule Take 2 capsules (40 mg total) by mouth daily 60 capsule 2   • fluticasone (FLONASE) 50 mcg/act nasal spray instill 1 spray into each nostril once daily 16 g 2   • lamoTRIgine (LaMICtal) 200 MG tablet Take 1 tablet (200 mg total) by mouth daily 30 tablet 2   • levocetirizine (XYZAL) 5 MG tablet take 1 tablet by mouth every evening 30 tablet 4   • naproxen (Naprosyn) 500 mg tablet Take 1 tablet (500 mg total) by mouth 2 (two) times a day with meals 30 tablet 0   • nicotine polacrilex (COMMIT) 4 MG lozenge Apply 1 lozenge (4 mg total) to the mouth or throat as needed for smoking cessation (Patient not taking: Reported on 7/13/2022) 100 each 5   • omeprazole (PriLOSEC) 20 mg delayed release capsule take 1 capsule by mouth twice a day 60 capsule 5   • predniSONE 10 mg tablet Take 3 tabs BID X 2 days, 2 tabs BID X 2 days, 1 tab BID X 2 days, 1 tab daily X 2 days (Patient not taking: Reported on 4/17/2023) 26 tablet 0   • senna-docusate sodium (SENOKOT-S) 8 6-50 mg per tablet Take 3 tablets by mouth as needed       • tiotropium-olodaterol (Stiolto Respimat) 2 5-2 5 MCG/ACT inhaler Inhale 2 puffs daily 4 g 11     No current facility-administered medications for this visit  No Known Allergies    Review of Systems    Video Exam    There were no vitals filed for this visit      Physical Exam       06/30/23  Start Time: 0400  Stop Time: 0500  Total Visit Time: 60 minutes

## 2023-07-05 ENCOUNTER — TELEMEDICINE (OUTPATIENT)
Dept: BEHAVIORAL/MENTAL HEALTH CLINIC | Facility: CLINIC | Age: 58
End: 2023-07-05
Payer: COMMERCIAL

## 2023-07-05 DIAGNOSIS — F10.11 ALCOHOL ABUSE, IN REMISSION: ICD-10-CM

## 2023-07-05 DIAGNOSIS — F31.81 MODERATE MIXED BIPOLAR II DISORDER (HCC): Primary | ICD-10-CM

## 2023-07-05 DIAGNOSIS — F17.210 CIGARETTE NICOTINE DEPENDENCE WITHOUT COMPLICATION: ICD-10-CM

## 2023-07-05 DIAGNOSIS — F12.90 MARIJUANA USE: Chronic | ICD-10-CM

## 2023-07-05 DIAGNOSIS — F41.1 GENERALIZED ANXIETY DISORDER: ICD-10-CM

## 2023-07-05 DIAGNOSIS — F43.10 PTSD (POST-TRAUMATIC STRESS DISORDER): ICD-10-CM

## 2023-07-05 PROCEDURE — 90834 PSYTX W PT 45 MINUTES: CPT | Performed by: COUNSELOR
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
## 2023-07-07 ENCOUNTER — TELEMEDICINE (OUTPATIENT)
Dept: BEHAVIORAL/MENTAL HEALTH CLINIC | Facility: CLINIC | Age: 58
End: 2023-07-07
Payer: COMMERCIAL

## 2023-07-07 DIAGNOSIS — F12.90 MARIJUANA USE: Chronic | ICD-10-CM

## 2023-07-07 DIAGNOSIS — F31.81 MODERATE MIXED BIPOLAR II DISORDER (HCC): Primary | ICD-10-CM

## 2023-07-07 DIAGNOSIS — F43.10 PTSD (POST-TRAUMATIC STRESS DISORDER): ICD-10-CM

## 2023-07-07 DIAGNOSIS — F17.210 CIGARETTE NICOTINE DEPENDENCE WITHOUT COMPLICATION: ICD-10-CM

## 2023-07-07 DIAGNOSIS — F41.1 GENERALIZED ANXIETY DISORDER: ICD-10-CM

## 2023-07-07 DIAGNOSIS — F10.11 ALCOHOL ABUSE, IN REMISSION: ICD-10-CM

## 2023-07-07 PROCEDURE — 90853 GROUP PSYCHOTHERAPY: CPT | Performed by: COUNSELOR

## 2023-07-07 NOTE — PSYCH
Virtual Regular Visit    Verification of patient location:    Patient is located at Home in the following state in which I hold an active license [place]      Assessment/Plan:    Problem List Items Addressed This Visit        Other    Marijuana use (Chronic)    Moderate mixed bipolar II disorder (HCC) - Primary    Generalized anxiety disorder    PTSD (post-traumatic stress disorder)    Alcohol abuse, in remission    Cigarette nicotine dependence without complication       Goals addressed in session: Goal 1 and Goal 2          Reason for visit is No chief complaint on file. Encounter provider [facility]idi [facility]    Provider located at [address] [place] [id number] [phone]      Recent Visits  Date Type Provider Dept   [facility]/23 [facility], [address]   06/30/23 Zoey, [address] [place] [address] [facility] Therapist LADARIUS   Showing recent visits within past 7 days and meeting all other requirements  Future Appointments  No visits were found meeting these conditions. Showing future appointments within next 150 days and meeting all other requirements       The patient was identified by name and date of birth. Annie Alexander was informed that this is a telemedicine visit and that the visit is being conducted [employer]. She agrees to proceed. .  My office door was closed. The patient was notified the following individuals were present in the room other group members. She acknowledged consent and understanding of privacy and security of the video platform. The patient has agreed to participate and understands they can discontinue the visit at any time. Patient is aware this is a billable service. Subjective  Annie Alexander is a 56 y.o. female  .     Data: Ling Joya attended the Relaxation group and was guided through [facility] tapping on anxiety, diaphragmatic breathing, Vagus nerve stimulation, and progressive muscle relaxation. Furthermore, attendees were guided through mindfulness exercises for attending to psychological and physical pain, self-blame, and building self-compassion, and affirmations for forgiveness, being happy for others achievements, and gratitude. Psychoeducation, EFT, Mindfulness-based, and group approach were used to support the cohesion and maintain the serene environment for the exercise’s purpose. Assessment: Lucho Dorman followed through guidance and completed the exercises, showed willingness to continue learning and practicing self-care in weekly Relaxation Group as well as individual therapy. Plan: Lucho Dorman will practice deep breathing and other techniques based on each occasion to counter stress and will attend the next group to be able to take part in further guided exercises on chosen topics to build on the techniques learned in the beginning.     HPI     Past Medical History:   Diagnosis Date   • Bilateral carpal tunnel syndrome 08/20/2019   • Carpal tunnel syndrome of right wrist 10/30/2019    Added automatically from request for surgery [id number]   • COPD (chronic obstructive pulmonary disease) (HCC)    • Hypertension        Past Surgical History:   Procedure Laterality Date   • BREAST BIOPSY Left 20yrs ago benign   • OOPHORECTOMY Right    • CT NDSC WRST SURG W/RLS TRANSVRS CARPL LIGM Right 1/21/2020    Procedure: RELEASE CARPAL TUNNEL ENDOSCOPIC;  Surgeon: Jina Hair MD;  [facility];  Service: Orthopedics   • [place] [address] WRST SURG W/RLS TRANSVRS CARPL LIGM Left 1/28/2020    Procedure: RELEASE CARPAL TUNNEL ENDOSCOPIC;  Surgeon: Jina Hair MD;  [facility];  Service: Orthopedics       Current Outpatient Medications   Medication Sig Dispense Refill   • albuterol (PROVENTIL HFA,VENTOLIN HFA) 90 mcg/act inhaler inhale 2 puffs by mouth and INTO THE LUNGS every 6 hours IF NEEDED FOR WHEEZING. 8.5 g 2   • benzonatate (TESSALON) 200 MG capsule Take 1 capsule (200 mg total) by mouth 3 (three) times a day as needed for cough (Patient not taking: Reported on 4/17/2023) 20 capsule 0   • buPROPion (Wellbutrin XL) 150 mg 24 hr tablet Take 1 tablet (150 mg total) by mouth daily 30 tablet 2   • cholecalciferol (VITAMIN D3) 400 units tablet Take 400 Units by mouth daily     • FLUoxetine (PROzac) 20 mg capsule Take 2 capsules (40 mg total) by mouth daily 60 capsule 2   • fluticasone (FLONASE) 50 mcg/act nasal spray instill 1 spray into each nostril once daily 16 g 2   • lamoTRIgine (LaMICtal) 200 MG tablet Take 1 tablet (200 mg total) by mouth daily 30 tablet 2   • levocetirizine (XYZAL) 5 MG tablet take 1 tablet by mouth every evening 30 tablet 4   • naproxen (Naprosyn) 500 mg tablet Take 1 tablet (500 mg total) by mouth 2 (two) times a day with meals 30 tablet 0   • nicotine polacrilex (COMMIT) 4 MG lozenge Apply 1 lozenge (4 mg total) to the mouth or throat as needed for smoking cessation (Patient not taking: Reported on 7/13/2022) 100 each 5   • omeprazole (PriLOSEC) 20 mg delayed release capsule take 1 capsule by mouth twice a day 60 capsule 5   • predniSONE 10 mg tablet Take 3 tabs BID X 2 days, 2 tabs BID X 2 days, 1 tab BID X 2 days, 1 tab daily X 2 days (Patient not taking: Reported on 4/17/2023) 26 tablet 0   • senna-docusate sodium (SENOKOT-S) 8.6-50 mg per tablet Take 3 tablets by mouth as needed       • tiotropium-olodaterol (Stiolto Respimat) 2.5-2.5 MCG/ACT inhaler Inhale 2 puffs daily 4 g 11     No current facility-administered medications for this visit. No Known Allergies    Review of Systems    Video Exam    There were no vitals filed for this visit.     Physical Exam       07/07/23  Start Time: 0400  Stop Time: 0500  Total Visit Time: 60 minutes

## 2023-07-12 ENCOUNTER — TELEMEDICINE (OUTPATIENT)
Dept: BEHAVIORAL/MENTAL HEALTH CLINIC | Facility: CLINIC | Age: 58
End: 2023-07-12
Payer: COMMERCIAL

## 2023-07-12 DIAGNOSIS — F10.11 ALCOHOL ABUSE, IN REMISSION: ICD-10-CM

## 2023-07-12 DIAGNOSIS — F41.1 GENERALIZED ANXIETY DISORDER: ICD-10-CM

## 2023-07-12 DIAGNOSIS — F17.210 CIGARETTE NICOTINE DEPENDENCE WITHOUT COMPLICATION: ICD-10-CM

## 2023-07-12 DIAGNOSIS — F43.10 PTSD (POST-TRAUMATIC STRESS DISORDER): ICD-10-CM

## 2023-07-12 DIAGNOSIS — F12.90 MARIJUANA USE: Chronic | ICD-10-CM

## 2023-07-12 DIAGNOSIS — F31.81 MODERATE MIXED BIPOLAR II DISORDER (HCC): Primary | ICD-10-CM

## 2023-07-12 PROCEDURE — 90834 PSYTX W PT 45 MINUTES: CPT | Performed by: COUNSELOR

## 2023-07-12 NOTE — PSYCH
Behavioral Health Psychotherapy Progress Note    Psychotherapy Provided: Individual Psychotherapy     1. Moderate mixed bipolar II disorder (HCC)        2. PTSD (post-traumatic stress disorder)        3. Generalized anxiety disorder        4. Marijuana use        5. Cigarette nicotine dependence without complication        6. Alcohol abuse, in remission            Goals addressed in session: Goal 1     DATA: Lucho Dorman discussed being alone, her landlords are away until Sunday. Marie immersed into happy memories about her grandson chasing chickens around the lawn where they used to live. Lucho Dorman was given information about nervous sytem response to stress and different ways to access memories safer. During this session, this clinician used the following therapeutic modalities: Client-centered Therapy, Cognitive Behavioral Therapy and Dialectical Behavior Therapy, Mindfulness-Based Strategies, Motivational Interviewing, and Supportive Psychotherapy. Substance Abuse was addressed during this session. If the client is diagnosed with a co-occurring substance use disorder, please indicate any changes in the frequency or amount of use: daily cigarettes. Stage of change for addressing substance use diagnoses: Pre-contemplation    ASSESSMENT:  Jacinda Muhammad presents with a Euthymic/ normal, Anxious and Depressed mood. her affect is Normal range and intensity, which is congruent, with her mood and the content of the session. The client has made progress on their goals. Lucho Dorman realizes the impact of her past trauma on her current struggle but is not yet convinced that there is more she can do than she gives herself credit for to impose control over her emotions and behaviors. Jacinda Muhammad presents with a minimal risk of suicide, minimal risk of self-harm, and none risk of harm to others. For any risk assessment that surpasses a "low" rating, a safety plan must be developed.     A safety plan was indicated: no  If yes, describe in detail NA    PLAN: Between sessions, Mariely Marin will use her learned Mindfulness skills to go through challenging situations and Emotion Regulation skills to deal with intensity. At the next session, the therapist will use Client-centered Therapy, Cognitive Behavioral Therapy, Dialectical Behavior Therapy, Mindfulness-based Strategies and Supportive Psychotherapy to address self-care and using muscle relaxation to pair it with trauma memories to teach her nervous system safer ways. She will continue using her cognitive exercises and notes to document some of her experience and remember what she did to help her short-term memory and avoid getting upset for not being able to refer to her recent experiences. Behavioral Health Treatment Plan and Discharge Planning: Mariely Marin is aware of and agrees to continue to work on their treatment plan. They have identified and are working toward their discharge goals.  yes    Visit start and stop times:    07/12/23  Start Time: 0102  Stop Time: 0142  Total Visit Time: 40 minutes

## 2023-07-14 ENCOUNTER — TELEMEDICINE (OUTPATIENT)
Dept: BEHAVIORAL/MENTAL HEALTH CLINIC | Facility: CLINIC | Age: 58
End: 2023-07-14
Payer: COMMERCIAL

## 2023-07-14 DIAGNOSIS — F17.210 CIGARETTE NICOTINE DEPENDENCE WITHOUT COMPLICATION: ICD-10-CM

## 2023-07-14 DIAGNOSIS — F41.1 GENERALIZED ANXIETY DISORDER: ICD-10-CM

## 2023-07-14 DIAGNOSIS — F12.90 MARIJUANA USE: Chronic | ICD-10-CM

## 2023-07-14 DIAGNOSIS — F10.11 ALCOHOL ABUSE, IN REMISSION: ICD-10-CM

## 2023-07-14 DIAGNOSIS — F31.81 MODERATE MIXED BIPOLAR II DISORDER (HCC): Primary | ICD-10-CM

## 2023-07-14 DIAGNOSIS — F43.10 PTSD (POST-TRAUMATIC STRESS DISORDER): ICD-10-CM

## 2023-07-14 PROCEDURE — 90853 GROUP PSYCHOTHERAPY: CPT | Performed by: COUNSELOR

## 2023-07-14 NOTE — PSYCH
Virtual Regular Visit    Verification of patient location:    Patient is located at Home in the following state in which I hold an active license [place]      Assessment/Plan:    Problem List Items Addressed This Visit        Other    Marijuana use (Chronic)    Moderate mixed bipolar II disorder (HCC) - Primary    Generalized anxiety disorder    PTSD (post-traumatic stress disorder)    Alcohol abuse, in remission    Cigarette nicotine dependence without complication       Goals addressed in session: Goal 1 and Goal 2          Reason for visit is No chief complaint on file. Encounter provider FARHAN Mcgregor [facility]    Provider located at [address] [place] [id number] [phone]      Recent Visits  Date Type Provider Dept   07/12/23 [facility], [address]   07/07/23 Zoey, [address] [place] [address] [facility] Therapist LADARIUS   Showing recent visits within past 7 days and meeting all other requirements  Future Appointments  No visits were found meeting these conditions. Showing future appointments within next 150 days and meeting all other requirements       The patient was identified by name and date of birth. Anibal العلي was informed that this is a telemedicine visit and that the visit is being conducted [employer]. She agrees to proceed. .  My office door was closed. The patient was notified the following individuals were present in the room other group memebrs. She acknowledged consent and understanding of privacy and security of the video platform. The patient has agreed to participate and understands they can discontinue the visit at any time. Patient is aware this is a billable service. Subjective  Anibal العلي is a 56 y.o. female  .     Data: Steve Russell attended the Relaxation group and was guided through [facility] tapping on trauma and anxiety, diaphragmatic breathing, Vagus nerve stimulation, and progressive muscle relaxation. Furthermore, attendees were guided through mindfulness exercises for self-blame and guilt, self-compassion and forgiveness, and affirmations for confidence, acceptance, and strong self-esteem . Psychoeducation, EFT, Mindfulness-based, and group approach were used to support the cohesion and maintain the serene environment for the exercise’s purpose. Assessment: Ting Patterson followed through guidance and completed the exercises, showed willingness to continue learning and practicing self-care in weekly Relaxation Group as well as individual therapy. Plan: Ting Patterson will practice deep breathing and other techniques based on each occasion to counter stress and will attend the next group to be able to take part in further guided exercises on chosen topics to build on the techniques learned in the beginning. HPI     Past Medical History:   Diagnosis Date   • Bilateral carpal tunnel syndrome 08/20/2019   • Carpal tunnel syndrome of right wrist 10/30/2019    Added automatically from request for surgery [id number]   • COPD (chronic obstructive pulmonary disease) (HCC)    • Hypertension        Past Surgical History:   Procedure Laterality Date   • BREAST BIOPSY Left 20yrs ago benign   • OOPHORECTOMY Right    • [place] NDSC WRST SURG W/RLS TRANSVRS CARPL LIGM Right 1/21/2020    Procedure: RELEASE CARPAL TUNNEL ENDOSCOPIC;  Surgeon: Siri Singh MD;  [facility];  Service: Orthopedics   • [place] [address] WRST SURG W/RLS TRANSVRS CARPL LIGM Left 1/28/2020    Procedure: RELEASE CARPAL TUNNEL ENDOSCOPIC;  Surgeon: Siri Singh MD;  [facility];  Service: Orthopedics       Current Outpatient Medications   Medication Sig Dispense Refill   • albuterol (PROVENTIL HFA,VENTOLIN HFA) 90 mcg/act inhaler inhale 2 puffs by mouth and INTO THE LUNGS every 6 hours IF NEEDED FOR WHEEZING.  8.5 g 2   • benzonatate (TESSALON) 200 MG capsule Take 1 capsule (200 mg total) by mouth 3 (three) times a day as needed for cough (Patient not taking: Reported on 4/17/2023) 20 capsule 0   • buPROPion (Wellbutrin XL) 150 mg 24 hr tablet Take 1 tablet (150 mg total) by mouth daily 30 tablet 2   • cholecalciferol (VITAMIN D3) 400 units tablet Take 400 Units by mouth daily     • FLUoxetine (PROzac) 20 mg capsule Take 2 capsules (40 mg total) by mouth daily 60 capsule 2   • fluticasone (FLONASE) 50 mcg/act nasal spray instill 1 spray into each nostril once daily 16 g 2   • lamoTRIgine (LaMICtal) 200 MG tablet Take 1 tablet (200 mg total) by mouth daily 30 tablet 2   • levocetirizine (XYZAL) 5 MG tablet take 1 tablet by mouth every evening 30 tablet 4   • naproxen (Naprosyn) 500 mg tablet Take 1 tablet (500 mg total) by mouth 2 (two) times a day with meals 30 tablet 0   • nicotine polacrilex (COMMIT) 4 MG lozenge Apply 1 lozenge (4 mg total) to the mouth or throat as needed for smoking cessation (Patient not taking: Reported on 7/13/2022) 100 each 5   • omeprazole (PriLOSEC) 20 mg delayed release capsule take 1 capsule by mouth twice a day 60 capsule 5   • predniSONE 10 mg tablet Take 3 tabs BID X 2 days, 2 tabs BID X 2 days, 1 tab BID X 2 days, 1 tab daily X 2 days (Patient not taking: Reported on 4/17/2023) 26 tablet 0   • senna-docusate sodium (SENOKOT-S) 8.6-50 mg per tablet Take 3 tablets by mouth as needed       • tiotropium-olodaterol (Stiolto Respimat) 2.5-2.5 MCG/ACT inhaler Inhale 2 puffs daily 4 g 11     No current facility-administered medications for this visit. No Known Allergies    Review of Systems    Video Exam    There were no vitals filed for this visit.     Physical Exam     07/14/23  Start Time: 0400  Stop Time: 0500  Total Visit Time: 60 minutes

## 2023-07-18 ENCOUNTER — TELEMEDICINE (OUTPATIENT)
Dept: BEHAVIORAL/MENTAL HEALTH CLINIC | Facility: CLINIC | Age: 58
End: 2023-07-18
Payer: COMMERCIAL

## 2023-07-18 DIAGNOSIS — F12.90 MARIJUANA USE: Chronic | ICD-10-CM

## 2023-07-18 DIAGNOSIS — F31.81 MODERATE MIXED BIPOLAR II DISORDER (HCC): Primary | ICD-10-CM

## 2023-07-18 DIAGNOSIS — F41.1 GENERALIZED ANXIETY DISORDER: ICD-10-CM

## 2023-07-18 DIAGNOSIS — F10.11 ALCOHOL ABUSE, IN REMISSION: ICD-10-CM

## 2023-07-18 DIAGNOSIS — F17.210 CIGARETTE NICOTINE DEPENDENCE WITHOUT COMPLICATION: ICD-10-CM

## 2023-07-18 DIAGNOSIS — F43.10 PTSD (POST-TRAUMATIC STRESS DISORDER): ICD-10-CM

## 2023-07-18 PROCEDURE — 90834 PSYTX W PT 45 MINUTES: CPT | Performed by: COUNSELOR

## 2023-07-18 NOTE — PSYCH
Virtual Regular Visit    Verification of patient location:    Patient is located at Home in the following state in which I hold an active license [place]      Assessment/Plan:    Problem List Items Addressed This Visit        Other    Marijuana use (Chronic)    Moderate mixed bipolar II disorder (HCC) - Primary    Generalized anxiety disorder    PTSD (post-traumatic stress disorder)    Alcohol abuse, in remission    Cigarette nicotine dependence without complication       Goals addressed in session: Goal 1 and Goal 2          Reason for visit is No chief complaint on file. Encounter provider [facility]    Provider located at [address] [place], [place] [id number] [phone]      Recent Visits  Date Type Provider Dept   07/14/23 [facility], [address]   07/12/23 [facility], [address] [place] [address] [facility] Therapist LADARIUS   Showing recent visits within past 7 days and meeting all other requirements  Today's Visits  Date Type Provider Dept   07/18/23 [facility], [address]   Showing today's visits and meeting all other requirements  Future Appointments  No visits were found meeting these conditions. Showing future appointments within next 150 days and meeting all other requirements       The patient was identified by name and date of birth. Gustabo Rodrigez was informed that this is a telemedicine visit and that the visit is being conducted through the [facility]. She agrees to proceed. .  My office door was closed. No one else was in the room. She acknowledged consent and understanding of privacy and security of the video platform. The patient has agreed to participate and understands they can discontinue the visit at any time.     Patient is aware this is a billable service. Delvis Gardner is a 56 y.o. female  . HPI     Past Medical History:   Diagnosis Date   • Bilateral carpal tunnel syndrome 08/20/2019   • Carpal tunnel syndrome of right wrist 10/30/2019    Added automatically from request for surgery [id number]   • COPD (chronic obstructive pulmonary disease) (HCC)    • Hypertension        Past Surgical History:   Procedure Laterality Date   • BREAST BIOPSY Left 20yrs ago benign   • OOPHORECTOMY Right    • [place] NDSC WRST SURG W/RLS TRANSVRS CARPL LIGM Right 1/21/2020    Procedure: RELEASE CARPAL TUNNEL ENDOSCOPIC;  Surgeon: Ashutosh Castillo MD;  [facility];  Service: Orthopedics   • [place] [address] WRST SURG W/RLS TRANSVRS CARPL LIGM Left 1/28/2020    Procedure: RELEASE CARPAL TUNNEL ENDOSCOPIC;  Surgeon: Ashutosh Castillo MD;  [facility];  Service: Orthopedics       Current Outpatient Medications   Medication Sig Dispense Refill   • albuterol (PROVENTIL HFA,VENTOLIN HFA) 90 mcg/act inhaler inhale 2 puffs by mouth and INTO THE LUNGS every 6 hours IF NEEDED FOR WHEEZING.  8.5 g 2   • benzonatate (TESSALON) 200 MG capsule Take 1 capsule (200 mg total) by mouth 3 (three) times a day as needed for cough (Patient not taking: Reported on 4/17/2023) 20 capsule 0   • buPROPion (Wellbutrin XL) 150 mg 24 hr tablet Take 1 tablet (150 mg total) by mouth daily 30 tablet 2   • cholecalciferol (VITAMIN D3) 400 units tablet Take 400 Units by mouth daily     • FLUoxetine (PROzac) 20 mg capsule Take 2 capsules (40 mg total) by mouth daily 60 capsule 2   • fluticasone (FLONASE) 50 mcg/act nasal spray instill 1 spray into each nostril once daily 16 g 2   • lamoTRIgine (LaMICtal) 200 MG tablet Take 1 tablet (200 mg total) by mouth daily 30 tablet 2   • levocetirizine (XYZAL) 5 MG tablet take 1 tablet by mouth every evening 30 tablet 4   • naproxen (Naprosyn) 500 mg tablet Take 1 tablet (500 mg total) by mouth 2 (two) times a day with meals 30 tablet 0   • nicotine polacrilex (COMMIT) 4 MG lozenge Apply 1 lozenge (4 mg total) to the mouth or throat as needed for smoking cessation (Patient not taking: Reported on 7/13/2022) 100 each 5   • omeprazole (PriLOSEC) 20 mg delayed release capsule take 1 capsule by mouth twice a day 60 capsule 5   • predniSONE 10 mg tablet Take 3 tabs BID X 2 days, 2 tabs BID X 2 days, 1 tab BID X 2 days, 1 tab daily X 2 days (Patient not taking: Reported on 4/17/2023) 26 tablet 0   • senna-docusate sodium (SENOKOT-S) 8.6-50 mg per tablet Take 3 tablets by mouth as needed       • tiotropium-olodaterol (Stiolto Respimat) 2.5-2.5 MCG/ACT inhaler Inhale 2 puffs daily 4 g 11     No current facility-administered medications for this visit. No Known Allergies    Review of Systems    Video Exam    There were no vitals filed for this visit. Physical Exam     Behavioral Health Psychotherapy Progress Note    Psychotherapy Provided: Individual Psychotherapy     1. Moderate mixed bipolar II disorder (HCC)        2. PTSD (post-traumatic stress disorder)        3. Generalized anxiety disorder        4. Cigarette nicotine dependence without complication        5. Marijuana use        6. Alcohol abuse, in remission            Goals addressed in session: Goal 1 and Goal 2     DATA: Antonio Hensley was invited through Teams, since both infibond platforms resulted in error multiple times. Antonio Hensley reported feeling better after their friends and landlords have returned from their trip, and she is no longer alone at home. During this session, this clinician used the following therapeutic modalities: Client-centered Therapy, Cognitive Behavioral Therapy, Dialectical Behavior Therapy, Mindfulness-based Strategies and Supportive Psychotherapy    Substance Abuse was addressed during this session. If the client is diagnosed with a co-occurring substance use disorder, please indicate any changes in the frequency or amount of use: daily smoking, varies due to weather.  Stage of change for addressing substance use diagnoses: Maintenance    ASSESSMENT:  Tonja Garcia presents with a Euthymic/ normal and Anxious mood. her affect is Normal range and intensity, which is congruent, with her mood and the content of the session. The client has made progress on their goals. Jose E Real presents with a minimal risk of suicide, minimal risk of self-harm, and none risk of harm to others. For any risk assessment that surpasses a "low" rating, a safety plan must be developed. A safety plan was indicated: no  If yes, describe in detail NA    PLAN: Between sessions, Tonja Garcia will make notes of her experience and to be able to refer back and not get frustrated of her short-term memory. She will use her Mindfulness skills to handle emotions as a neutral obsberver, and will attend her individual and group sessions. At the next session, the therapist will use Client-centered Therapy, Cognitive Behavioral Therapy, Dialectical Behavior Therapy, Mindfulness-based Strategies and Supportive Psychotherapy to address cognitive struggle, cognitive, exercise, scheduling an appointment with neurologist, adequate levels of and self-care. Behavioral Health Treatment Plan and Discharge Planning: Tonja Garcia is aware of and agrees to continue to work on their treatment plan. They have identified and are working toward their discharge goals.  yes    Visit start and stop times:    07/18/23  Start Time: 1250  Stop Time: 1334  Total Visit Time: 44 minutes

## 2023-07-21 ENCOUNTER — ANNUAL EXAM (OUTPATIENT)
Dept: OBGYN CLINIC | Facility: CLINIC | Age: 58
End: 2023-07-21
Payer: COMMERCIAL

## 2023-07-21 VITALS
SYSTOLIC BLOOD PRESSURE: 132 MMHG | BODY MASS INDEX: 21.75 KG/M2 | HEIGHT: 60 IN | WEIGHT: 110.8 LBS | DIASTOLIC BLOOD PRESSURE: 80 MMHG

## 2023-07-21 DIAGNOSIS — Z72.51 HIGH RISK SEXUAL BEHAVIOR, UNSPECIFIED TYPE: ICD-10-CM

## 2023-07-21 DIAGNOSIS — Z12.4 SCREENING FOR CERVICAL CANCER: ICD-10-CM

## 2023-07-21 DIAGNOSIS — R87.610 ASCUS WITH POSITIVE HIGH RISK HPV CERVICAL: ICD-10-CM

## 2023-07-21 DIAGNOSIS — Z01.419 ROUTINE GYNECOLOGICAL EXAMINATION: Primary | ICD-10-CM

## 2023-07-21 DIAGNOSIS — R87.810 ASCUS WITH POSITIVE HIGH RISK HPV CERVICAL: ICD-10-CM

## 2023-07-21 DIAGNOSIS — Z12.31 ENCOUNTER FOR SCREENING MAMMOGRAM FOR BREAST CANCER: ICD-10-CM

## 2023-07-21 PROCEDURE — G0145 SCR C/V CYTO,THINLAYER,RESCR: HCPCS | Performed by: PATHOLOGY

## 2023-07-21 PROCEDURE — 99396 PREV VISIT EST AGE 40-64: CPT | Performed by: ADVANCED PRACTICE MIDWIFE

## 2023-07-21 PROCEDURE — G0476 HPV COMBO ASSAY CA SCREEN: HCPCS | Performed by: ADVANCED PRACTICE MIDWIFE

## 2023-07-21 PROCEDURE — G0124 SCREEN C/V THIN LAYER BY MD: HCPCS | Performed by: PATHOLOGY

## 2023-07-21 PROCEDURE — 87591 N.GONORRHOEAE DNA AMP PROB: CPT | Performed by: ADVANCED PRACTICE MIDWIFE

## 2023-07-21 PROCEDURE — 87491 CHLMYD TRACH DNA AMP PROBE: CPT | Performed by: ADVANCED PRACTICE MIDWIFE

## 2023-07-21 NOTE — PROGRESS NOTES
[facility] [address] [place] [address]    ASSESSMENT/PLAN: Brandi Burns is a 56 y.o. [id number] who presents for annual gynecologic exam.    Encounter for routine gynecologic examination  - Routine well woman exam completed today. - Cervical Cancer Screening: Current ASCCP Guidelines reviewed. Last Pap: 07/21/2023 ascus/hpv other with colpo- LGSIL. Next Pap Due:  today  - HPV Vaccination status: Not immunized  - STI screening offered including HIV: desires gc/chalmydia culture  - Breast Cancer Screening: Last Mammogram 12/21/2022, script given  - Colorectal cancer screening was not ordered. - The following were reviewed in today's visit: breast self exam, mammography screening ordered, adequate intake of calcium and vitamin D and exercise  - RTO 1 yr    Additional problems addressed at this visit:  1. Routine gynecological examination  -     Liquid-based pap, screening  -     Mammo screening bilateral w 3d & cad; Future; Expected date: 12/21/2023  -     Chlamydia/GC amplified DNA by PCR    2. ASCUS with positive high risk HPV cervical  -     Liquid-based pap, screening    3. Screening for cervical cancer  -     Liquid-based pap, screening    4. Encounter for screening mammogram for breast cancer  -     Mammo screening bilateral w 3d & cad; Future; Expected date: 12/21/2023    5. High risk sexual behavior, unspecified type  -     Chlamydia/GC amplified DNA by PCR          CC:  Annual Gynecologic Examination    HPI: Brandi Burns is a 56 y.o. [id number] who presents for annual gynecologic examination. Carmencita Morales presents today for gyn exam. No vaginal bleeding since onset of menopause. 2022 last pap smear-  ASCUS HPV other, LGSIL on colpo, Hx of abnormal pap smear 2021 and 2022. Sexually active- occasionally, with partner for 2 yrs. Desires Chalmydia and GC testing. 12/2023 mammogram- normal, and 3/2021 Cologuard- plans on doing colonoscopy.  Reports 2-5 hrs of sleep daily, minimal servings of calcium rich foods daily. Exercises - walks 7 days per week. 1-2 servings of caffeine daily. Performs SBE. Safe at home- yes. Wears seatbelt - yes Concerns: pap smear results, GC/Chlam testing. The following portions of the patient's history were reviewed and updated as appropriate: allergies, current medications, past family history, past medical history, obstetric history, gynecologic history, past social history, past surgical history and problem list.    Review of Systems   Constitutional: Negative for chills, fatigue and fever. Respiratory: Positive for shortness of breath. Negative for cough. Cardiovascular: Negative for chest pain, palpitations and leg swelling. Gastrointestinal: Positive for constipation and diarrhea. Genitourinary: Negative for difficulty urinating, dysuria, frequency, urgency, vaginal bleeding, vaginal discharge and vaginal pain. Neurological: Negative for light-headedness and headaches. Psychiatric/Behavioral: The patient is nervous/anxious. Objective:  /80   Ht 5' (1.524 m)   Wt 50.3 kg (110 lb 12.8 oz)   LMP  (LMP Unknown)   BMI 21.64 kg/m²    Physical Exam  Vitals reviewed. Constitutional:       Appearance: Normal appearance. HENT:      Head: Normocephalic. Neck:      Thyroid: No thyroid mass or thyroid tenderness. Cardiovascular:      Rate and Rhythm: Normal rate and regular rhythm. Heart sounds: Normal heart sounds. Pulmonary:      Effort: Pulmonary effort is normal.      Breath sounds: Normal breath sounds. Chest:   Breasts:     Right: No mass, nipple discharge, skin change or tenderness. Left: No mass, nipple discharge, skin change or tenderness. Abdominal:      General: There is no distension. Palpations: There is no mass. Tenderness: There is no abdominal tenderness. There is no guarding. Genitourinary:     General: Normal vulva. Exam position: Lithotomy position.       Labia:         Right: No tenderness or lesion. Left: No tenderness or lesion. Vagina: No vaginal discharge, tenderness, bleeding or lesions. Cervix: No discharge, lesion, erythema or cervical bleeding. Uterus: Normal. Not enlarged and not tender. Adnexa:         Right: No mass, tenderness or fullness. Left: No mass, tenderness or fullness. Musculoskeletal:      Cervical back: Normal range of motion. Lymphadenopathy:      Upper Body:      Right upper body: No axillary adenopathy. Left upper body: No axillary adenopathy. Skin:     General: Skin is warm and dry. Neurological:      Mental Status: She is alert.    Psychiatric:         Mood and Affect: Mood normal.         Behavior: Behavior normal.         Judgment: Judgment normal.             Dee Crews CNM  [facility]  07/21/23 2:56 PM

## 2023-07-24 DIAGNOSIS — J30.2 SEASONAL ALLERGIES: ICD-10-CM

## 2023-07-25 LAB
C TRACH DNA SPEC QL NAA+PROBE: NEGATIVE
HPV HR 12 DNA CVX QL NAA+PROBE: POSITIVE
HPV16 DNA CVX QL NAA+PROBE: NEGATIVE
HPV18 DNA CVX QL NAA+PROBE: NEGATIVE
N GONORRHOEA DNA SPEC QL NAA+PROBE: NEGATIVE

## 2023-07-25 RX ORDER — LEVOCETIRIZINE DIHYDROCHLORIDE 5 MG/1
TABLET, FILM COATED ORAL
Qty: 30 TABLET | Refills: 4 | Status: SHIPPED | OUTPATIENT
Start: 2023-07-25

## 2023-07-26 ENCOUNTER — TELEMEDICINE (OUTPATIENT)
Dept: BEHAVIORAL/MENTAL HEALTH CLINIC | Facility: CLINIC | Age: 58
End: 2023-07-26
Payer: COMMERCIAL

## 2023-07-26 DIAGNOSIS — F31.81 MODERATE MIXED BIPOLAR II DISORDER (HCC): Primary | ICD-10-CM

## 2023-07-26 DIAGNOSIS — F41.1 GENERALIZED ANXIETY DISORDER: ICD-10-CM

## 2023-07-26 DIAGNOSIS — F12.90 MARIJUANA USE: Chronic | ICD-10-CM

## 2023-07-26 DIAGNOSIS — F10.11 ALCOHOL ABUSE, IN REMISSION: ICD-10-CM

## 2023-07-26 DIAGNOSIS — F43.10 PTSD (POST-TRAUMATIC STRESS DISORDER): ICD-10-CM

## 2023-07-26 DIAGNOSIS — F17.210 CIGARETTE NICOTINE DEPENDENCE WITHOUT COMPLICATION: ICD-10-CM

## 2023-07-26 PROCEDURE — 90832 PSYTX W PT 30 MINUTES: CPT | Performed by: COUNSELOR

## 2023-07-26 NOTE — PSYCH
Virtual Regular Visit    Verification of patient location:    Patient is located at Other in the following state in which I hold an active license [place]      Assessment/Plan:    Problem List Items Addressed This Visit        Other    Marijuana use (Chronic)    Moderate mixed bipolar II disorder (HCC) - Primary    Generalized anxiety disorder    PTSD (post-traumatic stress disorder)    Alcohol abuse, in remission    Cigarette nicotine dependence without complication       Goals addressed in session: Goal 1 and Goal 2          Reason for visit is No chief complaint on file. Encounter provider Tru Daugherty [facility]    Provider located at [address] [place] [id number] [phone]      Recent Visits  No visits were found meeting these conditions. Showing recent visits within past 7 days and meeting all other requirements  Today's Visits  Date Type Provider Dept   07/26/23 Anders Greer today's visits and meeting all other requirements  Future Appointments  No visits were found meeting these conditions. Showing future appointments within next 150 days and meeting all other requirements       The patient was identified by name and date of birth. Kings Frazier was informed that this is a telemedicine visit and that the visit is being conducted throughthe [employer] platform. She agrees to proceed. .  My office door was closed. No one else was in the room. She acknowledged consent and understanding of privacy and security of the video platform. The patient has agreed to participate and understands they can discontinue the visit at any time. Patient is aware this is a billable service. Subjective  Kings Frazier is a 56 y.o. female  .       HPI     Past Medical History:   Diagnosis Date   • Bilateral carpal tunnel syndrome 08/20/2019   • Carpal tunnel syndrome of right wrist 10/30/2019    Added automatically from request for surgery [id number]   • Chlamydia    • COPD (chronic obstructive pulmonary disease) ([address])    • Depression    • Hypertension    • Varicella        Past Surgical History:   Procedure Laterality Date   • BREAST BIOPSY Left 20yrs ago benign   • EGD     • OOPHORECTOMY Right    • [place] NDSC WRST SURG W/RLS TRANSVRS CARPL LIGM Right 01/21/2020    Procedure: RELEASE CARPAL TUNNEL ENDOSCOPIC;  Surgeon: Miguelangel Lew MD;  [facility];  Service: Orthopedics   • [place] [address] WRST SURG W/RLS TRANSVRS CARPL LIGM Left 01/28/2020    Procedure: RELEASE CARPAL TUNNEL ENDOSCOPIC;  Surgeon: Miguelangel Lew MD;  [facility];  Service: Orthopedics       Current Outpatient Medications   Medication Sig Dispense Refill   • albuterol (PROVENTIL HFA,VENTOLIN HFA) 90 mcg/act inhaler inhale 2 puffs by mouth and INTO THE LUNGS every 6 hours IF NEEDED FOR WHEEZING. 8.5 g 2   • buPROPion (Wellbutrin XL) 150 mg 24 hr tablet Take 1 tablet (150 mg total) by mouth daily 30 tablet 2   • cholecalciferol (VITAMIN D3) 400 units tablet Take 400 Units by mouth daily     • FLUoxetine (PROzac) 20 mg capsule Take 2 capsules (40 mg total) by mouth daily 60 capsule 2   • fluticasone (FLONASE) 50 mcg/act nasal spray instill 1 spray into each nostril once daily 16 g 2   • lamoTRIgine (LaMICtal) 200 MG tablet Take 1 tablet (200 mg total) by mouth daily 30 tablet 2   • levocetirizine (XYZAL) 5 MG tablet take 1 tablet by mouth every evening 30 tablet 4   • omeprazole (PriLOSEC) 20 mg delayed release capsule take 1 capsule by mouth twice a day 60 capsule 5   • senna-docusate sodium (SENOKOT-S) 8.6-50 mg per tablet Take 3 tablets by mouth as needed       • tiotropium-olodaterol (Stiolto Respimat) 2.5-2.5 MCG/ACT inhaler Inhale 2 puffs daily 4 g 11     No current facility-administered medications for this visit.         No Known Allergies    Review of Systems    Video Exam    There were no vitals filed for this visit. Physical Exam     Behavioral Health Psychotherapy Progress Note    Psychotherapy Provided: Individual Psychotherapy     1. Moderate mixed bipolar II disorder (HCC)        2. PTSD (post-traumatic stress disorder)        3. Generalized anxiety disorder        4. Marijuana use        5. Cigarette nicotine dependence without complication        6. Alcohol abuse, in remission            Goals addressed in session: Goal 1 and Goal 2     DATA: Rakesh Quezada appeared in her session with a delay due to being still at her friend's home and not able to return home on time. A phone was added for better audio signal. Rakesh Quezada discussed her challenges with short-term memory and that she struggles with mood when she cannot recall her experiences from yesterday. She shared that she still tries to put in writing the most imprtant events or conversations for the day, so that she could refer to her journal and recall. She had a short session due to not being able to hear very well. During this session, this clinician used the following therapeutic modalities: Client-centered Therapy, Cognitive Behavioral Therapy, Dialectical Behavior Therapy, Mindfulness-based Strategies and Supportive Psychotherapy    Substance Abuse was not addressed during this session. If the client is diagnosed with a co-occurring substance use disorder, please indicate any changes in the frequency or amount of use: NA. Stage of change for addressing substance use diagnoses: Maintenance    ASSESSMENT:  Jamar Bender presents with a Euthymic/ normal and Anxious mood. her affect is Normal range and intensity, which is congruent, with her mood and the content of the session. The client has made progress on their goals. Rakesh Quezada seemed anxious a bit, insecure, while talking about possible dating, guarded and insecure with the thought of being in a serious relationship.  Jamar Bender presents with a minimal risk of suicide, minimal risk of self-harm, and none risk of harm to others. For any risk assessment that surpasses a "low" rating, a safety plan must be developed. A safety plan was indicated: no  If yes, describe in detail NA    PLAN: Between sessions, Pilar Peña will use her Mindfulness skills to attend to intense emotions in healthier ways and will continue participating in her individual and group sessions to continue practicing meditation and relaxation techniques to counter stress adequately  . At the next session, the therapist will use Client-centered Therapy, Cognitive Behavioral Therapy, Dialectical Behavior Therapy, Mindfulness-based Strategies and Supportive Psychotherapy to address self-care and memory struggle. Behavioral Health Treatment Plan and Discharge Planning: Pilar Peña is aware of and agrees to continue to work on their treatment plan. They have identified and are working toward their discharge goals.  yes    Visit start and stop times:    07/26/23  Start Time: 1219  Stop Time: 3338  Total Visit Time: 18 minutes

## 2023-07-27 LAB
LAB AP GYN PRIMARY INTERPRETATION: ABNORMAL
Lab: ABNORMAL
PATH INTERP SPEC-IMP: ABNORMAL

## 2023-07-28 ENCOUNTER — TELEMEDICINE (OUTPATIENT)
Dept: BEHAVIORAL/MENTAL HEALTH CLINIC | Facility: CLINIC | Age: 58
End: 2023-07-28
Payer: COMMERCIAL

## 2023-07-28 DIAGNOSIS — F10.11 ALCOHOL ABUSE, IN REMISSION: ICD-10-CM

## 2023-07-28 DIAGNOSIS — F41.1 GENERALIZED ANXIETY DISORDER: ICD-10-CM

## 2023-07-28 DIAGNOSIS — F43.10 PTSD (POST-TRAUMATIC STRESS DISORDER): ICD-10-CM

## 2023-07-28 DIAGNOSIS — F17.210 CIGARETTE NICOTINE DEPENDENCE WITHOUT COMPLICATION: ICD-10-CM

## 2023-07-28 DIAGNOSIS — F12.90 MARIJUANA USE: Chronic | ICD-10-CM

## 2023-07-28 DIAGNOSIS — F31.81 MODERATE MIXED BIPOLAR II DISORDER (HCC): Primary | ICD-10-CM

## 2023-07-28 PROCEDURE — 90853 GROUP PSYCHOTHERAPY: CPT | Performed by: COUNSELOR

## 2023-07-28 NOTE — PSYCH
Virtual Regular Visit    Verification of patient location:    Patient is located at Home in the following state in which I hold an active license [place]      Assessment/Plan:    Problem List Items Addressed This Visit        Other    Marijuana use (Chronic)    Moderate mixed bipolar II disorder (HCC) - Primary    Generalized anxiety disorder    PTSD (post-traumatic stress disorder)    Alcohol abuse, in remission    Cigarette nicotine dependence without complication       Goals addressed in session: Goal 1 and Goal 2          Reason for visit is No chief complaint on file. Encounter provider Nathanael Laguna [facility]    Provider located at [address] [place] [id number] [phone]      Recent Visits  Date Type Provider Dept   07/26/23 Zoey, [address] [place] [address] [facility] LADARIUS   Showing recent visits within past 7 days and meeting all other requirements  Future Appointments  No visits were found meeting these conditions. Showing future appointments within next 150 days and meeting all other requirements       The patient was identified by name and date of birth. Anoop Sharma was informed that this is a telemedicine visit and that the visit is being conducted [employer]. She agrees to proceed. .  My office door was closed. The patient was notified the following individuals were present in the room other group members. She acknowledged consent and understanding of privacy and security of the video platform. The patient has agreed to participate and understands they can discontinue the visit at any time. Patient is aware this is a billable service. Subjective  Anoop Sharma is a 56 y.o. female  .     Data: Vandana Gama attended the Relaxation group and was guided through EFT tapping on chronic pain, diaphragmatic breathing, Vagus nerve stimulation, and progressive muscle relaxation. Furthermore, attendees were guided through mindfulness exercises for living with pain, guided imagery for healing in a special place, attending to rollercoaster of emotions, and affirmations for self-confidence, self-compassion, and forgiveness. Psychoeducation, EFT, Mindfulness-based, and group approach were used to support the cohesion and maintain the serene environment for the exercise’s purpose. Assessment: Manisha Quinteros followed through guidance and completed the exercises, showed willingness to continue learning and practicing self-care in weekly Relaxation Group as well as individual therapy. Plan: Manisha Quinteros will practice deep breathing and other techniques based on each occasion to counter stress and will attend the next group to be able to take part in further guided exercises on chosen topics to build on the techniques learned in the beginning.        HPI     Past Medical History:   Diagnosis Date   • Bilateral carpal tunnel syndrome 08/20/2019   • Carpal tunnel syndrome of right wrist 10/30/2019    Added automatically from request for surgery [id number]   • Chlamydia    • COPD (chronic obstructive pulmonary disease) ([address])    • Depression    • Hypertension    • Varicella        Past Surgical History:   Procedure Laterality Date   • BREAST BIOPSY Left 20yrs ago benign   • EGD     • OOPHORECTOMY Right    • [place] NDSC WRST SURG W/RLS TRANSVRS CARPL LIGM Right 01/21/2020    Procedure: RELEASE CARPAL TUNNEL ENDOSCOPIC;  Surgeon: Monae Huff MD;  [facility];  Service: Orthopedics   • [place] [address] WRST SURG W/RLS TRANSVRS CARPL LIGM Left 01/28/2020    Procedure: RELEASE CARPAL TUNNEL ENDOSCOPIC;  Surgeon: Monae Huff MD;  [facility];  Service: Orthopedics       Current Outpatient Medications   Medication Sig Dispense Refill   • albuterol (PROVENTIL HFA,VENTOLIN HFA) 90 mcg/act inhaler inhale 2 puffs by mouth and INTO THE LUNGS every 6 hours IF NEEDED FOR WHEEZING. 8.5 g 2   • buPROPion (Wellbutrin XL) 150 mg 24 hr tablet Take 1 tablet (150 mg total) by mouth daily 30 tablet 2   • cholecalciferol (VITAMIN D3) 400 units tablet Take 400 Units by mouth daily     • FLUoxetine (PROzac) 20 mg capsule Take 2 capsules (40 mg total) by mouth daily 60 capsule 2   • fluticasone (FLONASE) 50 mcg/act nasal spray instill 1 spray into each nostril once daily 16 g 2   • lamoTRIgine (LaMICtal) 200 MG tablet Take 1 tablet (200 mg total) by mouth daily 30 tablet 2   • levocetirizine (XYZAL) 5 MG tablet take 1 tablet by mouth every evening 30 tablet 4   • omeprazole (PriLOSEC) 20 mg delayed release capsule take 1 capsule by mouth twice a day 60 capsule 5   • senna-docusate sodium (SENOKOT-S) 8.6-50 mg per tablet Take 3 tablets by mouth as needed       • tiotropium-olodaterol (Stiolto Respimat) 2.5-2.5 MCG/ACT inhaler Inhale 2 puffs daily 4 g 11     No current facility-administered medications for this visit. No Known Allergies    Review of Systems    Video Exam    There were no vitals filed for this visit.     Physical Exam     07/28/23  Start Time: 0400  Stop Time: 0500  Total Visit Time: 60 minutes

## 2023-08-01 ENCOUNTER — TELEMEDICINE (OUTPATIENT)
Dept: BEHAVIORAL/MENTAL HEALTH CLINIC | Facility: CLINIC | Age: 58
End: 2023-08-01
Payer: COMMERCIAL

## 2023-08-01 DIAGNOSIS — F31.81 MODERATE MIXED BIPOLAR II DISORDER (HCC): Primary | ICD-10-CM

## 2023-08-01 DIAGNOSIS — F10.11 ALCOHOL ABUSE, IN REMISSION: ICD-10-CM

## 2023-08-01 DIAGNOSIS — F43.10 PTSD (POST-TRAUMATIC STRESS DISORDER): ICD-10-CM

## 2023-08-01 DIAGNOSIS — F17.210 CIGARETTE NICOTINE DEPENDENCE WITHOUT COMPLICATION: ICD-10-CM

## 2023-08-01 DIAGNOSIS — F12.90 MARIJUANA USE: Chronic | ICD-10-CM

## 2023-08-01 DIAGNOSIS — F41.1 GENERALIZED ANXIETY DISORDER: ICD-10-CM

## 2023-08-01 PROCEDURE — 90834 PSYTX W PT 45 MINUTES: CPT | Performed by: COUNSELOR

## 2023-08-01 NOTE — PSYCH
Virtual Regular Visit    Verification of patient location:    Patient is located at Other (at her friend's place) the following state in which I hold an active license [place]      Assessment/Plan:    Problem List Items Addressed This Visit        Other    Marijuana use (Chronic)    Moderate mixed bipolar II disorder (HCC) - Primary    Generalized anxiety disorder    PTSD (post-traumatic stress disorder)    Alcohol abuse, in remission    Cigarette nicotine dependence without complication       Goals addressed in session: Goal 1 and Goal 2          Reason for visit is No chief complaint on file. Encounter provider FARHAN Leonardo [facility]    Provider located at [address] [place] [id number]  [phone]      Recent Visits  Date Type Provider Dept   07/28/23 [facility], [address]   07/26/23 [facility], [address] [place] [address] [facility] Therapist LADARIUS   Showing recent visits within past 7 days and meeting all other requirements  Today's Visits  Date Type Provider Dept   08/01/23 [facility], [address]   Showing today's visits and meeting all other requirements  Future Appointments  No visits were found meeting these conditions. Showing future appointments within next 150 days and meeting all other requirements       The patient was identified by name and date of birth. Phi Guzman was informed that this is a telemedicine visit and that the visit is being conducted throughthe [employer] platform. She agrees to proceed. .  My office door was closed. No one else was in the room. She acknowledged consent and understanding of privacy and security of the video platform. The patient has agreed to participate and understands they can discontinue the visit at any time.     Patient is aware this is a billable service. Subjective  Yasmin Tidwell is a 56 y.o. female . HPI     Past Medical History:   Diagnosis Date   • Bilateral carpal tunnel syndrome 08/20/2019   • Carpal tunnel syndrome of right wrist 10/30/2019    Added automatically from request for surgery [id number]   • Chlamydia    • COPD (chronic obstructive pulmonary disease) ([address] )    • Depression    • Hypertension    • Varicella        Past Surgical History:   Procedure Laterality Date   • BREAST BIOPSY Left 20yrs ago benign   • EGD     • OOPHORECTOMY Right    • [place] NDSC WRST SURG W/RLS TRANSVRS CARPL LIGM Right 01/21/2020    Procedure: RELEASE CARPAL TUNNEL ENDOSCOPIC;  Surgeon: Zina Gonzalez MD;  [facility];  Service: Orthopedics   • [place] [address] WRST SURG W/RLS TRANSVRS CARPL LIGM Left 01/28/2020    Procedure: RELEASE CARPAL TUNNEL ENDOSCOPIC;  Surgeon: Zina Gonzalez MD;  [facility];  Service: Orthopedics       Current Outpatient Medications   Medication Sig Dispense Refill   • albuterol (PROVENTIL HFA,VENTOLIN HFA) 90 mcg/act inhaler inhale 2 puffs by mouth and INTO THE LUNGS every 6 hours IF NEEDED FOR WHEEZING.  8.5 g 2   • buPROPion (Wellbutrin XL) 150 mg 24 hr tablet Take 1 tablet (150 mg total) by mouth daily 30 tablet 2   • cholecalciferol (VITAMIN D3) 400 units tablet Take 400 Units by mouth daily     • FLUoxetine (PROzac) 20 mg capsule Take 2 capsules (40 mg total) by mouth daily 60 capsule 2   • fluticasone (FLONASE) 50 mcg/act nasal spray instill 1 spray into each nostril once daily 16 g 2   • lamoTRIgine (LaMICtal) 200 MG tablet Take 1 tablet (200 mg total) by mouth daily 30 tablet 2   • levocetirizine (XYZAL) 5 MG tablet take 1 tablet by mouth every evening 30 tablet 4   • omeprazole (PriLOSEC) 20 mg delayed release capsule take 1 capsule by mouth twice a day 60 capsule 5   • senna-docusate sodium (SENOKOT-S) 8.6-50 mg per tablet Take 3 tablets by mouth as needed       • tiotropium-olodaterol (Stiolto Respimat) 2.5-2.5 MCG/ACT inhaler Inhale 2 puffs daily 4 g 11     No current facility-administered medications for this visit. No Known Allergies    Review of Systems    Video Exam    There were no vitals filed for this visit. Physical Exam     Behavioral Health Psychotherapy Progress Note    Psychotherapy Provided: Individual Psychotherapy     1. Moderate mixed bipolar II disorder (HCC)        2. PTSD (post-traumatic stress disorder)        3. Generalized anxiety disorder        4. Marijuana use        5. Cigarette nicotine dependence without complication        6. Alcohol abuse, in remission            Goals addressed in session: Goal 1 and Goal 2     DATA: Major Ortiz was observed in a very good mood, outside, at a swimming pool, visiting her friend and taking a private break for her session. She reported struggling with traumatic memories and with short-term memory. Major Ortiz was encouraged to resume her journaling and to practice her learned techniques to connect to her inner child and comfort that vulnerable part of herself. The sound had very low quality, and Major Ortiz did not have the full session, but she said the next time, she believes she would be at home, and will be writing down the affirmations and the the demonstrated self-soothing bilateral technique. During this session, this clinician used the following therapeutic modalities: Client-centered Therapy, Cognitive Behavioral Therapy, Dialectical Behavior Therapy, Mindfulness-based Strategies and Supportive Psychotherapy    Substance Abuse was not addressed during this session. If the client is diagnosed with a co-occurring substance use disorder, please indicate any changes in the frequency or amount of use: NA. Stage of change for addressing substance use diagnoses: Maintenance    ASSESSMENT:  Turner Brandt presents with a Euthymic/ normal, Anxious and Depressed mood.      her affect is Normal range and intensity, which is congruent, with her mood and the content of the session. The client has made progress on their goals. Steve Russell showed honesty by admitting not jounraling about her experience to be able to refer to her short-term actions and events and avoid frustration. Anibal العلي presents with a low risk of suicide, low risk of self-harm, and none risk of harm to others. For any risk assessment that surpasses a "low" rating, a safety plan must be developed. A safety plan was indicated: no  If yes, describe in detail NA    PLAN: Between sessions, Anibal العلي will put effort in getting back to journaling about her short-term experience and will continue her cognitive exercises- puzzles and scenario resolutions for brain food. At the next session, the therapist will use Client-centered Therapy, Cognitive Behavioral Therapy, Dialectical Behavior Therapy, Mindfulness-based Strategies and Supportive Psychotherapy to address memory, trauma, evidence-based techniques to self-sooth and process her experiences to arrive closer to peace and forgievness. She will write down the bilateral self-soothing technique and her mantra of positive statements and affirmations to practice every day. Behavioral Health Treatment Plan and Discharge Planning: Anibal العلي is aware of and agrees to continue to work on their treatment plan. They have identified and are working toward their discharge goals.  yes    Visit start and stop times:    08/01/23  Start Time: 0100  Stop Time: 0139  Total Visit Time: 39 minutes

## 2023-08-04 ENCOUNTER — TELEMEDICINE (OUTPATIENT)
Dept: BEHAVIORAL/MENTAL HEALTH CLINIC | Facility: CLINIC | Age: 58
End: 2023-08-04
Payer: COMMERCIAL

## 2023-08-04 DIAGNOSIS — F43.10 PTSD (POST-TRAUMATIC STRESS DISORDER): ICD-10-CM

## 2023-08-04 DIAGNOSIS — F10.11 ALCOHOL ABUSE, IN REMISSION: ICD-10-CM

## 2023-08-04 DIAGNOSIS — F41.1 GENERALIZED ANXIETY DISORDER: ICD-10-CM

## 2023-08-04 DIAGNOSIS — F17.210 CIGARETTE NICOTINE DEPENDENCE WITHOUT COMPLICATION: ICD-10-CM

## 2023-08-04 DIAGNOSIS — F31.81 MODERATE MIXED BIPOLAR II DISORDER (HCC): Primary | ICD-10-CM

## 2023-08-04 DIAGNOSIS — F12.90 MARIJUANA USE: ICD-10-CM

## 2023-08-04 PROCEDURE — 90853 GROUP PSYCHOTHERAPY: CPT | Performed by: COUNSELOR

## 2023-08-04 NOTE — PSYCH
Virtual Regular Visit    Verification of patient location:    Patient is located at Home in the following state in which I hold an active license [place]      Assessment/Plan:    Problem List Items Addressed This Visit        Other    Moderate mixed bipolar II disorder (HCC) - Primary    Generalized anxiety disorder    PTSD (post-traumatic stress disorder)       Goals addressed in session: Goal 1 and Goal 2          Reason for visit is No chief complaint on file. Encounter provider Anthony Hu [facility]    Provider located at [address] [place], [place] [id number] [phone]      Recent Visits  Date Type Provider Dept   [facility] [address]   07/28/23 [place], [address] [facility] Therapist Serene Marcus   Showing recent visits within past 7 days and meeting all other requirements  Future Appointments  No visits were found meeting these conditions. Showing future appointments within next 150 days and meeting all other requirements       The patient was identified by name and date of birth. Tonja Garcia was informed that this is a telemedicine visit and that the visit is being conducted [employer]. She agrees to proceed. .  My office door was closed. The patient was notified the following individuals were present in the room other group members. She acknowledged consent and understanding of privacy and security of the video platform. The patient has agreed to participate and understands they can discontinue the visit at any time. Patient is aware this is a billable service. Subjective  Tonja Garcia is a 56 y.o. female  . Data:  Nancy Miriam attended the Relaxation group with a delay and was guided through diaphragmatic breathing, Vagus nerve stimulation, and progressive muscle relaxation.  Furthermore, attendees were guided through mindfulness exercises for body scan, creating a safe place to deal with pain, focusing on being a healthy person, and letting go of negative thoughts, and affirmations for confidence, creativity, mindful eating, and against anxiety and depression. Psychoeducation, EFT, Mindfulness-based, and group approach were used to support the cohesion and maintain the serene environment for the exercise’s purpose. Assessment: Lizzie Sevilla followed through guidance and completed the exercises, showed willingness to continue learning and practicing self-care in weekly Relaxation Group as well as individual therapy. Plan: Lizzie Sevilla will practice deep breathing and other techniques based on each occasion to counter stress and will attend the next group to be able to take part in further guided exercises on chosen topics to build on the techniques learned in the beginning. HPI     Past Medical History:   Diagnosis Date   • Bilateral carpal tunnel syndrome 08/20/2019   • Carpal tunnel syndrome of right wrist 10/30/2019    Added automatically from request for surgery [id number]   • Chlamydia    • COPD (chronic obstructive pulmonary disease) ([address])    • Depression    • Hypertension    • Varicella        Past Surgical History:   Procedure Laterality Date   • BREAST BIOPSY Left 20yrs ago benign   • EGD     • OOPHORECTOMY Right    • [place] NDSC WRST SURG W/RLS TRANSVRS CARPL LIGM Right 01/21/2020    Procedure: RELEASE CARPAL TUNNEL ENDOSCOPIC;  Surgeon: Miguelangel Lew MD;  Location: [facility];  Service: Orthopedics   • [place] [address] WRST SURG W/RLS TRANSVRS CARPL LIGM Left 01/28/2020    Procedure: RELEASE CARPAL TUNNEL ENDOSCOPIC;  Surgeon: Miguelangel Lew MD;  Location: [facility];  Service: Orthopedics       Current Outpatient Medications   Medication Sig Dispense Refill   • albuterol (PROVENTIL HFA,VENTOLIN HFA) 90 mcg/act inhaler inhale 2 puffs by mouth and INTO THE LUNGS every 6 hours IF NEEDED FOR WHEEZING. 8.5 g 2   • buPROPion (Wellbutrin XL) 150 mg 24 hr tablet Take 1 tablet (150 mg total) by mouth daily 30 tablet 2   • cholecalciferol (VITAMIN D3) 400 units tablet Take 400 Units by mouth daily     • FLUoxetine (PROzac) 20 mg capsule Take 2 capsules (40 mg total) by mouth daily 60 capsule 2   • fluticasone (FLONASE) 50 mcg/act nasal spray instill 1 spray into each nostril once daily 16 g 2   • lamoTRIgine (LaMICtal) 200 MG tablet Take 1 tablet (200 mg total) by mouth daily 30 tablet 2   • levocetirizine (XYZAL) 5 MG tablet take 1 tablet by mouth every evening 30 tablet 4   • omeprazole (PriLOSEC) 20 mg delayed release capsule take 1 capsule by mouth twice a day 60 capsule 5   • senna-docusate sodium (SENOKOT-S) 8.6-50 mg per tablet Take 3 tablets by mouth as needed       • tiotropium-olodaterol (Stiolto Respimat) 2.5-2.5 MCG/ACT inhaler Inhale 2 puffs daily 4 g 11     No current facility-administered medications for this visit. No Known Allergies    Review of Systems    Video Exam    There were no vitals filed for this visit.     Physical Exam     08/04/23  Start Time: 9032  Stop Time: 0500  Total Visit Time: 45 minutes

## 2023-08-07 ENCOUNTER — OFFICE VISIT (OUTPATIENT)
Dept: PSYCHIATRY | Facility: CLINIC | Age: 58
End: 2023-08-07
Payer: COMMERCIAL

## 2023-08-07 DIAGNOSIS — F31.32 BIPOLAR AFFECTIVE DISORDER, CURRENTLY DEPRESSED, MODERATE (HCC): Primary | ICD-10-CM

## 2023-08-07 DIAGNOSIS — F41.1 GENERALIZED ANXIETY DISORDER: ICD-10-CM

## 2023-08-07 DIAGNOSIS — F43.10 PTSD (POST-TRAUMATIC STRESS DISORDER): ICD-10-CM

## 2023-08-07 PROBLEM — F31.81 MODERATE MIXED BIPOLAR II DISORDER (HCC): Status: RESOLVED | Noted: 2019-08-20 | Resolved: 2023-08-07

## 2023-08-07 PROCEDURE — 90792 PSYCH DIAG EVAL W/MED SRVCS: CPT | Performed by: PSYCHIATRY & NEUROLOGY

## 2023-08-07 RX ORDER — BUPROPION HYDROCHLORIDE 150 MG/1
150 TABLET ORAL DAILY
Qty: 30 TABLET | Refills: 1 | Status: SHIPPED | OUTPATIENT
Start: 2023-08-07 | End: 2023-10-06

## 2023-08-07 RX ORDER — LAMOTRIGINE 200 MG/1
200 TABLET ORAL DAILY
Qty: 30 TABLET | Refills: 1 | Status: SHIPPED | OUTPATIENT
Start: 2023-08-07 | End: 2023-10-06

## 2023-08-07 RX ORDER — FLUOXETINE HYDROCHLORIDE 20 MG/1
40 CAPSULE ORAL DAILY
Qty: 60 CAPSULE | Refills: 1 | Status: SHIPPED | OUTPATIENT
Start: 2023-08-07 | End: 2023-10-06

## 2023-08-07 NOTE — PSYCH
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 Final   • Monocytes Relative 03/13/2023 7  4 - 12 % Final   • Eosinophils Relative 03/13/2023 3  0 - 6 % Final   • Basophils Relative 03/13/2023 1  0 - 1 % Final   • Neutrophils Absolute 03/13/2023 4.79  1.85 - 7.62 Thousands/µL Final   • Immature Grans Absolute 03/13/2023 0.01  0.00 - 0.20 Thousand/uL Final   • Lymphocytes Absolute 03/13/2023 1.99  0.60 - 4.47 Thousands/µL Final   • Monocytes Absolute 03/13/2023 0.55  0.17 - 1.22 Thousand/µL Final   • Eosinophils Absolute 03/13/2023 0.25  0.00 - 0.61 Thousand/µL Final   • Basophils Absolute 03/13/2023 0.05  0.00 - 0.10 Thousands/µL Final   • Vit D, 25-Hydroxy 03/13/2023 53.1  30.0 - 100.0 ng/mL Final   • TSH 3RD GENERATON 03/13/2023 1.890  0.450 - 4.500 uIU/mL Final    The recommended reference ranges for TSH during pregnancy are as follows:   First trimester 0.1 to 2.5 uIU/mL   Second trimester  0.2 to 3.0 uIU/mL   Third trimester 0.3 to 3.0 uIU/m    Note: Normal ranges may not apply to patients who are transgender, non-binary, or whose legal sex, sex at birth, and gender identity differ.   Adult TSH (3rd generation) reference range follows the recommended guidelines of the American Thyroid Association, January, 2020. Transcribe Orders on 03/13/2023   Component Date Value Ref Range Status   • Folate 03/13/2023 18.3 (H)  3.1 - 17.5 ng/mL Final   • Vitamin B-12 03/13/2023 371  100 - 900 pg/mL Final       Note Share: This note was shared with patient.     I spent 60 minutes directly with the patient during this visit    Dorothy Art DO 08/07/23

## 2023-08-07 NOTE — BH TREATMENT PLAN
TREATMENT PLAN        [address] [facility]    Name and Date of Birth:  Bharati Baker 56 y.o. 1965  Date of Treatment Plan: August 7, 2023  Diagnosis/Diagnoses:    1. Moderate mixed bipolar II disorder (HCC)    2. PTSD (post-traumatic stress disorder)    3. Generalized anxiety disorder        Strengths/Personal Resources for Self-Care: supportive family, supportive friends, taking medications as prescribed, ability to listen, ability to reason, general fund of knowledge, motivation for treatment, ability to negotiate basic needs, being resoureceful, self-reliance, sense of humor, special hobby/interest, willingness to work on problems    Area/Areas of need: anxiety symptoms, mood swings/symptoms    Long Term Goal: improve anxiety & depressive symptoms  Target Date: 6 months - February 7, 2024  Person/Persons responsible for completion of goal: Beck Fishman DO     Short Term Objective (s) - How will we reach this goal?:   1. Take medications as prescribed  2. Attend psychiatry appointments regularly  3. Exercise more   4. Try breathing exercises  5. Try relaxation techniques  Target Date: 1-3 months  Person/Persons Responsible for Completion of Goal: Gary     Progress Towards Goals: Continuing treatment    Treatment Modality: medication management every 1-3 months as needed  Review due 180 days from date of this plan: February 3, 2024   Expected length of service: Ongoing treatment    My physician and I have developed this plan together, and I agree to work on the goals and objectives. I understand the treatment goals that were developed for my treatment. The treatment plan was created between Morena Fishman DO and Bharati Baker on 08/07/23 at 3:24 PM but not signed at the time of the visit due to [address] The plan was reviewed, and verbal consent was given.

## 2023-08-07 NOTE — PATIENT INSTRUCTIONS
Sleep hygiene tips:    Keep a consistent bedtime and wake up time. This will lead to a more regular sleep schedule and avoid periods of sleep deprivation or periods of extended wakefulness during the night. Avoid watching TV in bed. If needing a form of media to help with sleep - can try sleep aid podcasts such Sleep With Me - a free podcast that helps with sleep initiation    Avoid napping, especially naps lasting longer than 1 hour or naps late in the day, which will likely affect your ability to fall asleep that night. Limit caffeine, avoiding caffeine after lunch to allow it to get out of your system and not affect your ability to fall asleep or the quality of your sleep. I usually recommend avoiding caffeine at least 6 hours before bedstime    Limit alcohol, alcohol can be sedating but also activating as it metabolizes, causing you to awaken from sleep earlier than desired. It can affect the quality of your sleep by not letting you get into the more refreshing stages of sleep. Avoid nicotine, of course not smoking or vaping at all is best, but nicotine is a stimulant and should be avoided near bedtime and during the night    Exercise and daytime physical activity is encouraged, in particular 4-6 hours before bedtime, as this may help you to fall asleep more easily and quality of sleep is improved. Rigorous exercise within 3 hours of bedtime is discouraged. Keep the sleep environment quiet and dark - Noise and light exposure during the night can disrupt sleep. White noise or ear plugs are often recommended to reduce noise. Using black out shades or an eye mask is commonly recommended to reduce light. This also includes avoiding exposure to television or technology near bedtime, as this can have an impact on circadian rhythms by shifting sleep time later. Bedroom clock - Avoid checking the time at night  This includes alarm clocks and other time pieces such as watches and phones.   Checking the time increases cognitive arousal and prolongs wakefulness. Evening eating - Avoid a large meal near bedtime, but don't go to bed hungry. Eat a healthy and filling meal in the evening without over-eating and avoid late night snacks. Insomnia tips: With great difficulty falling asleep or with difficulty falling back asleep, laying in bed for a prolonged period time is not ideal.  This can increase worry and rumination (having racing thoughts, not able to "turn off your brain". After what feels like 15 minutes, if you feel wide awake, worried, anxious, or can't clear your brain, it is better to leave the bedroom to do something relaxing , The typical recommendation is to read a boring book in dim light. In general, if you leave the room, you want to do something that is relaxing, not stimulating. Avoid bright screens, doing work, doing chores, or anything that requires a lot of mental effort. Return to bed when you feel more calm, sleepy, or mentally clear. There are some on-line resources that do require a fee that can be of help. Some of which will require a fee. https://barkley.info/. va.gov/apps/insomnia/index.html#dashboard (FREE)  Http://Curazy/cbt-online-insomnia-treatment.html  IndoorTheaters.si    Some apps that have helped people sleep: Insomnia  (FREE)  CBT-I   Go! To Sleep by the [facility] SYSTEMS       Please present for your previously scheduled appointment approximately 15 minutes prior to appointment time. If you are running late or are unable to attend your appointment, please call our [facility] office at [phone] or our [facility] office at [phone] if applicable to notify the office of your absence.     If you are in psychological crisis including not limited to suicidal/homicidal thoughts or thoughts of self-injury, do not hesitate to call/contact your [facility] hotline (see below) or attend to the nearest emergency department.   [facility] Crisis: [address] Crisis: [phone]  [address] Crisis: [address] Crisis: [address] Crisis: [address] Crisis: [address] Crisis: [phone]  National Suicide Prevention Hotline: 5-970.737.9734

## 2023-08-09 ENCOUNTER — TELEMEDICINE (OUTPATIENT)
Dept: BEHAVIORAL/MENTAL HEALTH CLINIC | Facility: CLINIC | Age: 58
End: 2023-08-09
Payer: COMMERCIAL

## 2023-08-09 DIAGNOSIS — F17.210 CIGARETTE NICOTINE DEPENDENCE WITHOUT COMPLICATION: ICD-10-CM

## 2023-08-09 DIAGNOSIS — F10.11 ALCOHOL ABUSE, IN REMISSION: ICD-10-CM

## 2023-08-09 DIAGNOSIS — F31.81 MODERATE MIXED BIPOLAR II DISORDER (HCC): ICD-10-CM

## 2023-08-09 DIAGNOSIS — F43.10 PTSD (POST-TRAUMATIC STRESS DISORDER): Primary | ICD-10-CM

## 2023-08-09 DIAGNOSIS — F41.1 GENERALIZED ANXIETY DISORDER: ICD-10-CM

## 2023-08-09 DIAGNOSIS — F12.90 MARIJUANA USE: Chronic | ICD-10-CM

## 2023-08-09 PROCEDURE — 90834 PSYTX W PT 45 MINUTES: CPT | Performed by: COUNSELOR

## 2023-08-09 NOTE — PSYCH
Virtual Regular Visit    Verification of patient location:    Patient is located at Home in the following state in which I hold an active license [place]      Assessment/Plan:    1. Moderate mixed bipolar II disorder (HCC)          2. Generalized anxiety disorder          3. PTSD (post-traumatic stress disorder)          4. Cigarette nicotine dependence without complication          5. Alcohol abuse, in remission         Problem List Items Addressed This Visit        Other    Marijuana use (Chronic)    Generalized anxiety disorder    PTSD (post-traumatic stress disorder) - Primary    Alcohol abuse, in remission    Cigarette nicotine dependence without complication   Other Visit Diagnoses     Moderate mixed bipolar II disorder ([address])              Goals addressed in session: Goal 1 and Goal 2          Reason for visit is No chief complaint on file. Encounter provider FARHAN [facility]    Provider located at [address] [place] [id number] [phone]      Recent Visits  Date Type Provider Dept   08/04/23 Zoey, [address] [place] [address] [facility] LADARIUS   Showing recent visits within past 7 days and meeting all other requirements  Today's Visits  Date Type Provider Dept   08/09/23 Zoey, [address]   Showing today's visits and meeting all other requirements  Future Appointments  No visits were found meeting these conditions. Showing future appointments within next 150 days and meeting all other requirements       The patient was identified by name and date of birth. Jamar Bender was informed that this is a telemedicine visit and that the visit is being conducted throughthe RESPACE platform. She agrees to proceed. .  My office door was closed. No one else was in the room.   She acknowledged consent and understanding of privacy and security of the video platform. The patient has agreed to participate and understands they can discontinue the visit at any time. Patient is aware this is a billable service. Delvis Vides is a 56 y.o. female. HPI     Past Medical History:   Diagnosis Date   • Bilateral carpal tunnel syndrome 08/20/2019   • Carpal tunnel syndrome of right wrist 10/30/2019    Added automatically from request for surgery [id number]   • Chlamydia    • COPD (chronic obstructive pulmonary disease) ([address])    • Depression    • Hypertension    • Varicella        Past Surgical History:   Procedure Laterality Date   • BREAST BIOPSY Left 20yrs ago benign   • EGD     • OOPHORECTOMY Right    • [place] NDSC WRST SURG W/RLS TRANSVRS CARPL LIGM Right 01/21/2020    Procedure: RELEASE CARPAL TUNNEL ENDOSCOPIC;  Surgeon: Shorty Rodriguez MD;  Location: [facility];  Service: Orthopedics   • [place] [address] WRST SURG W/RLS TRANSVRS CARPL LIGM Left 01/28/2020    Procedure: RELEASE CARPAL TUNNEL ENDOSCOPIC;  Surgeon: Shorty Rodriguez MD;  Location: [facility];  Service: Orthopedics       Current Outpatient Medications   Medication Sig Dispense Refill   • albuterol (PROVENTIL HFA,VENTOLIN HFA) 90 mcg/act inhaler inhale 2 puffs by mouth and INTO THE LUNGS every 6 hours IF NEEDED FOR WHEEZING.  8.5 g 2   • buPROPion (Wellbutrin XL) 150 mg 24 hr tablet Take 1 tablet (150 mg total) by mouth daily 30 tablet 1   • cholecalciferol (VITAMIN D3) 400 units tablet Take 400 Units by mouth daily     • FLUoxetine (PROzac) 20 mg capsule Take 2 capsules (40 mg total) by mouth daily 60 capsule 1   • fluticasone (FLONASE) 50 mcg/act nasal spray instill 1 spray into each nostril once daily 16 g 2   • lamoTRIgine (LaMICtal) 200 MG tablet Take 1 tablet (200 mg total) by mouth daily 30 tablet 1   • levocetirizine (XYZAL) 5 MG tablet take 1 tablet by mouth every evening 30 tablet 4   • omeprazole (PriLOSEC) 20 mg delayed release capsule take 1 capsule by mouth twice a day 60 capsule 5   • senna-docusate sodium (SENOKOT-S) 8.6-50 mg per tablet Take 3 tablets by mouth as needed       • tiotropium-olodaterol (Stiolto Respimat) 2.5-2.5 MCG/ACT inhaler Inhale 2 puffs daily 4 g 11     No current facility-administered medications for this visit. No Known Allergies    Review of Systems    Video Exam    There were no vitals filed for this visit. Physical Exam     Behavioral Health Psychotherapy Progress Note    Psychotherapy Provided: Individual Psychotherapy     1. PTSD (post-traumatic stress disorder)        2. Generalized anxiety disorder        3. Cigarette nicotine dependence without complication        4. Marijuana use        5. Alcohol abuse, in remission        6. Moderate mixed bipolar II disorder ([address])            Goals addressed in session: Goal 1 and Goal 2     DATA: Francisco Javier Mohamud discussed her new psych evaluation and shared her trust on the process. She talked about her past work and her satisfaction and rewarding despite her struggle with mood swings. During this session, this clinician used the following therapeutic modalities: Client-centered Therapy, Cognitive Behavioral Therapy, Dialectical Behavior Therapy, Mindfulness-based Strategies and Supportive Psychotherapy    Substance Abuse was addressed during this session. If the client is diagnosed with a co-occurring substance use disorder, please indicate any changes in the frequency or amount of use: daily smoking . Stage of change for addressing substance use diagnoses: Maintenance    ASSESSMENT:  Jie Sanchez presents with a Euthymic/ normal and Anxious mood. her affect is Normal range and intensity, which is congruent, with her mood and the content of the session. The client has made progress on their goals. Francisco Javierjeovanny Mohamud seemed distracted despite her activity to do something with her hands to focus on throughout the session.  Jie Sanchez presents with a low risk of suicide, low risk of self-harm, and none risk of harm to others. For any risk assessment that surpasses a "low" rating, a safety plan must be developed. A safety plan was indicated: no  If yes, describe in detail NA    PLAN: Between sessions, Frieda Thomas will continue writing about her . At the next session, the therapist will use Client-centered Therapy, Cognitive Behavioral Therapy, Dialectical Behavior Therapy, Mindfulness-based Strategies and Supportive Psychotherapy to address self-care and adequate self-talk to substitute negative thoughts and tendencies to spiral down. Behavioral Health Treatment Plan and Discharge Planning: Frieda Thomas is aware of and agrees to continue to work on their treatment plan. They have identified and are working toward their discharge goals.  yes    Visit start and stop times:    08/09/23  Start Time: 1259  Stop Time: 1344  Total Visit Time: 45 minutes

## 2023-08-10 ENCOUNTER — OFFICE VISIT (OUTPATIENT)
Dept: FAMILY MEDICINE CLINIC | Facility: CLINIC | Age: 58
End: 2023-08-10
Payer: COMMERCIAL

## 2023-08-10 VITALS
OXYGEN SATURATION: 97 % | SYSTOLIC BLOOD PRESSURE: 118 MMHG | WEIGHT: 112 LBS | HEIGHT: 60 IN | DIASTOLIC BLOOD PRESSURE: 74 MMHG | BODY MASS INDEX: 21.99 KG/M2 | TEMPERATURE: 98.5 F | HEART RATE: 86 BPM

## 2023-08-10 DIAGNOSIS — Z12.11 SCREEN FOR COLON CANCER: ICD-10-CM

## 2023-08-10 DIAGNOSIS — E78.49 OTHER HYPERLIPIDEMIA: ICD-10-CM

## 2023-08-10 DIAGNOSIS — Z00.00 ANNUAL PHYSICAL EXAM: Primary | ICD-10-CM

## 2023-08-10 PROCEDURE — 99396 PREV VISIT EST AGE 40-64: CPT | Performed by: NURSE PRACTITIONER

## 2023-08-10 NOTE — PROGRESS NOTES
ADULT ANNUAL [id number]    NAME: Adrien Trevino  AGE: 56 y.o. SEX: female  : 1965     DATE: 8/10/2023     Assessment and Plan:     Problem List Items Addressed This Visit        Other    Other hyperlipidemia    Relevant Orders    Lipid panel   Other Visit Diagnoses     Annual physical exam    -  Primary    Screen for colon cancer        Relevant Orders    Ambulatory Referral to Gastroenterology          Immunizations and preventive care screenings were discussed with patient today. Appropriate education was printed on patient's after visit summary. Counseling:  Alcohol/drug use: discussed moderation in alcohol intake, the recommendations for healthy alcohol use, and avoidance of illicit drug use. Sexual health: discussed sexually transmitted diseases, partner selection, use of condoms, avoidance of unintended pregnancy, and contraceptive alternatives. · Exercise: the importance of regular exercise/physical activity was discussed. Recommend exercise 3-5 times per week for at least 30 minutes. Depression Screening and Follow-up Plan: Patient's depression screening was positive with a PHQ-2 score of 6. Their PHQ-9 score was 20. Patient assessed for underlying major depression. Brief counseling provided and recommend additional follow-up/re-evaluation next office visit. Return in 1 year (on 8/10/2024). Chief Complaint:     Chief Complaint   Patient presents with   • Annual Exam      History of Present Illness:     Adult Annual Physical   Patient here for a comprehensive physical exam. The patient reports no problems. Diet and Physical Activity  · Diet/Nutrition: well balanced diet, frequent junk food and limited fruits/vegetables. · Exercise: walking, moderate cardiovascular exercise, 5-7 times a week on average and 30-60 minutes on average.       Depression Screening  PHQ-2/9 Depression Screening    Little interest or pleasure in doing things: 3 - nearly every day  Feeling down, depressed, or hopeless: 3 - nearly every day  Trouble falling or staying asleep, or sleeping too much: 2 - more than half the days  Feeling tired or having little energy: 3 - nearly every day  Poor appetite or overeating: 3 - nearly every day  Feeling bad about yourself - or that you are a failure or have let yourself or your family down: 3 - nearly every day  Trouble concentrating on things, such as reading the newspaper or watching television: 3 - nearly every day  Moving or speaking so slowly that other people could have noticed. Or the opposite - being so fidgety or restless that you have been moving around a lot more than usual: 0 - not at all  Thoughts that you would be better off dead, or of hurting yourself in some way: 0 - not at all  PHQ-2 Score: 6  PHQ-2 Interpretation: POSITIVE depression screen  PHQ-9 Score: 20   PHQ-9 Interpretation: Severe depression        General Health  · Sleep: sleeps poorly and gets 4-6 hours of sleep on average. · Vision: goes for regular eye exams, most recent eye exam <1 year ago and wears glasses and contacts. · Dental: no dental visits for >1 year and  has full dentures. /GYN Health  · Patient is: postmenopausal       Review of Systems:     Review of Systems   Constitutional: Negative. Negative for activity change, appetite change, chills, fatigue and fever. HENT: Positive for sore throat. Negative for congestion, ear pain, nosebleeds and rhinorrhea. Eyes: Negative for photophobia, pain, redness and visual disturbance. Respiratory: Negative for cough, shortness of breath and wheezing. Cardiovascular: Negative. Negative for chest pain. Gastrointestinal: Negative. Negative for abdominal pain, constipation, diarrhea and vomiting. Endocrine: Negative. Genitourinary: Negative for difficulty urinating, dysuria and flank pain. Musculoskeletal: Positive for myalgias.    Skin: Negative for color change and rash. Neurological: Negative. Negative for dizziness, weakness, numbness and headaches. Hematological: Negative for adenopathy. Psychiatric/Behavioral: Positive for sleep disturbance. Negative for agitation and confusion. The patient is nervous/anxious. Past Medical History:     Past Medical History:   Diagnosis Date   • Bilateral carpal tunnel syndrome 08/20/2019   • Carpal tunnel syndrome of right wrist 10/30/2019    Added automatically from request for surgery [id number]   • Chlamydia    • COPD (chronic obstructive pulmonary disease) (HCC)    • Depression    • Hypertension    • Varicella       Past Surgical History:     Past Surgical History:   Procedure Laterality Date   • BREAST BIOPSY Left 20yrs ago benign   • EGD     • OOPHORECTOMY Right    • [place] NDSC WRST SURG W/RLS TRANSVRS CARPL LIGM Right 01/21/2020    Procedure: RELEASE CARPAL TUNNEL ENDOSCOPIC;  Surgeon: Alicia Voss MD;  [facility];  Service: Orthopedics   • [place] [address] WRST SURG W/RLS TRANSVRS CARPL LIGM Left 01/28/2020    Procedure: RELEASE CARPAL TUNNEL ENDOSCOPIC;  Surgeon: Alicia Voss MD;  [facility];  Service: Orthopedics      Social History:     Social History     Socioeconomic History   • Marital status:      Spouse name: None   • Number of children: 2   • Years of education: None   • Highest education level: None   Occupational History   • 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 She quit on her own without any h/o withdrawal or rehab. • Drug use: Yes     Types: Marijuana     Comment: Smokes THC several times a day. Also uses CBD OIL at times. Used cocaine and methamphetamine  fairly steadily for a period of approx 1 year approx 36 years ago. Tried LSD, Mushrooms. No h/o rehab   • Sexual activity: Not Currently     Partners: Male   Other Topics Concern   • None   Social History Narrative    Home: lives with 2 best friends. Education:    Pt states she always got Ds and Fs in school and skipped classes often, but there were no formally diagnosed learning disabilities. Pt was in alternative schooling. She learned "Nothing" there and they didn't teach much. Pt was then placed back in regular classes but could not keep up. Pt reached childhood milestones on time as far as she knows.      Highest grade completed 11th grade    Had some training through [employer] but no actual certificate     Social Determinants of Health     Financial Resource Strain: Not on file   Food Insecurity: Not on file   Transportation Needs: Not on file   Physical Activity: Not on file   Stress: Not on file   Social Connections: Not on file   Intimate Partner Violence: Not on file   Housing Stability: Not on file      Family History:     Family History   Problem Relation Age of Onset   • Alcohol abuse Mother    • Eating disorder Mother         anorexia and bulimia per Pt   • Hodgkin's lymphoma Father    • Cancer Father         3rd time lymphoma, prostate, lung   • No Known Problems Sister    • No Known Problems Daughter    • Colon cancer Maternal Grandmother    • Alcohol abuse Maternal Grandfather    • Colon cancer Paternal Grandmother    • Breast cancer Maternal Aunt    • No Known Problems Maternal Aunt    • No Known Problems Maternal Aunt    • Anxiety disorder Paternal Aunt    • No Known Problems Paternal Aunt    • Multiple sclerosis Cousin    • Colon cancer Maternal Uncle       Current Medications:     Current Outpatient Medications   Medication Sig Dispense Refill   • albuterol (PROVENTIL HFA,VENTOLIN HFA) 90 mcg/act inhaler inhale 2 puffs by mouth and INTO THE LUNGS every 6 hours IF NEEDED FOR WHEEZING. 8.5 g 2   • buPROPion (Wellbutrin XL) 150 mg 24 hr tablet Take 1 tablet (150 mg total) by mouth daily 30 tablet 1   • cholecalciferol (VITAMIN D3) 400 units tablet Take 400 Units by mouth daily     • FLUoxetine (PROzac) 20 mg capsule Take 2 capsules (40 mg total) by mouth daily 60 capsule 1   • fluticasone (FLONASE) 50 mcg/act nasal spray instill 1 spray into each nostril once daily 16 g 2   • lamoTRIgine (LaMICtal) 200 MG tablet Take 1 tablet (200 mg total) by mouth daily 30 tablet 1   • levocetirizine (XYZAL) 5 MG tablet take 1 tablet by mouth every evening 30 tablet 4   • omeprazole (PriLOSEC) 20 mg delayed release capsule take 1 capsule by mouth twice a day 60 capsule 5   • senna-docusate sodium (SENOKOT-S) 8.6-50 mg per tablet Take 3 tablets by mouth as needed       • tiotropium-olodaterol (Stiolto Respimat) 2.5-2.5 MCG/ACT inhaler Inhale 2 puffs daily 4 g 11     No current facility-administered medications for this visit. Allergies:     No Known Allergies   Physical Exam:     /74   Pulse 86   Temp 98.5 °F (36.9 °C) (Tympanic)   Ht 5' (1.524 m)   Wt 50.8 kg (112 lb)   LMP  (LMP Unknown)   SpO2 97%   BMI 21.87 kg/m²     Physical Exam  Vitals and nursing note reviewed. Constitutional:       General: She is not in acute distress. Appearance: She is well-developed. She is not ill-appearing or diaphoretic. HENT:      Head: Normocephalic and atraumatic. Right Ear: Hearing, tympanic membrane and ear canal normal.      Left Ear: Hearing, tympanic membrane and ear canal normal.      Nose: Nose normal.      Mouth/Throat:      Pharynx: Uvula midline. Eyes:      General: Lids are normal.      Conjunctiva/sclera: Conjunctivae normal.   Cardiovascular:      Rate and Rhythm: Normal rate and regular rhythm.       Heart sounds: Normal heart sounds, S1 normal and S2 normal. No murmur heard. Pulmonary:      Effort: Pulmonary effort is normal. No respiratory distress. Breath sounds: Normal breath sounds. Musculoskeletal:         General: No tenderness. Normal range of motion. Lymphadenopathy:      Cervical: No cervical adenopathy. Skin:     General: Skin is warm. Capillary Refill: Capillary refill takes less than 2 seconds. Findings: No rash. Neurological:      Mental Status: She is alert. Psychiatric:         Behavior: Behavior normal. Behavior is cooperative. Thought Content:  Thought content normal.         Judgment: Judgment normal.          Forde Frankel, CRNP  [facility]

## 2023-08-11 ENCOUNTER — TELEMEDICINE (OUTPATIENT)
Dept: BEHAVIORAL/MENTAL HEALTH CLINIC | Facility: CLINIC | Age: 58
End: 2023-08-11
Payer: COMMERCIAL

## 2023-08-11 DIAGNOSIS — F31.81 MODERATE MIXED BIPOLAR II DISORDER (HCC): Primary | ICD-10-CM

## 2023-08-11 DIAGNOSIS — F43.10 PTSD (POST-TRAUMATIC STRESS DISORDER): ICD-10-CM

## 2023-08-11 DIAGNOSIS — F12.90 MARIJUANA USE: Chronic | ICD-10-CM

## 2023-08-11 DIAGNOSIS — F17.210 CIGARETTE NICOTINE DEPENDENCE WITHOUT COMPLICATION: ICD-10-CM

## 2023-08-11 DIAGNOSIS — F10.11 ALCOHOL ABUSE, IN REMISSION: ICD-10-CM

## 2023-08-11 DIAGNOSIS — F41.1 GENERALIZED ANXIETY DISORDER: ICD-10-CM

## 2023-08-11 PROCEDURE — 90853 GROUP PSYCHOTHERAPY: CPT | Performed by: COUNSELOR

## 2023-08-11 NOTE — PSYCH
Virtual Regular Visit    Verification of patient location:    Patient is located at Home in the following state in which I hold an active license [place]      Assessment/Plan:    Problem List Items Addressed This Visit        Other    Marijuana use (Chronic)    Moderate mixed bipolar II disorder (HCC) - Primary    Generalized anxiety disorder    PTSD (post-traumatic stress disorder)    Alcohol abuse, in remission    Cigarette nicotine dependence without complication       Goals addressed in session: Goal 1 and Goal 2          Reason for visit is No chief complaint on file. Encounter provider [facility] [place]    Provider located at [address] [place] [id number] [phone]      Recent Visits  Date Type Provider Dept   08/10/23 Office Visit Merlin Lown, [facility]   08/09/23 Zoey, [address]   08/04/23 Zoey, [address] [facility] Therapist [facility]   Showing recent visits within past 7 days and meeting all other requirements  Future Appointments  No visits were found meeting these conditions. Showing future appointments within next 150 days and meeting all other requirements       The patient was identified by name and date of birth. Rakel Mak was informed that this is a telemedicine visit and that the visit is being conducted [employer]. She agrees to proceed. .  My office door was closed. The patient was notified the following individuals were present in the room other virtual group members. She acknowledged consent and understanding of privacy and security of the video platform. The patient has agreed to participate and understands they can discontinue the visit at any time. Patient is aware this is a billable service. Subjective  Rakel Mka is a 56 y. o. female  . Data: Erik Toussaint attended the Relaxation group and was guided through EFT tapping on chronic pain, diaphragmatic breathing, Vagus nerve stimulation, and progressive muscle relaxation. Furthermore, attendees were guided through mindfulness exercises for dropping suitcases of worries and regrets, advanced body scan, bringing mind back from thoughts, and awareness for five senses, and finally with affirmations for self-love and self-compassion, confidence, and forgiveness. Psychoeducation, EFT, Mindfulness-based, and group approach were used to support the cohesion and maintain the serene environment for the exercise’s purpose. Assessment: Erik Toussaint followed through guidance and completed the exercises, showed willingness to continue learning and practicing self-care in weekly Relaxation Group as well as individual therapy. Plan: Erik Toussaint will practice deep breathing and other techniques based on each occasion to counter stress and will attend the next group to be able to take part in further guided exercises on chosen topics to build on the techniques learned in the beginning.     HPI     Past Medical History:   Diagnosis Date   • Bilateral carpal tunnel syndrome 08/20/2019   • Carpal tunnel syndrome of right wrist 10/30/2019    Added automatically from request for surgery [id number]   • Chlamydia    • COPD (chronic obstructive pulmonary disease) ([address])    • Depression    • Hypertension    • Varicella        Past Surgical History:   Procedure Laterality Date   • BREAST BIOPSY Left 20yrs ago benign   • EGD     • OOPHORECTOMY Right    • [place] NDSC WRST SURG W/RLS TRANSVRS CARPL LIGM Right 01/21/2020    Procedure: RELEASE CARPAL TUNNEL ENDOSCOPIC;  Surgeon: Ashutosh Castillo MD;  [facility];  Service: Orthopedics   • [place] [address] [address] WRST SURG W/RLS TRANSVRS CARPL LIGM Left 01/28/2020    Procedure: RELEASE CARPAL TUNNEL ENDOSCOPIC;  Surgeon: Ashutosh Castillo MD;  [facility];  Service: Orthopedics Current Outpatient Medications   Medication Sig Dispense Refill   • albuterol (PROVENTIL HFA,VENTOLIN HFA) 90 mcg/act inhaler inhale 2 puffs by mouth and INTO THE LUNGS every 6 hours IF NEEDED FOR WHEEZING. 8.5 g 2   • buPROPion (Wellbutrin XL) 150 mg 24 hr tablet Take 1 tablet (150 mg total) by mouth daily 30 tablet 1   • cholecalciferol (VITAMIN D3) 400 units tablet Take 400 Units by mouth daily     • FLUoxetine (PROzac) 20 mg capsule Take 2 capsules (40 mg total) by mouth daily 60 capsule 1   • fluticasone (FLONASE) 50 mcg/act nasal spray instill 1 spray into each nostril once daily 16 g 2   • lamoTRIgine (LaMICtal) 200 MG tablet Take 1 tablet (200 mg total) by mouth daily 30 tablet 1   • levocetirizine (XYZAL) 5 MG tablet take 1 tablet by mouth every evening 30 tablet 4   • omeprazole (PriLOSEC) 20 mg delayed release capsule take 1 capsule by mouth twice a day 60 capsule 5   • senna-docusate sodium (SENOKOT-S) 8.6-50 mg per tablet Take 3 tablets by mouth as needed       • tiotropium-olodaterol (Stiolto Respimat) 2.5-2.5 MCG/ACT inhaler Inhale 2 puffs daily 4 g 11     No current facility-administered medications for this visit. No Known Allergies    Review of Systems    Video Exam    There were no vitals filed for this visit.     Physical Exam       08/11/23  Start Time: 0400  Stop Time: 0500  Total Visit Time: 60 minutes

## 2023-08-15 ENCOUNTER — TELEMEDICINE (OUTPATIENT)
Dept: BEHAVIORAL/MENTAL HEALTH CLINIC | Facility: CLINIC | Age: 58
End: 2023-08-15
Payer: COMMERCIAL

## 2023-08-15 DIAGNOSIS — F31.81 MODERATE MIXED BIPOLAR II DISORDER (HCC): Primary | ICD-10-CM

## 2023-08-15 DIAGNOSIS — F43.10 PTSD (POST-TRAUMATIC STRESS DISORDER): ICD-10-CM

## 2023-08-15 DIAGNOSIS — F12.90 MARIJUANA USE: Chronic | ICD-10-CM

## 2023-08-15 DIAGNOSIS — F41.1 GENERALIZED ANXIETY DISORDER: ICD-10-CM

## 2023-08-15 DIAGNOSIS — F10.11 ALCOHOL ABUSE, IN REMISSION: ICD-10-CM

## 2023-08-15 DIAGNOSIS — F17.210 CIGARETTE NICOTINE DEPENDENCE WITHOUT COMPLICATION: ICD-10-CM

## 2023-08-15 PROCEDURE — 90834 PSYTX W PT 45 MINUTES: CPT | Performed by: COUNSELOR

## 2023-08-15 NOTE — PSYCH
Virtual Regular Visit    Verification of patient location:    Patient is located at Home in the following state in which I hold an active license [place]      Assessment/Plan:    Problem List Items Addressed This Visit        Other    Marijuana use (Chronic)    Moderate mixed bipolar II disorder (HCC) - Primary    Generalized anxiety disorder    PTSD (post-traumatic stress disorder)    Alcohol abuse, in remission    Cigarette nicotine dependence without complication       Goals addressed in session: Goal 1 and Goal 2          Reason for visit is No chief complaint on file. Encounter provider [facility] [place]    Provider located at [address] [place], [place] [id number] [phone]      Recent Visits  Date Type Provider Dept   08/11/23 [facility], [address]   08/10/23 Office Visit Luis Chang, [facility]   08/09/23 [facility]ist [facility] [place]   Showing recent visits within past 7 days and meeting all other requirements  Today's Visits  Date Type Provider Dept   08/15/23 [facility], [address]   Showing today's visits and meeting all other requirements  Future Appointments  No visits were found meeting these conditions. Showing future appointments within next 150 days and meeting all other requirements       The patient was identified by name and date of birth. Adrien Trevino was informed that this is a telemedicine visit and that the visit is being conducted throughthe [employer] platform. She agrees to proceed. .  My office door was closed. No one else was in the room. She acknowledged consent and understanding of privacy and security of the video platform.  The patient has agreed to participate and understands they can discontinue the visit at any time. Patient is aware this is a billable service. Subjective  Jacinda Muhammad is a 56 y.o. female  . HPI     Past Medical History:   Diagnosis Date   • Bilateral carpal tunnel syndrome 08/20/2019   • Carpal tunnel syndrome of right wrist 10/30/2019    Added automatically from request for surgery [id number]   • Chlamydia    • COPD (chronic obstructive pulmonary disease) ([address])    • Depression    • Hypertension    • Varicella        Past Surgical History:   Procedure Laterality Date   • BREAST BIOPSY Left 20yrs ago benign   • EGD     • OOPHORECTOMY Right    • [place] NDSC WRST SURG W/RLS TRANSVRS CARPL LIGM Right 01/21/2020    Procedure: RELEASE CARPAL TUNNEL ENDOSCOPIC;  Surgeon: Jina Hair MD;  [facility];  Service: Orthopedics   • [place] [address] [address] WRST SURG W/RLS TRANSVRS CARPL LIGM Left 01/28/2020    Procedure: RELEASE CARPAL TUNNEL ENDOSCOPIC;  Surgeon: Jina Hair MD;  [facility];  Service: Orthopedics       Current Outpatient Medications   Medication Sig Dispense Refill   • albuterol (PROVENTIL HFA,VENTOLIN HFA) 90 mcg/act inhaler inhale 2 puffs by mouth and INTO THE LUNGS every 6 hours IF NEEDED FOR WHEEZING.  8.5 g 2   • buPROPion (Wellbutrin XL) 150 mg 24 hr tablet Take 1 tablet (150 mg total) by mouth daily 30 tablet 1   • cholecalciferol (VITAMIN D3) 400 units tablet Take 400 Units by mouth daily     • FLUoxetine (PROzac) 20 mg capsule Take 2 capsules (40 mg total) by mouth daily 60 capsule 1   • fluticasone (FLONASE) 50 mcg/act nasal spray instill 1 spray into each nostril once daily 16 g 2   • lamoTRIgine (LaMICtal) 200 MG tablet Take 1 tablet (200 mg total) by mouth daily 30 tablet 1   • levocetirizine (XYZAL) 5 MG tablet take 1 tablet by mouth every evening 30 tablet 4   • omeprazole (PriLOSEC) 20 mg delayed release capsule take 1 capsule by mouth twice a day 60 capsule 5   • senna-docusate sodium (SENOKOT-S) 8.6-50 mg per tablet Take 3 tablets by mouth as needed • tiotropium-olodaterol (Stiolto Respimat) 2.5-2.5 MCG/ACT inhaler Inhale 2 puffs daily 4 g 11     No current facility-administered medications for this visit. No Known Allergies    Review of Systems    Video Exam    There were no vitals filed for this visit. Physical Exam     Behavioral Health Psychotherapy Progress Note    Psychotherapy Provided: Individual Psychotherapy     1. Moderate mixed bipolar II disorder (HCC)        2. PTSD (post-traumatic stress disorder)        3. Generalized anxiety disorder        4. Cigarette nicotine dependence without complication        5. Alcohol abuse, in remission        6. Marijuana use            Goals addressed in session: Goal 1 and Goal 2     DATA: Tigist Grimm discussed her last week's experience with forgetfulness and making notes to be able to refer to, some other cognitive exercises, and attendance of individual and group sessions. During this session, this clinician used the following therapeutic modalities: Client-centered Therapy, Cognitive Behavioral Therapy, Dialectical Behavior Therapy, Mindfulness-based Strategies and Supportive Psychotherapy    Substance Abuse was addressed during this session. If the client is diagnosed with a co-occurring substance use disorder, please indicate any changes in the frequency or amount of use: . Stage of change for addressing substance use diagnoses: No substance use/Not applicable    ASSESSMENT:  Mariely Marin presents with a Euthymic/ normal, Anxious and Depressed mood. her affect is Normal range and intensity, which is congruent, with her mood and the content of the session. The client has made progress on their goals. Tigist Grimm seemed very disappointed by the political picture in her country and expressed worries about the future of her children  Mariely Marin presents with a minimal risk of suicide, minimal risk of self-harm, and none risk of harm to others.     For any risk assessment that surpasses a "low" rating, a safety plan must be developed. A safety plan was indicated: no  If yes, describe in detail NA    PLAN: Between sessions, Carmen Main will use her learned skills and participate in weekly individual and group therapy sessions to be able to expand on self-care. At the next session, the therapist will use Client-centered Therapy, Cognitive Behavioral Therapy, Dialectical Behavior Therapy, Mindfulness-based Strategies and Supportive Psychotherapy to address adequate self-care and cognitive exercises to counter memory issues and to refer to her past experience about- notes. Behavioral Health Treatment Plan and Discharge Planning: Carmen Main is aware of and agrees to continue to work on their treatment plan. They have identified and are working toward their discharge goals.  yes    Visit start and stop times:    08/15/23  Start Time: 0105  Stop Time: 9208  Total Visit Time: 45 minutes

## 2023-08-18 ENCOUNTER — TELEMEDICINE (OUTPATIENT)
Dept: BEHAVIORAL/MENTAL HEALTH CLINIC | Facility: CLINIC | Age: 58
End: 2023-08-18
Payer: COMMERCIAL

## 2023-08-18 DIAGNOSIS — F41.1 GENERALIZED ANXIETY DISORDER: ICD-10-CM

## 2023-08-18 DIAGNOSIS — F31.81 MODERATE MIXED BIPOLAR II DISORDER (HCC): Primary | ICD-10-CM

## 2023-08-18 DIAGNOSIS — F12.90 MARIJUANA USE: Chronic | ICD-10-CM

## 2023-08-18 DIAGNOSIS — F17.210 CIGARETTE NICOTINE DEPENDENCE WITHOUT COMPLICATION: ICD-10-CM

## 2023-08-18 DIAGNOSIS — F10.11 ALCOHOL ABUSE, IN REMISSION: ICD-10-CM

## 2023-08-18 DIAGNOSIS — F43.10 PTSD (POST-TRAUMATIC STRESS DISORDER): ICD-10-CM

## 2023-08-18 PROCEDURE — 90853 GROUP PSYCHOTHERAPY: CPT | Performed by: COUNSELOR

## 2023-08-18 NOTE — PSYCH
Virtual Regular Visit    Verification of patient location:    Patient is located at Home in the following state in which I hold an active license [place]      Assessment/Plan:    Problem List Items Addressed This Visit        Other    Marijuana use (Chronic)    Moderate mixed bipolar II disorder (HCC) - Primary    Generalized anxiety disorder    PTSD (post-traumatic stress disorder)    Alcohol abuse, in remission    Cigarette nicotine dependence without complication       Goals addressed in session: Goal 1 and Goal 2          Reason for visit is No chief complaint on file. Encounter provider Christine [facility]ina [facility]    Provider located at [address] [place] [id number] [phone]      Recent Visits  Date Type Provider Dept   08/15/23 [place], [address]   08/11/23 Zoey, [address] [facility] Therapist LADARIUS   Showing recent visits within past 7 days and meeting all other requirements  Future Appointments  No visits were found meeting these conditions. Showing future appointments within next 150 days and meeting all other requirements       The patient was identified by name and date of birth. Magui Manrique was informed that this is a telemedicine visit and that the visit is being conducted [employer]. She agrees to proceed. .  My office door was closed. The patient was notified the following individuals were present in the room other group members. She acknowledged consent and understanding of privacy and security of the video platform. The patient has agreed to participate and understands they can discontinue the visit at any time. Patient is aware this is a billable service. Subjective  Magui Manrique is a 56 y.o. female  .     Data: Liz Lopez attended the Relaxation group and was guided through [facility] tapping on chronic pain, diaphragmatic breathing, Vagus nerve stimulation, and progressive muscle relaxation. Furthermore, attendees were guided through mindfulness exercises for a quick body scan, awareness of comfortable, uncomfortable, or neutral sensations, attending to difficult emotions, being grateful for abilities, and with affirmations for self-love and self-compassion, confidence, and forgiveness. Psychoeducation, EFT, Mindfulness-based, and group approach were used to support the cohesion and maintain the serene environment for the exercise’s purpose. Assessment: Celeste Nielsen followed through guidance and completed the exercises, showed willingness to continue learning and practicing self-care in weekly Relaxation Group as well as individual therapy. Plan:Marie  will practice deep breathing and other techniques based on each occasion to counter stress and will attend the next group to be able to take part in further guided exercises on chosen topics to build on the techniques learned in the beginning.     HPI     Past Medical History:   Diagnosis Date   • Bilateral carpal tunnel syndrome 08/20/2019   • Carpal tunnel syndrome of right wrist 10/30/2019    Added automatically from request for surgery [id number]   • Chlamydia    • COPD (chronic obstructive pulmonary disease) ([address])    • Depression    • Hypertension    • Varicella        Past Surgical History:   Procedure Laterality Date   • BREAST BIOPSY Left 20yrs ago benign   • EGD     • OOPHORECTOMY Right    • [place] NDSC WRST SURG W/RLS TRANSVRS CARPL LIGM Right 01/21/2020    Procedure: RELEASE CARPAL TUNNEL ENDOSCOPIC;  Surgeon: Melva Cantu MD;  [facility];  Service: Orthopedics   • [place] [address] WRST SURG W/RLS TRANSVRS CARPL LIGM Left 01/28/2020    Procedure: RELEASE CARPAL TUNNEL ENDOSCOPIC;  Surgeon: Melva Cantu MD;  [facility];  Service: Orthopedics       Current Outpatient Medications   Medication Sig Dispense Refill   • albuterol (PROVENTIL HFA,VENTOLIN HFA) 90 mcg/act inhaler inhale 2 puffs by mouth and INTO THE LUNGS every 6 hours IF NEEDED FOR WHEEZING. 8.5 g 2   • buPROPion (Wellbutrin XL) 150 mg 24 hr tablet Take 1 tablet (150 mg total) by mouth daily 30 tablet 1   • cholecalciferol (VITAMIN D3) 400 units tablet Take 400 Units by mouth daily     • FLUoxetine (PROzac) 20 mg capsule Take 2 capsules (40 mg total) by mouth daily 60 capsule 1   • fluticasone (FLONASE) 50 mcg/act nasal spray instill 1 spray into each nostril once daily 16 g 2   • lamoTRIgine (LaMICtal) 200 MG tablet Take 1 tablet (200 mg total) by mouth daily 30 tablet 1   • levocetirizine (XYZAL) 5 MG tablet take 1 tablet by mouth every evening 30 tablet 4   • omeprazole (PriLOSEC) 20 mg delayed release capsule take 1 capsule by mouth twice a day 60 capsule 5   • senna-docusate sodium (SENOKOT-S) 8.6-50 mg per tablet Take 3 tablets by mouth as needed       • tiotropium-olodaterol (Stiolto Respimat) 2.5-2.5 MCG/ACT inhaler Inhale 2 puffs daily 4 g 11     No current facility-administered medications for this visit. No Known Allergies    Review of Systems    Video Exam    There were no vitals filed for this visit.     Physical Exam         08/18/23  Start Time: 0400  Stop Time: 0500  Total Visit Time: 60 minutes

## 2023-08-22 ENCOUNTER — TELEMEDICINE (OUTPATIENT)
Dept: BEHAVIORAL/MENTAL HEALTH CLINIC | Facility: CLINIC | Age: 58
End: 2023-08-22
Payer: COMMERCIAL

## 2023-08-22 DIAGNOSIS — F31.81 MODERATE MIXED BIPOLAR II DISORDER (HCC): Primary | ICD-10-CM

## 2023-08-22 DIAGNOSIS — F41.1 GENERALIZED ANXIETY DISORDER: ICD-10-CM

## 2023-08-22 DIAGNOSIS — F10.11 ALCOHOL ABUSE, IN REMISSION: ICD-10-CM

## 2023-08-22 DIAGNOSIS — F17.210 CIGARETTE NICOTINE DEPENDENCE WITHOUT COMPLICATION: ICD-10-CM

## 2023-08-22 DIAGNOSIS — F12.90 MARIJUANA USE: Chronic | ICD-10-CM

## 2023-08-22 DIAGNOSIS — F43.10 PTSD (POST-TRAUMATIC STRESS DISORDER): ICD-10-CM

## 2023-08-22 PROCEDURE — 90834 PSYTX W PT 45 MINUTES: CPT | Performed by: COUNSELOR

## 2023-08-22 NOTE — PSYCH
Virtual Regular Visit    Verification of patient location:    Patient is located at Home in the following state in which I hold an active license [place]      Assessment/Plan:    Problem List Items Addressed This Visit        Other    Marijuana use (Chronic)    Moderate mixed bipolar II disorder (HCC) - Primary    Generalized anxiety disorder    PTSD (post-traumatic stress disorder)    Alcohol abuse, in remission    Cigarette nicotine dependence without complication       Goals addressed in session: Goal 1 and Goal 2          Reason for visit is No chief complaint on file. Encounter provider Krystyna Gonsalez [facility]    Provider located at [address] [place] [id number] [phone]      Recent Visits  Date Type Provider Dept   08/18/23 [facility], [address]   08/15/23 [facility], [address] [place] [address] [facility] Therapist LADARIUS   Showing recent visits within past 7 days and meeting all other requirements  Today's Visits  Date Type Provider Dept   08/22/23 [facility], [address]   Showing today's visits and meeting all other requirements  Future Appointments  No visits were found meeting these conditions. Showing future appointments within next 150 days and meeting all other requirements       The patient was identified by name and date of birth. Dhiraj Quiroz was informed that this is a telemedicine visit and that the visit is being conducted throughthe [employer] platform. She agrees to proceed. .  My office door was closed. No one else was in the room. She acknowledged consent and understanding of privacy and security of the video platform. The patient has agreed to participate and understands they can discontinue the visit at any time. Patient is aware this is a billable service. Delvis Armstrong is a 56 y.o. female. HPI     Past Medical History:   Diagnosis Date   • Bilateral carpal tunnel syndrome 08/20/2019   • Carpal tunnel syndrome of right wrist 10/30/2019    Added automatically from request for surgery [id number]   • Chlamydia    • COPD (chronic obstructive pulmonary disease) ([address])    • Depression    • Hypertension    • Varicella        Past Surgical History:   Procedure Laterality Date   • BREAST BIOPSY Left 20yrs ago benign   • EGD     • OOPHORECTOMY Right    • [place] NDSC WRST SURG W/RLS TRANSVRS CARPL LIGM Right 01/21/2020    Procedure: RELEASE CARPAL TUNNEL ENDOSCOPIC;  Surgeon: Emma Massey MD;  [facility];  Service: Orthopedics   • [place] [address] WRST SURG W/RLS TRANSVRS CARPL LIGM Left 01/28/2020    Procedure: RELEASE CARPAL TUNNEL ENDOSCOPIC;  Surgeon: Emma Massey MD;  [facility];  Service: Orthopedics       Current Outpatient Medications   Medication Sig Dispense Refill   • albuterol (PROVENTIL HFA,VENTOLIN HFA) 90 mcg/act inhaler inhale 2 puffs by mouth and INTO THE LUNGS every 6 hours IF NEEDED FOR WHEEZING.  8.5 g 2   • buPROPion (Wellbutrin XL) 150 mg 24 hr tablet Take 1 tablet (150 mg total) by mouth daily 30 tablet 1   • cholecalciferol (VITAMIN D3) 400 units tablet Take 400 Units by mouth daily     • FLUoxetine (PROzac) 20 mg capsule Take 2 capsules (40 mg total) by mouth daily 60 capsule 1   • fluticasone (FLONASE) 50 mcg/act nasal spray instill 1 spray into each nostril once daily 16 g 2   • lamoTRIgine (LaMICtal) 200 MG tablet Take 1 tablet (200 mg total) by mouth daily 30 tablet 1   • levocetirizine (XYZAL) 5 MG tablet take 1 tablet by mouth every evening 30 tablet 4   • omeprazole (PriLOSEC) 20 mg delayed release capsule take 1 capsule by mouth twice a day 60 capsule 5   • senna-docusate sodium (SENOKOT-S) 8.6-50 mg per tablet Take 3 tablets by mouth as needed       • tiotropium-olodaterol (Stiolto Respimat) 2.5-2.5 MCG/ACT inhaler Inhale 2 puffs daily 4 g 11     No current facility-administered medications for this visit. No Known Allergies    Review of Systems    Video Exam    There were no vitals filed for this visit. Physical Exam     Behavioral Health Psychotherapy Progress Note    Psychotherapy Provided: Individual Psychotherapy     1. Moderate mixed bipolar II disorder (HCC)        2. PTSD (post-traumatic stress disorder)        3. Generalized anxiety disorder        4. Cigarette nicotine dependence without complication        5. Marijuana use        6. Alcohol abuse, in remission            Goals addressed in session: Goal 1 and Goal 2     DATA: Rebeka Mendoza discussed a down she is in right now- she reported that she is in bed for three days and was encouraged to break the cycle that feeds the depression and Act Opposite to the deceiving lack of motivation and exhaustion. Rebeka Mendoza was heard talking about her father, with whom she spent time last week. Rebeka Mendoza wants to go back to [employer] activities and restore her connection to it. During this session, this clinician used the following therapeutic modalities: Client-centered Therapy, Cognitive Behavioral Therapy, Dialectical Behavior Therapy, Mindfulness-based Strategies and Supportive Psychotherapy    Substance Abuse was not addressed during this session. If the client is diagnosed with a co-occurring substance use disorder, please indicate any changes in the frequency or amount of use: NA. Stage of change for addressing substance use diagnoses: Maintenance    ASSESSMENT:  Aye Ramirez presents with a Euthymic/ normal and Depressed mood. her affect is Normal range and intensity and Tearful, at time, which is congruent, with her mood and the content of the session. The client has made progress on their goals. Rebeka Menodza gained insight and built an understanding about her current symptoms and mental state.   Aye Ramirez presents with a low risk of suicide, low risk of self-harm, and none risk of harm to others. For any risk assessment that surpasses a "low" rating, a safety plan must be developed. A safety plan was indicated: no  If yes, describe in detail NA    PLAN: Between sessions, Cindy Maxwell will continue with her cognitive exercises and will put on notes her experience to be able to refer to it and not get frustrated of failing short-term memory. At the next session, the therapist will use Client-centered Therapy, Cognitive Behavioral Therapy, Dialectical Behavior Therapy, Mindfulness-based Strategies and Supportive Psychotherapy to address adequate self--care, negative self-talk, short-term memory, and .    Behavioral Health Treatment Plan and Discharge Planning: Cindy Maxwell is aware of and agrees to continue to work on their treatment plan. They have identified and are working toward their discharge goals.  yes    Visit start and stop times:    08/22/23  Start Time: 2020  Stop Time: 1337  Total Visit Time: 42 minutes

## 2023-08-24 ENCOUNTER — TELEPHONE (OUTPATIENT)
Dept: PSYCHIATRY | Facility: CLINIC | Age: 58
End: 2023-08-24

## 2023-08-24 NOTE — TELEPHONE ENCOUNTER
Psychological Impairment Questionnaire was received from [address] [place] [address] and [address] [facility]. Will be placed in Case Management's mailbox by the end of the day.

## 2023-08-25 ENCOUNTER — TELEMEDICINE (OUTPATIENT)
Dept: BEHAVIORAL/MENTAL HEALTH CLINIC | Facility: CLINIC | Age: 58
End: 2023-08-25
Payer: COMMERCIAL

## 2023-08-25 DIAGNOSIS — F31.81 MODERATE MIXED BIPOLAR II DISORDER (HCC): Primary | ICD-10-CM

## 2023-08-25 DIAGNOSIS — F17.210 CIGARETTE NICOTINE DEPENDENCE WITHOUT COMPLICATION: ICD-10-CM

## 2023-08-25 DIAGNOSIS — F12.90 MARIJUANA USE: Chronic | ICD-10-CM

## 2023-08-25 DIAGNOSIS — F43.10 PTSD (POST-TRAUMATIC STRESS DISORDER): ICD-10-CM

## 2023-08-25 DIAGNOSIS — F41.1 GENERALIZED ANXIETY DISORDER: ICD-10-CM

## 2023-08-25 DIAGNOSIS — F10.11 ALCOHOL ABUSE, IN REMISSION: ICD-10-CM

## 2023-08-25 PROCEDURE — 90853 GROUP PSYCHOTHERAPY: CPT | Performed by: COUNSELOR

## 2023-08-25 NOTE — PSYCH
Virtual Regular Visit    Verification of patient location:    Patient is located at Home in the following state in which I hold an active license [place]      Assessment/Plan:    Problem List Items Addressed This Visit        Other    Marijuana use (Chronic)    Moderate mixed bipolar II disorder (HCC) - Primary    Generalized anxiety disorder    PTSD (post-traumatic stress disorder)    Alcohol abuse, in remission    Cigarette nicotine dependence without complication       Goals addressed in session: Goal 1 and Goal 2          Reason for visit is No chief complaint on file. Encounter provider [place] [facility]    [place] [address] [place] [id number] [phone]      Recent Visits  Date Type Provider Dept   08/24/23 Telephone Adriano Chavez, [address] [facility] [address]   08/22/23 [facility], [address]   [facility] [address] [place] [address] [facility] Therapist LADARIUS   Showing recent visits within past 7 days and meeting all other requirements  Future Appointments  No visits were found meeting these conditions. Showing future appointments within next 150 days and meeting all other requirements       The patient was identified by name and date of birth. Gideon Trejo was informed that this is a telemedicine visit and that the visit is being conducted [employer]. She agrees to proceed. .  My office door was closed. The patient was notified the following individuals were present in the room other virtual group members. She acknowledged consent and understanding of privacy and security of the video platform. The patient has agreed to participate and understands they can discontinue the visit at any time. Patient is aware this is a billable service. Subjective  Gideon Trejo is a 56 y. o. female  . Data: Matt Agee attended the Relaxation group with a delay and was guided through mindfulness exercises for connecting with the inner child, the Child in the [facility] meditation, self-love and compassion imagery, and with affirmations for confidence, courage, strength, and inner resources. Psychoeducation, EFT, Mindfulness-based, and group approach were used to support the cohesion and maintain the serene environment for the exercise’s purpose. Assessment: Matt Agee followed through guidance and completed the exercises, showed willingness to continue learning and practicing self-care in weekly Relaxation Group as well as individual therapy. Plan: Matt Agee will practice deep breathing and other techniques based on each occasion to counter stress and will attend the next group to be able to take part in further guided exercises on chosen topics to build on the techniques learned in the beginning.        HPI     Past Medical History:   Diagnosis Date   • Bilateral carpal tunnel syndrome 08/20/2019   • Carpal tunnel syndrome of right wrist 10/30/2019    Added automatically from request for surgery [id number]   • Chlamydia    • COPD (chronic obstructive pulmonary disease) ([address])    • Depression    • Hypertension    • Varicella        Past Surgical History:   Procedure Laterality Date   • BREAST BIOPSY Left 20yrs ago benign   • EGD     • OOPHORECTOMY Right    • [place] NDSC WRST SURG W/RLS TRANSVRS CARPL LIGM Right 01/21/2020    Procedure: RELEASE CARPAL TUNNEL ENDOSCOPIC;  Surgeon: Tana Suazo MD;  [facility];  Service: Orthopedics   • [place] [address] WRST SURG W/RLS TRANSVRS CARPL LIGM Left 01/28/2020    Procedure: RELEASE CARPAL TUNNEL ENDOSCOPIC;  Surgeon: Tana Suazo MD;  [facility];  Service: Orthopedics       Current Outpatient Medications   Medication Sig Dispense Refill   • albuterol (PROVENTIL HFA,VENTOLIN HFA) 90 mcg/act inhaler inhale 2 puffs by mouth and INTO THE LUNGS every 6 hours IF NEEDED FOR WHEEZING. 8.5 g 2   • buPROPion (Wellbutrin XL) 150 mg 24 hr tablet Take 1 tablet (150 mg total) by mouth daily 30 tablet 1   • cholecalciferol (VITAMIN D3) 400 units tablet Take 400 Units by mouth daily     • FLUoxetine (PROzac) 20 mg capsule Take 2 capsules (40 mg total) by mouth daily 60 capsule 1   • fluticasone (FLONASE) 50 mcg/act nasal spray instill 1 spray into each nostril once daily 16 g 2   • lamoTRIgine (LaMICtal) 200 MG tablet Take 1 tablet (200 mg total) by mouth daily 30 tablet 1   • levocetirizine (XYZAL) 5 MG tablet take 1 tablet by mouth every evening 30 tablet 4   • omeprazole (PriLOSEC) 20 mg delayed release capsule take 1 capsule by mouth twice a day 60 capsule 5   • senna-docusate sodium (SENOKOT-S) 8.6-50 mg per tablet Take 3 tablets by mouth as needed       • tiotropium-olodaterol (Stiolto Respimat) 2.5-2.5 MCG/ACT inhaler Inhale 2 puffs daily 4 g 11     No current facility-administered medications for this visit. No Known Allergies    Review of Systems    Video Exam    There were no vitals filed for this visit.     Physical Exam     08/25/23  Start Time: 1883  Stop Time: 0500  Total Visit Time: 45 minutes

## 2023-08-31 ENCOUNTER — TELEMEDICINE (OUTPATIENT)
Dept: BEHAVIORAL/MENTAL HEALTH CLINIC | Facility: CLINIC | Age: 58
End: 2023-08-31
Payer: COMMERCIAL

## 2023-08-31 DIAGNOSIS — F41.1 GENERALIZED ANXIETY DISORDER: ICD-10-CM

## 2023-08-31 DIAGNOSIS — F43.10 PTSD (POST-TRAUMATIC STRESS DISORDER): ICD-10-CM

## 2023-08-31 DIAGNOSIS — F10.11 ALCOHOL ABUSE, IN REMISSION: ICD-10-CM

## 2023-08-31 DIAGNOSIS — F17.210 CIGARETTE NICOTINE DEPENDENCE WITHOUT COMPLICATION: ICD-10-CM

## 2023-08-31 DIAGNOSIS — F31.81 MODERATE MIXED BIPOLAR II DISORDER (HCC): Primary | ICD-10-CM

## 2023-08-31 DIAGNOSIS — F12.90 MARIJUANA USE: Chronic | ICD-10-CM

## 2023-08-31 PROCEDURE — 90834 PSYTX W PT 45 MINUTES: CPT | Performed by: COUNSELOR

## 2023-08-31 NOTE — PSYCH
Virtual Regular Visit    Verification of patient location:    Patient is located at Home in the following state in which I hold an active license [place]      Assessment/Plan:    Problem List Items Addressed This Visit        Other    Marijuana use (Chronic)    Moderate mixed bipolar II disorder (HCC) - Primary    Generalized anxiety disorder    PTSD (post-traumatic stress disorder)    Alcohol abuse, in remission    Cigarette nicotine dependence without complication       Goals addressed in session: Goal 1 and Goal 2          Reason for visit is No chief complaint on file. Encounter provider Kassie Wren [facility]    Provider located at [address] [place] [id number] [phone]      Recent Visits  Date Type Provider Dept   08/25/23 [employer], [address]   08/24/23 Telephone Kassie Wren, [address] [place] [address] recent visits within past 7 days and meeting all other requirements  Today's Visits  Date Type Provider Dept   [facility] [address] [facility] [address]   Showing today's visits and meeting all other requirements  Future Appointments  No visits were found meeting these conditions. Showing future appointments within next 150 days and meeting all other requirements       The patient was identified by name and date of birth. Tolu Gutierrez was informed that this is a telemedicine visit and that the visit is being conducted throughthe [employer] platform. She agrees to proceed. .  My office door was closed. No one else was in the room. She acknowledged consent and understanding of privacy and security of the video platform. The patient has agreed to participate and understands they can discontinue the visit at any time. Patient is aware this is a billable service.      Subjective  Reji Mole Felix Treadwell is a 56 y.o. female  . HPI     Past Medical History:   Diagnosis Date   • Bilateral carpal tunnel syndrome 08/20/2019   • Carpal tunnel syndrome of right wrist 10/30/2019    Added automatically from request for surgery [id number]   • Chlamydia    • COPD (chronic obstructive pulmonary disease) ([address])    • Depression    • Hypertension    • Varicella        Past Surgical History:   Procedure Laterality Date   • BREAST BIOPSY Left 20yrs ago benign   • EGD     • OOPHORECTOMY Right    • [place] NDSC WRST SURG W/RLS TRANSVRS CARPL LIGM Right 01/21/2020    Procedure: RELEASE CARPAL TUNNEL ENDOSCOPIC;  Surgeon: Sanjuanita Moreno MD;  Location[facility];  Service: Orthopedics   • [place] [address] WRST SURG W/RLS TRANSVRS CARPL LIGM Left 01/28/2020    Procedure: RELEASE CARPAL TUNNEL ENDOSCOPIC;  Surgeon: Sanjuanita Moreno MD;  Location[facility];  Service: Orthopedics       Current Outpatient Medications   Medication Sig Dispense Refill   • albuterol (PROVENTIL HFA,VENTOLIN HFA) 90 mcg/act inhaler inhale 2 puffs by mouth and INTO THE LUNGS every 6 hours IF NEEDED FOR WHEEZING.  8.5 g 2   • buPROPion (Wellbutrin XL) 150 mg 24 hr tablet Take 1 tablet (150 mg total) by mouth daily 30 tablet 1   • cholecalciferol (VITAMIN D3) 400 units tablet Take 400 Units by mouth daily     • FLUoxetine (PROzac) 20 mg capsule Take 2 capsules (40 mg total) by mouth daily 60 capsule 1   • fluticasone (FLONASE) 50 mcg/act nasal spray instill 1 spray into each nostril once daily 16 g 2   • lamoTRIgine (LaMICtal) 200 MG tablet Take 1 tablet (200 mg total) by mouth daily 30 tablet 1   • levocetirizine (XYZAL) 5 MG tablet take 1 tablet by mouth every evening 30 tablet 4   • omeprazole (PriLOSEC) 20 mg delayed release capsule take 1 capsule by mouth twice a day 60 capsule 5   • senna-docusate sodium (SENOKOT-S) 8.6-50 mg per tablet Take 3 tablets by mouth as needed       • tiotropium-olodaterol (Stiolto Respimat) 2.5-2.5 MCG/ACT inhaler Inhale 2 puffs daily 4 g 11     No current facility-administered medications for this visit. No Known Allergies    Review of Systems    Video Exam    There were no vitals filed for this visit. Physical Exam     Behavioral Health Psychotherapy Progress Note    Psychotherapy Provided: Individual Psychotherapy     1. Moderate mixed bipolar II disorder (HCC)        2. PTSD (post-traumatic stress disorder)        3. Generalized anxiety disorder        4. Cigarette nicotine dependence without complication        5. Marijuana use        6. Alcohol abuse, in remission            Goals addressed in session: Goal 1 and Goal 2     DATA: Tana Gutierrez discussed her history of head injury and was given information about TBI and mTBI and resources that she may use to enroll to programs designed for people with TBI exclusively. During this session, this clinician used the following therapeutic modalities: Client-centered Therapy, Cognitive Behavioral Therapy, Dialectical Behavior Therapy, Mindfulness-based Strategies and Supportive Psychotherapy    Substance Abuse was addressed during this session. If the client is diagnosed with a co-occurring substance use disorder, please indicate any changes in the frequency or amount of use: daily minimal smoking. Stage of change for addressing substance use diagnoses: Pre-contemplation    ASSESSMENT:  Yolanda Su presents with a Euthymic/ normal and Depressed mood. her affect is Normal range and intensity, which is congruent, with her mood and the content of the session. The client has made progress on their goals. Tana Gutierrez seemed very interested in the TBI information and showed openness in learning =. Yolanda Su presents with a minimal risk of suicide, minimal risk of self-harm, and none risk of harm to others. For any risk assessment that surpasses a "low" rating, a safety plan must be developed.     A safety plan was indicated: no  If yes, describe in detail NA    PLAN: Between sessions, Tolu Gutierrez will continue challenging her mind and body to start and finish tasks and will take notes about her experience to be able to refer back. At the next session, the therapist will use Client-centered Therapy, Cognitive Behavioral Therapy, Dialectical Behavior Therapy, Mindfulness-based Strategies and Supportive Psychotherapy to address self-care, cognitive decline, cognitive exercisie, and research on concussion. Behavioral Health Treatment Plan and Discharge Planning: Tolu Gutierrez is aware of and agrees to continue to work on their treatment plan. They have identified and are working toward their discharge goals.  yes    Visit start and stop times:    08/31/23  Start Time: 4348  Stop Time: 1338  Total Visit Time: 43 minutes

## 2023-09-01 ENCOUNTER — TELEMEDICINE (OUTPATIENT)
Dept: BEHAVIORAL/MENTAL HEALTH CLINIC | Facility: CLINIC | Age: 58
End: 2023-09-01
Payer: COMMERCIAL

## 2023-09-01 DIAGNOSIS — F17.210 CIGARETTE NICOTINE DEPENDENCE WITHOUT COMPLICATION: ICD-10-CM

## 2023-09-01 DIAGNOSIS — F31.81 MODERATE MIXED BIPOLAR II DISORDER (HCC): Primary | ICD-10-CM

## 2023-09-01 DIAGNOSIS — F12.90 MARIJUANA USE: Chronic | ICD-10-CM

## 2023-09-01 DIAGNOSIS — F43.10 PTSD (POST-TRAUMATIC STRESS DISORDER): ICD-10-CM

## 2023-09-01 DIAGNOSIS — F10.11 ALCOHOL ABUSE, IN REMISSION: ICD-10-CM

## 2023-09-01 DIAGNOSIS — F41.1 GENERALIZED ANXIETY DISORDER: ICD-10-CM

## 2023-09-01 PROCEDURE — 90853 GROUP PSYCHOTHERAPY: CPT | Performed by: COUNSELOR

## 2023-09-01 NOTE — PSYCH
Virtual Regular Visit    Verification of patient location:    Patient is located at Home in the following state in which I hold an active license [place]      Assessment/Plan:    Problem List Items Addressed This Visit        Other    Marijuana use (Chronic)    Moderate mixed bipolar II disorder (HCC) - Primary    Generalized anxiety disorder    PTSD (post-traumatic stress disorder)    Alcohol abuse, in remission    Cigarette nicotine dependence without complication       Goals addressed in session: Goal 1 and Goal 2          Reason for visit is No chief complaint on file. Encounter provider Michael Wilkinson [facility]    Provider located at [address] [place] [id number] [phone]      Recent Visits  Date Type Provider Dept   08/31/23 [facility], [address]   08/25/23 [place], [address] [facility] Therapist LADARIUS   Showing recent visits within past 7 days and meeting all other requirements  Future Appointments  No visits were found meeting these conditions. Showing future appointments within next 150 days and meeting all other requirements       The patient was identified by name and date of birth. Livia Denver was informed that this is a telemedicine visit and that the visit is being conducted [employer]. She agrees to proceed. .  My office door was closed. The patient was notified the following individuals were present in the room other virtual group attendees. She acknowledged consent and understanding of privacy and security of the video platform. The patient has agreed to participate and understands they can discontinue the visit at any time. Patient is aware this is a billable service. Subjective  Livia Denver is a 56 y.o. female  .     Data: Moy Erickson attended the Relaxation group and was guided through EFT tapping on chronic pain, diaphragmatic breathing, Vagus nerve stimulation, and progressive muscle relaxation. Furthermore, attendees were guided through mindfulness exercises for acceptance and letting go, to hold the emotional body, and appreciating things in life, and with affirmations against anxiety and depression and for inner power and manifestation of goals and needs. Psychoeducation, EFT, Mindfulness-based, and group approach were used to support the cohesion and maintain the serene environment for the exercise’s purpose. Assessment: Michele Shows followed through guidance and completed the exercises, showed willingness to continue learning and practicing self-care in weekly Relaxation Group as well as individual therapy. Plan: Michele Shows will practice deep breathing and other techniques based on each occasion to counter stress and will attend the next group to be able to take part in further guided exercises on chosen topics to build on the techniques learned in the beginning.     HPI     Past Medical History:   Diagnosis Date   • Bilateral carpal tunnel syndrome 08/20/2019   • Carpal tunnel syndrome of right wrist 10/30/2019    Added automatically from request for surgery [id number]   • Chlamydia    • COPD (chronic obstructive pulmonary disease) ([address])    • Depression    • Hypertension    • Varicella        Past Surgical History:   Procedure Laterality Date   • BREAST BIOPSY Left 20yrs ago benign   • EGD     • OOPHORECTOMY Right    • [place] NDSC WRST SURG W/RLS TRANSVRS CARPL LIGM Right 01/21/2020    Procedure: RELEASE CARPAL TUNNEL ENDOSCOPIC;  Surgeon: Azalia Dave MD;  [facility];  Service: Orthopedics   • [place] [address] WRST SURG W/RLS TRANSVRS CARPL LIGM Left 01/28/2020    Procedure: RELEASE CARPAL TUNNEL ENDOSCOPIC;  Surgeon: Azalia Dave MD;  [facility];  Service: Orthopedics       Current Outpatient Medications   Medication Sig Dispense Refill   • albuterol (Alvjesi Jose HFA,VENTOLIN HFA) 90 mcg/act inhaler inhale 2 puffs by mouth and INTO THE LUNGS every 6 hours IF NEEDED FOR WHEEZING. 8.5 g 2   • buPROPion (Wellbutrin XL) 150 mg 24 hr tablet Take 1 tablet (150 mg total) by mouth daily 30 tablet 1   • cholecalciferol (VITAMIN D3) 400 units tablet Take 400 Units by mouth daily     • FLUoxetine (PROzac) 20 mg capsule Take 2 capsules (40 mg total) by mouth daily 60 capsule 1   • fluticasone (FLONASE) 50 mcg/act nasal spray instill 1 spray into each nostril once daily 16 g 2   • lamoTRIgine (LaMICtal) 200 MG tablet Take 1 tablet (200 mg total) by mouth daily 30 tablet 1   • levocetirizine (XYZAL) 5 MG tablet take 1 tablet by mouth every evening 30 tablet 4   • omeprazole (PriLOSEC) 20 mg delayed release capsule take 1 capsule by mouth twice a day 60 capsule 5   • senna-docusate sodium (SENOKOT-S) 8.6-50 mg per tablet Take 3 tablets by mouth as needed       • tiotropium-olodaterol (Stiolto Respimat) 2.5-2.5 MCG/ACT inhaler Inhale 2 puffs daily 4 g 11     No current facility-administered medications for this visit. No Known Allergies    Review of Systems    Video Exam    There were no vitals filed for this visit.     Physical Exam     09/01/23  Start Time: 0400  Stop Time: 0500  Total Visit Time: 60 minutes

## 2023-09-05 ENCOUNTER — TELEMEDICINE (OUTPATIENT)
Dept: BEHAVIORAL/MENTAL HEALTH CLINIC | Facility: CLINIC | Age: 58
End: 2023-09-05
Payer: COMMERCIAL

## 2023-09-05 DIAGNOSIS — F43.10 PTSD (POST-TRAUMATIC STRESS DISORDER): ICD-10-CM

## 2023-09-05 DIAGNOSIS — F12.90 MARIJUANA USE: Chronic | ICD-10-CM

## 2023-09-05 DIAGNOSIS — F41.1 GENERALIZED ANXIETY DISORDER: ICD-10-CM

## 2023-09-05 DIAGNOSIS — F31.81 MODERATE MIXED BIPOLAR II DISORDER (HCC): Primary | ICD-10-CM

## 2023-09-05 DIAGNOSIS — F17.210 CIGARETTE NICOTINE DEPENDENCE WITHOUT COMPLICATION: ICD-10-CM

## 2023-09-05 DIAGNOSIS — F10.11 ALCOHOL ABUSE, IN REMISSION: ICD-10-CM

## 2023-09-05 PROCEDURE — 90834 PSYTX W PT 45 MINUTES: CPT | Performed by: COUNSELOR
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
## 2023-09-08 ENCOUNTER — TELEMEDICINE (OUTPATIENT)
Dept: BEHAVIORAL/MENTAL HEALTH CLINIC | Facility: CLINIC | Age: 58
End: 2023-09-08
Payer: COMMERCIAL

## 2023-09-08 ENCOUNTER — PROCEDURE VISIT (OUTPATIENT)
Dept: OBGYN CLINIC | Facility: CLINIC | Age: 58
End: 2023-09-08
Payer: COMMERCIAL

## 2023-09-08 VITALS
WEIGHT: 110.2 LBS | HEIGHT: 60 IN | BODY MASS INDEX: 21.64 KG/M2 | DIASTOLIC BLOOD PRESSURE: 88 MMHG | SYSTOLIC BLOOD PRESSURE: 148 MMHG

## 2023-09-08 DIAGNOSIS — R87.610 ASCUS WITH POSITIVE HIGH RISK HPV CERVICAL: Primary | ICD-10-CM

## 2023-09-08 DIAGNOSIS — F43.10 PTSD (POST-TRAUMATIC STRESS DISORDER): ICD-10-CM

## 2023-09-08 DIAGNOSIS — R87.810 ASCUS WITH POSITIVE HIGH RISK HPV CERVICAL: Primary | ICD-10-CM

## 2023-09-08 DIAGNOSIS — F12.90 MARIJUANA USE: Chronic | ICD-10-CM

## 2023-09-08 DIAGNOSIS — F41.1 GENERALIZED ANXIETY DISORDER: ICD-10-CM

## 2023-09-08 DIAGNOSIS — F10.11 ALCOHOL ABUSE, IN REMISSION: ICD-10-CM

## 2023-09-08 DIAGNOSIS — F17.210 CIGARETTE NICOTINE DEPENDENCE WITHOUT COMPLICATION: ICD-10-CM

## 2023-09-08 DIAGNOSIS — F31.81 MODERATE MIXED BIPOLAR II DISORDER (HCC): Primary | ICD-10-CM

## 2023-09-08 PROCEDURE — 88305 TISSUE EXAM BY PATHOLOGIST: CPT | Performed by: PATHOLOGY

## 2023-09-08 PROCEDURE — 90853 GROUP PSYCHOTHERAPY: CPT | Performed by: COUNSELOR

## 2023-09-08 PROCEDURE — 88344 IMHCHEM/IMCYTCHM EA MLT ANTB: CPT | Performed by: PATHOLOGY

## 2023-09-08 PROCEDURE — 57455 BIOPSY OF CERVIX W/SCOPE: CPT | Performed by: OBSTETRICS & GYNECOLOGY

## 2023-09-08 NOTE — PSYCH
Virtual Regular Visit    Verification of patient location:    Patient is located at Home in the following state in which I hold an active license [place]      Assessment/Plan:    Problem List Items Addressed This Visit        Other    Marijuana use (Chronic)    Moderate mixed bipolar II disorder (HCC) - Primary    Generalized anxiety disorder    PTSD (post-traumatic stress disorder)    Alcohol abuse, in remission    Cigarette nicotine dependence without complication       Goals addressed in session: Goal 1 and Goal 2          Reason for visit is No chief complaint on file. Encounter provider [facility]  [facility]    Provider located at [address] [place] [id number] [phone]      Recent Visits  Date Type Provider Dept   09/05/23 [place], [address]   09/01/23 [place], [address] [facility] Therapist LADARIUS   Showing recent visits within past 7 days and meeting all other requirements  Future Appointments  No visits were found meeting these conditions. Showing future appointments within next 150 days and meeting all other requirements       The patient was identified by name and date of birth. Elmer Avila was informed that this is a telemedicine visit and that the visit is being conducted [employer]. She agrees to proceed. .  My office door was closed. The patient was notified the following individuals were present in the room other group members . She acknowledged consent and understanding of privacy and security of the video platform. The patient has agreed to participate and understands they can discontinue the visit at any time. Patient is aware this is a billable service. Subjective  Elmer Avila is a 56 y.o. female  .     Data: Criss Goel attended the Relaxation group and was guided through EFT tapping on anxiety, diaphragmatic breathing, Vagus nerve stimulation, and progressive muscle relaxation. Furthermore, attendees were guided through mindfulness exercises for breaking old habits, being mindful with negative emotions, interconnectedness, and heartwarming guided imagery, and with affirmations for self-forgiveness, self-compassion, self-confidence, and inner wisdom. Psychoeducation, EFT, Mindfulness-based, and group approach were used to support the cohesion and maintain the serene environment for the exercise’s purpose. Assessment: Yahir Cervantes followed through guidance and completed the exercises, showed willingness to continue learning and practicing self-care in weekly Relaxation Group as well as individual therapy. Plan: Yahir Cervantes will practice deep breathing and other techniques based on each occasion to counter stress and will attend the next group to be able to take part in further guided exercises on chosen topics to build on the techniques learned in the beginning.     HPI     Past Medical History:   Diagnosis Date   • Bilateral carpal tunnel syndrome 08/20/2019   • Carpal tunnel syndrome of right wrist 10/30/2019    Added automatically from request for surgery [id number]   • Chlamydia    • COPD (chronic obstructive pulmonary disease) ([address])    • Depression    • Hypertension    • Varicella        Past Surgical History:   Procedure Laterality Date   • BREAST BIOPSY Left 20yrs ago benign   • EGD     • OOPHORECTOMY Right    • [place] NDSC WRST SURG W/RLS TRANSVRS CARPL LIGM Right 01/21/2020    Procedure: RELEASE CARPAL TUNNEL ENDOSCOPIC;  Surgeon: Shima Gastelum MD;  Location: [facility];  Service: Orthopedics   • [place] [address] WRST SURG W/RLS TRANSVRS CARPL LIGM Left 01/28/2020    Procedure: RELEASE CARPAL TUNNEL ENDOSCOPIC;  Surgeon: Shima Gastelum MD;  [facility];  Service: Orthopedics       Current Outpatient Medications   Medication Sig Dispense Refill   • albuterol ([place] Causey HFA,VENTOLIN HFA) 90 mcg/act inhaler inhale 2 puffs by mouth and INTO THE LUNGS every 6 hours IF NEEDED FOR WHEEZING. 8.5 g 2   • buPROPion (Wellbutrin XL) 150 mg 24 hr tablet Take 1 tablet (150 mg total) by mouth daily 30 tablet 1   • cholecalciferol (VITAMIN D3) 400 units tablet Take 400 Units by mouth daily     • FLUoxetine (PROzac) 20 mg capsule Take 2 capsules (40 mg total) by mouth daily 60 capsule 1   • fluticasone (FLONASE) 50 mcg/act nasal spray instill 1 spray into each nostril once daily 16 g 2   • lamoTRIgine (LaMICtal) 200 MG tablet Take 1 tablet (200 mg total) by mouth daily 30 tablet 1   • levocetirizine (XYZAL) 5 MG tablet take 1 tablet by mouth every evening 30 tablet 4   • omeprazole (PriLOSEC) 20 mg delayed release capsule take 1 capsule by mouth twice a day 60 capsule 5   • senna-docusate sodium (SENOKOT-S) 8.6-50 mg per tablet Take 3 tablets by mouth as needed       • tiotropium-olodaterol (Stiolto Respimat) 2.5-2.5 MCG/ACT inhaler Inhale 2 puffs daily 4 g 11     No current facility-administered medications for this visit. No Known Allergies    Review of Systems    Video Exam    There were no vitals filed for this visit.     Physical Exam     09/08/23  Start Time: 0400  Stop Time: 0500  Total Visit Time: 60 minutes

## 2023-09-08 NOTE — PROGRESS NOTES
Colposcopy     Date/Time 9/8/2023 1:20 PM     [place] Protocol   Consent: Verbal consent obtained. Written consent obtained. Risks and benefits: risks, benefits and alternatives were discussed  Consent given by: patient  Patient understanding: patient states understanding of the procedure being performed  Patient consent: the patient's understanding of the procedure matches consent given  Procedure consent: procedure consent matches procedure scheduled  Required items: required blood products, implants, devices, and special equipment available       Performed by  Tamir Lund MD   Authorized by Tamir Lund MD       Pre-procedure details     Prepped with: acetic acid       Indication    ASC-US (HPV+)     Procedure Details   Procedure: Colposcopy w/ biopsy of cervix      Under satisfactory analgesia the patient was prepped and draped in the dorsal lithotomy position: yes      Hickman speculum was placed in the vagina: yes      Under colposcopic examination the transition zone was seen in entirety: yes      Cervical biopsy performed with a cervical biopsy punch: yes (luis daniel)      Tampon inserted: no      Monsel's solution was applied: yes      Biopsy(s): yes      Location:  2 oclock    Specimen to pathology: yes       Post-procedure      Findings: White epithelium      Impression: Low grade cervical dysplasia      Patient tolerance of procedure:   Tolerated well, no immediate complications         Pap HX  2023 - pap - ASCUS, HPV+ (other)  2022 - colpo - CIN1  2022 - pap - ASCUS, HPV+ (other)  2021 - pap - NILM, HPV+ (other)

## 2023-09-12 ENCOUNTER — TELEMEDICINE (OUTPATIENT)
Dept: BEHAVIORAL/MENTAL HEALTH CLINIC | Facility: CLINIC | Age: 58
End: 2023-09-12
Payer: COMMERCIAL

## 2023-09-12 DIAGNOSIS — F41.1 GENERALIZED ANXIETY DISORDER: ICD-10-CM

## 2023-09-12 DIAGNOSIS — F12.90 MARIJUANA USE: Chronic | ICD-10-CM

## 2023-09-12 DIAGNOSIS — F17.210 CIGARETTE NICOTINE DEPENDENCE WITHOUT COMPLICATION: ICD-10-CM

## 2023-09-12 DIAGNOSIS — F10.11 ALCOHOL ABUSE, IN REMISSION: ICD-10-CM

## 2023-09-12 DIAGNOSIS — F43.10 PTSD (POST-TRAUMATIC STRESS DISORDER): ICD-10-CM

## 2023-09-12 DIAGNOSIS — F31.81 MODERATE MIXED BIPOLAR II DISORDER (HCC): Primary | ICD-10-CM

## 2023-09-12 PROCEDURE — 90832 PSYTX W PT 30 MINUTES: CPT | Performed by: COUNSELOR

## 2023-09-12 NOTE — PSYCH
Virtual Regular Visit    Verification of patient location:    Patient is located at Home in the following state in which I hold an active license [place]      Assessment/Plan:    Problem List Items Addressed This Visit        Other    Marijuana use (Chronic)    Moderate mixed bipolar II disorder (HCC) - Primary    Generalized anxiety disorder    PTSD (post-traumatic stress disorder)    Alcohol abuse, in remission    Cigarette nicotine dependence without complication       Goals addressed in session: Goal 1 and Goal 2          Reason for visit is No chief complaint on file. [facility]    Provider located at [address] [place], [place] [id number] [phone]      Recent Visits  Date Type Provider Dept   09/08/23 [facility], [address]   09/05/23 [facility], [address] [place] [address] [facility] Therapist LADARIUS   Showing recent visits within past 7 days and meeting all other requirements  Today's Visits  Date Type Provider Dept   09/12/23 [facility], [address]   Showing today's visits and meeting all other requirements  Future Appointments  No visits were found meeting these conditions. Showing future appointments within next 150 days and meeting all other requirements       The patient was identified by name and date of birth. Livia Denver was informed that this is a telemedicine visit and that the visit is being conducted throughthe [employer] platform. She agrees to proceed. .  My office door was closed. No one else was in the room. She acknowledged consent and understanding of privacy and security of the video platform. The patient has agreed to participate and understands they can discontinue the visit at any time. Patient is aware this is a billable service. Delvis Trejo is a 56 y.o. female  . HPI     Past Medical History:   Diagnosis Date   • Bilateral carpal tunnel syndrome 08/20/2019   • Carpal tunnel syndrome of right wrist 10/30/2019    Added automatically from request for surgery [id number]   • Chlamydia    • COPD (chronic obstructive pulmonary disease) ([address])    • Depression    • Hypertension    • Varicella        Past Surgical History:   Procedure Laterality Date   • BREAST BIOPSY Left 20yrs ago benign   • EGD     • OOPHORECTOMY Right    • [place] NDSC WRST SURG W/RLS TRANSVRS CARPL LIGM Right 01/21/2020    Procedure: RELEASE CARPAL TUNNEL ENDOSCOPIC;  Surgeon: Nik Lin MD;  [facility];  Service: Orthopedics   • [place] [address] WRST SURG W/RLS TRANSVRS CARPL LIGM Left 01/28/2020    Procedure: RELEASE CARPAL TUNNEL ENDOSCOPIC;  Surgeon: Nik Lin MD;  [facility];  Service: Orthopedics       Current Outpatient Medications   Medication Sig Dispense Refill   • albuterol (PROVENTIL HFA,VENTOLIN HFA) 90 mcg/act inhaler inhale 2 puffs by mouth and INTO THE LUNGS every 6 hours IF NEEDED FOR WHEEZING.  8.5 g 2   • buPROPion (Wellbutrin XL) 150 mg 24 hr tablet Take 1 tablet (150 mg total) by mouth daily 30 tablet 1   • cholecalciferol (VITAMIN D3) 400 units tablet Take 400 Units by mouth daily     • FLUoxetine (PROzac) 20 mg capsule Take 2 capsules (40 mg total) by mouth daily 60 capsule 1   • fluticasone (FLONASE) 50 mcg/act nasal spray instill 1 spray into each nostril once daily 16 g 2   • lamoTRIgine (LaMICtal) 200 MG tablet Take 1 tablet (200 mg total) by mouth daily 30 tablet 1   • levocetirizine (XYZAL) 5 MG tablet take 1 tablet by mouth every evening 30 tablet 4   • omeprazole (PriLOSEC) 20 mg delayed release capsule take 1 capsule by mouth twice a day 60 capsule 5   • senna-docusate sodium (SENOKOT-S) 8.6-50 mg per tablet Take 3 tablets by mouth as needed       • tiotropium-olodaterol (Stiolto Respimat) 2.5-2.5 MCG/ACT inhaler Inhale 2 puffs daily 4 g 11     No current facility-administered medications for this visit. No Known Allergies    Review of Systems    Video Exam    There were no vitals filed for this visit. Physical Exam     Behavioral Health Psychotherapy Progress Note    Psychotherapy Provided: Individual Psychotherapy     1. Moderate mixed bipolar II disorder (HCC)        2. PTSD (post-traumatic stress disorder)        3. Generalized anxiety disorder        4. Cigarette nicotine dependence without complication        5. Alcohol abuse, in remission        6. Marijuana use            Goals addressed in session: Goal 1 and Goal 2     DATA: Tangela Titus discussed not being in a mood due to distraction, feeling off, struggling with memory and guilt that her friends take her under their roof and pay for her needs. She was encouraged to focus on gratitude and how hector she is to deserve friends like them as well as to what of her qualities and strengths she attribute her friends' gesture and constant support. Tangela Titus received a doctor's call that she needed to respond and was excised due to importance to her cognitive health. During this session, this clinician used the following therapeutic modalities: Client-centered Therapy, Cognitive Behavioral Therapy, Dialectical Behavior Therapy, Mindfulness-based Strategies and Supportive Psychotherapy    Substance Abuse was not addressed during this session. If the client is diagnosed with a co-occurring substance use disorder, please indicate any changes in the frequency or amount of use: NA. Stage of change for addressing substance use diagnoses: Maintenance    ASSESSMENT:  Yair Nathan presents with a Euthymic/ normal and Anxious mood. her affect is Normal range and intensity, which is congruent, with her mood and the content of the session. The client has made progress on their goals.     Tangela Titus still struggles with memory but does better when does notes or sticky notes to be able to refer to. Yefri Melo presents with a minimal risk of suicide, minimal risk of self-harm, and none risk of harm to others. For any risk assessment that surpasses a "low" rating, a safety plan must be developed. A safety plan was indicated: no  If yes, describe in detail NA    PLAN: Between sessions, Yefri Melo will use notes to put her experiences in writing, will follow up with a neurologist to be able to be evaluated and to receive proper services. At the next session, the therapist will use Client-centered Therapy, Cognitive Behavioral Therapy, Dialectical Behavior Therapy, Mindfulness-based Strategies and Supportive Psychotherapy to address self-care, memory struggle, and emotion regulation. Behavioral Health Treatment Plan and Discharge Planning: Yefri Melo is aware of and agrees to continue to work on their treatment plan. They have identified and are working toward their discharge goals.  yes    Visit start and stop times:    09/12/23  Start Time: 0205  Stop Time: 1728  Total Visit Time: 32 minutes

## 2023-09-14 PROCEDURE — 88344 IMHCHEM/IMCYTCHM EA MLT ANTB: CPT | Performed by: PATHOLOGY

## 2023-09-14 PROCEDURE — 88305 TISSUE EXAM BY PATHOLOGIST: CPT | Performed by: PATHOLOGY

## 2023-09-15 ENCOUNTER — TELEPHONE (OUTPATIENT)
Dept: PSYCHIATRY | Facility: CLINIC | Age: 58
End: 2023-09-15

## 2023-09-15 ENCOUNTER — TELEMEDICINE (OUTPATIENT)
Dept: BEHAVIORAL/MENTAL HEALTH CLINIC | Facility: CLINIC | Age: 58
End: 2023-09-15
Payer: COMMERCIAL

## 2023-09-15 DIAGNOSIS — F12.90 MARIJUANA USE: Chronic | ICD-10-CM

## 2023-09-15 DIAGNOSIS — F10.11 ALCOHOL ABUSE, IN REMISSION: ICD-10-CM

## 2023-09-15 DIAGNOSIS — F43.10 PTSD (POST-TRAUMATIC STRESS DISORDER): ICD-10-CM

## 2023-09-15 DIAGNOSIS — F31.81 MODERATE MIXED BIPOLAR II DISORDER (HCC): Primary | ICD-10-CM

## 2023-09-15 DIAGNOSIS — F41.1 GENERALIZED ANXIETY DISORDER: ICD-10-CM

## 2023-09-15 DIAGNOSIS — F17.210 CIGARETTE NICOTINE DEPENDENCE WITHOUT COMPLICATION: ICD-10-CM

## 2023-09-15 PROCEDURE — 90853 GROUP PSYCHOTHERAPY: CPT | Performed by: COUNSELOR

## 2023-09-15 NOTE — PSYCH
Virtual Regular Visit    Verification of patient location:    Patient is located at Home in the following state in which I hold an active license [place]      Assessment/Plan:    Problem List Items Addressed This Visit        Other    Marijuana use (Chronic)    Moderate mixed bipolar II disorder (HCC) - Primary    Generalized anxiety disorder    PTSD (post-traumatic stress disorder)    Alcohol abuse, in remission    Cigarette nicotine dependence without complication       Goals addressed in session: Goal 1 and Goal 2          Reason for visit is No chief complaint on file. [place] [place] [address] [place] [id number] [phone]      Recent Visits  Date Type Provider Dept   09/12/23 Zoey, [address]   09/08/23 Zoey, [facility] [place]   Showing recent visits within past 7 days and meeting all other requirements  Today's Visits  Date Type Provider Dept   09/15/23 Telephone Stephania Yarbrough, [address] [facility] today's visits and meeting all other requirements  Future Appointments  No visits were found meeting these conditions. Showing future appointments within next 150 days and meeting all other requirements       The patient was identified by name and date of birth. Jt Carpenter was informed that this is a telemedicine visit and that the visit is being conducted [employer]. She agrees to proceed. .  My office door was closed. The patient was notified the following individuals were present in the room other group members. She acknowledged consent and understanding of privacy and security of the video platform.  The patient has agreed to participate and understands they can discontinue the visit at any time.    Patient is aware this is a billable service. Delvis Avilez is a 56 y.o. female  . Data: [facility] attended the Relaxation group and was guided through EFT tapping on trauma, diaphragmatic breathing, Vagus nerve stimulation, and progressive muscle relaxation. Furthermore, attendees were guided through mindfulness exercises  for exploring the inner wisdom, connecting with the inner resources, guided imagery for hot springs relaxation and letting go of old beliefs, and with affirmations against depression and anxiety and for releasing trapped energy from past trauma. Psychoeducation, EFT, Mindfulness-based, and group approach were used to support the cohesion and maintain the serene environment for the exercise’s purpose. Assessment: [facility] followed through guidance and completed the exercises, showed willingness to continue learning and practicing self-care in weekly Relaxation Group as well as individual therapy. Plan: [facility] will practice deep breathing and other techniques based on each occasion to counter stress and will attend the next group to be able to take part in further guided exercises on chosen topics to build on the techniques learned in the beginning.     HPI     Past Medical History:   Diagnosis Date   • Bilateral carpal tunnel syndrome 08/20/2019   • Carpal tunnel syndrome of right wrist 10/30/2019    Added automatically from request for surgery [id number]   • Chlamydia    • COPD (chronic obstructive pulmonary disease) ([address])    • Depression    • Hypertension    • Varicella        Past Surgical History:   Procedure Laterality Date   • BREAST BIOPSY Left 20yrs ago benign   • EGD     • OOPHORECTOMY Right    • [place] NDSC WRST SURG W/RLS TRANSVRS CARPL LIGM Right 01/21/2020    Procedure: RELEASE CARPAL TUNNEL ENDOSCOPIC;  Surgeon: Clau Vargas MD;  [facility];  Service: Orthopedics   • [place] [address] [address] WRST SURG W/RLS TRANSVRS CARPL LIGM Left 01/28/2020    Procedure: RELEASE CARPAL TUNNEL ENDOSCOPIC;  Surgeon: Syeda Shaw MD;  [facility];  Service: Orthopedics       Current Outpatient Medications   Medication Sig Dispense Refill   • albuterol (PROVENTIL HFA,VENTOLIN HFA) 90 mcg/act inhaler inhale 2 puffs by mouth and INTO THE LUNGS every 6 hours IF NEEDED FOR WHEEZING. 8.5 g 2   • buPROPion (Wellbutrin XL) 150 mg 24 hr tablet Take 1 tablet (150 mg total) by mouth daily 30 tablet 1   • cholecalciferol (VITAMIN D3) 400 units tablet Take 400 Units by mouth daily     • FLUoxetine (PROzac) 20 mg capsule Take 2 capsules (40 mg total) by mouth daily 60 capsule 1   • fluticasone (FLONASE) 50 mcg/act nasal spray instill 1 spray into each nostril once daily 16 g 2   • lamoTRIgine (LaMICtal) 200 MG tablet Take 1 tablet (200 mg total) by mouth daily 30 tablet 1   • levocetirizine (XYZAL) 5 MG tablet take 1 tablet by mouth every evening 30 tablet 4   • omeprazole (PriLOSEC) 20 mg delayed release capsule take 1 capsule by mouth twice a day 60 capsule 5   • senna-docusate sodium (SENOKOT-S) 8.6-50 mg per tablet Take 3 tablets by mouth as needed       • tiotropium-olodaterol (Stiolto Respimat) 2.5-2.5 MCG/ACT inhaler Inhale 2 puffs daily 4 g 11     No current facility-administered medications for this visit. No Known Allergies    Review of Systems    Video Exam    There were no vitals filed for this visit.     Physical Exam     09/15/23  Start Time: 0400  Stop Time: 0500  Total Visit Time: 60 minutes

## 2023-09-17 NOTE — RESULT ENCOUNTER NOTE
Please call pt with results. Colpo bx shows reactive changes/inflammation vs early low grade dysplasia. Recommend repeat pap in 1yr for close monitoring.

## 2023-09-19 NOTE — TELEPHONE ENCOUNTER
Per Dr. Hurley Officer paperwork has been scanned into the chart & patient is aware to pick it up at the .

## 2023-09-21 ENCOUNTER — TELEMEDICINE (OUTPATIENT)
Dept: BEHAVIORAL/MENTAL HEALTH CLINIC | Facility: CLINIC | Age: 58
End: 2023-09-21
Payer: COMMERCIAL

## 2023-09-21 ENCOUNTER — TELEPHONE (OUTPATIENT)
Dept: PSYCHIATRY | Facility: CLINIC | Age: 58
End: 2023-09-21

## 2023-09-21 DIAGNOSIS — F43.10 PTSD (POST-TRAUMATIC STRESS DISORDER): ICD-10-CM

## 2023-09-21 DIAGNOSIS — F41.1 GENERALIZED ANXIETY DISORDER: ICD-10-CM

## 2023-09-21 DIAGNOSIS — F10.11 ALCOHOL ABUSE, IN REMISSION: ICD-10-CM

## 2023-09-21 DIAGNOSIS — F17.210 CIGARETTE NICOTINE DEPENDENCE WITHOUT COMPLICATION: ICD-10-CM

## 2023-09-21 DIAGNOSIS — F31.81 MODERATE MIXED BIPOLAR II DISORDER (HCC): Primary | ICD-10-CM

## 2023-09-21 DIAGNOSIS — F12.90 MARIJUANA USE: Chronic | ICD-10-CM

## 2023-09-21 PROCEDURE — 90834 PSYTX W PT 45 MINUTES: CPT | Performed by: COUNSELOR

## 2023-09-21 NOTE — PSYCH
Virtual Regular Visit    Verification of patient location:    Patient is located at Home in the following state in which I hold an active license [place]      Assessment/Plan:    Problem List Items Addressed This Visit        Other    Marijuana use (Chronic)    Moderate mixed bipolar II disorder (HCC) - Primary    Generalized anxiety disorder    PTSD (post-traumatic stress disorder)    Alcohol abuse, in remission    Cigarette nicotine dependence without complication       Goals addressed in session: Goal 1 and Goal 2          Reason for visit is No chief complaint on file. En[place] provider [facility] [place]    [place] located at [address] [place] [id number] [phone]      Recent Visits  Date Type Provider Dept   09/15/23 Telephone Sofi Del Valle, [address]   [facility] [address] [place] [address] [facility] Therapist LADARIUS   Showing recent visits within past 7 days and meeting all other requirements  Today's Visits  Date Type Provider Dept   [facility]/23 [facility] [address]   Showing today's visits and meeting all other requirements  Future Appointments  No visits were found meeting these conditions. Showing future appointments within next 150 days and meeting all other requirements       The patient was identified by name and date of birth. Mai Sr was informed that this is a telemedicine visit and that the visit is being conducted throughthe [employer] platform. She agrees to proceed. .  My office door was closed. No one else was in the room. She acknowledged consent and understanding of privacy and security of the video platform. The patient has agreed to participate and understands they can discontinue the visit at any time. Patient is aware this is a billable service.      Delvis Sims Waylon Meraz is a 56 y.o. female. HPI     Past Medical History:   Diagnosis Date   • Bilateral carpal tunnel syndrome 08/20/2019   • Carpal tunnel syndrome of right wrist 10/30/2019    Added automatically from request for surgery [id number]   • Chlamydia    • COPD (chronic obstructive pulmonary disease) ([address])    • Depression    • Hypertension    • Varicella        Past Surgical History:   Procedure Laterality Date   • BREAST BIOPSY Left 20yrs ago benign   • EGD     • OOPHORECTOMY Right    • [place] NDSC WRST SURG W/RLS TRANSVRS CARPL LIGM Right 01/21/2020    Procedure: RELEASE CARPAL TUNNEL ENDOSCOPIC;  Surgeon: Lisseth Ramirez MD;  [facility];  Service: Orthopedics   • [place] [address] WRST SURG W/RLS TRANSVRS CARPL LIGM Left 01/28/2020    Procedure: RELEASE CARPAL TUNNEL ENDOSCOPIC;  Surgeon: Lisseth Ramirez MD;  [facility];  Service: Orthopedics       Current Outpatient Medications   Medication Sig Dispense Refill   • albuterol (PROVENTIL HFA,VENTOLIN HFA) 90 mcg/act inhaler inhale 2 puffs by mouth and INTO THE LUNGS every 6 hours IF NEEDED FOR WHEEZING.  8.5 g 2   • buPROPion (Wellbutrin XL) 150 mg 24 hr tablet Take 1 tablet (150 mg total) by mouth daily 30 tablet 1   • cholecalciferol (VITAMIN D3) 400 units tablet Take 400 Units by mouth daily     • FLUoxetine (PROzac) 20 mg capsule Take 2 capsules (40 mg total) by mouth daily 60 capsule 1   • fluticasone (FLONASE) 50 mcg/act nasal spray instill 1 spray into each nostril once daily 16 g 2   • lamoTRIgine (LaMICtal) 200 MG tablet Take 1 tablet (200 mg total) by mouth daily 30 tablet 1   • levocetirizine (XYZAL) 5 MG tablet take 1 tablet by mouth every evening 30 tablet 4   • omeprazole (PriLOSEC) 20 mg delayed release capsule take 1 capsule by mouth twice a day 60 capsule 5   • senna-docusate sodium (SENOKOT-S) 8.6-50 mg per tablet Take 3 tablets by mouth as needed       • tiotropium-olodaterol (Stiolto Respimat) 2.5-2.5 MCG/ACT inhaler Inhale 2 puffs daily 4 g 11     No current facility-administered medications for this visit. No Known Allergies    Review of Systems    Video Exam    There were no vitals filed for this visit. Physical Exam     Behavioral Health Psychotherapy Progress Note    Psychotherapy Provided: Individual Psychotherapy     1. Moderate mixed bipolar II disorder (HCC)        2. PTSD (post-traumatic stress disorder)        3. Generalized anxiety disorder        4. Cigarette nicotine dependence without complication        5. Marijuana use        6. Alcohol abuse, in remission            Goals addressed in session: Goal 1 and Goal 2     DATA: Matt Agee discussed her outdoor activities trimming bushes and spending time taking care of the garden of the building. Head trauma and proper treatment and evaluation for that, history of abuse, and current impact on memory, mood, and daily functioning. During this session, this clinician used the following therapeutic modalities: Client-centered Therapy, Cognitive Behavioral Therapy, Dialectical Behavior Therapy, Mindfulness-based Strategies and Supportive Psychotherapy    Substance Abuse was not addressed during this session. If the client is diagnosed with a co-occurring substance use disorder, please indicate any changes in the frequency or amount of use: NA. Stage of change for addressing substance use diagnoses: Maintenance    ASSESSMENT:  Zeyad Coates presents with a Euthymic/ normal and Anxious mood. her affect is Normal range and intensity, which is congruent, with her mood and the content of the session. The client has made progress on their goals. Matt Agee is very determined to continue learning about how to improve her cognition and memory. Zeyad Coates presents with a minimal risk of suicide, minimal risk of self-harm, and none risk of harm to others. For any risk assessment that surpasses a "low" rating, a safety plan must be developed.     A safety plan was indicated: no  If yes, describe in detail NA    PLAN: Between sessions, Fran Avilez will use her learned DBT skills to handle intense emotions better and to push away from triggers and regulate emotions. At the next session, the therapist will use Client-centered Therapy, Cognitive Behavioral Therapy, Dialectical Behavior Therapy, Mindfulness-based Strategies and Supportive Psychotherapy, Somatic strategies to address handling emotions and difficult memories, learn how to describe her sensations and process trauma through Somatic Experience. Behavioral Health Treatment Plan and Discharge Planning: Fran Avilez is aware of and agrees to continue to work on their treatment plan. They have identified and are working toward their discharge goals.  yes    Visit start and stop times:    09/21/23  Start Time: 8454  Stop Time: 1333  Total Visit Time: 38 minutes

## 2023-09-21 NOTE — TELEPHONE ENCOUNTER
Patient LVM stating she was coming in to  completed medical records and wanted confirmation that they were still there. Writer researched patients chart and could not find any request or encounter stating anything was ready for . Writer called patient stating there was nothing in their chart and an MERLE would need to be filled out in person at one of the [address] [place] [address] locations. Writer gave address to the [facility] location due to it being closest to patient.

## 2023-09-22 ENCOUNTER — TELEMEDICINE (OUTPATIENT)
Dept: BEHAVIORAL/MENTAL HEALTH CLINIC | Facility: CLINIC | Age: 58
End: 2023-09-22
Payer: COMMERCIAL

## 2023-09-22 DIAGNOSIS — F43.10 PTSD (POST-TRAUMATIC STRESS DISORDER): ICD-10-CM

## 2023-09-22 DIAGNOSIS — F12.90 MARIJUANA USE: Chronic | ICD-10-CM

## 2023-09-22 DIAGNOSIS — F10.11 ALCOHOL ABUSE, IN REMISSION: ICD-10-CM

## 2023-09-22 DIAGNOSIS — F41.1 GENERALIZED ANXIETY DISORDER: ICD-10-CM

## 2023-09-22 DIAGNOSIS — F17.210 CIGARETTE NICOTINE DEPENDENCE WITHOUT COMPLICATION: ICD-10-CM

## 2023-09-22 DIAGNOSIS — F31.81 MODERATE MIXED BIPOLAR II DISORDER (HCC): Primary | ICD-10-CM

## 2023-09-22 PROCEDURE — 90853 GROUP PSYCHOTHERAPY: CPT | Performed by: COUNSELOR

## 2023-09-22 NOTE — PSYCH
Virtual Regular Visit    Verification of patient location:    Patient is located at Home in the following state in which I hold an active license [place]      Assessment/Plan:    Problem List Items Addressed This Visit        Other    Marijuana use (Chronic)    Moderate mixed bipolar II disorder (HCC) - Primary    Generalized anxiety disorder    PTSD (post-traumatic stress disorder)    Alcohol abuse, in remission    Cigarette nicotine dependence without complication       Goals addressed in session: Goal 1 and Goal 2          Reason for visit is No chief complaint on file. Encounter provider Gustavo Denise [facility]    Provider located at [address] [place] [id number] [phone]      Recent Visits  Date Type Provider Dept   09/21/23 Telephone [facility], [address]   [facility], [address]   09/15/23 Telephone Latonia [facility], [address]   [facility]/23 [facility], [address] [facility] Therapist LADARIUS   Showing recent visits within past 7 days and meeting all other requirements  Future Appointments  No visits were found meeting these conditions. Showing future appointments within next 150 days and meeting all other requirements       The patient was identified by name and date of birth. Lisa Vitale was informed that this is a telemedicine visit and that the visit is being conducted [employer]. She agrees to proceed. .  Other methods to assure confidentiality were taken  No one else was in the room. She acknowledged consent and understanding of privacy and security of the video platform. The patient has agreed to participate and understands they can discontinue the visit at any time. Patient is aware this is a billable service. Delvis Ordaz is a 56 y.o. female  . Data: Arturo Lawler attended the Relaxation group and was guided through EFT tapping on anxiety, diaphragmatic breathing, other Vagus nerve stimulation techniques, and progressive muscle relaxation. Furthermore, attendees were guided through mindfulness exercises  for attending to physical and emotional pain and judgment, guided imagery for floating on a cloud relaxation and letting go of blame and guilt, and with affirmations for compassion, gratitude, and self-confidence. Psychoeducation, EFT, Mindfulness-based, and group approach were used to support the cohesion and maintain the serene environment for the exercise’s purpose. Assessment: Arturo Lawler followed through guidance and completed the exercises, showed willingness to continue learning and practicing self-care in weekly Relaxation Group as well as individual therapy. Plan: Arturo Lawler will practice deep breathing and other techniques based on each occasion to counter stress and will attend the next group to be able to take part in further guided exercises on chosen topics to build on the techniques learned in the beginning.        HPI     Past Medical History:   Diagnosis Date   • Bilateral carpal tunnel syndrome 08/20/2019   • Carpal tunnel syndrome of right wrist 10/30/2019    Added automatically from request for surgery [id number]   • Chlamydia    • COPD (chronic obstructive pulmonary disease) (HCC)    • Depression    • Hypertension    • Varicella        Past Surgical History:   Procedure Laterality Date   • BREAST BIOPSY Left 20yrs ago benign   • EGD     • OOPHORECTOMY Right    • [place] NDSC WRST SURG W/RLS TRANSVRS CARPL LIGM Right 01/21/2020    Procedure: RELEASE CARPAL TUNNEL ENDOSCOPIC;  Surgeon: Gage Frankel MD;  [facility];  Service: Orthopedics   • [place] [id number] WRST SURG W/RLS TRANSVRS CARPL LIGM Left 01/28/2020    Procedure: RELEASE CARPAL TUNNEL ENDOSCOPIC;  Surgeon: Gage Frankel MD; [facility];  Service: Orthopedics       Current Outpatient Medications   Medication Sig Dispense Refill   • albuterol (PROVENTIL HFA,VENTOLIN HFA) 90 mcg/act inhaler inhale 2 puffs by mouth and INTO THE LUNGS every 6 hours IF NEEDED FOR WHEEZING. 8.5 g 2   • buPROPion (Wellbutrin XL) 150 mg 24 hr tablet Take 1 tablet (150 mg total) by mouth daily 30 tablet 1   • cholecalciferol (VITAMIN D3) 400 units tablet Take 400 Units by mouth daily     • FLUoxetine (PROzac) 20 mg capsule Take 2 capsules (40 mg total) by mouth daily 60 capsule 1   • fluticasone (FLONASE) 50 mcg/act nasal spray instill 1 spray into each nostril once daily 16 g 2   • lamoTRIgine (LaMICtal) 200 MG tablet Take 1 tablet (200 mg total) by mouth daily 30 tablet 1   • levocetirizine (XYZAL) 5 MG tablet take 1 tablet by mouth every evening 30 tablet 4   • omeprazole (PriLOSEC) 20 mg delayed release capsule take 1 capsule by mouth twice a day 60 capsule 5   • senna-docusate sodium (SENOKOT-S) 8.6-50 mg per tablet Take 3 tablets by mouth as needed       • tiotropium-olodaterol (Stiolto Respimat) 2.5-2.5 MCG/ACT inhaler Inhale 2 puffs daily 4 g 11     No current facility-administered medications for this visit. No Known Allergies    Review of Systems    Video Exam    There were no vitals filed for this visit.     Physical Exam     09/22/23  Start Time: 0400  Stop Time: 0500  Total Visit Time: 60 minutes

## 2023-09-27 ENCOUNTER — TELEMEDICINE (OUTPATIENT)
Dept: BEHAVIORAL/MENTAL HEALTH CLINIC | Facility: CLINIC | Age: 58
End: 2023-09-27
Payer: COMMERCIAL

## 2023-09-27 DIAGNOSIS — F41.1 GENERALIZED ANXIETY DISORDER: ICD-10-CM

## 2023-09-27 DIAGNOSIS — F43.10 PTSD (POST-TRAUMATIC STRESS DISORDER): ICD-10-CM

## 2023-09-27 DIAGNOSIS — F10.11 ALCOHOL ABUSE, IN REMISSION: ICD-10-CM

## 2023-09-27 DIAGNOSIS — F12.90 MARIJUANA USE: Chronic | ICD-10-CM

## 2023-09-27 DIAGNOSIS — F17.210 CIGARETTE NICOTINE DEPENDENCE WITHOUT COMPLICATION: ICD-10-CM

## 2023-09-27 DIAGNOSIS — F31.81 MODERATE MIXED BIPOLAR II DISORDER (HCC): Primary | ICD-10-CM

## 2023-09-27 PROCEDURE — 90834 PSYTX W PT 45 MINUTES: CPT | Performed by: COUNSELOR
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
## 2023-09-29 ENCOUNTER — TELEMEDICINE (OUTPATIENT)
Dept: BEHAVIORAL/MENTAL HEALTH CLINIC | Facility: CLINIC | Age: 58
End: 2023-09-29
Payer: COMMERCIAL

## 2023-09-29 DIAGNOSIS — F31.81 MODERATE MIXED BIPOLAR II DISORDER (HCC): Primary | ICD-10-CM

## 2023-09-29 DIAGNOSIS — F10.11 ALCOHOL ABUSE, IN REMISSION: ICD-10-CM

## 2023-09-29 DIAGNOSIS — F12.90 MARIJUANA USE: Chronic | ICD-10-CM

## 2023-09-29 DIAGNOSIS — F17.210 CIGARETTE NICOTINE DEPENDENCE WITHOUT COMPLICATION: ICD-10-CM

## 2023-09-29 DIAGNOSIS — F43.10 PTSD (POST-TRAUMATIC STRESS DISORDER): ICD-10-CM

## 2023-09-29 DIAGNOSIS — F41.1 GENERALIZED ANXIETY DISORDER: ICD-10-CM

## 2023-09-29 PROCEDURE — 90853 GROUP PSYCHOTHERAPY: CPT | Performed by: COUNSELOR

## 2023-09-29 NOTE — PSYCH
Virtual Regular Visit    Verification of patient location:    Patient is located at Home in the following state in which I hold an active license [place]      Assessment/Plan:    Problem List Items Addressed This Visit        Other    Marijuana use (Chronic)    Moderate mixed bipolar II disorder (HCC) - Primary    Generalized anxiety disorder    PTSD (post-traumatic stress disorder)    Alcohol abuse, in remission    Cigarette nicotine dependence without complication       Goals addressed in session: Goal 1 and Goal 2          Reason for visit is No chief complaint on file. Encounter provider FARHAN [facility]    Provider located at [address] [place] [address] [phone]      Recent Visits  Date Type Provider Dept   09/27/23 [facility], [address]   09/22/23 [place], [address] [facility] Therapist Yosi Hidalgo   Showing recent visits within past 7 days and meeting all other requirements  Future Appointments  No visits were found meeting these conditions. Showing future appointments within next 150 days and meeting all other requirements       The patient was identified by name and date of birth. Kayla Salinas was informed that this is a telemedicine visit and that the visit is being conducted [employer]. She agrees to proceed. .  My office door was closed. The patient was notified the following individuals were present in the room other goup members. She acknowledged consent and understanding of privacy and security of the video platform. The patient has agreed to participate and understands they can discontinue the visit at any time. Patient is aware this is a billable service. Subjective  Kayla Salinas is a 56 y.o. female  .     Data: Rashmi Deutsch attended the Relaxation group and was guided through [facility] tapping on trauma, diaphragmatic breathing, other Vagus nerve stimulation techniques, and progressive muscle relaxation. Furthermore, attendees were guided through mindfulness exercises  for attending to difficult emotions, connecting to and re-parenting the inner child,  self-compassion, and guided imagery for dealing with pain and letting go of self-criticism and loathing, and with affirmations for gratitude, forgiveness, and self-love. Psychoeducation, EFT, Mindfulness-based, and group approach were used to support the cohesion and maintain the serene environment for the exercise’s purpose. Assessment: Young Jernigan followed through guidance and completed the exercises, showed willingness to continue learning and practicing self-care in weekly Relaxation Group as well as individual therapy. Plan: Young Jernigan will practice deep breathing and other techniques based on each occasion to counter stress and will attend the next group to be able to take part in further guided exercises on chosen topics to build on the techniques learned in the beginning.     HPI     Past Medical History:   Diagnosis Date   • Bilateral carpal tunnel syndrome 08/20/2019   • Carpal tunnel syndrome of right wrist 10/30/2019    Added automatically from request for surgery [id number]   • Chlamydia    • COPD (chronic obstructive pulmonary disease) ([address])    • Depression    • Hypertension    • Varicella        Past Surgical History:   Procedure Laterality Date   • BREAST BIOPSY Left 20yrs ago benign   • EGD     • OOPHORECTOMY Right    • [place] NDSC WRST SURG W/RLS TRANSVRS CARPL LIGM Right 01/21/2020    Procedure: RELEASE CARPAL TUNNEL ENDOSCOPIC;  Surgeon: Kenton Ruiz MD;  [facility];  Service: Orthopedics   • [place] [address] [address] WRST SURG W/RLS TRANSVRS CARPL LIGM Left 01/28/2020    Procedure: RELEASE CARPAL TUNNEL ENDOSCOPIC;  Surgeon: Kenton Ruiz MD;  [facility];  Service: Orthopedics       Current Outpatient Medications Medication Sig Dispense Refill   • albuterol (PROVENTIL HFA,VENTOLIN HFA) 90 mcg/act inhaler inhale 2 puffs by mouth and INTO THE LUNGS every 6 hours IF NEEDED FOR WHEEZING. 8.5 g 2   • buPROPion (Wellbutrin XL) 150 mg 24 hr tablet Take 1 tablet (150 mg total) by mouth daily 30 tablet 1   • cholecalciferol (VITAMIN D3) 400 units tablet Take 400 Units by mouth daily     • FLUoxetine (PROzac) 20 mg capsule Take 2 capsules (40 mg total) by mouth daily 60 capsule 1   • fluticasone (FLONASE) 50 mcg/act nasal spray instill 1 spray into each nostril once daily 16 g 2   • lamoTRIgine (LaMICtal) 200 MG tablet Take 1 tablet (200 mg total) by mouth daily 30 tablet 1   • levocetirizine (XYZAL) 5 MG tablet take 1 tablet by mouth every evening 30 tablet 4   • omeprazole (PriLOSEC) 20 mg delayed release capsule take 1 capsule by mouth twice a day 60 capsule 5   • senna-docusate sodium (SENOKOT-S) 8.6-50 mg per tablet Take 3 tablets by mouth as needed       • tiotropium-olodaterol (Stiolto Respimat) 2.5-2.5 MCG/ACT inhaler Inhale 2 puffs daily 4 g 11     No current facility-administered medications for this visit. No Known Allergies    Review of Systems    Video Exam    There were no vitals filed for this visit.     Physical Exam       09/29/23  Start Time: 0400  Stop Time: 0500  Total Visit Time: 60 minutes

## 2023-10-03 ENCOUNTER — TELEMEDICINE (OUTPATIENT)
Dept: BEHAVIORAL/MENTAL HEALTH CLINIC | Facility: CLINIC | Age: 58
End: 2023-10-03
Payer: COMMERCIAL

## 2023-10-03 DIAGNOSIS — F31.81 MODERATE MIXED BIPOLAR II DISORDER (HCC): Primary | ICD-10-CM

## 2023-10-03 DIAGNOSIS — F17.210 CIGARETTE NICOTINE DEPENDENCE WITHOUT COMPLICATION: ICD-10-CM

## 2023-10-03 DIAGNOSIS — J43.2 CENTRILOBULAR EMPHYSEMA (HCC): ICD-10-CM

## 2023-10-03 DIAGNOSIS — F43.10 PTSD (POST-TRAUMATIC STRESS DISORDER): ICD-10-CM

## 2023-10-03 DIAGNOSIS — F10.11 ALCOHOL ABUSE, IN REMISSION: ICD-10-CM

## 2023-10-03 DIAGNOSIS — F12.90 MARIJUANA USE: Chronic | ICD-10-CM

## 2023-10-03 DIAGNOSIS — F41.1 GENERALIZED ANXIETY DISORDER: ICD-10-CM

## 2023-10-03 PROCEDURE — 90834 PSYTX W PT 45 MINUTES: CPT | Performed by: COUNSELOR
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
## 2023-10-04 RX ORDER — TIOTROPIUM BROMIDE AND OLODATEROL 3.124; 2.736 UG/1; UG/1
SPRAY, METERED RESPIRATORY (INHALATION)
Qty: 4 G | Refills: 11 | Status: SHIPPED | OUTPATIENT
Start: 2023-10-04

## 2023-10-06 ENCOUNTER — TELEMEDICINE (OUTPATIENT)
Dept: BEHAVIORAL/MENTAL HEALTH CLINIC | Facility: CLINIC | Age: 58
End: 2023-10-06
Payer: COMMERCIAL

## 2023-10-06 DIAGNOSIS — F41.1 GENERALIZED ANXIETY DISORDER: ICD-10-CM

## 2023-10-06 DIAGNOSIS — F31.81 MODERATE MIXED BIPOLAR II DISORDER (HCC): Primary | ICD-10-CM

## 2023-10-06 DIAGNOSIS — F43.10 PTSD (POST-TRAUMATIC STRESS DISORDER): ICD-10-CM

## 2023-10-06 DIAGNOSIS — F12.90 MARIJUANA USE: Chronic | ICD-10-CM

## 2023-10-06 DIAGNOSIS — F10.11 ALCOHOL ABUSE, IN REMISSION: ICD-10-CM

## 2023-10-06 DIAGNOSIS — F17.210 CIGARETTE NICOTINE DEPENDENCE WITHOUT COMPLICATION: ICD-10-CM

## 2023-10-06 PROCEDURE — 90853 GROUP PSYCHOTHERAPY: CPT | Performed by: COUNSELOR

## 2023-10-06 NOTE — PSYCH
Virtual Regular Visit    Verification of patient location:    Patient is located at Home in the following state in which I hold an active license [place]      Assessment/Plan:    Problem List Items Addressed This Visit        Other    Marijuana use (Chronic)    Moderate mixed bipolar II disorder (HCC) - Primary    Generalized anxiety disorder    PTSD (post-traumatic stress disorder)    Alcohol abuse, in remission    Cigarette nicotine dependence without complication       Goals addressed in session: Goal 1 and Goal 2          Reason for visit is No chief complaint on file. Encounter provider Pinky Burk [facility]    Provider located at [address] [place] [id number] [phone]      Recent Visits  Date Type Provider Dept   10/03/23 [facility], [address]   [facility] [address] [place] [address] [facility] Therapist HALIE   Showing recent visits within past 7 days and meeting all other requirements  Future Appointments  No visits were found meeting these conditions. Showing future appointments within next 150 days and meeting all other requirements       The patient was identified by name and date of birth. Arnold Carter was informed that this is a telemedicine visit and that the visit is being conducted [employer]. She agrees to proceed. .  My office door was closed. Other methods to assure confidentiality were taken informing about other group members. The patient was notified the following individuals were present in the room other virtual group atendees. She acknowledged consent and understanding of privacy and security of the video platform. The patient has agreed to participate and understands they can discontinue the visit at any time. Patient is aware this is a billable service.      Subjective  Vicente Miranda Kirsten Huerta is a 56 y.o. female  . Data: [facility] Karina attended the Relaxation group and was guided through EFT tapping on pain, diaphragmatic breathing, other Vagus nerve stimulation techniques, and progressive muscle relaxation. Furthermore, attendees were guided through mindfulness exercises  for connecting to the inner child, reparenting the inner child, providing the unconditional support and reassurance for safety, for forgiveness and moving on with self-love and confidence, and with affirmations for self-compassion, praise for the best self-version and the qualities that contribute to resilience. Psychoeducation, EFT, Mindfulness-based, and group approach were used to support the cohesion and maintain the serene environment for the exercise’s purpose. Assessment: [facility] Congress followed through guidance and completed the exercises, showed willingness to continue learning and practicing self-care in weekly Relaxation Group as well as individual therapy. Plan: [facility] Congress will practice deep breathing and other techniques based on each occasion to counter stress and will attend the next group to be able to take part in further guided exercises on chosen topics to build on the techniques learned in the beginning.        HPI     Past Medical History:   Diagnosis Date   • Bilateral carpal tunnel syndrome 08/20/2019   • Carpal tunnel syndrome of right wrist 10/30/2019    Added automatically from request for surgery [id number]   • Chlamydia    • COPD (chronic obstructive pulmonary disease) ([address])    • Depression    • Hypertension    • Varicella        Past Surgical History:   Procedure Laterality Date   • BREAST BIOPSY Left 20yrs ago benign   • EGD     • OOPHORECTOMY Right    • [place] NDSC WRST SURG W/RLS TRANSVRS CARPL LIGM Right 01/21/2020    Procedure: RELEASE CARPAL TUNNEL ENDOSCOPIC;  Surgeon: Clau Vargas MD;  [facility];  Service: Orthopedics   • [place] [address] WRST SURG W/RLS TRANSVRS CARPL LIGM Left 01/28/2020 Procedure: RELEASE CARPAL TUNNEL ENDOSCOPIC;  Surgeon: Shima Gastelum MD;  [facility];  Service: Orthopedics       Current Outpatient Medications   Medication Sig Dispense Refill   • albuterol (PROVENTIL HFA,VENTOLIN HFA) 90 mcg/act inhaler inhale 2 puffs by mouth and INTO THE LUNGS every 6 hours IF NEEDED FOR WHEEZING. 8.5 g 2   • buPROPion (Wellbutrin XL) 150 mg 24 hr tablet Take 1 tablet (150 mg total) by mouth daily 30 tablet 1   • cholecalciferol (VITAMIN D3) 400 units tablet Take 400 Units by mouth daily     • FLUoxetine (PROzac) 20 mg capsule Take 2 capsules (40 mg total) by mouth daily 60 capsule 1   • fluticasone (FLONASE) 50 mcg/act nasal spray instill 1 spray into each nostril once daily 16 g 2   • lamoTRIgine (LaMICtal) 200 MG tablet Take 1 tablet (200 mg total) by mouth daily 30 tablet 1   • levocetirizine (XYZAL) 5 MG tablet take 1 tablet by mouth every evening 30 tablet 4   • omeprazole (PriLOSEC) 20 mg delayed release capsule take 1 capsule by mouth twice a day 60 capsule 5   • senna-docusate sodium (SENOKOT-S) 8.6-50 mg per tablet Take 3 tablets by mouth as needed       • Stiolto Respimat 2.5-2.5 MCG/ACT inhaler inhale 2 puffs by mouth and INTO THE LUNGS once daily 4 g 11     No current facility-administered medications for this visit. No Known Allergies    Review of Systems    Video Exam    There were no vitals filed for this visit.     Physical Exam     10/06/23  Start Time: 0400  Stop Time: 0500  Total Visit Time: 60 minutes

## 2023-10-09 ENCOUNTER — TELEMEDICINE (OUTPATIENT)
Dept: PSYCHIATRY | Facility: CLINIC | Age: 58
End: 2023-10-09
Payer: COMMERCIAL

## 2023-10-09 ENCOUNTER — TELEPHONE (OUTPATIENT)
Dept: PSYCHIATRY | Facility: CLINIC | Age: 58
End: 2023-10-09

## 2023-10-09 DIAGNOSIS — F43.10 PTSD (POST-TRAUMATIC STRESS DISORDER): ICD-10-CM

## 2023-10-09 DIAGNOSIS — F31.32 BIPOLAR AFFECTIVE DISORDER, CURRENTLY DEPRESSED, MODERATE (HCC): ICD-10-CM

## 2023-10-09 DIAGNOSIS — F41.1 GENERALIZED ANXIETY DISORDER: ICD-10-CM

## 2023-10-09 PROCEDURE — 99214 OFFICE O/P EST MOD 30 MIN: CPT

## 2023-10-09 RX ORDER — FLUOXETINE HYDROCHLORIDE 20 MG/1
20 CAPSULE ORAL DAILY
Qty: 30 CAPSULE | Refills: 1 | Status: SHIPPED | OUTPATIENT
Start: 2023-10-09 | End: 2023-12-08

## 2023-10-09 RX ORDER — LAMOTRIGINE 200 MG/1
200 TABLET ORAL DAILY
Qty: 30 TABLET | Refills: 1 | Status: SHIPPED | OUTPATIENT
Start: 2023-10-09 | End: 2023-12-08

## 2023-10-09 RX ORDER — BUPROPION HYDROCHLORIDE 150 MG/1
150 TABLET ORAL DAILY
Qty: 30 TABLET | Refills: 1 | Status: SHIPPED | OUTPATIENT
Start: 2023-10-09 | End: 2023-12-08

## 2023-10-09 NOTE — PATIENT INSTRUCTIONS
Decrease prozac to 20mg daily  Continue remaining medications and remember to them daily! Please present for your previously scheduled appointment approximately 15 minutes prior to appointment time. If you are running late or are unable to attend your appointment, please call our [facility] office at [phone] or our [facility] office at [phone] if applicable to notify the office of your absence. If you are in psychological crisis including not limited to suicidal/homicidal thoughts or thoughts of self-injury, do not hesitate to call/contact your [address] [place] [address] hotline (see below) or attend to the nearest emergency department.   [facility] [place], operated [facility] Crisis: [address] Crisis: [phone]  [address] Crisis: [address] Crisis: [address] Crisis: [address] Crisis: [address] Crisis: [phone]  National Suicide Prevention Hotline: 8-549.331.7254
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
## 2023-10-09 NOTE — TELEPHONE ENCOUNTER
Patient contacted the office inquiring about how to join the virtual visit for 10/9 at 3:00p. Writer explained she is set up to receive an e-mailed link to join. Writer advised the patient to contact the office if she has any difficulty joining at 3:00p.

## 2023-10-12 ENCOUNTER — TELEMEDICINE (OUTPATIENT)
Dept: BEHAVIORAL/MENTAL HEALTH CLINIC | Facility: CLINIC | Age: 58
End: 2023-10-12
Payer: COMMERCIAL

## 2023-10-12 DIAGNOSIS — F10.11 ALCOHOL ABUSE, IN REMISSION: ICD-10-CM

## 2023-10-12 DIAGNOSIS — F41.1 GENERALIZED ANXIETY DISORDER: ICD-10-CM

## 2023-10-12 DIAGNOSIS — F31.81 MODERATE MIXED BIPOLAR II DISORDER (HCC): Primary | ICD-10-CM

## 2023-10-12 DIAGNOSIS — F43.10 PTSD (POST-TRAUMATIC STRESS DISORDER): ICD-10-CM

## 2023-10-12 DIAGNOSIS — F17.210 CIGARETTE NICOTINE DEPENDENCE WITHOUT COMPLICATION: ICD-10-CM

## 2023-10-12 DIAGNOSIS — F12.90 MARIJUANA USE: Chronic | ICD-10-CM

## 2023-10-12 PROCEDURE — 90834 PSYTX W PT 45 MINUTES: CPT | Performed by: COUNSELOR

## 2023-10-12 NOTE — PSYCH
Virtual Regular Visit    Verification of patient location:    Patient is located at Home in the following state in which I hold an active license [place]      Assessment/Plan:    Problem List Items Addressed This Visit          Other    Marijuana use (Chronic)    Moderate mixed bipolar II disorder (HCC) - Primary    Generalized anxiety disorder    PTSD (post-traumatic stress disorder)    Alcohol abuse, in remission    Cigarette nicotine dependence without complication       Goals addressed in session: Goal 1 and Goal 2          Reason for visit is No chief complaint on file. Encounter provider Neto Gastelum [facility]    Provider located at [address] [place] [id number] [phone]      Recent Visits  Date Type Provider Dept   10/06/23 Zoey, [address] [place] [address] [facility]ist LADARIUS   Showing recent visits within past 7 days and meeting all other requirements  Future Appointments  No visits were found meeting these conditions. Showing future appointments within next 150 days and meeting all other requirements       The patient was identified by name and date of birth. Bennie Robert was informed that this is a telemedicine visit and that the visit is being conducted throughthe [employer] platform. She agrees to proceed. .  My office door was closed. No one else was in the room. She acknowledged consent and understanding of privacy and security of the video platform. The patient has agreed to participate and understands they can discontinue the visit at any time. Patient is aware this is a billable service. Subjective  Bennie Robert is a 56 y.o. female  .       HPI     Past Medical History:   Diagnosis Date    Bilateral carpal tunnel syndrome 08/20/2019    Carpal tunnel syndrome of right wrist 10/30/2019    Added automatically from request for surgery [id number]    Chlamydia     COPD (chronic obstructive pulmonary disease) ([facility])     Depression     Hypertension     Varicella        Past Surgical History:   Procedure Laterality Date    BREAST BIOPSY Left 20yrs ago benign    EGD      OOPHORECTOMY Right     [place] NDSC WRST SURG W/RLS TRANSVRS CARPL LIGM Right 01/21/2020    Procedure: RELEASE CARPAL TUNNEL ENDOSCOPIC;  Surgeon: Arabella Bravo MD;  [facility];  Service: Orthopedics    [place] [address] WRST SURG W/RLS TRANSVRS CARPL LIGM Left 01/28/2020    Procedure: RELEASE CARPAL TUNNEL ENDOSCOPIC;  Surgeon: Arabella Bravo MD;  [facility];  Service: Orthopedics       Current Outpatient Medications   Medication Sig Dispense Refill    albuterol (PROVENTIL HFA,VENTOLIN HFA) 90 mcg/act inhaler inhale 2 puffs by mouth and INTO THE LUNGS every 6 hours IF NEEDED FOR WHEEZING. 8.5 g 2    buPROPion (Wellbutrin XL) 150 mg 24 hr tablet Take 1 tablet (150 mg total) by mouth daily 30 tablet 1    cholecalciferol (VITAMIN D3) 400 units tablet Take 400 Units by mouth daily      FLUoxetine (PROzac) 20 mg capsule Take 1 capsule (20 mg total) by mouth daily 30 capsule 1    fluticasone (FLONASE) 50 mcg/act nasal spray instill 1 spray into each nostril once daily 16 g 2    lamoTRIgine (LaMICtal) 200 MG tablet Take 1 tablet (200 mg total) by mouth daily 30 tablet 1    levocetirizine (XYZAL) 5 MG tablet take 1 tablet by mouth every evening 30 tablet 4    omeprazole (PriLOSEC) 20 mg delayed release capsule take 1 capsule by mouth twice a day 60 capsule 5    senna-docusate sodium (SENOKOT-S) 8.6-50 mg per tablet Take 3 tablets by mouth as needed        Stiolto Respimat 2.5-2.5 MCG/ACT inhaler inhale 2 puffs by mouth and INTO THE LUNGS once daily 4 g 11     No current facility-administered medications for this visit. No Known Allergies    Review of Systems    Video Exam    There were no vitals filed for this visit.     Physical Exam     10/12/23  Start Time: 0100  Stop Time: 0143  Total Visit Time: 37 minutes

## 2023-10-12 NOTE — PSYCH
Behavioral Health Psychotherapy Progress Note    Psychotherapy Provided: Individual Psychotherapy     1. Moderate mixed bipolar II disorder (HCC)        2. Generalized anxiety disorder        3. PTSD (post-traumatic stress disorder)        4. Cigarette nicotine dependence without complication        5. Marijuana use        6. Alcohol abuse, in remission            Goals addressed in session: Goal 1 and Goal 2     DATA: Zach Edmond discussed her impression from her new psychiatrist and her medication changes. She spoke about Melatonin's effects on her sleep, about her Prozac decrease, and her quality of sleep, eating habits, and self-care activities. She showed better mood than other times, and she did not get upset or cry this time. Zach Edmond was able to refer back to one of her notes she took and stated that she is glad that she does that. During this session, this clinician used the following therapeutic modalities: Client-centered Therapy, Cognitive Behavioral Therapy, Dialectical Behavior Therapy, Mindfulness-based Strategies, and Supportive Psychotherapy    Substance Abuse was not addressed during this session. If the client is diagnosed with a co-occurring substance use disorder, please indicate any changes in the frequency or amount of use: NA. Stage of change for addressing substance use diagnoses: Maintenance    ASSESSMENT:  Mario Kenny presents with a Euthymic/ normal, Anxious, and Depressed mood. her affect is Normal range and intensity and Tearful, at times, which is congruent, with her mood and the content of the session. The client has made progress on their goals. Zach Edmond seemed progressing towards her self-awareness of her symptoms and cognitive decline, which is helpful for her mood, irritability, and engagement in cognitive exercises. Mario Kenny presents with a low risk of suicide, low risk of self-harm, and none risk of harm to others.     For any risk assessment that surpasses a "low" rating, a safety plan must be developed. A safety plan was indicated: no  If yes, describe in detail NA    PLAN: Between sessions, Hair Ramirez will apply her learned Mindfulness skills to deal with intense emotions and will attend her weekly individual and weekly group sessions to be able to work on her cognition, emotion regulation, and mood improvement. At the next session, the therapist will use Client-centered Therapy, Cognitive Behavioral Therapy, Dialectical Behavior Therapy, Mindfulness-based Strategies, Supportive Psychotherapy, and Somatic Experiencing  to address self-care, how emotions are sensed into body, and how . Behavioral Health Treatment Plan and Discharge Planning: Hair Ramirez is aware of and agrees to continue to work on their treatment plan. They have identified and are working toward their discharge goals.  yes    Visit start and stop times:    10/12/23  Start Time: 0100  Stop Time: 0143  Total Visit Time: 43 minutes

## 2023-10-13 ENCOUNTER — TELEMEDICINE (OUTPATIENT)
Dept: BEHAVIORAL/MENTAL HEALTH CLINIC | Facility: CLINIC | Age: 58
End: 2023-10-13
Payer: COMMERCIAL

## 2023-10-13 DIAGNOSIS — F12.90 MARIJUANA USE: Chronic | ICD-10-CM

## 2023-10-13 DIAGNOSIS — F31.81 MODERATE MIXED BIPOLAR II DISORDER (HCC): Primary | ICD-10-CM

## 2023-10-13 DIAGNOSIS — F41.1 GENERALIZED ANXIETY DISORDER: ICD-10-CM

## 2023-10-13 DIAGNOSIS — F43.10 PTSD (POST-TRAUMATIC STRESS DISORDER): ICD-10-CM

## 2023-10-13 DIAGNOSIS — F10.11 ALCOHOL ABUSE, IN REMISSION: ICD-10-CM

## 2023-10-13 DIAGNOSIS — F17.210 CIGARETTE NICOTINE DEPENDENCE WITHOUT COMPLICATION: ICD-10-CM

## 2023-10-13 PROCEDURE — 90853 GROUP PSYCHOTHERAPY: CPT | Performed by: COUNSELOR

## 2023-10-13 NOTE — PSYCH
Virtual Regular Visit    Verification of patient location:    Patient is located at Home in the following state in which I hold an active license [place]      Assessment/Plan:    Problem List Items Addressed This Visit          Other    Marijuana use (Chronic)    Moderate mixed bipolar II disorder (HCC) - Primary    Generalized anxiety disorder    PTSD (post-traumatic stress disorder)    Alcohol abuse, in remission    Cigarette nicotine dependence without complication       Goals addressed in session: Goal 1 and Goal 2          Reason for visit is No chief complaint on file. Encounter provider FARHAN Mast [facility]    Provider located at [address] [place] [id number] [phone]      Recent Visits  Date Type Provider Dept   10/12/23 [facility], [address]   10/06/23 [place], [address] [place] [address] [facility] Therapist LADARIUS   Showing recent visits within past 7 days and meeting all other requirements  Future Appointments  No visits were found meeting these conditions. Showing future appointments within next 150 days and meeting all other requirements       The patient was identified by name and date of birth. Troy Hadley was informed that this is a telemedicine visit and that the visit is being conducted [employer]. She agrees to proceed. .  My office door was closed. The patient was notified the following individuals were present in the room other virtual group attendees. She acknowledged consent and understanding of privacy and security of the video platform. The patient has agreed to participate and understands they can discontinue the visit at any time. Patient is aware this is a billable service. Delvis Hadley is a 56 y.o. female  .     Data: Anai Adams attended the Relaxation group and was guided through EFT tapping on anxiety, diaphragmatic breathing, other Vagus nerve stimulation techniques, and progressive muscle relaxation. Furthermore, attendees were guided through mindfulness exercises  for accessing one's potential and boosting the sense of agency, riding on a tranquility train guided imagery, and with affirmations for self-love and confidence, strong sense of resilience, and focusing on learning benefits and what to be grateful for, what one has in their life. Psychoeducation, EFT, Mindfulness-based, and group approach were used to support the cohesion and maintain the serene environment for the exercise's purpose. Assessment: Rosita Cho followed through guidance and completed the exercises, showed willingness to continue learning and practicing self-care in weekly Relaxation Group as well as individual therapy. Plan: Rosita Cho will practice deep breathing and other techniques based on each occasion to counter stress and will attend the next group to be able to take part in further guided exercises on chosen topics to build on the techniques learned in the beginning.     HPI     Past Medical History:   Diagnosis Date    Bilateral carpal tunnel syndrome 08/20/2019    Carpal tunnel syndrome of right wrist 10/30/2019    Added automatically from request for surgery [id number]    Chlamydia     COPD (chronic obstructive pulmonary disease) ([facility])     Depression     Hypertension     Varicella        Past Surgical History:   Procedure Laterality Date    BREAST BIOPSY Left 20yrs ago benign    EGD      OOPHORECTOMY Right     [place] NDSC WRST SURG W/RLS TRANSVRS CARPL LIGM Right 01/21/2020    Procedure: RELEASE CARPAL TUNNEL ENDOSCOPIC;  Surgeon: Leti Lee MD;  [facility];  Service: Orthopedics    [place] [address] WRST SURG W/RLS TRANSVRS CARPL LIGM Left 01/28/2020    Procedure: RELEASE CARPAL TUNNEL ENDOSCOPIC;  Surgeon: Leti Lee MD;  [facility];  Service: Orthopedics       Current Outpatient Medications   Medication Sig Dispense Refill    albuterol (PROVENTIL HFA,VENTOLIN HFA) 90 mcg/act inhaler inhale 2 puffs by mouth and INTO THE LUNGS every 6 hours IF NEEDED FOR WHEEZING. 8.5 g 2    buPROPion (Wellbutrin XL) 150 mg 24 hr tablet Take 1 tablet (150 mg total) by mouth daily 30 tablet 1    cholecalciferol (VITAMIN D3) 400 units tablet Take 400 Units by mouth daily      FLUoxetine (PROzac) 20 mg capsule Take 1 capsule (20 mg total) by mouth daily 30 capsule 1    fluticasone (FLONASE) 50 mcg/act nasal spray instill 1 spray into each nostril once daily 16 g 2    lamoTRIgine (LaMICtal) 200 MG tablet Take 1 tablet (200 mg total) by mouth daily 30 tablet 1    levocetirizine (XYZAL) 5 MG tablet take 1 tablet by mouth every evening 30 tablet 4    omeprazole (PriLOSEC) 20 mg delayed release capsule take 1 capsule by mouth twice a day 60 capsule 5    senna-docusate sodium (SENOKOT-S) 8.6-50 mg per tablet Take 3 tablets by mouth as needed        Stiolto Respimat 2.5-2.5 MCG/ACT inhaler inhale 2 puffs by mouth and INTO THE LUNGS once daily 4 g 11     No current facility-administered medications for this visit. No Known Allergies    Review of Systems    Video Exam    There were no vitals filed for this visit.     Physical Exam     10/13/23  Start Time: 0400  Stop Time: 0500  Total Visit Time: 60 minutes

## 2023-10-18 ENCOUNTER — TELEMEDICINE (OUTPATIENT)
Dept: BEHAVIORAL/MENTAL HEALTH CLINIC | Facility: CLINIC | Age: 58
End: 2023-10-18
Payer: COMMERCIAL

## 2023-10-18 DIAGNOSIS — F10.11 ALCOHOL ABUSE, IN REMISSION: ICD-10-CM

## 2023-10-18 DIAGNOSIS — F17.210 CIGARETTE NICOTINE DEPENDENCE WITHOUT COMPLICATION: ICD-10-CM

## 2023-10-18 DIAGNOSIS — F41.1 GENERALIZED ANXIETY DISORDER: ICD-10-CM

## 2023-10-18 DIAGNOSIS — F12.90 MARIJUANA USE: ICD-10-CM

## 2023-10-18 DIAGNOSIS — F31.81 MODERATE MIXED BIPOLAR II DISORDER (HCC): Primary | ICD-10-CM

## 2023-10-18 DIAGNOSIS — F43.10 PTSD (POST-TRAUMATIC STRESS DISORDER): ICD-10-CM

## 2023-10-18 PROCEDURE — 90834 PSYTX W PT 45 MINUTES: CPT | Performed by: COUNSELOR

## 2023-10-18 NOTE — PSYCH
Behavioral Health Psychotherapy Progress Note    Psychotherapy Provided: Individual Psychotherapy     1. Moderate mixed bipolar II disorder (HCC)          2. PTSD (post-traumatic stress disorder)          3. Generalized anxiety disorder          4. Cigarette nicotine dependence without complication          5. Alcohol abuse, in remission          6. Marijuana use         Goals addressed in session: Goal 1 and Goal 2     DATA: Ty Soliman discussed her current struggle to focus on something else and stop sinking in negative self-talk. During this session, this clinician used the following therapeutic modalities: Client-centered Therapy, Cognitive Behavioral Therapy, Dialectical Behavior Therapy, Mindfulness-based Strategies, and Supportive Psychotherapy    Substance Abuse was not addressed during this session. If the client is diagnosed with a co-occurring substance use disorder, please indicate any changes in the frequency or amount of use: NA. Stage of change for addressing substance use diagnoses: Maintenance    ASSESSMENT:  Suhas Ying presents with a Euthymic/ normal, Anxious, and Depressed mood. her affect is Normal range and intensity, tearful at times, which is congruent, with her mood and the content of the session. The client has made progress on their goals. Ty Soliman seemed more focused this time, yet more depressed and anxious. Suhas Ying presents with a low risk of suicide, low risk of self-harm, and none risk of harm to others. For any risk assessment that surpasses a "low" rating, a safety plan must be developed. A safety plan was indicated: no  If yes, describe in detail NA    PLAN: Between sessions, Suhas Ying will use her learned Mindfulness skills to attend to intense emotions in healthy ways.  At the next session, the therapist will use Client-centered Therapy, Cognitive Behavioral Therapy, Dialectical Behavior Therapy, Mindfulness-based Strategies, and Supportive Psychotherapy to address self-care, cognitive struggle, and focusing on things to be grateful for. Behavioral Health Treatment Plan and Discharge Planning: Kevinjaleesa Esther is aware of and agrees to continue to work on their treatment plan. They have identified and are working toward their discharge goals.  yes    Visit start and stop times:    10/18/23  Start Time: 1200  Stop Time: 1244  Total Visit Time: 44 minutes

## 2023-10-19 ENCOUNTER — OFFICE VISIT (OUTPATIENT)
Dept: OBGYN CLINIC | Facility: CLINIC | Age: 58
End: 2023-10-19

## 2023-10-19 VITALS
HEART RATE: 93 BPM | SYSTOLIC BLOOD PRESSURE: 145 MMHG | TEMPERATURE: 99.3 F | HEIGHT: 60 IN | WEIGHT: 107.8 LBS | DIASTOLIC BLOOD PRESSURE: 96 MMHG | BODY MASS INDEX: 21.17 KG/M2

## 2023-10-19 DIAGNOSIS — M17.0 BILATERAL PRIMARY OSTEOARTHRITIS OF KNEE: Primary | ICD-10-CM

## 2023-10-19 RX ORDER — ROPIVACAINE HYDROCHLORIDE 5 MG/ML
10 INJECTION, SOLUTION EPIDURAL; INFILTRATION; PERINEURAL
Status: COMPLETED | OUTPATIENT
Start: 2023-10-19 | End: 2023-10-19

## 2023-10-19 RX ORDER — TRIAMCINOLONE ACETONIDE 40 MG/ML
40 INJECTION, SUSPENSION INTRA-ARTICULAR; INTRAMUSCULAR
Status: COMPLETED | OUTPATIENT
Start: 2023-10-19 | End: 2023-10-19

## 2023-10-19 RX ORDER — IBUPROFEN 200 MG
200 TABLET ORAL EVERY 6 HOURS PRN
COMMUNITY

## 2023-10-19 RX ADMIN — TRIAMCINOLONE ACETONIDE 40 MG: 40 INJECTION, SUSPENSION INTRA-ARTICULAR; INTRAMUSCULAR at 14:00

## 2023-10-19 RX ADMIN — ROPIVACAINE HYDROCHLORIDE 10 ML: 5 INJECTION, SOLUTION EPIDURAL; INFILTRATION; PERINEURAL at 14:00

## 2023-10-19 NOTE — PROGRESS NOTES
[facility] [address] [id number] [phone]      Assessment:  1. Bilateral primary osteoarthritis of knee        Plan:  Patient Instructions   F/u  needed  Home exercises. Icing/heat/OTC pain meds as needed. B knee steroid injections given   Return if symptoms worsen or fail to improve. Chief Complaint:  Chief Complaint   Patient presents with    Left Knee - Follow-up    Right Knee - Follow-up       Subjective:   HPI    Patient ID: Jt Carpenter is a 56 y.o. female     Here c/o B knee pain  Pain started about 6 wks  Denies injury  Heavy dull pain  L worse than R  Taking ibuprofen  Standing worsens pain    Review of Systems   Constitutional:  Negative for fatigue and fever. Respiratory:  Negative for shortness of breath. Cardiovascular:  Negative for chest pain. Gastrointestinal:  Negative for abdominal pain and nausea. Genitourinary:  Negative for dysuria. Musculoskeletal:  Positive for arthralgias. Skin:  Negative for rash and wound. Neurological:  Negative for weakness and headaches. Objective:  Vitals:  /96 (BP Location: Left arm, Patient Position: Sitting, Cuff Size: Standard)   Pulse 93   Temp 99.3 °F (37.4 °C) (Tympanic)   Ht 5' (1.524 m)   Wt 48.9 kg (107 lb 12.8 oz)   LMP  (LMP Unknown)   BMI 21.05 kg/m²     The following portions of the patient's history were reviewed and updated as appropriate: allergies, current medications, past family history, past medical history, past social history, past surgical history, and problem list.    Physical exam:  Physical Exam  Constitutional:       Appearance: Normal appearance. She is normal weight. HENT:      Head: Normocephalic. Eyes:      Extraocular Movements: Extraocular movements intact. Pulmonary:      Effort: Pulmonary effort is normal.   Musculoskeletal:      Cervical back: Normal range of motion. Right knee: No effusion. Instability Tests: Medial Toni test positive and lateral Toni test positive. Left knee: No effusion. Instability Tests: Medial Toni test positive and lateral Toni test positive. Skin:     General: Skin is warm and dry. Neurological:      General: No focal deficit present. Mental Status: She is alert and oriented to person, place, and time. Mental status is at baseline. Psychiatric:         Mood and Affect: Mood normal.         Behavior: Behavior normal.         Thought Content: Thought content normal.         Judgment: Judgment normal.       Right Knee Exam     Tenderness   The patient is experiencing no tenderness. Range of Motion   The patient has normal right knee ROM. Tests   Toni:  Medial - positive Lateral - positive  Varus: negative Valgus: negative    Other   Swelling: none  Effusion: no effusion present      Left Knee Exam     Tenderness   The patient is experiencing tenderness in the lateral joint line. Range of Motion   The patient has normal left knee ROM. Tests   Toni:  Medial - positive Lateral - positive  Varus: negative Valgus: negative    Other   Swelling: none  Effusion: no effusion present          Observations   Left Knee   Negative for effusion. Right Knee   Negative for effusion. Large joint arthrocentesis: L knee  Universal Protocol:  Consent: Verbal consent obtained. Risks and benefits: risks, benefits and alternatives were discussed  Consent given by: patient  Time out: Immediately prior to procedure a "time out" was called to verify the correct patient, procedure, equipment, support staff and site/side marked as required.   Timeout called at: 10/19/2023 2:17 PM.  Site marked: the operative site was marked  Supporting Documentation  Indications: pain   Procedure Details  Location: knee - L knee  Preparation: Patient was prepped and draped in the usual sterile fashion  Needle size: 25 G  Ultrasound guidance: no  Approach: anterolateral  Medications administered: 40 mg triamcinolone acetonide 40 mg/mL; 10 mL ropivacaine 0.5 %    Patient tolerance: patient tolerated the procedure well with no immediate complications  Dressing:  Sterile dressing applied      Large joint arthrocentesis: R knee  Universal Protocol:  Consent: Verbal consent obtained. Risks and benefits: risks, benefits and alternatives were discussed  Consent given by: patient  Time out: Immediately prior to procedure a "time out" was called to verify the correct patient, procedure, equipment, support staff and site/side marked as required.   Timeout called at: 10/19/2023 2:17 PM.  Site marked: the operative site was marked  Supporting Documentation  Indications: pain   Procedure Details  Location: knee - R knee  Preparation: Patient was prepped and draped in the usual sterile fashion  Needle size: 25 G  Ultrasound guidance: no  Approach: anterolateral  Medications administered: 40 mg triamcinolone acetonide 40 mg/mL; 10 mL ropivacaine 0.5 %    Patient tolerance: patient tolerated the procedure well with no immediate complications  Dressing:  Sterile dressing applied              Derek Ge MD

## 2023-10-20 ENCOUNTER — TELEMEDICINE (OUTPATIENT)
Dept: BEHAVIORAL/MENTAL HEALTH CLINIC | Facility: CLINIC | Age: 58
End: 2023-10-20

## 2023-10-20 DIAGNOSIS — F12.90 MARIJUANA USE: Chronic | ICD-10-CM

## 2023-10-20 DIAGNOSIS — F17.210 CIGARETTE NICOTINE DEPENDENCE WITHOUT COMPLICATION: ICD-10-CM

## 2023-10-20 DIAGNOSIS — F43.10 PTSD (POST-TRAUMATIC STRESS DISORDER): ICD-10-CM

## 2023-10-20 DIAGNOSIS — F10.11 ALCOHOL ABUSE, IN REMISSION: ICD-10-CM

## 2023-10-20 DIAGNOSIS — F41.1 GENERALIZED ANXIETY DISORDER: ICD-10-CM

## 2023-10-20 DIAGNOSIS — F31.81 MODERATE MIXED BIPOLAR II DISORDER (HCC): Primary | ICD-10-CM

## 2023-10-20 NOTE — PSYCH
Virtual Regular Visit    Verification of patient location:    Patient is located at Home in the following state in which I hold an active license [place]      Assessment/Plan:    Problem List Items Addressed This Visit          Other    Marijuana use (Chronic)    Moderate mixed bipolar II disorder (HCC) - Primary    Generalized anxiety disorder    PTSD (post-traumatic stress disorder)    Alcohol abuse, in remission    Cigarette nicotine dependence without complication       Goals addressed in session: Goal 1 and Goal 2          Reason for visit is No chief complaint on file. Encounter provider Krytsyna Gonsalez [facility] [place]    Provider located at [address] [place] [id number] [phone]      Recent Visits  Date Type Provider Dept   10/18/23 [facility], [address]   10/13/23 [facility], [address] [place] [address] [facility]ist [facility]   Showing recent visits within past 7 days and meeting all other requirements  Future Appointments  No visits were found meeting these conditions. Showing future appointments within next 150 days and meeting all other requirements       The patient was identified by name and date of birth. Dhiraj Quiroz was informed that this is a telemedicine visit and that the visit is being conducted [employer]. She agrees to proceed. .  My office door was closed. The patient was notified the following individuals were present in the room other virtual group attendees. She acknowledged consent and understanding of privacy and security of the video platform. The patient has agreed to participate and understands they can discontinue the visit at any time. Patient is aware this is a billable service. Subjective  Dhiraj Quiroz is a 56 y.o. female  .     Data: Yahir Cervantes attended the Relaxation group and was guided through EFT tapping on pain, diaphragmatic breathing, other Vagus nerve stimulation techniques, and progressive muscle relaxation. Furthermore, attendees were guided through mindfulness exercises  for attending to psychological and physical pain, resentment, and guided imagery for safe place in the mind as a lake, and with affirmations for self-compassion, forgiveness, acceptance, and focusing on inner resources and strengths. Psychoeducation, EFT, Mindfulness-based, and group approach were used to support the cohesion and maintain the serene environment for the exercise's purpose. Assessment: Tanya Carroll followed through guidance and completed the exercises, showed willingness to continue learning and practicing self-care in weekly Relaxation Group as well as individual therapy. Plan: Tanya Carroll will practice deep breathing and other techniques based on each occasion to counter stress and will attend the next group to be able to take part in further guided exercises on chosen topics to build on the techniques learned in the beginning.     HPI     Past Medical History:   Diagnosis Date    Bilateral carpal tunnel syndrome 08/20/2019    Carpal tunnel syndrome of right wrist 10/30/2019    Added automatically from request for surgery [id number]    Chlamydia     COPD (chronic obstructive pulmonary disease) ([address] [facility])     Depression     Hypertension     Varicella        Past Surgical History:   Procedure Laterality Date    BREAST BIOPSY Left 20yrs ago benign    EGD      OOPHORECTOMY Right     [place] [facility] WRST SURG W/RLS TRANSVRS CARPL LIGM Right 01/21/2020    Procedure: RELEASE CARPAL TUNNEL ENDOSCOPIC;  Surgeon: Clint Lopez MD;  [facility];  Service: Orthopedics    [place] [address] WRST SURG W/RLS TRANSVRS CARPL LIGM Left 01/28/2020    Procedure: RELEASE CARPAL TUNNEL ENDOSCOPIC;  Surgeon: Clint Lopez MD;  [facility];  Service: Orthopedics       Current Outpatient Medications   Medication Sig Dispense Refill albuterol (PROVENTIL HFA,VENTOLIN HFA) 90 mcg/act inhaler inhale 2 puffs by mouth and INTO THE LUNGS every 6 hours IF NEEDED FOR WHEEZING. 8.5 g 2    buPROPion (Wellbutrin XL) 150 mg 24 hr tablet Take 1 tablet (150 mg total) by mouth daily 30 tablet 1    cholecalciferol (VITAMIN D3) 400 units tablet Take 400 Units by mouth daily      FLUoxetine (PROzac) 20 mg capsule Take 1 capsule (20 mg total) by mouth daily 30 capsule 1    fluticasone (FLONASE) 50 mcg/act nasal spray instill 1 spray into each nostril once daily 16 g 2    ibuprofen (MOTRIN) 200 mg tablet Take 200 mg by mouth every 6 (six) hours as needed for mild pain      lamoTRIgine (LaMICtal) 200 MG tablet Take 1 tablet (200 mg total) by mouth daily 30 tablet 1    levocetirizine (XYZAL) 5 MG tablet take 1 tablet by mouth every evening 30 tablet 4    omeprazole (PriLOSEC) 20 mg delayed release capsule take 1 capsule by mouth twice a day 60 capsule 5    senna-docusate sodium (SENOKOT-S) 8.6-50 mg per tablet Take 3 tablets by mouth as needed        Stiolto Respimat 2.5-2.5 MCG/ACT inhaler inhale 2 puffs by mouth and INTO THE LUNGS once daily 4 g 11     No current facility-administered medications for this visit. No Known Allergies    Review of Systems    Video Exam    There were no vitals filed for this visit.     Physical Exam     10/20/23  Start Time: 0400  Stop Time: 0500  Total Visit Time: 60 minutes

## 2023-10-24 ENCOUNTER — TELEPHONE (OUTPATIENT)
Dept: PSYCHIATRY | Facility: CLINIC | Age: 58
End: 2023-10-24

## 2023-10-24 NOTE — TELEPHONE ENCOUNTER
Patient is calling regarding cancelling an appointment.     Date/Time: 10/24/23 at 1:00 pm    Reason: The patient has an MRI scheduled for today at 12:30 pm.    Patient was rescheduled: YES [] NO [x]  If yes, when was Patient reschedule for:     Patient requesting call back to reschedule: YES [] NO [x]

## 2023-10-25 ENCOUNTER — OFFICE VISIT (OUTPATIENT)
Dept: PULMONOLOGY | Facility: CLINIC | Age: 58
End: 2023-10-25
Payer: COMMERCIAL

## 2023-10-25 VITALS
HEIGHT: 60 IN | BODY MASS INDEX: 20.97 KG/M2 | OXYGEN SATURATION: 98 % | DIASTOLIC BLOOD PRESSURE: 78 MMHG | WEIGHT: 106.8 LBS | SYSTOLIC BLOOD PRESSURE: 142 MMHG | HEART RATE: 92 BPM | TEMPERATURE: 98.4 F

## 2023-10-25 DIAGNOSIS — Z23 NEED FOR INFLUENZA VACCINATION: ICD-10-CM

## 2023-10-25 DIAGNOSIS — Z72.0 TOBACCO ABUSE: ICD-10-CM

## 2023-10-25 DIAGNOSIS — J43.2 CENTRILOBULAR EMPHYSEMA (HCC): Primary | ICD-10-CM

## 2023-10-25 DIAGNOSIS — R91.8 PULMONARY NODULES: ICD-10-CM

## 2023-10-25 DIAGNOSIS — J41.0 SIMPLE CHRONIC BRONCHITIS (HCC): ICD-10-CM

## 2023-10-25 PROCEDURE — 90686 IIV4 VACC NO PRSV 0.5 ML IM: CPT

## 2023-10-25 PROCEDURE — 90471 IMMUNIZATION ADMIN: CPT

## 2023-10-25 PROCEDURE — 99214 OFFICE O/P EST MOD 30 MIN: CPT

## 2023-10-25 RX ORDER — GUAIFENESIN 600 MG/1
1200 TABLET, EXTENDED RELEASE ORAL EVERY 12 HOURS SCHEDULED
Qty: 60 TABLET | Refills: 3 | Status: SHIPPED | OUTPATIENT
Start: 2023-10-25

## 2023-10-25 RX ORDER — IPRATROPIUM BROMIDE AND ALBUTEROL SULFATE 2.5; .5 MG/3ML; MG/3ML
3 SOLUTION RESPIRATORY (INHALATION) 4 TIMES DAILY
Qty: 360 ML | Refills: 3 | Status: SHIPPED | OUTPATIENT
Start: 2023-10-25

## 2023-10-25 RX ORDER — NICOTINE 21 MG/24HR
1 PATCH, TRANSDERMAL 24 HOURS TRANSDERMAL EVERY 24 HOURS
Qty: 28 PATCH | Refills: 0 | Status: SHIPPED | OUTPATIENT
Start: 2023-10-25

## 2023-10-25 NOTE — ASSESSMENT & PLAN NOTE
Mild apical centrilobular emphysema on prior CT in 11/2021. Spirometry at that time also without any evidence of obstruction. Encouraged patient to quit smoking. She will continue to use her inhalers as mentioned below.

## 2023-10-25 NOTE — PATIENT INSTRUCTIONS
Continue Stiolto 2 puffs daily  Start Duo-Nebulizer treatments, use AS NEEDED every 6 hours for shortness of breath or wheezing  Use Nicotine patch daily, lozenges as needed  Mucinex twice daily for mucus clearance  Quit smoking  Get CT Chest  Follow up in 3 months

## 2023-10-25 NOTE — ASSESSMENT & PLAN NOTE
Currently smoking approximately 14 rolled cigarettes daily. She is interested in quitting. We will start patient on nicotine patch and lozenge.   Would avoid Chantix due to psychiatric history, but she can follow-up with her psychiatrist regarding this medication if she does not have success with nicotine patch/lozenge

## 2023-10-25 NOTE — ASSESSMENT & PLAN NOTE
Tiny pulmonary nodules, largest measuring 4 mm in LOGAN on CT in 11/2021. No follow-up imaging completed thereafter. I have ordered CT chest for the patient today. We will follow-up with the patient with those results.

## 2023-10-25 NOTE — ASSESSMENT & PLAN NOTE
Continues with chronic cough and mucus production. Evidence of mild bronchial wall thickening on prior CT. She had normal spirometry in 2021. She has expiratory wheezing on exam today, but did not take her inhaler today. I have added duo nebulizer treatments to use every 6 hours as needed for shortness of breath or wheezing and to help with mucus clearance. Also start Mucinex twice daily. She will continue Stiolto 2 puffs daily as prescribed. Encouraged patient to quit smoking as this may improve her chronic cough/mucus production. Influenza vaccine administered today. Patient will follow-up in 3 months or sooner if needed.

## 2023-10-25 NOTE — PROGRESS NOTES
Pulmonary Follow Up Note  Troy Hadley 56 y.o. female MRN: [id number]  10/25/2023    Assessment:    Centrilobular emphysema ([address])  Mild apical centrilobular emphysema on prior CT in 11/2021. Spirometry at that time also without any evidence of obstruction. Encouraged patient to quit smoking. She will continue to use her inhalers as mentioned below. Pulmonary nodules  Tiny pulmonary nodules, largest measuring 4 mm in LOGAN on CT in 11/2021. No follow-up imaging completed thereafter. I have ordered CT chest for the patient today. We will follow-up with the patient with those results. Simple chronic bronchitis ([address])  Continues with chronic cough and mucus production. Evidence of mild bronchial wall thickening on prior CT. She had normal spirometry in 2021. She has expiratory wheezing on exam today, but did not take her inhaler today. I have added duo nebulizer treatments to use every 6 hours as needed for shortness of breath or wheezing and to help with mucus clearance. Also start Mucinex twice daily. She will continue Stiolto 2 puffs daily as prescribed. Encouraged patient to quit smoking as this may improve her chronic cough/mucus production. Influenza vaccine administered today. Patient will follow-up in 3 months or sooner if needed. Tobacco abuse  Currently smoking approximately 14 rolled cigarettes daily. She is interested in quitting. We will start patient on nicotine patch and lozenge. Would avoid Chantix due to psychiatric history, but she can follow-up with her psychiatrist regarding this medication if she does not have success with nicotine patch/lozenge        Plan:    Diagnoses and all orders for this visit:    Centrilobular emphysema (HCC)    Tobacco abuse  -     nicotine (NICODERM CQ) 21 mg/24 hr TD 24 hr patch; Place 1 patch on the skin over 24 hours every 24 hours  -     nicotine polacrilex (COMMIT) 4 MG lozenge;  Apply 1 lozenge (4 mg total) to the mouth or throat as needed for smoking cessation    Simple chronic bronchitis (HCC)  -     ipratropium-albuterol (DUO-NEB) 0.5-2.5 mg/3 mL nebulizer solution; Take 3 mL by nebulization 4 (four) times a day  -     Nebulizer  -     guaiFENesin (MUCINEX) 600 mg 12 hr tablet; Take 2 tablets (1,200 mg total) by mouth every 12 (twelve) hours    Pulmonary nodules  -     CT chest wo contrast; Future    Need for influenza vaccination  -     influenza vaccine, quadrivalent, 0.5 mL, preservative-free, for adult and pediatric patients 6 mos+ (AFLURIA, FLUARIX, FLULAVAL, FLUZONE)    Other orders  -     Adult Nebulizer Package        Return in about 3 months (around 1/25/2024). History of Present Illness     Chief Complaint:   Chief Complaint   Patient presents with    Follow-up       Patient ID: Sofya Trujillo is a 56 y.o. y.o. female has a past medical history of Bilateral carpal tunnel syndrome (08/20/2019), Carpal tunnel syndrome of right wrist (10/30/2019), Chlamydia, COPD (chronic obstructive pulmonary disease) ([address]), Depression, Hypertension, and Varicella. 10/25/2023  HPI: Dorothy Ansari is a 56 y.o. female who presents to the office today for a follow-up visit. She has a past medical history including but not limited to hypertension, reflux, bipolar disorder, hypertension, alcohol abuse, and marijuana use. She is a current smoker with approximately 42-pack-year history. She was previously seen in the office in 7/2022. Her inhaler therapy was increased from [employer] to [employer]. She was supposed to have follow-up CT chest November 2022 for pulmonary nodules surveillance, but did not complete. Patient returns today without any improvement of her chronic cough or mucus production. Patient reports she coughs all day. Has shortness of breath with more moderate exertion such as bicycling on an incline. She is very active stating she walks daily. She also has wheezing and chest tightness. Using albuterol inhaler 1-2 times per day. She denies any recent exacerbations. She is still smoking 14 rolled cigarettes per day. She rolls them herself. She is interested in quitting. Review of Systems   Constitutional:  Negative for activity change, chills, diaphoresis, fever and unexpected weight change. HENT:  Negative for congestion, postnasal drip, rhinorrhea, sore throat, trouble swallowing and voice change. Respiratory:  Positive for cough, chest tightness, shortness of breath and wheezing. Cardiovascular:  Negative for chest pain, palpitations and leg swelling. Allergic/Immunologic: Negative.         Historical Information   Past Medical History:   Diagnosis Date    Bilateral carpal tunnel syndrome 08/20/2019    Carpal tunnel syndrome of right wrist 10/30/2019    Added automatically from request for surgery [id number]    Chlamydia     COPD (chronic obstructive pulmonary disease) ([address]     Depression     Hypertension     Varicella      Past Surgical History:   Procedure Laterality Date    BREAST BIOPSY Left 20yrs ago benign    EGD      OOPHORECTOMY Right     [place] [facility] WR[place] SURG W/RLS TRANSVRS CARPL LIGM Right 01/21/2020    Procedure: RELEASE CARPAL TUNNEL ENDOSCOPIC;  Surgeon: Alexander Gowers, MD;  [facility];  Service: Orthopedics    [place] [address] WRST SURG W/RLS TRANSVRS CARPL LIGM Left 01/28/2020    Procedure: RELEASE CARPAL TUNNEL ENDOSCOPIC;  Surgeon: Alexander Gowers, MD;  [facility];  Service: Orthopedics     Family History   Problem Relation Age of Onset    Alcohol abuse Mother     Eating disorder Mother         anorexia and bulimia per Pt    Hodgkin's lymphoma Father     Cancer Father         3rd time lymphoma, prostate, lung    No Known Problems Sister     No Known Problems Daughter     Colon cancer Maternal Grandmother     Alcohol abuse Maternal Grandfather     Colon cancer Paternal Grandmother     Breast cancer Maternal Aunt     No Known Problems Maternal Aunt     No Known Problems Maternal Aunt Anxiety disorder Paternal Aunt     No Known Problems Paternal Aunt     Multiple sclerosis Cousin     Colon cancer Maternal Uncle        Smoking history: She reports that she has been smoking cigarettes. She started smoking about 44 years ago. She has a 17.50 pack-year smoking history.  She has never used smokeless tobacco.    Occupational History:     Immunization History   Administered Date(s) Administered    COVID-19 MODERNA VACC 0.5 ML IM 05/07/2021, 06/03/2021    H1N1, All Formulations 10/29/2009    INFLUENZA 02/04/2014, 10/20/2014, 11/13/2020    Influenza, injectable, quadrivalent, preservative free 0.5 mL 10/25/2023    Influenza, recombinant, quadrivalent,injectable, preservative free 01/13/2022       Meds/Allergies     Current Outpatient Medications:     albuterol (PROVENTIL HFA,VENTOLIN HFA) 90 mcg/act inhaler, inhale 2 puffs by mouth and INTO THE LUNGS every 6 hours IF NEEDED FOR WHEEZING., Disp: 8.5 g, Rfl: 2    buPROPion (Wellbutrin XL) 150 mg 24 hr tablet, Take 1 tablet (150 mg total) by mouth daily, Disp: 30 tablet, Rfl: 1    cholecalciferol (VITAMIN D3) 400 units tablet, Take 400 Units by mouth daily, Disp: , Rfl:     FLUoxetine (PROzac) 20 mg capsule, Take 1 capsule (20 mg total) by mouth daily, Disp: 30 capsule, Rfl: 1    fluticasone (FLONASE) 50 mcg/act nasal spray, instill 1 spray into each nostril once daily, Disp: 16 g, Rfl: 2    guaiFENesin (MUCINEX) 600 mg 12 hr tablet, Take 2 tablets (1,200 mg total) by mouth every 12 (twelve) hours, Disp: 60 tablet, Rfl: 3    ibuprofen (MOTRIN) 200 mg tablet, Take 200 mg by mouth every 6 (six) hours as needed for mild pain, Disp: , Rfl:     ipratropium-albuterol (DUO-NEB) 0.5-2.5 mg/3 mL nebulizer solution, Take 3 mL by nebulization 4 (four) times a day, Disp: 360 mL, Rfl: 3    lamoTRIgine (LaMICtal) 200 MG tablet, Take 1 tablet (200 mg total) by mouth daily, Disp: 30 tablet, Rfl: 1    levocetirizine (XYZAL) 5 MG tablet, take 1 tablet by mouth every evening, Disp: 30 tablet, Rfl: 4    nicotine (NICODERM CQ) 21 mg/24 hr TD 24 hr patch, Place 1 patch on the skin over 24 hours every 24 hours, Disp: 28 patch, Rfl: 0    nicotine polacrilex (COMMIT) 4 MG lozenge, Apply 1 lozenge (4 mg total) to the mouth or throat as needed for smoking cessation, Disp: 100 each, Rfl: 0    omeprazole (PriLOSEC) 20 mg delayed release capsule, take 1 capsule by mouth twice a day, Disp: 60 capsule, Rfl: 5    senna-docusate sodium (SENOKOT-S) 8.6-50 mg per tablet, Take 3 tablets by mouth as needed  , Disp: , Rfl:     Stiolto Respimat 2.5-2.5 MCG/ACT inhaler, inhale 2 puffs by mouth and INTO THE LUNGS once daily, Disp: 4 g, Rfl: 11  Allergies: No Known Allergies      Vitals:  Vitals:    10/25/23 1333   BP: 142/78   BP Location: Left arm   Patient Position: Sitting   Cuff Size: Adult   Pulse: 92   Temp: 98.4 °F (36.9 °C)   TempSrc: Temporal   SpO2: 98%   Weight: 48.4 kg (106 lb 12.8 oz)   Height: 5' (1.524 m)   Oxygen Therapy  SpO2: 98 %  . Wt Readings from Last 3 Encounters:   10/25/23 48.4 kg (106 lb 12.8 oz)   10/19/23 48.9 kg (107 lb 12.8 oz)   09/08/23 50 kg (110 lb 3.2 oz)     Body mass index is 20.86 kg/m². Physical Exam  Vitals and nursing note reviewed. Constitutional:       General: She is not in acute distress. Appearance: Normal appearance. She is well-developed. Cardiovascular:      Rate and Rhythm: Normal rate and regular rhythm. Heart sounds: Normal heart sounds, S1 normal and S2 normal. No murmur heard. Pulmonary:      Effort: Pulmonary effort is normal.      Breath sounds: Wheezing present. No decreased breath sounds, rhonchi or rales. Comments: Coarse, expiratory wheezing throughout  Musculoskeletal:         General: No swelling. Right lower leg: No edema. Left lower leg: No edema. Neurological:      Mental Status: She is alert.    Psychiatric:         Mood and Affect: Mood and affect normal.         Behavior: Behavior normal. Behavior is cooperative. Labs: I have personally reviewed pertinent lab results. Lab Results   Component Value Date    WBC 7.64 03/13/2023    HGB 14.7 03/13/2023    HCT 44.8 03/13/2023    MCV 92 03/13/2023     03/13/2023     Lab Results   Component Value Date    CALCIUM 9.2 03/13/2023     06/15/2018    K 3.7 03/13/2023    CO2 26 03/13/2023     (H) 03/13/2023    BUN 18 03/13/2023    CREATININE 0.65 03/13/2023     No results found for: "IGE"  Lab Results   Component Value Date    ALT 24 03/13/2023    AST 20 03/13/2023    ALKPHOS 49 03/13/2023    BILITOT 0.3 06/15/2018       Imaging and other studies: I have personally reviewed pertinent reports and I have personally reviewed pertinent films in PACS     CT chest 11/22/2021  Tiny pulmonary nodules, largest measuring 4 mm in left upper lobe. Mild bronchial wall thickening. Mild apical predominant centrilobular emphysema. Pulmonary function testing:     Pulmonary Functions Testing Results: 12/20/2021    FEV1/FVC ratio 75%    FEV1 89% predicted  FVC 95% predicted  (-) response to bronchodilators  TLC 85 % predicted  RV 67 % predicted  DLCO corrected for hemoglobin 102 % predicted    Impression: Normal spirometry. Normal TLC. No restriction on lung volumes. Normal diffusion capacity.

## 2023-10-27 ENCOUNTER — TELEMEDICINE (OUTPATIENT)
Dept: BEHAVIORAL/MENTAL HEALTH CLINIC | Facility: CLINIC | Age: 58
End: 2023-10-27
Payer: COMMERCIAL

## 2023-10-27 DIAGNOSIS — F10.11 ALCOHOL ABUSE, IN REMISSION: ICD-10-CM

## 2023-10-27 DIAGNOSIS — F43.10 PTSD (POST-TRAUMATIC STRESS DISORDER): ICD-10-CM

## 2023-10-27 DIAGNOSIS — F41.1 GENERALIZED ANXIETY DISORDER: ICD-10-CM

## 2023-10-27 DIAGNOSIS — F17.210 CIGARETTE NICOTINE DEPENDENCE WITHOUT COMPLICATION: ICD-10-CM

## 2023-10-27 DIAGNOSIS — F12.90 MARIJUANA USE: Chronic | ICD-10-CM

## 2023-10-27 DIAGNOSIS — F31.81 MODERATE MIXED BIPOLAR II DISORDER (HCC): Primary | ICD-10-CM

## 2023-10-27 PROCEDURE — 90853 GROUP PSYCHOTHERAPY: CPT | Performed by: COUNSELOR

## 2023-10-27 NOTE — PSYCH
Virtual Regular Visit    Verification of patient location:    Patient is located at Home in the following state in which I hold an active license [place]      Assessment/Plan:    Problem List Items Addressed This Visit          Other    Marijuana use (Chronic)    Moderate mixed bipolar II disorder (HCC) - Primary    Generalized anxiety disorder    PTSD (post-traumatic stress disorder)    Alcohol abuse, in remission    Cigarette nicotine dependence without complication       Goals addressed in session: Goal 1 and Goal 2          Reason for visit is No chief complaint on file. Encounter provider [facility]    Provider located at [address] [place] [id number]  [phone]      Recent Visits  Date Type Provider Dept   10/24/23 Telephone [facility], [address] [place] [address]   10/20/23 [address] [facility]   Showing recent visits within past 7 days and meeting all other requirements  Future Appointments  No visits were found meeting these conditions. Showing future appointments within next 150 days and meeting all other requirements       The patient was identified by name and date of birth. Mario Kenny was informed that this is a telemedicine visit and that the visit is being conducted [employer]. She agrees to proceed. .  My office door was closed. The patient was notified the following individuals were present in the room other virtual group attendees. She acknowledged consent and understanding of privacy and security of the video platform. The patient has agreed to participate and understands they can discontinue the visit at any time. Patient is aware this is a billable service. Subjective  Mario Kenny is a 56 y.o. female  .     Data: Zach Edmond attended the Relaxation group with delay and was guided through mindfulness exercises  for attending to psychological and physical pain, resentment, and guided imagery for safe place in the mind as a lake, and with affirmations for self-compassion, forgiveness, acceptance, and focusing on inner resources and strengths. Psychoeducation, EFT, Mindfulness-based, and group approach were used to support the cohesion and maintain the serene environment for the exercise's purpose. Assessment: Alisia Perez followed through guidance and completed the exercises, showed willingness to continue learning and practicing self-care in weekly Relaxation Group as well as individual therapy. Plan: Alisia Perez will practice deep breathing and other techniques based on each occasion to counter stress and will attend the next group to be able to take part in further guided exercises on chosen topics to build on the techniques learned in the beginning. HPI     Past Medical History:   Diagnosis Date    Bilateral carpal tunnel syndrome 08/20/2019    Carpal tunnel syndrome of right wrist 10/30/2019    Added automatically from request for surgery [id number]    Chlamydia     COPD (chronic obstructive pulmonary disease) ([facility])     Depression     Hypertension     Varicella        Past Surgical History:   Procedure Laterality Date    BREAST BIOPSY Left 20yrs ago benign    EGD      OOPHORECTOMY Right     [place] [facility] WRST SURG W/RLS TRANSVRS CARPL LIGM Right 01/21/2020    Procedure: RELEASE CARPAL TUNNEL ENDOSCOPIC;  Surgeon: Lisette Durham MD;  [facility];  Service: Orthopedics    [place] [address] [facility] WRST SURG W/RLS TRANSVRS CARPL LIGM Left 01/28/2020    Procedure: RELEASE CARPAL TUNNEL ENDOSCOPIC;  Surgeon: Lisette Durham MD;  [facility];  Service: Orthopedics       Current Outpatient Medications   Medication Sig Dispense Refill    albuterol (PROVENTIL HFA,VENTOLIN HFA) 90 mcg/act inhaler inhale 2 puffs by mouth and INTO THE LUNGS every 6 hours IF NEEDED FOR WHEEZING.  8.5 g 2    buPROPion (Wellbutrin XL) 150 mg 24 hr tablet Take 1 tablet (150 mg total) by mouth daily 30 tablet 1    cholecalciferol (VITAMIN D3) 400 units tablet Take 400 Units by mouth daily      FLUoxetine (PROzac) 20 mg capsule Take 1 capsule (20 mg total) by mouth daily 30 capsule 1    fluticasone (FLONASE) 50 mcg/act nasal spray instill 1 spray into each nostril once daily 16 g 2    guaiFENesin (MUCINEX) 600 mg 12 hr tablet Take 2 tablets (1,200 mg total) by mouth every 12 (twelve) hours 60 tablet 3    ibuprofen (MOTRIN) 200 mg tablet Take 200 mg by mouth every 6 (six) hours as needed for mild pain      ipratropium-albuterol (DUO-NEB) 0.5-2.5 mg/3 mL nebulizer solution Take 3 mL by nebulization 4 (four) times a day 360 mL 3    lamoTRIgine (LaMICtal) 200 MG tablet Take 1 tablet (200 mg total) by mouth daily 30 tablet 1    levocetirizine (XYZAL) 5 MG tablet take 1 tablet by mouth every evening 30 tablet 4    nicotine (NICODERM CQ) 21 mg/24 hr TD 24 hr patch Place 1 patch on the skin over 24 hours every 24 hours 28 patch 0    nicotine polacrilex (COMMIT) 4 MG lozenge Apply 1 lozenge (4 mg total) to the mouth or throat as needed for smoking cessation 100 each 0    omeprazole (PriLOSEC) 20 mg delayed release capsule take 1 capsule by mouth twice a day 60 capsule 5    senna-docusate sodium (SENOKOT-S) 8.6-50 mg per tablet Take 3 tablets by mouth as needed        Stiolto Respimat 2.5-2.5 MCG/ACT inhaler inhale 2 puffs by mouth and INTO THE LUNGS once daily 4 g 11     No current facility-administered medications for this visit. No Known Allergies    Review of Systems    Video Exam    There were no vitals filed for this visit.     Physical Exam     10/27/23  Start Time: 0430  Stop Time: 0500  Total Visit Time: 30 minutes

## 2023-10-31 ENCOUNTER — HOSPITAL ENCOUNTER (OUTPATIENT)
Dept: CT IMAGING | Facility: HOSPITAL | Age: 58
Discharge: HOME | End: 2023-10-31
Payer: COMMERCIAL

## 2023-10-31 DIAGNOSIS — R91.8 PULMONARY NODULES: ICD-10-CM

## 2023-10-31 PROCEDURE — G1004 CDSM NDSC: HCPCS

## 2023-10-31 PROCEDURE — 71250 CT THORAX DX C-: CPT

## 2023-11-01 ENCOUNTER — TELEMEDICINE (OUTPATIENT)
Dept: PSYCHIATRY | Facility: CLINIC | Age: 58
End: 2023-11-01
Payer: COMMERCIAL

## 2023-11-01 ENCOUNTER — TELEMEDICINE (OUTPATIENT)
Dept: BEHAVIORAL/MENTAL HEALTH CLINIC | Facility: CLINIC | Age: 58
End: 2023-11-01
Payer: COMMERCIAL

## 2023-11-01 DIAGNOSIS — F10.11 ALCOHOL ABUSE, IN REMISSION: ICD-10-CM

## 2023-11-01 DIAGNOSIS — Z72.0 TOBACCO ABUSE: ICD-10-CM

## 2023-11-01 DIAGNOSIS — F31.81 MODERATE MIXED BIPOLAR II DISORDER (HCC): Primary | ICD-10-CM

## 2023-11-01 DIAGNOSIS — F41.1 GENERALIZED ANXIETY DISORDER: ICD-10-CM

## 2023-11-01 DIAGNOSIS — F43.10 PTSD (POST-TRAUMATIC STRESS DISORDER): ICD-10-CM

## 2023-11-01 DIAGNOSIS — F31.32 BIPOLAR AFFECTIVE DISORDER, CURRENTLY DEPRESSED, MODERATE (HCC): ICD-10-CM

## 2023-11-01 DIAGNOSIS — F17.210 CIGARETTE NICOTINE DEPENDENCE WITHOUT COMPLICATION: ICD-10-CM

## 2023-11-01 PROCEDURE — 90834 PSYTX W PT 45 MINUTES: CPT | Performed by: COUNSELOR

## 2023-11-01 PROCEDURE — 99213 OFFICE O/P EST LOW 20 MIN: CPT

## 2023-11-01 RX ORDER — LAMOTRIGINE 200 MG/1
200 TABLET ORAL DAILY
Qty: 30 TABLET | Refills: 1 | Status: SHIPPED | OUTPATIENT
Start: 2023-11-01 | End: 2023-12-31

## 2023-11-01 RX ORDER — BUPROPION HYDROCHLORIDE 150 MG/1
150 TABLET ORAL DAILY
Qty: 30 TABLET | Refills: 1 | Status: SHIPPED | OUTPATIENT
Start: 2023-11-01 | End: 2023-12-31

## 2023-11-01 RX ORDER — FLUOXETINE HYDROCHLORIDE 20 MG/1
20 CAPSULE ORAL DAILY
Qty: 30 CAPSULE | Refills: 1 | Status: SHIPPED | OUTPATIENT
Start: 2023-11-01 | End: 2023-12-31

## 2023-11-01 NOTE — PSYCH
Behavioral Health Psychotherapy Progress Note    Psychotherapy Provided: Individual Psychotherapy     1. Moderate mixed bipolar II disorder (HCC)        2. Generalized anxiety disorder        3. PTSD (post-traumatic stress disorder)        4. Tobacco abuse        5. Cigarette nicotine dependence without complication        6. Alcohol abuse, in remission            Goals addressed in session: Goal 1 and Goal 2     DATA: Pastora Baires addressed her concerns about her health, her memories from the traumatic past, and the effects on her mood and behavior. She discussed her willingness to keep a consistent day and slot for the future and expressed gratitude for all she has been blessed with, and her friends Rolyroberto Mckeon and Zaria Myers who are providing her roof, food, and took her to a concert last weekend. During this session, this clinician used the following therapeutic modalities: Client-centered Therapy, Cognitive Behavioral Therapy, Dialectical Behavior Therapy, Mindfulness-based Strategies, and Supportive Psychotherapy    Substance Abuse was not addressed during this session. If the client is diagnosed with a co-occurring substance use disorder, please indicate any changes in the frequency or amount of use: NA. Stage of change for addressing substance use diagnoses: Maintenance    ASSESSMENT:  Cindy Maxwell presents with a Euthymic/ normal and Depressed mood. her affect is Normal range and intensity, tearful at times, which is congruent, with her mood and the content of the session. The client has made progress on their goals. Pastora Baires made some progress with handling intense emotions through Mindfulness  skills application. Cindy Maxwell presents with a minimal risk of suicide, low risk of self-harm, and none risk of harm to others. For any risk assessment that surpasses a "low" rating, a safety plan must be developed.     A safety plan was indicated: no  If yes, describe in detail NA    PLAN: Between sessions, Cindy Maxwell will attend her weekly individual and weekly group sessions. At the next session, the therapist will use Client-centered Therapy, Cognitive Behavioral Therapy, Dialectical Behavior Therapy, Mindfulness-based Strategies, and Supportive Psychotherapy to address taking notes for important events to avoid getting frustrated with forgetting. Behavioral Health Treatment Plan and Discharge Planning: Flor Lugo is aware of and agrees to continue to work on their treatment plan. They have identified and are working toward their discharge goals.  yes    Visit start and stop times:    11/01/23  Start Time: 0100  Stop Time: 0140  Total Visit Time: 40 minutes

## 2023-11-01 NOTE — PATIENT INSTRUCTIONS
- Continue current medication regimen  - Follow up with your imaging and specialty doctor appointments    Please present for your previously scheduled appointment approximately 15 minutes prior to appointment time. If you are running late or are unable to attend your appointment, please call our [facility] office at [phone] or our [place] [facility] office at [phone] if applicable to notify the office of your absence. If you are in psychological crisis including not limited to suicidal/homicidal thoughts or thoughts of self-injury, do not hesitate to call/contact your [facility] hotline (see below) or attend to the nearest emergency department.   [facility] Crisis: [address] Crisis: [phone]  [address] Crisis: [address] Crisis: [address] Crisis: [address] Crisis: [address] Crisis: [phone]  National Suicide Prevention Hotline: 4-216.470.9385

## 2023-11-01 NOTE — PSYCH
Virtual Regular Visit    Verification of patient location:    Patient is located at Home in the following state in which I hold an active license [place]      Assessment/Plan:    Problem List Items Addressed This Visit          Other    Moderate mixed bipolar II disorder (HCC) - Primary    Generalized anxiety disorder    PTSD (post-traumatic stress disorder)    Alcohol abuse, in remission    Cigarette nicotine dependence without complication    Tobacco abuse       Goals addressed in session: Goal 1 and Goal 2          Reason for visit is No chief complaint on file. Encounter provider [facility]    Provider located at [address] [place] [id number] [phone]      Recent Visits  Date Type Provider Dept   10/27/23 [facility], [address] [place] [address] [facility]   Showing recent visits within past 7 days and meeting all other requirements  Future Appointments  No visits were found meeting these conditions. Showing future appointments within next 150 days and meeting all other requirements       The patient was identified by name and date of birth. Shazia Trujillo was informed that this is a telemedicine visit and that the visit is being conducted throughthe [employer] platform. She agrees to proceed. .  My office door was closed. No one else was in the room. She acknowledged consent and understanding of privacy and security of the video platform. The patient has agreed to participate and understands they can discontinue the visit at any time. Patient is aware this is a billable service. Subjective  Shazia Trujillo is a 56 y.o. female  .       HPI     Past Medical History:   Diagnosis Date    Bilateral carpal tunnel syndrome 08/20/2019    Carpal tunnel syndrome of right wrist 10/30/2019    Added automatically from request for surgery [id number]    Chlamydia     COPD (chronic obstructive pulmonary disease) ([facility])     Depression     Hypertension     Varicella        Past Surgical History:   Procedure Laterality Date    BREAST BIOPSY Left 20yrs ago benign    EGD      OOPHORECTOMY Right     [place] [facility] WRST SURG W/RLS TRANSVRS CARPL LIGM Right 01/21/2020    Procedure: RELEASE CARPAL TUNNEL ENDOSCOPIC;  Surgeon: Nura Lovett MD;  [facility];  Service: Orthopedics    [place] [address] [address] WRST SURG W/RLS TRANSVRS CARPL LIGM Left 01/28/2020    Procedure: RELEASE CARPAL TUNNEL ENDOSCOPIC;  Surgeon: Nura Lovett MD;  [facility];  Service: Orthopedics       Current Outpatient Medications   Medication Sig Dispense Refill    albuterol (PROVENTIL HFA,VENTOLIN HFA) 90 mcg/act inhaler inhale 2 puffs by mouth and INTO THE LUNGS every 6 hours IF NEEDED FOR WHEEZING.  8.5 g 2    buPROPion (Wellbutrin XL) 150 mg 24 hr tablet Take 1 tablet (150 mg total) by mouth daily 30 tablet 1    cholecalciferol (VITAMIN D3) 400 units tablet Take 400 Units by mouth daily      FLUoxetine (PROzac) 20 mg capsule Take 1 capsule (20 mg total) by mouth daily 30 capsule 1    fluticasone (FLONASE) 50 mcg/act nasal spray instill 1 spray into each nostril once daily 16 g 2    guaiFENesin (MUCINEX) 600 mg 12 hr tablet Take 2 tablets (1,200 mg total) by mouth every 12 (twelve) hours 60 tablet 3    ibuprofen (MOTRIN) 200 mg tablet Take 200 mg by mouth every 6 (six) hours as needed for mild pain      ipratropium-albuterol (DUO-NEB) 0.5-2.5 mg/3 mL nebulizer solution Take 3 mL by nebulization 4 (four) times a day 360 mL 3    lamoTRIgine (LaMICtal) 200 MG tablet Take 1 tablet (200 mg total) by mouth daily 30 tablet 1    levocetirizine (XYZAL) 5 MG tablet take 1 tablet by mouth every evening 30 tablet 4    nicotine (NICODERM CQ) 21 mg/24 hr TD 24 hr patch Place 1 patch on the skin over 24 hours every 24 hours 28 patch 0    nicotine polacrilex (COMMIT) 4 MG lozenge Apply 1 lozenge (4 mg total) to the mouth or throat as needed for smoking cessation 100 each 0    omeprazole (PriLOSEC) 20 mg delayed release capsule take 1 capsule by mouth twice a day 60 capsule 5    senna-docusate sodium (SENOKOT-S) 8.6-50 mg per tablet Take 3 tablets by mouth as needed        Stiolto Respimat 2.5-2.5 MCG/ACT inhaler inhale 2 puffs by mouth and INTO THE LUNGS once daily 4 g 11     No current facility-administered medications for this visit. No Known Allergies    Review of Systems    Video Exam    There were no vitals filed for this visit.     Physical Exam
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
## 2023-11-03 ENCOUNTER — TELEMEDICINE (OUTPATIENT)
Dept: BEHAVIORAL/MENTAL HEALTH CLINIC | Facility: CLINIC | Age: 58
End: 2023-11-03
Payer: COMMERCIAL

## 2023-11-03 ENCOUNTER — VBI (OUTPATIENT)
Dept: ADMINISTRATIVE | Facility: OTHER | Age: 58
End: 2023-11-03

## 2023-11-03 DIAGNOSIS — F17.210 CIGARETTE NICOTINE DEPENDENCE WITHOUT COMPLICATION: ICD-10-CM

## 2023-11-03 DIAGNOSIS — F10.11 ALCOHOL ABUSE, IN REMISSION: ICD-10-CM

## 2023-11-03 DIAGNOSIS — F43.10 PTSD (POST-TRAUMATIC STRESS DISORDER): ICD-10-CM

## 2023-11-03 DIAGNOSIS — Z72.0 TOBACCO ABUSE: ICD-10-CM

## 2023-11-03 DIAGNOSIS — F41.1 GENERALIZED ANXIETY DISORDER: ICD-10-CM

## 2023-11-03 DIAGNOSIS — F31.81 MODERATE MIXED BIPOLAR II DISORDER (HCC): Primary | ICD-10-CM

## 2023-11-03 DIAGNOSIS — F12.90 MARIJUANA USE: Chronic | ICD-10-CM

## 2023-11-03 PROCEDURE — 90853 GROUP PSYCHOTHERAPY: CPT | Performed by: COUNSELOR

## 2023-11-03 NOTE — PSYCH
Virtual Regular Visit    Verification of patient location:    Patient is located at Home in the following state in which I hold an active license [place]      Assessment/Plan:    Problem List Items Addressed This Visit          Other    Marijuana use (Chronic)    Moderate mixed bipolar II disorder (HCC) - Primary    Generalized anxiety disorder    PTSD (post-traumatic stress disorder)    Alcohol abuse, in remission    Cigarette nicotine dependence without complication    Tobacco abuse       Goals addressed in session: Goal 1 and Goal 2          Reason for visit is No chief complaint on file. Encounter provider Pinky Burk [facility] [place]    Provider located at [address] [place] [id number] [phone]      Recent Visits  Date Type Provider Dept   11/01/23 [facility], [address]   10/27/23 [facility], [address] [place] [address] [facility] Therapist LADARIUS   Showing recent visits within past 7 days and meeting all other requirements  Future Appointments  No visits were found meeting these conditions. Showing future appointments within next 150 days and meeting all other requirements       The patient was identified by name and date of birth. Arnold Machado was informed that this is a telemedicine visit and that the visit is being conducted [employer]. She agrees to proceed. .  My office door was closed. The patient was notified the following individuals were present in the room other virtual group members. She acknowledged consent and understanding of privacy and security of the video platform. The patient has agreed to participate and understands they can discontinue the visit at any time. Patient is aware this is a billable service. Delvis Machado is a 56 y.o. female  .     Data: Vicente Miranda attended the Relaxation group and was guided through EFT tapping on trauma, diaphragmatic breathing, other Vagus nerve stimulation techniques, and progressive muscle relaxation. Furthermore, attendees were guided through mindfulness exercises  for attending to feelings of anger and resentment, exploring interconnectedness, acceptance, imagery through resting in a safe space, and with affirmations against anxiety and depression and for confidence, self-forgiveness, and community support. Psychoeducation, EFT, Mindfulness-based, and group approach were used to support the cohesion and maintain the serene environment for the exercise's purpose. Assessment: Vicente Miranda followed through guidance and completed the exercises, showed willingness to continue learning and practicing self-care in weekly Relaxation Group as well as individual therapy. Plan: Vicente Miranda will practice deep breathing and other techniques based on each occasion to counter stress and will attend the next group to be able to take part in further guided exercises on chosen topics to build on the techniques learned in the beginning.     HPI     Past Medical History:   Diagnosis Date    Bilateral carpal tunnel syndrome 08/20/2019    Carpal tunnel syndrome of right wrist 10/30/2019    Added automatically from request for surgery [id number]    Chlamydia     COPD (chronic obstructive pulmonary disease) ([address])     Depression     Hypertension     Varicella        Past Surgical History:   Procedure Laterality Date    BREAST BIOPSY Left 20yrs ago benign    EGD      OOPHORECTOMY Right     [place] NDSC WRST SURG W/RLS TRANSVRS CARPL LIGM Right 01/21/2020    Procedure: RELEASE CARPAL TUNNEL ENDOSCOPIC;  Surgeon: Sanjuanita Moreno MD;  [facility];  Service: Orthopedics    [place] [address] [address] WRST SURG W/RLS TRANSVRS CARPL LIGM Left 01/28/2020    Procedure: RELEASE CARPAL TUNNEL ENDOSCOPIC;  Surgeon: Sanjuanita Moreno MD;  [facility];  Service: Orthopedics       Current Outpatient Medications Medication Sig Dispense Refill    albuterol (PROVENTIL HFA,VENTOLIN HFA) 90 mcg/act inhaler inhale 2 puffs by mouth and INTO THE LUNGS every 6 hours IF NEEDED FOR WHEEZING. 8.5 g 2    buPROPion (Wellbutrin XL) 150 mg 24 hr tablet Take 1 tablet (150 mg total) by mouth daily 30 tablet 1    cholecalciferol (VITAMIN D3) 400 units tablet Take 400 Units by mouth daily      FLUoxetine (PROzac) 20 mg capsule Take 1 capsule (20 mg total) by mouth daily 30 capsule 1    fluticasone (FLONASE) 50 mcg/act nasal spray instill 1 spray into each nostril once daily 16 g 2    guaiFENesin (MUCINEX) 600 mg 12 hr tablet Take 2 tablets (1,200 mg total) by mouth every 12 (twelve) hours 60 tablet 3    ibuprofen (MOTRIN) 200 mg tablet Take 200 mg by mouth every 6 (six) hours as needed for mild pain      ipratropium-albuterol (DUO-NEB) 0.5-2.5 mg/3 mL nebulizer solution Take 3 mL by nebulization 4 (four) times a day 360 mL 3    lamoTRIgine (LaMICtal) 200 MG tablet Take 1 tablet (200 mg total) by mouth daily 30 tablet 1    levocetirizine (XYZAL) 5 MG tablet take 1 tablet by mouth every evening 30 tablet 4    nicotine (NICODERM CQ) 21 mg/24 hr TD 24 hr patch Place 1 patch on the skin over 24 hours every 24 hours 28 patch 0    nicotine polacrilex (COMMIT) 4 MG lozenge Apply 1 lozenge (4 mg total) to the mouth or throat as needed for smoking cessation 100 each 0    omeprazole (PriLOSEC) 20 mg delayed release capsule take 1 capsule by mouth twice a day 60 capsule 5    senna-docusate sodium (SENOKOT-S) 8.6-50 mg per tablet Take 3 tablets by mouth as needed        Stiolto Respimat 2.5-2.5 MCG/ACT inhaler inhale 2 puffs by mouth and INTO THE LUNGS once daily 4 g 11     No current facility-administered medications for this visit. No Known Allergies    Review of Systems    Video Exam    There were no vitals filed for this visit.     Physical Exam     11/03/23  Start Time: 0400  Stop Time: 0500  Total Visit Time: 60 minutes

## 2023-11-04 LAB
DME PARACHUTE DELIVERY DATE ACTUAL: NORMAL
DME PARACHUTE DELIVERY DATE REQUESTED: NORMAL
DME PARACHUTE ITEM DESCRIPTION: NORMAL
DME PARACHUTE ORDER STATUS: NORMAL
DME PARACHUTE SUPPLIER NAME: NORMAL
DME PARACHUTE SUPPLIER PHONE: NORMAL

## 2023-11-07 ENCOUNTER — TELEMEDICINE (OUTPATIENT)
Dept: BEHAVIORAL/MENTAL HEALTH CLINIC | Facility: CLINIC | Age: 58
End: 2023-11-07
Payer: COMMERCIAL

## 2023-11-07 DIAGNOSIS — F12.90 MARIJUANA USE: Chronic | ICD-10-CM

## 2023-11-07 DIAGNOSIS — F10.11 ALCOHOL ABUSE, IN REMISSION: ICD-10-CM

## 2023-11-07 DIAGNOSIS — F41.1 GENERALIZED ANXIETY DISORDER: ICD-10-CM

## 2023-11-07 DIAGNOSIS — F43.10 PTSD (POST-TRAUMATIC STRESS DISORDER): ICD-10-CM

## 2023-11-07 DIAGNOSIS — Z72.0 TOBACCO ABUSE: ICD-10-CM

## 2023-11-07 DIAGNOSIS — F31.81 MODERATE MIXED BIPOLAR II DISORDER (HCC): Primary | ICD-10-CM

## 2023-11-07 PROCEDURE — 90832 PSYTX W PT 30 MINUTES: CPT | Performed by: COUNSELOR

## 2023-11-07 NOTE — PSYCH
Virtual Regular Visit    Verification of patient location:    Patient is located at Home in the following state in which I hold an active license [place]      Assessment/Plan:    Problem List Items Addressed This Visit          Other    Marijuana use (Chronic)    Moderate mixed bipolar II disorder (HCC) - Primary    Generalized anxiety disorder    PTSD (post-traumatic stress disorder)    Alcohol abuse, in remission    Tobacco abuse       Goals addressed in session: Goal 1 and Goal 2          Reason for visit is No chief complaint on file. Encounter provider Sofia Manning [facility]    Provider located at [address] [place] [id number] [phone]      Recent Visits  Date Type Provider Dept   11[facility]03/23 [facility], [address]   11/01/23 [facility], [address] [place] [address] [facility] Therapist Анна Alexis   Showing recent visits within past 7 days and meeting all other requirements  Future Appointments  No visits were found meeting these conditions. Showing future appointments within next 150 days and meeting all other requirements       The patient was identified by name and date of birth. Laura Tristan was informed that this is a telemedicine visit and that the visit is being conducted throughthe [employer] platform. She agrees to proceed. .  My office door was closed. No one else was in the room. She acknowledged consent and understanding of privacy and security of the video platform. The patient ha agreed to participate and understands they can discontinue the visit at any time. Patient is aware this is a billable service. Subjective  Laura Tristan is a 56 y.o. female  .       HPI     Past Medical History:   Diagnosis Date    Bilateral carpal tunnel syndrome 08/20/2019    Carpal tunnel syndrome of right wrist 10/30/2019    Added automatically from request for surgery [id number]    Chlamydia     COPD (chronic obstructive pulmonary disease) ([address])     Depression     Hypertension     Varicella        Past Surgical History:   Procedure Laterality Date    BREAST BIOPSY Left 20yrs ago benign    EGD      OOPHORECTOMY Right     [place] WRST SURG W/RLS TRANSVRS CARPL LIGM Right 01/21/2020    Procedure: RELEASE CARPAL TUNNEL ENDOSCOPIC;  Surgeon: Alison Mejia MD;  [facility];  Service: Orthopedics    [place] [address] WRST SURG W/RLS TRANSVRS CARPL LIGM Left 01/28/2020    Procedure: RELEASE CARPAL TUNNEL ENDOSCOPIC;  Surgeon: Alison Mejia MD;  [facility];  Service: Orthopedics       Current Outpatient Medications   Medication Sig Dispense Refill    albuterol (PROVENTIL HFA,VENTOLIN HFA) 90 mcg/act inhaler inhale 2 puffs by mouth and INTO THE LUNGS every 6 hours IF NEEDED FOR WHEEZING.  8.5 g 2    buPROPion (Wellbutrin XL) 150 mg 24 hr tablet Take 1 tablet (150 mg total) by mouth daily 30 tablet 1    cholecalciferol (VITAMIN D3) 400 units tablet Take 400 Units by mouth daily      FLUoxetine (PROzac) 20 mg capsule Take 1 capsule (20 mg total) by mouth daily 30 capsule 1    fluticasone (FLONASE) 50 mcg/act nasal spray instill 1 spray into each nostril once daily 16 g 2    guaiFENesin (MUCINEX) 600 mg 12 hr tablet Take 2 tablets (1,200 mg total) by mouth every 12 (twelve) hours 60 tablet 3    ibuprofen (MOTRIN) 200 mg tablet Take 200 mg by mouth every 6 (six) hours as needed for mild pain      ipratropium-albuterol (DUO-NEB) 0.5-2.5 mg/3 mL nebulizer solution Take 3 mL by nebulization 4 (four) times a day 360 mL 3    lamoTRIgine (LaMICtal) 200 MG tablet Take 1 tablet (200 mg total) by mouth daily 30 tablet 1    levocetirizine (XYZAL) 5 MG tablet take 1 tablet by mouth every evening 30 tablet 4    nicotine (NICODERM CQ) 21 mg/24 hr TD 24 hr patch Place 1 patch on the skin over 24 hours every 24 hours 28 patch 0    nicotine polacrilex (COMMIT) 4 MG lozenge Apply 1 lozenge (4 mg total) to the mouth or throat as needed for smoking cessation 100 each 0    omeprazole (PriLOSEC) 20 mg delayed release capsule take 1 capsule by mouth twice a day 60 capsule 5    senna-docusate sodium (SENOKOT-S) 8.6-50 mg per tablet Take 3 tablets by mouth as needed        Stiolto Respimat 2.5-2.5 MCG/ACT inhaler inhale 2 puffs by mouth and INTO THE LUNGS once daily 4 g 11     No current facility-administered medications for this visit. No Known Allergies    Review of Systems    Video Exam    There were no vitals filed for this visit.     Physical Exam

## 2023-11-07 NOTE — PSYCH
Behavioral Health Psychotherapy Progress Note    Psychotherapy Provided: Individual Psychotherapy     1. Moderate mixed bipolar II disorder (HCC)        2. Generalized anxiety disorder        3. PTSD (post-traumatic stress disorder)        4. Tobacco abuse        5. Marijuana use        6. Alcohol abuse, in remission            Goals addressed in session: Goal 1 and Goal 2     DATA: Bee Charlton had a short session due to being on the phone with her disability . She discussed her fears of being denied continuous services and was allowed to express herself. Bee Charlton then was focused on the things she is grateful for and how much she has rather than miss or lack. During this session, this clinician used the following therapeutic modalities: Client-centered Therapy, Cognitive Behavioral Therapy, and Dialectical Behavior Therapy, Mindfulness, and Supportive Psychotherapy. Substance Abuse was not addressed during this session. If the client is diagnosed with a co-occurring substance use disorder, please indicate any changes in the frequency or amount of use: NA. Stage of change for addressing substance use diagnoses: Maintenance    ASSESSMENT:  Logan Armstrong presents with a Euthymic/ normal and Anxious mood. her affect is Normal range and intensity, which is congruent, with her mood and the content of the session. The client has made progress on their goals. Bee Charlton  seemed distracted yet with much effort she managed to put in a note her important tasks and action steps  Logan Armstrong presents with a low risk of suicide, low risk of self-harm, and none risk of harm to others. For any risk assessment that surpasses a "low" rating, a safety plan must be developed. A safety plan was indicated: no  If yes, describe in detail NA    PLAN: Between sessions, Logan Armstrong will attend her.weekly individual and group sessions to be able to deal with her complex trauma in healthy ways.  At the next session, the therapist will use Client-centered Therapy, Cognitive Behavioral Therapy, Dialectical Behavior Therapy, Mindfulness-based Strategies, and Supportive Psychotherapy to address self-boundaries and complex trauma. Behavioral Health Treatment Plan and Discharge Planning: Rachel Santana is aware of and agrees to continue to work on their treatment plan. They have identified and are working toward their discharge goals.  yes    Visit start and stop times:    11/07/23  Start Time: 0230  Stop Time: 8436  Total Visit Time: 29 minutes

## 2023-11-08 DIAGNOSIS — R91.8 PULMONARY NODULES: Primary | ICD-10-CM

## 2023-11-10 ENCOUNTER — TELEMEDICINE (OUTPATIENT)
Dept: BEHAVIORAL/MENTAL HEALTH CLINIC | Facility: CLINIC | Age: 58
End: 2023-11-10
Payer: COMMERCIAL

## 2023-11-10 DIAGNOSIS — F12.90 MARIJUANA USE: Chronic | ICD-10-CM

## 2023-11-10 DIAGNOSIS — F41.1 GENERALIZED ANXIETY DISORDER: ICD-10-CM

## 2023-11-10 DIAGNOSIS — F43.10 PTSD (POST-TRAUMATIC STRESS DISORDER): ICD-10-CM

## 2023-11-10 DIAGNOSIS — F17.210 CIGARETTE NICOTINE DEPENDENCE WITHOUT COMPLICATION: ICD-10-CM

## 2023-11-10 DIAGNOSIS — Z72.0 TOBACCO ABUSE: ICD-10-CM

## 2023-11-10 DIAGNOSIS — F31.81 MODERATE MIXED BIPOLAR II DISORDER (HCC): Primary | ICD-10-CM

## 2023-11-10 DIAGNOSIS — F10.11 ALCOHOL ABUSE, IN REMISSION: ICD-10-CM

## 2023-11-10 PROCEDURE — 90853 GROUP PSYCHOTHERAPY: CPT | Performed by: COUNSELOR

## 2023-11-10 NOTE — PSYCH
Virtual Regular Visit    Verification of patient location:    Patient is located at Home in the following state in which I hold an active license [place]      Assessment/Plan:    Problem List Items Addressed This Visit          Other    Marijuana use (Chronic)    Moderate mixed bipolar II disorder (HCC) - Primary    Generalized anxiety disorder    PTSD (post-traumatic stress disorder)    Alcohol abuse, in remission    Cigarette nicotine dependence without complication    Tobacco abuse       Goals addressed in session: Goal 1 and Goal 2          Reason for visit is No chief complaint on file. Encounter provider Angela Paul [facility]    Provider located at [address] [place] [address]  [address] [address] [phone]      Recent Visits  Date Type Provider Dept   11/07/23 [facility], [address]   11/03/23 [facility], [address] [facility] Therapist LADARIUS   Showing recent visits within past 7 days and meeting all other requirements  Future Appointments  No visits were found meeting these conditions. Showing future appointments within next 150 days and meeting all other requirements       The patient was identified by name and date of birth. Yair Evans was informed that this is a telemedicine visit and that the visit is being conducted [employer]. She agrees to proceed. .  My office door was closed. The patient was notified the following individuals were present in the room other virtual group attendees. She acknowledged consent and understanding of privacy and security of the video platform. The patient has agreed to participate and understands they can discontinue the visit at any time. Patient is aware this is a billable service. Subjective  Yair Evans is a 56 y.o. female  .     Data: Tangela Titus attended the Relaxation group and was guided through EFT tapping on trauma, diaphragmatic breathing, other Vagus nerve stimulation techniques, and progressive muscle relaxation. Furthermore, attendees were guided through mindfulness exercises  for body scan with connecting to what is present and attending to roller-coaster of emotions, imagery for accessing one's wealth of knowledge and inner resources, and with affirmations for forgiveness, self-appreciation, and unconditional support for others and self. Psychoeducation, EFT, Mindfulness-based, and Somatic approaches were used to achieve the deep state of relaxation and to maintain the serene environment for the exercise's purpose. Assessment: Chapin Valle followed through guidance and completed the exercises, showed willingness to continue learning and practicing self-care in weekly Relaxation Group as well as individual therapy. Plan: Chapin Valle will practice deep breathing and other techniques based on each occasion to counter stress and will attend the next group to be able to take part in further guided exercises on chosen topics to build on the techniques learned in the beginning.     HPI     Past Medical History:   Diagnosis Date    Bilateral carpal tunnel syndrome 08/20/2019    Carpal tunnel syndrome of right wrist 10/30/2019    Added automatically from request for surgery [id number]    Chlamydia     COPD (chronic obstructive pulmonary disease) ([address] )     Depression     Hypertension     Varicella        Past Surgical History:   Procedure Laterality Date    BREAST BIOPSY Left 20yrs ago benign    EGD      OOPHORECTOMY Right     [place] NDSC WRST SURG W/RLS TRANSVRS CARPL LIGM Right 01/21/2020    Procedure: RELEASE CARPAL TUNNEL ENDOSCOPIC;  Surgeon: Brittni Alcazar MD;  [facility];  Service: Orthopedics    [place] [address] WRST SURG W/RLS TRANSVRS CARPL LIGM Left 01/28/2020    Procedure: RELEASE CARPAL TUNNEL ENDOSCOPIC;  Surgeon: Brittni Alcazar MD;  [facility];  Service: Orthopedics       Current Outpatient Medications   Medication Sig Dispense Refill    albuterol (PROVENTIL HFA,VENTOLIN HFA) 90 mcg/act inhaler inhale 2 puffs by mouth and INTO THE LUNGS every 6 hours IF NEEDED FOR WHEEZING. 8.5 g 2    buPROPion (Wellbutrin XL) 150 mg 24 hr tablet Take 1 tablet (150 mg total) by mouth daily 30 tablet 1    cholecalciferol (VITAMIN D3) 400 units tablet Take 400 Units by mouth daily      FLUoxetine (PROzac) 20 mg capsule Take 1 capsule (20 mg total) by mouth daily 30 capsule 1    fluticasone (FLONASE) 50 mcg/act nasal spray instill 1 spray into each nostril once daily 16 g 2    guaiFENesin (MUCINEX) 600 mg 12 hr tablet Take 2 tablets (1,200 mg total) by mouth every 12 (twelve) hours 60 tablet 3    ibuprofen (MOTRIN) 200 mg tablet Take 200 mg by mouth every 6 (six) hours as needed for mild pain      ipratropium-albuterol (DUO-NEB) 0.5-2.5 mg/3 mL nebulizer solution Take 3 mL by nebulization 4 (four) times a day 360 mL 3    lamoTRIgine (LaMICtal) 200 MG tablet Take 1 tablet (200 mg total) by mouth daily 30 tablet 1    levocetirizine (XYZAL) 5 MG tablet take 1 tablet by mouth every evening 30 tablet 4    nicotine (NICODERM CQ) 21 mg/24 hr TD 24 hr patch Place 1 patch on the skin over 24 hours every 24 hours 28 patch 0    nicotine polacrilex (COMMIT) 4 MG lozenge Apply 1 lozenge (4 mg total) to the mouth or throat as needed for smoking cessation 100 each 0    omeprazole (PriLOSEC) 20 mg delayed release capsule take 1 capsule by mouth twice a day 60 capsule 5    senna-docusate sodium (SENOKOT-S) 8.6-50 mg per tablet Take 3 tablets by mouth as needed        Stiolto Respimat 2.5-2.5 MCG/ACT inhaler inhale 2 puffs by mouth and INTO THE LUNGS once daily 4 g 11     No current facility-administered medications for this visit. No Known Allergies    Review of Systems    Video Exam    There were no vitals filed for this visit.     Physical Exam     11/10/23  Start Time: 0405  Stop Time: 0500  Total Visit Time: 55 minutes

## 2023-11-11 ENCOUNTER — TELEPHONE (OUTPATIENT)
Age: 58
End: 2023-11-11

## 2023-11-11 NOTE — TELEPHONE ENCOUNTER
11/11/23  Screened by: Adeel Brandt    Referring Provider Frank Balbuena    Pre- Screening:     Z12.11 (ICD-10-CM) - Screen for colon cancer   Family HX of colon cancer (Both Grandmothers)    There is no height or weight on file to calculate BMI.20.86  Has patient been referred for a routine screening Colonoscopy? no  Is the patient between 43-73 years old? no      Previous Colonoscopy no   If yes:    Date:   Facility:     Reason:       SCHEDULING STAFF: If the patient is between 45yrs-49yrs, please advise patient to confirm benefits/coverage with their insurance company for a routine screening colonoscopy, some insurance carriers will only cover at [address] [place] [address] or older. If the patient is over 66years old, please schedule an office visit. Does the patient want to see a Gastroenterologist prior to their procedure OR are they having any GI symptoms? no    Has the patient been hospitalized or had abdominal surgery in the past 6 months? no    Does the patient use supplemental oxygen? no    Does the patient take Coumadin, Lovenox, Plavix, Elliquis, Xarelto, or other blood thinning medication? no    Has the patient had a stroke, cardiac event, or stent placed in the past year? no    SCHEDULING STAFF: If patient answers NO to above questions, then schedule procedure. If patient answers YES to above questions, then schedule office appointment. If patient is between 45yrs - 49yrs, please advise patient that we will have to confirm benefits & coverage with their insurance company for a routine screening colonoscopy.     PT PASSED OA

## 2023-11-11 NOTE — TELEPHONE ENCOUNTER
Scheduled date of colonoscopy (as of today):12/22/2023  Physician performing colonoscopy:Dr. Boggs  Location of colonoscopy:[facility]  Bowel prep reviewed with patient:Keyur/Dalila  Instructions reviewed with patient by:CB/sent via [email or web address]  Clearances: n/a      :Alaina Preston or Priscilla Malagon [phone]

## 2023-11-14 ENCOUNTER — TELEMEDICINE (OUTPATIENT)
Dept: BEHAVIORAL/MENTAL HEALTH CLINIC | Facility: CLINIC | Age: 58
End: 2023-11-14
Payer: COMMERCIAL

## 2023-11-14 DIAGNOSIS — F31.81 MODERATE MIXED BIPOLAR II DISORDER (HCC): Primary | ICD-10-CM

## 2023-11-14 DIAGNOSIS — F41.1 GENERALIZED ANXIETY DISORDER: ICD-10-CM

## 2023-11-14 DIAGNOSIS — F17.210 CIGARETTE NICOTINE DEPENDENCE WITHOUT COMPLICATION: ICD-10-CM

## 2023-11-14 DIAGNOSIS — F10.11 ALCOHOL ABUSE, IN REMISSION: ICD-10-CM

## 2023-11-14 DIAGNOSIS — F43.10 PTSD (POST-TRAUMATIC STRESS DISORDER): ICD-10-CM

## 2023-11-14 DIAGNOSIS — Z72.0 TOBACCO ABUSE: ICD-10-CM

## 2023-11-14 PROCEDURE — 90834 PSYTX W PT 45 MINUTES: CPT | Performed by: COUNSELOR

## 2023-11-14 NOTE — PSYCH
Behavioral Health Psychotherapy Progress Note    Psychotherapy Provided: Individual Psychotherapy     1. Moderate mixed bipolar II disorder (HCC)        2. Generalized anxiety disorder        3. PTSD (post-traumatic stress disorder)        4. Tobacco abuse        5. Cigarette nicotine dependence without complication        6. Alcohol abuse, in remission            Goals addressed in session: Goal 1 and Goal 2     DATA: Matt Agee was encouraged to work on [address] [place] [address] and find under Documents her virtual session and sign it. She then focused on her goals and objectives in her [place] plan update and reported her results from her brain MRI. Further steps were discussed to follow through appointments and recommendations. [facility] logged in twice to avoid echo in the audio and other audio issues. During this session, this clinician used the following therapeutic modalities: Client-centered Therapy, Cognitive Behavioral Therapy, Dialectical Behavior Therapy, Mindfulness-based Strategies, and Supportive Psychotherapy    Substance Abuse was not addressed during this session. If the client is diagnosed with a co-occurring substance use disorder, please indicate any changes in the frequency or amount of use: NA. Stage of change for addressing substance use diagnoses: Pre-contemplation    ASSESSMENT:  Zeyad Coates presents with a Euthymic/ normal, Anxious, and Depressed mood. her affect is Normal range and intensity and Tearful, at times, which is congruent, with her mood and the content of the session. The client has made progress on their goals and then maybe followed by regress due to other cognitive issues after head trauma. Matt Agee seemed in a good mood despite her reports but was redirected easily to different topics to avoid escalation of emotions. Zeyad Coates presents with a minimal risk of suicide, minimal risk of self-harm, and none risk of harm to others.     For any risk assessment that surpasses a "low" rating, a safety plan must be developed. A safety plan was indicated: no  If yes, describe in detail NA    PLAN: Between sessions, Destini Combs will continue attending her weekly individual and weekly group sessions to ne able to practice cognitive exercises and countering stress strategies as welll as processing her past trauma. At the next session, the therapist will use Client-centered Therapy, Cognitive Behavioral Therapy, Dialectical Behavior Therapy, Mindfulness-based Strategies, and Supportive Psychotherapy to address self-care and cognitive decline. Behavioral Health Treatment Plan and Discharge Planning: Destini Combs is aware of and agrees to continue to work on their treatment plan. They have identified and are working toward their discharge goals.  yes    Visit start and stop times:    11/14/23  Start Time: 0200  Stop Time: 0251  Total Visit Time: 51 minutes

## 2023-11-14 NOTE — PSYCH
Virtual Regular Visit    Verification of patient location:    Patient is located at Home in the following state in which I hold an active license [place]      Assessment/Plan:    Problem List Items Addressed This Visit          Other    Moderate mixed bipolar II disorder (HCC) - Primary    Generalized anxiety disorder    PTSD (post-traumatic stress disorder)    Alcohol abuse, in remission    Cigarette nicotine dependence without complication    Tobacco abuse       Goals addressed in session: Goal 1 and Goal 2          Reason for visit is No chief complaint on file. Encounter provider FARHAN Barroso [facility]    Provider located at [address] [place] [id number] [phone]      Recent Visits  Date Type Provider Dept   11/10/23 [facility], [address]   11/07/23 [facility], [address] [place] [address] [facility] Therapist LADARIUS   Showing recent visits within past 7 days and meeting all other requirements  Future Appointments  No visits were found meeting these conditions. Showing future appointments within next 150 days and meeting all other requirements       The patient was identified by name and date of birth. Mai Sr was informed that this is a telemedicine visit and that the visit is being conducted throughthe [employer] platform. She agrees to proceed. .  My office door was closed. No one else was in the room. She acknowledged consent and understanding of privacy and security of the video platform. The patient has agreed to participate and understands they can discontinue the visit at any time. Patient is aware this is a billable service. Subjective  Mai Sr is a 66 y.o. female  .       HPI     Past Medical History:   Diagnosis Date    Bilateral carpal tunnel syndrome 08/20/2019    Carpal tunnel syndrome of right wrist 10/30/2019 Added automatically from request for surgery [id number]    Chlamydia     COPD (chronic obstructive pulmonary disease) ([address])     Depression     Hypertension     Varicella        Past Surgical History:   Procedure Laterality Date    BREAST BIOPSY Left 20yrs ago benign    EGD      OOPHORECTOMY Right     [place] NDSC WRST SURG W/RLS TRANSVRS CARPL LIGM Right 01/21/2020    Procedure: RELEASE CARPAL TUNNEL ENDOSCOPIC;  Surgeon: Katia Bhakta MD;  [facility];  Service: Orthopedics    [place] [address] WRST SURG W/RLS TRANSVRS CARPL LIGM Left 01/28/2020    Procedure: RELEASE CARPAL TUNNEL ENDOSCOPIC;  Surgeon: Katia Bhakta MD;  [facility];  Service: Orthopedics       Current Outpatient Medications   Medication Sig Dispense Refill    albuterol (PROVENTIL HFA,VENTOLIN HFA) 90 mcg/act inhaler inhale 2 puffs by mouth and INTO THE LUNGS every 6 hours IF NEEDED FOR WHEEZING.  8.5 g 2    buPROPion (Wellbutrin XL) 150 mg 24 hr tablet Take 1 tablet (150 mg total) by mouth daily 30 tablet 1    cholecalciferol (VITAMIN D3) 400 units tablet Take 400 Units by mouth daily      FLUoxetine (PROzac) 20 mg capsule Take 1 capsule (20 mg total) by mouth daily 30 capsule 1    fluticasone (FLONASE) 50 mcg/act nasal spray instill 1 spray into each nostril once daily 16 g 2    guaiFENesin (MUCINEX) 600 mg 12 hr tablet Take 2 tablets (1,200 mg total) by mouth every 12 (twelve) hours 60 tablet 3    ibuprofen (MOTRIN) 200 mg tablet Take 200 mg by mouth every 6 (six) hours as needed for mild pain      ipratropium-albuterol (DUO-NEB) 0.5-2.5 mg/3 mL nebulizer solution Take 3 mL by nebulization 4 (four) times a day 360 mL 3    lamoTRIgine (LaMICtal) 200 MG tablet Take 1 tablet (200 mg total) by mouth daily 30 tablet 1    levocetirizine (XYZAL) 5 MG tablet take 1 tablet by mouth every evening 30 tablet 4    nicotine (NICODERM CQ) 21 mg/24 hr TD 24 hr patch Place 1 patch on the skin over 24 hours every 24 hours 28 patch 0    nicotine polacrilex (COMMIT) 4 MG lozenge Apply 1 lozenge (4 mg total) to the mouth or throat as needed for smoking cessation 100 each 0    omeprazole (PriLOSEC) 20 mg delayed release capsule take 1 capsule by mouth twice a day 60 capsule 5    senna-docusate sodium (SENOKOT-S) 8.6-50 mg per tablet Take 3 tablets by mouth as needed        Stiolto Respimat 2.5-2.5 MCG/ACT inhaler inhale 2 puffs by mouth and INTO THE LUNGS once daily 4 g 11     No current facility-administered medications for this visit. No Known Allergies    Review of Systems    Video Exam    There were no vitals filed for this visit.     Physical Exam

## 2023-11-14 NOTE — BH TREATMENT PLAN
[address] [facility]  1965     Date of Initial Psychotherapy Assessment: 3/22/23   Date of Current Treatment Plan: 11/14/23  Treatment Plan Target Date: TBD  Treatment Plan Expiration Date: 4/12/24    Diagnosis:   1. Moderate mixed bipolar II disorder (HCC)        2. Generalized anxiety disorder        3. PTSD (post-traumatic stress disorder)        4. Tobacco abuse        5. Cigarette nicotine dependence without complication        6.  Alcohol abuse, in remission            Area(s) of Need: mood, handling emotions, cognitive decline     Long Term Goal 1 (in the client's own words): I want to feel better when I wake up in the morning     Stage of Change: Preparation    Target Date for completion: TBD     Anticipated therapeutic modalities: Person-Centered, TF-CBT,  DBT, Somatic Experiencing      People identified to complete this goal: Patient, Doctors, Therapist       Objective 1: (identify the means of measuring success in meeting the objective): will process her trauma during sessions and will start re-connecting to her body and identifying how emotions are sensed in the body and will describe with Somatic language        Objective 2: (identify the means of measuring success in meeting the objective): will use Mindfulness attendance to difficult emotions            Objective 3: (identify the means of measuring success in meeting the objective): will follow a strict medication management schedule       Long Term Goal 2 (in the client's own words): I want to handle stress better      Stage of Change: Action    Target Date for completion: TBD     Anticipated therapeutic modalities: Person-Centered, DBT, CBT, and Somatic      People identified to complete this goal: Patent, Doctors, Therapist       Objective 1: (identify the means of measuring success in meeting the objective): will practice relaxation techniques daily to counter stress- will take rapid and vigorous inhales with long exhales and will attend Relaxation Group weekly      Objective 2: (identify the means of measuring success in meeting the objective): will follow a strict medication management schedule         I am currently under the care of a [facility] psychiatric provider: yes    My [facility] psychiatric provider is: Dr. Mariela Stack    I am currently taking psychiatric medications: Yes, as prescribed    I feel that I will be ready for discharge from mental health care when I reach the following (measurable goal/objective): "I gotta think about it- happy and sad that I am ready and will be missing the people I met"     For children and adults who have a legal guardian:   Has there been any change to custody orders and/or guardianship status? NA. If yes, attach updated documentation. I have created my Crisis Plan and have been offered a copy of this plan    [address] [facility]: Diagnosis and Treatment Plan explained to [address] [place] [address] Drive acknowledges an understanding of their diagnosis. Mai Sr agrees to this treatment plan. I have been offered a copy of this Treatment Plan. Yes      Mai Sr, 1965, actively participated in the review and update of this treatment plan during a virtual session, using the [employer] platform. Mai Sr  provided verbal consent on 11/14/2023 at 2:28 PM. The treatment plan was transcribed into the [employer] Record at a later time. Pt will be mailed two copies of this Shashank Rousseau through [address] if unsuccessful signing electronically and will return a copy with her actual signature.

## 2023-11-17 ENCOUNTER — TELEMEDICINE (OUTPATIENT)
Dept: BEHAVIORAL/MENTAL HEALTH CLINIC | Facility: CLINIC | Age: 58
End: 2023-11-17
Payer: COMMERCIAL

## 2023-11-17 DIAGNOSIS — F12.90 MARIJUANA USE: Chronic | ICD-10-CM

## 2023-11-17 DIAGNOSIS — F10.11 ALCOHOL ABUSE, IN REMISSION: ICD-10-CM

## 2023-11-17 DIAGNOSIS — F43.10 PTSD (POST-TRAUMATIC STRESS DISORDER): ICD-10-CM

## 2023-11-17 DIAGNOSIS — Z72.0 TOBACCO ABUSE: ICD-10-CM

## 2023-11-17 DIAGNOSIS — F41.1 GENERALIZED ANXIETY DISORDER: ICD-10-CM

## 2023-11-17 DIAGNOSIS — F31.81 MODERATE MIXED BIPOLAR II DISORDER (HCC): Primary | ICD-10-CM

## 2023-11-17 PROCEDURE — 90853 GROUP PSYCHOTHERAPY: CPT | Performed by: COUNSELOR

## 2023-11-17 NOTE — PSYCH
Virtual Regular Visit    Verification of patient location:    Patient is located at Home in the following state in which I hold an active license [place]      Assessment/Plan:    Problem List Items Addressed This Visit          Other    Marijuana use (Chronic)    Moderate mixed bipolar II disorder (HCC) - Primary    Generalized anxiety disorder    PTSD (post-traumatic stress disorder)    Alcohol abuse, in remission    Tobacco abuse       Goals addressed in session: Goal 1 and Goal 2          Reason for visit is No chief complaint on file. Encounter provider FARHAN [facility]    Provider located at [address] [place] [address]  [address] [address] [phone]      Recent Visits  Date Type Provider Dept   11/14/23 [facility], [address]   11/10/23 [facility], [address] [facility] Therapist LADARIUS   Showing recent visits within past 7 days and meeting all other requirements  Future Appointments  No visits were found meeting these conditions. Showing future appointments within next 150 days and meeting all other requirements       The patient was identified by name and date of birth. Aye Ramirez was informed that this is a telemedicine visit and that the visit is being conducted [employer]. She agrees to proceed. .  My office door was closed. The patient was notified the following individuals were present in the room other group members. She acknowledged consent and understanding of privacy and security of the video platform. The patient has agreed to participate and understands they can discontinue the visit at any time. Patient is aware this is a billable service.      Subjective  Aye Ramirez is a 66 y.o. female     Data: Rebeka Mendoza attended the Relaxation group and was guided through EFT tapping on pain, diaphragmatic breathing, other Vagus nerve stimulation techniques, and progressive muscle relaxation. Furthermore, attendees were guided through mindfulness exercises  for body scan with connecting to what is present and attending to neutral, positive, and negative thoughts or emotions as well as trusting providers who perform medical procedures, imagery for accessing one's agency and strengths, and with affirmations for confidence, self-appreciation, and sense of safety. Psychoeducation, EFT, Mindfulness-based, and Somatic approaches were used to achieve the deep state of relaxation and to maintain the serene environment for the exercise's purpose. Assessment: Pastora Baires followed through guidance and completed the exercises, showed willingness to continue learning and practicing self-care in weekly Relaxation Group as well as individual therapy. Plan: Pastora Baires will practice deep breathing and other techniques based on each occasion to counter stress and will attend the next group to be able to take part in further guided exercises on chosen topics to build on the techniques learned in the beginning.     HPI     Past Medical History:   Diagnosis Date    Bilateral carpal tunnel syndrome 08/20/2019    Carpal tunnel syndrome of right wrist 10/30/2019    Added automatically from request for surgery [id number]    Chlamydia     COPD (chronic obstructive pulmonary disease) ([facility])     Depression     Hypertension     Varicella        Past Surgical History:   Procedure Laterality Date    BREAST BIOPSY Left 20yrs ago benign    EGD      OOPHORECTOMY Right     [place] WRST SURG W/RLS TRANSVRS CARPL LIGM Right 01/21/2020    Procedure: RELEASE CARPAL TUNNEL ENDOSCOPIC;  Surgeon: Verna Chavez MD;  [facility];  Service: Orthopedics    [place] [address] WRST SURG W/RLS TRANSVRS CARPL LIGM Left 01/28/2020    Procedure: RELEASE CARPAL TUNNEL ENDOSCOPIC;  Surgeon: Verna Chavez MD;  [facility];  Service: Orthopedics       Current Outpatient Medications   Medication Sig Dispense Refill    albuterol (PROVENTIL HFA,VENTOLIN HFA) 90 mcg/act inhaler inhale 2 puffs by mouth and INTO THE LUNGS every 6 hours IF NEEDED FOR WHEEZING. 8.5 g 2    buPROPion (Wellbutrin XL) 150 mg 24 hr tablet Take 1 tablet (150 mg total) by mouth daily 30 tablet 1    cholecalciferol (VITAMIN D3) 400 units tablet Take 400 Units by mouth daily      FLUoxetine (PROzac) 20 mg capsule Take 1 capsule (20 mg total) by mouth daily 30 capsule 1    fluticasone (FLONASE) 50 mcg/act nasal spray instill 1 spray into each nostril once daily 16 g 2    guaiFENesin (MUCINEX) 600 mg 12 hr tablet Take 2 tablets (1,200 mg total) by mouth every 12 (twelve) hours 60 tablet 3    ibuprofen (MOTRIN) 200 mg tablet Take 200 mg by mouth every 6 (six) hours as needed for mild pain      ipratropium-albuterol (DUO-NEB) 0.5-2.5 mg/3 mL nebulizer solution Take 3 mL by nebulization 4 (four) times a day 360 mL 3    lamoTRIgine (LaMICtal) 200 MG tablet Take 1 tablet (200 mg total) by mouth daily 30 tablet 1    levocetirizine (XYZAL) 5 MG tablet take 1 tablet by mouth every evening 30 tablet 4    nicotine (NICODERM CQ) 21 mg/24 hr TD 24 hr patch Place 1 patch on the skin over 24 hours every 24 hours 28 patch 0    nicotine polacrilex (COMMIT) 4 MG lozenge Apply 1 lozenge (4 mg total) to the mouth or throat as needed for smoking cessation 100 each 0    omeprazole (PriLOSEC) 20 mg delayed release capsule take 1 capsule by mouth twice a day 60 capsule 5    senna-docusate sodium (SENOKOT-S) 8.6-50 mg per tablet Take 3 tablets by mouth as needed        Stiolto Respimat 2.5-2.5 MCG/ACT inhaler inhale 2 puffs by mouth and INTO THE LUNGS once daily 4 g 11     No current facility-administered medications for this visit. No Known Allergies    Review of Systems    Video Exam    There were no vitals filed for this visit.     Physical Exam     11/17/23  Start Time: 0400  Stop Time: 0500  Total Visit Time: 60 minutes

## 2023-11-21 ENCOUNTER — TELEMEDICINE (OUTPATIENT)
Dept: BEHAVIORAL/MENTAL HEALTH CLINIC | Facility: CLINIC | Age: 58
End: 2023-11-21
Payer: COMMERCIAL

## 2023-11-21 DIAGNOSIS — F12.90 MARIJUANA USE: Chronic | ICD-10-CM

## 2023-11-21 DIAGNOSIS — F31.81 MODERATE MIXED BIPOLAR II DISORDER (HCC): Primary | ICD-10-CM

## 2023-11-21 DIAGNOSIS — F17.210 CIGARETTE NICOTINE DEPENDENCE WITHOUT COMPLICATION: ICD-10-CM

## 2023-11-21 DIAGNOSIS — F43.10 PTSD (POST-TRAUMATIC STRESS DISORDER): ICD-10-CM

## 2023-11-21 DIAGNOSIS — F10.11 ALCOHOL ABUSE, IN REMISSION: ICD-10-CM

## 2023-11-21 DIAGNOSIS — Z72.0 TOBACCO ABUSE: ICD-10-CM

## 2023-11-21 DIAGNOSIS — F41.1 GENERALIZED ANXIETY DISORDER: ICD-10-CM

## 2023-11-21 PROCEDURE — 90834 PSYTX W PT 45 MINUTES: CPT | Performed by: COUNSELOR

## 2023-11-21 NOTE — PSYCH
Behavioral Health Psychotherapy Progress Note    Psychotherapy Provided: Individual Psychotherapy     1. Moderate mixed bipolar II disorder (HCC)          2. Generalized anxiety disorder          3. PTSD (post-traumatic stress disorder)          4. Tobacco abuse          5. Cigarette nicotine dependence without complication          6. Alcohol abuse, in remission         Goals addressed in session: Goal 1 and Goal 2     DATA: Rashmi Deutsch discussed her BDay's presents from her loved ones, her time with her mother, and some emotions that surfaced. She brought up alternative methods to treat resistant depression and was given information about ECT, [address], and Ketamine/Esketamine treatments. She was also encouraged to focus on her strengths and learned DBT and Mindfulness skills, use of compassion and forgiveness when processing trauma. During this session, this clinician used the following therapeutic modalities: Client-centered Therapy, Cognitive Behavioral Therapy, Dialectical Behavior Therapy, Mindfulness-based Strategies, and Supportive Psychotherapy    Substance Abuse was addressed during this session. If the client is diagnosed with a co-occurring substance use disorder, please indicate any changes in the frequency or amount of use: NA. Stage of change for addressing substance use diagnoses: Maintenance    ASSESSMENT:  Kayla Salinas presents with a Euthymic/ normal and Anxious mood. her affect is Normal range and intensity and Tearful, at times, which is congruent, with her mood and the content of the session. The client has made progress on their goals. Rashmi Deutsch seemed a bit stressed while talking about her hearing while during the other topics, she was calmer, confident, and friendly. Kayla Salinas presents with a minimal risk of suicide, minimal risk of self-harm, and none risk of harm to others. For any risk assessment that surpasses a "low" rating, a safety plan must be developed.     A safety plan was indicated: no  If yes, describe in detail NA    PLAN: Between sessions, Hair Ramirez will attend her weekly individual and weekly group sessions . At the next session, the therapist will use Client-centered Therapy, Cognitive Behavioral Therapy, Dialectical Behavior Therapy, Mindfulness-based Strategies, and Supportive Psychotherapy to address cognitive decline, emotions intensity, and self-care. Behavioral Health Treatment Plan and Discharge Planning: Hair Ramirez is aware of and agrees to continue to work on their treatment plan. They have identified and are working toward their discharge goals.  yes    Visit start and stop times:    11/21/23  Start Time: 0201  Stop Time: 9315  Total Visit Time: 46 minutes

## 2023-11-21 NOTE — PSYCH
Virtual Regular Visit    Verification of patient location:    Patient is located at Home in the following state in which I hold an active license [place]      Assessment/Plan:    Problem List Items Addressed This Visit    None      Goals addressed in session: Goal 1 and Goal 2          Reason for visit is No chief complaint on file. [facility]    Provider located at [address] [place] [address]  [address] [address] [phone]      Recent Visits  Date Type Provider Dept   11/17/23 [facility], [address] [place] [address]   [facility] [address] [place] [address] [facility] Therapist [place]   Showing recent visits within past 7 days and meeting all other requirements  Future Appointments  No visits were found meeting these conditions. Showing future appointments within next 150 days and meeting all other requirements       The patient was identified by name and date of birth. Arnold Machado was informed that this is a telemedicine visit and that the visit is being conducted throughthe [employer] platform. She agrees to proceed. .  My office door was closed. No one else was in the room. She acknowledged consent and understanding of privacy and security of the video platform. The patient has agreed to participate and understands they can discontinue the visit at any time. Patient is aware this is a billable service. Subjective  Arnold Machado is a 66 y.o. female  .       HPI     Past Medical History:   Diagnosis Date    Bilateral carpal tunnel syndrome 08/20/2019    Carpal tunnel syndrome of right wrist 10/30/2019    Added automatically from request for surgery [id number]    Chlamydia     COPD (chronic obstructive pulmonary disease) ([address])     Depression     Hypertension     Varicella        Past Surgical History:   Procedure Laterality Date    BREAST BIOPSY Left 20yrs ago benign    EGD      OOPHORECTOMY Right     [place] NDSC WRST SURG W/RLS TRANSVRS CARPL LIGM Right 01/21/2020    Procedure: RELEASE CARPAL TUNNEL ENDOSCOPIC;  Surgeon: Brittni Alcazar MD;  Location: [facility];  Service: Orthopedics    [place] [address] WRST SURG W/RLS TRANSVRS CARPL LIGM Left 01/28/2020    Procedure: RELEASE CARPAL TUNNEL ENDOSCOPIC;  Surgeon: Brittni Alcazar MD;  [facility];  Service: Orthopedics       Current Outpatient Medications   Medication Sig Dispense Refill    albuterol (PROVENTIL HFA,VENTOLIN HFA) 90 mcg/act inhaler inhale 2 puffs by mouth and INTO THE LUNGS every 6 hours IF NEEDED FOR WHEEZING.  8.5 g 2    buPROPion (Wellbutrin XL) 150 mg 24 hr tablet Take 1 tablet (150 mg total) by mouth daily 30 tablet 1    cholecalciferol (VITAMIN D3) 400 units tablet Take 400 Units by mouth daily      FLUoxetine (PROzac) 20 mg capsule Take 1 capsule (20 mg total) by mouth daily 30 capsule 1    fluticasone (FLONASE) 50 mcg/act nasal spray instill 1 spray into each nostril once daily 16 g 2    guaiFENesin (MUCINEX) 600 mg 12 hr tablet Take 2 tablets (1,200 mg total) by mouth every 12 (twelve) hours 60 tablet 3    ibuprofen (MOTRIN) 200 mg tablet Take 200 mg by mouth every 6 (six) hours as needed for mild pain      ipratropium-albuterol (DUO-NEB) 0.5-2.5 mg/3 mL nebulizer solution Take 3 mL by nebulization 4 (four) times a day 360 mL 3    lamoTRIgine (LaMICtal) 200 MG tablet Take 1 tablet (200 mg total) by mouth daily 30 tablet 1    levocetirizine (XYZAL) 5 MG tablet take 1 tablet by mouth every evening 30 tablet 4    nicotine (NICODERM CQ) 21 mg/24 hr TD 24 hr patch Place 1 patch on the skin over 24 hours every 24 hours 28 patch 0    nicotine polacrilex (COMMIT) 4 MG lozenge Apply 1 lozenge (4 mg total) to the mouth or throat as needed for smoking cessation 100 each 0    omeprazole (PriLOSEC) 20 mg delayed release capsule take 1 capsule by mouth twice a day 60 capsule 5 senna-docusate sodium (SENOKOT-S) 8.6-50 mg per tablet Take 3 tablets by mouth as needed        Stiolto Respimat 2.5-2.5 MCG/ACT inhaler inhale 2 puffs by mouth and INTO THE LUNGS once daily 4 g 11     No current facility-administered medications for this visit. No Known Allergies    Review of Systems    Video Exam    There were no vitals filed for this visit.     Physical Exam

## 2023-11-28 ENCOUNTER — TELEMEDICINE (OUTPATIENT)
Dept: BEHAVIORAL/MENTAL HEALTH CLINIC | Facility: CLINIC | Age: 58
End: 2023-11-28
Payer: COMMERCIAL

## 2023-11-28 DIAGNOSIS — F41.1 GENERALIZED ANXIETY DISORDER: ICD-10-CM

## 2023-11-28 DIAGNOSIS — F31.81 MODERATE MIXED BIPOLAR II DISORDER (HCC): Primary | ICD-10-CM

## 2023-11-28 DIAGNOSIS — F43.10 PTSD (POST-TRAUMATIC STRESS DISORDER): ICD-10-CM

## 2023-11-28 DIAGNOSIS — F10.11 ALCOHOL ABUSE, IN REMISSION: ICD-10-CM

## 2023-11-28 DIAGNOSIS — F17.210 CIGARETTE NICOTINE DEPENDENCE WITHOUT COMPLICATION: ICD-10-CM

## 2023-11-28 DIAGNOSIS — Z72.0 TOBACCO ABUSE: ICD-10-CM

## 2023-11-28 PROCEDURE — 90834 PSYTX W PT 45 MINUTES: CPT | Performed by: COUNSELOR

## 2023-11-28 NOTE — PSYCH
Behavioral Health Psychotherapy Progress Note    Psychotherapy Provided: Individual Psychotherapy     1. Moderate mixed bipolar II disorder (HCC)        2. Generalized anxiety disorder        3. PTSD (post-traumatic stress disorder)        4. Tobacco abuse        5. Cigarette nicotine dependence without complication        6. Alcohol abuse, in remission            Goals addressed in session: Goal 1 and Goal 2     DATA: Mani Gutierrez discussed her new diagnosis and the consequences of that for her mental and physical health. She was allowed to express herself and brought up her awareness about the new information she received and concerns she has. During this session, this clinician used the following therapeutic modalities: Client-centered Therapy, Cognitive Behavioral Therapy, Dialectical Behavior Therapy, Mindfulness-based Strategies, and Supportive Psychotherapy    Substance Abuse was not addressed during this session. If the client is diagnosed with a co-occurring substance use disorder, please indicate any changes in the frequency or amount of use: NA. Stage of change for addressing substance use diagnoses: Maintenance    ASSESSMENT:  Destini Combs presents with a Euthymic/ normal, Anxious, and Depressed mood. her affect is Normal range and intensity and Tearful, which is congruent, with her mood and the content of the session. The client has made progress on their goals. Mani Gutierrez made progress with her making notes about her important paying bills, scheduling, follow ups, and engaging tasks. Destini Combs presents with a low risk of suicide, low risk of self-harm, and none risk of harm to others. For any risk assessment that surpasses a "low" rating, a safety plan must be developed.     A safety plan was indicated: no  If yes, describe in detail NA    PLAN: Between sessions, Destini Combs will continue making notes about her checked meetings or tasks to avoid being frustrated with her forgetfulness At the next session, the therapist will use Client-centered Therapy, Cognitive Behavioral Therapy, Dialectical Behavior Therapy, Mindfulness-based Strategies, and Supportive Psychotherapy to address self-care, awareness about programs available for people with brain injury, neurorehab programs with federal funding that she may benefit from. Behavioral Health Treatment Plan and Discharge Planning: Fabricio Sims is aware of and agrees to continue to work on their treatment plan. They have identified and are working toward their discharge goals.  yes    Visit start and stop times:    11/28/23  Start Time: 0200  Stop Time: 0250  Total Visit Time: 50 minutes

## 2023-11-28 NOTE — PSYCH
Virtual Regular Visit    Verification of patient location:    Patient is located at Home in the following state in which I hold an active license [place]      Assessment/Plan:    Problem List Items Addressed This Visit          Other    Moderate mixed bipolar II disorder (HCC) - Primary    Generalized anxiety disorder    PTSD (post-traumatic stress disorder)    Alcohol abuse, in remission    Cigarette nicotine dependence without complication    Tobacco abuse       Goals addressed in session: Goal 1 and Goal 2          Reason for visit is No chief complaint on file. [facility]    Provider located at [address] [place] [address]  [phone]      Recent Visits  Date Type Provider Dept   11/21/23 Zoey, [address] [place] [address] [facility] Therapist LAD[facility]   Showing recent visits within past 7 days and meeting all other requirements  Future Appointments  No visits were found meeting these conditions. Showing future appointments within next 150 days and meeting all other requirements       The patient was identified by name and date of birth. Ana Gamboa was informed that this is a telemedicine visit and that the visit is being conducted throughthe [employer] platform. She agrees to proceed. .  My office door was closed. No one else was in the room. She acknowledged consent and understanding of privacy and security of the video platform. The patient has agreed to participate and understands they can discontinue the visit at any time. Patient is aware this is a billable service. Subjective  Ana Gamboa is a 66 y.o. female  .       HPI     Past Medical History:   Diagnosis Date    Bilateral carpal tunnel syndrome 08/20/2019    Carpal tunnel syndrome of right wrist 10/30/2019    Added automatically from request for surgery [id number]    Chlamydia     COPD (chronic obstructive pulmonary disease) ([facility])     Depression     Hypertension     Varicella        Past Surgical History:   Procedure Laterality Date    BREAST BIOPSY Left 20yrs ago benign    EGD      OOPHORECTOMY Right     [place] NDSC WRST SURG W/RLS TRANSVRS CARPL LIGM Right 01/21/2020    Procedure: RELEASE CARPAL TUNNEL ENDOSCOPIC;  Surgeon: Lisseth Ramirez MD;  [facility];  Service: Orthopedics    [place] [id number] WRST SURG W/RLS TRANSVRS CARPL LIGM Left 01/28/2020    Procedure: RELEASE CARPAL TUNNEL ENDOSCOPIC;  Surgeon: Lisseth Ramirez MD;  [facility];  Service: Orthopedics       Current Outpatient Medications   Medication Sig Dispense Refill    albuterol (PROVENTIL HFA,VENTOLIN HFA) 90 mcg/act inhaler inhale 2 puffs by mouth and INTO THE LUNGS every 6 hours IF NEEDED FOR WHEEZING.  8.5 g 2    buPROPion (Wellbutrin XL) 150 mg 24 hr tablet Take 1 tablet (150 mg total) by mouth daily 30 tablet 1    cholecalciferol (VITAMIN D3) 400 units tablet Take 400 Units by mouth daily      FLUoxetine (PROzac) 20 mg capsule Take 1 capsule (20 mg total) by mouth daily 30 capsule 1    fluticasone (FLONASE) 50 mcg/act nasal spray instill 1 spray into each nostril once daily 16 g 2    guaiFENesin (MUCINEX) 600 mg 12 hr tablet Take 2 tablets (1,200 mg total) by mouth every 12 (twelve) hours 60 tablet 3    ibuprofen (MOTRIN) 200 mg tablet Take 200 mg by mouth every 6 (six) hours as needed for mild pain      ipratropium-albuterol (DUO-NEB) 0.5-2.5 mg/3 mL nebulizer solution Take 3 mL by nebulization 4 (four) times a day 360 mL 3    lamoTRIgine (LaMICtal) 200 MG tablet Take 1 tablet (200 mg total) by mouth daily 30 tablet 1    levocetirizine (XYZAL) 5 MG tablet take 1 tablet by mouth every evening 30 tablet 4    nicotine (NICODERM CQ) 21 mg/24 hr TD 24 hr patch Place 1 patch on the skin over 24 hours every 24 hours 28 patch 0    nicotine polacrilex (COMMIT) 4 MG lozenge Apply 1 lozenge (4 mg total) to the mouth or throat as needed for smoking cessation 100 each 0    omeprazole (PriLOSEC) 20 mg delayed release capsule take 1 capsule by mouth twice a day 60 capsule 5    senna-docusate sodium (SENOKOT-S) 8.6-50 mg per tablet Take 3 tablets by mouth as needed        Stiolto Respimat 2.5-2.5 MCG/ACT inhaler inhale 2 puffs by mouth and INTO THE LUNGS once daily 4 g 11     No current facility-administered medications for this visit. No Known Allergies    Review of Systems    Video Exam    There were no vitals filed for this visit.     Physical Exam

## 2023-12-01 ENCOUNTER — TELEMEDICINE (OUTPATIENT)
Dept: BEHAVIORAL/MENTAL HEALTH CLINIC | Facility: CLINIC | Age: 58
End: 2023-12-01
Payer: COMMERCIAL

## 2023-12-01 DIAGNOSIS — F10.11 ALCOHOL ABUSE, IN REMISSION: ICD-10-CM

## 2023-12-01 DIAGNOSIS — F43.10 PTSD (POST-TRAUMATIC STRESS DISORDER): ICD-10-CM

## 2023-12-01 DIAGNOSIS — F41.1 GENERALIZED ANXIETY DISORDER: ICD-10-CM

## 2023-12-01 DIAGNOSIS — Z72.0 TOBACCO ABUSE: ICD-10-CM

## 2023-12-01 DIAGNOSIS — F31.81 MODERATE MIXED BIPOLAR II DISORDER (HCC): Primary | ICD-10-CM

## 2023-12-01 DIAGNOSIS — F12.90 MARIJUANA USE: Chronic | ICD-10-CM

## 2023-12-01 PROCEDURE — 90853 GROUP PSYCHOTHERAPY: CPT | Performed by: COUNSELOR

## 2023-12-01 NOTE — PSYCH
Virtual Regular Visit    Verification of patient location:    Patient is located at Home in the following state in which I hold an active license [place]      Assessment/Plan:    Problem List Items Addressed This Visit          Other    Marijuana use (Chronic)    Moderate mixed bipolar II disorder (HCC) - Primary    Generalized anxiety disorder    PTSD (post-traumatic stress disorder)    Alcohol abuse, in remission    Tobacco abuse       Goals addressed in session: Goal 1 and Goal 2          Reason for visit is No chief complaint on file. Encounter provider Olivia Ramirez [facility]    Provider located at [address] [place] [id number] [phone]      Recent Visits  Date Type Provider Dept   11/28/23 Zoey, [facility] Therapist LADARIUS   Showing recent visits within past 7 days and meeting all other requirements  Future Appointments  No visits were found meeting these conditions. Showing future appointments within next 150 days and meeting all other requirements       The patient was identified by name and date of birth. Henrik Presley was informed that this is a telemedicine visit and that the visit is being conducted [employer]. She agrees to proceed. .  My office door was closed. The patient was notified the following individuals were present in the room other group members. She acknowledged consent and understanding of privacy and security of the video platform. The patient has agreed to participate and understands they can discontinue the visit at any time. Patient is aware this is a billable service. Subjective  Henrik Presley is a 66 y.o. female  . Data:  Rositasophia Cho attended the Relaxation group and was guided through EFT tapping on trauma, diaphragmatic breathing, other Vagus nerve stimulation techniques, and progressive muscle relaxation. Furthermore, attendees were guided through mindfulness exercises  for a quick body scan transitioning to connection to the inner child and re-parenting, imagery for comforting and supporting the child, creating space of safety, and with affirmations for self-compassion, forgiveness, and inner resources and strengths. Psychoeducation, EFT, Mindfulness-based, and Somatic approaches were used to achieve the deep state of relaxation and to maintain the serene environment for the exercise's purpose. Assessment: Rony Gibson followed through guidance and completed the exercises, showed willingness to continue learning and practicing self-care in weekly Relaxation Group as well as individual therapy. Plan: Rony Gibson will practice deep breathing and other techniques based on each occasion to counter stress and will attend the next group to be able to take part in further guided exercises on chosen topics to build on the techniques learned in the beginning.     HPI     Past Medical History:   Diagnosis Date    Bilateral carpal tunnel syndrome 08/20/2019    Carpal tunnel syndrome of right wrist 10/30/2019    Added automatically from request for surgery [id number]    Chlamydia     COPD (chronic obstructive pulmonary disease) ([address] )     Depression     Hypertension     Varicella        Past Surgical History:   Procedure Laterality Date    BREAST BIOPSY Left 20yrs ago benign    EGD      OOPHORECTOMY Right     [place] NDSC WRST SURG W/RLS TRANSVRS CARPL LIGM Right 01/21/2020    Procedure: RELEASE CARPAL TUNNEL ENDOSCOPIC;  Surgeon: Katia Bhakta MD;  [facility];  Service: Orthopedics    [place] [id number] WRST SURG W/RLS TRANSVRS CARPL LIGM Left 01/28/2020    Procedure: RELEASE CARPAL TUNNEL ENDOSCOPIC;  Surgeon: Katia Bhakta MD;  [facility];  Service: Orthopedics       Current Outpatient Medications   Medication Sig Dispense Refill    albuterol (PROVENTIL HFA,VENTOLIN HFA) 90 mcg/act inhaler inhale 2 puffs by mouth and INTO THE LUNGS every 6 hours IF NEEDED FOR WHEEZING. 8.5 g 2    buPROPion (Wellbutrin XL) 150 mg 24 hr tablet Take 1 tablet (150 mg total) by mouth daily 30 tablet 1    cholecalciferol (VITAMIN D3) 400 units tablet Take 400 Units by mouth daily      FLUoxetine (PROzac) 20 mg capsule Take 1 capsule (20 mg total) by mouth daily 30 capsule 1    fluticasone (FLONASE) 50 mcg/act nasal spray instill 1 spray into each nostril once daily 16 g 2    guaiFENesin (MUCINEX) 600 mg 12 hr tablet Take 2 tablets (1,200 mg total) by mouth every 12 (twelve) hours 60 tablet 3    ibuprofen (MOTRIN) 200 mg tablet Take 200 mg by mouth every 6 (six) hours as needed for mild pain      ipratropium-albuterol (DUO-NEB) 0.5-2.5 mg/3 mL nebulizer solution Take 3 mL by nebulization 4 (four) times a day 360 mL 3    lamoTRIgine (LaMICtal) 200 MG tablet Take 1 tablet (200 mg total) by mouth daily 30 tablet 1    levocetirizine (XYZAL) 5 MG tablet take 1 tablet by mouth every evening 30 tablet 4    nicotine (NICODERM CQ) 21 mg/24 hr TD 24 hr patch Place 1 patch on the skin over 24 hours every 24 hours 28 patch 0    nicotine polacrilex (COMMIT) 4 MG lozenge Apply 1 lozenge (4 mg total) to the mouth or throat as needed for smoking cessation 100 each 0    omeprazole (PriLOSEC) 20 mg delayed release capsule take 1 capsule by mouth twice a day 60 capsule 5    senna-docusate sodium (SENOKOT-S) 8.6-50 mg per tablet Take 3 tablets by mouth as needed        Stiolto Respimat 2.5-2.5 MCG/ACT inhaler inhale 2 puffs by mouth and INTO THE LUNGS once daily 4 g 11     No current facility-administered medications for this visit. No Known Allergies    Review of Systems    Video Exam    There were no vitals filed for this visit.     Physical Exam     12/01/23  Start Time: 0400  Stop Time: 0500  Total Visit Time: 60 minutes

## 2023-12-05 ENCOUNTER — TELEMEDICINE (OUTPATIENT)
Dept: BEHAVIORAL/MENTAL HEALTH CLINIC | Facility: CLINIC | Age: 58
End: 2023-12-05
Payer: COMMERCIAL

## 2023-12-05 DIAGNOSIS — F43.10 PTSD (POST-TRAUMATIC STRESS DISORDER): ICD-10-CM

## 2023-12-05 DIAGNOSIS — Z72.0 TOBACCO ABUSE: ICD-10-CM

## 2023-12-05 DIAGNOSIS — F17.210 CIGARETTE NICOTINE DEPENDENCE WITHOUT COMPLICATION: ICD-10-CM

## 2023-12-05 DIAGNOSIS — F41.1 GENERALIZED ANXIETY DISORDER: ICD-10-CM

## 2023-12-05 DIAGNOSIS — F12.90 MARIJUANA USE: Chronic | ICD-10-CM

## 2023-12-05 DIAGNOSIS — F31.81 MODERATE MIXED BIPOLAR II DISORDER (HCC): Primary | ICD-10-CM

## 2023-12-05 DIAGNOSIS — F10.11 ALCOHOL ABUSE, IN REMISSION: ICD-10-CM

## 2023-12-05 PROCEDURE — 90834 PSYTX W PT 45 MINUTES: CPT | Performed by: COUNSELOR

## 2023-12-05 NOTE — PSYCH
Behavioral Health Psychotherapy Progress Note    Psychotherapy Provided: Individual Psychotherapy     1. Moderate mixed bipolar II disorder (HCC)        2. Generalized anxiety disorder        3. PTSD (post-traumatic stress disorder)        4. Tobacco abuse        5. Marijuana use        6. Cigarette nicotine dependence without complication        7. Alcohol abuse, in remission            Goals addressed in session: Goal 1 and Goal 2     DATA: Anai Adams talked about her recent Chiari malformation type 1 diagnosis and shared her worries about the recommended surgical procedure. She shared a lot of rumination about the news and was encouraged to counter her negative self-talk and focus on other topics and activities to improve mindset and inner talk. Anai Adams brought up her memory issues and that she would discuss it with her providers. During this session, this clinician used the following therapeutic modalities: Client-centered Therapy, Cognitive Behavioral Therapy, Dialectical Behavior Therapy, Mindfulness-based Strategies, and Supportive Psychotherapy    Substance Abuse was not addressed during this session. If the client is diagnosed with a co-occurring substance use disorder, please indicate any changes in the frequency or amount of use: NA. Stage of change for addressing substance use diagnoses: Maintenance    ASSESSMENT:  Troy Hadley presents with a Euthymic/ normal, Anxious, and Depressed mood. her affect is Normal range and intensity and Tearful, at times, which is congruent, with her mood and the content of the session. The client has made progress on their goals. Anai Adams has built good knowledge and awareness about her symptoms and tries hardly to implement her learned Mindfulness skills to deal with intense emotions in healthier ways. Troy Hadley presents with a minimal risk of suicide, low risk of self-harm, and none risk of harm to others.     For any risk assessment that surpasses a "low" rating, a safety plan must be developed. A safety plan was indicated: no  If yes, describe in detail NA    PLAN: Between sessions, Yolanda Su will attend her weekly individual and weekly group sessions. At the next session, the therapist will use Client-centered Therapy, Cognitive Behavioral Therapy, Dialectical Behavior Therapy, Mindfulness-based Strategies, and Supportive Psychotherapy to address self-care and dealing with intense emotions. Behavioral Health Treatment Plan and Discharge Planning: Yolanda Su is aware of and agrees to continue to work on their treatment plan. They have identified and are working toward their discharge goals.  yes    Visit start and stop times:    12/05/23  Start Time: 0157  Stop Time: 4483  Total Visit Time: 46 minutes

## 2023-12-05 NOTE — PSYCH
Virtual Regular Visit    Verification of patient location:    Patient is located at Home in the following state in which I hold an active license [place]      Assessment/Plan:    Problem List Items Addressed This Visit          Other    Marijuana use (Chronic)    Moderate mixed bipolar II disorder (HCC) - Primary    Generalized anxiety disorder    PTSD (post-traumatic stress disorder)    Alcohol abuse, in remission    Cigarette nicotine dependence without complication    Tobacco abuse       Goals addressed in session: Goal 1 and Goal 2          Reason for visit is No chief complaint on file. Encounter provider Mel Simeon [facility]    Provider located at [address] [place] [address]  [address] [place] [address] [address] [address] [phone]      Recent Visits  Date Type Provider Dept   12/01/23 [facility], [address]   11/28/23 [facility], [address] [facility] Therapist [facility]   Showing recent visits within past 7 days and meeting all other requirements  Future Appointments  No visits were found meeting these conditions. Showing future appointments within next 150 days and meeting all other requirements       The patient was identified by name and date of birth. Rachel Santana was informed that this is a telemedicine visit and that the visit is being conducted throughthe [employer] platform. She agrees to proceed. .  My office door was closed. No one else was in the room. She acknowledged consent and understanding of privacy and security of the video platform. The patient has agreed to participate and understands they can discontinue the visit at any time. Patient is aware this is a billable service. Subjective  Rachel Santana is a 66 y.o. female  .       HPI     Past Medical History:   Diagnosis Date    Bilateral carpal tunnel syndrome 08/20/2019    Carpal tunnel syndrome of right wrist 10/30/2019    Added automatically from request for surgery [id number]    Chlamydia     COPD (chronic obstructive pulmonary disease) ([address])     Depression     Hypertension     Varicella        Past Surgical History:   Procedure Laterality Date    BREAST BIOPSY Left 20yrs ago benign    EGD      OOPHORECTOMY Right     [place] NDSC WRST SURG W/RLS TRANSVRS CARPL LIGM Right 01/21/2020    Procedure: RELEASE CARPAL TUNNEL ENDOSCOPIC;  Surgeon: Clau Vargas MD;  [facility];  Service: Orthopedics    [place] [id number] WRST SURG W/RLS TRANSVRS CARPL LIGM Left 01/28/2020    Procedure: RELEASE CARPAL TUNNEL ENDOSCOPIC;  Surgeon: Clau Vargas MD;  [facility];  Service: Orthopedics       Current Outpatient Medications   Medication Sig Dispense Refill    albuterol (PROVENTIL HFA,VENTOLIN HFA) 90 mcg/act inhaler inhale 2 puffs by mouth and INTO THE LUNGS every 6 hours IF NEEDED FOR WHEEZING.  8.5 g 2    buPROPion (Wellbutrin XL) 150 mg 24 hr tablet Take 1 tablet (150 mg total) by mouth daily 30 tablet 1    cholecalciferol (VITAMIN D3) 400 units tablet Take 400 Units by mouth daily      FLUoxetine (PROzac) 20 mg capsule Take 1 capsule (20 mg total) by mouth daily 30 capsule 1    fluticasone (FLONASE) 50 mcg/act nasal spray instill 1 spray into each nostril once daily 16 g 2    guaiFENesin (MUCINEX) 600 mg 12 hr tablet Take 2 tablets (1,200 mg total) by mouth every 12 (twelve) hours 60 tablet 3    ibuprofen (MOTRIN) 200 mg tablet Take 200 mg by mouth every 6 (six) hours as needed for mild pain      ipratropium-albuterol (DUO-NEB) 0.5-2.5 mg/3 mL nebulizer solution Take 3 mL by nebulization 4 (four) times a day 360 mL 3    lamoTRIgine (LaMICtal) 200 MG tablet Take 1 tablet (200 mg total) by mouth daily 30 tablet 1    levocetirizine (XYZAL) 5 MG tablet take 1 tablet by mouth every evening 30 tablet 4    nicotine (NICODERM CQ) 21 mg/24 hr TD 24 hr patch Place 1 patch on the skin over 24 hours every 24 hours 28 patch 0    nicotine polacrilex (COMMIT) 4 MG lozenge Apply 1 lozenge (4 mg total) to the mouth or throat as needed for smoking cessation 100 each 0    omeprazole (PriLOSEC) 20 mg delayed release capsule take 1 capsule by mouth twice a day 60 capsule 5    senna-docusate sodium (SENOKOT-S) 8.6-50 mg per tablet Take 3 tablets by mouth as needed        Stiolto Respimat 2.5-2.5 MCG/ACT inhaler inhale 2 puffs by mouth and INTO THE LUNGS once daily 4 g 11     No current facility-administered medications for this visit. No Known Allergies    Review of Systems    Video Exam    There were no vitals filed for this visit.     Physical Exam

## 2023-12-08 ENCOUNTER — TELEMEDICINE (OUTPATIENT)
Dept: BEHAVIORAL/MENTAL HEALTH CLINIC | Facility: CLINIC | Age: 58
End: 2023-12-08
Payer: COMMERCIAL

## 2023-12-08 DIAGNOSIS — F43.10 PTSD (POST-TRAUMATIC STRESS DISORDER): ICD-10-CM

## 2023-12-08 DIAGNOSIS — F31.81 MODERATE MIXED BIPOLAR II DISORDER (HCC): Primary | ICD-10-CM

## 2023-12-08 DIAGNOSIS — F41.1 GENERALIZED ANXIETY DISORDER: ICD-10-CM

## 2023-12-08 DIAGNOSIS — F12.90 MARIJUANA USE: Chronic | ICD-10-CM

## 2023-12-08 DIAGNOSIS — Z72.0 TOBACCO ABUSE: ICD-10-CM

## 2023-12-08 DIAGNOSIS — F17.210 CIGARETTE NICOTINE DEPENDENCE WITHOUT COMPLICATION: ICD-10-CM

## 2023-12-08 DIAGNOSIS — F10.11 ALCOHOL ABUSE, IN REMISSION: ICD-10-CM

## 2023-12-08 PROCEDURE — 90853 GROUP PSYCHOTHERAPY: CPT | Performed by: COUNSELOR

## 2023-12-08 NOTE — PSYCH
Virtual Regular Visit    Verification of patient location:    Patient is located at Home in the following state in which I hold an active license [place]      Assessment/Plan:    Problem List Items Addressed This Visit          Other    Marijuana use (Chronic)    Moderate mixed bipolar II disorder (HCC) - Primary    Generalized anxiety disorder    PTSD (post-traumatic stress disorder)    Alcohol abuse, in remission    Cigarette nicotine dependence without complication    Tobacco abuse       Goals addressed in session: Goal 1 and Goal 2          Reason for visit is No chief complaint on file. Encounter provider FARHAN Gregory [facility]    Provider located at [address] [place] [address] [phone]      Recent Visits  Date Type Provider Dept   12/05/23 [facility], [address]   12/01/23 Zoey, [address] [place] [address] [facility] Therapist LADARIUS   Showing recent visits within past 7 days and meeting all other requirements  Future Appointments  No visits were found meeting these conditions. Showing future appointments within next 150 days and meeting all other requirements       The patient was identified by name and date of birth. Alec Paris was informed that this is a telemedicine visit and that the visit is being conducted [employer]. She agrees to proceed. .  My office door was closed. The patient was notified the following individuals were present in the room other virtual group attendees. She acknowledged consent and understanding of privacy and security of the video platform. The patient has agreed to participate and understands they can discontinue the visit at any time. Patient is aware this is a billable service. Subjective  Alec Paris is a 66 y.o. female  .     Data: Chapin Valle attended the Relaxation group and was guided through EFT tapping on anxiety, diaphragmatic breathing, other Vagus nerve stimulation techniques, and progressive muscle relaxation. Furthermore, attendees were guided through mindfulness exercises  for a seated body scan transitioning to attendance to difficult emotions and feelings of resentment and anger, imagery for giving self-love, accessing one's sources of strength and confidence, and with affirmations against depression and anxiety. Psychoeducation, EFT, Mindfulness-based, and Somatic approaches were used to achieve the deep state of relaxation and to maintain the serene environment for the exercise's purpose. Assessment: Prince Knowles followed through guidance and completed the exercises, showed willingness to continue learning and practicing self-care in weekly Relaxation Group as well as individual therapy. Plan: Prince Knowles will practice deep breathing and other techniques based on each occasion to counter stress and will attend the next group to be able to take part in further guided exercises on chosen topics to build on the techniques learned in the beginning.     HPI     Past Medical History:   Diagnosis Date    Bilateral carpal tunnel syndrome 08/20/2019    Carpal tunnel syndrome of right wrist 10/30/2019    Added automatically from request for surgery [id number]    Chlamydia     COPD (chronic obstructive pulmonary disease) ([facility])     Depression     Hypertension     Varicella        Past Surgical History:   Procedure Laterality Date    BREAST BIOPSY Left 20yrs ago benign    EGD      OOPHORECTOMY Right     [place] NDSC WRST SURG W/RLS TRANSVRS CARPL LIGM Right 01/21/2020    Procedure: RELEASE CARPAL TUNNEL ENDOSCOPIC;  Surgeon: Lisseth Ramirez MD;  [facility];  Service: Orthopedics    [place] [id number] [facility] WRST SURG W/RLS TRANSVRS CARPL LIGM Left 01/28/2020    Procedure: RELEASE CARPAL TUNNEL ENDOSCOPIC;  Surgeon: Lisseth Ramirez MD;  Location: [facility];  Service: Orthopedics Current Outpatient Medications   Medication Sig Dispense Refill    albuterol (PROVENTIL HFA,VENTOLIN HFA) 90 mcg/act inhaler inhale 2 puffs by mouth and INTO THE LUNGS every 6 hours IF NEEDED FOR WHEEZING. 8.5 g 2    buPROPion (Wellbutrin XL) 150 mg 24 hr tablet Take 1 tablet (150 mg total) by mouth daily 30 tablet 1    cholecalciferol (VITAMIN D3) 400 units tablet Take 400 Units by mouth daily      FLUoxetine (PROzac) 20 mg capsule Take 1 capsule (20 mg total) by mouth daily 30 capsule 1    fluticasone (FLONASE) 50 mcg/act nasal spray instill 1 spray into each nostril once daily 16 g 2    guaiFENesin (MUCINEX) 600 mg 12 hr tablet Take 2 tablets (1,200 mg total) by mouth every 12 (twelve) hours 60 tablet 3    ibuprofen (MOTRIN) 200 mg tablet Take 200 mg by mouth every 6 (six) hours as needed for mild pain      ipratropium-albuterol (DUO-NEB) 0.5-2.5 mg/3 mL nebulizer solution Take 3 mL by nebulization 4 (four) times a day 360 mL 3    lamoTRIgine (LaMICtal) 200 MG tablet Take 1 tablet (200 mg total) by mouth daily 30 tablet 1    levocetirizine (XYZAL) 5 MG tablet take 1 tablet by mouth every evening 30 tablet 4    nicotine (NICODERM CQ) 21 mg/24 hr TD 24 hr patch Place 1 patch on the skin over 24 hours every 24 hours 28 patch 0    nicotine polacrilex (COMMIT) 4 MG lozenge Apply 1 lozenge (4 mg total) to the mouth or throat as needed for smoking cessation 100 each 0    omeprazole (PriLOSEC) 20 mg delayed release capsule take 1 capsule by mouth twice a day 60 capsule 5    senna-docusate sodium (SENOKOT-S) 8.6-50 mg per tablet Take 3 tablets by mouth as needed        Stiolto Respimat 2.5-2.5 MCG/ACT inhaler inhale 2 puffs by mouth and INTO THE LUNGS once daily 4 g 11     No current facility-administered medications for this visit. No Known Allergies    Review of Systems    Video Exam    There were no vitals filed for this visit.     Physical Exam       12/08/23  Start Time: 0405  Stop Time: 0500  Total Visit Time: 55 minutes

## 2023-12-11 ENCOUNTER — TELEPHONE (OUTPATIENT)
Dept: PSYCHIATRY | Facility: CLINIC | Age: 58
End: 2023-12-11

## 2023-12-11 NOTE — TELEPHONE ENCOUNTER
Aleclaurel Paris  requested a call back to discuss her medication. .    They can be reached at P# [phone]. Thank you.

## 2023-12-11 NOTE — TELEPHONE ENCOUNTER
Spoke with Rashmi Deutsch. She reports she has not taken any of her medications for 5 days. She said she has been forgetting or falling asleep. She is aware not to re-start at current doses. She would like Dr Mannie Clark to provide how she should re-start all her psychiatric medications.          Please advise

## 2023-12-11 NOTE — TELEPHONE ENCOUNTER
Called patient to discuss concerns with not taking medications. She notes a lot has been going on and has been forgetting to take her medications for roughly 6 days now. Given concerns for side effects with her Lamictal she was recommended not to be taking her medications at this time. Scheduled patient for this week to address how to restart medications that were previously effective but in a safe manner. Notified patient if there are any concerns prior to visit to call the office and if there was immediate concerns to call [phone] in an emergency.

## 2023-12-12 ENCOUNTER — TELEMEDICINE (OUTPATIENT)
Dept: BEHAVIORAL/MENTAL HEALTH CLINIC | Facility: CLINIC | Age: 58
End: 2023-12-12
Payer: COMMERCIAL

## 2023-12-12 DIAGNOSIS — F43.10 PTSD (POST-TRAUMATIC STRESS DISORDER): ICD-10-CM

## 2023-12-12 DIAGNOSIS — F31.81 MODERATE MIXED BIPOLAR II DISORDER (HCC): Primary | ICD-10-CM

## 2023-12-12 DIAGNOSIS — F41.1 GENERALIZED ANXIETY DISORDER: ICD-10-CM

## 2023-12-12 DIAGNOSIS — Z72.0 TOBACCO ABUSE: ICD-10-CM

## 2023-12-12 DIAGNOSIS — F12.90 MARIJUANA USE: Chronic | ICD-10-CM

## 2023-12-12 DIAGNOSIS — F10.11 ALCOHOL ABUSE, IN REMISSION: ICD-10-CM

## 2023-12-12 PROCEDURE — 90834 PSYTX W PT 45 MINUTES: CPT | Performed by: COUNSELOR

## 2023-12-12 NOTE — PSYCH
Behavioral Health Psychotherapy Progress Note    Psychotherapy Provided: Individual Psychotherapy     1. Moderate mixed bipolar II disorder (HCC)        2. Generalized anxiety disorder        3. PTSD (post-traumatic stress disorder)        4. Tobacco abuse        5. Marijuana use        6. Alcohol abuse, in remission            Goals addressed in session: Goal 1 and Goal 2     DATA: Tana Gutierrez reported deciding to have the posterior fossa decompression surgery for her Type I Chiari malformation. She was allowed to express her concerns about her increased rumination about the surgery, about her daughter, and about her past. Tana Gutierrez was encouraged to talk about her progress, strengths, and coping skills to make a difference how far she has come in learning how to deal with her challenges. Tana Gutierrez addressed some technical difficulties when trying to attend her group and was informed that all attendees who experiences difficulties will be provided with assistance before the group. During this session, this clinician used the following therapeutic modalities: Client-centered Therapy, Cognitive Behavioral Therapy, Dialectical Behavior Therapy, Mindfulness-based Strategies, and Supportive Psychotherapy    Substance Abuse was not addressed during this session. If the client is diagnosed with a co-occurring substance use disorder, please indicate any changes in the frequency or amount of use: NA. Stage of change for addressing substance use diagnoses: Pre-contemplation    ASSESSMENT:  Yolanda Su presents with a Euthymic/ normal, Anxious, and Depressed mood. her affect is Normal range and intensity, Constricted, and Tearful, which is congruent, with her mood and the content of the session. The client has made progress on their goals. Tana Gutierrez seemed overly stressed due to the surgery that she is encouraged to have to relieve some of her most debilitating symptoms.  Yolanda Su presents with a low risk of suicide, low risk of self-harm, and none risk of harm to others. For any risk assessment that surpasses a "low" rating, a safety plan must be developed. A safety plan was indicated: no  If yes, describe in detail NA    PLAN: Between sessions, Elmer Avila will use her learned DBT and Mindfulness skills to be able to attend to her intense emotions in healthier ways and to process her trauma to move on. At the next session, the therapist will use Client-centered Therapy, Cognitive Behavioral Therapy, Dialectical Behavior Therapy, Mindfulness-based Strategies, and Supportive Psychotherapy to address adequate self-care and medication management affecting GI health. Behavioral Health Treatment Plan and Discharge Planning: Elmer Avila is aware of and agrees to continue to work on their treatment plan. They have identified and are working toward their discharge goals.  yes    Visit start and stop times:    12/12/23  Start Time: 0155  Stop Time: 0234  Total Visit Time: 39 minutes

## 2023-12-12 NOTE — PSYCH
Virtual Regular Visit    Verification of patient location:    Patient is located at Home in the following state in which I hold an active license [place]      Assessment/Plan:    Problem List Items Addressed This Visit       Moderate mixed bipolar II disorder (HCC) - Primary    Generalized anxiety disorder    PTSD (post-traumatic stress disorder)    Alcohol abuse, in remission    Marijuana use (Chronic)    Tobacco abuse       Goals addressed in session: Goal 1 and Goal 2          Reason for visit is No chief complaint on file. Encounter provider Olivia Ramirez [facility]    Provider located at [address] [place], [place] [id number] [phone]      Recent Visits  Date Type Provider Dept   12/08/23 [facility], [address] [place] [address]   [facility] [address] [place] [address] [facility]   Showing recent visits within past 7 days and meeting all other requirements  Today's Visits  Date Type Provider Dept   [facility]12/[facility] [address] [place] [address]   Showing today's visits and meeting all other requirements  Future Appointments  No visits were found meeting these conditions. Showing future appointments within next 150 days and meeting all other requirements       The patient was identified by name and date of birth. Henrik Presley was informed that this is a telemedicine visit and that the visit is being conducted throughthe [employer] platform. She agrees to proceed. .  My office door was closed. No one else was in the room. She acknowledged consent and understanding of privacy and security of the video platform. The patient has agreed to participate and understands they can discontinue the visit at any time. Patient is aware this is a billable service.      Subjective  Henrik Presley is a 66 y.o. female Gaby LO     Past Medical History:   Diagnosis Date    Bilateral carpal tunnel syndrome 08/20/2019    Carpal tunnel syndrome of right wrist 10/30/2019    Added automatically from request for surgery [id number]    Chlamydia     COPD (chronic obstructive pulmonary disease) ([address])     Depression     Hypertension     Varicella        Past Surgical History:   Procedure Laterality Date    BREAST BIOPSY Left 20yrs ago benign    EGD      OOPHORECTOMY Right     [place] [facility] WRST SURG W/RLS TRANSVRS CARPL LIGM Right 01/21/2020    Procedure: RELEASE CARPAL TUNNEL ENDOSCOPIC;  Surgeon: Almaz Madden MD;  Location: [facility];  Service: Orthopedics    [place] [id number] WRST SURG W/RLS TRANSVRS CARPL LIGM Left 01/28/2020    Procedure: RELEASE CARPAL TUNNEL ENDOSCOPIC;  Surgeon: Almaz Madden MD;  Location: [facility];  Service: Orthopedics       Current Outpatient Medications   Medication Sig Dispense Refill    albuterol (PROVENTIL HFA,VENTOLIN HFA) 90 mcg/act inhaler inhale 2 puffs by mouth and INTO THE LUNGS every 6 hours IF NEEDED FOR WHEEZING.  8.5 g 2    buPROPion (Wellbutrin XL) 150 mg 24 hr tablet Take 1 tablet (150 mg total) by mouth daily 30 tablet 1    cholecalciferol (VITAMIN D3) 400 units tablet Take 400 Units by mouth daily      FLUoxetine (PROzac) 20 mg capsule Take 1 capsule (20 mg total) by mouth daily 30 capsule 1    fluticasone (FLONASE) 50 mcg/act nasal spray instill 1 spray into each nostril once daily 16 g 2    guaiFENesin (MUCINEX) 600 mg 12 hr tablet Take 2 tablets (1,200 mg total) by mouth every 12 (twelve) hours 60 tablet 3    ibuprofen (MOTRIN) 200 mg tablet Take 200 mg by mouth every 6 (six) hours as needed for mild pain      ipratropium-albuterol (DUO-NEB) 0.5-2.5 mg/3 mL nebulizer solution Take 3 mL by nebulization 4 (four) times a day 360 mL 3    lamoTRIgine (LaMICtal) 200 MG tablet Take 1 tablet (200 mg total) by mouth daily 30 tablet 1    levocetirizine (XYZAL) 5 MG tablet take 1 tablet by mouth every evening 30 tablet 4    nicotine (NICODERM CQ) 21 mg/24 hr TD 24 hr patch Place 1 patch on the skin over 24 hours every 24 hours 28 patch 0    nicotine polacrilex (COMMIT) 4 MG lozenge Apply 1 lozenge (4 mg total) to the mouth or throat as needed for smoking cessation 100 each 0    omeprazole (PriLOSEC) 20 mg delayed release capsule take 1 capsule by mouth twice a day 60 capsule 5    senna-docusate sodium (SENOKOT-S) 8.6-50 mg per tablet Take 3 tablets by mouth as needed        Stiolto Respimat 2.5-2.5 MCG/ACT inhaler inhale 2 puffs by mouth and INTO THE LUNGS once daily 4 g 11     No current facility-administered medications for this visit. No Known Allergies    Review of Systems    Video Exam    There were no vitals filed for this visit.     Physical Exam

## 2023-12-14 ENCOUNTER — TELEMEDICINE (OUTPATIENT)
Dept: PSYCHIATRY | Facility: CLINIC | Age: 58
End: 2023-12-14
Payer: COMMERCIAL

## 2023-12-14 DIAGNOSIS — F43.10 PTSD (POST-TRAUMATIC STRESS DISORDER): ICD-10-CM

## 2023-12-14 DIAGNOSIS — F31.32 BIPOLAR AFFECTIVE DISORDER, CURRENTLY DEPRESSED, MODERATE (HCC): ICD-10-CM

## 2023-12-14 PROCEDURE — 99214 OFFICE O/P EST MOD 30 MIN: CPT

## 2023-12-14 RX ORDER — LAMOTRIGINE 25 MG/1
25 TABLET ORAL DAILY
Qty: 21 TABLET | Refills: 0 | Status: SHIPPED | OUTPATIENT
Start: 2023-12-14 | End: 2024-01-04

## 2023-12-14 RX ORDER — LAMOTRIGINE 100 MG/1
100 TABLET ORAL DAILY
Qty: 7 TABLET | Refills: 0 | Status: SHIPPED | OUTPATIENT
Start: 2023-12-14 | End: 2023-12-21
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
## 2023-12-14 NOTE — PATIENT INSTRUCTIONS
[address] [facility] ED  EMERGENCY DEPARTMENT ENCOUNTER      Pt Name: Jacob Teresa  MRN: [id number]  [place] 1980  Date of evaluation: 6/3/2022  Provider: Alvaro Galeano PA-C    CHIEF COMPLAINT       Chief Complaint   Patient presents with    Eye Problem         HISTORY OF PRESENT ILLNESS   (Location/Symptom, Timing/Onset, Context/Setting, Quality, Duration, Modifying Factors, Severity)  Note limiting factors. Jcaob Teresa is a 46 y.o. male who presents to the emergency department with foreign body sensation to the left eye. Patient states that he was grinding metal prior to arrival when a metal shaving hit him in the eye. Patient denies any loss of vision but does state tearing and photophobia. No other areas of injuries. Last tetanus shot was 2 days ago    HPI    Nursing Notes were reviewed. REVIEW OF SYSTEMS    (2-9 systems for level 4, 10 or more for level 5)     Review of Systems   Constitutional: Negative for diaphoresis and fever. HENT: Negative for hearing loss and trouble swallowing. Eyes: Positive for photophobia, pain, discharge and redness. Respiratory: Negative for apnea and shortness of breath. Cardiovascular: Negative for chest pain. Gastrointestinal: Negative for abdominal pain. Endocrine: Negative. Genitourinary: Negative for hematuria. Musculoskeletal: Negative for neck pain and neck stiffness. Skin: Negative for color change. Allergic/Immunologic: Negative. Neurological: Negative for dizziness and numbness. Hematological: Negative. Psychiatric/Behavioral: Negative. All other systems reviewed and are negative. Except as noted above the remainder of the review of systems was reviewed and negative. PAST MEDICAL HISTORY   History reviewed. No pertinent past medical history. SURGICAL HISTORY     History reviewed. No pertinent surgical history.       CURRENT MEDICATIONS       Previous Medications    No medications on file       ALLERGIES Restart Lamictal with the following schedule while monitoring to see if there are any rashes. If you experience or notice a rash call provider immediately to discuss:  Week 1: 25mg/day  Week 2: 50mg/day   Week 3: 100mg/day  Week 4: 200mg/day  Restart Wellbutrin 150mg XL   On week 2  Stop taking Prozac     Please present for your previously scheduled appointment approximately 15 minutes prior to appointment time. If you are running late or are unable to attend your appointment, please call our [facility] office at [phone] or our [facility]) office at [phone] if applicable to notify the office of your absence. If you are in psychological crisis including not limited to suicidal/homicidal thoughts or thoughts of self-injury, do not hesitate to call/contact your [facility] hotline (see below) or attend to the nearest emergency department.   [facility] Crisis: [facility] Crisis: [phone]  [facility] Crisis: [facility] Crisis: [facility] Crisis: [address] Crisis: [address] [facility] Crisis: [phone]  National Suicide Prevention Hotline: 4-588.884.8740 Pcn [penicillins]    FAMILY HISTORY     History reviewed. No pertinent family history. SOCIAL HISTORY       Social History     Socioeconomic History    Marital status: None     Spouse name: None    Number of children: None    Years of education: None    Highest education level: None   Occupational History    None   Tobacco Use    Smoking status: Current Every Day Smoker     Packs/day: 1.00    Smokeless tobacco: Never Used   Vaping Use    Vaping Use: Never used   Substance and Sexual Activity    Alcohol use: Not Currently    Drug use: Not Currently    Sexual activity: None   Other Topics Concern    None   Social History Narrative    None     Social Determinants of Health     Financial Resource Strain:     Difficulty of Paying Living Expenses: Not on file   Food Insecurity:     Worried About Running Out of Food in the Last Year: Not on file    Syed of Food in the Last Year: Not on file   Transportation Needs:     Lack of Transportation (Medical): Not on file    Lack of Transportation (Non-Medical):  Not on file   Physical Activity:     Days of Exercise per Week: Not on file    Minutes of Exercise per Session: Not on file   Stress:     Feeling of Stress : Not on file   Social Connections:     Frequency of Communication with Friends and Family: Not on file    Frequency of Social Gatherings with Friends and Family: Not on file    Attends Zoroastrianism Services: Not on file    Active Member of [address] [place] [address] [employer] or Organizations: Not on file    Attends Club or Organization Meetings: Not on file    Marital Status: Not on file   Intimate Partner Violence:     Fear of Current or Ex-Partner: Not on file    Emotionally Abused: Not on file    Physically Abused: Not on file    Sexually Abused: Not on file   Housing Stability:     Unable to Pay for Housing in the Last Year: Not on file    Number of [place] in the Last Year: Not on file    Unstable Housing in the Last Year: Not on file SCREENINGS        Jo-Ann Coma Scale  Eye Opening: Spontaneous  Best Verbal Response: Oriented  Best Motor Response: Obeys commands  Jo-Ann Coma Scale Score: 15               PHYSICAL EXAM    (up to 7 for level 4, 8 or more for level 5)     ED Triage Vitals [06/03/22 1351]   BP Temp Temp Source Heart Rate Resp SpO2 Height Weight   135/69 97.5 °F (36.4 °C) Temporal 76 20 96 % -- 165 lb (74.8 kg)       Physical Exam  Vitals and nursing note reviewed. Constitutional:       General: He is not in acute distress. Appearance: He is well-developed. He is not diaphoretic. HENT:      Head: Normocephalic and atraumatic. Mouth/Throat:      Pharynx: No oropharyngeal exudate. Eyes:      General: Lids are normal. Lids are everted, no foreign bodies appreciated. Gaze aligned appropriately. No scleral icterus. Left eye: No foreign body. Conjunctiva/sclera:      Left eye: Left conjunctiva is injected. Pupils: Pupils are equal, round, and reactive to light. Left eye: Corneal abrasion and fluorescein uptake present. Neck:      Trachea: No tracheal deviation. Cardiovascular:      Rate and Rhythm: Normal rate. Heart sounds: Normal heart sounds. Pulmonary:      Effort: Pulmonary effort is normal. No respiratory distress. Breath sounds: Normal breath sounds. Abdominal:      General: Bowel sounds are normal. There is no distension. Palpations: Abdomen is soft. Musculoskeletal:         General: Normal range of motion. Cervical back: Normal range of motion and neck supple. Skin:     General: Skin is warm and dry. Findings: No erythema or rash. Neurological:      Mental Status: He is alert and oriented to person, place, and time. Cranial Nerves: No cranial nerve deficit. Motor: No abnormal muscle tone. Psychiatric:         Behavior: Behavior normal.         Thought Content:  Thought content normal.         Judgment: Judgment normal. DIAGNOSTIC RESULTS     EKG: All EKG's are interpreted by the Emergency Department Physician who either signs or Co-signs this chart in the absence of a cardiologist.        RADIOLOGY:   Non-plain film images such as CT, Ultrasound and MRI are read by the radiologist. Plain radiographic images are visualized and preliminarily interpreted by the emergency physician with the below findings:      Interpretation per the Radiologist below, if available at the time of this note:    No orders to display         ED BEDSIDE ULTRASOUND:   Performed by ED Physician - none    LABS:  Labs Reviewed - No data to display    All other labs were within normal range or not returned as of this dictation. EMERGENCY DEPARTMENT COURSE and DIFFERENTIAL DIAGNOSIS/MDM:   Vitals:    Vitals:    06/03/22 1351   BP: 135/69   Pulse: 76   Resp: 20   Temp: 97.5 °F (36.4 °C)   TempSrc: Temporal   SpO2: 96%   Weight: 165 lb (74.8 kg)         MDM      REASSESSMENT        Patient presented the emergency department with left thigh pain following an injury sustained while grinding metal prior to arrival.  Eyes shows large corneal abrasion at 6:00 but no appreciable foreign bodies. Patient will be started on tobramycin ophthalmic drops and referred to ophthalmology, being advised to call them today. Patient will return to the emergency department for any worsening symptoms or vision loss. CONSULTS:  None    PROCEDURES:  Unless otherwise noted below, none     Procedures        FINAL IMPRESSION      1.  Abrasion of left cornea, initial encounter          DISPOSITION/PLAN   DISPOSITION Discharge - Pending Orders Complete 06/03/2022 02:20:52 PM      PATIENT REFERRED TO:  [facility] (A [facility])  [address] [place] [id number]  [phone]    Call today      [facility]) ED  [address] [facility] [address] [phone]    As needed, If symptoms worsen      DISCHARGE MEDICATIONS:  New Prescriptions    HYDROCODONE-ACETAMINOPHEN (NORCO) 5-325 MG PER TABLET    Take 1 tablet by mouth every 8 hours as needed for Pain for up to 3 days. Intended supply: 3 days. Take lowest dose possible to manage pain    IBUPROFEN (IBU) 800 MG TABLET    Take 1 tablet by mouth every 8 hours as needed for Pain     Controlled Substances Monitoring:     No flowsheet data found.     (Please note that portions of this note were completed with a voice recognition program.  Efforts were made to edit the dictations but occasionally words are mis-transcribed.)    Sumi Hernandez PA-C (electronically signed)  Attending Emergency Physician            Sumi Hernandez PA-C  06/03/22 5970

## 2023-12-15 ENCOUNTER — TELEMEDICINE (OUTPATIENT)
Dept: BEHAVIORAL/MENTAL HEALTH CLINIC | Facility: CLINIC | Age: 58
End: 2023-12-15
Payer: COMMERCIAL

## 2023-12-15 ENCOUNTER — TELEPHONE (OUTPATIENT)
Age: 58
End: 2023-12-15

## 2023-12-15 DIAGNOSIS — F41.1 GENERALIZED ANXIETY DISORDER: ICD-10-CM

## 2023-12-15 DIAGNOSIS — F31.81 MODERATE MIXED BIPOLAR II DISORDER (HCC): Primary | ICD-10-CM

## 2023-12-15 DIAGNOSIS — F12.90 MARIJUANA USE: Chronic | ICD-10-CM

## 2023-12-15 DIAGNOSIS — F17.210 CIGARETTE NICOTINE DEPENDENCE WITHOUT COMPLICATION: ICD-10-CM

## 2023-12-15 DIAGNOSIS — F10.11 ALCOHOL ABUSE, IN REMISSION: ICD-10-CM

## 2023-12-15 DIAGNOSIS — F43.10 PTSD (POST-TRAUMATIC STRESS DISORDER): ICD-10-CM

## 2023-12-15 DIAGNOSIS — Z12.11 SCREENING FOR COLON CANCER: Primary | ICD-10-CM

## 2023-12-15 PROCEDURE — 90853 GROUP PSYCHOTHERAPY: CPT | Performed by: COUNSELOR

## 2023-12-15 RX ORDER — POLYETHYLENE GLYCOL 3350 17 G/17G
POWDER, FOR SOLUTION ORAL
Qty: 527 G | Refills: 0 | Status: SHIPPED | OUTPATIENT
Start: 2023-12-15

## 2023-12-15 RX ORDER — BISACODYL 5 MG/1
TABLET, DELAYED RELEASE ORAL
Qty: 2 TABLET | Refills: 0 | Status: SHIPPED | OUTPATIENT
Start: 2023-12-15

## 2023-12-15 NOTE — TELEPHONE ENCOUNTER
Patients GI provider:  Dr. Jacinta Mercado    Number to return call: [phone]    Reason for call: Pt calling to request script be sent [facility] to [facility] on file for Miralax and Dulcolax. Pt states that [facility] advised her to get a script. Please enter and call pt to let her know.     Scheduled procedure/appointment date if applicable: Procedure 25/41/7136

## 2023-12-15 NOTE — PSYCH
Virtual Regular Visit    Verification of patient location:    Patient is located at Home in the following state in which I hold an active license [place]      Assessment/Plan:    Problem List Items Addressed This Visit       Moderate mixed bipolar II disorder (HCC) - Primary    Generalized anxiety disorder    PTSD (post-traumatic stress disorder)    Alcohol abuse, in remission    Marijuana use (Chronic)    Cigarette nicotine dependence without complication       Goals addressed in session: Goal 1 and Goal 2          Reason for visit is No chief complaint on file. Encounter provider [facility]y [facility]    Provider located at [address] [place] [id number] [phone]      Recent Visits  Date Type Provider Dept   12/12/23 [facility], [address]   12/08/23 [facility], [address] [facility] Therapist [facility]   Showing recent visits within past 7 days and meeting all other requirements  Future Appointments  No visits were found meeting these conditions. Showing future appointments within next 150 days and meeting all other requirements       The patient was identified by name and date of birth. Arnold Machado was informed that this is a telemedicine visit and that the visit is being conducted [employer]. She agrees to proceed. .  My office door was closed. The patient was notified the following individuals were present in the room other virtual group members. She acknowledged consent and understanding of privacy and security of the video platform. The patient has agreed to participate and understands they can discontinue the visit at any time. Patient is aware this is a billable service. Delvis Machado is a 66 y.o. female  .     Data: Vicente Miranda attended the Relaxation group and was guided through [facility] tapping on trauma, diaphragmatic breathing, other Vagus nerve stimulation techniques, and progressive muscle relaxation. Furthermore, attendees were guided through mindfulness exercises  for a quick body scan transitioning to attendance to chronic pain or discomfort, imagery for tranquility in the nature, self-esteem meditation, and with affirmations for confidence, self-compassion, and acceptance. Psychoeducation, EFT, Mindfulness-based, and Somatic approaches were used to achieve the deep state of relaxation and to maintain the serene environment for the exercise's purpose. Assessment: Yang Durand followed through guidance and completed the exercises, showed willingness to continue learning and practicing self-care in weekly Relaxation Group as well as individual therapy. Plan: Yang Durand will practice deep breathing and other techniques based on each occasion to counter stress and will attend the next group to be able to take part in further guided exercises on chosen topics to build on the techniques learned in the beginning.     HPI     Past Medical History:   Diagnosis Date    Bilateral carpal tunnel syndrome 08/20/2019    Carpal tunnel syndrome of right wrist 10/30/2019    Added automatically from request for surgery [id number]    Chlamydia     COPD (chronic obstructive pulmonary disease) ([address] [facility])     Depression     Hypertension     Varicella        Past Surgical History:   Procedure Laterality Date    BREAST BIOPSY Left 20yrs ago benign    EGD      OOPHORECTOMY Right     [facility] WRST SURG W/RLS TRANSVRS CARPL LIGM Right 01/21/2020    Procedure: RELEASE CARPAL TUNNEL ENDOSCOPIC;  Surgeon: Ace Phillips MD;  Location: [facility];  Service: Orthopedics    [place] [address] [facility] [address] WRST SURG W/RLS TRANSVRS CARPL LIGM Left 01/28/2020    Procedure: RELEASE CARPAL TUNNEL ENDOSCOPIC;  Surgeon: Ace Phillips MD;  Location: [facility];  Service: Orthopedics       Current Outpatient Medications   Medication Sig Dispense Refill albuterol (PROVENTIL HFA,VENTOLIN HFA) 90 mcg/act inhaler inhale 2 puffs by mouth and INTO THE LUNGS every 6 hours IF NEEDED FOR WHEEZING.  8.5 g 2    bisacodyl (DULCOLAX) 5 mg EC tablet Take as directed by GI office 2 tablet 0    buPROPion (Wellbutrin XL) 150 mg 24 hr tablet Take 1 tablet (150 mg total) by mouth daily 30 tablet 1    cholecalciferol (VITAMIN D3) 400 units tablet Take 400 Units by mouth daily      fluticasone (FLONASE) 50 mcg/act nasal spray instill 1 spray into each nostril once daily 16 g 2    guaiFENesin (MUCINEX) 600 mg 12 hr tablet Take 2 tablets (1,200 mg total) by mouth every 12 (twelve) hours 60 tablet 3    ibuprofen (MOTRIN) 200 mg tablet Take 200 mg by mouth every 6 (six) hours as needed for mild pain      ipratropium-albuterol (DUO-NEB) 0.5-2.5 mg/3 mL nebulizer solution Take 3 mL by nebulization 4 (four) times a day 360 mL 3    lamoTRIgine (LaMICtal) 100 mg tablet Take 1 tablet (100 mg total) by mouth daily for 7 days Week 3 take 100mg daily 7 tablet 0    lamoTRIgine (LaMICtal) 200 MG tablet Take 1 tablet (200 mg total) by mouth daily 30 tablet 1    lamoTRIgine (LaMICtal) 25 mg tablet Take 1 tablet (25 mg total) by mouth daily for 21 doses Take 25mg for week 1 (1 tablet daily) then Take 50mg for week 2 (2 tablets daily) 21 tablet 0    levocetirizine (XYZAL) 5 MG tablet take 1 tablet by mouth every evening 30 tablet 4    nicotine (NICODERM CQ) 21 mg/24 hr TD 24 hr patch Place 1 patch on the skin over 24 hours every 24 hours 28 patch 0    nicotine polacrilex (COMMIT) 4 MG lozenge Apply 1 lozenge (4 mg total) to the mouth or throat as needed for smoking cessation 100 each 0    omeprazole (PriLOSEC) 20 mg delayed release capsule take 1 capsule by mouth twice a day 60 capsule 5    polyethylene glycol (GLYCOLAX) 17 GM/SCOOP powder Take as directed by GI office 527 g 0    senna-docusate sodium (SENOKOT-S) 8.6-50 mg per tablet Take 3 tablets by mouth as needed        Stiolto Respimat 2.5-2.5 MCG/ACT inhaler inhale 2 puffs by mouth and INTO THE LUNGS once daily 4 g 11     No current facility-administered medications for this visit. No Known Allergies    Review of Systems    Video Exam    There were no vitals filed for this visit.     Physical Exam       12/15/23  Start Time: 0400  Stop Time: 0500  Total Visit Time: 60 minutes

## 2023-12-19 ENCOUNTER — TELEMEDICINE (OUTPATIENT)
Dept: BEHAVIORAL/MENTAL HEALTH CLINIC | Facility: CLINIC | Age: 58
End: 2023-12-19
Payer: COMMERCIAL

## 2023-12-19 DIAGNOSIS — F12.90 MARIJUANA USE: Chronic | ICD-10-CM

## 2023-12-19 DIAGNOSIS — F41.1 GENERALIZED ANXIETY DISORDER: ICD-10-CM

## 2023-12-19 DIAGNOSIS — F10.11 ALCOHOL ABUSE, IN REMISSION: ICD-10-CM

## 2023-12-19 DIAGNOSIS — Z72.0 TOBACCO ABUSE: ICD-10-CM

## 2023-12-19 DIAGNOSIS — F31.81 MODERATE MIXED BIPOLAR II DISORDER (HCC): Primary | ICD-10-CM

## 2023-12-19 DIAGNOSIS — F17.210 CIGARETTE NICOTINE DEPENDENCE WITHOUT COMPLICATION: ICD-10-CM

## 2023-12-19 DIAGNOSIS — F43.10 PTSD (POST-TRAUMATIC STRESS DISORDER): ICD-10-CM

## 2023-12-19 PROCEDURE — 90834 PSYTX W PT 45 MINUTES: CPT | Performed by: COUNSELOR

## 2023-12-19 NOTE — PSYCH
"Behavioral Health Psychotherapy Progress Note    Psychotherapy Provided: Individual Psychotherapy     1. Moderate mixed bipolar II disorder (HCC)        2. Generalized anxiety disorder        3. PTSD (post-traumatic stress disorder)        4. Tobacco abuse        5. Marijuana use        6. Cigarette nicotine dependence without complication        7. Alcohol abuse, in remission            Goals addressed in session: Goal 1 and Goal 2     DATA: Marie reported feeling worse during this month's holidays. She was allowed to express herself and was encouraged to use her toolbox and attend to the challenging and intense emotions in healthy ways and to hydrate herself and act as her own best friend and/or advocate. Marie shared her struggle with memory and as encouraged to keep notes for her experiences and be able to refer back.   During this session, this clinician used the following therapeutic modalities: Client-centered Therapy, Cognitive Behavioral Therapy, Dialectical Behavior Therapy, Mindfulness-based Strategies, and Supportive Psychotherapy    Substance Abuse was addressed during this session. If the client is diagnosed with a co-occurring substance use disorder, please indicate any changes in the frequency or amount of use: daily med marijuana. Stage of change for addressing substance use diagnoses: Maintenance    ASSESSMENT:  Marie Portillo presents with a Euthymic/ normal, Anxious, and Depressed mood.     her affect is Normal range and intensity, Constricted, and Tearful, which is congruent, with her mood and the content of the session. The client has made progress on their goals.    Marie sounded very emotional and swinging between moods a lot throughout the session. Marie Portillo presents with a low risk of suicide, low risk of self-harm, and none risk of harm to others.    For any risk assessment that surpasses a \"low\" rating, a safety plan must be developed.    A safety plan was indicated: no  If yes, describe " in detail NA    PLAN: Between sessions, Marie Portillo will use her notes to write about her experiences and be able to refer back to them and avoid frustration with memory problems and will apply her Mindfulness skills to attend to difficult emotions and neutral ways. At the next session, the therapist will use Client-centered Therapy, Cognitive Behavioral Therapy, Dialectical Behavior Therapy, Mindfulness-based Strategies, and Supportive Psychotherapy to address adequate self-care, following up with appointments and handling stress from upcoming brain surgery.    Behavioral Health Treatment Plan and Discharge Planning: Marie Portillo is aware of and agrees to continue to work on their treatment plan. They have identified and are working toward their discharge goals. yes    Visit start and stop times:    12/19/23  Start Time: 0203  Stop Time: 0243  Total Visit Time: 40 minutes

## 2023-12-19 NOTE — PSYCH
Virtual Regular Visit    Verification of patient location:    Patient is located at Home in the following state in which I hold an active license [place]      Assessment/Plan:    Problem List Items Addressed This Visit       Moderate mixed bipolar II disorder (HCC) - Primary    Generalized anxiety disorder    PTSD (post-traumatic stress disorder)    Alcohol abuse, in remission    Marijuana use (Chronic)    Cigarette nicotine dependence without complication    Tobacco abuse       Goals addressed in session: Goal 1 and Goal 2          Reason for visit is No chief complaint on file.       Encounter provider Isabel Watts LPC    Provider located at [facility] [place]  [facility] [place] [id number] [phone]      Recent Visits  Date Type Provider Dept   12/15/23 Telemedicine Isabel Watts LPC [facility] [place]   12/12/23 Telemedicine Isabel Watts LPC [facility] [place]   Showing recent visits within past 7 days and meeting all other requirements  Future Appointments  No visits were found meeting these conditions.  Showing future appointments within next 150 days and meeting all other requirements       The patient was identified by name and date of birth. Marie Portillo was informed that this is a telemedicine visit and that the visit is being conducted throughthe [employer] platform. She agrees to proceed..  My office door was closed. No one else was in the room.  She acknowledged consent and understanding of privacy and security of the video platform. The patient has agreed to participate and understands they can discontinue the visit at any time.    Patient is aware this is a billable service.     Subjective  Marie Portillo is a 58 y.o. female  .      HPI     Past Medical History:   Diagnosis Date    Bilateral carpal tunnel syndrome 08/20/2019    Carpal tunnel syndrome of right  wrist 10/30/2019    Added automatically from request for surgery [id number]    Chlamydia     COPD (chronic obstructive pulmonary disease) (HCC)     Depression     Hypertension     Varicella        Past Surgical History:   Procedure Laterality Date    BREAST BIOPSY Left 20yrs ago benign    EGD      OOPHORECTOMY Right     [facility] WRST SURG W/RLS TRANSVRS CARPL LIGM Right 01/21/2020    Procedure: RELEASE CARPAL TUNNEL ENDOSCOPIC;  Surgeon: Sam Nguyen MD;  [facility];  Service: Orthopedics    [facility] WRST SURG W/RLS TRANSVRS CARPL LIGM Left 01/28/2020    Procedure: RELEASE CARPAL TUNNEL ENDOSCOPIC;  Surgeon: Sam Nguyen MD;  [facility];  Service: Orthopedics       Current Outpatient Medications   Medication Sig Dispense Refill    albuterol (PROVENTIL HFA,VENTOLIN HFA) 90 mcg/act inhaler inhale 2 puffs by mouth and INTO THE LUNGS every 6 hours IF NEEDED FOR WHEEZING. 8.5 g 2    bisacodyl (DULCOLAX) 5 mg EC tablet Take as directed by GI office 2 tablet 0    buPROPion (Wellbutrin XL) 150 mg 24 hr tablet Take 1 tablet (150 mg total) by mouth daily 30 tablet 1    cholecalciferol (VITAMIN D3) 400 units tablet Take 400 Units by mouth daily      fluticasone (FLONASE) 50 mcg/act nasal spray instill 1 spray into each nostril once daily 16 g 2    guaiFENesin (MUCINEX) 600 mg 12 hr tablet Take 2 tablets (1,200 mg total) by mouth every 12 (twelve) hours 60 tablet 3    ibuprofen (MOTRIN) 200 mg tablet Take 200 mg by mouth every 6 (six) hours as needed for mild pain      ipratropium-albuterol (DUO-NEB) 0.5-2.5 mg/3 mL nebulizer solution Take 3 mL by nebulization 4 (four) times a day 360 mL 3    lamoTRIgine (LaMICtal) 100 mg tablet Take 1 tablet (100 mg total) by mouth daily for 7 days Week 3 take 100mg daily 7 tablet 0    lamoTRIgine (LaMICtal) 200 MG tablet Take 1 tablet (200 mg total) by mouth daily 30 tablet 1    lamoTRIgine (LaMICtal) 25 mg tablet Take 1 tablet (25 mg total) by mouth daily for 21  doses Take 25mg for week 1 (1 tablet daily) then Take 50mg for week 2 (2 tablets daily) 21 tablet 0    levocetirizine (XYZAL) 5 MG tablet take 1 tablet by mouth every evening 30 tablet 4    nicotine (NICODERM CQ) 21 mg/24 hr TD 24 hr patch Place 1 patch on the skin over 24 hours every 24 hours 28 patch 0    nicotine polacrilex (COMMIT) 4 MG lozenge Apply 1 lozenge (4 mg total) to the mouth or throat as needed for smoking cessation 100 each 0    omeprazole (PriLOSEC) 20 mg delayed release capsule take 1 capsule by mouth twice a day 60 capsule 5    polyethylene glycol (GLYCOLAX) 17 GM/SCOOP powder Take as directed by GI office 527 g 0    senna-docusate sodium (SENOKOT-S) 8.6-50 mg per tablet Take 3 tablets by mouth as needed        Stiolto Respimat 2.5-2.5 MCG/ACT inhaler inhale 2 puffs by mouth and INTO THE LUNGS once daily 4 g 11     No current facility-administered medications for this visit.        No Known Allergies    Review of Systems    Video Exam    There were no vitals filed for this visit.    Physical Exam

## 2023-12-22 ENCOUNTER — TELEMEDICINE (OUTPATIENT)
Dept: BEHAVIORAL/MENTAL HEALTH CLINIC | Facility: CLINIC | Age: 58
End: 2023-12-22
Payer: COMMERCIAL

## 2023-12-22 DIAGNOSIS — F43.10 PTSD (POST-TRAUMATIC STRESS DISORDER): ICD-10-CM

## 2023-12-22 DIAGNOSIS — F17.210 CIGARETTE NICOTINE DEPENDENCE WITHOUT COMPLICATION: ICD-10-CM

## 2023-12-22 DIAGNOSIS — F31.81 MODERATE MIXED BIPOLAR II DISORDER (HCC): Primary | ICD-10-CM

## 2023-12-22 DIAGNOSIS — F41.1 GENERALIZED ANXIETY DISORDER: ICD-10-CM

## 2023-12-22 DIAGNOSIS — F10.11 ALCOHOL ABUSE, IN REMISSION: ICD-10-CM

## 2023-12-22 DIAGNOSIS — F12.90 MARIJUANA USE: Chronic | ICD-10-CM

## 2023-12-22 DIAGNOSIS — Z72.0 TOBACCO ABUSE: ICD-10-CM

## 2023-12-22 PROCEDURE — 90853 GROUP PSYCHOTHERAPY: CPT | Performed by: COUNSELOR

## 2023-12-22 NOTE — PSYCH
Virtual Regular Visit    Verification of patient location:    Patient is located at Home in the following state in which I hold an active license [place]      Assessment/Plan:    Problem List Items Addressed This Visit       Moderate mixed bipolar II disorder (HCC) - Primary    Generalized anxiety disorder    PTSD (post-traumatic stress disorder)    Alcohol abuse, in remission    Marijuana use (Chronic)    Cigarette nicotine dependence without complication    Tobacco abuse       Goals addressed in session: Goal 1 and Goal 2          Reason for visit is No chief complaint on file.       Encounter provider Isabel Watts LPC    Provider located at [facility] [place]  [facility] [place]  [address]  [place] [id number] [phone]      Recent Visits  Date Type Provider Dept   12/19/23 Telemedicine Isabel Watts LPC [facility] [place]   12/15/23 Telemedicine Isabel Watts LPC [facility] [place]   Showing recent visits within past 7 days and meeting all other requirements  Future Appointments  No visits were found meeting these conditions.  Showing future appointments within next 150 days and meeting all other requirements       The patient was identified by name and date of birth. Marie Portillo was informed that this is a telemedicine visit and that the visit is being conducted throughthe [employer] platform. She agrees to proceed..  My office door was closed. The patient was notified the following individuals were present in the room other group members.  She acknowledged consent and understanding of privacy and security of the video platform. The patient has agreed to participate and understands they can discontinue the visit at any time.    Patient is aware this is a billable service.     Subjective  Marie Portillo is a 58 y.o. female  .    Data: Marie attended the Relaxation group and was guided  through EFT tapping on trauma, diaphragmatic breathing, other Vagus nerve stimulation techniques, and progressive muscle relaxation. Furthermore, attendees were guided through mindfulness exercises  for a quick body scan transitioning to imagery for seven chakra meditation, dealing with feelings of anger and resentment, dissolving judgment with gratitude and validation, and with affirmations for self-worth, peace, letting go of urges to control, and for inner harmony. Psychoeducation, EFT, Mindfulness-based, and Somatic approaches were used to achieve the deep state of relaxation and to maintain the serene environment for the exercise's purpose.      Assessment: Marie followed through guidance and completed the exercises, showed willingness to continue learning and practicing self-care in weekly Relaxation Group as well as individual therapy.      Plan: Marie will practice deep breathing and other techniques based on each occasion to counter stress and will attend the next group to be able to take part in further guided exercises on chosen topics to build on the techniques learned in the beginning.       HPI     Past Medical History:   Diagnosis Date    Bilateral carpal tunnel syndrome 08/20/2019    Carpal tunnel syndrome of right wrist 10/30/2019    Added automatically from request for surgery [id number]    Chlamydia     COPD (chronic obstructive pulmonary disease) (HCC)     Depression     Hypertension     Varicella        Past Surgical History:   Procedure Laterality Date    BREAST BIOPSY Left 20yrs ago benign    EGD      OOPHORECTOMY Right     [facility] W/RLS TRANSVRS CARPL LIGM Right 01/21/2020    Procedure: RELEASE CARPAL TUNNEL ENDOSCOPIC;  Surgeon: Sam Nguyen MD;  [facility];  Service: Orthopedics    [facility]G W/RLS TRANSVRS CARPL LIGM Left 01/28/2020    Procedure: RELEASE CARPAL TUNNEL ENDOSCOPIC;  Surgeon: Sam Nguyen MD;  [facility];  Service: Orthopedics        Current Outpatient Medications   Medication Sig Dispense Refill    albuterol (PROVENTIL HFA,VENTOLIN HFA) 90 mcg/act inhaler inhale 2 puffs by mouth and INTO THE LUNGS every 6 hours IF NEEDED FOR WHEEZING. 8.5 g 2    bisacodyl (DULCOLAX) 5 mg EC tablet Take as directed by GI office 2 tablet 0    buPROPion (Wellbutrin XL) 150 mg 24 hr tablet Take 1 tablet (150 mg total) by mouth daily 30 tablet 1    cholecalciferol (VITAMIN D3) 400 units tablet Take 400 Units by mouth daily      fluticasone (FLONASE) 50 mcg/act nasal spray instill 1 spray into each nostril once daily 16 g 2    guaiFENesin (MUCINEX) 600 mg 12 hr tablet Take 2 tablets (1,200 mg total) by mouth every 12 (twelve) hours 60 tablet 3    ibuprofen (MOTRIN) 200 mg tablet Take 200 mg by mouth every 6 (six) hours as needed for mild pain      ipratropium-albuterol (DUO-NEB) 0.5-2.5 mg/3 mL nebulizer solution Take 3 mL by nebulization 4 (four) times a day 360 mL 3    lamoTRIgine (LaMICtal) 100 mg tablet Take 1 tablet (100 mg total) by mouth daily for 7 days Week 3 take 100mg daily 7 tablet 0    lamoTRIgine (LaMICtal) 200 MG tablet Take 1 tablet (200 mg total) by mouth daily 30 tablet 1    lamoTRIgine (LaMICtal) 25 mg tablet Take 1 tablet (25 mg total) by mouth daily for 21 doses Take 25mg for week 1 (1 tablet daily) then Take 50mg for week 2 (2 tablets daily) 21 tablet 0    levocetirizine (XYZAL) 5 MG tablet take 1 tablet by mouth every evening 30 tablet 4    nicotine (NICODERM CQ) 21 mg/24 hr TD 24 hr patch Place 1 patch on the skin over 24 hours every 24 hours 28 patch 0    nicotine polacrilex (COMMIT) 4 MG lozenge Apply 1 lozenge (4 mg total) to the mouth or throat as needed for smoking cessation 100 each 0    omeprazole (PriLOSEC) 20 mg delayed release capsule take 1 capsule by mouth twice a day 60 capsule 5    polyethylene glycol (GLYCOLAX) 17 GM/SCOOP powder Take as directed by GI office 527 g 0    senna-docusate sodium (SENOKOT-S) 8.6-50 mg per  tablet Take 3 tablets by mouth as needed        Stiolto Respimat 2.5-2.5 MCG/ACT inhaler inhale 2 puffs by mouth and INTO THE LUNGS once daily 4 g 11     No current facility-administered medications for this visit.        No Known Allergies    Review of Systems    Video Exam    There were no vitals filed for this visit.    Physical Exam     12/22/23  Start Time: 0400  Stop Time: 0500  Total Visit Time: 60 minutes

## 2023-12-28 ENCOUNTER — TELEPHONE (OUTPATIENT)
Dept: PSYCHIATRY | Facility: CLINIC | Age: 58
End: 2023-12-28

## 2023-12-28 NOTE — TELEPHONE ENCOUNTER
Spoke with male that answered the phone. He will pass message onto patient regarding signing the document.

## 2024-01-01 DIAGNOSIS — F31.32 BIPOLAR AFFECTIVE DISORDER, CURRENTLY DEPRESSED, MODERATE (HCC): ICD-10-CM

## 2024-01-01 DIAGNOSIS — F43.10 PTSD (POST-TRAUMATIC STRESS DISORDER): ICD-10-CM

## 2024-01-02 ENCOUNTER — CONSULT (OUTPATIENT)
Dept: FAMILY MEDICINE CLINIC | Facility: CLINIC | Age: 59
End: 2024-01-02
Payer: COMMERCIAL

## 2024-01-02 ENCOUNTER — TELEPHONE (OUTPATIENT)
Dept: FAMILY MEDICINE CLINIC | Facility: CLINIC | Age: 59
End: 2024-01-02

## 2024-01-02 ENCOUNTER — TELEMEDICINE (OUTPATIENT)
Dept: BEHAVIORAL/MENTAL HEALTH CLINIC | Facility: CLINIC | Age: 59
End: 2024-01-02
Payer: COMMERCIAL

## 2024-01-02 VITALS
OXYGEN SATURATION: 99 % | BODY MASS INDEX: 21.52 KG/M2 | DIASTOLIC BLOOD PRESSURE: 84 MMHG | HEIGHT: 60 IN | TEMPERATURE: 98.6 F | WEIGHT: 109.6 LBS | SYSTOLIC BLOOD PRESSURE: 138 MMHG | HEART RATE: 99 BPM

## 2024-01-02 DIAGNOSIS — F10.11 ALCOHOL ABUSE, IN REMISSION: ICD-10-CM

## 2024-01-02 DIAGNOSIS — F41.1 GENERALIZED ANXIETY DISORDER: ICD-10-CM

## 2024-01-02 DIAGNOSIS — M48.02 CERVICAL STENOSIS OF SPINAL CANAL: ICD-10-CM

## 2024-01-02 DIAGNOSIS — Z01.818 PRE-OP EXAMINATION: Primary | ICD-10-CM

## 2024-01-02 DIAGNOSIS — F31.81 MODERATE MIXED BIPOLAR II DISORDER (HCC): Primary | ICD-10-CM

## 2024-01-02 DIAGNOSIS — Z72.0 TOBACCO ABUSE: ICD-10-CM

## 2024-01-02 DIAGNOSIS — F12.90 MARIJUANA USE: Chronic | ICD-10-CM

## 2024-01-02 DIAGNOSIS — F43.10 PTSD (POST-TRAUMATIC STRESS DISORDER): ICD-10-CM

## 2024-01-02 PROBLEM — G93.5 CHIARI MALFORMATION TYPE I (HCC): Status: ACTIVE | Noted: 2023-12-20

## 2024-01-02 PROCEDURE — 99214 OFFICE O/P EST MOD 30 MIN: CPT | Performed by: NURSE PRACTITIONER

## 2024-01-02 PROCEDURE — 90834 PSYTX W PT 45 MINUTES: CPT | Performed by: COUNSELOR

## 2024-01-02 NOTE — PSYCH
Virtual Regular Visit    Verification of patient location:    Patient is located at Home in the following state in which I hold an active license [place]      Assessment/Plan:    Problem List Items Addressed This Visit       Moderate mixed bipolar II disorder (HCC) - Primary    Generalized anxiety disorder    PTSD (post-traumatic stress disorder)    Alcohol abuse, in remission    Marijuana use (Chronic)    Tobacco abuse       Goals addressed in session: Goal 1 and Goal 2          Reason for visit is No chief complaint on file.       Encounter provider Isabel Watts LPC    Provider located at [facility] [place]  [facility] [place]  [address]  [place] [address] [phone]      Recent Visits  Date Type Provider Dept   12/28/23 Telephone Isabel Watts LPC [facility] [place]   Showing recent visits within past 7 days and meeting all other requirements  Future Appointments  No visits were found meeting these conditions.  Showing future appointments within next 150 days and meeting all other requirements       The patient was identified by name and date of birth. Marie Portillo was informed that this is a telemedicine visit and that the visit is being conducted throughthe  platform. She agrees to proceed..  My office door was closed. No one else was in the room.  She acknowledged consent and understanding of privacy and security of the video platform. The patient has agreed to participate and understands they can discontinue the visit at any time.    Patient is aware this is a billable service.     Subjective  Marie Portillo is a 58 y.o. female  .      HPI     Past Medical History:   Diagnosis Date    Bilateral carpal tunnel syndrome 08/20/2019    Carpal tunnel syndrome of right wrist 10/30/2019    Added automatically from request for surgery [id number]    Chlamydia     COPD (chronic obstructive pulmonary disease) (HCC)     Depression      Hypertension     Varicella        Past Surgical History:   Procedure Laterality Date    BREAST BIOPSY Left 20yrs ago benign    EGD      OOPHORECTOMY Right     [facility] WR[facility] SURG W/RLS TRANSVRS CARPL LIGM Right 01/21/2020    Procedure: RELEASE CARPAL TUNNEL ENDOSCOPIC;  Surgeon: Sam Nguyen MD;  [facility];  Service: Orthopedics    [facility] WRST SURG W/RLS TRANSVRS CARPL LIGM Left 01/28/2020    Procedure: RELEASE CARPAL TUNNEL ENDOSCOPIC;  Surgeon: Sam Nguyen MD;  [facility];  Service: Orthopedics       Current Outpatient Medications   Medication Sig Dispense Refill    albuterol (PROVENTIL HFA,VENTOLIN HFA) 90 mcg/act inhaler inhale 2 puffs by mouth and INTO THE LUNGS every 6 hours IF NEEDED FOR WHEEZING. 8.5 g 2    bisacodyl (DULCOLAX) 5 mg EC tablet Take as directed by GI office 2 tablet 0    buPROPion (Wellbutrin XL) 150 mg 24 hr tablet Take 1 tablet (150 mg total) by mouth daily 30 tablet 1    cholecalciferol (VITAMIN D3) 400 units tablet Take 400 Units by mouth daily      fluticasone (FLONASE) 50 mcg/act nasal spray instill 1 spray into each nostril once daily 16 g 2    guaiFENesin (MUCINEX) 600 mg 12 hr tablet Take 2 tablets (1,200 mg total) by mouth every 12 (twelve) hours 60 tablet 3    ibuprofen (MOTRIN) 200 mg tablet Take 200 mg by mouth every 6 (six) hours as needed for mild pain      ipratropium-albuterol (DUO-NEB) 0.5-2.5 mg/3 mL nebulizer solution Take 3 mL by nebulization 4 (four) times a day 360 mL 3    lamoTRIgine (LaMICtal) 100 mg tablet Take 1 tablet (100 mg total) by mouth daily for 7 days Week 3 take 100mg daily 7 tablet 0    lamoTRIgine (LaMICtal) 200 MG tablet Take 1 tablet (200 mg total) by mouth daily 30 tablet 1    lamoTRIgine (LaMICtal) 25 mg tablet Take 1 tablet (25 mg total) by mouth daily for 21 doses Take 25mg for week 1 (1 tablet daily) then Take 50mg for week 2 (2 tablets daily) 21 tablet 0    levocetirizine (XYZAL) 5 MG tablet take 1 tablet by  mouth every evening 30 tablet 4    nicotine (NICODERM CQ) 21 mg/24 hr TD 24 hr patch Place 1 patch on the skin over 24 hours every 24 hours 28 patch 0    nicotine polacrilex (COMMIT) 4 MG lozenge Apply 1 lozenge (4 mg total) to the mouth or throat as needed for smoking cessation 100 each 0    omeprazole (PriLOSEC) 20 mg delayed release capsule take 1 capsule by mouth twice a day 60 capsule 5    polyethylene glycol (GLYCOLAX) 17 GM/SCOOP powder Take as directed by GI office 527 g 0    senna-docusate sodium (SENOKOT-S) 8.6-50 mg per tablet Take 3 tablets by mouth as needed        Stiolto Respimat 2.5-2.5 MCG/ACT inhaler inhale 2 puffs by mouth and INTO THE LUNGS once daily 4 g 11     No current facility-administered medications for this visit.        No Known Allergies    Review of Systems    Video Exam    There were no vitals filed for this visit.    Physical Exam

## 2024-01-02 NOTE — PSYCH
"Behavioral Health Psychotherapy Progress Note    Psychotherapy Provided: Individual Psychotherapy     1. Moderate mixed bipolar II disorder (HCC)        2. Generalized anxiety disorder        3. PTSD (post-traumatic stress disorder)        4. Tobacco abuse        5. Marijuana use        6. Alcohol abuse, in remission            Goals addressed in session: Goal 1 and Goal 2     DATA: Marie reported feeling anxious about her upcoming brain surgery this Fri. Marie expressed her fears and concerns and was encouraged to focus on the outcome that may bring her improvement for her daily functioning and life quality. Marie talked about the things in her life that she is grateful for and her strengths that allow her to move on from her traumatic past and heal. She listed several ways to provide self-compassion and forgiveness.   During this session, this clinician used the following therapeutic modalities: Client-centered Therapy, Cognitive Behavioral Therapy, Dialectical Behavior Therapy, Mindfulness-based Strategies, and Supportive Psychotherapy    Substance Abuse was addressed during this session. If the client is diagnosed with a co-occurring substance use disorder, please indicate any changes in the frequency or amount of use: daily med marijuana use. Stage of change for addressing substance use diagnoses: Maintenance    ASSESSMENT:  Marie Portillo presents with a Euthymic/ normal and Anxious mood.     her affect is Normal range and intensity, which is congruent, with her mood and the content of the session. The client has made progress on their goals.    Marie seemed stressed out and in anticipation anxiety about her surgery but was able to externalize her emotions and to express her feelings and thoughts about her fears and concerns.  Marie Portillo presents with a minimal risk of suicide, minimal risk of self-harm, and none risk of harm to others.    For any risk assessment that surpasses a \"low\" rating, a safety plan " must be developed.    A safety plan was indicated: no  If yes, describe in detail NA    PLAN: Between sessions, Marie Portillo will use her learned coping skills to deal with her intense emotions and will use Mindfulness skills to observe them and let them go as well as effective self-soothing techniques. At the next session, the therapist will use Client-centered Therapy, Cognitive Behavioral Therapy, Dialectical Behavior Therapy, Mindfulness-based Strategies, and Supportive Psychotherapy to address self-care and dealing with intense emotions as well as making notes about daily experiences to counter forgetfulness.    Behavioral Health Treatment Plan and Discharge Planning: Marie Portillo is aware of and agrees to continue to work on their treatment plan. They have identified and are working toward their discharge goals. yes    Visit start and stop times:    01/02/24  Start Time: 0155  Stop Time: 0235  Total Visit Time: 40 minutes

## 2024-01-02 NOTE — PROGRESS NOTES
Name: Marie Portillo      : 1965      MRN: [id number]  Encounter Provider: CAROLINE Dorsey  Encounter Date: 2024   Encounter department: [facility] PRIMARY CARE    Assessment & Plan  1. Cervical stenosis of spinal canal  Patient is low risk for scheduled surgery per ACS guidelines, can proceed with surgery as scheduled.   - labs and xray reviewed.   2. Pre-op examination         1. Pre-op examination    2. Cervical stenosis of spinal canal           Subjective      Presurgical Evaluation  Patient ID: Marie Portillo is a 58 y.o. female.    Patient presents with:  Pre-op Exam: Patient having spinal surgery  Dr Cooper. Chronic neck pain.     Procedure date: 24  Surgeon:  Aric Cooper  Planned procedure:  C4/5/6/7 microdisectomy, interbody fusio with allograft bone, fixation with cervical plate  Diagnosis for procedure:  Spinal stenosis in cervical region    Prior anesthesia: Yes   General; Complications:  None / Tolerated well  CAD History: None  Pulmonary History: COPD  Renal history: None  Diabetes History:  None  Neurological History: None   On Immunosuppressant meds/biologics: No    Preop labs/testing available and reviewed: yes    EKG yes  Echo no  Stress test/cath no  PFT/Perry no  Functional capacity: Shovel snow                          6 Mets   Pick the highest level patient can comfortably perform   4 mets or greater for surgery    RCRI  High Risk surgery?         1 Point  CAD History:         1 Point   MI; Positive Stress Test; CP due to Mi;  Nitrate Usage to control Angina; Pathologic Q wave on EKG  CHF Active:         1 Point   Pulm Edema; Paroxysmal Nocturnal Dyspnea;  Bibasilar Rales (crackles);S3; CHF on CXR  Cerebrovascular Disease (TIA or CVA):     1 Point  DM on Insulin:        1 Point  Serum Creat >2.0 mg/dl:       1 Point          Total Points: 1     Scorin: Class I, Very Low Risk (0.4%)     1: Class II, Low risk (0.9%)     2: Class III Moderate  (6.6%)     3: Class IV High (>11%)      THOMAS Risk:  GFR:        For PCP:  If GFR>60, Hold ACE/ARB/Diuretic on the day of surgery, and NSAIDS 10 days before.    If GFR<45, Consider PRE and POST op Nephrology Consult.    If 46 <GFR> 59 : Has Patient had THOMAS in last 6 Months? no   If YES: Preop Nephrology consult   If No:  Post Op Nephrology consult.                   Review of Systems   HENT: Negative.  Negative for congestion.    Respiratory: Negative.  Negative for shortness of breath and wheezing.    Musculoskeletal:  Positive for arthralgias, back pain and neck pain.   Skin: Negative.  Negative for rash.   Psychiatric/Behavioral: Negative.  Negative for confusion. The patient is not nervous/anxious and is not hyperactive.        Current Outpatient Medications on File Prior to Visit   Medication Sig    albuterol (PROVENTIL HFA,VENTOLIN HFA) 90 mcg/act inhaler inhale 2 puffs by mouth and INTO THE LUNGS every 6 hours IF NEEDED FOR WHEEZING.    bisacodyl (DULCOLAX) 5 mg EC tablet Take as directed by GI office    cholecalciferol (VITAMIN D3) 400 units tablet Take 400 Units by mouth daily    fluticasone (FLONASE) 50 mcg/act nasal spray instill 1 spray into each nostril once daily    guaiFENesin (MUCINEX) 600 mg 12 hr tablet Take 2 tablets (1,200 mg total) by mouth every 12 (twelve) hours    ibuprofen (MOTRIN) 200 mg tablet Take 200 mg by mouth every 6 (six) hours as needed for mild pain    ipratropium-albuterol (DUO-NEB) 0.5-2.5 mg/3 mL nebulizer solution Take 3 mL by nebulization 4 (four) times a day    lamoTRIgine (LaMICtal) 25 mg tablet Take 1 tablet (25 mg total) by mouth daily for 21 doses Take 25mg for week 1 (1 tablet daily) then Take 50mg for week 2 (2 tablets daily)    levocetirizine (XYZAL) 5 MG tablet take 1 tablet by mouth every evening    nicotine (NICODERM CQ) 21 mg/24 hr TD 24 hr patch Place 1 patch on the skin over 24 hours every 24 hours    nicotine polacrilex (COMMIT) 4 MG lozenge Apply 1 lozenge (4 mg  total) to the mouth or throat as needed for smoking cessation    omeprazole (PriLOSEC) 20 mg delayed release capsule take 1 capsule by mouth twice a day    polyethylene glycol (GLYCOLAX) 17 GM/SCOOP powder Take as directed by GI office    senna-docusate sodium (SENOKOT-S) 8.6-50 mg per tablet Take 3 tablets by mouth as needed      Stiolto Respimat 2.5-2.5 MCG/ACT inhaler inhale 2 puffs by mouth and INTO THE LUNGS once daily    buPROPion (Wellbutrin XL) 150 mg 24 hr tablet Take 1 tablet (150 mg total) by mouth daily    lamoTRIgine (LaMICtal) 100 mg tablet Take 1 tablet (100 mg total) by mouth daily for 7 days Week 3 take 100mg daily    lamoTRIgine (LaMICtal) 200 MG tablet Take 1 tablet (200 mg total) by mouth daily    [DISCONTINUED] Spiriva Respimat 2.5 MCG/ACT AERS inhaler inhale 2 puffs by mouth and INTO THE LUNGS once daily       Objective     /84   Pulse 99   Temp 98.6 °F (37 °C)   Ht 5' (1.524 m)   Wt 49.7 kg (109 lb 9.6 oz)   LMP  (LMP Unknown)   SpO2 99%   BMI 21.40 kg/m²     Physical Exam  Vitals and nursing note reviewed.   Constitutional:       General: She is not in acute distress.     Appearance: Normal appearance. She is well-developed. She is not ill-appearing.   HENT:      Head: Normocephalic and atraumatic.      Left Ear: Tympanic membrane normal.      Nose: Nose normal. No congestion.      Mouth/Throat:      Mouth: Mucous membranes are moist.      Pharynx: No oropharyngeal exudate or posterior oropharyngeal erythema.   Eyes:      General: Lids are normal.   Cardiovascular:      Rate and Rhythm: Normal rate and regular rhythm.      Heart sounds: Normal heart sounds, S1 normal and S2 normal. No murmur heard.  Pulmonary:      Effort: Pulmonary effort is normal. No respiratory distress.      Breath sounds: Normal breath sounds. No decreased breath sounds or wheezing.   Musculoskeletal:         General: No tenderness or deformity.   Skin:     General: Skin is warm.      Findings: No erythema  or rash.   Neurological:      General: No focal deficit present.      Mental Status: She is alert and oriented to person, place, and time.   Psychiatric:         Behavior: Behavior normal. Behavior is cooperative.         Thought Content: Thought content normal.       CAROLINE Dorsey

## 2024-01-02 NOTE — TELEPHONE ENCOUNTER
Spoke to [facility], Dr Cooper's office. Once patient's OV note from 1/2/23 is cosigned by MD, need to fax over note to their office. Fax number is [phone]. Telephone number is [phone].

## 2024-01-03 RX ORDER — BUPROPION HYDROCHLORIDE 150 MG/1
150 TABLET ORAL DAILY
Qty: 30 TABLET | Refills: 1 | Status: SHIPPED | OUTPATIENT
Start: 2024-01-03

## 2024-01-03 RX ORDER — LAMOTRIGINE 200 MG/1
200 TABLET ORAL DAILY
Qty: 30 TABLET | Refills: 1 | Status: SHIPPED | OUTPATIENT
Start: 2024-01-03

## 2024-01-03 NOTE — PSYCH
Virtual Regular Visit    Verification of patient location:    Patient is located in the following state in which I hold an active license [place]      Assessment/Plan:    Problem List Items Addressed This Visit        Other    Marijuana use (Chronic)    Moderate mixed bipolar II disorder (HCC) - Primary    Generalized anxiety disorder    PTSD (post-traumatic stress disorder)    Alcohol abuse, in remission    Cigarette nicotine dependence without complication       Goals addressed in session: Goal 1 and Goal 2          Reason for visit is No chief complaint on file  Encounter provider [facility]    Provider located at [address] [place] [address]  [address] [facility] [id number] [phone]      Recent Visits  Date Type Provider Dept   02/03/23 229 [facility], [address] [place] [address]   02/02/23 229 [facility], [address] [facility] [place]   Showing recent visits within past 7 days and meeting all other requirements  Future Appointments  No visits were found meeting these conditions  Showing future appointments within next 150 days and meeting all other requirements       The patient was identified by name and date of birth  Ray Goel was informed that this is a telemedicine visit and that the visit is being conducted throughthe [employer] platform  She agrees to proceed     My office door was closed  No one else was in the room  She acknowledged consent and understanding of privacy and security of the video platform  The patient has agreed to participate and understands they can discontinue the visit at any time  Patient is aware this is a billable service  Subjective  Ray Goel is a 56 y o  female  Data: Naima Kraus reported not remembering how she slept or woke up with energy, she complained that her memory makes her upset   Person-Centered, DBT-based, and TF-CBT were used to assist Pt work on a better self-talk, externalizing her anger and reshape her irrational beliefs that affect her  She worked on her trauma narrative and remembered her learned skills to handle her emotions but was difficult to concentrate  Assessment: Dany Naranjo was emotional, regulating at times, yet very easily crossing the limits of handling it well  Plan: Dany Naranjo will attend the [employer] on Fridays to have the self-care activity at the end of the week  She will use Mindfulness and Emotion Regulation skills in challenging situations or memories  HPI     Past Medical History:   Diagnosis Date   • Bilateral carpal tunnel syndrome 08/20/2019   • Carpal tunnel syndrome of right wrist 10/30/2019    Added automatically from request for surgery [id number]   • COPD (chronic obstructive pulmonary disease) (HCC)    • Hypertension        Past Surgical History:   Procedure Laterality Date   • BREAST BIOPSY Left 20yrs ago benign   • OOPHORECTOMY Right    • [place] NDSC WRST SURG W/RLS TRANSVRS CARPL LIGM Right 1/21/2020    Procedure: RELEASE CARPAL TUNNEL ENDOSCOPIC;  Surgeon: Antonio Salmon MD;  [facility];  Service: Orthopedics   • [place] [address] [facility] Fl[address] WRST SURG W/RLS TRANSVRS CARPL LIGM Left 1/28/2020    Procedure: RELEASE CARPAL TUNNEL ENDOSCOPIC;  Surgeon: Antonio Salmon MD;  [facility];  Service: Orthopedics       Current Outpatient Medications   Medication Sig Dispense Refill   • albuterol (PROVENTIL HFA,VENTOLIN HFA) 90 mcg/act inhaler inhale 2 puffs by mouth and INTO THE LUNGS every 6 hours IF NEEDED FOR WHEEZING   8 5 g 2   • benzonatate (TESSALON) 200 MG capsule Take 1 capsule (200 mg total) by mouth 3 (three) times a day as needed for cough 20 capsule 0   • cholecalciferol (VITAMIN D3) 400 units tablet Take 400 Units by mouth daily     • FLUoxetine (PROzac) 20 mg capsule take 2 capsules by mouth once daily 30 capsule 1   • fluticasone (FLONASE) 50 mcg/act nasal spray 1 spray into each nostril daily 16 g 2   • gabapentin (NEURONTIN) 100 mg capsule Take 1 capsule (100 mg total) by mouth 3 (three) times a day 90 capsule 1   • lamoTRIgine (LaMICtal) 200 MG tablet Take 1 tablet (200 mg total) by mouth daily 30 tablet 2   • levocetirizine (XYZAL) 5 MG tablet take 1 tablet by mouth every evening 30 tablet 4   • nicotine polacrilex (COMMIT) 4 MG lozenge Apply 1 lozenge (4 mg total) to the mouth or throat as needed for smoking cessation (Patient not taking: No sig reported) 100 each 5   • omeprazole (PriLOSEC) 20 mg delayed release capsule take 1 capsule by mouth twice a day 60 capsule 5   • predniSONE 10 mg tablet Take 3 tabs BID X 2 days, 2 tabs BID X 2 days, 1 tab BID X 2 days, 1 tab daily X 2 days 26 tablet 0   • senna-docusate sodium (SENOKOT-S) 8 6-50 mg per tablet Take 3 tablets by mouth as needed       • tiotropium-olodaterol (Stiolto Respimat) 2 5-2 5 MCG/ACT inhaler Inhale 2 puffs daily 4 g 11     No current facility-administered medications for this visit  No Known Allergies    Review of Systems    Video Exam    There were no vitals filed for this visit      Physical Exam     02/09/23  Start Time: 0200  Stop Time: 2258  Total Visit Time: 39 minutes No

## 2024-01-09 ENCOUNTER — TELEMEDICINE (OUTPATIENT)
Dept: BEHAVIORAL/MENTAL HEALTH CLINIC | Facility: CLINIC | Age: 59
End: 2024-01-09
Payer: COMMERCIAL

## 2024-01-09 DIAGNOSIS — F31.81 MODERATE MIXED BIPOLAR II DISORDER (HCC): Primary | ICD-10-CM

## 2024-01-09 DIAGNOSIS — F12.90 MARIJUANA USE: Chronic | ICD-10-CM

## 2024-01-09 DIAGNOSIS — F10.11 ALCOHOL ABUSE, IN REMISSION: ICD-10-CM

## 2024-01-09 DIAGNOSIS — Z72.0 TOBACCO ABUSE: ICD-10-CM

## 2024-01-09 DIAGNOSIS — F41.1 GENERALIZED ANXIETY DISORDER: ICD-10-CM

## 2024-01-09 DIAGNOSIS — F43.10 PTSD (POST-TRAUMATIC STRESS DISORDER): ICD-10-CM

## 2024-01-09 PROCEDURE — 90834 PSYTX W PT 45 MINUTES: CPT | Performed by: COUNSELOR

## 2024-01-09 NOTE — PSYCH
"Behavioral Health Psychotherapy Progress Note    Psychotherapy Provided: Individual Psychotherapy     1. Moderate mixed bipolar II disorder (HCC)        2. Generalized anxiety disorder        3. PTSD (post-traumatic stress disorder)        4. Tobacco abuse        5. Marijuana use        6. Alcohol abuse, in remission            Goals addressed in session: Goal 1 and Goal 2     DATA: Marie connected twice with the link due to losing connection the first time. She reported feeling badly after her surgical procedure that was an infusion for her spinal stenosis and not for Chiari malformation that would be in three to six months. Marie spoke about her improved and more deeper connection with her mother since she had to have more procedures and shared that she was happier and calmer, more confident with the positive change.     During this session, this clinician used the following therapeutic modalities: Client-centered Therapy, Cognitive Behavioral Therapy, Dialectical Behavior Therapy, Mindfulness-based Strategies, and Supportive Psychotherapy    Substance Abuse was not addressed during this session. If the client is diagnosed with a co-occurring substance use disorder, please indicate any changes in the frequency or amount of use: NA. Stage of change for addressing substance use diagnoses: Maintenance    ASSESSMENT:  Marie Portillo presents with a Euthymic/ normal, Anxious, and Depressed mood.     her affect is Normal range and intensity, which is congruent, with her mood and the content of the session. The client has made progress on their goals.    Marie seemed very drained and sleepy, in a lot of pain despite the pain alleviation that was  Marie Portillo presents with a low risk of suicide, minimal risk of self-harm, and none risk of harm to others.    For any risk assessment that surpasses a \"low\" rating, a safety plan must be developed.    A safety plan was indicated: no  If yes, describe in detail NA    PLAN: " Between sessions, Marie Portillo will use her learned Core Mindfulness skills to deal with intense emotions and will journal or take notes about her daily experiences to deal with forgetfulness. At the next session, the therapist will use Client-centered Therapy, Cognitive Behavioral Therapy, Dialectical Behavior Therapy, Mindfulness-based Strategies, and Supportive Psychotherapy to address adequate self-care and cognitive struggle.    Behavioral Health Treatment Plan and Discharge Planning: Marie Portillo is aware of and agrees to continue to work on their treatment plan. They have identified and are working toward their discharge goals. yes    Visit start and stop times:    01/09/24  Start Time: 0150  Stop Time: 0235  Total Visit Time: 45 minutes

## 2024-01-09 NOTE — PSYCH
Virtual Regular Visit    Verification of patient location:    Patient is located at Home in the following state in which I hold an active license [place]      Assessment/Plan:    Problem List Items Addressed This Visit       Moderate mixed bipolar II disorder (HCC) - Primary    Generalized anxiety disorder    PTSD (post-traumatic stress disorder)    Alcohol abuse, in remission    Marijuana use (Chronic)    Tobacco abuse       Goals addressed in session: Goal 1 and Goal 2          Reason for visit is No chief complaint on file.       Encounter provider Isabel Watts LPC    Provider located at [facility] [place]  [facility] [place]  [address]  [place] [id number] [phone]      Recent Visits  Date Type Provider Dept   01/02/24 Telemedicine Isabel Watts LPC [facility] [place]   Showing recent visits within past 7 days and meeting all other requirements  Future Appointments  No visits were found meeting these conditions.  Showing future appointments within next 150 days and meeting all other requirements       The patient was identified by name and date of birth. Marie Portillo was informed that this is a telemedicine visit and that the visit is being conducted throughthe [employer] platform. She agrees to proceed..  My office door was closed. No one else was in the room.  She acknowledged consent and understanding of privacy and security of the video platform. The patient has agreed to participate and understands they can discontinue the visit at any time.    Patient is aware this is a billable service.     Subjective  Marie Portillo is a 58 y.o. female  .      HPI     Past Medical History:   Diagnosis Date    Bilateral carpal tunnel syndrome 08/20/2019    Carpal tunnel syndrome of right wrist 10/30/2019    Added automatically from request for surgery [id number]    Chlamydia     COPD (chronic obstructive pulmonary disease) ([facility])      Depression     Hypertension     Varicella        Past Surgical History:   Procedure Laterality Date    BREAST BIOPSY Left 20yrs ago benign    EGD      OOPHORECTOMY Right     [place] NDSC WRST SURG W/RLS TRANSVRS CARPL LIGM Right 01/21/2020    Procedure: RELEASE CARPAL TUNNEL ENDOSCOPIC;  Surgeon: Sam Nguyen MD;  [facility];  Service: Orthopedics    [place] NDSC WRST SURG W/RLS TRANSVRS CARPL LIGM Left 01/28/2020    Procedure: RELEASE CARPAL TUNNEL ENDOSCOPIC;  Surgeon: Sam Nguyen MD;  [facility];  Service: Orthopedics       Current Outpatient Medications   Medication Sig Dispense Refill    albuterol (PROVENTIL HFA,VENTOLIN HFA) 90 mcg/act inhaler inhale 2 puffs by mouth and INTO THE LUNGS every 6 hours IF NEEDED FOR WHEEZING. 8.5 g 2    bisacodyl (DULCOLAX) 5 mg EC tablet Take as directed by GI office 2 tablet 0    buPROPion (WELLBUTRIN XL) 150 mg 24 hr tablet take 1 tablet by mouth once daily 30 tablet 1    cholecalciferol (VITAMIN D3) 400 units tablet Take 400 Units by mouth daily      fluticasone (FLONASE) 50 mcg/act nasal spray instill 1 spray into each nostril once daily 16 g 2    guaiFENesin (MUCINEX) 600 mg 12 hr tablet Take 2 tablets (1,200 mg total) by mouth every 12 (twelve) hours 60 tablet 3    ibuprofen (MOTRIN) 200 mg tablet Take 200 mg by mouth every 6 (six) hours as needed for mild pain      ipratropium-albuterol (DUO-NEB) 0.5-2.5 mg/3 mL nebulizer solution Take 3 mL by nebulization 4 (four) times a day 360 mL 3    lamoTRIgine (LaMICtal) 200 MG tablet take 1 tablet by mouth once daily 30 tablet 1    levocetirizine (XYZAL) 5 MG tablet take 1 tablet by mouth every evening 30 tablet 4    nicotine (NICODERM CQ) 21 mg/24 hr TD 24 hr patch Place 1 patch on the skin over 24 hours every 24 hours 28 patch 0    nicotine polacrilex (COMMIT) 4 MG lozenge Apply 1 lozenge (4 mg total) to the mouth or throat as needed for smoking cessation 100 each 0    omeprazole (PriLOSEC) 20 mg delayed  release capsule take 1 capsule by mouth twice a day 60 capsule 5    polyethylene glycol (GLYCOLAX) 17 GM/SCOOP powder Take as directed by GI office 527 g 0    senna-docusate sodium (SENOKOT-S) 8.6-50 mg per tablet Take 3 tablets by mouth as needed        Stiolto Respimat 2.5-2.5 MCG/ACT inhaler inhale 2 puffs by mouth and INTO THE LUNGS once daily 4 g 11     No current facility-administered medications for this visit.        No Known Allergies    Review of Systems    Video Exam    There were no vitals filed for this visit.    Physical Exam

## 2024-01-16 ENCOUNTER — TELEMEDICINE (OUTPATIENT)
Dept: BEHAVIORAL/MENTAL HEALTH CLINIC | Facility: CLINIC | Age: 59
End: 2024-01-16
Payer: COMMERCIAL

## 2024-01-16 DIAGNOSIS — F43.10 PTSD (POST-TRAUMATIC STRESS DISORDER): ICD-10-CM

## 2024-01-16 DIAGNOSIS — F12.90 MARIJUANA USE: Chronic | ICD-10-CM

## 2024-01-16 DIAGNOSIS — F31.81 MODERATE MIXED BIPOLAR II DISORDER (HCC): Primary | ICD-10-CM

## 2024-01-16 DIAGNOSIS — F17.210 CIGARETTE NICOTINE DEPENDENCE WITHOUT COMPLICATION: ICD-10-CM

## 2024-01-16 DIAGNOSIS — F41.1 GENERALIZED ANXIETY DISORDER: ICD-10-CM

## 2024-01-16 DIAGNOSIS — F10.11 ALCOHOL ABUSE, IN REMISSION: ICD-10-CM

## 2024-01-16 PROCEDURE — 90832 PSYTX W PT 30 MINUTES: CPT | Performed by: COUNSELOR

## 2024-01-16 NOTE — PSYCH
Virtual Regular Visit    Verification of patient location:    Patient is located at Home in the following state in which I hold an active license [place]      Assessment/Plan:    Problem List Items Addressed This Visit       Moderate mixed bipolar II disorder (HCC) - Primary    Generalized anxiety disorder    PTSD (post-traumatic stress disorder)    Alcohol abuse, in remission    Marijuana use (Chronic)    Cigarette nicotine dependence without complication       Goals addressed in session: Goal 1 and Goal 2          Reason for visit is No chief complaint on file.       Encounter provider Isabel Watts LPC    Provider located at [facility] [place]  [facility] [place] [id number] [phone]      Recent Visits  Date Type Provider Dept   01/09/24 Telemedicine Isabel Watts LPC [facility] [place]   Showing recent visits within past 7 days and meeting all other requirements  Future Appointments  No visits were found meeting these conditions.  Showing future appointments within next 150 days and meeting all other requirements       The patient was identified by name and date of birth. Marie Portillo was informed that this is a telemedicine visit and that the visit is being conducted throughthe [employer] platform. She agrees to proceed..  My office door was closed. No one else was in the room.  She acknowledged consent and understanding of privacy and security of the video platform. The patient has agreed to participate and understands they can discontinue the visit at any time.    Patient is aware this is a billable service.     Subjective  Marie Portillo is a 58 y.o. female  .      HPI     Past Medical History:   Diagnosis Date    Bilateral carpal tunnel syndrome 08/20/2019    Carpal tunnel syndrome of right wrist 10/30/2019    Added automatically from request for surgery [id number]    Chlamydia     COPD (chronic  obstructive pulmonary disease) (HCC)     Depression     Hypertension     Varicella        Past Surgical History:   Procedure Laterality Date    BREAST BIOPSY Left 20yrs ago benign    EGD      OOPHORECTOMY Right     [place] NDSC WRST SURG W/RLS TRANSVRS CARPL LIGM Right 01/21/2020    Procedure: RELEASE CARPAL TUNNEL ENDOSCOPIC;  Surgeon: Sam Nguyen MD;  [facility];  Service: Orthopedics    [place] NDSC WRST SURG W/RLS TRANSVRS CARPL LIGM Left 01/28/2020    Procedure: RELEASE CARPAL TUNNEL ENDOSCOPIC;  Surgeon: Sam Nguyen MD;  [facility]: [facility];  Service: Orthopedics       Current Outpatient Medications   Medication Sig Dispense Refill    albuterol (PROVENTIL HFA,VENTOLIN HFA) 90 mcg/act inhaler inhale 2 puffs by mouth and INTO THE LUNGS every 6 hours IF NEEDED FOR WHEEZING. 8.5 g 2    bisacodyl (DULCOLAX) 5 mg EC tablet Take as directed by GI office 2 tablet 0    buPROPion (WELLBUTRIN XL) 150 mg 24 hr tablet take 1 tablet by mouth once daily 30 tablet 1    cholecalciferol (VITAMIN D3) 400 units tablet Take 400 Units by mouth daily      fluticasone (FLONASE) 50 mcg/act nasal spray instill 1 spray into each nostril once daily 16 g 2    guaiFENesin (MUCINEX) 600 mg 12 hr tablet Take 2 tablets (1,200 mg total) by mouth every 12 (twelve) hours 60 tablet 3    ibuprofen (MOTRIN) 200 mg tablet Take 200 mg by mouth every 6 (six) hours as needed for mild pain      ipratropium-albuterol (DUO-NEB) 0.5-2.5 mg/3 mL nebulizer solution Take 3 mL by nebulization 4 (four) times a day 360 mL 3    lamoTRIgine (LaMICtal) 200 MG tablet take 1 tablet by mouth once daily 30 tablet 1    levocetirizine (XYZAL) 5 MG tablet take 1 tablet by mouth every evening 30 tablet 4    nicotine (NICODERM CQ) 21 mg/24 hr TD 24 hr patch Place 1 patch on the skin over 24 hours every 24 hours 28 patch 0    nicotine polacrilex (COMMIT) 4 MG lozenge Apply 1 lozenge (4 mg total) to the mouth or throat as needed for smoking cessation 100 each 0     omeprazole (PriLOSEC) 20 mg delayed release capsule take 1 capsule by mouth twice a day 60 capsule 5    polyethylene glycol (GLYCOLAX) 17 GM/SCOOP powder Take as directed by GI office 527 g 0    senna-docusate sodium (SENOKOT-S) 8.6-50 mg per tablet Take 3 tablets by mouth as needed        Stiolto Respimat 2.5-2.5 MCG/ACT inhaler inhale 2 puffs by mouth and INTO THE LUNGS once daily 4 g 11     No current facility-administered medications for this visit.        No Known Allergies    Review of Systems    Video Exam    There were no vitals filed for this visit.    Physical Exam

## 2024-01-16 NOTE — PSYCH
"Behavioral Health Psychotherapy Progress Note    Psychotherapy Provided: Individual Psychotherapy     1. Moderate mixed bipolar II disorder (HCC)        2. Generalized anxiety disorder        3. PTSD (post-traumatic stress disorder)        4. Cigarette nicotine dependence without complication        5. Marijuana use        6. Alcohol abuse, in remission            Goals addressed in session: Goal 1 and Goal 2     DATA: Marie arrived with a little delay due to severe pain and restriction and connecting twice to be successful. She discussed dealing with severe pain in her shoulders and neck after her surgery. She provided good examples of using Mindfulness skills to attend to difficult emotions and pain through mindful ways. Marie had a shorter session due to difficulties concentrating due to pain.   During this session, this clinician used the following therapeutic modalities: Client-centered Therapy, Cognitive Behavioral Therapy, Dialectical Behavior Therapy, Mindfulness-based Strategies, and Supportive Psychotherapy    Substance Abuse was not addressed during this session. If the client is diagnosed with a co-occurring substance use disorder, please indicate any changes in the frequency or amount of use: NA. Stage of change for addressing substance use diagnoses: Pre-contemplation    ASSESSMENT:  Marie Portillo presents with a Euthymic/ normal, Anxious, and Depressed mood.     her affect is Normal range and intensity and Tearful, at times, which is congruent, with her mood and the content of the session. The client has made progress on their goals.    Marie seemed expressive and talkative despite her pain. Marie Portillo presents with a low risk of suicide, low risk of self-harm, and none risk of harm to others.    For any risk assessment that surpasses a \"low\" rating, a safety plan must be developed.    A safety plan was indicated: no  If yes, describe in detail NA    PLAN: Between sessions, Marie Portillo will use her " learned . At the next session, the therapist will use Client-centered Therapy, Cognitive Behavioral Therapy, Dialectical Behavior Therapy, Mindfulness-based Strategies, and Supportive Psychotherapy to address self-care, dealing with intense emotions, and living with severe pain.    Behavioral Health Treatment Plan and Discharge Planning: Marie Portillo is aware of and agrees to continue to work on their treatment plan. They have identified and are working toward their discharge goals. yes    Visit start and stop times:    01/16/24  Start Time: 0107  Stop Time: 0141  Total Visit Time: 34 minutes

## 2024-01-17 ENCOUNTER — TELEMEDICINE (OUTPATIENT)
Dept: PSYCHIATRY | Facility: CLINIC | Age: 59
End: 2024-01-17

## 2024-01-17 DIAGNOSIS — F31.81 MODERATE MIXED BIPOLAR II DISORDER (HCC): Primary | ICD-10-CM

## 2024-01-17 PROCEDURE — NC001 PR NO CHARGE

## 2024-01-18 ENCOUNTER — OFFICE VISIT (OUTPATIENT)
Dept: CARDIOLOGY CLINIC | Facility: CLINIC | Age: 59
End: 2024-01-18
Payer: COMMERCIAL

## 2024-01-18 VITALS
HEART RATE: 97 BPM | OXYGEN SATURATION: 98 % | WEIGHT: 111 LBS | DIASTOLIC BLOOD PRESSURE: 72 MMHG | HEIGHT: 60 IN | BODY MASS INDEX: 21.79 KG/M2 | SYSTOLIC BLOOD PRESSURE: 126 MMHG

## 2024-01-18 DIAGNOSIS — I10 ESSENTIAL HYPERTENSION: ICD-10-CM

## 2024-01-18 DIAGNOSIS — R00.2 PALPITATIONS: ICD-10-CM

## 2024-01-18 DIAGNOSIS — I50.32 CHRONIC DIASTOLIC (CONGESTIVE) HEART FAILURE (HCC): Primary | ICD-10-CM

## 2024-01-18 DIAGNOSIS — Z72.0 TOBACCO ABUSE: ICD-10-CM

## 2024-01-18 PROCEDURE — 99214 OFFICE O/P EST MOD 30 MIN: CPT | Performed by: INTERNAL MEDICINE

## 2024-01-18 PROCEDURE — 93000 ELECTROCARDIOGRAM COMPLETE: CPT | Performed by: INTERNAL MEDICINE

## 2024-01-18 NOTE — PROGRESS NOTES
Cardiology     Clinic Visit Note  Marie Portillo 58 y.o. female   MRN: [id number]    Assessment and Plan      Chronic diastolic HF   - Patient had echo in 2021 which demonstrated EF of 50%, mildly reduced LV strain and grade 1 diastolic dysfunction  - Patient remains euvolemic on exam and has had no exacerbations  - Currently not on any GDMT  - Unclear etiology of diastolic heart failure however echo demonstrates concentric LVH which could be due to history of hypertension  - Will obtain updated echo and initiate GDMT based off these results    Concentric LVH   - Likely secondary to hypertension however this is now well-controlled  - Echo from 2021 demonstrated moderate concentric LVH with low normal EF and grade 1 diastolic dysfunction  - Will repeat echo now and based off results will pursue further workup for LVH    Hypertension  - History of hypertension however has not been on any medications for this since coming to our office  - Blood Pressure remains well-controlled at 1/20/1972  - Continue lifestyle modification low-sodium diet, exercises 30 minutes at least 3 times weekly  - Most recent lipid panel from 2022 demonstrated cholesterol 175, HDL 57, triglycerides 60 and LDL of 96     Tobacco use   - Patient has significant smoking history  - Currently smokes half pack a day and is trying to cut back  - Encouraged smoking cessation    Palpitations   - Improved with counseling and medication for anxiety  - EKG in office today demonstrated normal sinus rhythm, rate 97, , QTc 434, left atrial enlargement and nonspecific ST     Schedule a follow-up appointment in 3 months or sooner depending on echo results.     Chief Complaint: Check up   Subjective     History of Present Illness:  58 year old female with PMH of Lower extremity edema, Tobacco use and IFG, palpitations who presented for a check up. She has not had any other lower extremity edema since last visit. She denies chest pain, no orthopnea, PND,  lightheadedness, syncope, presyncope.   She has COPD and is short of breath  all the time. She is pretty active and walks daily. She still feels occasional palpitations. She is seeing a psychiatrist now and is on medication and she states that this is better now. Still smoking due to stress. About 2 weeks ago she had a microdiscectomy, interbody fusion due to cervical spinal stenosis.     She states from a cardiac standpoint she is feeling well.     She initially presented to our office in  due to palpitations.  2 week zio from  demonstrated a min HR of 67 bpm, max HR of 130 bpm, and avg HR of 99  bpm. Predominant underlying rhythm was Sinus Rhythm. Isolated SVEs were rare (<1.0%), and no SVE Couplets or SVE Triplets were present.  Isolated VEs were rare (<1.0%), VE Couplets were rare (<1.0%), and no VE Triplets were present.    Previous Cardiology Workup:  TREADMILL STRESS  No results found for this or any previous visit.     ----------------------------------------------------------------------------------------------  NUCLEAR STRESS TEST: No results found for this or any previous visit.    No results found for this or any previous visit.      --------------------------------------------------------------------------------  CATH:  No results found for this or any previous visit.    --------------------------------------------------------------------------------  ECHO:   Results for orders placed during the hospital encounter of 20    Echo complete with contrast if indicated    Narrative  [address] [address]    Transthoracic Echocardiogram  2D, M-mode, Doppler, and Color Doppler    Study date:  11-Aug-2020    Patient: GABRIELA COTA  MR number: [id number]  Account number: [id number]  : 1965  Age: 54 years  Gender: Female  Status: Outpatient  Location: [facility]  Height: 60 in  Weight: 103.8 lb  BP: 128/ 76  mmHg    Indications: Palpitations    Diagnoses: R00.2 - Palpitations    Sonographer:  Ernesto Gordon RDCS  Primary Physician:  Rashid Rivers MD  Referring Physician:  Sunny Narayan MD  Group:  [facility]  Interpreting Physician:  Adria Mcknight MD    SUMMARY    LEFT VENTRICLE:  Systolic function was mildly reduced. Ejection fraction was estimated in the range of 45 % to 50 %.  There were no regional wall motion abnormalities.    HISTORY: PRIOR HISTORY: Hypertension    PROCEDURE: The study was performed in the [facility]. This was a routine study. The transthoracic approach was used. The study included complete 2D imaging, M-mode, complete spectral Doppler, and color Doppler. The  heart rate was 93 bpm, at the start of the study. Image quality was adequate.    LEFT VENTRICLE: Size was normal. Systolic function was mildly reduced. Ejection fraction was estimated in the range of 45 % to 50 %. There were no regional wall motion abnormalities. Wall thickness was normal. No evidence of apical  thrombus. DOPPLER: Left ventricular diastolic function parameters were normal.    RIGHT VENTRICLE: The size was normal. Systolic function was normal. Wall thickness was normal.    LEFT ATRIUM: Size was normal.    RIGHT ATRIUM: Size was normal.    MITRAL VALVE: Valve structure was normal. There was normal leaflet separation. DOPPLER: The transmitral velocity was within the normal range. There was no evidence for stenosis. There was no significant regurgitation.    AORTIC VALVE: The valve was trileaflet. Leaflets exhibited normal thickness and normal cuspal separation. DOPPLER: Transaortic velocity was within the normal range. There was no evidence for stenosis. There was no significant  regurgitation.    TRICUSPID VALVE: The valve structure was normal. There was normal leaflet separation. DOPPLER: The transtricuspid velocity was within the normal range. There was no evidence for  stenosis. There was no significant regurgitation.    PULMONIC VALVE: Leaflets exhibited normal thickness, no calcification, and normal cuspal separation. DOPPLER: The transpulmonic velocity was within the normal range. There was no significant regurgitation.    PERICARDIUM: There was no pericardial effusion. The pericardium was normal in appearance.    AORTA: The root exhibited normal size.    SYSTEMIC VEINS: IVC: The inferior vena cava was normal in size.    SYSTEM MEASUREMENT TABLES    2D  %FS: 30.73 %  AV Diam: 2.85 cm  EDV(Teich): 104.28 ml  EF(Teich): 58.25 %  ESV(Teich): 43.54 ml  IVSd: 0.92 cm  LA Area: 16.4 cm2  LA Diam: 2.15 cm  LVEDV MOD A4C: 95.88 ml  LVEF MOD A4C: 41.01 %  LVESV MOD A4C: 56.56 ml  LVIDd: 4.74 cm  LVIDs: 3.28 cm  LVLd A4C: 6.78 cm  LVLs A4C: 6.03 cm  LVPWd: 0.95 cm  RA Area: 10.37 cm2  RVOT Diam: 2.56 cm  SI(Teich): 43.08 ml/m2  SV MOD A4C: 39.32 ml  SV(Cube): 70.99 ml  SV(Teich): 60.74 ml    CW  PV Vmax: 0.86 m/s  PV maxP.95 mmHg  TR Vmax: 2.24 m/s  TR maxP.06 mmHg    PW  E': 0.08 m/s  E/E': 7.93  LVOT Env.Ti: 258.78 ms  LVOT VTI: 23.14 cm  LVOT Vmax: 1.28 m/s  LVOT Vmean: 0.89 m/s  LVOT maxP.52 mmHg  LVOT meanPG: 3.75 mmHg  MV A Christopher: 0.7 m/s  MV Dec [place]: 4.14 m/s2  MV DecT: 155.31 ms  MV E Christopher: 0.64 m/s  MV E/A Ratio: 0.91  PVA (Vmax): 3.77 cm2  RVOT Vmax: 0.63 m/s  RVOT maxP.58 mmHg    [facility] Accredited [facility]ch[facility]    Prepared and electronically signed by    Adria Mcknight MD  Signed 11-Aug-2020 17:08:17    No results found for this or any previous visit.    --------------------------------------------------------------------------------  HOLTER  No results found for this or any previous visit.    --------------------------------------------------------------------------------  CAROTIDS  No results found for this or any previous visit.       ---------------------------------------------------------------------------------  Review  of Systems   Constitutional:  Negative for chills and fever.   HENT:  Negative for ear pain and sore throat.    Eyes:  Negative for pain and visual disturbance.   Respiratory:  Positive for shortness of breath. Negative for cough.    Cardiovascular:  Negative for chest pain and palpitations.   Gastrointestinal:  Negative for abdominal pain and vomiting.   Genitourinary:  Negative for dysuria and hematuria.   Musculoskeletal:  Negative for arthralgias and back pain.   Skin:  Negative for color change and rash.   Neurological:  Negative for seizures and syncope.   All other systems reviewed and are negative.        Current Outpatient Medications:     albuterol (PROVENTIL HFA,VENTOLIN HFA) 90 mcg/act inhaler, inhale 2 puffs by mouth and INTO THE LUNGS every 6 hours IF NEEDED FOR WHEEZING., Disp: 8.5 g, Rfl: 2    bisacodyl (DULCOLAX) 5 mg EC tablet, Take as directed by GI office, Disp: 2 tablet, Rfl: 0    buPROPion (WELLBUTRIN XL) 150 mg 24 hr tablet, take 1 tablet by mouth once daily, Disp: 30 tablet, Rfl: 1    cholecalciferol (VITAMIN D3) 400 units tablet, Take 400 Units by mouth daily, Disp: , Rfl:     fluticasone (FLONASE) 50 mcg/act nasal spray, instill 1 spray into each nostril once daily, Disp: 16 g, Rfl: 2    guaiFENesin (MUCINEX) 600 mg 12 hr tablet, Take 2 tablets (1,200 mg total) by mouth every 12 (twelve) hours, Disp: 60 tablet, Rfl: 3    ibuprofen (MOTRIN) 200 mg tablet, Take 200 mg by mouth every 6 (six) hours as needed for mild pain, Disp: , Rfl:     ipratropium-albuterol (DUO-NEB) 0.5-2.5 mg/3 mL nebulizer solution, Take 3 mL by nebulization 4 (four) times a day, Disp: 360 mL, Rfl: 3    lamoTRIgine (LaMICtal) 200 MG tablet, take 1 tablet by mouth once daily, Disp: 30 tablet, Rfl: 1    levocetirizine (XYZAL) 5 MG tablet, take 1 tablet by mouth every evening, Disp: 30 tablet, Rfl: 4    nicotine (NICODERM CQ) 21 mg/24 hr TD 24 hr patch, Place 1 patch on the skin over 24 hours every 24 hours, Disp: 28 patch,  Rfl: 0    nicotine polacrilex (COMMIT) 4 MG lozenge, Apply 1 lozenge (4 mg total) to the mouth or throat as needed for smoking cessation, Disp: 100 each, Rfl: 0    omeprazole (PriLOSEC) 20 mg delayed release capsule, take 1 capsule by mouth twice a day, Disp: 60 capsule, Rfl: 5    polyethylene glycol (GLYCOLAX) 17 GM/SCOOP powder, Take as directed by GI office, Disp: 527 g, Rfl: 0    senna-docusate sodium (SENOKOT-S) 8.6-50 mg per tablet, Take 3 tablets by mouth as needed  , Disp: , Rfl:     Stiolto Respimat 2.5-2.5 MCG/ACT inhaler, inhale 2 puffs by mouth and INTO THE LUNGS once daily, Disp: 4 g, Rfl: 11  Past Medical History:   Diagnosis Date    Bilateral carpal tunnel syndrome 08/20/2019    Carpal tunnel syndrome of right wrist 10/30/2019    Added automatically from request for surgery [id number]    Chlamydia     COPD (chronic obstructive pulmonary disease) (HCC)     Depression     Hypertension     Varicella      Past Surgical History:   Procedure Laterality Date    BREAST BIOPSY Left 20yrs ago benign    EGD      OOPHORECTOMY Right     GA NDSC WRST SURG W/RLS TRANSVRS CARPL LIGM Right 01/21/2020    Procedure: RELEASE CARPAL TUNNEL ENDOSCOPIC;  Surgeon: Sam Nguyen MD;  Location: [facility];  Service: Orthopedics    GA NDSC WRST SURG W/RLS TRANSVRS CARPL LIGM Left 01/28/2020    Procedure: RELEASE CARPAL TUNNEL ENDOSCOPIC;  Surgeon: Sam Nguyen MD;  Location: [facility];  Service: Orthopedics     Social History     Socioeconomic History    Marital status:      Spouse name: Not on file    Number of children: 2    Years of education: Not on file    Highest education level: Not on file   Occupational History    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  She quit on her own without any h/o withdrawal or rehab.    Drug use: Yes     Types: Marijuana     Comment: Smokes THC several times a day.  Also uses CBD OIL at times.   Used cocaine and methamphetamine  fairly steadily for a period of approx 1 year approx 36 years ago.  Tried LSD, Mushrooms.  No h/o rehab    Sexual activity: Yes     Partners: Male   Other Topics Concern    Not on file   Social History Narrative    Home: lives with 2 best friends.         Education:    Pt states she always got Ds and Fs in school and skipped classes often, but there were no formally diagnosed learning disabilities.  Pt was in alternative schooling.  She learned \"Nothing\" there and they didn't teach much.  Pt was then placed back in regular classes but could not keep up.  Pt reached childhood milestones on time as far as she knows.     Highest grade completed 11th grade    [facility] but no actual certificate     Social Determinants of Health     Financial Resource Strain: Low Risk  (1/5/2024)    Received from [facility]    Overall Financial Resource Strain (CARDIA)     Difficulty of Paying Living Expenses: Not hard at all   Food Insecurity: No Food Insecurity (1/5/2024)    Received from [facility]    Hunger Vital Sign     Worried About Running Out of Food in the Last Year: Never true     Ran Out of Food in the Last Year: Never true   Transportation Needs: No Transportation Needs (1/5/2024)    Received from [facility]    PRAPARE - Transportation     Lack of Transportation (Medical): No     Lack of Transportation (Non-Medical): No   Physical Activity: Not on file   Stress: Not on file   Social Connections: Not on file   Intimate " Partner Violence: Not At Risk (1/5/2024)    Received from [facility]    Humiliation, Afraid, Rape, and Kick questionnaire     Fear of Current or Ex-Partner: No     Emotionally Abused: No     Physically Abused: No     Sexually Abused: No   Housing Stability: Low Risk  (1/5/2024)    Received from [facility]    Housing Stability Vital Sign     Unable to Pay for Housing in the Last Year: No     Number of Places Lived in the Last Year: 1     Unstable Housing in the Last Year: No     Family History   Problem Relation Age of Onset    Alcohol abuse Mother     Eating disorder Mother         anorexia and bulimia per Pt    Hodgkin's lymphoma Father     Cancer Father         3rd time lymphoma, prostate, lung    No Known Problems Sister     No Known Problems Daughter     Colon cancer Maternal Grandmother     Alcohol abuse Maternal Grandfather     Colon cancer Paternal Grandmother     Breast cancer Maternal Aunt     No Known Problems Maternal Aunt     No Known Problems Maternal Aunt     Anxiety disorder Paternal Aunt     No Known Problems Paternal Aunt     Multiple sclerosis Cousin     Colon cancer Maternal Uncle      No Known Allergies    Objective     Vitals:    01/18/24 1025   BP: 126/72   BP Location: Left arm   Patient Position: Sitting   Cuff Size: Standard   Pulse: 97   SpO2: 98%   Weight: 50.3 kg (111 lb)   Height: 5' (1.524 m)       Physical exam:     Physical Exam  Vitals and nursing note reviewed.   Constitutional:       General: She is not in acute distress.     Appearance: She is well-developed.   HENT:      Head: Normocephalic and atraumatic.   Eyes:      Conjunctiva/sclera: Conjunctivae normal.      Pupils: Pupils are equal, round, and reactive to light.   Neck:      Vascular: No carotid bruit.   Cardiovascular:      Rate and Rhythm: Normal rate and regular rhythm.      Heart sounds: No murmur heard.     No friction rub. No gallop.   Pulmonary:      Effort: Pulmonary effort is  normal. No respiratory distress.      Breath sounds: Rhonchi present.   Abdominal:      Palpations: Abdomen is soft.      Tenderness: There is no abdominal tenderness.   Musculoskeletal:         General: No swelling.      Cervical back: Neck supple.   Skin:     General: Skin is warm and dry.      Capillary Refill: Capillary refill takes less than 2 seconds.   Neurological:      Mental Status: She is alert and oriented to person, place, and time.   Psychiatric:         Mood and Affect: Mood normal.           ==  PLEASE NOTE:  This encounter was completed utilizing the [employer]Fluency Direct Speech Voice Recognition Software. Grammatical errors, random word insertions, pronoun errors and incomplete sentences are occasional consequences of the system due to software limitations, ambient noise and hardware issues.These may be missed by proof reading prior to affixing electronic signature. Any questions or concerns about the content, text or information contained within the body of this dictation should be directly addressed to the physician for clarification. Please do not hesitate to call me directly if you have any any questions or concerns.

## 2024-01-19 ENCOUNTER — TELEMEDICINE (OUTPATIENT)
Dept: BEHAVIORAL/MENTAL HEALTH CLINIC | Facility: CLINIC | Age: 59
End: 2024-01-19
Payer: COMMERCIAL

## 2024-01-19 DIAGNOSIS — F31.81 MODERATE MIXED BIPOLAR II DISORDER (HCC): Primary | ICD-10-CM

## 2024-01-19 DIAGNOSIS — F17.210 CIGARETTE NICOTINE DEPENDENCE WITHOUT COMPLICATION: ICD-10-CM

## 2024-01-19 DIAGNOSIS — F43.10 PTSD (POST-TRAUMATIC STRESS DISORDER): ICD-10-CM

## 2024-01-19 DIAGNOSIS — F10.11 ALCOHOL ABUSE, IN REMISSION: ICD-10-CM

## 2024-01-19 DIAGNOSIS — F12.90 MARIJUANA USE: Chronic | ICD-10-CM

## 2024-01-19 DIAGNOSIS — F41.1 GENERALIZED ANXIETY DISORDER: ICD-10-CM

## 2024-01-19 DIAGNOSIS — Z72.0 TOBACCO ABUSE: ICD-10-CM

## 2024-01-19 PROCEDURE — 90853 GROUP PSYCHOTHERAPY: CPT | Performed by: COUNSELOR

## 2024-01-19 NOTE — PSYCH
Virtual Regular Visit    Verification of patient location:    Patient is located at Home in the following state in which I hold an active license [place]      Assessment/Plan:    Problem List Items Addressed This Visit       Moderate mixed bipolar II disorder (HCC) - Primary    Generalized anxiety disorder    PTSD (post-traumatic stress disorder)    Alcohol abuse, in remission    Marijuana use (Chronic)    Cigarette nicotine dependence without complication    Tobacco abuse       Goals addressed in session: Goal 1 and Goal 2          Reason for visit is No chief complaint on file.       Encounter provider Isabel Watts LPC    Provider located at [facility] [place]  [facility] [address]  [place] [id number] [phone]      Recent Visits  Date Type Provider Dept   01/16/24 Telemedicine Isabel Watts LPC [facility] [place]   Showing recent visits within past 7 days and meeting all other requirements  Future Appointments  No visits were found meeting these conditions.  Showing future appointments within next 150 days and meeting all other requirements       The patient was identified by name and date of birth. Marie Portillo was informed that this is a telemedicine visit and that the visit is being conducted throughthe [employer] Teams platform. She agrees to proceed..  My office door was closed. The patient was notified the following individuals were present in the room other group attendees.  She acknowledged consent and understanding of privacy and security of the video platform. The patient has agreed to participate and understands they can discontinue the visit at any time.    Patient is aware this is a billable service.     Subjective  Marie Portillo is a 58 y.o. female  .    Data: Marie attended the Relaxation group and was guided through EFT tapping on trauma, diaphragmatic breathing, other Vagus nerve stimulation  techniques, and progressive muscle relaxation. Furthermore, attendees were guided through mindfulness meditation  for a quick body scan transitioning to attending to chronic pain, feelings of resentment, and imagery for dealing with anger and self-blame, and with affirmations for self-confidence, self-compassion, and acceptance. Psychoeducation, EFT, Mindfulness-based, and Somatic approaches were used to achieve the deep state of relaxation and to maintain the serene environment for the exercise's purpose.      Assessment: Marie followed through guidance and completed the exercises, showed willingness to continue learning and practicing self-care in weekly Relaxation Group as well as individual therapy.      Plan: Marie will practice deep breathing and other techniques based on each occasion to counter stress and will attend the next group to be able to take part in further guided exercises on chosen topics to build on the techniques learned in the beginning.    HPI     Past Medical History:   Diagnosis Date    Bilateral carpal tunnel syndrome 08/20/2019    Carpal tunnel syndrome of right wrist 10/30/2019    Added automatically from request for surgery [id number]    Chlamydia     COPD (chronic obstructive pulmonary disease) (HCC)     Depression     Hypertension     Varicella        Past Surgical History:   Procedure Laterality Date    BREAST BIOPSY Left 20yrs ago benign    EGD      OOPHORECTOMY Right     [facility] NDSC WRST SURG W/RLS TRANSVRS CARPL LIGM Right 01/21/2020    Procedure: RELEASE CARPAL TUNNEL ENDOSCOPIC;  Surgeon: Sam Nguyen MD;  [facility];  Service: Orthopedics    NM NDSC WRST SURG W/RLS TRANSVRS CARPL LIGM Left 01/28/2020    Procedure: RELEASE CARPAL TUNNEL ENDOSCOPIC;  Surgeon: Sam Nguyen MD;  [facility];  Service: Orthopedics       Current Outpatient Medications   Medication Sig Dispense Refill    albuterol (PROVENTIL HFA,VENTOLIN HFA) 90 mcg/act inhaler inhale 2 puffs by  mouth and INTO THE LUNGS every 6 hours IF NEEDED FOR WHEEZING. 8.5 g 2    bisacodyl (DULCOLAX) 5 mg EC tablet Take as directed by GI office 2 tablet 0    buPROPion (WELLBUTRIN XL) 150 mg 24 hr tablet take 1 tablet by mouth once daily 30 tablet 1    cholecalciferol (VITAMIN D3) 400 units tablet Take 400 Units by mouth daily      fluticasone (FLONASE) 50 mcg/act nasal spray instill 1 spray into each nostril once daily 16 g 2    guaiFENesin (MUCINEX) 600 mg 12 hr tablet Take 2 tablets (1,200 mg total) by mouth every 12 (twelve) hours 60 tablet 3    ibuprofen (MOTRIN) 200 mg tablet Take 200 mg by mouth every 6 (six) hours as needed for mild pain      ipratropium-albuterol (DUO-NEB) 0.5-2.5 mg/3 mL nebulizer solution Take 3 mL by nebulization 4 (four) times a day 360 mL 3    lamoTRIgine (LaMICtal) 200 MG tablet take 1 tablet by mouth once daily 30 tablet 1    levocetirizine (XYZAL) 5 MG tablet take 1 tablet by mouth every evening 30 tablet 4    nicotine (NICODERM CQ) 21 mg/24 hr TD 24 hr patch Place 1 patch on the skin over 24 hours every 24 hours 28 patch 0    nicotine polacrilex (COMMIT) 4 MG lozenge Apply 1 lozenge (4 mg total) to the mouth or throat as needed for smoking cessation 100 each 0    omeprazole (PriLOSEC) 20 mg delayed release capsule take 1 capsule by mouth twice a day 60 capsule 5    polyethylene glycol (GLYCOLAX) 17 GM/SCOOP powder Take as directed by GI office 527 g 0    senna-docusate sodium (SENOKOT-S) 8.6-50 mg per tablet Take 3 tablets by mouth as needed        Stiolto Respimat 2.5-2.5 MCG/ACT inhaler inhale 2 puffs by mouth and INTO THE LUNGS once daily 4 g 11     No current facility-administered medications for this visit.        No Known Allergies    Review of Systems    Video Exam    There were no vitals filed for this visit.    Physical Exam     01/19/24  Start Time: 0400  Stop Time: 0500  Total Visit Time: 60 minutes

## 2024-01-23 ENCOUNTER — TELEMEDICINE (OUTPATIENT)
Dept: BEHAVIORAL/MENTAL HEALTH CLINIC | Facility: CLINIC | Age: 59
End: 2024-01-23
Payer: COMMERCIAL

## 2024-01-23 DIAGNOSIS — F41.1 GENERALIZED ANXIETY DISORDER: ICD-10-CM

## 2024-01-23 DIAGNOSIS — F43.10 PTSD (POST-TRAUMATIC STRESS DISORDER): ICD-10-CM

## 2024-01-23 DIAGNOSIS — F17.210 CIGARETTE NICOTINE DEPENDENCE WITHOUT COMPLICATION: ICD-10-CM

## 2024-01-23 DIAGNOSIS — F10.11 ALCOHOL ABUSE, IN REMISSION: ICD-10-CM

## 2024-01-23 DIAGNOSIS — F31.81 MODERATE MIXED BIPOLAR II DISORDER (HCC): Primary | ICD-10-CM

## 2024-01-23 DIAGNOSIS — Z72.0 TOBACCO ABUSE: ICD-10-CM

## 2024-01-23 PROCEDURE — 90834 PSYTX W PT 45 MINUTES: CPT | Performed by: COUNSELOR

## 2024-01-23 NOTE — PSYCH
Virtual Regular Visit    Verification of patient location:    Patient is located at Home in the following state in which I hold an active license [place]      Assessment/Plan:    Problem List Items Addressed This Visit       Moderate mixed bipolar II disorder (HCC) - Primary    Generalized anxiety disorder    PTSD (post-traumatic stress disorder)    Alcohol abuse, in remission    Cigarette nicotine dependence without complication    Tobacco abuse       Goals addressed in session: Goal 1 and Goal 2          Reason for visit is No chief complaint on file.       Encounter provider Isabel Watts LPC    Provider located at [facility] [place]  [facility] [place]  [address]  [place] [id number] [phone]      Recent Visits  Date Type Provider Dept   01/19/24 Telemedicine Isabel Watts LPC [facility] [place]   01/16/24 Telemedicine Isabel Watts LPC [facility] [place]   Showing recent visits within past 7 days and meeting all other requirements  Future Appointments  No visits were found meeting these conditions.  Showing future appointments within next 150 days and meeting all other requirements       The patient was identified by name and date of birth. Marie Portillo was informed that this is a telemedicine visit and that the visit is being conducted throughthe [employer] platform. She agrees to proceed..  My office door was closed. No one else was in the room.  She acknowledged consent and understanding of privacy and security of the video platform. The patient has agreed to participate and understands they can discontinue the visit at any time.    Patient is aware this is a billable service.     Subjective  Marie Portillo is a 58 y.o. female  .      HPI     Past Medical History:   Diagnosis Date    Bilateral carpal tunnel syndrome 08/20/2019    Carpal tunnel syndrome of right wrist 10/30/2019    Added  automatically from request for surgery [id number]    Chlamydia     COPD (chronic obstructive pulmonary disease) (HCC)     Depression     Hypertension     Varicella        Past Surgical History:   Procedure Laterality Date    BREAST BIOPSY Left 20yrs ago benign    EGD      OOPHORECTOMY Right     [place] NDSC WRST SURG W/RLS TRANSVRS CARPL LIGM Right 01/21/2020    Procedure: RELEASE CARPAL TUNNEL ENDOSCOPIC;  Surgeon: Sam Nguyen MD;  [facility];  Service: Orthopedics    [place] NDSC WRST SURG W/RLS TRANSVRS CARPL LIGM Left 01/28/2020    Procedure: RELEASE CARPAL TUNNEL ENDOSCOPIC;  Surgeon: Sam Nguyen MD;  [facility];  Service: Orthopedics       Current Outpatient Medications   Medication Sig Dispense Refill    albuterol (PROVENTIL HFA,VENTOLIN HFA) 90 mcg/act inhaler inhale 2 puffs by mouth and INTO THE LUNGS every 6 hours IF NEEDED FOR WHEEZING. 8.5 g 2    bisacodyl (DULCOLAX) 5 mg EC tablet Take as directed by GI office 2 tablet 0    buPROPion (WELLBUTRIN XL) 150 mg 24 hr tablet take 1 tablet by mouth once daily 30 tablet 1    cholecalciferol (VITAMIN D3) 400 units tablet Take 400 Units by mouth daily      fluticasone (FLONASE) 50 mcg/act nasal spray instill 1 spray into each nostril once daily 16 g 2    guaiFENesin (MUCINEX) 600 mg 12 hr tablet Take 2 tablets (1,200 mg total) by mouth every 12 (twelve) hours 60 tablet 3    ibuprofen (MOTRIN) 200 mg tablet Take 200 mg by mouth every 6 (six) hours as needed for mild pain      ipratropium-albuterol (DUO-NEB) 0.5-2.5 mg/3 mL nebulizer solution Take 3 mL by nebulization 4 (four) times a day 360 mL 3    lamoTRIgine (LaMICtal) 200 MG tablet take 1 tablet by mouth once daily 30 tablet 1    levocetirizine (XYZAL) 5 MG tablet take 1 tablet by mouth every evening 30 tablet 4    nicotine (NICODERM CQ) 21 mg/24 hr TD 24 hr patch Place 1 patch on the skin over 24 hours every 24 hours 28 patch 0    nicotine polacrilex (COMMIT) 4 MG lozenge Apply 1 lozenge  (4 mg total) to the mouth or throat as needed for smoking cessation 100 each 0    omeprazole (PriLOSEC) 20 mg delayed release capsule take 1 capsule by mouth twice a day 60 capsule 5    polyethylene glycol (GLYCOLAX) 17 GM/SCOOP powder Take as directed by GI office 527 g 0    senna-docusate sodium (SENOKOT-S) 8.6-50 mg per tablet Take 3 tablets by mouth as needed        Stiolto Respimat 2.5-2.5 MCG/ACT inhaler inhale 2 puffs by mouth and INTO THE LUNGS once daily 4 g 11     No current facility-administered medications for this visit.        No Known Allergies    Review of Systems    Video Exam    There were no vitals filed for this visit.    Physical Exam

## 2024-01-23 NOTE — PSYCH
Behavioral Health Psychotherapy Progress Note    Psychotherapy Provided: Individual Psychotherapy     1. Moderate mixed bipolar II disorder (HCC)        2. Generalized anxiety disorder        3. PTSD (post-traumatic stress disorder)        4. Tobacco abuse        5. Cigarette nicotine dependence without complication        6. Alcohol abuse, in remission            Goals addressed in session: Goal 1 and Goal 2     DATA: Marie was on the phone at first for better audio and then connected through the video for the remaining time. She discussed her recent health and dental issues that boiled down to her recently diagnosed Chiari malformation and neck/brain issues. Marie brought up her support system and was commended for identifying the sources and figures that provide meaningful reassurance and help that she needs due to not driving and having multiple appointments and help she has from her landlords of driving her. Marie was challenged to provide some examples of her recent notes from appointments.  During this session, this clinician used the following therapeutic modalities: Client-centered Therapy, Cognitive Behavioral Therapy, Dialectical Behavior Therapy, Mindfulness-based Strategies, and Supportive Psychotherapy    Substance Abuse was not addressed during this session. If the client is diagnosed with a co-occurring substance use disorder, please indicate any changes in the frequency or amount of use: NA except for the daily light smoking. Stage of change for addressing substance use diagnoses: Maintenance    ASSESSMENT:  Marie Portillo presents with a Euthymic/ normal mood.     her affect is Normal range and intensity, which is congruent, with her mood and the content of the session. The client has made progress on their goals.    Marie seemed worried for her health and the recently discovered complications in her spine and neck.  Marie Portillo presents with a low risk of suicide, low risk of self-harm, and none  "risk of harm to others.    For any risk assessment that surpasses a \"low\" rating, a safety plan must be developed.    A safety plan was indicated: no  If yes, describe in detail NA    PLAN: Between sessions, Marie Portillo will use her strengths and knowledge to attend to her symptoms ongoingly through catching up with notes about her everyday experience to avoid frustration from forgetfulness. At the next session, the therapist will use Client-centered Therapy, Cognitive Behavioral Therapy, Dialectical Behavior Therapy, Mindfulness-based Strategies, and Supportive Psychotherapy to address having notes for everyday experience.    Behavioral Health Treatment Plan and Discharge Planning: Marie Portillo is aware of and agrees to continue to work on their treatment plan. They have identified and are working toward their discharge goals. yes    Visit start and stop times:    01/23/24  Start Time: 0205  Stop Time: 0255  Total Visit Time: 50 minutes  "

## 2024-01-25 ENCOUNTER — TELEMEDICINE (OUTPATIENT)
Dept: PSYCHIATRY | Facility: CLINIC | Age: 59
End: 2024-01-25
Payer: COMMERCIAL

## 2024-01-25 DIAGNOSIS — F43.10 PTSD (POST-TRAUMATIC STRESS DISORDER): ICD-10-CM

## 2024-01-25 DIAGNOSIS — F31.32 BIPOLAR AFFECTIVE DISORDER, CURRENTLY DEPRESSED, MODERATE (HCC): Primary | ICD-10-CM

## 2024-01-25 DIAGNOSIS — F41.1 GENERALIZED ANXIETY DISORDER: ICD-10-CM

## 2024-01-25 PROCEDURE — 99214 OFFICE O/P EST MOD 30 MIN: CPT

## 2024-01-25 RX ORDER — BUPROPION HYDROCHLORIDE 300 MG/1
300 TABLET ORAL DAILY
Qty: 30 TABLET | Refills: 1 | Status: SHIPPED | OUTPATIENT
Start: 2024-01-25 | End: 2024-03-25

## 2024-01-25 RX ORDER — LAMOTRIGINE 200 MG/1
200 TABLET ORAL DAILY
Qty: 30 TABLET | Refills: 1 | Status: SHIPPED | OUTPATIENT
Start: 2024-01-25

## 2024-01-25 NOTE — BH CRISIS PLAN
Client Name: Marie Portillo       Client YOB: 1965    [facility] Safety Plan      Creation Date: 1/25/24 Update Date: 1/25/24      Step 1: Warning Signs:   Warning Signs   breathing increases   heart racing            Step 2: Internal Coping Strategies:   Internal Coping Strategies   Step away from situation   Find ways to distract self            Step 3: People and social settings that provide distraction:   Name Contact Information   Alaina Preston (Friend) [phone]   Peter (friend)     Places   Bedroom in house           Step 4: People whom I can ask for help during a crisis:      Name Contact Information    Alaina Preston (Friend)     Peter (friend)       Step 5: Professionals or agencies I can contact during a crisis:      Clinican/Agency Name Phone Emergency Contact    911 & 984          Crisis Phone Numbers:   Suicide Prevention Lifeline: Call or Text  988 Crisis Text Line: Text HOME to 350-883   Please note: Some [facility] do not have a separate number for Child/Adolescent specific crisis. If your county is not listed under Child/Adolescent, please call the adult number for your county      Adult Crisis Numbers: Child/Adolescent Crisis Numbers   [facility]: [phone] [facility]: [phone]   [facility]: [phone] [facility]: [phone]   [facility]: [phone] [place]: [phone]   [facility]: [phone] [place]: [phone]   [facility]: [phone]   [facility]: [phone]   [facility]: [phone]   [facility]: [phone] (daytime) [phone] (after hours, weekends, holidays)      Step 6: Making the environment safer (plan for lethal means safety):   Plan: Patient notes friend has weapons in house but pt has no access.     Optional: What is most important to me and worth living for?   Family (daughter, mom, etc.)     [facility] Safety Plan. Yaneth Cage and  Luis Fernando Ponce. Used with permission of the authors.       Virtual visit was with patient who gave verbal agreement to what was outlined above.
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
## 2024-01-25 NOTE — BH TREATMENT PLAN
TREATMENT PLAN        [facility]    Name and Date of Birth:  Marie Portillo 58 y.o. 1965  Date of Treatment Plan: January 25, 2024  Diagnosis/Diagnoses:    1. Bipolar affective disorder, currently depressed, moderate (HCC)    2. PTSD (post-traumatic stress disorder)    3. Generalized anxiety disorder        Strengths/Personal Resources for Self-Care: taking medications as prescribed, ability to listen, ability to reason, average or above intelligence, independence, motivation for treatment, ability to negotiate basic needs, self-reliance, sense of humor, special hobby/interest, willingness to work on problems, work skills    Area/Areas of need: mood swings    Long Term Goal: continue improvement in mood stability, anxiety symptoms  Target Date: 6 months - July 25, 2024  Person/Persons responsible for completion of goal: Keila Kumar DO     Short Term Objective (s) - How will we reach this goal?:   Take medications as prescribed  Attend psychiatry appointments regularly  Follow up with medical providers  Practice coping skills  Avoid alcohol   Avoid drugs   Eat a healthy diet   Exercise more   Take walks regularly  Spend more time with friends and family  Try breathing exercises  Try relaxation techniques  Target Date: 3 months - April 25, 2024  Person/Persons Responsible for Completion of Goal: Marie     Progress Towards Goals: Continuing treatment    Treatment Modality: medication management every 1-3 months as needed  Review due 180 days from date of this plan: July 23, 2024   Expected length of service: Ongoing treatment    My physician and I have developed this plan together, and I agree to work on the goals and objectives. I understand the treatment goals that were developed for my treatment.    The treatment plan was created between Clint Kumar DO and Marie Portillo on 01/25/24 at 2:00 PM could not be signed as patient was virtual. Patient gave  verbal agreement.

## 2024-01-25 NOTE — PATIENT INSTRUCTIONS
- Increase wellbutrin to 300mg XL daily  - Continue Lamictal 200mg daily    Sleep hygiene tips:    Keep a consistent bedtime and wake up time.  This will lead to a more regular sleep schedule and avoid periods of sleep deprivation or periods of extended wakefulness during the night.    Avoid watching TV in bed.  If needing a form of media to help with sleep - can try sleep aid podcasts such Sleep With Me - a free podcast that helps with sleep initiation    Avoid napping, especially naps lasting longer than 1 hour or naps late in the day, which will likely affect your ability to fall asleep that night.    Limit caffeine, avoiding caffeine after lunch to allow it to get out of your system and not affect your ability to fall asleep or the quality of your sleep.  I usually recommend avoiding caffeine at least 6 hours before bedstime    Limit alcohol, alcohol can be sedating but also activating as it metabolizes, causing you to awaken from sleep earlier than desired.  It can affect the quality of your sleep by not letting you get into the more refreshing stages of sleep.    Avoid nicotine, of course not smoking or vaping at all is best, but nicotine is a stimulant and should be avoided near bedtime and during the night    Exercise and daytime physical activity is encouraged, in particular 4-6 hours before bedtime, as this may help you to fall asleep more easily and quality of sleep is improved.  Rigorous exercise within 3 hours of bedtime is discouraged.    Keep the sleep environment quiet and dark - Noise and light exposure during the night can disrupt sleep.  White noise or ear plugs are often recommended to reduce noise.  Using black out shades or an eye mask is commonly recommended to reduce light.  This also includes avoiding exposure to television or technology near bedtime, as this can have an impact on circadian rhythms by shifting sleep time later.    Bedroom clock - Avoid checking the time at night  This  "includes alarm clocks and other time pieces such as watches and phones.  Checking the time increases cognitive arousal and prolongs wakefulness.    Evening eating - Avoid a large meal near bedtime, but don't go to bed hungry.  Eat a healthy and filling meal in the evening without over-eating and avoid late night snacks.    Insomnia tips:  With great difficulty falling asleep or with difficulty falling back asleep, laying in bed for a prolonged period time is not ideal.  This can increase worry and rumination (having racing thoughts, not able to \"turn off your brain\".  After what feels like 15 minutes, if you feel wide awake, worried, anxious, or can't clear your brain, it is better to leave the bedroom to do something relaxing , The typical recommendation is to read a boring book in dim light.  In general, if you leave the room, you want to do something that is relaxing, not stimulating. Avoid bright screens, doing work, doing chores, or anything that requires a lot of mental effort. Return to bed when you feel more calm, sleepy, or mentally clear.      There are some on-line resources that do require a fee that can be of help.  Some of which will require a fee.    Http://www.veterantraining.va.gov/apps/insomnia/index.html#dashboard (FREE)  Http://[employer]/cbt-online-insomnia-treatment.html  Http://www.Access Mobile/    Some apps that have helped people sleep:  Insomnia  (FREE)  CBT-I   Go! To Sleep by the [facility]  Sleepio    Please present for your previously scheduled appointment approximately 15 minutes prior to appointment time. If you are running late or are unable to attend your appointment, please call our [place] office at [phone] or our [place] office at [phone] if applicable to notify the office of your absence.    If you are in psychological crisis including not limited to suicidal/homicidal thoughts or thoughts of self-injury, do not hesitate to call/contact your " County Crisis hotline (see below) or attend to the nearest emergency department.  [facility] Crisis: [phone]  [facility] Crisis: [phone]  [facility] Crisis: [phone]  [facility] Crisis: [phone]  [facility] Crisis: [phone]  [facility] Crisis: [phone]  [facility] Crisis: [phone]  National Suicide Prevention Hotline: 1-709.516.7704

## 2024-01-26 ENCOUNTER — TELEPHONE (OUTPATIENT)
Dept: PSYCHIATRY | Facility: CLINIC | Age: 59
End: 2024-01-26

## 2024-01-30 ENCOUNTER — TELEPHONE (OUTPATIENT)
Dept: PSYCHIATRY | Facility: CLINIC | Age: 59
End: 2024-01-30

## 2024-01-30 ENCOUNTER — TELEPHONE (OUTPATIENT)
Age: 59
End: 2024-01-30

## 2024-01-30 ENCOUNTER — TELEMEDICINE (OUTPATIENT)
Dept: BEHAVIORAL/MENTAL HEALTH CLINIC | Facility: CLINIC | Age: 59
End: 2024-01-30
Payer: COMMERCIAL

## 2024-01-30 DIAGNOSIS — F10.11 ALCOHOL ABUSE, IN REMISSION: ICD-10-CM

## 2024-01-30 DIAGNOSIS — F17.210 CIGARETTE NICOTINE DEPENDENCE WITHOUT COMPLICATION: ICD-10-CM

## 2024-01-30 DIAGNOSIS — F31.81 MODERATE MIXED BIPOLAR II DISORDER (HCC): Primary | ICD-10-CM

## 2024-01-30 DIAGNOSIS — Z72.0 TOBACCO ABUSE: ICD-10-CM

## 2024-01-30 DIAGNOSIS — F43.10 PTSD (POST-TRAUMATIC STRESS DISORDER): ICD-10-CM

## 2024-01-30 DIAGNOSIS — F41.1 GENERALIZED ANXIETY DISORDER: ICD-10-CM

## 2024-01-30 DIAGNOSIS — F12.90 MARIJUANA USE: Chronic | ICD-10-CM

## 2024-01-30 PROCEDURE — 90834 PSYTX W PT 45 MINUTES: CPT | Performed by: COUNSELOR

## 2024-01-30 NOTE — PSYCH
"Behavioral Health Psychotherapy Progress Note    Psychotherapy Provided: Individual Psychotherapy     1. Moderate mixed bipolar II disorder (HCC)        2. Generalized anxiety disorder        3. PTSD (post-traumatic stress disorder)        4. Cigarette nicotine dependence without complication        5. Tobacco abuse        6. Marijuana use        7. Alcohol abuse, in remission            Goals addressed in session: Goal 1 and Goal 2     DATA: Marie reported \"having a bad day\" and shared her struggle with negative and intense emotions triggered form her interactions with her sister blaming her on not being there for her mother's surgery for which Marie thought it was on 31st, not 30th, and secondly, she could not be transported there. Marie was redirected throughout the session towards focus on her strengths, things to be grateful for, and more realistic self-beliefs, but she stayed with her negative mood most of the time.   During this session, this clinician used the following therapeutic modalities: Client-centered Therapy, Cognitive Behavioral Therapy, Dialectical Behavior Therapy, Mindfulness-based Strategies, and Supportive Psychotherapy    Substance Abuse was not addressed during this session. If the client is diagnosed with a co-occurring substance use disorder, please indicate any changes in the frequency or amount of use: NA. Stage of change for addressing substance use diagnoses: Maintenance    ASSESSMENT:  Marie Portillo presents with a Euthymic/ normal, Anxious, and Depressed mood.     her affect is Normal range and intensity and Tearful, which is congruent, with her mood and the content of the session. The client has made progress on their goals.    Marie seemed very upset about her sister's putting her down and was very self-loathing and not easily redirected towards her strengths or what she did right. Marie Portillo presents with a low risk of suicide, low risk of self-harm, and none risk of harm to " "others.    For any risk assessment that surpasses a \"low\" rating, a safety plan must be developed.    A safety plan was indicated: no  If yes, describe in detail NA    PLAN: Between sessions, Marie Portillo will use self-compassion and Mindfulness meditation to be able to attend to difficult emotions as an observer and in healthier ways. At the next session, the therapist will use Client-centered Therapy, Cognitive Behavioral Therapy, Dialectical Behavior Therapy, Mindfulness-based Strategies, and Supportive Psychotherapy to address self-care and negative self-talk    Behavioral Health Treatment Plan and Discharge Planning: Marie Portillo is aware of and agrees to continue to work on their treatment plan. They have identified and are working toward their discharge goals. yes    Visit start and stop times:    01/30/24  Start Time: 0200  Stop Time: 0238  Total Visit Time: 38 minutes  "

## 2024-01-30 NOTE — PSYCH
Virtual Regular Visit    Verification of patient location:    Patient is located at Home in the following state in which I hold an active license [place]      Assessment/Plan:    Problem List Items Addressed This Visit       Moderate mixed bipolar II disorder (HCC) - Primary    Generalized anxiety disorder    PTSD (post-traumatic stress disorder)    Alcohol abuse, in remission    Marijuana use (Chronic)    Cigarette nicotine dependence without complication    Tobacco abuse       Goals addressed in session: Goal 1 and Goal 2          Reason for visit is No chief complaint on file.       Encounter provider Isaebl Watts LPC    Provider located at [facility] [place]  [facility] [place]  [address]  [place] [id number] [phone]      Recent Visits  Date Type Provider Dept   01/26/24 Telephone Isabel Watts LPC [facility] [place]   01/23/24 Telemedicine Isabel Watts LPC [facility] [place]   Showing recent visits within past 7 days and meeting all other requirements  Future Appointments  No visits were found meeting these conditions.  Showing future appointments within next 150 days and meeting all other requirements       The patient was identified by name and date of birth. Marie Portillo was informed that this is a telemedicine visit and that the visit is being conducted throughthe [employer] platform. She agrees to proceed..  My office door was closed. No one else was in the room.  She acknowledged consent and understanding of privacy and security of the video platform. The patient has agreed to participate and understands they can discontinue the visit at any time.    Patient is aware this is a billable service.     Subjective  Marie Portillo is a 58 y.o. female  .      HPI     Past Medical History:   Diagnosis Date    Bilateral carpal tunnel syndrome 08/20/2019    Carpal tunnel syndrome of right wrist 10/30/2019     Added automatically from request for surgery [id number]    Chlamydia     COPD (chronic obstructive pulmonary disease) (HCC)     Depression     Hypertension     Varicella        Past Surgical History:   Procedure Laterality Date    BREAST BIOPSY Left 20yrs ago benign    EGD      OOPHORECTOMY Right     [facility] WRST SURG W/RLS TRANSVRS CARPL LIGM Right 01/21/2020    Procedure: RELEASE CARPAL TUNNEL ENDOSCOPIC;  Surgeon: Sam Nguyen MD;  [facility];  Service: Orthopedics    [facility] NDSC WRST SURG W/RLS TRANSVRS CARPL LIGM Left 01/28/2020    Procedure: RELEASE CARPAL TUNNEL ENDOSCOPIC;  Surgeon: Sam Nguyen MD;  [facility];  Service: Orthopedics       Current Outpatient Medications   Medication Sig Dispense Refill    albuterol (PROVENTIL HFA,VENTOLIN HFA) 90 mcg/act inhaler inhale 2 puffs by mouth and INTO THE LUNGS every 6 hours IF NEEDED FOR WHEEZING. 8.5 g 2    bisacodyl (DULCOLAX) 5 mg EC tablet Take as directed by GI office 2 tablet 0    buPROPion (WELLBUTRIN XL) 300 mg 24 hr tablet Take 1 tablet (300 mg total) by mouth daily 30 tablet 1    cholecalciferol (VITAMIN D3) 400 units tablet Take 400 Units by mouth daily      fluticasone (FLONASE) 50 mcg/act nasal spray instill 1 spray into each nostril once daily 16 g 2    guaiFENesin (MUCINEX) 600 mg 12 hr tablet Take 2 tablets (1,200 mg total) by mouth every 12 (twelve) hours 60 tablet 3    ibuprofen (MOTRIN) 200 mg tablet Take 200 mg by mouth every 6 (six) hours as needed for mild pain      ipratropium-albuterol (DUO-NEB) 0.5-2.5 mg/3 mL nebulizer solution Take 3 mL by nebulization 4 (four) times a day 360 mL 3    lamoTRIgine (LaMICtal) 200 MG tablet Take 1 tablet (200 mg total) by mouth daily 30 tablet 1    levocetirizine (XYZAL) 5 MG tablet take 1 tablet by mouth every evening 30 tablet 4    nicotine (NICODERM CQ) 21 mg/24 hr TD 24 hr patch Place 1 patch on the skin over 24 hours every 24 hours 28 patch 0    nicotine polacrilex (COMMIT)  4 MG lozenge Apply 1 lozenge (4 mg total) to the mouth or throat as needed for smoking cessation 100 each 0    omeprazole (PriLOSEC) 20 mg delayed release capsule take 1 capsule by mouth twice a day 60 capsule 5    polyethylene glycol (GLYCOLAX) 17 GM/SCOOP powder Take as directed by GI office 527 g 0    senna-docusate sodium (SENOKOT-S) 8.6-50 mg per tablet Take 3 tablets by mouth as needed        Stiolto Respimat 2.5-2.5 MCG/ACT inhaler inhale 2 puffs by mouth and INTO THE LUNGS once daily 4 g 11     No current facility-administered medications for this visit.        No Known Allergies    Review of Systems    Video Exam    There were no vitals filed for this visit.    Physical Exam

## 2024-01-30 NOTE — TELEPHONE ENCOUNTER
Scheduled date of colonoscopy (as of today):02/20/2024  Physician performing colonoscopy:Stefani  Location of colonoscopy:[place]  Bowel prep reviewed with patient:Miralax/Dulcoalx  Instructions reviewed with patient by:Radha  Clearances: None

## 2024-02-02 ENCOUNTER — TELEMEDICINE (OUTPATIENT)
Dept: BEHAVIORAL/MENTAL HEALTH CLINIC | Facility: CLINIC | Age: 59
End: 2024-02-02
Payer: COMMERCIAL

## 2024-02-02 DIAGNOSIS — F41.1 GENERALIZED ANXIETY DISORDER: ICD-10-CM

## 2024-02-02 DIAGNOSIS — F12.90 MARIJUANA USE: Chronic | ICD-10-CM

## 2024-02-02 DIAGNOSIS — F10.11 ALCOHOL ABUSE, IN REMISSION: ICD-10-CM

## 2024-02-02 DIAGNOSIS — F43.10 PTSD (POST-TRAUMATIC STRESS DISORDER): ICD-10-CM

## 2024-02-02 DIAGNOSIS — F31.81 MODERATE MIXED BIPOLAR II DISORDER (HCC): Primary | ICD-10-CM

## 2024-02-02 DIAGNOSIS — Z72.0 TOBACCO ABUSE: ICD-10-CM

## 2024-02-02 PROCEDURE — 90853 GROUP PSYCHOTHERAPY: CPT | Performed by: COUNSELOR

## 2024-02-02 NOTE — PSYCH
Virtual Regular Visit    Verification of patient location:    Patient is located at Home in the following state in which I hold an active license [place]      Assessment/Plan:    Problem List Items Addressed This Visit       Moderate mixed bipolar II disorder (HCC) - Primary    Generalized anxiety disorder    PTSD (post-traumatic stress disorder)    Alcohol abuse, in remission    Marijuana use (Chronic)    Tobacco abuse       Goals addressed in session: Goal 1 and Goal 2          Reason for visit is No chief complaint on file.       Encounter provider Isabel Watts LPC    Provider located at [facility] [place]  [facility] [place]  [address]  [place] [id number] [phone]      Recent Visits  Date Type Provider Dept   01/30/24 Telemedicine Isabel [facility] LPC [facility] [place]   01/26/24 Telephone Isabel Watts LPC [facility] [place]   Showing recent visits within past 7 days and meeting all other requirements  Future Appointments  No visits were found meeting these conditions.  Showing future appointments within next 150 days and meeting all other requirements       The patient was identified by name and date of birth. Marie Portillo was informed that this is a telemedicine visit and that the visit is being conducted throughthe Microsoft Teams platform. She agrees to proceed..  My office door was closed. The patient was notified the following individuals were present in the room other virtual group members.  She acknowledged consent and understanding of privacy and security of the video platform. The patient has agreed to participate and understands they can discontinue the visit at any time.    Patient is aware this is a billable service.     Subjective  Marie Portillo is a 58 y.o. female  .    Data: Marie attended the Relaxation group and was guided through EFT tapping on trauma, diaphragmatic breathing, other  Vagus nerve stimulation techniques, and progressive muscle relaxation. Furthermore, attendees were guided through mindfulness meditation  for a quick body scan transitioning to imagery [place] [employer] for meditation with nature elements and attending/observing judgment with awareness, finishing with affirmations for gratitude and self-forgiveness. Brief psychoeducation, EFT, Mindfulness-based, and Somatic approaches were used to achieve the deep state of relaxation and to maintain the serene environment for the exercise's purpose.      Assessment: Marie was in euthymic mood and in congruence with broad affect, followed through guidance and completed the exercises well, showed willingness to continue learning and practicing self-care in weekly Relaxation Group as well as individual therapy.      Plan: Marie will practice deep breathing and other techniques based on each occasion to counter stress and will attend the next group to be able to take part in further guided exercises on chosen topics to build on the techniques learned in the beginning.    HPI     Past Medical History:   Diagnosis Date    Bilateral carpal tunnel syndrome 08/20/2019    Carpal tunnel syndrome of right wrist 10/30/2019    Added automatically from request for surgery [id number]    Chlamydia     COPD (chronic obstructive pulmonary disease) (HCC)     Depression     Hypertension     Varicella        Past Surgical History:   Procedure Laterality Date    BREAST BIOPSY Left 20yrs ago benign    EGD      OOPHORECTOMY Right     [facility] NDSC WRST SURG W/RLS TRANSVRS CARPL LIGM Right 01/21/2020    Procedure: RELEASE CARPAL TUNNEL ENDOSCOPIC;  Surgeon: Sam Nguyen MD;  [facility];  Service: Orthopedics    [facility] NDSC WRST SURG W/RLS TRANSVRS CARPL LIGM Left 01/28/2020    Procedure: RELEASE CARPAL TUNNEL ENDOSCOPIC;  Surgeon: Sam Nguyen MD;  [facility];  Service: Orthopedics       Current Outpatient Medications   Medication Sig Dispense  Refill    albuterol (PROVENTIL HFA,VENTOLIN HFA) 90 mcg/act inhaler inhale 2 puffs by mouth and INTO THE LUNGS every 6 hours IF NEEDED FOR WHEEZING. 8.5 g 2    bisacodyl (DULCOLAX) 5 mg EC tablet Take as directed by GI office 2 tablet 0    buPROPion (WELLBUTRIN XL) 300 mg 24 hr tablet Take 1 tablet (300 mg total) by mouth daily 30 tablet 1    cholecalciferol (VITAMIN D3) 400 units tablet Take 400 Units by mouth daily      fluticasone (FLONASE) 50 mcg/act nasal spray instill 1 spray into each nostril once daily 16 g 2    guaiFENesin (MUCINEX) 600 mg 12 hr tablet Take 2 tablets (1,200 mg total) by mouth every 12 (twelve) hours 60 tablet 3    ibuprofen (MOTRIN) 200 mg tablet Take 200 mg by mouth every 6 (six) hours as needed for mild pain      ipratropium-albuterol (DUO-NEB) 0.5-2.5 mg/3 mL nebulizer solution Take 3 mL by nebulization 4 (four) times a day 360 mL 3    lamoTRIgine (LaMICtal) 200 MG tablet Take 1 tablet (200 mg total) by mouth daily 30 tablet 1    levocetirizine (XYZAL) 5 MG tablet take 1 tablet by mouth every evening 30 tablet 4    nicotine (NICODERM CQ) 21 mg/24 hr TD 24 hr patch Place 1 patch on the skin over 24 hours every 24 hours 28 patch 0    nicotine polacrilex (COMMIT) 4 MG lozenge Apply 1 lozenge (4 mg total) to the mouth or throat as needed for smoking cessation 100 each 0    omeprazole (PriLOSEC) 20 mg delayed release capsule take 1 capsule by mouth twice a day 60 capsule 5    polyethylene glycol (GLYCOLAX) 17 GM/SCOOP powder Take as directed by GI office 527 g 0    senna-docusate sodium (SENOKOT-S) 8.6-50 mg per tablet Take 3 tablets by mouth as needed        Stiolto Respimat 2.5-2.5 MCG/ACT inhaler inhale 2 puffs by mouth and INTO THE LUNGS once daily 4 g 11     No current facility-administered medications for this visit.        No Known Allergies    Review of Systems    Video Exam    There were no vitals filed for this visit.    Physical Exam     02/02/24  Start Time: 0400  Stop Time:  0500  Total Visit Time: 60 minutes

## 2024-02-06 ENCOUNTER — TELEMEDICINE (OUTPATIENT)
Dept: BEHAVIORAL/MENTAL HEALTH CLINIC | Facility: CLINIC | Age: 59
End: 2024-02-06
Payer: COMMERCIAL

## 2024-02-06 DIAGNOSIS — F41.1 GENERALIZED ANXIETY DISORDER: ICD-10-CM

## 2024-02-06 DIAGNOSIS — F31.81 MODERATE MIXED BIPOLAR II DISORDER (HCC): Primary | ICD-10-CM

## 2024-02-06 DIAGNOSIS — F43.10 PTSD (POST-TRAUMATIC STRESS DISORDER): ICD-10-CM

## 2024-02-06 DIAGNOSIS — F12.90 MARIJUANA USE: Chronic | ICD-10-CM

## 2024-02-06 DIAGNOSIS — F10.11 ALCOHOL ABUSE, IN REMISSION: ICD-10-CM

## 2024-02-06 DIAGNOSIS — F17.210 CIGARETTE NICOTINE DEPENDENCE WITHOUT COMPLICATION: ICD-10-CM

## 2024-02-06 DIAGNOSIS — Z72.0 TOBACCO ABUSE: ICD-10-CM

## 2024-02-06 PROCEDURE — 90834 PSYTX W PT 45 MINUTES: CPT | Performed by: COUNSELOR

## 2024-02-06 NOTE — PSYCH
Virtual Regular Visit    Verification of patient location:    Patient is located at Home in the following state in which I hold an active license [place]      Assessment/Plan:    Problem List Items Addressed This Visit       Moderate mixed bipolar II disorder (HCC) - Primary    Generalized anxiety disorder    PTSD (post-traumatic stress disorder)    Alcohol abuse, in remission    Marijuana use (Chronic)    Cigarette nicotine dependence without complication    Tobacco abuse       Goals addressed in session: Goal 1 and Goal 2          Reason for visit is No chief complaint on file.       Encounter provider Isabel Watts LPC    Provider located at [facility] [place]  [facility] [place]  [place] [id number] [phone]      Recent Visits  Date Type Provider Dept   02/02/24 Telemedicine Isabel Watts LPC [facility] [place]   01/30/24 Telemedicine Isabel Watts LPC [facility] [place]   Showing recent visits within past 7 days and meeting all other requirements  Future Appointments  No visits were found meeting these conditions.  Showing future appointments within next 150 days and meeting all other requirements       The patient was identified by name and date of birth. Marie Portillo was informed that this is a telemedicine visit and that the visit is being conducted throughthe [employer] platform. She agrees to proceed..  My office door was closed. No one else was in the room.  She acknowledged consent and understanding of privacy and security of the video platform. The patient has agreed to participate and understands they can discontinue the visit at any time.    Patient is aware this is a billable service.     Subjective  Marie Portillo is a 58 y.o. female  .      HPI     Past Medical History:   Diagnosis Date    Bilateral carpal tunnel syndrome 08/20/2019    Carpal tunnel syndrome of right  wrist 10/30/2019    Added automatically from request for surgery [id number]    Chlamydia     COPD (chronic obstructive pulmonary disease) (HCC)     Depression     Hypertension     Varicella        Past Surgical History:   Procedure Laterality Date    BREAST BIOPSY Left 20yrs ago benign    EGD      OOPHORECTOMY Right     [place] NDSC WRST SURG W/RLS TRANSVRS CARPL LIGM Right 01/21/2020    Procedure: RELEASE CARPAL TUNNEL ENDOSCOPIC;  Surgeon: Sam Nguyen MD;  [facility];  Service: Orthopedics    [place] NDSC WRST SURG W/RLS TRANSVRS CARPL LIGM Left 01/28/2020    Procedure: RELEASE CARPAL TUNNEL ENDOSCOPIC;  Surgeon: Sam Nguyen MD;  [facility];  Service: Orthopedics       Current Outpatient Medications   Medication Sig Dispense Refill    albuterol (PROVENTIL HFA,VENTOLIN HFA) 90 mcg/act inhaler inhale 2 puffs by mouth and INTO THE LUNGS every 6 hours IF NEEDED FOR WHEEZING. 8.5 g 2    bisacodyl (DULCOLAX) 5 mg EC tablet Take as directed by GI office 2 tablet 0    buPROPion (WELLBUTRIN XL) 300 mg 24 hr tablet Take 1 tablet (300 mg total) by mouth daily 30 tablet 1    cholecalciferol (VITAMIN D3) 400 units tablet Take 400 Units by mouth daily      fluticasone (FLONASE) 50 mcg/act nasal spray instill 1 spray into each nostril once daily 16 g 2    guaiFENesin (MUCINEX) 600 mg 12 hr tablet Take 2 tablets (1,200 mg total) by mouth every 12 (twelve) hours 60 tablet 3    ibuprofen (MOTRIN) 200 mg tablet Take 200 mg by mouth every 6 (six) hours as needed for mild pain      ipratropium-albuterol (DUO-NEB) 0.5-2.5 mg/3 mL nebulizer solution Take 3 mL by nebulization 4 (four) times a day 360 mL 3    lamoTRIgine (LaMICtal) 200 MG tablet Take 1 tablet (200 mg total) by mouth daily 30 tablet 1    levocetirizine (XYZAL) 5 MG tablet take 1 tablet by mouth every evening 30 tablet 4    nicotine (NICODERM CQ) 21 mg/24 hr TD 24 hr patch Place 1 patch on the skin over 24 hours every 24 hours 28 patch 0    nicotine  polacrilex (COMMIT) 4 MG lozenge Apply 1 lozenge (4 mg total) to the mouth or throat as needed for smoking cessation 100 each 0    omeprazole (PriLOSEC) 20 mg delayed release capsule take 1 capsule by mouth twice a day 60 capsule 5    polyethylene glycol (GLYCOLAX) 17 GM/SCOOP powder Take as directed by GI office 527 g 0    senna-docusate sodium (SENOKOT-S) 8.6-50 mg per tablet Take 3 tablets by mouth as needed        Stiolto Respimat 2.5-2.5 MCG/ACT inhaler inhale 2 puffs by mouth and INTO THE LUNGS once daily 4 g 11     No current facility-administered medications for this visit.        No Known Allergies    Review of Systems    Video Exam    There were no vitals filed for this visit.    Physical Exam

## 2024-02-06 NOTE — PSYCH
Behavioral Health Psychotherapy Progress Note    Psychotherapy Provided: Individual Psychotherapy     1. Moderate mixed bipolar II disorder (HCC)        2. Generalized anxiety disorder        3. PTSD (post-traumatic stress disorder)        4. Cigarette nicotine dependence without complication        5. Marijuana use        6. Alcohol abuse, in remission        7. Tobacco abuse            Goals addressed in session: Goal 1 and Goal 2     DATA: Marie discussed her forgetfulness and was encouraged to present her notes and sticky notes from the last few days, while she admitted that she did not take notes and was challenged to remember her agreement and commitment to avoid being upset and missing important meetings, appointments, and timings. Marie addressed her relief from the last conversation with mother and sister, after she apologized to them and made her point heard and validated.   During this session, this clinician used the following therapeutic modalities: Client-centered Therapy, Cognitive Behavioral Therapy, Dialectical Behavior Therapy, Mindfulness-based Strategies, and Supportive Psychotherapy    Substance Abuse was addressed during this session. If the client is diagnosed with a co-occurring substance use disorder, please indicate any changes in the frequency or amount of use: light daily smoking. Stage of change for addressing substance use diagnoses: Pre-contemplation    ASSESSMENT:  Marie Portillo presents with a Euthymic/ normal, Anxious, and Depressed mood.     her affect is Normal range and intensity and Tearful, at times, which is congruent, with her mood and the content of the session. The client has made progress on their goals.    Marie seemed in a ballesteros, while preparing to go for in-office visit with an ophthalmologist and was distracted but managed to focus on her most important topics.Marie Portillo presents with a low risk of suicide, low risk of self-harm, and none risk of harm to others.    For  "any risk assessment that surpasses a \"low\" rating, a safety plan must be developed.    A safety plan was indicated: no  If yes, describe in detail NA    PLAN: Between sessions, Marie Portillo will use her learned Mindfulness skills to attend to difficult emotions as an observer and will go over her notes from the DBT program for additional skills. At the next session, the therapist will use Client-centered Therapy, Cognitive Behavioral Therapy, Dialectical Behavior Therapy, Mindfulness-based Strategies, and Supportive Psychotherapy to address self-care and taking notes regularly.    Behavioral Health Treatment Plan and Discharge Planning: Marie Portillo is aware of and agrees to continue to work on their treatment plan. They have identified and are working toward their discharge goals. yes    Visit start and stop times:    02/06/24  Start Time: 0156  Stop Time: 0237  Total Visit Time: 41 minutes  "

## 2024-02-07 ENCOUNTER — TELEPHONE (OUTPATIENT)
Age: 59
End: 2024-02-07

## 2024-02-07 NOTE — TELEPHONE ENCOUNTER
Cancelled og colon on 2/20 with egd dx. Used anc orders and changed dx to screening for colon cancer as patient passed OA questions.     ----- Message from Basilia Plasencia sent at 2/7/2024  7:37 AM EST -----  Regarding: colon dx  Good Morning Diamond,  This pt is scheduled for a colonoscopy however has a dx for egd. Please review chart and adjust accordingly.  Thank you! Ana Maria

## 2024-02-09 ENCOUNTER — TELEMEDICINE (OUTPATIENT)
Dept: BEHAVIORAL/MENTAL HEALTH CLINIC | Facility: CLINIC | Age: 59
End: 2024-02-09
Payer: COMMERCIAL

## 2024-02-09 DIAGNOSIS — F17.210 CIGARETTE NICOTINE DEPENDENCE WITHOUT COMPLICATION: ICD-10-CM

## 2024-02-09 DIAGNOSIS — F10.11 ALCOHOL ABUSE, IN REMISSION: ICD-10-CM

## 2024-02-09 DIAGNOSIS — F41.1 GENERALIZED ANXIETY DISORDER: ICD-10-CM

## 2024-02-09 DIAGNOSIS — Z72.0 TOBACCO ABUSE: ICD-10-CM

## 2024-02-09 DIAGNOSIS — F43.10 PTSD (POST-TRAUMATIC STRESS DISORDER): ICD-10-CM

## 2024-02-09 DIAGNOSIS — F12.90 MARIJUANA USE: Chronic | ICD-10-CM

## 2024-02-09 DIAGNOSIS — F31.81 MODERATE MIXED BIPOLAR II DISORDER (HCC): Primary | ICD-10-CM

## 2024-02-09 PROCEDURE — 90853 GROUP PSYCHOTHERAPY: CPT | Performed by: COUNSELOR

## 2024-02-09 NOTE — PSYCH
Virtual Regular Visit    Verification of patient location:    Patient is located at Home in the following state in which I hold an active license [place]      Assessment/Plan:    Problem List Items Addressed This Visit       Moderate mixed bipolar II disorder (HCC) - Primary    Generalized anxiety disorder    PTSD (post-traumatic stress disorder)    Alcohol abuse, in remission    Marijuana use (Chronic)    Cigarette nicotine dependence without complication    Tobacco abuse       Goals addressed in session: Goal 1 and Goal 2          Reason for visit is No chief complaint on file.       Encounter provider Isabel Watts LPC    Provider located at [facility]  [facility] [place]  [address]  [place] [id number] [phone]      Recent Visits  Date Type Provider Dept   02/06/24 Telemedicine Isabel Watts LPC [facility] [place]   02/02/24 Telemedicine Isabel Watts LPC [facility] [place]   Showing recent visits within past 7 days and meeting all other requirements  Future Appointments  No visits were found meeting these conditions.  Showing future appointments within next 150 days and meeting all other requirements       The patient was identified by name and date of birth. Marie Portillo was informed that this is a telemedicine visit and that the visit is being conducted throughthe Microsoft Teams platform. She agrees to proceed..  My office door was closed. The patient was notified the following individuals were present in the room other virtual group attendees.  She acknowledged consent and understanding of privacy and security of the video platform. The patient has agreed to participate and understands they can discontinue the visit at any time.    Patient is aware this is a billable service.     Subjective  Marie Portillo is a 58 y.o. female  .      HPI     Past Medical History:   Diagnosis Date     Bilateral carpal tunnel syndrome 08/20/2019    Carpal tunnel syndrome of right wrist 10/30/2019    Added automatically from request for surgery [id number]    Chlamydia     COPD (chronic obstructive pulmonary disease) (HCC)     Depression     Hypertension     Varicella        Past Surgical History:   Procedure Laterality Date    BREAST BIOPSY Left 20yrs ago benign    EGD      OOPHORECTOMY Right     CT NDSC WRST SURG W/RLS TRANSVRS CARPL LIGM Right 01/21/2020    Procedure: RELEASE CARPAL TUNNEL ENDOSCOPIC;  Surgeon: Sam Nguyen MD;  [facility];  Service: Orthopedics    CT NDSC WRST SURG W/RLS TRANSVRS CARPL LIGM Left 01/28/2020    Procedure: RELEASE CARPAL TUNNEL ENDOSCOPIC;  Surgeon: Sam Nguyen MD;  [facility];  Service: Orthopedics       Current Outpatient Medications   Medication Sig Dispense Refill    albuterol (PROVENTIL HFA,VENTOLIN HFA) 90 mcg/act inhaler inhale 2 puffs by mouth and INTO THE LUNGS every 6 hours IF NEEDED FOR WHEEZING. 8.5 g 2    bisacodyl (DULCOLAX) 5 mg EC tablet Take as directed by GI office 2 tablet 0    buPROPion (WELLBUTRIN XL) 300 mg 24 hr tablet Take 1 tablet (300 mg total) by mouth daily 30 tablet 1    cholecalciferol (VITAMIN D3) 400 units tablet Take 400 Units by mouth daily      fluticasone (FLONASE) 50 mcg/act nasal spray instill 1 spray into each nostril once daily 16 g 2    guaiFENesin (MUCINEX) 600 mg 12 hr tablet Take 2 tablets (1,200 mg total) by mouth every 12 (twelve) hours 60 tablet 3    ibuprofen (MOTRIN) 200 mg tablet Take 200 mg by mouth every 6 (six) hours as needed for mild pain      ipratropium-albuterol (DUO-NEB) 0.5-2.5 mg/3 mL nebulizer solution Take 3 mL by nebulization 4 (four) times a day 360 mL 3    lamoTRIgine (LaMICtal) 200 MG tablet Take 1 tablet (200 mg total) by mouth daily 30 tablet 1    levocetirizine (XYZAL) 5 MG tablet take 1 tablet by mouth every evening 30 tablet 4    nicotine (NICODERM CQ) 21 mg/24 hr TD 24 hr patch  Place 1 patch on the skin over 24 hours every 24 hours 28 patch 0    nicotine polacrilex (COMMIT) 4 MG lozenge Apply 1 lozenge (4 mg total) to the mouth or throat as needed for smoking cessation 100 each 0    omeprazole (PriLOSEC) 20 mg delayed release capsule take 1 capsule by mouth twice a day 60 capsule 5    polyethylene glycol (GLYCOLAX) 17 GM/SCOOP powder Take as directed by GI office 527 g 0    senna-docusate sodium (SENOKOT-S) 8.6-50 mg per tablet Take 3 tablets by mouth as needed        Stiolto Respimat 2.5-2.5 MCG/ACT inhaler inhale 2 puffs by mouth and INTO THE LUNGS once daily 4 g 11     No current facility-administered medications for this visit.        No Known Allergies    Review of Systems    Video Exam    There were no vitals filed for this visit.    Physical Exam     02/09/24  Start Time: 0400  Stop Time: 0500  Total Visit Time: 60 minutes

## 2024-02-09 NOTE — PSYCH
Virtual Regular Visit    Verification of patient location:    Patient is located at Home in the following state in which I hold an active license [place]      Assessment/Plan:    Problem List Items Addressed This Visit       Moderate mixed bipolar II disorder (HCC) - Primary    Generalized anxiety disorder    PTSD (post-traumatic stress disorder)    Alcohol abuse, in remission    Marijuana use (Chronic)    Cigarette nicotine dependence without complication    Tobacco abuse       Goals addressed in session: Goal 1 and Goal 2          Reason for visit is   Chief Complaint   Patient presents with    Virtual Regular Visit          Encounter provider Isabel Watts LPC    Provider located at [facility]  [facility] [place] [address]  [place] [id number] [phone]      Recent Visits  Date Type Provider Dept   02/06/24 Telemedicine Isabel Watts LPC [facility] [place]   02/02/24 Telemedicine Isabel Watts LPC [facility] [place]   Showing recent visits within past 7 days and meeting all other requirements  Today's Visits  Date Type Provider Dept   02/09/24 Telemedicine Isabel Watts LPC [facility] [place]   Showing today's visits and meeting all other requirements  Future Appointments  No visits were found meeting these conditions.  Showing future appointments within next 150 days and meeting all other requirements       The patient was identified by name and date of birth. Marie Portillo was informed that this is a telemedicine visit and that the visit is being conducted throughthe Microsoft Teams platform. She agrees to proceed..  My office door was closed. The patient was notified the following individuals were present in the room other group members.  She acknowledged consent and understanding of privacy and security of the video platform. The patient has agreed to  participate and understands they can discontinue the visit at any time.    Patient is aware this is a billable service.     Subjective  Marie Portillo is a 58 y.o. female  .    Data: Marie attended the Relaxation group and was guided through EFT tapping on Pain, diaphragmatic breathing, other Vagus nerve stimulation techniques, and progressive muscle relaxation. Furthermore, attendees were guided through mindfulness meditation  for a quick body scan for pain transitioning to Mindfulness meditation on attending to pain with imagery, giving oneself loving kindness, and finishing with affirmations for self-compassion, unconditional love, and self-confidence. Brief psychoeducation, EFT, Mindfulness-based, and Somatic approaches were used to achieve the deep state of relaxation and to maintain the serene environment for the exercise's purpose.      Assessment: Marie was in euthymic mood and in congruence with broad affect, followed through guidance and completed the exercises well, showed willingness to continue learning and practicing self-care in weekly Relaxation Group as well as individual therapy.      Plan: Marie will practice deep breathing and other techniques based on each occasion to counter stress and will attend the next group to be able to take part in further guided exercises on chosen topics to build on the techniques learned in the beginning.       HPI     Past Medical History:   Diagnosis Date    Bilateral carpal tunnel syndrome 08/20/2019    Carpal tunnel syndrome of right wrist 10/30/2019    Added automatically from request for surgery [id number]    Chlamydia     COPD (chronic obstructive pulmonary disease) (HCC)     Depression     Hypertension     Varicella        Past Surgical History:   Procedure Laterality Date    BREAST BIOPSY Left 20yrs ago benign    EGD      OOPHORECTOMY Right     MD NDSC WRST SURG W/RLS TRANSVRS CARPL LIGM Right 01/21/2020    Procedure: RELEASE CARPAL TUNNEL ENDOSCOPIC;  Surgeon:  Sam Nguyen MD;  [facility];  Service: Orthopedics    [facility] NDSC WRST SURG W/RLS TRANSVRS CARPL LIGM Left 01/28/2020    Procedure: RELEASE CARPAL TUNNEL ENDOSCOPIC;  Surgeon: Sam Nguyen MD;  [facility];  Service: Orthopedics       Current Outpatient Medications   Medication Sig Dispense Refill    albuterol (PROVENTIL HFA,VENTOLIN HFA) 90 mcg/act inhaler inhale 2 puffs by mouth and INTO THE LUNGS every 6 hours IF NEEDED FOR WHEEZING. 8.5 g 2    bisacodyl (DULCOLAX) 5 mg EC tablet Take as directed by GI office 2 tablet 0    buPROPion (WELLBUTRIN XL) 300 mg 24 hr tablet Take 1 tablet (300 mg total) by mouth daily 30 tablet 1    cholecalciferol (VITAMIN D3) 400 units tablet Take 400 Units by mouth daily      fluticasone (FLONASE) 50 mcg/act nasal spray instill 1 spray into each nostril once daily 16 g 2    guaiFENesin (MUCINEX) 600 mg 12 hr tablet Take 2 tablets (1,200 mg total) by mouth every 12 (twelve) hours 60 tablet 3    ibuprofen (MOTRIN) 200 mg tablet Take 200 mg by mouth every 6 (six) hours as needed for mild pain      ipratropium-albuterol (DUO-NEB) 0.5-2.5 mg/3 mL nebulizer solution Take 3 mL by nebulization 4 (four) times a day 360 mL 3    lamoTRIgine (LaMICtal) 200 MG tablet Take 1 tablet (200 mg total) by mouth daily 30 tablet 1    levocetirizine (XYZAL) 5 MG tablet take 1 tablet by mouth every evening 30 tablet 4    nicotine (NICODERM CQ) 21 mg/24 hr TD 24 hr patch Place 1 patch on the skin over 24 hours every 24 hours 28 patch 0    nicotine polacrilex (COMMIT) 4 MG lozenge Apply 1 lozenge (4 mg total) to the mouth or throat as needed for smoking cessation 100 each 0    omeprazole (PriLOSEC) 20 mg delayed release capsule take 1 capsule by mouth twice a day 60 capsule 5    polyethylene glycol (GLYCOLAX) 17 GM/SCOOP powder Take as directed by GI office 527 g 0    senna-docusate sodium (SENOKOT-S) 8.6-50 mg per tablet Take 3 tablets by mouth as needed        Stiolto Respimat  2.5-2.5 MCG/ACT inhaler inhale 2 puffs by mouth and INTO THE LUNGS once daily 4 g 11     No current facility-administered medications for this visit.        No Known Allergies    Review of Systems    Video Exam    There were no vitals filed for this visit.    Physical Exam     02/10/24  Start Time: 0400  Stop Time: 0500  Total Visit Time: 60 minutes

## 2024-02-13 ENCOUNTER — TELEMEDICINE (OUTPATIENT)
Dept: BEHAVIORAL/MENTAL HEALTH CLINIC | Facility: CLINIC | Age: 59
End: 2024-02-13
Payer: COMMERCIAL

## 2024-02-13 DIAGNOSIS — F43.10 PTSD (POST-TRAUMATIC STRESS DISORDER): ICD-10-CM

## 2024-02-13 DIAGNOSIS — F12.90 MARIJUANA USE: Chronic | ICD-10-CM

## 2024-02-13 DIAGNOSIS — Z72.0 TOBACCO ABUSE: ICD-10-CM

## 2024-02-13 DIAGNOSIS — F17.210 CIGARETTE NICOTINE DEPENDENCE WITHOUT COMPLICATION: ICD-10-CM

## 2024-02-13 DIAGNOSIS — F31.81 MODERATE MIXED BIPOLAR II DISORDER (HCC): Primary | ICD-10-CM

## 2024-02-13 DIAGNOSIS — F10.11 ALCOHOL ABUSE, IN REMISSION: ICD-10-CM

## 2024-02-13 DIAGNOSIS — F41.1 GENERALIZED ANXIETY DISORDER: ICD-10-CM

## 2024-02-13 PROCEDURE — 90834 PSYTX W PT 45 MINUTES: CPT | Performed by: COUNSELOR

## 2024-02-13 NOTE — PSYCH
Virtual Regular Visit    Verification of patient location:    Patient is located at Home in the following state in which I hold an active license [place]      Assessment/Plan:    Problem List Items Addressed This Visit       Moderate mixed bipolar II disorder (HCC) - Primary    Generalized anxiety disorder    PTSD (post-traumatic stress disorder)    Alcohol abuse, in remission    Marijuana use (Chronic)    Cigarette nicotine dependence without complication    Tobacco abuse       Goals addressed in session: Goal 1 and Goal 2          Reason for visit is No chief complaint on file.       Encounter provider Isabel Watts LPC    Provider located at [facility] [place]  [facility] [place] [id number] [phone]      Recent Visits  Date Type Provider Dept   02/09/24 Telemedicine Isabel Watts LPC [facility] [place]   02/06/24 Telemedicine Isabel Watts LPC [facility] [place]   Showing recent visits within past 7 days and meeting all other requirements  Future Appointments  No visits were found meeting these conditions.  Showing future appointments within next 150 days and meeting all other requirements       The patient was identified by name and date of birth. Marie Portillo was informed that this is a telemedicine visit and that the visit is being conducted throughthe Epic Embedded platform. She agrees to proceed..  My office door was closed. No one else was in the room.  She acknowledged consent and understanding of privacy and security of the video platform. The patient has agreed to participate and understands they can discontinue the visit at any time.    Patient is aware this is a billable service.     Subjective  Marie Portillo is a 58 y.o. female  .      HPI     Past Medical History:   Diagnosis Date    Bilateral carpal tunnel syndrome 08/20/2019    Carpal tunnel syndrome of right  wrist 10/30/2019    Added automatically from request for surgery [id number]    Chlamydia     COPD (chronic obstructive pulmonary disease) (HCC)     Depression     Hypertension     Varicella        Past Surgical History:   Procedure Laterality Date    BREAST BIOPSY Left 20yrs ago benign    EGD      OOPHORECTOMY Right     [facility] NDSC WRST SURG W/RLS TRANSVRS CARPL LIGM Right 01/21/2020    Procedure: RELEASE CARPAL TUNNEL ENDOSCOPIC;  Surgeon: Sam Nguyen MD;  [facility];  Service: Orthopedics    [facility] NDSC WRST SURG W/RLS TRANSVRS CARPL LIGM Left 01/28/2020    Procedure: RELEASE CARPAL TUNNEL ENDOSCOPIC;  Surgeon: Sam Nguyen MD;  [facility];  Service: Orthopedics       Current Outpatient Medications   Medication Sig Dispense Refill    albuterol (PROVENTIL HFA,VENTOLIN HFA) 90 mcg/act inhaler inhale 2 puffs by mouth and INTO THE LUNGS every 6 hours IF NEEDED FOR WHEEZING. 8.5 g 2    bisacodyl (DULCOLAX) 5 mg EC tablet Take as directed by GI office 2 tablet 0    buPROPion (WELLBUTRIN XL) 300 mg 24 hr tablet Take 1 tablet (300 mg total) by mouth daily 30 tablet 1    cholecalciferol (VITAMIN D3) 400 units tablet Take 400 Units by mouth daily      fluticasone (FLONASE) 50 mcg/act nasal spray instill 1 spray into each nostril once daily 16 g 2    guaiFENesin (MUCINEX) 600 mg 12 hr tablet Take 2 tablets (1,200 mg total) by mouth every 12 (twelve) hours 60 tablet 3    ibuprofen (MOTRIN) 200 mg tablet Take 200 mg by mouth every 6 (six) hours as needed for mild pain      ipratropium-albuterol (DUO-NEB) 0.5-2.5 mg/3 mL nebulizer solution Take 3 mL by nebulization 4 (four) times a day 360 mL 3    lamoTRIgine (LaMICtal) 200 MG tablet Take 1 tablet (200 mg total) by mouth daily 30 tablet 1    levocetirizine (XYZAL) 5 MG tablet take 1 tablet by mouth every evening 30 tablet 4    nicotine (NICODERM CQ) 21 mg/24 hr TD 24 hr patch Place 1 patch on the skin over 24 hours every 24 hours 28 patch 0    nicotine  polacrilex (COMMIT) 4 MG lozenge Apply 1 lozenge (4 mg total) to the mouth or throat as needed for smoking cessation 100 each 0    omeprazole (PriLOSEC) 20 mg delayed release capsule take 1 capsule by mouth twice a day 60 capsule 5    polyethylene glycol (GLYCOLAX) 17 GM/SCOOP powder Take as directed by GI office 527 g 0    senna-docusate sodium (SENOKOT-S) 8.6-50 mg per tablet Take 3 tablets by mouth as needed        Stiolto Respimat 2.5-2.5 MCG/ACT inhaler inhale 2 puffs by mouth and INTO THE LUNGS once daily 4 g 11     No current facility-administered medications for this visit.        No Known Allergies    Review of Systems    Video Exam    There were no vitals filed for this visit.    Physical Exam

## 2024-02-13 NOTE — PSYCH
Behavioral Health Psychotherapy Progress Note    Psychotherapy Provided: Individual Psychotherapy     1. Moderate mixed bipolar II disorder (HCC)        2. Generalized anxiety disorder        3. PTSD (post-traumatic stress disorder)        4. Cigarette nicotine dependence without complication        5. Marijuana use        6. Alcohol abuse, in remission        7. Tobacco abuse            Goals addressed in session: Goal 1 and Goal 2     DATA: Marie stayed on the phone for the first few minutes while communicating her difficulties finding the link. She was reminded that the individual sessions go through [employer] or [employer], not through [employer] as for the group. Marie was redirected throughout the session to be able to focus on gratitude, on her learned coping skills, and her positive qualities that help her all the time to push through challenging situations. Marie discussed sensitive dates that remind her of her most beloved people who are estranged from her.    During this session, this clinician used the following therapeutic modalities: Client-centered Therapy, Cognitive Behavioral Therapy, Dialectical Behavior Therapy, Mindfulness-based Strategies, and Supportive Psychotherapy    Substance Abuse was not addressed during this session. If the client is diagnosed with a co-occurring substance use disorder, please indicate any changes in the frequency or amount of use: NA. Stage of change for addressing substance use diagnoses: Maintenance    ASSESSMENT:  Marie Portillo presents with a Euthymic/ normal and Depressed mood.     her affect is Normal range and intensity and Tearful, at times, which is congruent, with her mood and the content of the session. The client has made progress on their goals.    Marie seemed changeable in her mood but was easily redirected towards topics that avoid intensifying emotions. Marie Portillo presents with a minimal risk of suicide, minimal risk of self-harm, and none risk of harm to  "others.    For any risk assessment that surpasses a \"low\" rating, a safety plan must be developed.    A safety plan was indicated: no  If yes, describe in detail NA    PLAN: Between sessions, Marie Portillo will use notes to put her questions for healthcare providers to avoid forgetfulness and still discuss important topics during her appointments. At the next session, the therapist will use Client-centered Therapy, Cognitive Behavioral Therapy, Dialectical Behavior Therapy, Mindfulness-based Strategies, and Supportive Psychotherapy to address taking notes, adequate self-care, and self-compassion and forgiveness.    Behavioral Health Treatment Plan and Discharge Planning: Marie Portillo is aware of and agrees to continue to work on their treatment plan. They have identified and are working toward their discharge goals. yes    Visit start and stop times:    02/13/24  Start Time: 0201  Stop Time: 0240  Total Visit Time: 39 minutes  "

## 2024-02-15 ENCOUNTER — TELEPHONE (OUTPATIENT)
Dept: PSYCHIATRY | Facility: CLINIC | Age: 59
End: 2024-02-15

## 2024-02-15 NOTE — TELEPHONE ENCOUNTER
Unable to leave voicemail informing patient of the Psych Encounter form needing to be signed as a requirement from the insurance company for billing purposes. If patients contacts office, please make patient aware that the form can be accessed via [employer] to sign electronically and must be signed for each visit as a requirement to continue future visits with provider.

## 2024-02-16 ENCOUNTER — TELEMEDICINE (OUTPATIENT)
Dept: BEHAVIORAL/MENTAL HEALTH CLINIC | Facility: CLINIC | Age: 59
End: 2024-02-16
Payer: COMMERCIAL

## 2024-02-16 DIAGNOSIS — Z72.0 TOBACCO ABUSE: ICD-10-CM

## 2024-02-16 DIAGNOSIS — F31.81 MODERATE MIXED BIPOLAR II DISORDER (HCC): Primary | ICD-10-CM

## 2024-02-16 DIAGNOSIS — F43.10 PTSD (POST-TRAUMATIC STRESS DISORDER): ICD-10-CM

## 2024-02-16 DIAGNOSIS — F10.11 ALCOHOL ABUSE, IN REMISSION: ICD-10-CM

## 2024-02-16 DIAGNOSIS — F12.90 MARIJUANA USE: Chronic | ICD-10-CM

## 2024-02-16 DIAGNOSIS — F41.1 GENERALIZED ANXIETY DISORDER: ICD-10-CM

## 2024-02-16 PROCEDURE — 90853 GROUP PSYCHOTHERAPY: CPT | Performed by: COUNSELOR

## 2024-02-16 NOTE — PSYCH
Virtual Regular Visit    Verification of patient location:    Patient is located at Home in the following state in which I hold an active license [place]      Assessment/Plan:    Problem List Items Addressed This Visit       Moderate mixed bipolar II disorder (HCC) - Primary    Generalized anxiety disorder    PTSD (post-traumatic stress disorder)    Alcohol abuse, in remission    Marijuana use (Chronic)    Tobacco abuse       Goals addressed in session: Goal 1 and Goal 2          Reason for visit is No chief complaint on file.       Encounter provider Isabel Watts LPC    Provider located at [facility] [place]  [facility] [place]  [address]  [place] [id number] [phone]      Recent Visits  Date Type Provider Dept   02/15/24 Telephone Isabel Watts LPC [facility] [place]   02/13/24 Telemedicine Isabel Watts LPC [facility] [place]   02/09/24 Telemedicine Isabel Watts LPC [facility] [place]   Showing recent visits within past 7 days and meeting all other requirements  Future Appointments  No visits were found meeting these conditions.  Showing future appointments within next 150 days and meeting all other requirements       The patient was identified by name and date of birth. Marie Portillo was informed that this is a telemedicine visit and that the visit is being conducted throughthe Microsoft Teams platform. She agrees to proceed..  My office door was closed. No one else was in the room.  She acknowledged consent and understanding of privacy and security of the video platform. The patient has agreed to participate and understands they can discontinue the visit at any time.    Patient is aware this is a billable service.     Subjective  Marie Portillo is a 58 y.o. female  .    Data: Marie attended the Relaxation group partially and was guided through progressive muscle relaxation.  Furthermore, attendees were guided through a body scan transitioning to Mindfulness meditation on impermanence, self-esteem, and appreciation, with imagery for accessing one's sense of agency, and finishing with affirmations for self-forgiveness, gratitude, and humility. Psychoeducation, EFT, Mindfulness-based, and Somatic approaches were used to achieve the deep state of relaxation and to maintain the serene environment for the exercise's purpose.      Assessment: Marie was in euthymic mood and in congruence with broad affect, followed through guidance and completed the exercises well, showed willingness to continue learning and practicing self-care in weekly Relaxation Group as well as individual therapy.      Plan: Marie will practice deep breathing and other techniques based on each occasion to counter stress and will attend the next group to be able to take part in further guided exercises on chosen topics to build on the techniques learned in the beginning.    HPI     Past Medical History:   Diagnosis Date    Bilateral carpal tunnel syndrome 08/20/2019    Carpal tunnel syndrome of right wrist 10/30/2019    Added automatically from request for surgery [id number]    Chlamydia     COPD (chronic obstructive pulmonary disease) (HCC)     Depression     Hypertension     Varicella        Past Surgical History:   Procedure Laterality Date    BREAST BIOPSY Left 20yrs ago benign    EGD      OOPHORECTOMY Right     [place] ND[place] WRST SURG W/RLS TRANSVRS CARPL LIGM Right 01/21/2020    Procedure: RELEASE CARPAL TUNNEL ENDOSCOPIC;  Surgeon: Sam Nguyen MD;  [facility];  Service: Orthopedics    [place] ND[place] WRST SURG W/RLS TRANSVRS CARPL LIGM Left 01/28/2020    Procedure: RELEASE CARPAL TUNNEL ENDOSCOPIC;  Surgeon: Sam Nguyen MD;  [facility];  Service: Orthopedics       Current Outpatient Medications   Medication Sig Dispense Refill    albuterol (PROVENTIL HFA,VENTOLIN HFA) 90 mcg/act inhaler inhale 2  puffs by mouth and INTO THE LUNGS every 6 hours IF NEEDED FOR WHEEZING. 8.5 g 2    bisacodyl (DULCOLAX) 5 mg EC tablet Take as directed by GI office 2 tablet 0    buPROPion (WELLBUTRIN XL) 300 mg 24 hr tablet Take 1 tablet (300 mg total) by mouth daily 30 tablet 1    cholecalciferol (VITAMIN D3) 400 units tablet Take 400 Units by mouth daily      fluticasone (FLONASE) 50 mcg/act nasal spray instill 1 spray into each nostril once daily 16 g 2    guaiFENesin (MUCINEX) 600 mg 12 hr tablet Take 2 tablets (1,200 mg total) by mouth every 12 (twelve) hours 60 tablet 3    ibuprofen (MOTRIN) 200 mg tablet Take 200 mg by mouth every 6 (six) hours as needed for mild pain      ipratropium-albuterol (DUO-NEB) 0.5-2.5 mg/3 mL nebulizer solution Take 3 mL by nebulization 4 (four) times a day 360 mL 3    lamoTRIgine (LaMICtal) 200 MG tablet Take 1 tablet (200 mg total) by mouth daily 30 tablet 1    levocetirizine (XYZAL) 5 MG tablet take 1 tablet by mouth every evening 30 tablet 4    nicotine (NICODERM CQ) 21 mg/24 hr TD 24 hr patch Place 1 patch on the skin over 24 hours every 24 hours 28 patch 0    nicotine polacrilex (COMMIT) 4 MG lozenge Apply 1 lozenge (4 mg total) to the mouth or throat as needed for smoking cessation 100 each 0    omeprazole (PriLOSEC) 20 mg delayed release capsule take 1 capsule by mouth twice a day 60 capsule 5    polyethylene glycol (GLYCOLAX) 17 GM/SCOOP powder Take as directed by GI office 527 g 0    senna-docusate sodium (SENOKOT-S) 8.6-50 mg per tablet Take 3 tablets by mouth as needed        Stiolto Respimat 2.5-2.5 MCG/ACT inhaler inhale 2 puffs by mouth and INTO THE LUNGS once daily 4 g 11     No current facility-administered medications for this visit.        No Known Allergies    Review of Systems    Video Exam    There were no vitals filed for this visit.    Physical Exam     02/16/24  Start Time: 0410  Stop Time: 0450  Total Visit Time: 40 minutes

## 2024-02-19 ENCOUNTER — TELEPHONE (OUTPATIENT)
Age: 59
End: 2024-02-19

## 2024-02-19 ENCOUNTER — TELEPHONE (OUTPATIENT)
Dept: PSYCHIATRY | Facility: CLINIC | Age: 59
End: 2024-02-19

## 2024-02-19 DIAGNOSIS — K21.9 GASTROESOPHAGEAL REFLUX DISEASE WITHOUT ESOPHAGITIS: ICD-10-CM

## 2024-02-19 NOTE — TELEPHONE ENCOUNTER
Patient calling to review prep instructions. Miralax Dulcolax prep instructions reviewed step by step. Patient understood and had no further questions or concerns.

## 2024-02-20 ENCOUNTER — HOSPITAL ENCOUNTER (OUTPATIENT)
Dept: GASTROENTEROLOGY | Facility: HOSPITAL | Age: 59
Setting detail: OUTPATIENT SURGERY
Discharge: HOME | End: 2024-02-20
Payer: COMMERCIAL

## 2024-02-20 ENCOUNTER — ANESTHESIA (OUTPATIENT)
Dept: GASTROENTEROLOGY | Facility: HOSPITAL | Age: 59
End: 2024-02-20

## 2024-02-20 ENCOUNTER — ANESTHESIA EVENT (OUTPATIENT)
Dept: GASTROENTEROLOGY | Facility: HOSPITAL | Age: 59
End: 2024-02-20

## 2024-02-20 VITALS
WEIGHT: 111 LBS | BODY MASS INDEX: 21.79 KG/M2 | TEMPERATURE: 97.6 F | RESPIRATION RATE: 18 BRPM | DIASTOLIC BLOOD PRESSURE: 82 MMHG | HEIGHT: 60 IN | HEART RATE: 84 BPM | SYSTOLIC BLOOD PRESSURE: 132 MMHG | OXYGEN SATURATION: 100 %

## 2024-02-20 DIAGNOSIS — Z12.11 SCREENING FOR COLON CANCER: ICD-10-CM

## 2024-02-20 PROCEDURE — 45385 COLONOSCOPY W/LESION REMOVAL: CPT | Performed by: STUDENT IN AN ORGANIZED HEALTH CARE EDUCATION/TRAINING PROGRAM

## 2024-02-20 PROCEDURE — 88305 TISSUE EXAM BY PATHOLOGIST: CPT | Performed by: PATHOLOGY

## 2024-02-20 RX ORDER — SODIUM CHLORIDE, SODIUM LACTATE, POTASSIUM CHLORIDE, CALCIUM CHLORIDE 600; 310; 30; 20 MG/100ML; MG/100ML; MG/100ML; MG/100ML
INJECTION, SOLUTION INTRAVENOUS CONTINUOUS PRN
Status: DISCONTINUED | OUTPATIENT
Start: 2024-02-20 | End: 2024-02-20

## 2024-02-20 RX ORDER — PROPOFOL 10 MG/ML
INJECTION, EMULSION INTRAVENOUS AS NEEDED
Status: DISCONTINUED | OUTPATIENT
Start: 2024-02-20 | End: 2024-02-20

## 2024-02-20 RX ORDER — METHOCARBAMOL 500 MG/1
500 TABLET, FILM COATED ORAL 2 TIMES DAILY
COMMUNITY

## 2024-02-20 RX ORDER — OMEPRAZOLE 20 MG/1
CAPSULE, DELAYED RELEASE ORAL
Qty: 60 CAPSULE | Refills: 5 | Status: SHIPPED | OUTPATIENT
Start: 2024-02-20

## 2024-02-20 RX ADMIN — PROPOFOL 100 MG: 10 INJECTION, EMULSION INTRAVENOUS at 09:55

## 2024-02-20 RX ADMIN — PROPOFOL 50 MG: 10 INJECTION, EMULSION INTRAVENOUS at 10:10

## 2024-02-20 RX ADMIN — PROPOFOL 20 MG: 10 INJECTION, EMULSION INTRAVENOUS at 10:16

## 2024-02-20 RX ADMIN — PROPOFOL 50 MG: 10 INJECTION, EMULSION INTRAVENOUS at 09:59

## 2024-02-20 RX ADMIN — PROPOFOL 50 MG: 10 INJECTION, EMULSION INTRAVENOUS at 10:04

## 2024-02-20 RX ADMIN — SODIUM CHLORIDE, SODIUM LACTATE, POTASSIUM CHLORIDE, AND CALCIUM CHLORIDE: .6; .31; .03; .02 INJECTION, SOLUTION INTRAVENOUS at 09:52

## 2024-02-20 NOTE — TELEPHONE ENCOUNTER
Patient requesting refill(s) of: Prilosec    Last filled: 9/17/22  Last appt: 8/10/23  Next appt: 8/13/24  Pharmacy: [facility] [place]

## 2024-02-20 NOTE — H&P
[facility]  History & Physical     PATIENT INFO     Name: Marie Portillo  YOB: 1965   Age: 58 y.o.   Sex: female   MRN: [id number]     HISTORY OF PRESENT ILLNESS     Marie Portillo is a 58 y.o. year old female who presents for screening colonoscopy. Not on antithrombotics or anticoagulants.     REVIEW OF SYSTEMS     Per the HPI, and otherwise unremarkable.    Historical Information   Past Medical History:   Diagnosis Date    Bilateral carpal tunnel syndrome 08/20/2019    Carpal tunnel syndrome of right wrist 10/30/2019    Added automatically from request for surgery [id number]    Chlamydia     COPD (chronic obstructive pulmonary disease) (HCC)     Depression     Hypertension     Varicella      Past Surgical History:   Procedure Laterality Date    BREAST BIOPSY Left 20yrs ago benign    EGD      OOPHORECTOMY Right     [place] ND[place] WRST SURG W/RLS TRANSVRS CARPL LIGM Right 01/21/2020    Procedure: RELEASE CARPAL TUNNEL ENDOSCOPIC;  Surgeon: Sam Nguyen MD;  [facility];  Service: Orthopedics    [place] ND[place] WRST SURG W/RLS TRANSVRS CARPL LIGM Left 01/28/2020    Procedure: RELEASE CARPAL TUNNEL ENDOSCOPIC;  Surgeon: Sam Nguyen MD;  [facility];  Service: Orthopedics     Social History   Social History     Substance and Sexual Activity   Alcohol Use Not Currently    Comment: H/O ETOH abuse in approx 2010--for a period of 2 years.  She quit on her own without any h/o withdrawal or rehab.     Social History     Substance and Sexual Activity   Drug Use Yes    Types: Marijuana    Comment: Smokes THC several times a day.  Also uses CBD OIL at times.   Used cocaine and methamphetamine  fairly steadily for a period of approx 1 year approx 36 years ago.  Tried LSD, Mushrooms.  No h/o rehab     Social History     Tobacco Use   Smoking Status Every Day    Current packs/day: 0.50    Average packs/day: 0.5 packs/day for 45.1 years (22.6 ttl pk-yrs)    Types: Cigarettes     Start date: 1979   Smokeless Tobacco Never     Family History   Problem Relation Age of Onset    Alcohol abuse Mother     Eating disorder Mother         anorexia and bulimia per Pt    Hodgkin's lymphoma Father     Cancer Father         3rd time lymphoma, prostate, lung    No Known Problems Sister     No Known Problems Daughter     Colon cancer Maternal Grandmother     Alcohol abuse Maternal Grandfather     Colon cancer Paternal Grandmother     Breast cancer Maternal Aunt     No Known Problems Maternal Aunt     No Known Problems Maternal Aunt     Anxiety disorder Paternal Aunt     No Known Problems Paternal Aunt     Multiple sclerosis Cousin     Colon cancer Maternal Uncle         MEDICATIONS & ALLERGIES     Current Outpatient Medications   Medication Instructions    albuterol (PROVENTIL HFA,VENTOLIN HFA) 90 mcg/act inhaler inhale 2 puffs by mouth and INTO THE LUNGS every 6 hours IF NEEDED FOR WHEEZING.    buPROPion (WELLBUTRIN XL) 300 mg, Oral, Daily    cholecalciferol (VITAMIN D3) 400 Units, Oral, Daily    fluticasone (FLONASE) 50 mcg/act nasal spray instill 1 spray into each nostril once daily    guaiFENesin (MUCINEX) 1,200 mg, Oral, Every 12 hours scheduled    ibuprofen (MOTRIN) 200 mg, Oral, Every 6 hours PRN    ipratropium-albuterol (DUO-NEB) 0.5-2.5 mg/3 mL nebulizer solution 3 mL, Nebulization, 4 times daily    lamoTRIgine (LAMICTAL) 200 mg, Oral, Daily    levocetirizine (XYZAL) 5 MG tablet take 1 tablet by mouth every evening    nicotine (NICODERM CQ) 21 mg/24 hr TD 24 hr patch 1 patch, Transdermal, Every 24 hours    nicotine polacrilex (COMMIT) 4 mg, Mouth/Throat, As needed    omeprazole (PriLOSEC) 20 mg delayed release capsule take 1 capsule by mouth twice a day    senna-docusate sodium (SENOKOT-S) 8.6-50 mg per tablet 3 tablets, Oral, As needed    Stiolto Respimat 2.5-2.5 MCG/ACT inhaler inhale 2 puffs by mouth and INTO THE LUNGS once daily     No Known Allergies     PHYSICAL EXAM      Objective    Blood pressure 154/84, temperature (!) 96.9 °F (36.1 °C), temperature source Temporal. There is no height or weight on file to calculate BMI.    General Appearance:   Alert, cooperative, no distress   Lungs:   Equal chest rise, respirations unlabored    Heart:   Regular rate and rhythm   Abdomen:   Soft, non-tender, non-distended; normal bowel sounds; no masses, no organomegaly    Extremities:   No edema       ASSESSMENT & PLAN     This is a 58 y.o. year old female here for colonoscopy, and she is stable and optimized for her procedure.      Oscar Ko D.O.  [facility]  Division of Gastroenterology & Hepatology  Available on TigerText  [email or web address]    ** Please Note: This note is constructed using a voice recognition dictation system. **

## 2024-02-20 NOTE — ANESTHESIA PROCEDURE NOTES
Anesthesia Notable Event    Date/Time: 2/20/2024 10:27 AM    Patient location during procedure: OR procedure room  Performed by: Perez Okeefe CRNA  Authorized by: Erinn Clark MD

## 2024-02-20 NOTE — ANESTHESIA POSTPROCEDURE EVALUATION
Post-Op Assessment Note    CV Status:  Stable    Pain management: adequate       Mental Status:  Alert and awake   Hydration Status:  Euvolemic   PONV Controlled:  Controlled   Airway Patency:  Patent     Post Op Vitals Reviewed: Yes    No anethesia notable event occurred.    Staff: YOLI               /74 (02/20/24 1025)    Temp 97.6 °F (36.4 °C) (02/20/24 1025)    Pulse 79 (02/20/24 1025)   Resp 16 (02/20/24 1025)    SpO2 98 % (02/20/24 1025)

## 2024-02-20 NOTE — ANESTHESIA PREPROCEDURE EVALUATION
Procedure:  COLONOSCOPY    Relevant Problems   ANESTHESIA (within normal limits)      CARDIO   (+) Essential hypertension   (+) Other hyperlipidemia      ENDO (within normal limits)      GI/HEPATIC  Confirmed NPO appropriate  S/p bowel prep   (+) Gastroesophageal reflux disease without esophagitis      /RENAL (within normal limits)      HEMATOLOGY (within normal limits)      NEURO/PSYCH   (+) Generalized anxiety disorder   (+) PTSD (post-traumatic stress disorder)      PULMONARY   (+) COPD with acute exacerbation (HCC)   (+) Centrilobular emphysema (HCC)   (+) Simple chronic bronchitis (HCC)      Nervous and Auditory   (+) Chiari malformation type I (HCC)      Other   (+) Alcohol abuse, in remission   (+) Cigarette nicotine dependence without complication   (+) Marijuana use   (+) Moderate mixed bipolar II disorder (HCC)   (+) Tobacco abuse        Physical Exam    Airway    Mallampati score: I         Dental    lower dentures and upper dentures    Cardiovascular  Rhythm: regular, Rate: normal    Pulmonary   No wheezes    Other Findings  post-pubertal.      Anesthesia Plan  ASA Score- 3     Anesthesia Type- IV sedation with anesthesia with ASA Monitors.         Additional Monitors:     Airway Plan:     Comment: I discussed the risks and benefits of IV sedation anesthesia including the possibility of the need to convert to general anesthesia and the potential risk of awareness.  The patient was given the opportunity to ask questions, which were answered..       Plan Factors-Exercise tolerance (METS): >4 METS.    Chart reviewed. EKG reviewed.       Patient is a current smoker.  Patient instructed to abstain from smoking on day of procedure. Patient smoked on day of surgery.            Induction- intravenous.    Postoperative Plan-     Informed Consent- Anesthetic plan and risks discussed with patient.  I personally reviewed this patient with the CRNA. Discussed and agreed on the Anesthesia Plan with the  CRNA..            PFTs 9/20/21:  Interpretation:  Normal Spirometry  Normal TLC - no restriction on lung volumes.  Normal diffusion capacity  Patient walked 488 meters in 6 minutes and did not require supplemental oxygen.

## 2024-02-21 ENCOUNTER — TELEPHONE (OUTPATIENT)
Dept: PSYCHIATRY | Facility: CLINIC | Age: 59
End: 2024-02-21

## 2024-02-22 ENCOUNTER — TELEMEDICINE (OUTPATIENT)
Dept: BEHAVIORAL/MENTAL HEALTH CLINIC | Facility: CLINIC | Age: 59
End: 2024-02-22
Payer: COMMERCIAL

## 2024-02-22 DIAGNOSIS — F41.1 GENERALIZED ANXIETY DISORDER: ICD-10-CM

## 2024-02-22 DIAGNOSIS — F17.210 CIGARETTE NICOTINE DEPENDENCE WITHOUT COMPLICATION: ICD-10-CM

## 2024-02-22 DIAGNOSIS — F43.10 PTSD (POST-TRAUMATIC STRESS DISORDER): ICD-10-CM

## 2024-02-22 DIAGNOSIS — F12.90 MARIJUANA USE: Chronic | ICD-10-CM

## 2024-02-22 DIAGNOSIS — F31.81 MODERATE MIXED BIPOLAR II DISORDER (HCC): Primary | ICD-10-CM

## 2024-02-22 DIAGNOSIS — F10.11 ALCOHOL ABUSE, IN REMISSION: ICD-10-CM

## 2024-02-22 DIAGNOSIS — Z72.0 TOBACCO ABUSE: ICD-10-CM

## 2024-02-22 PROCEDURE — 88305 TISSUE EXAM BY PATHOLOGIST: CPT | Performed by: PATHOLOGY

## 2024-02-22 PROCEDURE — 90832 PSYTX W PT 30 MINUTES: CPT | Performed by: COUNSELOR

## 2024-02-22 NOTE — PSYCH
"Behavioral Health Psychotherapy Progress Note    Psychotherapy Provided: Individual Psychotherapy     1. Moderate mixed bipolar II disorder (HCC)        2. Generalized anxiety disorder        3. PTSD (post-traumatic stress disorder)        4. Tobacco abuse        5. Marijuana use        6. Alcohol abuse, in remission        7. Cigarette nicotine dependence without complication            Goals addressed in session: Goal 1     DATA: Marie appeared online earlier than the scheduled appointment but was allowed to have a short time due Th having a window and her feeling upset with her failing memory. She shared that she was preparing for an appointment again, with a psychologist,and that she could \"not take it anymore.\" Marie was encouraged to use her distress Tolerance skills to avoid making the situation worse and Mindfulness to attend to difficult emotions as an observer.    During this session, this clinician used the following therapeutic modalities: Client-centered Therapy, Cognitive Behavioral Therapy, Dialectical Behavior Therapy, Mindfulness-based Strategies, and Supportive Psychotherapy    Substance Abuse was not addressed during this session. If the client is diagnosed with a co-occurring substance use disorder, please indicate any changes in the frequency or amount of use: NA. Stage of change for addressing substance use diagnoses: Pre-contemplation    ASSESSMENT:  Marie Portillo presents with a Euthymic/ normal, Anxious, and Depressed mood.     her affect is Normal range and intensity, Constricted, and Tearful, which is congruent, with her mood and the content of the session. The client has made progress on their goals.    Marie was in distress but was able to redirect her energy to her learned coping skills after a bit of a resistance and focused on a healthier process. Marie Portillo presents with a low risk of suicide, minimal risk of self-harm, and none risk of harm to others.    For any risk assessment " "that surpasses a \"low\" rating, a safety plan must be developed.    A safety plan was indicated: no  If yes, describe in detail NA    PLAN: Between sessions, Marie Portillo will use Mindfulness to attend to difficult emotions. At the next session, the therapist will use Client-centered Therapy, Cognitive Behavioral Therapy, Dialectical Behavior Therapy, Mindfulness-based Strategies, and Supportive Psychotherapy to address adequate self-care.    Behavioral Health Treatment Plan and Discharge Planning: Marie Portillo is aware of and agrees to continue to work on their treatment plan. They have identified and are working toward their discharge goals. yes    Visit start and stop times:    02/22/24  Start Time: 1020  Stop Time: 1053  Total Visit Time: 33 minutes  "

## 2024-02-22 NOTE — PSYCH
Virtual Regular Visit    Verification of patient location:    Patient is located at Home in the following state in which I hold an active license [place]      Assessment/Plan:    Problem List Items Addressed This Visit       Moderate mixed bipolar II disorder (HCC) - Primary    Generalized anxiety disorder    PTSD (post-traumatic stress disorder)    Alcohol abuse, in remission    Marijuana use (Chronic)    Cigarette nicotine dependence without complication    Tobacco abuse       Goals addressed in session: Goal 1 and Goal 2          Reason for visit is No chief complaint on file.       Encounter provider Isabel Watts LPC    Provider located at [facility] [place]  [facility] [address]  [place] [id number] [phone]      Recent Visits  Date Type Provider Dept   02/21/24 Telephone Isabel Watts LPC [facility] [place]   02/21/24 Telephone Isabel Watts LPC [facility] [place]   02/19/24 Telephone Isabel Watts LPC [facility] [place]   02/16/24 Telemedicine Isabel Watts LPC [facility] [place]   02/15/24 Telephone Isabel Watts LPC [facility] [place]   Showing recent visits within past 7 days and meeting all other requirements  Future Appointments  No visits were found meeting these conditions.  Showing future appointments within next 150 days and meeting all other requirements       The patient was identified by name and date of birth. Marie Portillo was informed that this is a telemedicine visit and that the visit is being conducted throughthe [employer] platform. She agrees to proceed..  My office door was closed. No one else was in the room.  She acknowledged consent and understanding of privacy and security of the video platform. The patient has agreed to participate and understands they can discontinue the visit at any time.    Patient is aware this is  a billable service.     Subjective  Marie Portillo is a 58 y.o. female  .      HPI     Past Medical History:   Diagnosis Date    Bilateral carpal tunnel syndrome 08/20/2019    Carpal tunnel syndrome of right wrist 10/30/2019    Added automatically from request for surgery [id number]    Chlamydia     COPD (chronic obstructive pulmonary disease) (HCC)     Depression     Hypertension     Varicella        Past Surgical History:   Procedure Laterality Date    BREAST BIOPSY Left 20yrs ago benign    CERVICAL SPINE SURGERY      EGD      HERNIA REPAIR      OOPHORECTOMY Right     [place] NDSC WRST SURG W/RLS TRANSVRS CARPL LIGM Right 01/21/2020    Procedure: RELEASE CARPAL TUNNEL ENDOSCOPIC;  Surgeon: Sam Nguyen MD;  [facility];  Service: Orthopedics    [place] NDSC WRST SURG W/RLS TRANSVRS CARPL LIGM Left 01/28/2020    Procedure: RELEASE CARPAL TUNNEL ENDOSCOPIC;  Surgeon: Sam Nguyen MD;  [facility];  Service: Orthopedics       Current Outpatient Medications   Medication Sig Dispense Refill    Acetaminophen 500 MG Take 2 capsules by mouth every 4 (four) hours as needed for mild pain      albuterol (PROVENTIL HFA,VENTOLIN HFA) 90 mcg/act inhaler inhale 2 puffs by mouth and INTO THE LUNGS every 6 hours IF NEEDED FOR WHEEZING. 8.5 g 2    buPROPion (WELLBUTRIN XL) 300 mg 24 hr tablet Take 1 tablet (300 mg total) by mouth daily 30 tablet 1    cholecalciferol (VITAMIN D3) 400 units tablet Take 400 Units by mouth daily      fluticasone (FLONASE) 50 mcg/act nasal spray instill 1 spray into each nostril once daily 16 g 2    guaiFENesin (MUCINEX) 600 mg 12 hr tablet Take 2 tablets (1,200 mg total) by mouth every 12 (twelve) hours 60 tablet 3    ibuprofen (MOTRIN) 200 mg tablet Take 200 mg by mouth every 6 (six) hours as needed for mild pain      ipratropium-albuterol (DUO-NEB) 0.5-2.5 mg/3 mL nebulizer solution Take 3 mL by nebulization 4 (four) times a day 360 mL 3    lamoTRIgine (LaMICtal) 200 MG tablet Take 1  tablet (200 mg total) by mouth daily 30 tablet 1    levocetirizine (XYZAL) 5 MG tablet take 1 tablet by mouth every evening 30 tablet 4    methocarbamol (ROBAXIN) 500 mg tablet Take 500 mg by mouth 2 (two) times a day      nicotine (NICODERM CQ) 21 mg/24 hr TD 24 hr patch Place 1 patch on the skin over 24 hours every 24 hours 28 patch 0    nicotine polacrilex (COMMIT) 4 MG lozenge Apply 1 lozenge (4 mg total) to the mouth or throat as needed for smoking cessation 100 each 0    omeprazole (PriLOSEC) 20 mg delayed release capsule take 1 capsule by mouth twice a day 60 capsule 5    senna-docusate sodium (SENOKOT-S) 8.6-50 mg per tablet Take 3 tablets by mouth as needed        Stiolto Respimat 2.5-2.5 MCG/ACT inhaler inhale 2 puffs by mouth and INTO THE LUNGS once daily 4 g 11     No current facility-administered medications for this visit.        No Known Allergies    Review of Systems    Video Exam    There were no vitals filed for this visit.    Physical Exam

## 2024-02-23 ENCOUNTER — TELEMEDICINE (OUTPATIENT)
Dept: BEHAVIORAL/MENTAL HEALTH CLINIC | Facility: CLINIC | Age: 59
End: 2024-02-23
Payer: COMMERCIAL

## 2024-02-23 DIAGNOSIS — F43.10 PTSD (POST-TRAUMATIC STRESS DISORDER): ICD-10-CM

## 2024-02-23 DIAGNOSIS — F41.1 GENERALIZED ANXIETY DISORDER: ICD-10-CM

## 2024-02-23 DIAGNOSIS — F17.210 CIGARETTE NICOTINE DEPENDENCE WITHOUT COMPLICATION: ICD-10-CM

## 2024-02-23 DIAGNOSIS — F12.90 MARIJUANA USE: Chronic | ICD-10-CM

## 2024-02-23 DIAGNOSIS — Z72.0 TOBACCO ABUSE: ICD-10-CM

## 2024-02-23 DIAGNOSIS — F10.11 ALCOHOL ABUSE, IN REMISSION: ICD-10-CM

## 2024-02-23 DIAGNOSIS — F31.81 MODERATE MIXED BIPOLAR II DISORDER (HCC): Primary | ICD-10-CM

## 2024-02-23 PROCEDURE — 90853 GROUP PSYCHOTHERAPY: CPT | Performed by: COUNSELOR

## 2024-02-23 NOTE — PSYCH
Virtual Regular Visit    Verification of patient location:    Patient is located at Home in the following state in which I hold an active license [place]      Assessment/Plan:    Problem List Items Addressed This Visit       Moderate mixed bipolar II disorder (HCC) - Primary    Generalized anxiety disorder    PTSD (post-traumatic stress disorder)    Alcohol abuse, in remission    Marijuana use (Chronic)    Cigarette nicotine dependence without complication    Tobacco abuse       Goals addressed in session: Goal 1 and Goal 2          Reason for visit is No chief complaint on file.       Encounter provider Isabel Watts LPC    Provider located at [facility] [place]  [facility] [place] [address]  [place] [address] [phone]      Recent Visits  Date Type Provider Dept   02/22/24 Telemedicine Isabel Watts LPC [facility] [place]   02/21/24 Telephone Isabel Watts LPC [facility] [place]   02/21/24 Telephone Isabel Watts LPC [facility] [place]   02/19/24 Telephone Isabel Watts LPC [facility] [place]   02/16/24 Telemedicine Isabel Watts LPC [facility] [place]   Showing recent visits within past 7 days and meeting all other requirements  Future Appointments  No visits were found meeting these conditions.  Showing future appointments within next 150 days and meeting all other requirements       The patient was identified by name and date of birth. Marie Portillo was informed that this is a telemedicine visit and that the visit is being conducted throughthe Microsoft Teams platform. She agrees to proceed..  My office door was closed. The patient was notified the following individuals were present in the room other group attendees.  She acknowledged consent and understanding of privacy and security of the video platform. The patient has agreed to participate  and understands they can discontinue the visit at any time.    Patient is aware this is a billable service.     Subjective  Marie Portillo is a 58 y.o. female  .    Data: Marie attended the Relaxation group and was guided through EFT tapping on anxiety, diaphragmatic breathing, other Vagus nerve stimulation techniques, and progressive muscle relaxation. Furthermore, attendees were guided through a body scan transitioning to Mindfulness meditation on creating a safe space to deal with difficult emotions, with imagery for connecting to and healing the inner child, and with affirmations for self-compassion, self-love, and confidence. Psychoeducation, EFT, Mindfulness-based, and Somatic approaches were used to achieve the deep state of relaxation and to maintain the serene environment for the exercise's purpose.      Assessment: Marie was in euthymic mood and in congruence with broad affect, followed through guidance and completed the exercises well, showed willingness to continue learning and practicing self-care in weekly Relaxation Group as well as individual therapy.      Plan: Marie will practice deep breathing and other techniques based on each occasion to counter stress and will attend the next group to be able to take part in further guided exercises on chosen topics to build on the techniques learned in the beginning.      HPI     Past Medical History:   Diagnosis Date    Bilateral carpal tunnel syndrome 08/20/2019    Carpal tunnel syndrome of right wrist 10/30/2019    Added automatically from request for surgery [id number]    Chlamydia     COPD (chronic obstructive pulmonary disease) (HCC)     Depression     Hypertension     Varicella        Past Surgical History:   Procedure Laterality Date    BREAST BIOPSY Left 20yrs ago benign    CERVICAL SPINE SURGERY      EGD      HERNIA REPAIR      OOPHORECTOMY Right     [place] NDSC WRST SURG W/RLS TRANSVRS CARPL LIGM Right 01/21/2020    Procedure: RELEASE CARPAL TUNNEL  ENDOSCOPIC;  Surgeon: Sam Nguyen MD;  [facility]: [facility];  Service: Orthopedics    [place] NDSC WRST SURG W/RLS TRANSVRS CARPL LIGM Left 01/28/2020    Procedure: RELEASE CARPAL TUNNEL ENDOSCOPIC;  Surgeon: Sam Nguyen MD;  Location: [facility];  Service: Orthopedics       Current Outpatient Medications   Medication Sig Dispense Refill    Acetaminophen 500 MG Take 2 capsules by mouth every 4 (four) hours as needed for mild pain      albuterol (PROVENTIL HFA,VENTOLIN HFA) 90 mcg/act inhaler inhale 2 puffs by mouth and INTO THE LUNGS every 6 hours IF NEEDED FOR WHEEZING. 8.5 g 2    buPROPion (WELLBUTRIN XL) 300 mg 24 hr tablet Take 1 tablet (300 mg total) by mouth daily 30 tablet 1    cholecalciferol (VITAMIN D3) 400 units tablet Take 400 Units by mouth daily      fluticasone (FLONASE) 50 mcg/act nasal spray instill 1 spray into each nostril once daily 16 g 2    guaiFENesin (MUCINEX) 600 mg 12 hr tablet Take 2 tablets (1,200 mg total) by mouth every 12 (twelve) hours 60 tablet 3    ibuprofen (MOTRIN) 200 mg tablet Take 200 mg by mouth every 6 (six) hours as needed for mild pain      ipratropium-albuterol (DUO-NEB) 0.5-2.5 mg/3 mL nebulizer solution Take 3 mL by nebulization 4 (four) times a day 360 mL 3    lamoTRIgine (LaMICtal) 200 MG tablet Take 1 tablet (200 mg total) by mouth daily 30 tablet 1    levocetirizine (XYZAL) 5 MG tablet take 1 tablet by mouth every evening 30 tablet 4    methocarbamol (ROBAXIN) 500 mg tablet Take 500 mg by mouth 2 (two) times a day      nicotine (NICODERM CQ) 21 mg/24 hr TD 24 hr patch Place 1 patch on the skin over 24 hours every 24 hours 28 patch 0    nicotine polacrilex (COMMIT) 4 MG lozenge Apply 1 lozenge (4 mg total) to the mouth or throat as needed for smoking cessation 100 each 0    omeprazole (PriLOSEC) 20 mg delayed release capsule take 1 capsule by mouth twice a day 60 capsule 5    senna-docusate sodium (SENOKOT-S) 8.6-50 mg per tablet Take 3 tablets by mouth as  needed        Stiolto Respimat 2.5-2.5 MCG/ACT inhaler inhale 2 puffs by mouth and INTO THE LUNGS once daily 4 g 11     No current facility-administered medications for this visit.        No Known Allergies    Review of Systems    Video Exam    There were no vitals filed for this visit.    Physical Exam     02/23/24  Start Time: 0405  Stop Time: 0500  Total Visit Time: 55 minutes

## 2024-02-27 ENCOUNTER — TELEMEDICINE (OUTPATIENT)
Dept: BEHAVIORAL/MENTAL HEALTH CLINIC | Facility: CLINIC | Age: 59
End: 2024-02-27
Payer: COMMERCIAL

## 2024-02-27 ENCOUNTER — TELEPHONE (OUTPATIENT)
Dept: PSYCHIATRY | Facility: CLINIC | Age: 59
End: 2024-02-27

## 2024-02-27 DIAGNOSIS — F43.10 PTSD (POST-TRAUMATIC STRESS DISORDER): ICD-10-CM

## 2024-02-27 DIAGNOSIS — F31.81 MODERATE MIXED BIPOLAR II DISORDER (HCC): Primary | ICD-10-CM

## 2024-02-27 DIAGNOSIS — F41.1 GENERALIZED ANXIETY DISORDER: ICD-10-CM

## 2024-02-27 DIAGNOSIS — F17.210 CIGARETTE NICOTINE DEPENDENCE WITHOUT COMPLICATION: ICD-10-CM

## 2024-02-27 DIAGNOSIS — F10.11 ALCOHOL ABUSE, IN REMISSION: ICD-10-CM

## 2024-02-27 DIAGNOSIS — Z72.0 TOBACCO ABUSE: ICD-10-CM

## 2024-02-27 DIAGNOSIS — F12.90 MARIJUANA USE: Chronic | ICD-10-CM

## 2024-02-27 PROCEDURE — 90834 PSYTX W PT 45 MINUTES: CPT | Performed by: COUNSELOR

## 2024-02-27 NOTE — PSYCH
Virtual Regular Visit    Verification of patient location:    Patient is located at Home in the following state in which I hold an active license [place]      Assessment/Plan:    Problem List Items Addressed This Visit    None      Goals addressed in session: Goal 1 and Goal 2          Reason for visit is No chief complaint on file.       Encounter provider Isabel Watts LPC    Provider located at [facility] [place]  [facility] [place]  [address]  [place] [address] [phone]      Recent Visits  Date Type Provider Dept   02/23/24 Telemedicine Isabel Watts LPC [facility] [place]   02/22/24 Telemedicine Isabel Watts LPC [facility] [place]   02/21/24 Telephone Isabel Watts LPC [facility] [place]   02/21/24 Telephone Isabel Watts LPC [facility] [place]   Showing recent visits within past 7 days and meeting all other requirements  Future Appointments  No visits were found meeting these conditions.  Showing future appointments within next 150 days and meeting all other requirements       The patient was identified by name and date of birth. Marie Portillo was informed that this is a telemedicine visit and that the visit is being conducted throughthe Epic Embedded platform. She agrees to proceed..  My office door was closed. No one else was in the room.  She acknowledged consent and understanding of privacy and security of the video platform. The patient has agreed to participate and understands they can discontinue the visit at any time.    Patient is aware this is a billable service.     Subjective  Marie Portillo is a 58 y.o. female  .      HPI     Past Medical History:   Diagnosis Date    Bilateral carpal tunnel syndrome 08/20/2019    Carpal tunnel syndrome of right wrist 10/30/2019    Added automatically from request for surgery [id number]    Chlamydia     COPD (chronic  obstructive pulmonary disease) (HCC)     Depression     Hypertension     Varicella        Past Surgical History:   Procedure Laterality Date    BREAST BIOPSY Left 20yrs ago benign    CERVICAL SPINE SURGERY      EGD      HERNIA REPAIR      OOPHORECTOMY Right     [place] NDSC WRST SURG W/RLS TRANSVRS CARPL LIGM Right 01/21/2020    Procedure: RELEASE CARPAL TUNNEL ENDOSCOPIC;  Surgeon: Sam Nguyen MD;  [facility];  Service: Orthopedics    [place] NDSC WRST SURG W/RLS TRANSVRS CARPL LIGM Left 01/28/2020    Procedure: RELEASE CARPAL TUNNEL ENDOSCOPIC;  Surgeon: Sam Nguyen MD;  [facility];  Service: Orthopedics       Current Outpatient Medications   Medication Sig Dispense Refill    Acetaminophen 500 MG Take 2 capsules by mouth every 4 (four) hours as needed for mild pain      albuterol (PROVENTIL HFA,VENTOLIN HFA) 90 mcg/act inhaler inhale 2 puffs by mouth and INTO THE LUNGS every 6 hours IF NEEDED FOR WHEEZING. 8.5 g 2    buPROPion (WELLBUTRIN XL) 300 mg 24 hr tablet Take 1 tablet (300 mg total) by mouth daily 30 tablet 1    cholecalciferol (VITAMIN D3) 400 units tablet Take 400 Units by mouth daily      fluticasone (FLONASE) 50 mcg/act nasal spray instill 1 spray into each nostril once daily 16 g 2    guaiFENesin (MUCINEX) 600 mg 12 hr tablet Take 2 tablets (1,200 mg total) by mouth every 12 (twelve) hours 60 tablet 3    ibuprofen (MOTRIN) 200 mg tablet Take 200 mg by mouth every 6 (six) hours as needed for mild pain      ipratropium-albuterol (DUO-NEB) 0.5-2.5 mg/3 mL nebulizer solution Take 3 mL by nebulization 4 (four) times a day 360 mL 3    lamoTRIgine (LaMICtal) 200 MG tablet Take 1 tablet (200 mg total) by mouth daily 30 tablet 1    levocetirizine (XYZAL) 5 MG tablet take 1 tablet by mouth every evening 30 tablet 4    methocarbamol (ROBAXIN) 500 mg tablet Take 500 mg by mouth 2 (two) times a day      nicotine (NICODERM CQ) 21 mg/24 hr TD 24 hr patch Place 1 patch on the skin over 24  hours every 24 hours 28 patch 0    nicotine polacrilex (COMMIT) 4 MG lozenge Apply 1 lozenge (4 mg total) to the mouth or throat as needed for smoking cessation 100 each 0    omeprazole (PriLOSEC) 20 mg delayed release capsule take 1 capsule by mouth twice a day 60 capsule 5    senna-docusate sodium (SENOKOT-S) 8.6-50 mg per tablet Take 3 tablets by mouth as needed        Stiolto Respimat 2.5-2.5 MCG/ACT inhaler inhale 2 puffs by mouth and INTO THE LUNGS once daily 4 g 11     No current facility-administered medications for this visit.        No Known Allergies    Review of Systems    Video Exam    There were no vitals filed for this visit.    Physical Exam

## 2024-02-27 NOTE — PSYCH
"Behavioral Health Psychotherapy Progress Note    Psychotherapy Provided: Individual Psychotherapy     Moderate mixed bipolar II disorder (HCC) - Primary      Generalized anxiety disorder     PTSD (post-traumatic stress disorder)     Alcohol abuse, in remission     Marijuana use (Chronic)     Cigarette nicotine dependence without complication     Tobacco abuse       Goals addressed in session: Goal 1 and Goal 2     DATA: Marie had issues with connectivity and included a phone for a better audio for the last few minutes of the session. She reported doing well at the hearing for her disability and identified areas where she could have done better in handling her emotions prior the meeting by making things worse. She was reminded of her Mindfulness skills too to be able to become a neutral observer of her emotions.   During this session, this clinician used the following therapeutic modalities: Client-centered Therapy, Cognitive Behavioral Therapy, Dialectical Behavior Therapy, Mindfulness-based Strategies, and Supportive Psychotherapy    Substance Abuse was not addressed during this session. If the client is diagnosed with a co-occurring substance use disorder, please indicate any changes in the frequency or amount of use: NA. Stage of change for addressing substance use diagnoses: Pre-contemplation    ASSESSMENT:  Marie Portillo presents with a Euthymic/ normal, Anxious, and Depressed mood.     her affect is Normal range and intensity and Tearful, at times, which is congruent, with her mood and the content of the session. The client has made progress on their goals.    Marie seemed over-talkative, stressed out, and emotionally intense. Marie Portillo presents with a low risk of suicide, minimal risk of self-harm, and none risk of harm to others.    For any risk assessment that surpasses a \"low\" rating, a safety plan must be developed.    A safety plan was indicated: no  If yes, describe in detail NA    PLAN: Between " sessions, Marie Portillo will use her Emotion Regulation skills to revise her handling emotions' intensity. At the next session, the therapist will use Client-centered Therapy, Cognitive Behavioral Therapy, Dialectical Behavior Therapy, Mindfulness-based Strategies, and Supportive Psychotherapy to address emotion regulation and cognitive exercise.    Behavioral Health Treatment Plan and Discharge Planning: Marie Portillo is aware of and agrees to continue to work on their treatment plan. They have identified and are working toward their discharge goals. yes    Visit start and stop times:    02/27/24  Start Time: 0155  Stop Time: 0236  Total Visit Time: 41 minutes

## 2024-02-27 NOTE — TELEPHONE ENCOUNTER
Unable to leave voicemail informing patient of the Psych Encounter form needing to be signed as a requirement from the insurance company for billing purposes. If patients contacts office, please make patient aware that the form can be accessed via [employer] to sign electronically and must be signed for each visit as a requirement to continue future visits with provider.    Mailbox full at time of call.

## 2024-03-01 ENCOUNTER — TELEMEDICINE (OUTPATIENT)
Dept: PSYCHIATRY | Facility: CLINIC | Age: 59
End: 2024-03-01
Payer: COMMERCIAL

## 2024-03-01 ENCOUNTER — TELEMEDICINE (OUTPATIENT)
Dept: BEHAVIORAL/MENTAL HEALTH CLINIC | Facility: CLINIC | Age: 59
End: 2024-03-01

## 2024-03-01 DIAGNOSIS — F17.210 CIGARETTE NICOTINE DEPENDENCE WITHOUT COMPLICATION: ICD-10-CM

## 2024-03-01 DIAGNOSIS — F10.11 ALCOHOL ABUSE, IN REMISSION: ICD-10-CM

## 2024-03-01 DIAGNOSIS — F12.90 MARIJUANA USE: Chronic | ICD-10-CM

## 2024-03-01 DIAGNOSIS — F41.1 GENERALIZED ANXIETY DISORDER: ICD-10-CM

## 2024-03-01 DIAGNOSIS — F43.10 PTSD (POST-TRAUMATIC STRESS DISORDER): ICD-10-CM

## 2024-03-01 DIAGNOSIS — F31.32 BIPOLAR AFFECTIVE DISORDER, CURRENTLY DEPRESSED, MODERATE (HCC): ICD-10-CM

## 2024-03-01 DIAGNOSIS — Z72.0 TOBACCO ABUSE: ICD-10-CM

## 2024-03-01 DIAGNOSIS — F31.81 MODERATE MIXED BIPOLAR II DISORDER (HCC): Primary | ICD-10-CM

## 2024-03-01 PROCEDURE — 99214 OFFICE O/P EST MOD 30 MIN: CPT

## 2024-03-01 RX ORDER — LAMOTRIGINE 200 MG/1
200 TABLET ORAL DAILY
Qty: 30 TABLET | Refills: 1 | Status: SHIPPED | OUTPATIENT
Start: 2024-03-01

## 2024-03-01 RX ORDER — BUPROPION HYDROCHLORIDE 300 MG/1
300 TABLET ORAL DAILY
Qty: 30 TABLET | Refills: 1 | Status: SHIPPED | OUTPATIENT
Start: 2024-03-01 | End: 2024-04-30
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
## 2024-03-01 NOTE — PATIENT INSTRUCTIONS
Notable Changes  Change timing of Wellbutrin 300mg XL to the morning  Other Treatment recommendations  Continue Lamictal 200mg HS for mood   Continue routine therapy with Marianne - address automatic thoughts    Sleep hygiene tips:    Keep a consistent bedtime and wake up time.  This will lead to a more regular sleep schedule and avoid periods of sleep deprivation or periods of extended wakefulness during the night.    Avoid watching TV in bed.  If needing a form of media to help with sleep - can try sleep aid podcasts such Sleep With Me - a free podcast that helps with sleep initiation    Avoid napping, especially naps lasting longer than 1 hour or naps late in the day, which will likely affect your ability to fall asleep that night.    Limit caffeine, avoiding caffeine after lunch to allow it to get out of your system and not affect your ability to fall asleep or the quality of your sleep.  I usually recommend avoiding caffeine at least 6 hours before bedstime    Limit alcohol, alcohol can be sedating but also activating as it metabolizes, causing you to awaken from sleep earlier than desired.  It can affect the quality of your sleep by not letting you get into the more refreshing stages of sleep.    Avoid nicotine, of course not smoking or vaping at all is best, but nicotine is a stimulant and should be avoided near bedtime and during the night    Exercise and daytime physical activity is encouraged, in particular 4-6 hours before bedtime, as this may help you to fall asleep more easily and quality of sleep is improved.  Rigorous exercise within 3 hours of bedtime is discouraged.    Keep the sleep environment quiet and dark - Noise and light exposure during the night can disrupt sleep.  White noise or ear plugs are often recommended to reduce noise.  Using black out shades or an eye mask is commonly recommended to reduce light.  This also includes avoiding exposure to television or technology near bedtime, as this  "can have an impact on circadian rhythms by shifting sleep time later.    Bedroom clock - Avoid checking the time at night  This includes alarm clocks and other time pieces such as watches and phones.  Checking the time increases cognitive arousal and prolongs wakefulness.    Evening eating - Avoid a large meal near bedtime, but don't go to bed hungry.  Eat a healthy and filling meal in the evening without over-eating and avoid late night snacks.    Insomnia tips:  With great difficulty falling asleep or with difficulty falling back asleep, laying in bed for a prolonged period time is not ideal.  This can increase worry and rumination (having racing thoughts, not able to \"turn off your brain\".  After what feels like 15 minutes, if you feel wide awake, worried, anxious, or can't clear your brain, it is better to leave the bedroom to do something relaxing , The typical recommendation is to read a boring book in dim light.  In general, if you leave the room, you want to do something that is relaxing, not stimulating. Avoid bright screens, doing work, doing chores, or anything that requires a lot of mental effort. Return to bed when you feel more calm, sleepy, or mentally clear.      There are some on-line resources that do require a fee that can be of help.  Some of which will require a fee.    Http://www.veterant[employer].va.gov/apps/insomnia/index.html#dashboard (FREE)  Http://[employer]/cbt-online-insomnia-treatment.html  Http://www.[employer]/    Some apps that have helped people sleep:  Insomnia  (FREE)  CBT-I   Go! To Sleep by the [facility]  Sleepio    Please present for your previously scheduled appointment approximately 15 minutes prior to appointment time. If you are running late or are unable to attend your appointment, please call our [place] office at [phone] or our [place] office at [phone] if applicable to notify the office of your absence.    If you are in psychological " crisis including not limited to suicidal/homicidal thoughts or thoughts of self-injury, do not hesitate to call/contact your County Crisis hotline (see below) or attend to the nearest emergency department.  [facility] Crisis: [phone]  [facility] Crisis: [phone]  [facility] Crisis: [phone]  [facility] Crisis: [phone]  [facility] Crisis: [phone]  [facility] Crisis: [phone]  [facility] Crisis: [phone]  National Suicide Prevention Hotline: 1-563.318.7436

## 2024-03-01 NOTE — PSYCH
Virtual Regular Visit    Verification of patient location:    Patient is located at Home in the following state in which I hold an active license [place]      Assessment/Plan:    Problem List Items Addressed This Visit       Moderate mixed bipolar II disorder (HCC) - Primary    Generalized anxiety disorder    PTSD (post-traumatic stress disorder)    Alcohol abuse, in remission    Marijuana use (Chronic)    Cigarette nicotine dependence without complication    Tobacco abuse       Goals addressed in session: Goal 1 and Goal 2          Reason for visit is No chief complaint on file.       Encounter provider Isabel Watts LPC    Provider located at [facility]  [facility] [place]  [address]  [place] [id number] [phone]      Recent Visits  Date Type Provider Dept   02/27/24 Telephone Isabel Watts LPC [facility] [place]   02/27/24 Telemedicine Isabel Watts LPC [facility] [place]   02/23/24 Telemedicine Isabel Watts LPC [facility] [place]   Showing recent visits within past 7 days and meeting all other requirements  Future Appointments  No visits were found meeting these conditions.  Showing future appointments within next 150 days and meeting all other requirements       The patient was identified by name and date of birth. Marie Portillo was informed that this is a telemedicine visit and that the visit is being conducted throughthe Microsoft Teams platform. She agrees to proceed..  My office door was closed. The patient was notified the following individuals were present in the room other group attendees.  She acknowledged consent and understanding of privacy and security of the video platform. The patient has agreed to participate and understands they can discontinue the visit at any time.    Patient is aware this is a billable service.     Subjective  Marie Wianderson is a 58  y.o. female  .    Data: Marie attended the Relaxation group and was guided through EFT tapping on pain, diaphragmatic breathing, other Vagus nerve stimulation techniques, and progressive muscle relaxation. Furthermore, attendees were guided through a body scan transitioning to Mindfulness meditation on creating a safe space to be at, 7 chakra meditation, and a higher-self meditation, and with affirmations for self-forgiveness and against depression and anxiety. Psychoeducation, EFT, Mindfulness-based, and Somatic approaches were used to achieve the deep state of relaxation and to maintain the serene environment for the exercise's purpose.      Assessment: Marie was in euthymic mood and in congruence with broad affect, followed through guidance and completed the exercises well, showed willingness to continue learning and practicing self-care in weekly Relaxation Group as well as individual therapy.      Plan: Marie will practice deep breathing and other techniques based on each occasion to counter stress and will attend the next group to be able to take part in further guided exercises on chosen topics to build on the techniques learned in the beginning.    HPI     Past Medical History:   Diagnosis Date    Bilateral carpal tunnel syndrome 08/20/2019    Carpal tunnel syndrome of right wrist 10/30/2019    Added automatically from request for surgery [id number]    Chlamydia     COPD (chronic obstructive pulmonary disease) (HCC)     Depression     Hypertension     Varicella        Past Surgical History:   Procedure Laterality Date    BREAST BIOPSY Left 20yrs ago benign    CERVICAL SPINE SURGERY      EGD      HERNIA REPAIR      OOPHORECTOMY Right     CO NDSC WRST SURG W/RLS TRANSVRS CARPL LIGM Right 01/21/2020    Procedure: RELEASE CARPAL TUNNEL ENDOSCOPIC;  Surgeon: Sam Nguyen MD;  [facility];  Service: Orthopedics    CO NDSC WRST SURG W/RLS TRANSVRS CARPL LIGM Left 01/28/2020    Procedure: RELEASE CARPAL  TUNNEL ENDOSCOPIC;  Surgeon: Sam Nguyen MD;  [facility];  Service: Orthopedics       Current Outpatient Medications   Medication Sig Dispense Refill    Acetaminophen 500 MG Take 2 capsules by mouth every 4 (four) hours as needed for mild pain      albuterol (PROVENTIL HFA,VENTOLIN HFA) 90 mcg/act inhaler inhale 2 puffs by mouth and INTO THE LUNGS every 6 hours IF NEEDED FOR WHEEZING. 8.5 g 2    buPROPion (WELLBUTRIN XL) 300 mg 24 hr tablet Take 1 tablet (300 mg total) by mouth daily 30 tablet 1    cholecalciferol (VITAMIN D3) 400 units tablet Take 400 Units by mouth daily      fluticasone (FLONASE) 50 mcg/act nasal spray instill 1 spray into each nostril once daily 16 g 2    guaiFENesin (MUCINEX) 600 mg 12 hr tablet Take 2 tablets (1,200 mg total) by mouth every 12 (twelve) hours 60 tablet 3    ibuprofen (MOTRIN) 200 mg tablet Take 200 mg by mouth every 6 (six) hours as needed for mild pain      ipratropium-albuterol (DUO-NEB) 0.5-2.5 mg/3 mL nebulizer solution Take 3 mL by nebulization 4 (four) times a day 360 mL 3    lamoTRIgine (LaMICtal) 200 MG tablet Take 1 tablet (200 mg total) by mouth daily 30 tablet 1    levocetirizine (XYZAL) 5 MG tablet take 1 tablet by mouth every evening 30 tablet 4    methocarbamol (ROBAXIN) 500 mg tablet Take 500 mg by mouth 2 (two) times a day      nicotine (NICODERM CQ) 21 mg/24 hr TD 24 hr patch Place 1 patch on the skin over 24 hours every 24 hours 28 patch 0    nicotine polacrilex (COMMIT) 4 MG lozenge Apply 1 lozenge (4 mg total) to the mouth or throat as needed for smoking cessation 100 each 0    omeprazole (PriLOSEC) 20 mg delayed release capsule take 1 capsule by mouth twice a day 60 capsule 5    senna-docusate sodium (SENOKOT-S) 8.6-50 mg per tablet Take 3 tablets by mouth as needed        Stiolto Respimat 2.5-2.5 MCG/ACT inhaler inhale 2 puffs by mouth and INTO THE LUNGS once daily 4 g 11     No current facility-administered medications for this visit.         No Known Allergies    Review of Systems    Video Exam    There were no vitals filed for this visit.    Physical Exam     03/01/24  Start Time: 0400  Stop Time: 0500  Total Visit Time: 60 minutes

## 2024-03-05 ENCOUNTER — TELEPHONE (OUTPATIENT)
Dept: CARDIOLOGY CLINIC | Facility: CLINIC | Age: 59
End: 2024-03-05

## 2024-03-05 NOTE — TELEPHONE ENCOUNTER
Marie called about echo ordered and being     Called pt back and left a message to call office  Also try mobile number unable to leave a message

## 2024-03-06 ENCOUNTER — TELEMEDICINE (OUTPATIENT)
Dept: BEHAVIORAL/MENTAL HEALTH CLINIC | Facility: CLINIC | Age: 59
End: 2024-03-06
Payer: COMMERCIAL

## 2024-03-06 DIAGNOSIS — F43.10 PTSD (POST-TRAUMATIC STRESS DISORDER): ICD-10-CM

## 2024-03-06 DIAGNOSIS — F17.210 CIGARETTE NICOTINE DEPENDENCE WITHOUT COMPLICATION: ICD-10-CM

## 2024-03-06 DIAGNOSIS — F12.90 MARIJUANA USE: Chronic | ICD-10-CM

## 2024-03-06 DIAGNOSIS — Z72.0 TOBACCO ABUSE: ICD-10-CM

## 2024-03-06 DIAGNOSIS — F41.1 GENERALIZED ANXIETY DISORDER: ICD-10-CM

## 2024-03-06 DIAGNOSIS — F10.11 ALCOHOL ABUSE, IN REMISSION: ICD-10-CM

## 2024-03-06 DIAGNOSIS — F31.81 MODERATE MIXED BIPOLAR II DISORDER (HCC): Primary | ICD-10-CM

## 2024-03-06 PROCEDURE — 90834 PSYTX W PT 45 MINUTES: CPT | Performed by: COUNSELOR

## 2024-03-06 NOTE — PSYCH
Virtual Regular Visit    Verification of patient location:    Patient is located at Home in the following state in which I hold an active license [place]      Assessment/Plan:    Problem List Items Addressed This Visit       Moderate mixed bipolar II disorder (HCC) - Primary    Generalized anxiety disorder    PTSD (post-traumatic stress disorder)    Alcohol abuse, in remission    Marijuana use (Chronic)    Cigarette nicotine dependence without complication    Tobacco abuse       Goals addressed in session: Goal 1 and Goal 2          Reason for visit is No chief complaint on file.       Encounter provider Isabel Watts LPC    Provider located at [facility]  [facility] [place] [id number] [phone]      Recent Visits  Date Type Provider Dept   03/01/24 Telemedicine Isabel Watts LPC [facility] [place]   Showing recent visits within past 7 days and meeting all other requirements  Future Appointments  No visits were found meeting these conditions.  Showing future appointments within next 150 days and meeting all other requirements       The patient was identified by name and date of birth. Marie Portillo was informed that this is a telemedicine visit and that the visit is being conducted throughthe Epic Embedded platform. She agrees to proceed..  My office door was closed. No one else was in the room.  She acknowledged consent and understanding of privacy and security of the video platform. The patient has agreed to participate and understands they can discontinue the visit at any time.    Patient is aware this is a billable service.     Subjective  Marie Portillo is a 58 y.o. female .      HPI     Past Medical History:   Diagnosis Date    Bilateral carpal tunnel syndrome 08/20/2019    Carpal tunnel syndrome of right wrist 10/30/2019    Added automatically from request for surgery [id number]    Chlamydia      COPD (chronic obstructive pulmonary disease) (HCC)     Depression     Hypertension     Varicella        Past Surgical History:   Procedure Laterality Date    BREAST BIOPSY Left 20yrs ago benign    CERVICAL SPINE SURGERY      EGD      HERNIA REPAIR      OOPHORECTOMY Right     CO NDSC WRST SURG W/RLS TRANSVRS CARPL LIGM Right 01/21/2020    Procedure: RELEASE CARPAL TUNNEL ENDOSCOPIC;  Surgeon: Sam Nguyen MD;  [facility];  Service: Orthopedics    CO NDSC WRST SURG W/RLS TRANSVRS CARPL LIGM Left 01/28/2020    Procedure: RELEASE CARPAL TUNNEL ENDOSCOPIC;  Surgeon: Sam Nguyen MD;  [facility];  Service: Orthopedics       Current Outpatient Medications   Medication Sig Dispense Refill    Acetaminophen 500 MG Take 2 capsules by mouth every 4 (four) hours as needed for mild pain      albuterol (PROVENTIL HFA,VENTOLIN HFA) 90 mcg/act inhaler inhale 2 puffs by mouth and INTO THE LUNGS every 6 hours IF NEEDED FOR WHEEZING. 8.5 g 2    buPROPion (WELLBUTRIN XL) 300 mg 24 hr tablet Take 1 tablet (300 mg total) by mouth daily 30 tablet 1    cholecalciferol (VITAMIN D3) 400 units tablet Take 400 Units by mouth daily      fluticasone (FLONASE) 50 mcg/act nasal spray instill 1 spray into each nostril once daily 16 g 2    guaiFENesin (MUCINEX) 600 mg 12 hr tablet Take 2 tablets (1,200 mg total) by mouth every 12 (twelve) hours 60 tablet 3    ibuprofen (MOTRIN) 200 mg tablet Take 200 mg by mouth every 6 (six) hours as needed for mild pain      ipratropium-albuterol (DUO-NEB) 0.5-2.5 mg/3 mL nebulizer solution Take 3 mL by nebulization 4 (four) times a day 360 mL 3    lamoTRIgine (LaMICtal) 200 MG tablet Take 1 tablet (200 mg total) by mouth daily 30 tablet 1    levocetirizine (XYZAL) 5 MG tablet take 1 tablet by mouth every evening 30 tablet 4    methocarbamol (ROBAXIN) 500 mg tablet Take 500 mg by mouth 2 (two) times a day      nicotine (NICODERM CQ) 21 mg/24 hr TD 24 hr patch Place 1 patch on the  skin over 24 hours every 24 hours 28 patch 0    nicotine polacrilex (COMMIT) 4 MG lozenge Apply 1 lozenge (4 mg total) to the mouth or throat as needed for smoking cessation 100 each 0    omeprazole (PriLOSEC) 20 mg delayed release capsule take 1 capsule by mouth twice a day 60 capsule 5    senna-docusate sodium (SENOKOT-S) 8.6-50 mg per tablet Take 3 tablets by mouth as needed        Stiolto Respimat 2.5-2.5 MCG/ACT inhaler inhale 2 puffs by mouth and INTO THE LUNGS once daily 4 g 11     No current facility-administered medications for this visit.        No Known Allergies    Review of Systems    Video Exam    There were no vitals filed for this visit.    Physical Exam
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
## 2024-03-08 ENCOUNTER — TELEMEDICINE (OUTPATIENT)
Dept: BEHAVIORAL/MENTAL HEALTH CLINIC | Facility: CLINIC | Age: 59
End: 2024-03-08

## 2024-03-08 DIAGNOSIS — F12.90 MARIJUANA USE: Chronic | ICD-10-CM

## 2024-03-08 DIAGNOSIS — F31.81 MODERATE MIXED BIPOLAR II DISORDER (HCC): Primary | ICD-10-CM

## 2024-03-08 DIAGNOSIS — F41.1 GENERALIZED ANXIETY DISORDER: ICD-10-CM

## 2024-03-08 DIAGNOSIS — F10.11 ALCOHOL ABUSE, IN REMISSION: ICD-10-CM

## 2024-03-08 DIAGNOSIS — F43.10 PTSD (POST-TRAUMATIC STRESS DISORDER): ICD-10-CM

## 2024-03-08 DIAGNOSIS — Z72.0 TOBACCO ABUSE: ICD-10-CM

## 2024-03-08 DIAGNOSIS — F17.210 CIGARETTE NICOTINE DEPENDENCE WITHOUT COMPLICATION: ICD-10-CM

## 2024-03-08 NOTE — PSYCH
Virtual Regular Visit    Verification of patient location:    Patient is located at Home in the following state in which I hold an active license [place]      Assessment/Plan:    Problem List Items Addressed This Visit       Moderate mixed bipolar II disorder (HCC) - Primary    Generalized anxiety disorder    PTSD (post-traumatic stress disorder)    Alcohol abuse, in remission    Marijuana use (Chronic)    Cigarette nicotine dependence without complication    Tobacco abuse       Goals addressed in session: Goal 1 and Goal 2          Reason for visit is No chief complaint on file.       Encounter provider Isabel Watts LPC    Provider located at [facility] [place]  [facility] [place]  [address]  [place] [id number] [phone]      Recent Visits  Date Type Provider Dept   03/06/24 Telemedicine Isabel Watts LPC [facility] [place]   03/01/24 Telemedicine Isabel Watts LPC [facility] [place]   Showing recent visits within past 7 days and meeting all other requirements  Future Appointments  No visits were found meeting these conditions.  Showing future appointments within next 150 days and meeting all other requirements       The patient was identified by name and date of birth. Marie Portillo was informed that this is a telemedicine visit and that the visit is being conducted throughthe Microsoft Teams platform. She agrees to proceed..  My office door was closed. The patient was notified the following individuals were present in the room other group members.  She acknowledged consent and understanding of privacy and security of the video platform. The patient has agreed to participate and understands they can discontinue the visit at any time.    Patient is aware this is a billable service.     Subjective  Marie Portillo is a 58 y.o. female  .    Data: Marie attended the Relaxation group and was guided  through EFT tapping on trauma, diaphragmatic breathing, other Vagus nerve stimulation techniques, and progressive muscle relaxation. Furthermore, attendees were guided through a body scan transitioning to Mindfulness meditation on riding on a tranquility train, imagery for accessing one's tree of knowledge, and intentions/manifestations of gratitude, ending with affirmations for confidence, self-compassion, and acceptance. Psychoeducation, EFT, Mindfulness-based, and Somatic approaches were used to achieve the deep state of relaxation and to maintain the serene environment for the exercise's purpose.      Assessment: Marie was in euthymic mood and in congruence with broad affect, followed through guidance and completed the exercises well, showed willingness to continue learning and practicing self-care in weekly Relaxation Group as well as individual therapy.      Plan: Marie will practice deep breathing and other techniques based on each occasion to counter stress and will attend the next group to be able to take part in further guided exercises on chosen topics to build on the techniques learned in the beginning.    HPI     Past Medical History:   Diagnosis Date    Bilateral carpal tunnel syndrome 08/20/2019    Carpal tunnel syndrome of right wrist 10/30/2019    Added automatically from request for surgery [id number]    Chlamydia     COPD (chronic obstructive pulmonary disease) (HCC)     Depression     Hypertension     Varicella        Past Surgical History:   Procedure Laterality Date    BREAST BIOPSY Left 20yrs ago benign    CERVICAL SPINE SURGERY      EGD      HERNIA REPAIR      OOPHORECTOMY Right     [place] [facility] WRST SURG W/RLS TRANSVRS CARPL LIGM Right 01/21/2020    Procedure: RELEASE CARPAL TUNNEL ENDOSCOPIC;  Surgeon: Sam Nguyen MD;  [facility];  Service: Orthopedics    [place] [facility] WRST SURG W/RLS TRANSVRS CARPL LIGM Left 01/28/2020    Procedure: RELEASE CARPAL TUNNEL ENDOSCOPIC;  Surgeon: Sam  MD Patrick;  [facility];  Service: Orthopedics       Current Outpatient Medications   Medication Sig Dispense Refill    Acetaminophen 500 MG Take 2 capsules by mouth every 4 (four) hours as needed for mild pain      albuterol (PROVENTIL HFA,VENTOLIN HFA) 90 mcg/act inhaler inhale 2 puffs by mouth and INTO THE LUNGS every 6 hours IF NEEDED FOR WHEEZING. 8.5 g 2    buPROPion (WELLBUTRIN XL) 300 mg 24 hr tablet Take 1 tablet (300 mg total) by mouth daily 30 tablet 1    cholecalciferol (VITAMIN D3) 400 units tablet Take 400 Units by mouth daily      fluticasone (FLONASE) 50 mcg/act nasal spray instill 1 spray into each nostril once daily 16 g 2    guaiFENesin (MUCINEX) 600 mg 12 hr tablet Take 2 tablets (1,200 mg total) by mouth every 12 (twelve) hours 60 tablet 3    ibuprofen (MOTRIN) 200 mg tablet Take 200 mg by mouth every 6 (six) hours as needed for mild pain      ipratropium-albuterol (DUO-NEB) 0.5-2.5 mg/3 mL nebulizer solution Take 3 mL by nebulization 4 (four) times a day 360 mL 3    lamoTRIgine (LaMICtal) 200 MG tablet Take 1 tablet (200 mg total) by mouth daily 30 tablet 1    levocetirizine (XYZAL) 5 MG tablet take 1 tablet by mouth every evening 30 tablet 4    methocarbamol (ROBAXIN) 500 mg tablet Take 500 mg by mouth 2 (two) times a day      nicotine (NICODERM CQ) 21 mg/24 hr TD 24 hr patch Place 1 patch on the skin over 24 hours every 24 hours 28 patch 0    nicotine polacrilex (COMMIT) 4 MG lozenge Apply 1 lozenge (4 mg total) to the mouth or throat as needed for smoking cessation 100 each 0    omeprazole (PriLOSEC) 20 mg delayed release capsule take 1 capsule by mouth twice a day 60 capsule 5    senna-docusate sodium (SENOKOT-S) 8.6-50 mg per tablet Take 3 tablets by mouth as needed        Stiolto Respimat 2.5-2.5 MCG/ACT inhaler inhale 2 puffs by mouth and INTO THE LUNGS once daily 4 g 11     No current facility-administered medications for this visit.        No Known Allergies    Review of  Systems    Video Exam    There were no vitals filed for this visit.    Physical Exam       03/08/24  Start Time: 0400  Stop Time: 0500  Total Visit Time: 60 minutes

## 2024-03-11 ENCOUNTER — TELEPHONE (OUTPATIENT)
Dept: PSYCHIATRY | Facility: CLINIC | Age: 59
End: 2024-03-11

## 2024-03-13 ENCOUNTER — TELEMEDICINE (OUTPATIENT)
Dept: BEHAVIORAL/MENTAL HEALTH CLINIC | Facility: CLINIC | Age: 59
End: 2024-03-13

## 2024-03-13 DIAGNOSIS — F17.210 CIGARETTE NICOTINE DEPENDENCE WITHOUT COMPLICATION: ICD-10-CM

## 2024-03-13 DIAGNOSIS — F41.1 GENERALIZED ANXIETY DISORDER: ICD-10-CM

## 2024-03-13 DIAGNOSIS — F12.90 MARIJUANA USE: Chronic | ICD-10-CM

## 2024-03-13 DIAGNOSIS — F10.11 ALCOHOL ABUSE, IN REMISSION: ICD-10-CM

## 2024-03-13 DIAGNOSIS — F43.10 PTSD (POST-TRAUMATIC STRESS DISORDER): ICD-10-CM

## 2024-03-13 DIAGNOSIS — F31.81 MODERATE MIXED BIPOLAR II DISORDER (HCC): Primary | ICD-10-CM

## 2024-03-13 DIAGNOSIS — Z72.0 TOBACCO ABUSE: ICD-10-CM

## 2024-03-13 NOTE — PSYCH
Virtual Regular Visit    Verification of patient location:    Patient is located at Home in the following state in which I hold an active license [place]      Assessment/Plan:    Problem List Items Addressed This Visit       Moderate mixed bipolar II disorder (HCC) - Primary    Generalized anxiety disorder    PTSD (post-traumatic stress disorder)    Alcohol abuse, in remission    Marijuana use (Chronic)    Cigarette nicotine dependence without complication    Tobacco abuse       Goals addressed in session: Goal 1 and Goal 2          Reason for visit is No chief complaint on file.       Encounter provider Isabel Watts LPC    Provider located at [facility] [place]  [facility] [place] [id number] [phone]      Recent Visits  Date Type Provider Dept   03/11/24 Telephone Isabel Watts LPC [facility] [place]   03/08/24 Telemedicine Isabel Watts LPC [facility] [place]   03/06/24 Telemedicine Isabel Watts LPC [facility] [place]   Showing recent visits within past 7 days and meeting all other requirements  Future Appointments  No visits were found meeting these conditions.  Showing future appointments within next 150 days and meeting all other requirements       The patient was identified by name and date of birth. Marie Portillo was informed that this is a telemedicine visit and that the visit is being conducted throughthe Epic Embedded platform. She agrees to proceed..  My office door was closed. No one else was in the room.  She acknowledged consent and understanding of privacy and security of the video platform. The patient has agreed to participate and understands they can discontinue the visit at any time.    Patient is aware this is a billable service.     Subjective  Marie Portillo is a 58 y.o. female  .      HPI     Past Medical History:   Diagnosis Date     Bilateral carpal tunnel syndrome 08/20/2019    Carpal tunnel syndrome of right wrist 10/30/2019    Added automatically from request for surgery [id number]    Chlamydia     COPD (chronic obstructive pulmonary disease) (HCC)     Depression     Hypertension     Varicella        Past Surgical History:   Procedure Laterality Date    BREAST BIOPSY Left 20yrs ago benign    CERVICAL SPINE SURGERY      EGD      HERNIA REPAIR      OOPHORECTOMY Right     [place] [facility] WRST SURG W/RLS TRANSVRS CARPL LIGM Right 01/21/2020    Procedure: RELEASE CARPAL TUNNEL ENDOSCOPIC;  Surgeon: Sam Nguyen MD;  [facility];  Service: Orthopedics    [place] [facility] WRST SURG W/RLS TRANSVRS CARPL LIGM Left 01/28/2020    Procedure: RELEASE CARPAL TUNNEL ENDOSCOPIC;  Surgeon: Sam Nguyen MD;  [facility];  Service: Orthopedics       Current Outpatient Medications   Medication Sig Dispense Refill    Acetaminophen 500 MG Take 2 capsules by mouth every 4 (four) hours as needed for mild pain      albuterol (PROVENTIL HFA,VENTOLIN HFA) 90 mcg/act inhaler inhale 2 puffs by mouth and INTO THE LUNGS every 6 hours IF NEEDED FOR WHEEZING. 8.5 g 2    buPROPion (WELLBUTRIN XL) 300 mg 24 hr tablet Take 1 tablet (300 mg total) by mouth daily 30 tablet 1    cholecalciferol (VITAMIN D3) 400 units tablet Take 400 Units by mouth daily      fluticasone (FLONASE) 50 mcg/act nasal spray instill 1 spray into each nostril once daily 16 g 2    guaiFENesin (MUCINEX) 600 mg 12 hr tablet Take 2 tablets (1,200 mg total) by mouth every 12 (twelve) hours 60 tablet 3    ibuprofen (MOTRIN) 200 mg tablet Take 200 mg by mouth every 6 (six) hours as needed for mild pain      ipratropium-albuterol (DUO-NEB) 0.5-2.5 mg/3 mL nebulizer solution Take 3 mL by nebulization 4 (four) times a day 360 mL 3    lamoTRIgine (LaMICtal) 200 MG tablet Take 1 tablet (200 mg total) by mouth daily 30 tablet 1    levocetirizine (XYZAL) 5 MG tablet take 1 tablet by mouth every evening 30  tablet 4    methocarbamol (ROBAXIN) 500 mg tablet Take 500 mg by mouth 2 (two) times a day      nicotine (NICODERM CQ) 21 mg/24 hr TD 24 hr patch Place 1 patch on the skin over 24 hours every 24 hours 28 patch 0    nicotine polacrilex (COMMIT) 4 MG lozenge Apply 1 lozenge (4 mg total) to the mouth or throat as needed for smoking cessation 100 each 0    omeprazole (PriLOSEC) 20 mg delayed release capsule take 1 capsule by mouth twice a day 60 capsule 5    senna-docusate sodium (SENOKOT-S) 8.6-50 mg per tablet Take 3 tablets by mouth as needed        Stiolto Respimat 2.5-2.5 MCG/ACT inhaler inhale 2 puffs by mouth and INTO THE LUNGS once daily 4 g 11     No current facility-administered medications for this visit.        No Known Allergies    Review of Systems    Video Exam    There were no vitals filed for this visit.    Physical Exam

## 2024-03-13 NOTE — PSYCH
"Behavioral Health Psychotherapy Progress Note    Psychotherapy Provided: Individual Psychotherapy     1. Moderate mixed bipolar II disorder (HCC)        2. Generalized anxiety disorder        3. PTSD (post-traumatic stress disorder)        4. Tobacco abuse        5. Marijuana use        6. Cigarette nicotine dependence without complication        7. Alcohol abuse, in remission            Goals addressed in session: Goal 1 and Goal 2     DATA: Marie stayed on the phone for the first 5 mins to get guidance to find and use the provided link. She reported feeling intense emotions and was observed crying and sobbing, while trying to explain that she \"not worthy to talk to\" and \"not good\" and was encouraged to go and submerge her face in ice-cold water, to put cold on the back of her neck and to breath deeply. She came back and was feeling better and practiced Somatic Experiencing to identify her body resource and go into the re-negotiation vortex and then find the place where she feels calm or grounded.  During this session, this clinician used the following therapeutic modalities: Client-centered Therapy, Cognitive Behavioral Therapy, Dialectical Behavior Therapy, Mindfulness-based Strategies, and Supportive Psychotherapy and Somatic Experiencing.     Substance Abuse was addressed during this session. If the client is diagnosed with a co-occurring substance use disorder, please indicate any changes in the frequency or amount of use: light smoking daily. Stage of change for addressing substance use diagnoses: Pre-contemplation    ASSESSMENT:  Marie Portillo presents with a Euthymic/ normal, Anxious, and Depressed mood.     her affect is Normal range and intensity and Tearful, which is congruent, with her mood and the content of the session. The client has made progress on their goals.    Marie seemed very emotional but was redirectable towards practicing Somatic Experiencing. Marie Portillo presents with a low risk of " "suicide, low risk of self-harm, and none risk of harm to others.    For any risk assessment that surpasses a \"low\" rating, a safety plan must be developed.    A safety plan was indicated: no  If yes, describe in detail NA    PLAN: Between sessions, Marie Portillo will use Somatic language to describe . At the next session, the therapist will use Client-centered Therapy, Cognitive Behavioral Therapy, Dialectical Behavior Therapy, Mindfulness-based Strategies, Supportive Psychotherapy, and Somatic Experiencing  to address self-care and renegotiating trauma.    Behavioral Health Treatment Plan and Discharge Planning: Marie Portillo is aware of and agrees to continue to work on their treatment plan. They have identified and are working toward their discharge goals. yes    Visit start and stop times:    03/13/24  Start Time: 1155  Stop Time: 1245  Total Visit Time: 50 minutes  "

## 2024-03-15 ENCOUNTER — TELEMEDICINE (OUTPATIENT)
Dept: BEHAVIORAL/MENTAL HEALTH CLINIC | Facility: CLINIC | Age: 59
End: 2024-03-15

## 2024-03-15 DIAGNOSIS — F10.11 ALCOHOL ABUSE, IN REMISSION: ICD-10-CM

## 2024-03-15 DIAGNOSIS — F41.1 GENERALIZED ANXIETY DISORDER: ICD-10-CM

## 2024-03-15 DIAGNOSIS — Z72.0 TOBACCO ABUSE: ICD-10-CM

## 2024-03-15 DIAGNOSIS — F31.81 MODERATE MIXED BIPOLAR II DISORDER (HCC): Primary | ICD-10-CM

## 2024-03-15 DIAGNOSIS — F43.10 PTSD (POST-TRAUMATIC STRESS DISORDER): ICD-10-CM

## 2024-03-15 NOTE — PSYCH
Virtual Regular Visit    Verification of patient location:    Patient is located at Home in the following state in which I hold an active license [place]      Assessment/Plan:    Problem List Items Addressed This Visit       Moderate mixed bipolar II disorder (HCC) - Primary    Generalized anxiety disorder    PTSD (post-traumatic stress disorder)    Alcohol abuse, in remission    Tobacco abuse       Goals addressed in session: Goal 1 and Goal 2          Reason for visit is No chief complaint on file.       Encounter provider Isabel Watts LPC    Provider located at [facility] [place]  [facility] [place]  [address]  [place] [id number] [phone]      Recent Visits  Date Type Provider Dept   03/13/24 Telemedicine Isabel Watts LPC [facility] [place]   03/11/24 Telephone Isabel Watts LPC [facility] [place]   03/08/24 Telemedicine Isabel Watts LPC [facility] [place]   Showing recent visits within past 7 days and meeting all other requirements  Future Appointments  No visits were found meeting these conditions.  Showing future appointments within next 150 days and meeting all other requirements       The patient was identified by name and date of birth. Marie Portillo was informed that this is a telemedicine visit and that the visit is being conducted throughthe Microsoft Teams platform. She agrees to proceed..  My office door was closed. The patient was notified the following individuals were present in the room other group members.  She acknowledged consent and understanding of privacy and security of the video platform. The patient has agreed to participate and understands they can discontinue the visit at any time.    Patient is aware this is a billable service.     Subjective  Marie Portillo is a 58 y.o. female  .    Data: Marie attended the Relaxation group and was guided through EFT  tapping on anxiety, diaphragmatic breathing, other Vagus nerve stimulation techniques, and progressive muscle relaxation. Furthermore, attendees were guided through a body scan transitioning to Mindfulness meditation on self-love for creating a safe space to deal with anger and resentment, and turning to self-compassion with love blanket imagery, ending with affirmations for self-acceptance, self-forgiveness, and letting go of the past. Psychoeducation, EFT, Mindfulness-based, and Somatic approaches were used to achieve the deep state of relaxation and to maintain the serene environment for the exercise's purpose.      Assessment: Marie was in euthymic mood and in congruence with broad affect, followed through guidance and completed the exercises well, showed willingness to continue learning and practicing self-care in weekly Relaxation Group as well as individual therapy.      Plan: Marie will practice deep breathing and other techniques based on each occasion to counter stress and will attend the next group to be able to take part in further guided exercises on chosen topics to build on the techniques learned in the beginning.    HPI     Past Medical History:   Diagnosis Date    Bilateral carpal tunnel syndrome 08/20/2019    Carpal tunnel syndrome of right wrist 10/30/2019    Added automatically from request for surgery [id number]    Chlamydia     COPD (chronic obstructive pulmonary disease) (HCC)     Depression     Hypertension     Varicella        Past Surgical History:   Procedure Laterality Date    BREAST BIOPSY Left 20yrs ago benign    CERVICAL SPINE SURGERY      EGD      HERNIA REPAIR      OOPHORECTOMY Right     [place] [facility] WRST SURG W/RLS TRANSVRS CARPL LIGM Right 01/21/2020    Procedure: RELEASE CARPAL TUNNEL ENDOSCOPIC;  Surgeon: Sam Nguyen MD;  [facility];  Service: Orthopedics    [place] ND[facility] WRST SURG W/RLS TRANSVRS CARPL LIGM Left 01/28/2020    Procedure: RELEASE CARPAL TUNNEL ENDOSCOPIC;   Surgeon: Sam Nguyen MD;  [facility];  Service: Orthopedics       Current Outpatient Medications   Medication Sig Dispense Refill    Acetaminophen 500 MG Take 2 capsules by mouth every 4 (four) hours as needed for mild pain      albuterol (PROVENTIL HFA,VENTOLIN HFA) 90 mcg/act inhaler inhale 2 puffs by mouth and INTO THE LUNGS every 6 hours IF NEEDED FOR WHEEZING. 8.5 g 2    buPROPion (WELLBUTRIN XL) 300 mg 24 hr tablet Take 1 tablet (300 mg total) by mouth daily 30 tablet 1    cholecalciferol (VITAMIN D3) 400 units tablet Take 400 Units by mouth daily      fluticasone (FLONASE) 50 mcg/act nasal spray instill 1 spray into each nostril once daily 16 g 2    guaiFENesin (MUCINEX) 600 mg 12 hr tablet Take 2 tablets (1,200 mg total) by mouth every 12 (twelve) hours 60 tablet 3    ibuprofen (MOTRIN) 200 mg tablet Take 200 mg by mouth every 6 (six) hours as needed for mild pain      ipratropium-albuterol (DUO-NEB) 0.5-2.5 mg/3 mL nebulizer solution Take 3 mL by nebulization 4 (four) times a day 360 mL 3    lamoTRIgine (LaMICtal) 200 MG tablet Take 1 tablet (200 mg total) by mouth daily 30 tablet 1    levocetirizine (XYZAL) 5 MG tablet take 1 tablet by mouth every evening 30 tablet 4    methocarbamol (ROBAXIN) 500 mg tablet Take 500 mg by mouth 2 (two) times a day      nicotine (NICODERM CQ) 21 mg/24 hr TD 24 hr patch Place 1 patch on the skin over 24 hours every 24 hours 28 patch 0    nicotine polacrilex (COMMIT) 4 MG lozenge Apply 1 lozenge (4 mg total) to the mouth or throat as needed for smoking cessation 100 each 0    omeprazole (PriLOSEC) 20 mg delayed release capsule take 1 capsule by mouth twice a day 60 capsule 5    senna-docusate sodium (SENOKOT-S) 8.6-50 mg per tablet Take 3 tablets by mouth as needed        Stiolto Respimat 2.5-2.5 MCG/ACT inhaler inhale 2 puffs by mouth and INTO THE LUNGS once daily 4 g 11     No current facility-administered medications for this visit.        No Known  Allergies    Review of Systems    Video Exam    There were no vitals filed for this visit.    Physical Exam     03/15/24  Start Time: 0400  Stop Time: 0500  Total Visit Time: 60 minutes

## 2024-03-20 ENCOUNTER — TELEMEDICINE (OUTPATIENT)
Dept: BEHAVIORAL/MENTAL HEALTH CLINIC | Facility: CLINIC | Age: 59
End: 2024-03-20

## 2024-03-20 DIAGNOSIS — F41.1 GENERALIZED ANXIETY DISORDER: ICD-10-CM

## 2024-03-20 DIAGNOSIS — F12.90 MARIJUANA USE: Chronic | ICD-10-CM

## 2024-03-20 DIAGNOSIS — F17.210 CIGARETTE NICOTINE DEPENDENCE WITHOUT COMPLICATION: ICD-10-CM

## 2024-03-20 DIAGNOSIS — F10.11 ALCOHOL ABUSE, IN REMISSION: ICD-10-CM

## 2024-03-20 DIAGNOSIS — Z72.0 TOBACCO ABUSE: ICD-10-CM

## 2024-03-20 DIAGNOSIS — F31.81 MODERATE MIXED BIPOLAR II DISORDER (HCC): Primary | ICD-10-CM

## 2024-03-20 DIAGNOSIS — F43.10 PTSD (POST-TRAUMATIC STRESS DISORDER): ICD-10-CM

## 2024-03-20 NOTE — PSYCH
Behavioral Health Psychotherapy Progress Note    Psychotherapy Provided: Individual Psychotherapy     1. Moderate mixed bipolar II disorder (HCC)        2. Generalized anxiety disorder        3. PTSD (post-traumatic stress disorder)        4. Tobacco abuse        5. Cigarette nicotine dependence without complication        6. Marijuana use        7. Alcohol abuse, in remission            Goals addressed in session: Goal 1 and Goal 2     DATA: Marie addressed becoming upset when challenged in cognitive tasks and was encouraged to raise her awareness and work with her muscles to provide the message to her nervous system that she is not in a survival mode and to keep thinking as much clearly as possible, to use patience and compassion towards herself and the process. Marie was reminded about the Vagus nerve function and the exercises that she learned to help the nerve do its job better.  Marie addressed fear of death as well and was allowed to express the unknown aspect of it.   During this session, this clinician used the following therapeutic modalities: Client-centered Therapy, Cognitive Behavioral Therapy, Dialectical Behavior Therapy, Mindfulness-based Strategies, and Supportive Psychotherapy and Existential.     Substance Abuse was not addressed during this session. If the client is diagnosed with a co-occurring substance use disorder, please indicate any changes in the frequency or amount of use: NA. Stage of change for addressing substance use diagnoses: Pre-contemplation    ASSESSMENT:  Marie Portillo presents with a Euthymic/ normal, Anxious, and Depressed mood.     her affect is Normal range and intensity and Constricted, which is congruent, with her mood and the content of the session. The client has made progress on their goals.    Marie seemed more present and more focused this time, able to gain insight about her struggle. Marie Portillo presents with a low risk of suicide, minimal risk of self-harm, and  "none risk of harm to others.    For any risk assessment that surpasses a \"low\" rating, a safety plan must be developed.    A safety plan was indicated: no  If yes, describe in detail NA    PLAN: Between sessions, Marie Portillo will use learned Core Mindfulness skills to observe her emotions. At the next session, the therapist will use Client-centered Therapy, Cognitive Behavioral Therapy, Dialectical Behavior Therapy, Mindfulness-based Strategies, and Supportive Psychotherapy and Somatic Experiencing to address felt sense, self-care, forgetfulness, cognitive exercise.    Behavioral Health Treatment Plan and Discharge Planning: Marie Portillo is aware of and agrees to continue to work on their treatment plan. They have identified and are working toward their discharge goals. yes    Visit start and stop times:    03/20/24  Start Time: 0854  Stop Time: 0935  Total Visit Time: 41 minutes  "

## 2024-03-20 NOTE — PSYCH
Virtual Regular Visit    Verification of patient location:    Patient is located at Home in the following state in which I hold an active license [place]      Assessment/Plan:    Problem List Items Addressed This Visit       Moderate mixed bipolar II disorder (HCC) - Primary    Generalized anxiety disorder    PTSD (post-traumatic stress disorder)    Alcohol abuse, in remission    Marijuana use (Chronic)    Cigarette nicotine dependence without complication    Tobacco abuse       Goals addressed in session: Goal 1 and Goal 2          Reason for visit is No chief complaint on file.       Encounter provider Isabel Watts LPC    Provider located at [facility]  [facility] [address]  [place] [id number] [phone]      Recent Visits  Date Type Provider Dept   03/15/24 Telemedicine Isabel Watts LPC [facility] [place]   03/13/24 Telemedicine Isabel Watts LPC [facility] [place]   Showing recent visits within past 7 days and meeting all other requirements  Future Appointments  No visits were found meeting these conditions.  Showing future appointments within next 150 days and meeting all other requirements       The patient was identified by name and date of birth. Marie Portillo was informed that this is a telemedicine visit and that the visit is being conducted throughthe Epic Embedded platform. She agrees to proceed..  My office door was closed. No one else was in the room.  She acknowledged consent and understanding of privacy and security of the video platform. The patient has agreed to participate and understands they can discontinue the visit at any time.    Patient is aware this is a billable service.     Subjective  Marie Portillo is a 58 y.o. female  .      HPI     Past Medical History:   Diagnosis Date    Bilateral carpal tunnel syndrome 08/20/2019    Carpal tunnel syndrome of right  wrist 10/30/2019    Added automatically from request for surgery [id number]    Chlamydia     COPD (chronic obstructive pulmonary disease) (HCC)     Depression     Hypertension     Varicella        Past Surgical History:   Procedure Laterality Date    BREAST BIOPSY Left 20yrs ago benign    CERVICAL SPINE SURGERY      EGD      HERNIA REPAIR      OOPHORECTOMY Right     [place] [facility] WRST SURG W/RLS TRANSVRS CARPL LIGM Right 01/21/2020    Procedure: RELEASE CARPAL TUNNEL ENDOSCOPIC;  Surgeon: Sam Nguyen MD;  [facility];  Service: Orthopedics    [place] ND[place] WRST SURG W/RLS TRANSVRS CARPL LIGM Left 01/28/2020    Procedure: RELEASE CARPAL TUNNEL ENDOSCOPIC;  Surgeon: Sam Nguyen MD;  [facility];  Service: Orthopedics       Current Outpatient Medications   Medication Sig Dispense Refill    Acetaminophen 500 MG Take 2 capsules by mouth every 4 (four) hours as needed for mild pain      albuterol (PROVENTIL HFA,VENTOLIN HFA) 90 mcg/act inhaler inhale 2 puffs by mouth and INTO THE LUNGS every 6 hours IF NEEDED FOR WHEEZING. 8.5 g 2    buPROPion (WELLBUTRIN XL) 300 mg 24 hr tablet Take 1 tablet (300 mg total) by mouth daily 30 tablet 1    cholecalciferol (VITAMIN D3) 400 units tablet Take 400 Units by mouth daily      fluticasone (FLONASE) 50 mcg/act nasal spray instill 1 spray into each nostril once daily 16 g 2    guaiFENesin (MUCINEX) 600 mg 12 hr tablet Take 2 tablets (1,200 mg total) by mouth every 12 (twelve) hours 60 tablet 3    ibuprofen (MOTRIN) 200 mg tablet Take 200 mg by mouth every 6 (six) hours as needed for mild pain      ipratropium-albuterol (DUO-NEB) 0.5-2.5 mg/3 mL nebulizer solution Take 3 mL by nebulization 4 (four) times a day 360 mL 3    lamoTRIgine (LaMICtal) 200 MG tablet Take 1 tablet (200 mg total) by mouth daily 30 tablet 1    levocetirizine (XYZAL) 5 MG tablet take 1 tablet by mouth every evening 30 tablet 4    methocarbamol (ROBAXIN) 500 mg tablet Take 500 mg by mouth 2 (two)  times a day      nicotine (NICODERM CQ) 21 mg/24 hr TD 24 hr patch Place 1 patch on the skin over 24 hours every 24 hours 28 patch 0    nicotine polacrilex (COMMIT) 4 MG lozenge Apply 1 lozenge (4 mg total) to the mouth or throat as needed for smoking cessation 100 each 0    omeprazole (PriLOSEC) 20 mg delayed release capsule take 1 capsule by mouth twice a day 60 capsule 5    senna-docusate sodium (SENOKOT-S) 8.6-50 mg per tablet Take 3 tablets by mouth as needed        Stiolto Respimat 2.5-2.5 MCG/ACT inhaler inhale 2 puffs by mouth and INTO THE LUNGS once daily 4 g 11     No current facility-administered medications for this visit.        No Known Allergies    Review of Systems    Video Exam    There were no vitals filed for this visit.    Physical Exam

## 2024-03-22 ENCOUNTER — TELEMEDICINE (OUTPATIENT)
Dept: BEHAVIORAL/MENTAL HEALTH CLINIC | Facility: CLINIC | Age: 59
End: 2024-03-22

## 2024-03-22 DIAGNOSIS — F41.1 GENERALIZED ANXIETY DISORDER: ICD-10-CM

## 2024-03-22 DIAGNOSIS — F31.81 MODERATE MIXED BIPOLAR II DISORDER (HCC): Primary | ICD-10-CM

## 2024-03-22 DIAGNOSIS — F17.210 CIGARETTE NICOTINE DEPENDENCE WITHOUT COMPLICATION: ICD-10-CM

## 2024-03-22 DIAGNOSIS — Z72.0 TOBACCO ABUSE: ICD-10-CM

## 2024-03-22 DIAGNOSIS — F43.10 PTSD (POST-TRAUMATIC STRESS DISORDER): ICD-10-CM

## 2024-03-22 DIAGNOSIS — F10.11 ALCOHOL ABUSE, IN REMISSION: ICD-10-CM

## 2024-03-22 DIAGNOSIS — F12.90 MARIJUANA USE: Chronic | ICD-10-CM

## 2024-03-22 NOTE — PSYCH
Virtual Regular Visit    Verification of patient location:    Patient is located at Home in the following state in which I hold an active license [place]      Assessment/Plan:    Problem List Items Addressed This Visit       Moderate mixed bipolar II disorder (HCC) - Primary    Generalized anxiety disorder    PTSD (post-traumatic stress disorder)    Alcohol abuse, in remission    Marijuana use (Chronic)    Cigarette nicotine dependence without complication    Tobacco abuse       Goals addressed in session: Goal 1 and Goal 2          Reason for visit is No chief complaint on file.       Encounter provider Isabel Watts LPC    Provider located at [facility] [place]  [facility] [place]  [address]  [place] [id number] [phone]      Recent Visits  Date Type Provider Dept   03/20/24 Telemedicine Isabel Watts LPC [facility] [place]   03/15/24 Telemedicine Isabel Watts LPC [facility] [place]   Showing recent visits within past 7 days and meeting all other requirements  Future Appointments  No visits were found meeting these conditions.  Showing future appointments within next 150 days and meeting all other requirements       The patient was identified by name and date of birth. Marie Portillo was informed that this is a telemedicine visit and that the visit is being conducted throughthe [employer] Teams platform. She agrees to proceed..  My office door was closed. The patient was notified the following individuals were present in the room other group members.  She acknowledged consent and understanding of privacy and security of the video platform. The patient has agreed to participate and understands they can discontinue the visit at any time.    Patient is aware this is a billable service.     Subjective  Marie Portillo is a 58 y.o. female  .    Data: Marie attended the Relaxation group and was guided  through EFT tapping on pain, diaphragmatic breathing, other Vagus nerve stimulation techniques, and progressive muscle relaxation. Furthermore, attendees were guided through a body scan for pain transitioning to Mindfulness meditation on impermanence, working with difficult emotions, peaceful waves guided visualization,  and ending with affirmations for self-appreciation, letting go, and against anxiety and depression. Psychoeducation, EFT, Mindfulness-based, and Somatic approaches were used to achieve the deep state of relaxation and to maintain the serene environment for the exercise's purpose.      Assessment: Marie was in euthymic mood and in congruence with broad affect, followed through guidance and completed the exercises well, showed willingness to continue learning and practicing self-care in weekly Relaxation Group as well as individual therapy.      Plan: Marie will practice deep breathing and other techniques based on each occasion to counter stress and will attend the next group to be able to take part in further guided exercises on chosen topics to build on the techniques learned in the beginning.    HPI     Past Medical History:   Diagnosis Date    Bilateral carpal tunnel syndrome 08/20/2019    Carpal tunnel syndrome of right wrist 10/30/2019    Added automatically from request for surgery [id number]    Chlamydia     COPD (chronic obstructive pulmonary disease) (HCC)     Depression     Hypertension     Varicella        Past Surgical History:   Procedure Laterality Date    BREAST BIOPSY Left 20yrs ago benign    CERVICAL SPINE SURGERY      EGD      HERNIA REPAIR      OOPHORECTOMY Right     [place] ND[place] WRST SURG W/RLS TRANSVRS CARPL LIGM Right 01/21/2020    Procedure: RELEASE CARPAL TUNNEL ENDOSCOPIC;  Surgeon: Sam Nguyen MD;  [facility];  Service: Orthopedics    [place] ND[place] WRST SURG W/RLS TRANSVRS CARPL LIGM Left 01/28/2020    Procedure: RELEASE CARPAL TUNNEL ENDOSCOPIC;  Surgeon: Sam  MD Patrick;  [facility];  Service: Orthopedics       Current Outpatient Medications   Medication Sig Dispense Refill    Acetaminophen 500 MG Take 2 capsules by mouth every 4 (four) hours as needed for mild pain      albuterol (PROVENTIL HFA,VENTOLIN HFA) 90 mcg/act inhaler inhale 2 puffs by mouth and INTO THE LUNGS every 6 hours IF NEEDED FOR WHEEZING. 8.5 g 2    buPROPion (WELLBUTRIN XL) 300 mg 24 hr tablet Take 1 tablet (300 mg total) by mouth daily 30 tablet 1    cholecalciferol (VITAMIN D3) 400 units tablet Take 400 Units by mouth daily      fluticasone (FLONASE) 50 mcg/act nasal spray instill 1 spray into each nostril once daily 16 g 2    guaiFENesin (MUCINEX) 600 mg 12 hr tablet Take 2 tablets (1,200 mg total) by mouth every 12 (twelve) hours 60 tablet 3    ibuprofen (MOTRIN) 200 mg tablet Take 200 mg by mouth every 6 (six) hours as needed for mild pain      ipratropium-albuterol (DUO-NEB) 0.5-2.5 mg/3 mL nebulizer solution Take 3 mL by nebulization 4 (four) times a day 360 mL 3    lamoTRIgine (LaMICtal) 200 MG tablet Take 1 tablet (200 mg total) by mouth daily 30 tablet 1    levocetirizine (XYZAL) 5 MG tablet take 1 tablet by mouth every evening 30 tablet 4    methocarbamol (ROBAXIN) 500 mg tablet Take 500 mg by mouth 2 (two) times a day      nicotine (NICODERM CQ) 21 mg/24 hr TD 24 hr patch Place 1 patch on the skin over 24 hours every 24 hours 28 patch 0    nicotine polacrilex (COMMIT) 4 MG lozenge Apply 1 lozenge (4 mg total) to the mouth or throat as needed for smoking cessation 100 each 0    omeprazole (PriLOSEC) 20 mg delayed release capsule take 1 capsule by mouth twice a day 60 capsule 5    senna-docusate sodium (SENOKOT-S) 8.6-50 mg per tablet Take 3 tablets by mouth as needed        Stiolto Respimat 2.5-2.5 MCG/ACT inhaler inhale 2 puffs by mouth and INTO THE LUNGS once daily 4 g 11     No current facility-administered medications for this visit.        No Known Allergies    Review of  Systems    Video Exam    There were no vitals filed for this visit.    Physical Exam     03/22/24  Start Time: 0400  Stop Time: 0500  Total Visit Time: 60 minutes

## 2024-03-25 ENCOUNTER — HOSPITAL ENCOUNTER (OUTPATIENT)
Dept: NON INVASIVE DIAGNOSTICS | Facility: HOSPITAL | Age: 59
Discharge: HOME | End: 2024-03-25
Payer: COMMERCIAL

## 2024-03-25 VITALS
SYSTOLIC BLOOD PRESSURE: 132 MMHG | BODY MASS INDEX: 21.79 KG/M2 | WEIGHT: 111 LBS | DIASTOLIC BLOOD PRESSURE: 82 MMHG | HEIGHT: 60 IN | HEART RATE: 84 BPM

## 2024-03-25 DIAGNOSIS — I50.32 CHRONIC DIASTOLIC (CONGESTIVE) HEART FAILURE (HCC): ICD-10-CM

## 2024-03-25 LAB
AORTIC ROOT: 2.9 CM
APICAL FOUR CHAMBER EJECTION FRACTION: 37 %
ASCENDING AORTA: 3.1 CM
AV LVOT MEAN GRADIENT: 2 MMHG
AV LVOT PEAK GRADIENT: 4 MMHG
BSA FOR ECHO PROCEDURE: 1.45 M2
DOP CALC LVOT AREA: 7.54 CM2
DOP CALC LVOT CARDIAC INDEX: 7.8 L/MIN/M2
DOP CALC LVOT CARDIAC OUTPUT: 11.3 L/MIN
DOP CALC LVOT DIAMETER: 3.1 CM
DOP CALC LVOT PEAK VEL VTI: 16.85 CM
DOP CALC LVOT PEAK VEL: 0.97 M/S
DOP CALC LVOT STROKE INDEX: 87.6 ML/M2
DOP CALC LVOT STROKE VOLUME: 127
E WAVE DECELERATION TIME: 213 MS
E/A RATIO: 0.62
FRACTIONAL SHORTENING: 18 (ref 28–44)
INTERVENTRICULAR SEPTUM IN DIASTOLE (PARASTERNAL SHORT AXIS VIEW): 1 CM
INTERVENTRICULAR SEPTUM: 1 CM (ref 0.6–1.1)
LAAS-AP2: 8 CM2
LAAS-AP4: 13.2 CM2
LEFT ATRIUM SIZE: 2.7 CM
LEFT ATRIUM VOLUME (MOD BIPLANE): 26 ML
LEFT ATRIUM VOLUME INDEX (MOD BIPLANE): 17.9 ML/M2
LEFT INTERNAL DIMENSION IN SYSTOLE: 4.1 CM (ref 2.1–4)
LEFT VENTRICLE DIASTOLIC VOLUME (MOD BIPLANE): 111 ML
LEFT VENTRICLE DIASTOLIC VOLUME INDEX (MOD BIPLANE): 76.6 ML/M2
LEFT VENTRICLE SYSTOLIC VOLUME (MOD BIPLANE): 71 ML
LEFT VENTRICLE SYSTOLIC VOLUME INDEX (MOD BIPLANE): 49 ML/M2
LEFT VENTRICULAR INTERNAL DIMENSION IN DIASTOLE: 5 CM (ref 3.5–6)
LEFT VENTRICULAR POSTERIOR WALL IN END DIASTOLE: 0.8 CM
LEFT VENTRICULAR STROKE VOLUME: 43 ML
LV EF: 36 %
LVSV (TEICH): 43 ML
MV E'TISSUE VEL-SEP: 5 CM/S
MV PEAK A VEL: 0.78 M/S
MV PEAK E VEL: 48 CM/S
MV STENOSIS PRESSURE HALF TIME: 62 MS
MV VALVE AREA P 1/2 METHOD: 3.5
RIGHT ATRIUM AREA SYSTOLE A4C: 9.7 CM2
RIGHT VENTRICLE ID DIMENSION: 3 CM
SL CV LEFT ATRIUM LENGTH A2C: 2.9 CM
SL CV LV EF: 45
SL CV PED ECHO LEFT VENTRICLE DIASTOLIC VOLUME (MOD BIPLANE) 2D: 119 ML
SL CV PED ECHO LEFT VENTRICLE SYSTOLIC VOLUME (MOD BIPLANE) 2D: 76 ML
TR MAX PG: 26 MMHG
TR PEAK VELOCITY: 2.6 M/S
TRICUSPID ANNULAR PLANE SYSTOLIC EXCURSION: 1.7 CM
TRICUSPID VALVE PEAK REGURGITATION VELOCITY: 2.56 M/S

## 2024-03-25 PROCEDURE — 93306 TTE W/DOPPLER COMPLETE: CPT

## 2024-03-25 PROCEDURE — 93306 TTE W/DOPPLER COMPLETE: CPT | Performed by: INTERNAL MEDICINE

## 2024-03-26 ENCOUNTER — TELEMEDICINE (OUTPATIENT)
Dept: BEHAVIORAL/MENTAL HEALTH CLINIC | Facility: CLINIC | Age: 59
End: 2024-03-26
Payer: COMMERCIAL

## 2024-03-26 DIAGNOSIS — Z72.0 TOBACCO ABUSE: ICD-10-CM

## 2024-03-26 DIAGNOSIS — F10.11 ALCOHOL ABUSE, IN REMISSION: ICD-10-CM

## 2024-03-26 DIAGNOSIS — F12.90 MARIJUANA USE: Chronic | ICD-10-CM

## 2024-03-26 DIAGNOSIS — F43.10 PTSD (POST-TRAUMATIC STRESS DISORDER): ICD-10-CM

## 2024-03-26 DIAGNOSIS — F41.1 GENERALIZED ANXIETY DISORDER: ICD-10-CM

## 2024-03-26 DIAGNOSIS — F31.81 MODERATE MIXED BIPOLAR II DISORDER (HCC): Primary | ICD-10-CM

## 2024-03-26 DIAGNOSIS — F17.210 CIGARETTE NICOTINE DEPENDENCE WITHOUT COMPLICATION: ICD-10-CM

## 2024-03-26 PROCEDURE — 90834 PSYTX W PT 45 MINUTES: CPT | Performed by: COUNSELOR

## 2024-03-26 NOTE — PSYCH
Virtual Regular Visit    Verification of patient location:    Patient is located at Home in the following state in which I hold an active license [place]      Assessment/Plan:    Problem List Items Addressed This Visit       Moderate mixed bipolar II disorder (HCC) - Primary    Generalized anxiety disorder    PTSD (post-traumatic stress disorder)    Alcohol abuse, in remission    Marijuana use (Chronic)    Cigarette nicotine dependence without complication    Tobacco abuse       Goals addressed in session: Goal 1 and Goal 2          Reason for visit is No chief complaint on file.       Encounter provider Isabel Watts LPC    Provider located at [facility] [place]  [facility] [place]  [address]  [place] [id number] [phone]      Recent Visits  Date Type Provider Dept   03/22/24 Telemedicine Isabel Watts LPC [facility] [place]   03/20/24 Telemedicine Isabel Watts LPC [facility] [place]   Showing recent visits within past 7 days and meeting all other requirements  Today's Visits  Date Type Provider Dept   03/26/24 Telemedicine Isabel Watts LPC [facility] [place]   Showing today's visits and meeting all other requirements  Future Appointments  No visits were found meeting these conditions.  Showing future appointments within next 150 days and meeting all other requirements       The patient was identified by name and date of birth. Marie Portillo was informed that this is a telemedicine visit and that the visit is being conducted throughthe Epic Embedded platform. She agrees to proceed..  My office door was closed. No one else was in the room.  She acknowledged consent and understanding of privacy and security of the video platform. The patient has agreed to participate and understands they can discontinue the visit at any time.    Patient is aware this is a billable  service.     Subjective  Marie Portillo is a 58 y.o. female  .      HPI     Past Medical History:   Diagnosis Date    Bilateral carpal tunnel syndrome 08/20/2019    Carpal tunnel syndrome of right wrist 10/30/2019    Added automatically from request for surgery [id number]    Chlamydia     COPD (chronic obstructive pulmonary disease) (HCC)     Depression     Hypertension     Varicella        Past Surgical History:   Procedure Laterality Date    BREAST BIOPSY Left 20yrs ago benign    CERVICAL SPINE SURGERY      EGD      HERNIA REPAIR      OOPHORECTOMY Right     [place] [facility] WRST SURG W/RLS TRANSVRS CARPL LIGM Right 01/21/2020    Procedure: RELEASE CARPAL TUNNEL ENDOSCOPIC;  Surgeon: Sam Nguyen MD;  [facility];  Service: Orthopedics    [place] [facility] WRST SURG W/RLS TRANSVRS CARPL LIGM Left 01/28/2020    Procedure: RELEASE CARPAL TUNNEL ENDOSCOPIC;  Surgeon: Sam Nguyen MD;  [facility];  Service: Orthopedics       Current Outpatient Medications   Medication Sig Dispense Refill    Acetaminophen 500 MG Take 2 capsules by mouth every 4 (four) hours as needed for mild pain      albuterol (PROVENTIL HFA,VENTOLIN HFA) 90 mcg/act inhaler inhale 2 puffs by mouth and INTO THE LUNGS every 6 hours IF NEEDED FOR WHEEZING. 8.5 g 2    buPROPion (WELLBUTRIN XL) 300 mg 24 hr tablet Take 1 tablet (300 mg total) by mouth daily 30 tablet 1    cholecalciferol (VITAMIN D3) 400 units tablet Take 400 Units by mouth daily      fluticasone (FLONASE) 50 mcg/act nasal spray instill 1 spray into each nostril once daily 16 g 2    guaiFENesin (MUCINEX) 600 mg 12 hr tablet Take 2 tablets (1,200 mg total) by mouth every 12 (twelve) hours 60 tablet 3    ibuprofen (MOTRIN) 200 mg tablet Take 200 mg by mouth every 6 (six) hours as needed for mild pain      ipratropium-albuterol (DUO-NEB) 0.5-2.5 mg/3 mL nebulizer solution Take 3 mL by nebulization 4 (four) times a day 360 mL 3    lamoTRIgine (LaMICtal) 200 MG tablet Take 1 tablet (200  mg total) by mouth daily 30 tablet 1    levocetirizine (XYZAL) 5 MG tablet take 1 tablet by mouth every evening 30 tablet 4    methocarbamol (ROBAXIN) 500 mg tablet Take 500 mg by mouth 2 (two) times a day      nicotine (NICODERM CQ) 21 mg/24 hr TD 24 hr patch Place 1 patch on the skin over 24 hours every 24 hours 28 patch 0    nicotine polacrilex (COMMIT) 4 MG lozenge Apply 1 lozenge (4 mg total) to the mouth or throat as needed for smoking cessation 100 each 0    omeprazole (PriLOSEC) 20 mg delayed release capsule take 1 capsule by mouth twice a day 60 capsule 5    senna-docusate sodium (SENOKOT-S) 8.6-50 mg per tablet Take 3 tablets by mouth as needed        Stiolto Respimat 2.5-2.5 MCG/ACT inhaler inhale 2 puffs by mouth and INTO THE LUNGS once daily 4 g 11     No current facility-administered medications for this visit.        No Known Allergies    Review of Systems    Video Exam    There were no vitals filed for this visit.    Physical Exam     Behavioral Health Psychotherapy Progress Note    Psychotherapy Provided: Individual Psychotherapy     1. Moderate mixed bipolar II disorder (HCC)        2. PTSD (post-traumatic stress disorder)        3. Generalized anxiety disorder        4. Tobacco abuse        5. Cigarette nicotine dependence without complication        6. Marijuana use        7. Alcohol abuse, in remission            Goals addressed in session: Goal 1 and Goal 2     DATA: Marie discussed fear of death, recurrent memories from her traumatic past, emotions intensity, and her ways to ground in the present. She discussed also her struggle to live with arthritis and ongoing multiple sources of pain. Marie practiced Core Mindfulness skills that she learned to attend to pain in healthier ways and stated that it helps her when she remembers to practice. Marie stated that lately she has been feeling conflicting with accepting her past trauma.   During this session, this clinician used the following  "therapeutic modalities: Client-centered Therapy, Cognitive Behavioral Therapy, Dialectical Behavior Therapy, Mindfulness-based Strategies, and Supportive Psychotherapy    Substance Abuse was not addressed during this session. If the client is diagnosed with a co-occurring substance use disorder, please indicate any changes in the frequency or amount of use: NA. Stage of change for addressing substance use diagnoses: Pre-contemplation    ASSESSMENT:  Marie Portillo presents with a Euthymic/ normal, Anxious, and Depressed mood.     her affect is Normal range and intensity and Tearful, at times, which is congruent, with her mood and the content of the session. The client has made progress on their goals.    Marie seemed again emotional but was easily redirected this time and used successfully her grounding skills. Marie Portillo presents with a minimal risk of suicide, low risk of self-harm, and none risk of harm to others.    For any risk assessment that surpasses a \"low\" rating, a safety plan must be developed.    A safety plan was indicated: no  If yes, describe in detail NA    PLAN: Between sessions, Marie Portillo will use her grounding techniques and Mindfulness skills and will practice the exercises through the hour of Relaxation Group on Fridays for self-care. At the next session, the therapist will use Client-centered Therapy, Cognitive Behavioral Therapy, Dialectical Behavior Therapy, Mindfulness-based Strategies, and Supportive Psychotherapy to address adequate self-care and self-boundaries.    Behavioral Health Treatment Plan and Discharge Planning: Marie Portillo is aware of and agrees to continue to work on their treatment plan. They have identified and are working toward their discharge goals. yes    Visit start and stop times:    03/26/24  Start Time: 0157  Stop Time: 0243  Total Visit Time: 46 minutes      "

## 2024-03-27 ENCOUNTER — TELEPHONE (OUTPATIENT)
Dept: CARDIOLOGY CLINIC | Facility: CLINIC | Age: 59
End: 2024-03-27

## 2024-03-27 NOTE — TELEPHONE ENCOUNTER
----- Message from Rosario Rousseau, DO sent at 3/27/2024  1:10 PM EDT -----  Good afternoon, I tried to call Marie regarding her echo results however there was no answer and she did not have a voicemail available for me to leave a message.  Her echo shows an EF of 40 to 45% which is slightly lower than what it was previously.  I would like to do an ischemic workup with a stress test and I would also like to get her in the office to start her on some medication.  She could probably tolerate metoprolol succinate 25 mg daily before seeing me.  Otherwise I would like to see her and check her blood pressure before starting anything else.  If you could try to give her a call back later today without be great thank you.    Spoke wit pt re Echo. Instructed pt to call office and sched appt . Pt will do so asap.

## 2024-03-29 ENCOUNTER — TELEMEDICINE (OUTPATIENT)
Dept: BEHAVIORAL/MENTAL HEALTH CLINIC | Facility: CLINIC | Age: 59
End: 2024-03-29

## 2024-03-29 ENCOUNTER — TELEPHONE (OUTPATIENT)
Dept: PSYCHIATRY | Facility: CLINIC | Age: 59
End: 2024-03-29

## 2024-03-29 DIAGNOSIS — Z72.0 TOBACCO ABUSE: ICD-10-CM

## 2024-03-29 DIAGNOSIS — F41.1 GENERALIZED ANXIETY DISORDER: ICD-10-CM

## 2024-03-29 DIAGNOSIS — F12.90 MARIJUANA USE: Chronic | ICD-10-CM

## 2024-03-29 DIAGNOSIS — F10.11 ALCOHOL ABUSE, IN REMISSION: ICD-10-CM

## 2024-03-29 DIAGNOSIS — F31.81 MODERATE MIXED BIPOLAR II DISORDER (HCC): Primary | ICD-10-CM

## 2024-03-29 DIAGNOSIS — F43.10 PTSD (POST-TRAUMATIC STRESS DISORDER): ICD-10-CM

## 2024-03-29 NOTE — PSYCH
Virtual Regular Visit    Verification of patient location:    Patient is located at Home in the following state in which I hold an active license [place]      Assessment/Plan:    Problem List Items Addressed This Visit       Moderate mixed bipolar II disorder (HCC) - Primary    Generalized anxiety disorder    PTSD (post-traumatic stress disorder)    Alcohol abuse, in remission    Marijuana use (Chronic)    Tobacco abuse       Goals addressed in session: Goal 1 and Goal 2          Reason for visit is No chief complaint on file.       Encounter provider Isabel Watts LPC    Provider located at [facility] [place]  [facility] [place]  [address]  [place] [address] [phone]      Recent Visits  Date Type Provider Dept   03/26/24 Telemedicine Isabel Watts LPC [facility] [place]   03/22/24 Telemedicine Isabel Watts LPC [facility] [place]   Showing recent visits within past 7 days and meeting all other requirements  Today's Visits  Date Type Provider Dept   03/29/24 Telephone Isabel Watts LPC [facility] [place]   Showing today's visits and meeting all other requirements  Future Appointments  No visits were found meeting these conditions.  Showing future appointments within next 150 days and meeting all other requirements       The patient was identified by name and date of birth. Marie Portillo was informed that this is a telemedicine visit and that the visit is being conducted throughthe Microsoft Teams platform. She agrees to proceed..  My office door was closed. The patient was notified the following individuals were present in the room other group members.  She acknowledged consent and understanding of privacy and security of the video platform. The patient has agreed to participate and understands they can discontinue the visit at any time.    Patient is aware this is a billable  service.     Subjective  Marie Portillo is a 58 y.o. female  .    Data: Marie attended the Relaxation group and was guided through EFT tapping on pain, diaphragmatic breathing, other Vagus nerve stimulation techniques, and progressive muscle relaxation. Furthermore, attendees were guided through Mindfulness-based observation of judgment, imagery for [place] Relaxation, and imagery for meditation on self-esteem, and ending with affirmations for self-love and against depression and self-doubt and self-blame. Psychoeducation, EFT, Mindfulness-based, and Somatic approaches were used to achieve the deep state of relaxation and to maintain the serene environment for the exercise's purpose.      Assessment: Marie was in euthymic mood and in congruence with broad affect, followed through guidance and completed the exercises well, showed willingness to continue learning and practicing self-care in weekly Relaxation Group as well as individual therapy.      Plan: Marie will practice deep breathing and other techniques based on each occasion to counter stress and will attend the next group to be able to take part in further guided exercises on chosen topics to build on the techniques learned in the beginning.    HPI     Past Medical History:   Diagnosis Date    Bilateral carpal tunnel syndrome 08/20/2019    Carpal tunnel syndrome of right wrist 10/30/2019    Added automatically from request for surgery [id number]    Chlamydia     COPD (chronic obstructive pulmonary disease) (HCC)     Depression     Hypertension     Varicella        Past Surgical History:   Procedure Laterality Date    BREAST BIOPSY Left 20yrs ago benign    CERVICAL SPINE SURGERY      EGD      HERNIA REPAIR      OOPHORECTOMY Right     [place] NDSC WRST SURG W/RLS TRANSVRS CARPL LIGM Right 01/21/2020    Procedure: RELEASE CARPAL TUNNEL ENDOSCOPIC;  Surgeon: Sam Nguyen MD;  [facility];  Service: Orthopedics    [place] NDSC WRST SURG W/RLS TRANSVRS CARPL  LIGM Left 01/28/2020    Procedure: RELEASE CARPAL TUNNEL ENDOSCOPIC;  Surgeon: Sam Nguyen MD;  [facility];  Service: Orthopedics       Current Outpatient Medications   Medication Sig Dispense Refill    Acetaminophen 500 MG Take 2 capsules by mouth every 4 (four) hours as needed for mild pain      albuterol (PROVENTIL HFA,VENTOLIN HFA) 90 mcg/act inhaler inhale 2 puffs by mouth and INTO THE LUNGS every 6 hours IF NEEDED FOR WHEEZING. 8.5 g 2    buPROPion (WELLBUTRIN XL) 300 mg 24 hr tablet Take 1 tablet (300 mg total) by mouth daily 30 tablet 1    cholecalciferol (VITAMIN D3) 400 units tablet Take 400 Units by mouth daily      fluticasone (FLONASE) 50 mcg/act nasal spray instill 1 spray into each nostril once daily 16 g 2    guaiFENesin (MUCINEX) 600 mg 12 hr tablet Take 2 tablets (1,200 mg total) by mouth every 12 (twelve) hours 60 tablet 3    ibuprofen (MOTRIN) 200 mg tablet Take 200 mg by mouth every 6 (six) hours as needed for mild pain      ipratropium-albuterol (DUO-NEB) 0.5-2.5 mg/3 mL nebulizer solution Take 3 mL by nebulization 4 (four) times a day 360 mL 3    lamoTRIgine (LaMICtal) 200 MG tablet Take 1 tablet (200 mg total) by mouth daily 30 tablet 1    levocetirizine (XYZAL) 5 MG tablet take 1 tablet by mouth every evening 30 tablet 4    methocarbamol (ROBAXIN) 500 mg tablet Take 500 mg by mouth 2 (two) times a day      nicotine (NICODERM CQ) 21 mg/24 hr TD 24 hr patch Place 1 patch on the skin over 24 hours every 24 hours 28 patch 0    nicotine polacrilex (COMMIT) 4 MG lozenge Apply 1 lozenge (4 mg total) to the mouth or throat as needed for smoking cessation 100 each 0    omeprazole (PriLOSEC) 20 mg delayed release capsule take 1 capsule by mouth twice a day 60 capsule 5    senna-docusate sodium (SENOKOT-S) 8.6-50 mg per tablet Take 3 tablets by mouth as needed        Stiolto Respimat 2.5-2.5 MCG/ACT inhaler inhale 2 puffs by mouth and INTO THE LUNGS once daily 4 g 11     No current  facility-administered medications for this visit.        No Known Allergies    Review of Systems    Video Exam    There were no vitals filed for this visit.    Physical Exam     03/29/24  Start Time: 0410  Stop Time: 0500  Total Visit Time: 50 minutes

## 2024-03-31 ENCOUNTER — TELEPHONE (OUTPATIENT)
Dept: OTHER | Facility: OTHER | Age: 59
End: 2024-03-31

## 2024-03-31 NOTE — TELEPHONE ENCOUNTER
Patient is calling regarding cancelling an appointment.    Date/Time: 04/02/24 140p    Patient was rescheduled: YES [] NO [x]    Patient requesting call back to reschedule: YES [] NO [x]

## 2024-04-02 ENCOUNTER — TELEPHONE (OUTPATIENT)
Dept: PSYCHIATRY | Facility: CLINIC | Age: 59
End: 2024-04-02

## 2024-04-02 NOTE — TELEPHONE ENCOUNTER
Patient called in requesting a provider call back to discuss scheduling and appointments. Patient states they received a voice mail and would like to move the appointment on 4/9/24 back in time if possible but that it would need to be cleared by the talk therapist first.    Writer stated they would make the talk therapist aware of the request.

## 2024-04-03 ENCOUNTER — TELEPHONE (OUTPATIENT)
Dept: PSYCHIATRY | Facility: CLINIC | Age: 59
End: 2024-04-03

## 2024-04-03 ENCOUNTER — TELEMEDICINE (OUTPATIENT)
Dept: BEHAVIORAL/MENTAL HEALTH CLINIC | Facility: CLINIC | Age: 59
End: 2024-04-03

## 2024-04-03 DIAGNOSIS — F41.1 GENERALIZED ANXIETY DISORDER: ICD-10-CM

## 2024-04-03 DIAGNOSIS — F17.210 CIGARETTE NICOTINE DEPENDENCE WITHOUT COMPLICATION: ICD-10-CM

## 2024-04-03 DIAGNOSIS — F31.81 MODERATE MIXED BIPOLAR II DISORDER (HCC): Primary | ICD-10-CM

## 2024-04-03 DIAGNOSIS — F43.10 PTSD (POST-TRAUMATIC STRESS DISORDER): ICD-10-CM

## 2024-04-03 DIAGNOSIS — F12.90 MARIJUANA USE: Chronic | ICD-10-CM

## 2024-04-03 DIAGNOSIS — Z72.0 TOBACCO ABUSE: ICD-10-CM

## 2024-04-03 DIAGNOSIS — F10.11 ALCOHOL ABUSE, IN REMISSION: ICD-10-CM

## 2024-04-03 NOTE — PSYCH
"Behavioral Health Psychotherapy Progress Note    Psychotherapy Provided: Individual Psychotherapy     1. Moderate mixed bipolar II disorder (HCC)        2. Generalized anxiety disorder        3. PTSD (post-traumatic stress disorder)        4. Tobacco abuse        5. Cigarette nicotine dependence without complication        6. Marijuana use        7. Alcohol abuse, in remission            Goals addressed in session: Goal 1 and Goal 2     DATA: Marie discussed her emotions around holidays- she shared happy memories with her late son and was allowed to express herself and was validated for her empathy and love for him, processed her sadness and holding on to these memories. Marie was encouraged to make notes to catch up with her appointments and signing for her encounters and to talk about this if she is overwhelmed.    During this session, this clinician used the following therapeutic modalities: Client-centered Therapy, Cognitive Behavioral Therapy, Dialectical Behavior Therapy, Mindfulness-based Strategies, and Supportive Psychotherapy and Bereavement.     Substance Abuse was not addressed during this session. If the client is diagnosed with a co-occurring substance use disorder, please indicate any changes in the frequency or amount of use: NA. Stage of change for addressing substance use diagnoses: Pre-contemplation    ASSESSMENT:  Marie Portillo presents with a Euthymic/ normal, Anxious, and Labile mood.     her affect is Normal range and intensity, Constricted, and Tearful, at times, which is congruent, with her mood and the content of the session. The client has made progress on their goals.    Marie seemed very  changeable in her mood. Marie Portillo presents with a low risk of suicide, low risk of self-harm, and none risk of harm to others.    For any risk assessment that surpasses a \"low\" rating, a safety plan must be developed.    A safety plan was indicated: no  If yes, describe in detail NA    PLAN: Between " sessions, Marie Portillo will her learned DBT and Mindfulness skills to be observing her emotions and challenging situations in healthier ways. At the next session, the therapist will use Client-centered Therapy, Cognitive Behavioral Therapy, Dialectical Behavior Therapy, Mindfulness-based Strategies, and Supportive Psychotherapy to address adequate self-care and skill application.    Behavioral Health Treatment Plan and Discharge Planning: Marie Portillo is aware of and agrees to continue to work on their treatment plan. They have identified and are working toward their discharge goals. yes    Visit start and stop times:    04/03/24  Start Time: 0854  Stop Time: 0937  Total Visit Time: 43 minutes

## 2024-04-03 NOTE — PSYCH
Virtual Regular Visit    Verification of patient location:    Patient is located at Home in the following state in which I hold an active license [place]      Assessment/Plan:    Problem List Items Addressed This Visit       Moderate mixed bipolar II disorder (HCC) - Primary    Generalized anxiety disorder    PTSD (post-traumatic stress disorder)    Alcohol abuse, in remission    Marijuana use (Chronic)    Cigarette nicotine dependence without complication    Tobacco abuse       Goals addressed in session: Goal 1 and Goal 2          Reason for visit is No chief complaint on file.       Encounter provider Isabel Watts LPC    Provider located at [facility] [place]  [facility] [place] [address]  [place] [address] [phone]      Recent Visits  Date Type Provider Dept   04/02/24 Telephone Isabel Watts LPC [facility] [place]   03/29/24 Telephone Isabel Watts LPC [facility] [place]   03/29/24 Telemedicine Isabel Watts LPC [facility] [place]   Showing recent visits within past 7 days and meeting all other requirements  Future Appointments  No visits were found meeting these conditions.  Showing future appointments within next 150 days and meeting all other requirements       The patient was identified by name and date of birth. Marie Portillo was informed that this is a telemedicine visit and that the visit is being conducted throughthe Epic Embedded platform. She agrees to proceed..  My office door was closed. No one else was in the room.  She acknowledged consent and understanding of privacy and security of the video platform. The patient has agreed to participate and understands they can discontinue the visit at any time.    Patient is aware this is a billable service.     Subjective  Marie Portillo is a 58 y.o. female  .      HPI     Past Medical History:   Diagnosis Date    Bilateral carpal  tunnel syndrome 08/20/2019    Carpal tunnel syndrome of right wrist 10/30/2019    Added automatically from request for surgery [id number]    Chlamydia     COPD (chronic obstructive pulmonary disease) (HCC)     Depression     Hypertension     Varicella        Past Surgical History:   Procedure Laterality Date    BREAST BIOPSY Left 20yrs ago benign    CERVICAL SPINE SURGERY      EGD      HERNIA REPAIR      OOPHORECTOMY Right     [place] NDSC WRST SURG W/RLS TRANSVRS CARPL LIGM Right 01/21/2020    Procedure: RELEASE CARPAL TUNNEL ENDOSCOPIC;  Surgeon: Sam Nguyen MD;  [facility];  Service: Orthopedics    [place] NDSC WRST SURG W/RLS TRANSVRS CARPL LIGM Left 01/28/2020    Procedure: RELEASE CARPAL TUNNEL ENDOSCOPIC;  Surgeon: Sam Nguyen MD;  [facility];  Service: Orthopedics       Current Outpatient Medications   Medication Sig Dispense Refill    Acetaminophen 500 MG Take 2 capsules by mouth every 4 (four) hours as needed for mild pain      albuterol (PROVENTIL HFA,VENTOLIN HFA) 90 mcg/act inhaler inhale 2 puffs by mouth and INTO THE LUNGS every 6 hours IF NEEDED FOR WHEEZING. 8.5 g 2    buPROPion (WELLBUTRIN XL) 300 mg 24 hr tablet Take 1 tablet (300 mg total) by mouth daily 30 tablet 1    cholecalciferol (VITAMIN D3) 400 units tablet Take 400 Units by mouth daily      fluticasone (FLONASE) 50 mcg/act nasal spray instill 1 spray into each nostril once daily 16 g 2    guaiFENesin (MUCINEX) 600 mg 12 hr tablet Take 2 tablets (1,200 mg total) by mouth every 12 (twelve) hours 60 tablet 3    ibuprofen (MOTRIN) 200 mg tablet Take 200 mg by mouth every 6 (six) hours as needed for mild pain      ipratropium-albuterol (DUO-NEB) 0.5-2.5 mg/3 mL nebulizer solution Take 3 mL by nebulization 4 (four) times a day 360 mL 3    lamoTRIgine (LaMICtal) 200 MG tablet Take 1 tablet (200 mg total) by mouth daily 30 tablet 1    levocetirizine (XYZAL) 5 MG tablet take 1 tablet by mouth every evening 30 tablet 4     methocarbamol (ROBAXIN) 500 mg tablet Take 500 mg by mouth 2 (two) times a day      nicotine (NICODERM CQ) 21 mg/24 hr TD 24 hr patch Place 1 patch on the skin over 24 hours every 24 hours 28 patch 0    nicotine polacrilex (COMMIT) 4 MG lozenge Apply 1 lozenge (4 mg total) to the mouth or throat as needed for smoking cessation 100 each 0    omeprazole (PriLOSEC) 20 mg delayed release capsule take 1 capsule by mouth twice a day 60 capsule 5    senna-docusate sodium (SENOKOT-S) 8.6-50 mg per tablet Take 3 tablets by mouth as needed        Stiolto Respimat 2.5-2.5 MCG/ACT inhaler inhale 2 puffs by mouth and INTO THE LUNGS once daily 4 g 11     No current facility-administered medications for this visit.        No Known Allergies    Review of Systems    Video Exam    There were no vitals filed for this visit.    Physical Exam

## 2024-04-03 NOTE — TELEPHONE ENCOUNTER
Unable to leave voicemail informing patient of the Psych Encounter form needing to be signed as a requirement from the insurance company for billing purposes. If patients contacts office, please make patient aware that the form can be accessed via Pocket to sign electronically and must be signed for each visit as a requirement to continue future visits with provider.

## 2024-04-05 ENCOUNTER — TELEMEDICINE (OUTPATIENT)
Dept: BEHAVIORAL/MENTAL HEALTH CLINIC | Facility: CLINIC | Age: 59
End: 2024-04-05

## 2024-04-05 DIAGNOSIS — F43.10 PTSD (POST-TRAUMATIC STRESS DISORDER): ICD-10-CM

## 2024-04-05 DIAGNOSIS — F10.11 ALCOHOL ABUSE, IN REMISSION: ICD-10-CM

## 2024-04-05 DIAGNOSIS — F17.210 CIGARETTE NICOTINE DEPENDENCE WITHOUT COMPLICATION: ICD-10-CM

## 2024-04-05 DIAGNOSIS — F31.81 MODERATE MIXED BIPOLAR II DISORDER (HCC): Primary | ICD-10-CM

## 2024-04-05 DIAGNOSIS — F41.1 GENERALIZED ANXIETY DISORDER: ICD-10-CM

## 2024-04-05 DIAGNOSIS — F12.90 MARIJUANA USE: Chronic | ICD-10-CM

## 2024-04-05 NOTE — PSYCH
Virtual Regular Visit    Verification of patient location:    Patient is located at Home in the following state in which I hold an active license [place]      Assessment/Plan:    Problem List Items Addressed This Visit       Moderate mixed bipolar II disorder (HCC) - Primary    Generalized anxiety disorder    PTSD (post-traumatic stress disorder)    Alcohol abuse, in remission    Marijuana use (Chronic)    Cigarette nicotine dependence without complication       Goals addressed in session: Goal 2          Reason for visit is No chief complaint on file.       Encounter provider Isabel Watts LPC    Provider located at [facility] [place]  [facility] [address]  [place] [id number] [phone]      Recent Visits  Date Type Provider Dept   04/03/24 Telephone Isabel Watts LPC [facility] [place]   04/03/24 Telemedicine Isabel Watts LPC [facility] [place]   04/02/24 Telephone Isabel Watts LPC [facility] [place]   03/29/24 Telephone Isabel Watts LPC [facility] [place]   03/29/24 Telemedicine Isabel Watts LPC [facility] [place]   Showing recent visits within past 7 days and meeting all other requirements  Future Appointments  No visits were found meeting these conditions.  Showing future appointments within next 150 days and meeting all other requirements       The patient was identified by name and date of birth. Marie Portillo was informed that this is a telemedicine visit and that the visit is being conducted throughthe Microsoft Teams platform. She agrees to proceed..  My office door was closed. The patient was notified the following individuals were present in the room other group attendees.  She acknowledged consent and understanding of privacy and security of the video platform. The patient has agreed to participate and understands they can  discontinue the visit at any time.    Patient is aware this is a billable service.     Subjective  Marie Portillo is a 58 y.o. female  .    Data: Marie attended the Relaxation group and was guided through EFT tapping on pain, diaphragmatic breathing, other Vagus nerve stimulation techniques, and progressive muscle relaxation. Furthermore, attendees were guided through visualization of quick body scan, meditation for heathy boundaries in relationships, meditation on gratefulness , Sacral chakra meditation for self-compassion and confidence and ending with affirmations for self-forgiveness, courage and toribio in inner resources, and against anxiety. Psychoeducation, EFT, Mindfulness-based, and Somatic approaches were used to achieve the deep state of relaxation and to maintain the serene environment for the exercise's purpose.      Assessment: Marie was in euthymic mood and in congruence with broad affect, followed through guidance and completed the exercises well, showed willingness to continue learning and practicing self-care in weekly Relaxation Group as well as individual therapy.      Plan: Marie will practice deep breathing and other techniques based on each occasion to counter stress and will attend the next group to be able to take part in further guided exercises on chosen topics to build on the techniques learned in the beginning.    HPI     Past Medical History:   Diagnosis Date    Bilateral carpal tunnel syndrome 08/20/2019    Carpal tunnel syndrome of right wrist 10/30/2019    Added automatically from request for surgery [id number]    Chlamydia     COPD (chronic obstructive pulmonary disease) (HCC)     Depression     Hypertension     Varicella        Past Surgical History:   Procedure Laterality Date    BREAST BIOPSY Left 20yrs ago benign    CERVICAL SPINE SURGERY      EGD      HERNIA REPAIR      OOPHORECTOMY Right     [place] NDSC WRST SURG W/RLS TRANSVRS CARPL LIGM Right 01/21/2020    Procedure: RELEASE CARPAL  TUNNEL ENDOSCOPIC;  Surgeon: Sam Nguyen MD;  [facility];  Service: Orthopedics    [place] NDSC WRST SURG W/RLS TRANSVRS CARPL LIGM Left 01/28/2020    Procedure: RELEASE CARPAL TUNNEL ENDOSCOPIC;  Surgeon: Sam Nguyen MD;  [facility];  Service: Orthopedics       Current Outpatient Medications   Medication Sig Dispense Refill    Acetaminophen 500 MG Take 2 capsules by mouth every 4 (four) hours as needed for mild pain      albuterol (PROVENTIL HFA,VENTOLIN HFA) 90 mcg/act inhaler inhale 2 puffs by mouth and INTO THE LUNGS every 6 hours IF NEEDED FOR WHEEZING. 8.5 g 2    buPROPion (WELLBUTRIN XL) 300 mg 24 hr tablet Take 1 tablet (300 mg total) by mouth daily 30 tablet 1    cholecalciferol (VITAMIN D3) 400 units tablet Take 400 Units by mouth daily      fluticasone (FLONASE) 50 mcg/act nasal spray instill 1 spray into each nostril once daily 16 g 2    guaiFENesin (MUCINEX) 600 mg 12 hr tablet Take 2 tablets (1,200 mg total) by mouth every 12 (twelve) hours 60 tablet 3    ibuprofen (MOTRIN) 200 mg tablet Take 200 mg by mouth every 6 (six) hours as needed for mild pain      ipratropium-albuterol (DUO-NEB) 0.5-2.5 mg/3 mL nebulizer solution Take 3 mL by nebulization 4 (four) times a day 360 mL 3    lamoTRIgine (LaMICtal) 200 MG tablet Take 1 tablet (200 mg total) by mouth daily 30 tablet 1    levocetirizine (XYZAL) 5 MG tablet take 1 tablet by mouth every evening 30 tablet 4    methocarbamol (ROBAXIN) 500 mg tablet Take 500 mg by mouth 2 (two) times a day      nicotine (NICODERM CQ) 21 mg/24 hr TD 24 hr patch Place 1 patch on the skin over 24 hours every 24 hours 28 patch 0    nicotine polacrilex (COMMIT) 4 MG lozenge Apply 1 lozenge (4 mg total) to the mouth or throat as needed for smoking cessation 100 each 0    omeprazole (PriLOSEC) 20 mg delayed release capsule take 1 capsule by mouth twice a day 60 capsule 5    senna-docusate sodium (SENOKOT-S) 8.6-50 mg per tablet Take 3 tablets by  mouth as needed        Stiolto Respimat 2.5-2.5 MCG/ACT inhaler inhale 2 puffs by mouth and INTO THE LUNGS once daily 4 g 11     No current facility-administered medications for this visit.        No Known Allergies    Review of Systems    Video Exam    There were no vitals filed for this visit.    Physical Exam     04/05/24  Start Time: 0400  Stop Time: 0500  Total Visit Time: 60 minutes

## 2024-04-09 ENCOUNTER — SOCIAL WORK (OUTPATIENT)
Dept: BEHAVIORAL/MENTAL HEALTH CLINIC | Facility: CLINIC | Age: 59
End: 2024-04-09

## 2024-04-09 ENCOUNTER — OFFICE VISIT (OUTPATIENT)
Dept: CARDIOLOGY CLINIC | Facility: CLINIC | Age: 59
End: 2024-04-09
Payer: COMMERCIAL

## 2024-04-09 VITALS
HEART RATE: 91 BPM | BODY MASS INDEX: 20.81 KG/M2 | DIASTOLIC BLOOD PRESSURE: 74 MMHG | OXYGEN SATURATION: 97 % | HEIGHT: 60 IN | WEIGHT: 106 LBS | SYSTOLIC BLOOD PRESSURE: 138 MMHG

## 2024-04-09 DIAGNOSIS — F17.210 CIGARETTE NICOTINE DEPENDENCE WITHOUT COMPLICATION: ICD-10-CM

## 2024-04-09 DIAGNOSIS — I10 ESSENTIAL HYPERTENSION: ICD-10-CM

## 2024-04-09 DIAGNOSIS — F41.1 GENERALIZED ANXIETY DISORDER: ICD-10-CM

## 2024-04-09 DIAGNOSIS — Z72.0 TOBACCO ABUSE: ICD-10-CM

## 2024-04-09 DIAGNOSIS — F31.81 MODERATE MIXED BIPOLAR II DISORDER (HCC): Primary | ICD-10-CM

## 2024-04-09 DIAGNOSIS — F10.11 ALCOHOL ABUSE, IN REMISSION: ICD-10-CM

## 2024-04-09 DIAGNOSIS — F43.10 PTSD (POST-TRAUMATIC STRESS DISORDER): ICD-10-CM

## 2024-04-09 DIAGNOSIS — I50.20 SYSTOLIC HEART FAILURE, UNSPECIFIED HF CHRONICITY (HCC): Primary | ICD-10-CM

## 2024-04-09 DIAGNOSIS — F12.90 MARIJUANA USE: Chronic | ICD-10-CM

## 2024-04-09 PROCEDURE — 99214 OFFICE O/P EST MOD 30 MIN: CPT | Performed by: PHYSICIAN ASSISTANT

## 2024-04-09 RX ORDER — METOPROLOL SUCCINATE 25 MG/1
25 TABLET, EXTENDED RELEASE ORAL DAILY
Qty: 30 TABLET | Refills: 2 | Status: SHIPPED | OUTPATIENT
Start: 2024-04-09

## 2024-04-09 NOTE — PATIENT INSTRUCTIONS
Start metoprolol succinate 25 mg daily.  Schedule your stress test.   Get labs as ordered.     Please weigh yourself every day (after emptying your bladder) and keep a detailed log of weights.   Contact the Heart Failure program at [phone] if you gain 3+ lbs overnight or 5+ lbs in 5-7 days.  Limit daily sodium/salt intake to 2000 mg daily to prevent fluid retention.  Avoid canned foods, fast food/Chinese food, and processed meats (hot dogs, lunch meat, and sausage etc.). Caution with condiments.  Limit fluid intake to 2000 mL or 2 liters (about 60-65 ounces) daily.  Avoid electrolyte replacement drinks (such as Gatorade, Pedialyte, Propel, Liquid IV, etc.).  Bring complete list of medications and log of daily weights to your follow-up appointment.

## 2024-04-09 NOTE — PROGRESS NOTES
General Cardiology Outpatient Visit    Marie Portillo 58 y.o. female   MRN: [id number]  Encounter: [id number]    Assessment:  Patient Active Problem List    Diagnosis Date Noted    Cervical stenosis of spinal canal 12/20/2023    Chiari malformation type I (HCC) 12/20/2023    Simple chronic bronchitis (HCC) 10/25/2023    Tobacco abuse 10/25/2023    HPV in female 07/13/2022    COPD with acute exacerbation (HCC) 03/18/2022    PND (post-nasal drip) 03/18/2022    Centrilobular emphysema (HCC) 12/17/2021    Cigarette nicotine dependence without complication 12/17/2021    Pulmonary nodules 12/17/2021    Gastroesophageal reflux disease without esophagitis 08/18/2020    Essential hypertension 04/16/2020    Other hyperlipidemia 04/16/2020    Generalized anxiety disorder 03/13/2020    PTSD (post-traumatic stress disorder) 03/13/2020    Alcohol abuse, in remission 03/13/2020    Marijuana use 03/13/2020    Moderate mixed bipolar II disorder (HCC) 08/20/2019    Status post left breast lumpectomy 08/20/2019       Today's Plan:  Warm, euvolemic.  No indication for diuretic at this time.  Nuclear stress test ordered today.  GDMT inception with metoprolol succinate 25 mg daily.  Labs ordered to be done prior to next visit.  Follow-up with heart failure in 2 to 3 weeks.  Follow-up thereafter with Dr. Rousseau.  2 g sodium restriction, 2 L fluid restriction, daily weights.  Provided with heart failure education booklet and heart failure cookbook today.    Plan:  HFmrEF; LVEF 40-45%  Etiology: Unclear.  Longstanding history of hypertension. Needs ischemic evaluation.  TTE 3/25/2024: LVEF 40-45%.  Mild to moderate global hypokinesis.  Grade 1 DD.  LA mildly dilated.  Mild MR.    Pharmacotherapies / Neurohormonal Blockade:  --Beta Blocker: Start metoprolol succinate 25 mg daily  --ARNi / ACEi / ARB: No  --Aldosterone Antagonist: No  --SGLT2 Inhibitor: No  --Diuretic: No    Sudden Cardiac Death Risk Reduction:  --ICD: LVEF 40-45%.    HTN  Has  not previously been on antihypertensives  Tobacco abuse  COPD    HPI:   Marie Portillo is a 58-year-old female with a PMH as above who presents for follow up. Follows with Dr. Rousseau.   Per LM: 58 year old female with PMH of Lower extremity edema, Tobacco use and IFG, palpitations who presented for a check up. She has not had any other lower extremity edema since last visit. She denies chest pain, no orthopnea, PND, lightheadedness, syncope, presyncope.   She has COPD and is short of breath  all the time. She is pretty active and walks daily. She still feels occasional palpitations. She is seeing a psychiatrist now and is on medication and she states that this is better now. Still smoking due to stress. About 2 weeks ago she had a microdiscectomy, interbody fusion due to cervical spinal stenosis.      She states from a cardiac standpoint she is feeling well.      She initially presented to our office in 2020 due to palpitations.  2 week zio from 2020 demonstrated a min HR of 67 bpm, max HR of 130 bpm, and avg HR of 99  bpm. Predominant underlying rhythm was Sinus Rhythm. Isolated SVEs were rare (<1.0%), and no SVE Couplets or SVE Triplets were present.  Isolated VEs were rare (<1.0%), VE Couplets were rare (<1.0%), and no VE Triplets were present.    Went for echo, which revealed LVEF of 40 to 45%, reduced from prior.  Instructed to schedule appointment.    4/9/24: Presents today for follow-up. Has been feeling somewhat tired and is short of breath at baseline, no change. No LE swelling. No chest pain. Occasionally lightheaded when getting up. Has had a couple episodes of spinning dizziness since January, but thinks this may be related to her Chiari malformation.     Father has heart failure.  Patient has remote history of occasional drug use years ago as well as remote alcohol use. Still smokes tobacco and uses marijuana.  No recent history of viral infections or travel outside the country.  She denies any recent  anginal symptoms.      Past Medical History:   Diagnosis Date    Bilateral carpal tunnel syndrome 08/20/2019    Carpal tunnel syndrome of right wrist 10/30/2019    Added automatically from request for surgery [id number]    Chlamydia     COPD (chronic obstructive pulmonary disease) (HCC)     Depression     Hypertension     Varicella      Review of Systems   Constitutional:  Positive for fatigue. Negative for chills and fever.   HENT:  Negative for ear pain and sore throat.    Eyes:  Negative for pain and visual disturbance.   Respiratory:  Positive for shortness of breath (Baseline). Negative for cough.    Cardiovascular:  Negative for chest pain, palpitations and leg swelling.   Gastrointestinal:  Negative for abdominal pain and vomiting.   Genitourinary:  Negative for dysuria and hematuria.   Musculoskeletal:  Negative for arthralgias and back pain.   Skin:  Negative for color change and rash.   Neurological:  Positive for light-headedness (Occasional). Negative for seizures and syncope.   All other systems reviewed and are negative.  14-point ROS completed and negative except as stated above and/or in the HPI.      No Known Allergies    Current Outpatient Medications:     Acetaminophen 500 MG, Take 2 capsules by mouth every 4 (four) hours as needed for mild pain, Disp: , Rfl:     albuterol (PROVENTIL HFA,VENTOLIN HFA) 90 mcg/act inhaler, inhale 2 puffs by mouth and INTO THE LUNGS every 6 hours IF NEEDED FOR WHEEZING., Disp: 8.5 g, Rfl: 2    buPROPion (WELLBUTRIN XL) 300 mg 24 hr tablet, Take 1 tablet (300 mg total) by mouth daily, Disp: 30 tablet, Rfl: 1    cholecalciferol (VITAMIN D3) 400 units tablet, Take 400 Units by mouth daily, Disp: , Rfl:     fluticasone (FLONASE) 50 mcg/act nasal spray, instill 1 spray into each nostril once daily, Disp: 16 g, Rfl: 2    guaiFENesin (MUCINEX) 600 mg 12 hr tablet, Take 2 tablets (1,200 mg total) by mouth every 12 (twelve) hours, Disp: 60 tablet, Rfl: 3    ibuprofen (MOTRIN)  200 mg tablet, Take 200 mg by mouth every 6 (six) hours as needed for mild pain, Disp: , Rfl:     ipratropium-albuterol (DUO-NEB) 0.5-2.5 mg/3 mL nebulizer solution, Take 3 mL by nebulization 4 (four) times a day, Disp: 360 mL, Rfl: 3    lamoTRIgine (LaMICtal) 200 MG tablet, Take 1 tablet (200 mg total) by mouth daily, Disp: 30 tablet, Rfl: 1    levocetirizine (XYZAL) 5 MG tablet, take 1 tablet by mouth every evening, Disp: 30 tablet, Rfl: 4    methocarbamol (ROBAXIN) 500 mg tablet, Take 500 mg by mouth 2 (two) times a day PRN, Disp: , Rfl:     metoprolol succinate (TOPROL-XL) 25 mg 24 hr tablet, Take 1 tablet (25 mg total) by mouth daily, Disp: 30 tablet, Rfl: 2    omeprazole (PriLOSEC) 20 mg delayed release capsule, take 1 capsule by mouth twice a day, Disp: 60 capsule, Rfl: 5    senna-docusate sodium (SENOKOT-S) 8.6-50 mg per tablet, Take 3 tablets by mouth as needed  , Disp: , Rfl:     Stiolto Respimat 2.5-2.5 MCG/ACT inhaler, inhale 2 puffs by mouth and INTO THE LUNGS once daily, Disp: 4 g, Rfl: 11    nicotine (NICODERM CQ) 21 mg/24 hr TD 24 hr patch, Place 1 patch on the skin over 24 hours every 24 hours (Patient not taking: Reported on 4/9/2024), Disp: 28 patch, Rfl: 0    nicotine polacrilex (COMMIT) 4 MG lozenge, Apply 1 lozenge (4 mg total) to the mouth or throat as needed for smoking cessation (Patient not taking: Reported on 4/9/2024), Disp: 100 each, Rfl: 0    Social History     Socioeconomic History    Marital status:      Spouse name: Not on file    Number of children: 2    Years of education: Not on file    Highest education level: Not on file   Occupational History    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  She quit on her own without any h/o withdrawal or rehab.    Drug use: Yes     Types: Marijuana     Comment: Smokes THC several times a day.  Also uses CBD OIL at times.   Used cocaine and methamphetamine  fairly steadily for a period of approx 1 year approx 36 years ago.  Tried LSD, Mushrooms.  No h/o rehab    Sexual activity: Yes     Partners: Male   Other Topics Concern    Not on file   Social History Narrative    Home: lives with 2 best friends.         Education:    Pt states she always got Ds and Fs in school and skipped classes often, but there were no formally diagnosed learning disabilities.  Pt was in alternative schooling.  She learned \"Nothing\" there and they didn't teach much.  Pt was then placed back in regular classes but could not keep up.  Pt reached childhood milestones on time as far as she knows.     Highest grade completed 11th grade    [facility] but no actual certificate     Social Determinants of Health     Financial Resource Strain: Low Risk  (1/5/2024)    Received from [facility]    Overall Financial Resource Strain (CARDIA)     Difficulty of Paying Living Expenses: Not hard at all   Food Insecurity: No Food Insecurity (1/5/2024)    Received from [facility]    Hunger Vital Sign     Worried About Running Out of Food in the Last Year: Never true     Ran Out of Food in the Last Year: Never true   Transportation Needs: No Transportation Needs (1/5/2024)    Received from [facility]    PRAPARE - Transportation     Lack of Transportation (Medical): No     Lack of Transportation (Non-Medical): No   Physical Activity: Not on file   Stress: Not on file   Social Connections: Not on file   Intimate Partner Violence: Not At Risk " (1/5/2024)    Received from [facility]    Humiliation, Afraid, Rape, and Kick questionnaire     Fear of Current or Ex-Partner: No     Emotionally Abused: No     Physically Abused: No     Sexually Abused: No   Housing Stability: Low Risk  (1/5/2024)    Received from [facility]    Housing Stability Vital Sign     Unable to Pay for Housing in the Last Year: No     Number of Places Lived in the Last Year: 1     Unstable Housing in the Last Year: No     Family History   Problem Relation Age of Onset    Alcohol abuse Mother     Eating disorder Mother         anorexia and bulimia per Pt    Hodgkin's lymphoma Father     Cancer Father         3rd time lymphoma, prostate, lung    No Known Problems Sister     No Known Problems Daughter     Colon cancer Maternal Grandmother     Alcohol abuse Maternal Grandfather     Colon cancer Paternal Grandmother     Breast cancer Maternal Aunt     No Known Problems Maternal Aunt     No Known Problems Maternal Aunt     Anxiety disorder Paternal Aunt     No Known Problems Paternal Aunt     Multiple sclerosis Cousin     Colon cancer Maternal Uncle        Vitals:  Blood pressure 138/74, pulse 91, height 5' (1.524 m), weight 48.1 kg (106 lb), SpO2 97%.  Body mass index is 20.7 kg/m².  Wt Readings from Last 10 Encounters:   04/09/24 48.1 kg (106 lb)   03/25/24 50.3 kg (111 lb)   02/20/24 50.3 kg (111 lb)   01/18/24 50.3 kg (111 lb)   01/02/24 49.7 kg (109 lb 9.6 oz)   10/25/23 48.4 kg (106 lb 12.8 oz)   10/19/23 48.9 kg (107 lb 12.8 oz)   09/08/23 50 kg (110 lb 3.2 oz)   08/10/23 50.8 kg (112 lb)   07/21/23 50.3 kg (110 lb 12.8 oz)     Vitals:    04/09/24 1622   BP: 138/74   BP Location: Right arm   Patient Position: Sitting   Cuff Size: Standard   Pulse: 91   SpO2: 97%   Weight: 48.1 kg (106 lb)   Height: 5' (1.524 m)       Physical Exam  Constitutional:       Appearance: Normal appearance.   HENT:      Head: Normocephalic.      Nose: Nose normal.       "Mouth/Throat:      Mouth: Mucous membranes are moist.   Eyes:      Conjunctiva/sclera: Conjunctivae normal.   Neck:      Vascular: No JVD.   Cardiovascular:      Rate and Rhythm: Normal rate and regular rhythm.   Pulmonary:      Effort: Pulmonary effort is normal.      Breath sounds: Normal breath sounds.   Musculoskeletal:      Cervical back: Neck supple.      Right lower leg: No edema.      Left lower leg: No edema.   Skin:     General: Skin is warm and dry.   Neurological:      General: No focal deficit present.      Mental Status: She is alert and oriented to person, place, and time.   Psychiatric:         Mood and Affect: Mood normal.         Behavior: Behavior normal.         Labs & Results:  Lab Results   Component Value Date    WBC 7.64 03/13/2023    HGB 14.7 03/13/2023    HCT 44.8 03/13/2023    MCV 92 03/13/2023     03/13/2023     Lab Results   Component Value Date    SODIUM 140 12/28/2023    K 3.9 12/28/2023     12/28/2023    CO2 27 12/28/2023    BUN 20 12/28/2023    CREATININE 0.65 12/28/2023    GLUC 105 (H) 12/28/2023    CALCIUM 8.9 12/28/2023     Lab Results   Component Value Date    INR 1.0 12/28/2023     No results found for: \"BNP\"   Lab Results   Component Value Date    NTBNP 107 04/29/2022      Thank you for the opportunity to participate in the care of this patient.    Kalyn Gonzalez PA-C   "

## 2024-04-09 NOTE — PSYCH
"Behavioral Health Psychotherapy Progress Note    Psychotherapy Provided: Individual Psychotherapy     1. Moderate mixed bipolar II disorder (HCC)        2. PTSD (post-traumatic stress disorder)        3. Generalized anxiety disorder        4. Cigarette nicotine dependence without complication        5. Tobacco abuse        6. Marijuana use        7. Alcohol abuse, in remission            Goals addressed in session: Goal 1 and Goal 2     DATA: Marie reported feeling very upset due to thinking about her grandchildren and that she is unable to see them after her ex  took their children away. She was guided to connect with her body resource and feel calm and grounded due to being overwhelmed and very upset. Marie the focused on her [place] Plan updates and talked about her struggle with her memory.  During this session, this clinician used the following therapeutic modalities: Client-centered Therapy, Cognitive Behavioral Therapy, Dialectical Behavior Therapy, Mindfulness-based Strategies, Supportive Psychotherapy, and Somatic Experiencing.    Substance Abuse was not addressed during this session. If the client is diagnosed with a co-occurring substance use disorder, please indicate any changes in the frequency or amount of use: NA. Stage of change for addressing substance use diagnoses: Maintenance    ASSESSMENT:  Marie Portillo presents with a Euthymic/ normal, Anxious, Depressed, and Labile mood.     her affect is Normal range and intensity, Constricted, and Tearful, which is congruent, with her mood and the content of the session. The client has made progress on their goals.    Marie was very emotional yet possble to redirect to focus on her body sensations. Marie Portillo presents with a low risk of suicide, low risk of self-harm, and none risk of harm to others.    For any risk assessment that surpasses a \"low\" rating, a safety plan must be developed.    A safety plan was indicated: no  If yes, describe in detail " NA    PLAN: Between sessions, Marie Portillo will use her Core Mindfulness skills and will attend weekly Relaxation Group to practice more strategies and techniques to push through difficult emotions healthily. At the next session, the therapist will use Client-centered Therapy, Cognitive Behavioral Therapy, Dialectical Behavior Therapy, Mindfulness-based Strategies, Supportive Psychotherapy, and Somatic Experiencing  to address diverting from the trauma vortex and going into the healing (renegotiating) vortex to avoid re-traumatizing.     Behavioral Health Treatment Plan and Discharge Planning: Marie Portillo is aware of and agrees to continue to work on their treatment plan. They have identified and are working toward their discharge goals. yes    Visit start and stop times:    04/09/24  Start Time: 0150  Stop Time: 0248  Total Visit Time: 58 minutes

## 2024-04-09 NOTE — BH TREATMENT PLAN
Outpatient Behavioral Health Psychotherapy Treatment Plan    Marie Portillo  1965     Date of Initial Psychotherapy Assessment: 3/22/23   Date of Current Treatment Plan: 04/09/24  Treatment Plan Target Date: TBD  Treatment Plan Expiration Date: 10/07/24     Diagnosis:      1. Moderate mixed bipolar II disorder (HCC)        2. PTSD (post-traumatic stress disorder)        3. Generalized anxiety disorder        4. Cigarette nicotine dependence without complication        5. Tobacco abuse        6. Marijuana use        7. Alcohol abuse, in remission           Area(s) of Need: emotion regulation, cognitive decline      Long Term Goal 1 (in the client's own words): I want to feel better when I wake up in the morning      Stage of Change: Preparation to Action      Target Date for completion: TBD             Anticipated therapeutic modalities: Person-Centered, TF-CBT,  DBT, Somatic Experiencing              People identified to complete this goal: Patient, Doctors, Therapist                     Objective 1: (identify the means of measuring success in meeting the objective): will process her trauma during sessions and will start re-connecting with her body and identifying how emotions are sensed in the body and will describe with Somatic language                      Objective 2: (identify the means of measuring success in meeting the objective): will use Mindfulness attendance to difficult emotions and will [facility] How Mind[facility] skills [facility]               Objective 3: (identify the means of measuring success in meeting the objective): will follow a strict medication management schedule as prescribed by her doctor       Long Term Goal 2 (in the client's own words): I want to handle stress better       Stage of Change: Action     Target Date for completion: TBD             Anticipated therapeutic modalities: Person-Centered, DBT, CBT, Mindfulness, and Somatic              People identified  "to complete this goal: Patent, Doctors, Therapist                     Objective 1: (identify the means of measuring success in meeting the objective): will practice relaxation techniques at least once daily to counter stress and will take rapid and vigorous inhales with long exhales and will attend Relaxation Group weekly                    Objective 2: (identify the means of measuring success in meeting the objective): will follow a strict medication management schedule as prescribed      I am currently under the care of a [facility] psychiatric provider: yes     My [facility] psychiatric provider is: Dr. Kumar     I am currently taking psychiatric medications: Yes, as prescribed     I feel that I will be ready for discharge from mental health care when I reach the following (measurable goal/objective): \"I gotta think about it- happy and sad that I am ready and will be missing the people I met\"      For children and adults who have a legal guardian:          Has there been any change to custody orders and/or guardianship status? NA. If yes, attach updated documentation.     I have created my Crisis Plan and have been offered a copy of this plan     Behavioral Health Treatment Plan [facility]: Diagnosis and Treatment Plan explained to Marie Portillo acknowledges an understanding of their diagnosis. Marie Portillo agrees to this treatment plan.     I have been offered a copy of this Treatment Plan. Yes        "

## 2024-04-10 ENCOUNTER — TELEPHONE (OUTPATIENT)
Dept: PSYCHIATRY | Facility: CLINIC | Age: 59
End: 2024-04-10

## 2024-04-10 ENCOUNTER — DOCUMENTATION (OUTPATIENT)
Dept: BEHAVIORAL/MENTAL HEALTH CLINIC | Facility: CLINIC | Age: 59
End: 2024-04-10

## 2024-04-10 NOTE — TELEPHONE ENCOUNTER
Marie Portillo and/or patient requested a call back to discuss after her appt yesterday she had a disappointment and if provider can give her a call and if not pt will see you on Friday 4/12    They can be reached at P# [phone].       Thank you.

## 2024-04-12 ENCOUNTER — TELEMEDICINE (OUTPATIENT)
Dept: BEHAVIORAL/MENTAL HEALTH CLINIC | Facility: CLINIC | Age: 59
End: 2024-04-12

## 2024-04-12 DIAGNOSIS — F10.11 ALCOHOL ABUSE, IN REMISSION: ICD-10-CM

## 2024-04-12 DIAGNOSIS — F12.90 MARIJUANA USE: Chronic | ICD-10-CM

## 2024-04-12 DIAGNOSIS — F31.81 MODERATE MIXED BIPOLAR II DISORDER (HCC): Primary | ICD-10-CM

## 2024-04-12 DIAGNOSIS — F41.1 GENERALIZED ANXIETY DISORDER: ICD-10-CM

## 2024-04-12 DIAGNOSIS — F43.10 PTSD (POST-TRAUMATIC STRESS DISORDER): ICD-10-CM

## 2024-04-12 DIAGNOSIS — Z72.0 TOBACCO ABUSE: ICD-10-CM

## 2024-04-12 DIAGNOSIS — F17.210 CIGARETTE NICOTINE DEPENDENCE WITHOUT COMPLICATION: ICD-10-CM

## 2024-04-12 NOTE — PSYCH
Virtual Regular Visit    Verification of patient location:    Patient is located at Home in the following state in which I hold an active license [place]      Assessment/Plan:    Problem List Items Addressed This Visit       Moderate mixed bipolar II disorder (HCC) - Primary    Generalized anxiety disorder    PTSD (post-traumatic stress disorder)    Alcohol abuse, in remission    Marijuana use (Chronic)    Cigarette nicotine dependence without complication    Tobacco abuse       Goals addressed in session: Goal 1 and Goal 2          Reason for visit is No chief complaint on file.       Encounter provider Isabel Watts LPC    Provider located at [facility]  [facility] [address]  [place] [address] [phone]      Recent Visits  Date Type Provider Dept   04/10/24 Telephone Isabel Watts LPC [facility] [place]   04/10/24 Telephone Isabel Watts LPC [facility] [place]   04/05/24 Telemedicine Isabel Watts LPC [facility] [place]   Showing recent visits within past 7 days and meeting all other requirements  Future Appointments  No visits were found meeting these conditions.  Showing future appointments within next 150 days and meeting all other requirements       The patient was identified by name and date of birth. Marie Portillo was informed that this is a telemedicine visit and that the visit is being conducted throughthe Microsoft Teams platform. She agrees to proceed..  My office door was closed. The patient was notified the following individuals were present in the room other group members.  She acknowledged consent and understanding of privacy and security of the video platform. The patient has agreed to participate and understands they can discontinue the visit at any time.    Patient is aware this is a billable service.     Subjective  Marie Portillo is a 58 y.o. female   .    Data: Marie attended the Relaxation group and was guided through EFT tapping on pain, diaphragmatic breathing, other Vagus nerve stimulation techniques, and progressive muscle relaxation. Furthermore, attendees were guided through a quick body scan, imagery for connecting with the inner child, a break for self-compassion, Urge Surfing visualization, and ending with affirmations against anxiety and depression and for self-confidence, manifestation of willingness, and radical acceptance. Psychoeducation, EFT, Mindfulness-based, and Somatic approaches were used to achieve the deep state of relaxation and to maintain the serene environment for the exercise's purpose.      Assessment: Marie was in euthymic mood and in congruence with broad affect, followed through guidance and completed the exercises well, showed willingness to continue learning and practicing self-care in weekly Relaxation Group as well as individual therapy.      Plan: Marie will practice deep breathing and other techniques based on each occasion to counter stress and will attend the next group to be able to take part in further guided exercises on chosen topics to build on the techniques learned in the beginning.    HPI     Past Medical History:   Diagnosis Date    Bilateral carpal tunnel syndrome 08/20/2019    Carpal tunnel syndrome of right wrist 10/30/2019    Added automatically from request for surgery [id number]    Chlamydia     COPD (chronic obstructive pulmonary disease) (HCC)     Depression     Hypertension     Varicella        Past Surgical History:   Procedure Laterality Date    BREAST BIOPSY Left 20yrs ago benign    CERVICAL SPINE SURGERY      EGD      HERNIA REPAIR      OOPHORECTOMY Right     [place] [facility]SC WRST SURG W/RLS TRANSVRS CARPL LIGM Right 01/21/2020    Procedure: RELEASE CARPAL TUNNEL ENDOSCOPIC;  Surgeon: Sam Nguyen MD;  [facility];  Service: Orthopedics    [facility] NDSC WRST SURG W/RLS TRANSVRS CARPL LIGM Left 01/28/2020     Procedure: RELEASE CARPAL TUNNEL ENDOSCOPIC;  Surgeon: Sam Nguyen MD;  [facility];  Service: Orthopedics       Current Outpatient Medications   Medication Sig Dispense Refill    Acetaminophen 500 MG Take 2 capsules by mouth every 4 (four) hours as needed for mild pain      albuterol (PROVENTIL HFA,VENTOLIN HFA) 90 mcg/act inhaler inhale 2 puffs by mouth and INTO THE LUNGS every 6 hours IF NEEDED FOR WHEEZING. 8.5 g 2    buPROPion (WELLBUTRIN XL) 300 mg 24 hr tablet Take 1 tablet (300 mg total) by mouth daily 30 tablet 1    cholecalciferol (VITAMIN D3) 400 units tablet Take 400 Units by mouth daily      fluticasone (FLONASE) 50 mcg/act nasal spray instill 1 spray into each nostril once daily 16 g 2    guaiFENesin (MUCINEX) 600 mg 12 hr tablet Take 2 tablets (1,200 mg total) by mouth every 12 (twelve) hours 60 tablet 3    ibuprofen (MOTRIN) 200 mg tablet Take 200 mg by mouth every 6 (six) hours as needed for mild pain      ipratropium-albuterol (DUO-NEB) 0.5-2.5 mg/3 mL nebulizer solution Take 3 mL by nebulization 4 (four) times a day 360 mL 3    lamoTRIgine (LaMICtal) 200 MG tablet Take 1 tablet (200 mg total) by mouth daily 30 tablet 1    levocetirizine (XYZAL) 5 MG tablet take 1 tablet by mouth every evening 30 tablet 4    methocarbamol (ROBAXIN) 500 mg tablet Take 500 mg by mouth 2 (two) times a day PRN      metoprolol succinate (TOPROL-XL) 25 mg 24 hr tablet Take 1 tablet (25 mg total) by mouth daily 30 tablet 2    nicotine (NICODERM CQ) 21 mg/24 hr TD 24 hr patch Place 1 patch on the skin over 24 hours every 24 hours (Patient not taking: Reported on 4/9/2024) 28 patch 0    nicotine polacrilex (COMMIT) 4 MG lozenge Apply 1 lozenge (4 mg total) to the mouth or throat as needed for smoking cessation (Patient not taking: Reported on 4/9/2024) 100 each 0    omeprazole (PriLOSEC) 20 mg delayed release capsule take 1 capsule by mouth twice a day 60 capsule 5    senna-docusate sodium (SENOKOT-S)  8.6-50 mg per tablet Take 3 tablets by mouth as needed        Stiolto Respimat 2.5-2.5 MCG/ACT inhaler inhale 2 puffs by mouth and INTO THE LUNGS once daily 4 g 11     No current facility-administered medications for this visit.        No Known Allergies    Review of Systems    Video Exam    There were no vitals filed for this visit.    Physical Exam     04/12/24  Start Time: 0400  Stop Time: 0500  Total Visit Time: 60 minutes

## 2024-04-17 ENCOUNTER — TELEMEDICINE (OUTPATIENT)
Dept: BEHAVIORAL/MENTAL HEALTH CLINIC | Facility: CLINIC | Age: 59
End: 2024-04-17

## 2024-04-17 ENCOUNTER — TELEMEDICINE (OUTPATIENT)
Dept: PSYCHIATRY | Facility: CLINIC | Age: 59
End: 2024-04-17

## 2024-04-17 DIAGNOSIS — F43.10 PTSD (POST-TRAUMATIC STRESS DISORDER): ICD-10-CM

## 2024-04-17 DIAGNOSIS — F41.1 GENERALIZED ANXIETY DISORDER: ICD-10-CM

## 2024-04-17 DIAGNOSIS — F12.90 MARIJUANA USE: Chronic | ICD-10-CM

## 2024-04-17 DIAGNOSIS — F31.32 BIPOLAR AFFECTIVE DISORDER, CURRENTLY DEPRESSED, MODERATE (HCC): ICD-10-CM

## 2024-04-17 DIAGNOSIS — F10.11 ALCOHOL ABUSE, IN REMISSION: ICD-10-CM

## 2024-04-17 DIAGNOSIS — F31.81 MODERATE MIXED BIPOLAR II DISORDER (HCC): Primary | ICD-10-CM

## 2024-04-17 DIAGNOSIS — F17.210 CIGARETTE NICOTINE DEPENDENCE WITHOUT COMPLICATION: ICD-10-CM

## 2024-04-17 DIAGNOSIS — Z72.0 TOBACCO ABUSE: ICD-10-CM

## 2024-04-17 RX ORDER — BUPROPION HYDROCHLORIDE 300 MG/1
300 TABLET ORAL DAILY
Qty: 30 TABLET | Refills: 1 | Status: SHIPPED | OUTPATIENT
Start: 2024-04-17 | End: 2024-06-16

## 2024-04-17 NOTE — PSYCH
"Behavioral Health Psychotherapy Progress Note    Psychotherapy Provided: Individual Psychotherapy     1. Moderate mixed bipolar II disorder (HCC)        2. Generalized anxiety disorder        3. PTSD (post-traumatic stress disorder)        4. Cigarette nicotine dependence without complication        5. Tobacco abuse        6. Marijuana use        7. Alcohol abuse, in remission            Goals addressed in session: Goal 1 and Goal 2     DATA: Marie discussed her difficulties accepting yet another harsh diagnosis to her list that needs very special attention and restrictions of daily physical activities. She also talked about medical recommendations for her to follow with regards to her heart and was encouraged to focus on her benefits and opportunities to push through challenges with support figures in her life- parents and landlords Peter and Alaina as well as tow male friends.    During this session, this clinician used the following therapeutic modalities: Client-centered Therapy, Cognitive Behavioral Therapy, Dialectical Behavior Therapy, Mindfulness-based Strategies, and Supportive Psychotherapy    Substance Abuse was not addressed during this session. If the client is diagnosed with a co-occurring substance use disorder, please indicate any changes in the frequency or amount of use: Na. Stage of change for addressing substance use diagnoses: Maintenance    ASSESSMENT:  Marie Portillo presents with a Euthymic/ normal, Anxious, and Depressed mood.     her affect is Normal range and intensity, Constricted, and Tearful, at times, which is congruent, with her mood and the content of the session. The client has made progress on their goals.    Marie seemed in a negative mood, experiencing difficulties accepting fully her situation and physical state. Marie Portillo presents with a low risk of suicide, low risk of self-harm, and none risk of harm to others.    For any risk assessment that surpasses a \"low\" rating, a " safety plan must be developed.    A safety plan was indicated: no  If yes, describe in detail NA    PLAN: Between sessions, Marie Portillo will use her learned skills and will attend the weekly Relaxation Group for a better self-care at the end of the week. At the next session, the therapist will use Client-centered Therapy, Cognitive Behavioral Therapy, Dialectical Behavior Therapy, Mindfulness-based Strategies, and Supportive Psychotherapy to address emotions' intensity and skill application.    Behavioral Health Treatment Plan and Discharge Planning: Marie Portillo is aware of and agrees to continue to work on their treatment plan. They have identified and are working toward their discharge goals. yes    Visit start and stop times:    04/17/24  Start Time: 0154  Stop Time: 0238  Total Visit Time: 44 minutes

## 2024-04-17 NOTE — PATIENT INSTRUCTIONS
Continue Wellbutrin 300mg XL for depressive symptoms  Continue Lamictal 200mg daily for mood   Continue routine therapy with Marianne - consider exploring automatic negative thoughts    Please present for your previously scheduled appointment approximately 15 minutes prior to appointment time. If you are running late or are unable to attend your appointment, please call our [place] office at [phone] or our [place] office at [phone] if applicable to notify the office of your absence.    If you are in psychological crisis including not limited to suicidal/homicidal thoughts or thoughts of self-injury, do not hesitate to call/contact your County Crisis hotline (see below) or attend to the nearest emergency department.  [facility] Crisis: [phone]  [facility] Crisis: [phone]  [facility] Crisis: [phone]  [facility] Crisis: [phone]  [facility] Crisis: [phone]  [facility] Crisis: [phone]  [facility] Crisis: [phone]  National Suicide Prevention Hotline: 1-807.180.3352

## 2024-04-19 ENCOUNTER — TELEMEDICINE (OUTPATIENT)
Dept: BEHAVIORAL/MENTAL HEALTH CLINIC | Facility: CLINIC | Age: 59
End: 2024-04-19

## 2024-04-19 DIAGNOSIS — F12.90 MARIJUANA USE: Chronic | ICD-10-CM

## 2024-04-19 DIAGNOSIS — F43.10 PTSD (POST-TRAUMATIC STRESS DISORDER): ICD-10-CM

## 2024-04-19 DIAGNOSIS — F31.81 MODERATE MIXED BIPOLAR II DISORDER (HCC): Primary | ICD-10-CM

## 2024-04-19 DIAGNOSIS — F41.1 GENERALIZED ANXIETY DISORDER: ICD-10-CM

## 2024-04-19 DIAGNOSIS — F17.210 CIGARETTE NICOTINE DEPENDENCE WITHOUT COMPLICATION: ICD-10-CM

## 2024-04-19 DIAGNOSIS — F10.11 ALCOHOL ABUSE, IN REMISSION: ICD-10-CM

## 2024-04-19 NOTE — PSYCH
Virtual Regular Visit    Verification of patient location:    Patient is located at Home in the following state in which I hold an active license [place]      Assessment/Plan:    Problem List Items Addressed This Visit       Moderate mixed bipolar II disorder (HCC) - Primary    Generalized anxiety disorder    PTSD (post-traumatic stress disorder)    Alcohol abuse, in remission    Marijuana use (Chronic)    Cigarette nicotine dependence without complication       Goals addressed in session: Goal 1 and Goal 2          Reason for visit is No chief complaint on file.       Encounter provider Isabel Watts LPC    Provider located at [facility]  [facility] [place] [address] [place] [id number] [phone]      Recent Visits  Date Type Provider Dept   04/17/24 Telemedicine Isabel Watts LPC [facility] [place]   04/12/24 Telemedicine Isabel Watts LPC [facility] [place]   Showing recent visits within past 7 days and meeting all other requirements  Future Appointments  No visits were found meeting these conditions.  Showing future appointments within next 150 days and meeting all other requirements       The patient was identified by name and date of birth. Marie Portillo was informed that this is a telemedicine visit and that the visit is being conducted throughthe Microsoft Teams platform. She agrees to proceed..  My office door was closed. The patient was notified the following individuals were present in the room other group attendees.  She acknowledged consent and understanding of privacy and security of the video platform. The patient has agreed to participate and understands they can discontinue the visit at any time.    Patient is aware this is a billable service.     Subjective  Marie Portillo is a 58 y.o. female  .    Data: Marie attended the Relaxation group and was guided through EFT tapping  on pain, diaphragmatic breathing, other Vagus nerve stimulation techniques, and progressive muscle relaxation. Furthermore, attendees were guided through a quick body scan, [place] visualization, Facing Your Fears meditation, Gratitude and Appreciation manifestation, and ending with affirmations for forgiveness and moving on, courage and self-love, and for dissolving negative emotions with genuine care and kindness. Psychoeducation, EFT, Mindfulness-based, and Somatic approaches were used to achieve the deep state of relaxation and to maintain the serene environment for the exercise's purpose.      Assessment: Marie was in euthymic mood and in congruence with broad affect, followed through guidance and completed the exercises well, showed willingness to continue learning and practicing self-care in weekly Relaxation Group as well as individual therapy.      Plan: Marie will practice deep breathing and other techniques based on each occasion to counter stress and will attend the next group to be able to take part in further guided exercises on chosen topics to build on the techniques learned in the beginning.    HPI     Past Medical History:   Diagnosis Date    Bilateral carpal tunnel syndrome 08/20/2019    Carpal tunnel syndrome of right wrist 10/30/2019    Added automatically from request for surgery [id number]    Chlamydia     COPD (chronic obstructive pulmonary disease) (HCC)     Depression     Hypertension     Varicella        Past Surgical History:   Procedure Laterality Date    BREAST BIOPSY Left 20yrs ago benign    CERVICAL SPINE SURGERY      EGD      HERNIA REPAIR      OOPHORECTOMY Right     [facility] WRST SURG W/RLS TRANSVRS CARPL LIGM Right 01/21/2020    Procedure: RELEASE CARPAL TUNNEL ENDOSCOPIC;  Surgeon: Sam Nguyen MD;  [facility];  Service: Orthopedics    [facility] NDSC WRST SURG W/RLS TRANSVRS CARPL LIGM Left 01/28/2020    Procedure: RELEASE CARPAL TUNNEL ENDOSCOPIC;  Surgeon: Sam  MD Patrick;  [facility];  Service: Orthopedics       Current Outpatient Medications   Medication Sig Dispense Refill    Acetaminophen 500 MG Take 2 capsules by mouth every 4 (four) hours as needed for mild pain      albuterol (PROVENTIL HFA,VENTOLIN HFA) 90 mcg/act inhaler inhale 2 puffs by mouth and INTO THE LUNGS every 6 hours IF NEEDED FOR WHEEZING. 8.5 g 2    buPROPion (WELLBUTRIN XL) 300 mg 24 hr tablet Take 1 tablet (300 mg total) by mouth daily 30 tablet 1    cholecalciferol (VITAMIN D3) 400 units tablet Take 400 Units by mouth daily      fluticasone (FLONASE) 50 mcg/act nasal spray instill 1 spray into each nostril once daily 16 g 2    guaiFENesin (MUCINEX) 600 mg 12 hr tablet Take 2 tablets (1,200 mg total) by mouth every 12 (twelve) hours 60 tablet 3    ibuprofen (MOTRIN) 200 mg tablet Take 200 mg by mouth every 6 (six) hours as needed for mild pain      ipratropium-albuterol (DUO-NEB) 0.5-2.5 mg/3 mL nebulizer solution Take 3 mL by nebulization 4 (four) times a day 360 mL 3    lamoTRIgine (LaMICtal) 200 MG tablet Take 1 tablet (200 mg total) by mouth daily 30 tablet 1    levocetirizine (XYZAL) 5 MG tablet take 1 tablet by mouth every evening 30 tablet 4    methocarbamol (ROBAXIN) 500 mg tablet Take 500 mg by mouth 2 (two) times a day PRN      metoprolol succinate (TOPROL-XL) 25 mg 24 hr tablet Take 1 tablet (25 mg total) by mouth daily 30 tablet 2    nicotine (NICODERM CQ) 21 mg/24 hr TD 24 hr patch Place 1 patch on the skin over 24 hours every 24 hours (Patient not taking: Reported on 4/9/2024) 28 patch 0    nicotine polacrilex (COMMIT) 4 MG lozenge Apply 1 lozenge (4 mg total) to the mouth or throat as needed for smoking cessation (Patient not taking: Reported on 4/9/2024) 100 each 0    omeprazole (PriLOSEC) 20 mg delayed release capsule take 1 capsule by mouth twice a day 60 capsule 5    senna-docusate sodium (SENOKOT-S) 8.6-50 mg per tablet Take 3 tablets by mouth as needed        Stiolto  Respimat 2.5-2.5 MCG/ACT inhaler inhale 2 puffs by mouth and INTO THE LUNGS once daily 4 g 11     No current facility-administered medications for this visit.        No Known Allergies    Review of Systems    Video Exam    There were no vitals filed for this visit.    Physical Exam     04/19/24  Start Time: 0400  Stop Time: 0500  Total Visit Time: 60 minutes

## 2024-04-24 ENCOUNTER — TELEMEDICINE (OUTPATIENT)
Dept: BEHAVIORAL/MENTAL HEALTH CLINIC | Facility: CLINIC | Age: 59
End: 2024-04-24
Payer: COMMERCIAL

## 2024-04-24 DIAGNOSIS — F12.90 MARIJUANA USE: Chronic | ICD-10-CM

## 2024-04-24 DIAGNOSIS — F17.210 CIGARETTE NICOTINE DEPENDENCE WITHOUT COMPLICATION: ICD-10-CM

## 2024-04-24 DIAGNOSIS — F10.11 ALCOHOL ABUSE, IN REMISSION: ICD-10-CM

## 2024-04-24 DIAGNOSIS — F41.1 GENERALIZED ANXIETY DISORDER: ICD-10-CM

## 2024-04-24 DIAGNOSIS — F43.10 PTSD (POST-TRAUMATIC STRESS DISORDER): ICD-10-CM

## 2024-04-24 DIAGNOSIS — F31.81 MODERATE MIXED BIPOLAR II DISORDER (HCC): Primary | ICD-10-CM

## 2024-04-24 DIAGNOSIS — Z72.0 TOBACCO ABUSE: ICD-10-CM

## 2024-04-24 PROCEDURE — 90832 PSYTX W PT 30 MINUTES: CPT | Performed by: COUNSELOR

## 2024-04-24 NOTE — PSYCH
Virtual Regular Visit    Verification of patient location:    Patient is located at Home in the following state in which I hold an active license [place]      Assessment/Plan:    Problem List Items Addressed This Visit       Moderate mixed bipolar II disorder (HCC) - Primary    Generalized anxiety disorder    PTSD (post-traumatic stress disorder)    Alcohol abuse, in remission    Marijuana use (Chronic)    Cigarette nicotine dependence without complication    Tobacco abuse       Goals addressed in session: Goal 1 and Goal 2          Reason for visit is No chief complaint on file.       Encounter provider Isabel Watts LPC    Provider located at [facility]  [facility] [place] [address] [place] [id number] [phone]      Recent Visits  Date Type Provider Dept   04/19/24 Telemedicine Isabel Watts LPC [facility] [place]   04/17/24 Telemedicine Isabel Watts LPC [facility] [place]   Showing recent visits within past 7 days and meeting all other requirements  Future Appointments  No visits were found meeting these conditions.  Showing future appointments within next 150 days and meeting all other requirements       The patient was identified by name and date of birth. Marie Portillo was informed that this is a telemedicine visit and that the visit is being conducted throughthe Epic Embedded platform. She agrees to proceed..  My office door was closed. No one else was in the room.  She acknowledged consent and understanding of privacy and security of the video platform. The patient has agreed to participate and understands they can discontinue the visit at any time.    Patient is aware this is a billable service.     Subjective  Marie Portillo is a 58 y.o. female  .      HPI     Past Medical History:   Diagnosis Date    Bilateral carpal tunnel syndrome 08/20/2019    Carpal tunnel syndrome of right  wrist 10/30/2019    Added automatically from request for surgery [id number]    Chlamydia     COPD (chronic obstructive pulmonary disease) (HCC)     Depression     Hypertension     Varicella        Past Surgical History:   Procedure Laterality Date    BREAST BIOPSY Left 20yrs ago benign    CERVICAL SPINE SURGERY      EGD      HERNIA REPAIR      OOPHORECTOMY Right     [facility] NDSC WRST SURG W/RLS TRANSVRS CARPL LIGM Right 01/21/2020    Procedure: RELEASE CARPAL TUNNEL ENDOSCOPIC;  Surgeon: Sam Nguyen MD;  [facility];  Service: Orthopedics    [facility] NDSC WRST SURG W/RLS TRANSVRS CARPL LIGM Left 01/28/2020    Procedure: RELEASE CARPAL TUNNEL ENDOSCOPIC;  Surgeon: Sam Nguyen MD;  [facility];  Service: Orthopedics       Current Outpatient Medications   Medication Sig Dispense Refill    Acetaminophen 500 MG Take 2 capsules by mouth every 4 (four) hours as needed for mild pain      albuterol (PROVENTIL HFA,VENTOLIN HFA) 90 mcg/act inhaler inhale 2 puffs by mouth and INTO THE LUNGS every 6 hours IF NEEDED FOR WHEEZING. 8.5 g 2    buPROPion (WELLBUTRIN XL) 300 mg 24 hr tablet Take 1 tablet (300 mg total) by mouth daily 30 tablet 1    cholecalciferol (VITAMIN D3) 400 units tablet Take 400 Units by mouth daily      fluticasone (FLONASE) 50 mcg/act nasal spray instill 1 spray into each nostril once daily 16 g 2    guaiFENesin (MUCINEX) 600 mg 12 hr tablet Take 2 tablets (1,200 mg total) by mouth every 12 (twelve) hours 60 tablet 3    ibuprofen (MOTRIN) 200 mg tablet Take 200 mg by mouth every 6 (six) hours as needed for mild pain      ipratropium-albuterol (DUO-NEB) 0.5-2.5 mg/3 mL nebulizer solution Take 3 mL by nebulization 4 (four) times a day 360 mL 3    lamoTRIgine (LaMICtal) 200 MG tablet Take 1 tablet (200 mg total) by mouth daily 30 tablet 1    levocetirizine (XYZAL) 5 MG tablet take 1 tablet by mouth every evening 30 tablet 4    methocarbamol (ROBAXIN) 500 mg tablet Take 500 mg by mouth 2 (two)  times a day PRN      metoprolol succinate (TOPROL-XL) 25 mg 24 hr tablet Take 1 tablet (25 mg total) by mouth daily 30 tablet 2    nicotine (NICODERM CQ) 21 mg/24 hr TD 24 hr patch Place 1 patch on the skin over 24 hours every 24 hours (Patient not taking: Reported on 4/9/2024) 28 patch 0    nicotine polacrilex (COMMIT) 4 MG lozenge Apply 1 lozenge (4 mg total) to the mouth or throat as needed for smoking cessation (Patient not taking: Reported on 4/9/2024) 100 each 0    omeprazole (PriLOSEC) 20 mg delayed release capsule take 1 capsule by mouth twice a day 60 capsule 5    senna-docusate sodium (SENOKOT-S) 8.6-50 mg per tablet Take 3 tablets by mouth as needed        Stiolto Respimat 2.5-2.5 MCG/ACT inhaler inhale 2 puffs by mouth and INTO THE LUNGS once daily 4 g 11     No current facility-administered medications for this visit.        No Known Allergies    Review of Systems    Video Exam    There were no vitals filed for this visit.    Physical Exam

## 2024-04-24 NOTE — PSYCH
"Behavioral Health Psychotherapy Progress Note    Psychotherapy Provided: Individual Psychotherapy     1. Moderate mixed bipolar II disorder (HCC)        2. Generalized anxiety disorder        3. PTSD (post-traumatic stress disorder)        4. Tobacco abuse        5. Marijuana use        6. Cigarette nicotine dependence without complication        7. Alcohol abuse, in remission            Goals addressed in session: Goal 1 and Goal 2     DATA: Marie discussed her advantage to have a roof over her head from her friends and landlords and was encouraged to keep her focus on her gratitude list and to practice malgorzata Mindfulness skills, to be in her Wise Mind and the Middle Ground rather than on the extremes. She was encouraged to take notes to avoid frustration from forgetfulness and to be able to refer back to her experience.   During this session, this clinician used the following therapeutic modalities: Client-centered Therapy, Cognitive Behavioral Therapy, Dialectical Behavior Therapy, Mindfulness-based Strategies, and Supportive Psychotherapy    Substance Abuse was addressed during this session. If the client is diagnosed with a co-occurring substance use disorder, please indicate any changes in the frequency or amount of use: NA. Stage of change for addressing substance use diagnoses: Maintenance    ASSESSMENT:  Marie Portillo presents with a Euthymic/ normal, Anxious, and Depressed mood.     her affect is Normal range and intensity, Overbright, and Tearful, at times, rapidly changeable, which is congruent, with her mood and the content of the session. The client has made progress on their goals.    Marie seemed present and easily redirected to positive topics to avoid intense emotions. Marie Portillo presents with a low risk of suicide, low risk of self-harm, and none risk of harm to others.    For any risk assessment that surpasses a \"low\" rating, a safety plan must be developed.    A safety plan was indicated: no  If " yes, describe in detail NA    PLAN: Between sessions, Marie Portillo will use Mindfulness to attend to intense emotions in healthy ways and will attend the weekly Relaxation Group for self-care. At the next session, the therapist will use Client-centered Therapy, Cognitive Behavioral Therapy, Dialectical Behavior Therapy, Mindfulness-based Strategies, and Supportive Psychotherapy to address self-care and taking notes.    Behavioral Health Treatment Plan and Discharge Planning: Marie Portillo is aware of and agrees to continue to work on their treatment plan. They have identified and are working toward their discharge goals. yes    Visit start and stop times:    04/24/24  Start Time: 0204  Stop Time: 0241  Total Visit Time: 37 minutes

## 2024-04-26 ENCOUNTER — TELEMEDICINE (OUTPATIENT)
Dept: BEHAVIORAL/MENTAL HEALTH CLINIC | Facility: CLINIC | Age: 59
End: 2024-04-26
Payer: COMMERCIAL

## 2024-04-26 ENCOUNTER — HOSPITAL ENCOUNTER (OUTPATIENT)
Dept: CT IMAGING | Facility: HOSPITAL | Age: 59
End: 2024-04-26
Payer: COMMERCIAL

## 2024-04-26 DIAGNOSIS — F17.210 CIGARETTE NICOTINE DEPENDENCE WITHOUT COMPLICATION: ICD-10-CM

## 2024-04-26 DIAGNOSIS — F41.1 GENERALIZED ANXIETY DISORDER: ICD-10-CM

## 2024-04-26 DIAGNOSIS — R91.8 PULMONARY NODULES: ICD-10-CM

## 2024-04-26 DIAGNOSIS — F43.10 PTSD (POST-TRAUMATIC STRESS DISORDER): ICD-10-CM

## 2024-04-26 DIAGNOSIS — F12.90 MARIJUANA USE: Chronic | ICD-10-CM

## 2024-04-26 DIAGNOSIS — Z72.0 TOBACCO ABUSE: ICD-10-CM

## 2024-04-26 DIAGNOSIS — F31.81 MODERATE MIXED BIPOLAR II DISORDER (HCC): Primary | ICD-10-CM

## 2024-04-26 DIAGNOSIS — F10.11 ALCOHOL ABUSE, IN REMISSION: ICD-10-CM

## 2024-04-26 PROCEDURE — 90853 GROUP PSYCHOTHERAPY: CPT | Performed by: COUNSELOR

## 2024-04-26 PROCEDURE — G1004 CDSM NDSC: HCPCS

## 2024-04-26 PROCEDURE — 71250 CT THORAX DX C-: CPT

## 2024-04-26 NOTE — PSYCH
Virtual Regular Visit    Verification of patient location:    Patient is located at Home in the following state in which I hold an active license [place]      Assessment/Plan:    Problem List Items Addressed This Visit       Moderate mixed bipolar II disorder (HCC) - Primary    Generalized anxiety disorder    PTSD (post-traumatic stress disorder)    Alcohol abuse, in remission    Marijuana use (Chronic)    Cigarette nicotine dependence without complication    Tobacco abuse       Goals addressed in session: Goal 1 and Goal 2          Reason for visit is No chief complaint on file.       Encounter provider Isabel Watts LPC      Recent Visits  Date Type Provider Dept   04/24/24 Telemedicine Isabel Watts LPC [facility] Therapist [place]   04/19/24 Telemedicine Isabel Watts LPC [facility] Therapist [place]   Showing recent visits within past 7 days and meeting all other requirements  Future Appointments  No visits were found meeting these conditions.  Showing future appointments within next 150 days and meeting all other requirements       The patient was identified by name and date of birth. Marie Portillo was informed that this is a telemedicine visit and that the visit is being conducted throughthe [employer] Teams platform. She agrees to proceed..  My office door was closed. The patient was notified the following individuals were present in the room other group members.  She acknowledged consent and understanding of privacy and security of the video platform. The patient has agreed to participate and understands they can discontinue the visit at any time.    Patient is aware this is a billable service.     Subjective  Marie Portillo is a 58 y.o. female  .    Data: Marie attended the Relaxation group with delay from another appointment and was guided through EFT tapping on pain, diaphragmatic breathing, other Vagus nerve stimulation techniques, and progressive muscle relaxation.  Furthermore, attendees were guided through a quick body scan, Attending to Physical Pain Visualization, Power of Acceptance, Giving Self-Compassion for Failures and Mistakes, and ending with affirmations for forgiveness and moving on, courage and self-love, and for dissolving negative emotions with genuine care and kindness. Psychoeducation, EFT, Mindfulness-based, and Somatic approaches were used to achieve the deep state of relaxation and to maintain the serene environment for the exercise's purpose.      Assessment: Marie was in euthymic mood and in congruence with broad affect, followed through guidance and completed the exercises well, showed willingness to continue learning and practicing self-care in weekly Relaxation Group as well as individual therapy.      Plan: Marie will practice deep breathing and other techniques based on each occasion to counter stress and will attend the next group to be able to take part in further guided exercises on chosen topics to build on the techniques learned in the beginning.    HPI     Past Medical History:   Diagnosis Date    Bilateral carpal tunnel syndrome 08/20/2019    Carpal tunnel syndrome of right wrist 10/30/2019    Added automatically from request for surgery [id number]    Chlamydia     COPD (chronic obstructive pulmonary disease) (HCC)     Depression     Hypertension     Varicella        Past Surgical History:   Procedure Laterality Date    BREAST BIOPSY Left 20yrs ago benign    CERVICAL SPINE SURGERY      EGD      HERNIA REPAIR      OOPHORECTOMY Right     [place] [facility] WRST SURG W/RLS TRANSVRS CARPL LIGM Right 01/21/2020    Procedure: RELEASE CARPAL TUNNEL ENDOSCOPIC;  Surgeon: Sam Nguyen MD;  [facility];  Service: Orthopedics    [place] [facility] WRST SURG W/RLS TRANSVRS CARPL LIGM Left 01/28/2020    Procedure: RELEASE CARPAL TUNNEL ENDOSCOPIC;  Surgeon: Sam Nguyen MD;  [facility];  Service: Orthopedics       Current Outpatient Medications    Medication Sig Dispense Refill    Acetaminophen 500 MG Take 2 capsules by mouth every 4 (four) hours as needed for mild pain      albuterol (PROVENTIL HFA,VENTOLIN HFA) 90 mcg/act inhaler inhale 2 puffs by mouth and INTO THE LUNGS every 6 hours IF NEEDED FOR WHEEZING. 8.5 g 2    buPROPion (WELLBUTRIN XL) 300 mg 24 hr tablet Take 1 tablet (300 mg total) by mouth daily 30 tablet 1    cholecalciferol (VITAMIN D3) 400 units tablet Take 400 Units by mouth daily      fluticasone (FLONASE) 50 mcg/act nasal spray instill 1 spray into each nostril once daily 16 g 2    guaiFENesin (MUCINEX) 600 mg 12 hr tablet Take 2 tablets (1,200 mg total) by mouth every 12 (twelve) hours 60 tablet 3    ibuprofen (MOTRIN) 200 mg tablet Take 200 mg by mouth every 6 (six) hours as needed for mild pain      ipratropium-albuterol (DUO-NEB) 0.5-2.5 mg/3 mL nebulizer solution Take 3 mL by nebulization 4 (four) times a day 360 mL 3    lamoTRIgine (LaMICtal) 200 MG tablet Take 1 tablet (200 mg total) by mouth daily 30 tablet 1    levocetirizine (XYZAL) 5 MG tablet take 1 tablet by mouth every evening 30 tablet 4    methocarbamol (ROBAXIN) 500 mg tablet Take 500 mg by mouth 2 (two) times a day PRN      metoprolol succinate (TOPROL-XL) 25 mg 24 hr tablet Take 1 tablet (25 mg total) by mouth daily 30 tablet 2    nicotine (NICODERM CQ) 21 mg/24 hr TD 24 hr patch Place 1 patch on the skin over 24 hours every 24 hours (Patient not taking: Reported on 4/9/2024) 28 patch 0    nicotine polacrilex (COMMIT) 4 MG lozenge Apply 1 lozenge (4 mg total) to the mouth or throat as needed for smoking cessation (Patient not taking: Reported on 4/9/2024) 100 each 0    omeprazole (PriLOSEC) 20 mg delayed release capsule take 1 capsule by mouth twice a day 60 capsule 5    senna-docusate sodium (SENOKOT-S) 8.6-50 mg per tablet Take 3 tablets by mouth as needed        Stiolto Respimat 2.5-2.5 MCG/ACT inhaler inhale 2 puffs by mouth and INTO THE LUNGS once daily 4 g 11      No current facility-administered medications for this visit.        No Known Allergies    Review of Systems    Video Exam    There were no vitals filed for this visit.    Physical Exam     04/26/24  Start Time: 0420  Stop Time: 0500  Total Visit Time: 40 minutes

## 2024-05-01 ENCOUNTER — TELEMEDICINE (OUTPATIENT)
Dept: BEHAVIORAL/MENTAL HEALTH CLINIC | Facility: CLINIC | Age: 59
End: 2024-05-01

## 2024-05-01 ENCOUNTER — TELEPHONE (OUTPATIENT)
Dept: PSYCHIATRY | Facility: CLINIC | Age: 59
End: 2024-05-01

## 2024-05-01 DIAGNOSIS — F12.90 MARIJUANA USE: Chronic | ICD-10-CM

## 2024-05-01 DIAGNOSIS — F41.1 GENERALIZED ANXIETY DISORDER: ICD-10-CM

## 2024-05-01 DIAGNOSIS — F17.210 CIGARETTE NICOTINE DEPENDENCE WITHOUT COMPLICATION: ICD-10-CM

## 2024-05-01 DIAGNOSIS — F31.81 MODERATE MIXED BIPOLAR II DISORDER (HCC): Primary | ICD-10-CM

## 2024-05-01 DIAGNOSIS — F43.10 PTSD (POST-TRAUMATIC STRESS DISORDER): ICD-10-CM

## 2024-05-01 DIAGNOSIS — Z72.0 TOBACCO ABUSE: ICD-10-CM

## 2024-05-01 DIAGNOSIS — F10.11 ALCOHOL ABUSE, IN REMISSION: ICD-10-CM

## 2024-05-01 NOTE — TELEPHONE ENCOUNTER
Unable to leave voicemail informing patient of the Psych Encounter form needing to be signed as a requirement from the insurance company for billing purposes. If patients contacts office, please make patient aware that the form can be accessed via [employer] to sign electronically and must be signed for each visit as a requirement to continue future visits with provider.
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
## 2024-05-01 NOTE — PSYCH
Virtual Regular Visit    Verification of patient location:    Patient is located at Home in the following state in which I hold an active license [place]      Assessment/Plan:    Problem List Items Addressed This Visit       Moderate mixed bipolar II disorder (HCC) - Primary    Generalized anxiety disorder    PTSD (post-traumatic stress disorder)    Alcohol abuse, in remission    Marijuana use (Chronic)    Cigarette nicotine dependence without complication    Tobacco abuse       Goals addressed in session: Goal 1 and Goal 2          Reason for visit is No chief complaint on file.       Encounter provider Isabel Watts LPC      Recent Visits  Date Type Provider Dept   04/26/24 Telemedicine Isabel Watts LPC [facility] Therapist [place]   04/24/24 Telemedicine Isabel Watts LPC [facility] Therapist [place]   Showing recent visits within past 7 days and meeting all other requirements  Today's Visits  Date Type Provider Dept   05/01/24 Telephone Isabel Watts LPC [facility] [place]   Showing today's visits and meeting all other requirements  Future Appointments  No visits were found meeting these conditions.  Showing future appointments within next 150 days and meeting all other requirements       The patient was identified by name and date of birth. Marie Portillo was informed that this is a telemedicine visit and that the visit is being conducted throughthe Epic Embedded platform. She agrees to proceed..  My office door was closed. No one else was in the room.  She acknowledged consent and understanding of privacy and security of the video platform. The patient has agreed to participate and understands they can discontinue the visit at any time.    Patient is aware this is a billable service.     Subjective  Marie Portillo is a 58 y.o. female  .      HPI     Past Medical History:   Diagnosis Date    Bilateral carpal tunnel syndrome 08/20/2019    Carpal tunnel syndrome of  right wrist 10/30/2019    Added automatically from request for surgery [id number]    Chlamydia     COPD (chronic obstructive pulmonary disease) (HCC)     Depression     Hypertension     Varicella        Past Surgical History:   Procedure Laterality Date    BREAST BIOPSY Left 20yrs ago benign    CERVICAL SPINE SURGERY      EGD      HERNIA REPAIR      OOPHORECTOMY Right     [place] [facility] WR[facility] SURG W/RLS TRANSVRS CARPL LIGM Right 01/21/2020    Procedure: RELEASE CARPAL TUNNEL ENDOSCOPIC;  Surgeon: Sam Nguyen MD;  [facility];  Service: Orthopedics    [place] [facility] WRST SURG W/RLS TRANSVRS CARPL LIGM Left 01/28/2020    Procedure: RELEASE CARPAL TUNNEL ENDOSCOPIC;  Surgeon: Sam Nguyen MD;  [facility];  Service: Orthopedics       Current Outpatient Medications   Medication Sig Dispense Refill    Acetaminophen 500 MG Take 2 capsules by mouth every 4 (four) hours as needed for mild pain      albuterol (PROVENTIL HFA,VENTOLIN HFA) 90 mcg/act inhaler inhale 2 puffs by mouth and INTO THE LUNGS every 6 hours IF NEEDED FOR WHEEZING. 8.5 g 2    buPROPion (WELLBUTRIN XL) 300 mg 24 hr tablet Take 1 tablet (300 mg total) by mouth daily 30 tablet 1    cholecalciferol (VITAMIN D3) 400 units tablet Take 400 Units by mouth daily      fluticasone (FLONASE) 50 mcg/act nasal spray instill 1 spray into each nostril once daily 16 g 2    guaiFENesin (MUCINEX) 600 mg 12 hr tablet Take 2 tablets (1,200 mg total) by mouth every 12 (twelve) hours 60 tablet 3    ibuprofen (MOTRIN) 200 mg tablet Take 200 mg by mouth every 6 (six) hours as needed for mild pain      ipratropium-albuterol (DUO-NEB) 0.5-2.5 mg/3 mL nebulizer solution Take 3 mL by nebulization 4 (four) times a day 360 mL 3    lamoTRIgine (LaMICtal) 200 MG tablet Take 1 tablet (200 mg total) by mouth daily 30 tablet 1    levocetirizine (XYZAL) 5 MG tablet take 1 tablet by mouth every evening 30 tablet 4    methocarbamol (ROBAXIN) 500 mg tablet Take 500 mg by mouth 2  (two) times a day PRN      metoprolol succinate (TOPROL-XL) 25 mg 24 hr tablet Take 1 tablet (25 mg total) by mouth daily 30 tablet 2    nicotine (NICODERM CQ) 21 mg/24 hr TD 24 hr patch Place 1 patch on the skin over 24 hours every 24 hours (Patient not taking: Reported on 4/9/2024) 28 patch 0    nicotine polacrilex (COMMIT) 4 MG lozenge Apply 1 lozenge (4 mg total) to the mouth or throat as needed for smoking cessation (Patient not taking: Reported on 4/9/2024) 100 each 0    omeprazole (PriLOSEC) 20 mg delayed release capsule take 1 capsule by mouth twice a day 60 capsule 5    senna-docusate sodium (SENOKOT-S) 8.6-50 mg per tablet Take 3 tablets by mouth as needed        Stiolto Respimat 2.5-2.5 MCG/ACT inhaler inhale 2 puffs by mouth and INTO THE LUNGS once daily 4 g 11     No current facility-administered medications for this visit.        No Known Allergies    Review of Systems    Video Exam    There were no vitals filed for this visit.    Physical Exam

## 2024-05-03 ENCOUNTER — TELEMEDICINE (OUTPATIENT)
Dept: BEHAVIORAL/MENTAL HEALTH CLINIC | Facility: CLINIC | Age: 59
End: 2024-05-03

## 2024-05-03 DIAGNOSIS — F10.11 ALCOHOL ABUSE, IN REMISSION: ICD-10-CM

## 2024-05-03 DIAGNOSIS — F31.81 MODERATE MIXED BIPOLAR II DISORDER (HCC): Primary | ICD-10-CM

## 2024-05-03 DIAGNOSIS — F17.210 CIGARETTE NICOTINE DEPENDENCE WITHOUT COMPLICATION: ICD-10-CM

## 2024-05-03 DIAGNOSIS — F41.1 GENERALIZED ANXIETY DISORDER: ICD-10-CM

## 2024-05-03 DIAGNOSIS — Z72.0 TOBACCO ABUSE: ICD-10-CM

## 2024-05-03 DIAGNOSIS — F43.10 PTSD (POST-TRAUMATIC STRESS DISORDER): ICD-10-CM

## 2024-05-03 DIAGNOSIS — F12.90 MARIJUANA USE: Chronic | ICD-10-CM

## 2024-05-03 NOTE — PSYCH
Virtual Regular Visit    Verification of patient location:    Patient is located at Home in the following state in which I hold an active license [place]      Assessment/Plan:    Problem List Items Addressed This Visit       Moderate mixed bipolar II disorder (HCC) - Primary    Generalized anxiety disorder    PTSD (post-traumatic stress disorder)    Alcohol abuse, in remission    Marijuana use (Chronic)    Cigarette nicotine dependence without complication    Tobacco abuse       Goals addressed in session: Goal 1 and Goal 2          Reason for visit is No chief complaint on file.       Encounter provider Isabel Watts LPC      Recent Visits  Date Type Provider Dept   05/01/24 Telephone Isabel Watts LPC [facility] [place]   05/01/24 Telemedicine Isabel Watts LPC [facility] Therapist [place]   04/26/24 Telemedicine Isabel Watts LPC [facility] Therapist [place]   Showing recent visits within past 7 days and meeting all other requirements  Future Appointments  No visits were found meeting these conditions.  Showing future appointments within next 150 days and meeting all other requirements       The patient was identified by name and date of birth. Marie Portillo was informed that this is a telemedicine visit and that the visit is being conducted throughthe Microsoft Teams platform. She agrees to proceed..  My office door was closed. The patient was notified the following individuals were present in the room other group members.  She acknowledged consent and understanding of privacy and security of the video platform. The patient has agreed to participate and understands they can discontinue the visit at any time.    Patient is aware this is a billable service.     Subjective  Marie Portillo is a 58 y.o. female  .    Data: Marie attended the Relaxation group and was guided through EFT tapping on trauma, diaphragmatic breathing, other Vagus nerve stimulation techniques, and  progressive muscle relaxation. Furthermore, attendees were guided through a quick body scan, Meditation on Broadening Awareness through Turning toward the Difficult- Softening and Allowing for Pain, imagery for Floating on a Cloud, Exploring Interconnectedness, and ending with affirmations acceptance, self-compassion, and healthy boundaries. Psychoeducation, EFT, Mindfulness-based, and Somatic approaches were used to achieve the deep state of relaxation and to maintain the serene environment for the exercise's purpose.      Assessment: Marie was in euthymic mood and in congruence with broad affect, followed through guidance and completed the exercises well, showed willingness to continue learning and practicing self-care in weekly Relaxation Group as well as individual therapy.      Plan: Marie will practice deep breathing and other techniques based on each occasion to counter stress and will attend the next group to be able to take part in further guided exercises on chosen topics to build on the techniques learned in the beginning.    HPI     Past Medical History:   Diagnosis Date    Bilateral carpal tunnel syndrome 08/20/2019    Carpal tunnel syndrome of right wrist 10/30/2019    Added automatically from request for surgery [id number]    Chlamydia     COPD (chronic obstructive pulmonary disease) (HCC)     Depression     Hypertension     Varicella        Past Surgical History:   Procedure Laterality Date    BREAST BIOPSY Left 20yrs ago benign    CERVICAL SPINE SURGERY      EGD      HERNIA REPAIR      OOPHORECTOMY Right     [place] [facility] WR[place] SURG W/RLS TRANSVRS CARPL LIGM Right 01/21/2020    Procedure: RELEASE CARPAL TUNNEL ENDOSCOPIC;  Surgeon: Sam Nguyen MD;  [facility];  Service: Orthopedics    [place] [facility] WRST SURG W/RLS TRANSVRS CARPL LIGM Left 01/28/2020    Procedure: RELEASE CARPAL TUNNEL ENDOSCOPIC;  Surgeon: Sam Nguyen MD;  [facility];  Service: Orthopedics       Current Outpatient  Medications   Medication Sig Dispense Refill    Acetaminophen 500 MG Take 2 capsules by mouth every 4 (four) hours as needed for mild pain      albuterol (PROVENTIL HFA,VENTOLIN HFA) 90 mcg/act inhaler inhale 2 puffs by mouth and INTO THE LUNGS every 6 hours IF NEEDED FOR WHEEZING. 8.5 g 2    buPROPion (WELLBUTRIN XL) 300 mg 24 hr tablet Take 1 tablet (300 mg total) by mouth daily 30 tablet 1    cholecalciferol (VITAMIN D3) 400 units tablet Take 400 Units by mouth daily      fluticasone (FLONASE) 50 mcg/act nasal spray instill 1 spray into each nostril once daily 16 g 2    guaiFENesin (MUCINEX) 600 mg 12 hr tablet Take 2 tablets (1,200 mg total) by mouth every 12 (twelve) hours 60 tablet 3    ibuprofen (MOTRIN) 200 mg tablet Take 200 mg by mouth every 6 (six) hours as needed for mild pain      ipratropium-albuterol (DUO-NEB) 0.5-2.5 mg/3 mL nebulizer solution Take 3 mL by nebulization 4 (four) times a day 360 mL 3    lamoTRIgine (LaMICtal) 200 MG tablet Take 1 tablet (200 mg total) by mouth daily 30 tablet 1    levocetirizine (XYZAL) 5 MG tablet take 1 tablet by mouth every evening 30 tablet 4    methocarbamol (ROBAXIN) 500 mg tablet Take 500 mg by mouth 2 (two) times a day PRN      metoprolol succinate (TOPROL-XL) 25 mg 24 hr tablet Take 1 tablet (25 mg total) by mouth daily 30 tablet 2    nicotine (NICODERM CQ) 21 mg/24 hr TD 24 hr patch Place 1 patch on the skin over 24 hours every 24 hours (Patient not taking: Reported on 4/9/2024) 28 patch 0    nicotine polacrilex (COMMIT) 4 MG lozenge Apply 1 lozenge (4 mg total) to the mouth or throat as needed for smoking cessation (Patient not taking: Reported on 4/9/2024) 100 each 0    omeprazole (PriLOSEC) 20 mg delayed release capsule take 1 capsule by mouth twice a day 60 capsule 5    senna-docusate sodium (SENOKOT-S) 8.6-50 mg per tablet Take 3 tablets by mouth as needed        Stiolto Respimat 2.5-2.5 MCG/ACT inhaler inhale 2 puffs by mouth and INTO THE LUNGS once  daily 4 g 11     No current facility-administered medications for this visit.        No Known Allergies    Review of Systems    Video Exam    There were no vitals filed for this visit.    Physical Exam     05/03/24  Start Time: 0405  Stop Time: 0500  Total Visit Time: 55 minutes

## 2024-05-08 ENCOUNTER — TELEMEDICINE (OUTPATIENT)
Dept: BEHAVIORAL/MENTAL HEALTH CLINIC | Facility: CLINIC | Age: 59
End: 2024-05-08

## 2024-05-08 ENCOUNTER — TELEPHONE (OUTPATIENT)
Dept: PSYCHIATRY | Facility: CLINIC | Age: 59
End: 2024-05-08

## 2024-05-08 DIAGNOSIS — F31.81 MODERATE MIXED BIPOLAR II DISORDER (HCC): Primary | ICD-10-CM

## 2024-05-08 DIAGNOSIS — F10.11 ALCOHOL ABUSE, IN REMISSION: ICD-10-CM

## 2024-05-08 DIAGNOSIS — F17.210 CIGARETTE NICOTINE DEPENDENCE WITHOUT COMPLICATION: ICD-10-CM

## 2024-05-08 DIAGNOSIS — F12.90 MARIJUANA USE: Chronic | ICD-10-CM

## 2024-05-08 DIAGNOSIS — F43.10 PTSD (POST-TRAUMATIC STRESS DISORDER): ICD-10-CM

## 2024-05-08 DIAGNOSIS — F41.1 GENERALIZED ANXIETY DISORDER: ICD-10-CM

## 2024-05-08 DIAGNOSIS — Z72.0 TOBACCO ABUSE: ICD-10-CM

## 2024-05-08 NOTE — PSYCH
Virtual Regular Visit    Verification of patient location:    Patient is located at Home in the following state in which I hold an active license [place]      Assessment/Plan:    Problem List Items Addressed This Visit    None      Goals addressed in session: Goal 1 and Goal 2          Reason for visit is No chief complaint on file.       Encounter provider Isabel Watts LPC      Recent Visits  Date Type Provider Dept   05/03/24 Telemedicine Isabel Watts LPC [facility]rap[facility] [place]   05/01/24 Telephone Isabel Watts LPC [facility] [place]   05/01/24 Telemedicine Isabel Watts LPC [facility] [place]   Showing recent visits within past 7 days and meeting all other requirements  Today's Visits  Date Type Provider Dept   05/08/24 Telephone Isabel Watts LPC [facility] [place]   05/08/24 Telemedicine Isabel Watts LPC [facility] [place]   Showing today's visits and meeting all other requirements  Future Appointments  No visits were found meeting these conditions.  Showing future appointments within next 150 days and meeting all other requirements       The patient was identified by name and date of birth. Marie Portillo was informed that this is a telemedicine visit and that the visit is being conducted throughthe Epic Embedded platform. She agrees to proceed..  My office door was closed. No one else was in the room.  She acknowledged consent and understanding of privacy and security of the video platform. The patient has agreed to participate and understands they can discontinue the visit at any time.    Patient is aware this is a billable service.     Subjective  Marie Portillo is a 58 y.o. female  .      HPI     Past Medical History:   Diagnosis Date    Bilateral carpal tunnel syndrome 08/20/2019    Carpal tunnel syndrome of right wrist 10/30/2019    Added automatically from request for surgery [id number]    Chlamydia      COPD (chronic obstructive pulmonary disease) (HCC)     Depression     Hypertension     Varicella        Past Surgical History:   Procedure Laterality Date    BREAST BIOPSY Left 20yrs ago benign    CERVICAL SPINE SURGERY      EGD      HERNIA REPAIR      OOPHORECTOMY Right     [place] [facility] WRST SURG W/RLS TRANSVRS CARPL LIGM Right 01/21/2020    Procedure: RELEASE CARPAL TUNNEL ENDOSCOPIC;  Surgeon: Sam Nguyen MD;  [facility];  Service: Orthopedics    [place] [facility] WRST SURG W/RLS TRANSVRS CARPL LIGM Left 01/28/2020    Procedure: RELEASE CARPAL TUNNEL ENDOSCOPIC;  Surgeon: Sam Nguyen MD;  [facility];  Service: Orthopedics       Current Outpatient Medications   Medication Sig Dispense Refill    Acetaminophen 500 MG Take 2 capsules by mouth every 4 (four) hours as needed for mild pain      albuterol (PROVENTIL HFA,VENTOLIN HFA) 90 mcg/act inhaler inhale 2 puffs by mouth and INTO THE LUNGS every 6 hours IF NEEDED FOR WHEEZING. 8.5 g 2    buPROPion (WELLBUTRIN XL) 300 mg 24 hr tablet Take 1 tablet (300 mg total) by mouth daily 30 tablet 1    cholecalciferol (VITAMIN D3) 400 units tablet Take 400 Units by mouth daily      fluticasone (FLONASE) 50 mcg/act nasal spray instill 1 spray into each nostril once daily 16 g 2    guaiFENesin (MUCINEX) 600 mg 12 hr tablet Take 2 tablets (1,200 mg total) by mouth every 12 (twelve) hours 60 tablet 3    ibuprofen (MOTRIN) 200 mg tablet Take 200 mg by mouth every 6 (six) hours as needed for mild pain      ipratropium-albuterol (DUO-NEB) 0.5-2.5 mg/3 mL nebulizer solution Take 3 mL by nebulization 4 (four) times a day 360 mL 3    lamoTRIgine (LaMICtal) 200 MG tablet Take 1 tablet (200 mg total) by mouth daily 30 tablet 1    levocetirizine (XYZAL) 5 MG tablet take 1 tablet by mouth every evening 30 tablet 4    methocarbamol (ROBAXIN) 500 mg tablet Take 500 mg by mouth 2 (two) times a day PRN      metoprolol succinate (TOPROL-XL) 25 mg 24 hr tablet Take 1 tablet  (25 mg total) by mouth daily 30 tablet 2    nicotine (NICODERM CQ) 21 mg/24 hr TD 24 hr patch Place 1 patch on the skin over 24 hours every 24 hours (Patient not taking: Reported on 4/9/2024) 28 patch 0    nicotine polacrilex (COMMIT) 4 MG lozenge Apply 1 lozenge (4 mg total) to the mouth or throat as needed for smoking cessation (Patient not taking: Reported on 4/9/2024) 100 each 0    omeprazole (PriLOSEC) 20 mg delayed release capsule take 1 capsule by mouth twice a day 60 capsule 5    senna-docusate sodium (SENOKOT-S) 8.6-50 mg per tablet Take 3 tablets by mouth as needed        Stiolto Respimat 2.5-2.5 MCG/ACT inhaler inhale 2 puffs by mouth and INTO THE LUNGS once daily 4 g 11     No current facility-administered medications for this visit.        No Known Allergies    Review of Systems    Video Exam    There were no vitals filed for this visit.    Physical Exam

## 2024-05-08 NOTE — TELEPHONE ENCOUNTER
Left voicemail informing patient and/or parent/guardian of the Psych Encounter form needing to be signed as a requirement from the insurance company for billing purposes. Patient can access form via [employer] and sign electronically.     Please make patient aware this form must be signed for each visit as a requirement to continue future visits with provider.

## 2024-05-08 NOTE — PSYCH
"Behavioral Health Psychotherapy Progress Note    Psychotherapy Provided: Individual Psychotherapy     1. Moderate mixed bipolar II disorder (HCC)          2. PTSD (post-traumatic stress disorder)          3. Generalized anxiety disorder          4. Cigarette nicotine dependence without complication          5. Tobacco abuse          6. Marijuana use          7. Alcohol abuse, in remission         Goals addressed in session: Goal 1 and Goal 2     DATA: aMrie was upset due to receiving \"unfavorable\" evaluation for her disability and was allowed to express herself and her shock and was supported unconditionally. Marie was urged to make an action plan and she stated that she would call her  right after the call and will explain the letter. She also focused on her strengths and learned skills and was encouraged to continue taking notes to be able to refer to them and avoid becoming upset with forgetfulness.   During this session, this clinician used the following therapeutic modalities: Client-centered Therapy, Cognitive Behavioral Therapy, Dialectical Behavior Therapy, Mindfulness-based Strategies, and Supportive Psychotherapy    Substance Abuse was addressed during this session. If the client is diagnosed with a co-occurring substance use disorder, please indicate any changes in the frequency or amount of use: increased smoking today due to shock from the disability hearing. Stage of change for addressing substance use diagnoses: Maintenance    ASSESSMENT:  Marie Portillo presents with a Euthymic/ normal, Anxious, and Depressed mood.     her affect is Normal range and intensity, Constricted, and Tearful, at times, which is congruent, with her mood and the content of the session. The client has made progress on their goals.    Marie seemed very upset but able to express herself and identify counter strategies to balance her emotions. Marie Portillo presents with a low risk of suicide, low risk of self-harm, and none " "risk of harm to others.    For any risk assessment that surpasses a \"low\" rating, a safety plan must be developed.    A safety plan was indicated: no  If yes, describe in detail NA    PLAN: Between sessions, Marie Portillo will use her learned techniques to handle her intense emotions and will attend Relaxation Group weekly for self-care. At the next session, the therapist will use Client-centered Therapy, Cognitive Behavioral Therapy, Dialectical Behavior Therapy, Mindfulness-based Strategies, and Supportive Psychotherapy to address self-care, adequate response to stress, and skill application.    Behavioral Health Treatment Plan and Discharge Planning: Marie Portillo is aware of and agrees to continue to work on their treatment plan. They have identified and are working toward their discharge goals. yes    Visit start and stop times:    05/08/24  Start Time: 0155  Stop Time: 0237  Total Visit Time: 42 minutes  "

## 2024-05-10 ENCOUNTER — TELEMEDICINE (OUTPATIENT)
Dept: BEHAVIORAL/MENTAL HEALTH CLINIC | Facility: CLINIC | Age: 59
End: 2024-05-10

## 2024-05-10 DIAGNOSIS — Z72.0 TOBACCO ABUSE: ICD-10-CM

## 2024-05-10 DIAGNOSIS — F10.11 ALCOHOL ABUSE, IN REMISSION: ICD-10-CM

## 2024-05-10 DIAGNOSIS — F12.90 MARIJUANA USE: Chronic | ICD-10-CM

## 2024-05-10 DIAGNOSIS — F43.10 PTSD (POST-TRAUMATIC STRESS DISORDER): ICD-10-CM

## 2024-05-10 DIAGNOSIS — F31.81 MODERATE MIXED BIPOLAR II DISORDER (HCC): Primary | ICD-10-CM

## 2024-05-10 DIAGNOSIS — F41.1 GENERALIZED ANXIETY DISORDER: ICD-10-CM

## 2024-05-10 DIAGNOSIS — F17.210 CIGARETTE NICOTINE DEPENDENCE WITHOUT COMPLICATION: ICD-10-CM

## 2024-05-10 NOTE — PSYCH
Virtual Regular Visit    Verification of patient location:    Patient is located at Home in the following state in which I hold an active license [place]      Assessment/Plan:    Problem List Items Addressed This Visit       Moderate mixed bipolar II disorder (HCC) - Primary    Generalized anxiety disorder    PTSD (post-traumatic stress disorder)    Alcohol abuse, in remission    Marijuana use (Chronic)    Cigarette nicotine dependence without complication    Tobacco abuse       Goals addressed in session: Goal 1 and Goal 2          Reason for visit is No chief complaint on file.       Encounter provider Isabel Watts LPC      Recent Visits  Date Type Provider Dept   05/08/24 Telephone Isabel Watts LPC [facility] [place]   05/08/24 Telemedicine Isaebl Watts LPC [facility] [place]   05/03/24 Telemedicine Isabel Watts LPC [facility] [place]   Showing recent visits within past 7 days and meeting all other requirements  Future Appointments  No visits were found meeting these conditions.  Showing future appointments within next 150 days and meeting all other requirements       The patient was identified by name and date of birth. Marie Portillo was informed that this is a telemedicine visit and that the visit is being conducted throughthe [employer] Teams platform. She agrees to proceed..  My office door was closed. The patient was notified the following individuals were present in the room other group members.  She acknowledged consent and understanding of privacy and security of the video platform. The patient has agreed to participate and understands they can discontinue the visit at any time.    Patient is aware this is a billable service.     Subjective  Marie Portillo is a 58 y.o. female  .    Data: Marie attended the Relaxation group and was guided through EFT tapping on stress, diaphragmatic breathing, other Vagus nerve stimulation techniques, and  progressive muscle relaxation. Furthermore, attendees were guided through a quick body scan, Meditation on Seven Chakras and ending with affirmations for self-forgiveness and self-love, acceptance for failures and mistakes, and moving on with learning and spiritual growth. Psychoeducation, EFT, Mindfulness-based, and Somatic approaches were used to achieve the deep state of relaxation and to maintain the serene environment for the exercise's purpose.      Assessment: Marie was in euthymic mood and in congruence with broad affect, followed through guidance and completed the exercises well, showed willingness to continue learning and practicing self-care in weekly Relaxation Group as well as individual therapy.      Plan: Marie will practice deep breathing and other techniques based on each occasion to counter stress and will attend the next group to be able to take part in further guided exercises on chosen topics to build on the techniques learned in the beginning.    HPI     Past Medical History:   Diagnosis Date    Bilateral carpal tunnel syndrome 08/20/2019    Carpal tunnel syndrome of right wrist 10/30/2019    Added automatically from request for surgery [id number]    Chlamydia     COPD (chronic obstructive pulmonary disease) (HCC)     Depression     Hypertension     Varicella        Past Surgical History:   Procedure Laterality Date    BREAST BIOPSY Left 20yrs ago benign    CERVICAL SPINE SURGERY      EGD      HERNIA REPAIR      OOPHORECTOMY Right     [place] [facility] WRST SURG W/RLS TRANSVRS CARPL LIGM Right 01/21/2020    Procedure: RELEASE CARPAL TUNNEL ENDOSCOPIC;  Surgeon: Sam Nguyen MD;  [facility];  Service: Orthopedics    [place] [facility] WRST SURG W/RLS TRANSVRS CARPL LIGM Left 01/28/2020    Procedure: RELEASE CARPAL TUNNEL ENDOSCOPIC;  Surgeon: Sam Nguyen MD;  [facility];  Service: Orthopedics       Current Outpatient Medications   Medication Sig Dispense Refill    Acetaminophen 500 MG  Take 2 capsules by mouth every 4 (four) hours as needed for mild pain      albuterol (PROVENTIL HFA,VENTOLIN HFA) 90 mcg/act inhaler inhale 2 puffs by mouth and INTO THE LUNGS every 6 hours IF NEEDED FOR WHEEZING. 8.5 g 2    buPROPion (WELLBUTRIN XL) 300 mg 24 hr tablet Take 1 tablet (300 mg total) by mouth daily 30 tablet 1    cholecalciferol (VITAMIN D3) 400 units tablet Take 400 Units by mouth daily      fluticasone (FLONASE) 50 mcg/act nasal spray instill 1 spray into each nostril once daily 16 g 2    guaiFENesin (MUCINEX) 600 mg 12 hr tablet Take 2 tablets (1,200 mg total) by mouth every 12 (twelve) hours 60 tablet 3    ibuprofen (MOTRIN) 200 mg tablet Take 200 mg by mouth every 6 (six) hours as needed for mild pain      ipratropium-albuterol (DUO-NEB) 0.5-2.5 mg/3 mL nebulizer solution Take 3 mL by nebulization 4 (four) times a day 360 mL 3    lamoTRIgine (LaMICtal) 200 MG tablet Take 1 tablet (200 mg total) by mouth daily 30 tablet 1    levocetirizine (XYZAL) 5 MG tablet take 1 tablet by mouth every evening 30 tablet 4    methocarbamol (ROBAXIN) 500 mg tablet Take 500 mg by mouth 2 (two) times a day PRN      metoprolol succinate (TOPROL-XL) 25 mg 24 hr tablet Take 1 tablet (25 mg total) by mouth daily 30 tablet 2    nicotine (NICODERM CQ) 21 mg/24 hr TD 24 hr patch Place 1 patch on the skin over 24 hours every 24 hours (Patient not taking: Reported on 4/9/2024) 28 patch 0    nicotine polacrilex (COMMIT) 4 MG lozenge Apply 1 lozenge (4 mg total) to the mouth or throat as needed for smoking cessation (Patient not taking: Reported on 4/9/2024) 100 each 0    omeprazole (PriLOSEC) 20 mg delayed release capsule take 1 capsule by mouth twice a day 60 capsule 5    senna-docusate sodium (SENOKOT-S) 8.6-50 mg per tablet Take 3 tablets by mouth as needed        Stiolto Respimat 2.5-2.5 MCG/ACT inhaler inhale 2 puffs by mouth and INTO THE LUNGS once daily 4 g 11     No current facility-administered medications for this  visit.        No Known Allergies    Review of Systems    Video Exam    There were no vitals filed for this visit.    Physical Exam     05/10/24  Start Time: 0405  Stop Time: 0500  Total Visit Time: 55 minutes

## 2024-05-15 ENCOUNTER — TELEMEDICINE (OUTPATIENT)
Dept: BEHAVIORAL/MENTAL HEALTH CLINIC | Facility: CLINIC | Age: 59
End: 2024-05-15

## 2024-05-15 DIAGNOSIS — F17.210 CIGARETTE NICOTINE DEPENDENCE WITHOUT COMPLICATION: ICD-10-CM

## 2024-05-15 DIAGNOSIS — Z72.0 TOBACCO ABUSE: ICD-10-CM

## 2024-05-15 DIAGNOSIS — F31.81 MODERATE MIXED BIPOLAR II DISORDER (HCC): Primary | ICD-10-CM

## 2024-05-15 DIAGNOSIS — F41.1 GENERALIZED ANXIETY DISORDER: ICD-10-CM

## 2024-05-15 DIAGNOSIS — F10.11 ALCOHOL ABUSE, IN REMISSION: ICD-10-CM

## 2024-05-15 DIAGNOSIS — F12.90 MARIJUANA USE: Chronic | ICD-10-CM

## 2024-05-15 DIAGNOSIS — F43.10 PTSD (POST-TRAUMATIC STRESS DISORDER): ICD-10-CM

## 2024-05-15 NOTE — PSYCH
"Behavioral Health Psychotherapy Progress Note    Psychotherapy Provided: Individual Psychotherapy     1. Moderate mixed bipolar II disorder (HCC)        2. PTSD (post-traumatic stress disorder)        3. Generalized anxiety disorder        4. Tobacco abuse        5. Marijuana use        6. Cigarette nicotine dependence without complication        7. Alcohol abuse, in remission            Goals addressed in session: Goal 1 and Goal 2     DATA: Marie reported working better with her emotions when she observes them with Mindfulness and was praised for her effort. She was allowed to express her concerns and talked about her daughter and grandchildren and was encouraged to rely on her support system and her toolbox to select her most effective ways to counter stress and avoid getting and dwelling into the emotional vortex.    During this session, this clinician used the following therapeutic modalities: Client-centered Therapy, Cognitive Behavioral Therapy, Dialectical Behavior Therapy, Mindfulness-based Strategies, and Supportive Psychotherapy    Substance Abuse was not addressed during this session. If the client is diagnosed with a co-occurring substance use disorder, please indicate any changes in the frequency or amount of use: NA. Stage of change for addressing substance use diagnoses: Pre-contemplation    ASSESSMENT:  Marie Portillo presents with a Euthymic/ normal mood.     her affect is Normal range and intensity, which is congruent, with her mood and the content of the session. The client has made progress on their goals.    Marie seemed calmer and more centered than the previous session when she was anxious about losing disability benefits. Marie Portillo presents with a low risk of suicide, low risk of self-harm, and none risk of harm to others.    For any risk assessment that surpasses a \"low\" rating, a safety plan must be developed.    A safety plan was indicated: no  If yes, describe in detail NA    PLAN: " Between sessions, Marie Portillo will use Mindfulness skills to observe her emotions and deal with them in healthier ways. At the next session, the therapist will use Client-centered Therapy, Cognitive Behavioral Therapy, Dialectical Behavior Therapy, Mindfulness-based Strategies, and Supportive Psychotherapy to address adequate self-care, observing her emotions, and more cognitive exercise.    Behavioral Health Treatment Plan and Discharge Planning: Marie Portillo is aware of and agrees to continue to work on their treatment plan. They have identified and are working toward their discharge goals. yes    Visit start and stop times:    05/15/24  Start Time: 1255  Stop Time: 1334  Total Visit Time: 39 minutes

## 2024-05-15 NOTE — PSYCH
Virtual Regular Visit    Verification of patient location:    Patient is located at Home in the following state in which I hold an active license [place]      Assessment/Plan:    Problem List Items Addressed This Visit       Moderate mixed bipolar II disorder (HCC) - Primary    Generalized anxiety disorder    PTSD (post-traumatic stress disorder)    Alcohol abuse, in remission    Marijuana use (Chronic)    Cigarette nicotine dependence without complication    Tobacco abuse       Goals addressed in session: Goal 1 and Goal 2          Reason for visit is No chief complaint on file.       Encounter provider Isabel Watts LPC      Recent Visits  Date Type Provider Dept   05/10/24 Telemedicine Isabel Watts LPC [facility] [place]   05/08/24 Telephone Isabel Watts LPC [facility] [place]   05/08/24 Telemedicine Isabel Watts LPC [facility] [place]   Showing recent visits within past 7 days and meeting all other requirements  Future Appointments  No visits were found meeting these conditions.  Showing future appointments within next 150 days and meeting all other requirements       The patient was identified by name and date of birth. Marie Portillo was informed that this is a telemedicine visit and that the visit is being conducted throughthe Epic Embedded platform. She agrees to proceed..  My office door was closed. No one else was in the room.  She acknowledged consent and understanding of privacy and security of the video platform. The patient has agreed to participate and understands they can discontinue the visit at any time.    Patient is aware this is a billable service.     Subjective  Marie Portillo is a 58 y.o. female  .      HPI     Past Medical History:   Diagnosis Date    Bilateral carpal tunnel syndrome 08/20/2019    Carpal tunnel syndrome of right wrist 10/30/2019    Added automatically from request for surgery [id number]    Chlamydia     COPD  (chronic obstructive pulmonary disease) (HCC)     Depression     Hypertension     Varicella        Past Surgical History:   Procedure Laterality Date    BREAST BIOPSY Left 20yrs ago benign    CERVICAL SPINE SURGERY      EGD      HERNIA REPAIR      OOPHORECTOMY Right     [place] [facility] WRST SURG W/RLS TRANSVRS CARPL LIGM Right 01/21/2020    Procedure: RELEASE CARPAL TUNNEL ENDOSCOPIC;  Surgeon: Sam Nguyen MD;  [facility];  Service: Orthopedics    [place] [facility] WRST SURG W/RLS TRANSVRS CARPL LIGM Left 01/28/2020    Procedure: RELEASE CARPAL TUNNEL ENDOSCOPIC;  Surgeon: Sam Nguyen MD;  [facility];  Service: Orthopedics       Current Outpatient Medications   Medication Sig Dispense Refill    Acetaminophen 500 MG Take 2 capsules by mouth every 4 (four) hours as needed for mild pain      albuterol (PROVENTIL HFA,VENTOLIN HFA) 90 mcg/act inhaler inhale 2 puffs by mouth and INTO THE LUNGS every 6 hours IF NEEDED FOR WHEEZING. 8.5 g 2    buPROPion (WELLBUTRIN XL) 300 mg 24 hr tablet Take 1 tablet (300 mg total) by mouth daily 30 tablet 1    cholecalciferol (VITAMIN D3) 400 units tablet Take 400 Units by mouth daily      fluticasone (FLONASE) 50 mcg/act nasal spray instill 1 spray into each nostril once daily 16 g 2    guaiFENesin (MUCINEX) 600 mg 12 hr tablet Take 2 tablets (1,200 mg total) by mouth every 12 (twelve) hours 60 tablet 3    ibuprofen (MOTRIN) 200 mg tablet Take 200 mg by mouth every 6 (six) hours as needed for mild pain      ipratropium-albuterol (DUO-NEB) 0.5-2.5 mg/3 mL nebulizer solution Take 3 mL by nebulization 4 (four) times a day 360 mL 3    lamoTRIgine (LaMICtal) 200 MG tablet Take 1 tablet (200 mg total) by mouth daily 30 tablet 1    levocetirizine (XYZAL) 5 MG tablet take 1 tablet by mouth every evening 30 tablet 4    methocarbamol (ROBAXIN) 500 mg tablet Take 500 mg by mouth 2 (two) times a day PRN      metoprolol succinate (TOPROL-XL) 25 mg 24 hr tablet Take 1 tablet (25 mg  total) by mouth daily 30 tablet 2    nicotine (NICODERM CQ) 21 mg/24 hr TD 24 hr patch Place 1 patch on the skin over 24 hours every 24 hours (Patient not taking: Reported on 4/9/2024) 28 patch 0    nicotine polacrilex (COMMIT) 4 MG lozenge Apply 1 lozenge (4 mg total) to the mouth or throat as needed for smoking cessation (Patient not taking: Reported on 4/9/2024) 100 each 0    omeprazole (PriLOSEC) 20 mg delayed release capsule take 1 capsule by mouth twice a day 60 capsule 5    senna-docusate sodium (SENOKOT-S) 8.6-50 mg per tablet Take 3 tablets by mouth as needed        Stiolto Respimat 2.5-2.5 MCG/ACT inhaler inhale 2 puffs by mouth and INTO THE LUNGS once daily 4 g 11     No current facility-administered medications for this visit.        No Known Allergies    Review of Systems    Video Exam    There were no vitals filed for this visit.    Physical Exam

## 2024-05-17 ENCOUNTER — TELEMEDICINE (OUTPATIENT)
Dept: BEHAVIORAL/MENTAL HEALTH CLINIC | Facility: CLINIC | Age: 59
End: 2024-05-17

## 2024-05-17 DIAGNOSIS — F41.1 GENERALIZED ANXIETY DISORDER: ICD-10-CM

## 2024-05-17 DIAGNOSIS — F17.210 CIGARETTE NICOTINE DEPENDENCE WITHOUT COMPLICATION: ICD-10-CM

## 2024-05-17 DIAGNOSIS — F10.11 ALCOHOL ABUSE, IN REMISSION: ICD-10-CM

## 2024-05-17 DIAGNOSIS — F12.90 MARIJUANA USE: Chronic | ICD-10-CM

## 2024-05-17 DIAGNOSIS — F31.81 MODERATE MIXED BIPOLAR II DISORDER (HCC): Primary | ICD-10-CM

## 2024-05-17 DIAGNOSIS — F43.10 PTSD (POST-TRAUMATIC STRESS DISORDER): ICD-10-CM

## 2024-05-17 DIAGNOSIS — Z72.0 TOBACCO ABUSE: ICD-10-CM

## 2024-05-17 NOTE — PSYCH
Virtual Regular Visit    Verification of patient location:    Patient is located at Home in the following state in which I hold an active license [place]      Assessment/Plan:    Problem List Items Addressed This Visit       Moderate mixed bipolar II disorder (HCC) - Primary    Generalized anxiety disorder    PTSD (post-traumatic stress disorder)    Alcohol abuse, in remission    Marijuana use (Chronic)    Cigarette nicotine dependence without complication    Tobacco abuse       Goals addressed in session: Goal 1 and Goal 2          Reason for visit is No chief complaint on file.       Encounter provider Isabel Watts LPC      Recent Visits  Date Type Provider Dept   05/15/24 Telemedicine Isabel Watts LPC [facility] Therapist [place]   05/10/24 Telemedicine Isabel Watts LPC [facility] Therapist [place]   Showing recent visits within past 7 days and meeting all other requirements  Future Appointments  No visits were found meeting these conditions.  Showing future appointments within next 150 days and meeting all other requirements       The patient was identified by name and date of birth. Marie Portillo was informed that this is a telemedicine visit and that the visit is being conducted throughthe [employer] Teams platform. She agrees to proceed..  My office door was closed. The patient was notified the following individuals were present in the room other group members.  She acknowledged consent and understanding of privacy and security of the video platform. The patient has agreed to participate and understands they can discontinue the visit at any time.    Patient is aware this is a billable service.     Subjective  Marie Portillo is a 58 y.o. female  .    Data: Marie attended the Relaxation group and was guided through EFT tapping on anxiety, diaphragmatic breathing, other Vagus nerve stimulation techniques, and progressive muscle relaxation. Furthermore, attendees were guided  through a quick body scan, Meditation on self-esteem, gratefulness, imagery of embodied awareness, and ending with affirmations for forgiveness and unconditional love, acceptance of one's negative side the reality as is. Psychoeducation, EFT, Mindfulness-based, and Somatic approaches were used to achieve the deep state of relaxation and to maintain the serene environment for the exercise's purpose.      Assessment: Marie was in euthymic mood and in congruence with broad affect, followed through guidance and completed the exercises well, showed willingness to continue learning and practicing self-care in weekly Relaxation Group as well as individual therapy.      Plan: Marie will practice deep breathing and other techniques based on each occasion to counter stress and will attend the next group to be able to take part in further guided exercises on chosen topics to build on the techniques learned in the beginning.       HPI     Past Medical History:   Diagnosis Date    Bilateral carpal tunnel syndrome 08/20/2019    Carpal tunnel syndrome of right wrist 10/30/2019    Added automatically from request for surgery [id number]    Chlamydia     COPD (chronic obstructive pulmonary disease) (HCC)     Depression     Hypertension     Varicella        Past Surgical History:   Procedure Laterality Date    BREAST BIOPSY Left 20yrs ago benign    CERVICAL SPINE SURGERY      EGD      HERNIA REPAIR      OOPHORECTOMY Right     [facility] WRST SURG W/RLS TRANSVRS CARPL LIGM Right 01/21/2020    Procedure: RELEASE CARPAL TUNNEL ENDOSCOPIC;  Surgeon: Sam Nguyen MD;  Location: [facility];  Service: Orthopedics    [facility] NDSC WRST SURG W/RLS TRANSVRS CARPL LIGM Left 01/28/2020    Procedure: RELEASE CARPAL TUNNEL ENDOSCOPIC;  Surgeon: Sam Nguyen MD;  Location: [facility];  Service: Orthopedics       Current Outpatient Medications   Medication Sig Dispense Refill    Acetaminophen 500 MG Take 2 capsules by mouth every 4 (four) hours as  needed for mild pain      albuterol (PROVENTIL HFA,VENTOLIN HFA) 90 mcg/act inhaler inhale 2 puffs by mouth and INTO THE LUNGS every 6 hours IF NEEDED FOR WHEEZING. 8.5 g 2    buPROPion (WELLBUTRIN XL) 300 mg 24 hr tablet Take 1 tablet (300 mg total) by mouth daily 30 tablet 1    cholecalciferol (VITAMIN D3) 400 units tablet Take 400 Units by mouth daily      fluticasone (FLONASE) 50 mcg/act nasal spray instill 1 spray into each nostril once daily 16 g 2    guaiFENesin (MUCINEX) 600 mg 12 hr tablet Take 2 tablets (1,200 mg total) by mouth every 12 (twelve) hours 60 tablet 3    ibuprofen (MOTRIN) 200 mg tablet Take 200 mg by mouth every 6 (six) hours as needed for mild pain      ipratropium-albuterol (DUO-NEB) 0.5-2.5 mg/3 mL nebulizer solution Take 3 mL by nebulization 4 (four) times a day 360 mL 3    lamoTRIgine (LaMICtal) 200 MG tablet Take 1 tablet (200 mg total) by mouth daily 30 tablet 1    levocetirizine (XYZAL) 5 MG tablet take 1 tablet by mouth every evening 30 tablet 4    methocarbamol (ROBAXIN) 500 mg tablet Take 500 mg by mouth 2 (two) times a day PRN      metoprolol succinate (TOPROL-XL) 25 mg 24 hr tablet Take 1 tablet (25 mg total) by mouth daily 30 tablet 2    nicotine (NICODERM CQ) 21 mg/24 hr TD 24 hr patch Place 1 patch on the skin over 24 hours every 24 hours (Patient not taking: Reported on 4/9/2024) 28 patch 0    nicotine polacrilex (COMMIT) 4 MG lozenge Apply 1 lozenge (4 mg total) to the mouth or throat as needed for smoking cessation (Patient not taking: Reported on 4/9/2024) 100 each 0    omeprazole (PriLOSEC) 20 mg delayed release capsule take 1 capsule by mouth twice a day 60 capsule 5    senna-docusate sodium (SENOKOT-S) 8.6-50 mg per tablet Take 3 tablets by mouth as needed        Stiolto Respimat 2.5-2.5 MCG/ACT inhaler inhale 2 puffs by mouth and INTO THE LUNGS once daily 4 g 11     No current facility-administered medications for this visit.        No Known Allergies    Review of  Systems    Video Exam    There were no vitals filed for this visit.    Physical Exam     05/17/24  Start Time: 0415  Stop Time: 0500  Total Visit Time: 45 minutes

## 2024-05-20 ENCOUNTER — TELEPHONE (OUTPATIENT)
Dept: PSYCHIATRY | Facility: CLINIC | Age: 59
End: 2024-05-20

## 2024-05-20 ENCOUNTER — DOCUMENTATION (OUTPATIENT)
Dept: BEHAVIORAL/MENTAL HEALTH CLINIC | Facility: CLINIC | Age: 59
End: 2024-05-20

## 2024-05-20 NOTE — TELEPHONE ENCOUNTER
Unable to leave voicemail informing patient of the Psych Encounter form needing to be signed as a requirement from the insurance company for billing purposes. If patients contacts office, please make patient aware that the form can be accessed via [employer] to sign electronically and must be signed for each visit as a requirement to continue future visits with provider.

## 2024-05-20 NOTE — PROGRESS NOTES
Th reached out to Pt to offer assistance for signing the virtual encounters and Pt reported having a new phone that is not yet set up but is in the process. Th reassured Pt that she is not alone and that she could report her progress during the next session or receive more assistance.

## 2024-05-21 ENCOUNTER — HOSPITAL ENCOUNTER (OUTPATIENT)
Dept: NUCLEAR MEDICINE | Facility: HOSPITAL | Age: 59
Discharge: HOME | End: 2024-05-21
Payer: COMMERCIAL

## 2024-05-21 ENCOUNTER — APPOINTMENT (OUTPATIENT)
Dept: LAB | Facility: HOSPITAL | Age: 59
End: 2024-05-21
Payer: COMMERCIAL

## 2024-05-21 ENCOUNTER — HOSPITAL ENCOUNTER (OUTPATIENT)
Dept: NON INVASIVE DIAGNOSTICS | Facility: HOSPITAL | Age: 59
Discharge: HOME | End: 2024-05-21
Payer: COMMERCIAL

## 2024-05-21 VITALS
HEART RATE: 63 BPM | BODY MASS INDEX: 20.81 KG/M2 | DIASTOLIC BLOOD PRESSURE: 70 MMHG | SYSTOLIC BLOOD PRESSURE: 126 MMHG | WEIGHT: 106 LBS | RESPIRATION RATE: 20 BRPM | OXYGEN SATURATION: 97 % | HEIGHT: 60 IN

## 2024-05-21 DIAGNOSIS — F31.32 BIPOLAR AFFECTIVE DISORDER, CURRENTLY DEPRESSED, MODERATE (HCC): ICD-10-CM

## 2024-05-21 DIAGNOSIS — I50.20 SYSTOLIC HEART FAILURE, UNSPECIFIED HF CHRONICITY (HCC): ICD-10-CM

## 2024-05-21 DIAGNOSIS — Z51.81 THERAPEUTIC DRUG MONITORING: ICD-10-CM

## 2024-05-21 DIAGNOSIS — E78.49 OTHER HYPERLIPIDEMIA: ICD-10-CM

## 2024-05-21 LAB
ANION GAP SERPL CALCULATED.3IONS-SCNC: 9 MMOL/L (ref 4–13)
BUN SERPL-MCNC: 15 MG/DL (ref 5–25)
CALCIUM SERPL-MCNC: 9.7 MG/DL (ref 8.4–10.2)
CHEST PAIN STATEMENT: NORMAL
CHLORIDE SERPL-SCNC: 105 MMOL/L (ref 96–108)
CHOLEST SERPL-MCNC: 188 MG/DL
CO2 SERPL-SCNC: 27 MMOL/L (ref 21–32)
CREAT SERPL-MCNC: 0.69 MG/DL (ref 0.6–1.3)
GFR SERPL CREATININE-BSD FRML MDRD: 97 ML/MIN/1.73SQ M
GLUCOSE P FAST SERPL-MCNC: 97 MG/DL (ref 65–99)
HDLC SERPL-MCNC: 74 MG/DL
LDLC SERPL CALC-MCNC: 99 MG/DL (ref 0–100)
MAX DIASTOLIC BP: 80 MMHG
MAX HR PERCENT: 71 %
MAX HR: 117 BPM
MAX PREDICTED HEART RATE: 164 BPM
NONHDLC SERPL-MCNC: 114 MG/DL
NUC STRESS EJECTION FRACTION: 55 %
POTASSIUM SERPL-SCNC: 4 MMOL/L (ref 3.5–5.3)
PROTOCOL NAME: NORMAL
RATE PRESSURE PRODUCT: NORMAL
REASON FOR TERMINATION: NORMAL
SL CV REST NUCLEAR ISOTOPE DOSE: 11 MCI
SL CV STRESS NUCLEAR ISOTOPE DOSE: 31 MCI
SL CV STRESS RECOVERY BP: NORMAL MMHG
SL CV STRESS RECOVERY HR: 87 BPM
SL CV STRESS RECOVERY O2 SAT: 99 %
SL CV STRESS STAGE REACHED: 3
SODIUM SERPL-SCNC: 141 MMOL/L (ref 135–147)
STRESS ANGINA INDEX: 0
STRESS BASELINE BP: NORMAL MMHG
STRESS BASELINE HR: 63 BPM
STRESS O2 SAT REST: 97 %
STRESS PEAK HR: 123 BPM
STRESS POST EXERCISE DUR MIN: 11 MIN
STRESS POST EXERCISE DUR MIN: 5 MIN
STRESS POST EXERCISE DUR SEC: 21 SEC
STRESS POST EXERCISE DUR SEC: 31 SEC
STRESS POST O2 SAT PEAK: 98 %
STRESS POST PEAK BP: 170 MMHG
STRESS POST PEAK HR: 123 BPM
STRESS POST PEAK SYSTOLIC BP: 170 MMHG
STRESS/REST PERFUSION RATIO: 1.03
T4 FREE SERPL-MCNC: 0.58 NG/DL (ref 0.61–1.12)
TARGET HR FORMULA: NORMAL
TEST INDICATION: NORMAL
TRIGL SERPL-MCNC: 74 MG/DL
TSH SERPL DL<=0.05 MIU/L-ACNC: 4.67 UIU/ML (ref 0.45–4.5)

## 2024-05-21 PROCEDURE — 93016 CV STRESS TEST SUPVJ ONLY: CPT | Performed by: INTERNAL MEDICINE

## 2024-05-21 PROCEDURE — 93018 CV STRESS TEST I&R ONLY: CPT | Performed by: INTERNAL MEDICINE

## 2024-05-21 PROCEDURE — 84439 ASSAY OF FREE THYROXINE: CPT

## 2024-05-21 PROCEDURE — 93017 CV STRESS TEST TRACING ONLY: CPT

## 2024-05-21 PROCEDURE — 78452 HT MUSCLE IMAGE SPECT MULT: CPT

## 2024-05-21 PROCEDURE — A9502 TC99M TETROFOSMIN: HCPCS

## 2024-05-21 PROCEDURE — 80048 BASIC METABOLIC PNL TOTAL CA: CPT

## 2024-05-21 PROCEDURE — 36415 COLL VENOUS BLD VENIPUNCTURE: CPT

## 2024-05-21 PROCEDURE — 80175 DRUG SCREEN QUAN LAMOTRIGINE: CPT

## 2024-05-21 PROCEDURE — 80061 LIPID PANEL: CPT

## 2024-05-21 PROCEDURE — 84443 ASSAY THYROID STIM HORMONE: CPT

## 2024-05-21 RX ORDER — REGADENOSON 0.08 MG/ML
0.4 INJECTION, SOLUTION INTRAVENOUS ONCE
Status: DISCONTINUED | OUTPATIENT
Start: 2024-05-21 | End: 2024-05-22 | Stop reason: HOSPADM

## 2024-05-21 RX ORDER — REGADENOSON 0.08 MG/ML
0.4 INJECTION, SOLUTION INTRAVENOUS ONCE
Status: COMPLETED | OUTPATIENT
Start: 2024-05-21 | End: 2024-05-21

## 2024-05-21 RX ADMIN — REGADENOSON 0.4 MG: 0.08 INJECTION, SOLUTION INTRAVENOUS at 10:46

## 2024-05-22 ENCOUNTER — TELEMEDICINE (OUTPATIENT)
Dept: BEHAVIORAL/MENTAL HEALTH CLINIC | Facility: CLINIC | Age: 59
End: 2024-05-22

## 2024-05-22 DIAGNOSIS — F41.1 GENERALIZED ANXIETY DISORDER: ICD-10-CM

## 2024-05-22 DIAGNOSIS — Z72.0 TOBACCO ABUSE: ICD-10-CM

## 2024-05-22 DIAGNOSIS — F43.10 PTSD (POST-TRAUMATIC STRESS DISORDER): ICD-10-CM

## 2024-05-22 DIAGNOSIS — F10.11 ALCOHOL ABUSE, IN REMISSION: ICD-10-CM

## 2024-05-22 DIAGNOSIS — F12.90 MARIJUANA USE: Chronic | ICD-10-CM

## 2024-05-22 DIAGNOSIS — F31.81 MODERATE MIXED BIPOLAR II DISORDER (HCC): Primary | ICD-10-CM

## 2024-05-22 DIAGNOSIS — F17.210 CIGARETTE NICOTINE DEPENDENCE WITHOUT COMPLICATION: ICD-10-CM

## 2024-05-22 NOTE — PSYCH
"Behavioral Health Psychotherapy Progress Note    Psychotherapy Provided: Individual Psychotherapy     1. Moderate mixed bipolar II disorder (HCC)        2. Generalized anxiety disorder        3. PTSD (post-traumatic stress disorder)        4. Cigarette nicotine dependence without complication        5. Tobacco abuse        6. Marijuana use        7. Alcohol abuse, in remission            Goals addressed in session: Goal 1 and Goal 2     DATA: Marie discussed her struggling with technology and shared that she allows her emotions to intensify quickly and highly but provided good examples of handling the intensity through Emotion Regulation skills that she learned, specifically PLEASE that she expressed she liked the best.   During this session, this clinician used the following therapeutic modalities: Client-centered Therapy, Cognitive Behavioral Therapy, Dialectical Behavior Therapy, Mindfulness-based Strategies, and Supportive Psychotherapy    Substance Abuse was addressed during this session. If the client is diagnosed with a co-occurring substance use disorder, please indicate any changes in the frequency or amount of use: medical use. Stage of change for addressing substance use diagnoses: Maintenance    ASSESSMENT:  Marie Portillo presents with a Euthymic/ normal, Anxious, and Depressed, Constricted, Tearful, at times, mood.     her affect is Normal range and intensity, which is congruent, with her mood and the content of the session. The client has made progress on their goals.    Marie seemed calmer yet allowed her emotions to intensify and upset her significantly. Marie Portillo presents with a low risk of suicide, low risk of self-harm, and none risk of harm to others.    For any risk assessment that surpasses a \"low\" rating, a safety plan must be developed.    A safety plan was indicated: no  If yes, describe in detail NA    PLAN: Between sessions, Marie Portillo will use her learned DBT skills to handle her " emotions better and will attend the weekly Relaxation Group for self-care. At the next session, the therapist will use Client-centered Therapy, Cognitive Behavioral Therapy, Dialectical Behavior Therapy, Mindfulness-based Strategies, and Supportive Psychotherapy to address intense emotions and Emotion Regulation.    Behavioral Health Treatment Plan and Discharge Planning: Marie Portillo is aware of and agrees to continue to work on their treatment plan. They have identified and are working toward their discharge goals. yes    Visit start and stop times:    05/22/24  Start Time: 1255  Stop Time: 1337  Total Visit Time: 42 minutes

## 2024-05-22 NOTE — PSYCH
Virtual Regular Visit    Verification of patient location:    Patient is located at Home in the following state in which I hold an active license [place]      Assessment/Plan:    Problem List Items Addressed This Visit       Moderate mixed bipolar II disorder (HCC) - Primary    Generalized anxiety disorder    PTSD (post-traumatic stress disorder)    Alcohol abuse, in remission    Marijuana use (Chronic)    Cigarette nicotine dependence without complication    Tobacco abuse       Goals addressed in session: Goal 1 and Goal 2          Reason for visit is No chief complaint on file.       Encounter provider Isabel Watts LPC      Recent Visits  Date Type Provider Dept   05/20/24 Telephone Isabel Watts LPC [facility] [place]   05/17/24 Telemedicine Isabel Watts LPC [facility] Therapist [place]   05/15/24 Telemedicine Isabel Watts LPC [facility] Therapist [place]   Showing recent visits within past 7 days and meeting all other requirements  Future Appointments  No visits were found meeting these conditions.  Showing future appointments within next 150 days and meeting all other requirements       The patient was identified by name and date of birth. Marie Portillo was informed that this is a telemedicine visit and that the visit is being conducted throughthe Epic Embedded platform. She agrees to proceed..  My office door was closed. No one else was in the room.  She acknowledged consent and understanding of privacy and security of the video platform. The patient has agreed to participate and understands they can discontinue the visit at any time.    Patient is aware this is a billable service.     Subjective  Marie Portillo is a 58 y.o. female  .      HPI     Past Medical History:   Diagnosis Date    Bilateral carpal tunnel syndrome 08/20/2019    Cardiomyopathy (HCC)     Carpal tunnel syndrome of right wrist 10/30/2019    Added automatically from request for surgery [id number]     Chlamydia     COPD (chronic obstructive pulmonary disease) (HCC)     Depression     Hypertension     Varicella        Past Surgical History:   Procedure Laterality Date    BREAST BIOPSY Left 20yrs ago benign    CERVICAL SPINE SURGERY      EGD      HERNIA REPAIR      OOPHORECTOMY Right     [facility] WRST SURG W/RLS TRANSVRS CARPL LIGM Right 01/21/2020    Procedure: RELEASE CARPAL TUNNEL ENDOSCOPIC;  Surgeon: Sam Nguyen MD;  [facility];  Service: Orthopedics    [facility] NDSC WRST SURG W/RLS TRANSVRS CARPL LIGM Left 01/28/2020    Procedure: RELEASE CARPAL TUNNEL ENDOSCOPIC;  Surgeon: Sam Nguyen MD;  [facility];  Service: Orthopedics       Current Outpatient Medications   Medication Sig Dispense Refill    Acetaminophen 500 MG Take 2 capsules by mouth every 4 (four) hours as needed for mild pain      albuterol (PROVENTIL HFA,VENTOLIN HFA) 90 mcg/act inhaler inhale 2 puffs by mouth and INTO THE LUNGS every 6 hours IF NEEDED FOR WHEEZING. 8.5 g 2    buPROPion (WELLBUTRIN XL) 300 mg 24 hr tablet Take 1 tablet (300 mg total) by mouth daily 30 tablet 1    cholecalciferol (VITAMIN D3) 400 units tablet Take 400 Units by mouth daily      fluticasone (FLONASE) 50 mcg/act nasal spray instill 1 spray into each nostril once daily 16 g 2    guaiFENesin (MUCINEX) 600 mg 12 hr tablet Take 2 tablets (1,200 mg total) by mouth every 12 (twelve) hours 60 tablet 3    ibuprofen (MOTRIN) 200 mg tablet Take 200 mg by mouth every 6 (six) hours as needed for mild pain      ipratropium-albuterol (DUO-NEB) 0.5-2.5 mg/3 mL nebulizer solution Take 3 mL by nebulization 4 (four) times a day 360 mL 3    lamoTRIgine (LaMICtal) 200 MG tablet Take 1 tablet (200 mg total) by mouth daily 30 tablet 1    levocetirizine (XYZAL) 5 MG tablet take 1 tablet by mouth every evening 30 tablet 4    methocarbamol (ROBAXIN) 500 mg tablet Take 500 mg by mouth 2 (two) times a day PRN      metoprolol succinate (TOPROL-XL) 25 mg 24 hr tablet  Take 1 tablet (25 mg total) by mouth daily 30 tablet 2    nicotine (NICODERM CQ) 21 mg/24 hr TD 24 hr patch Place 1 patch on the skin over 24 hours every 24 hours (Patient not taking: Reported on 4/9/2024) 28 patch 0    nicotine polacrilex (COMMIT) 4 MG lozenge Apply 1 lozenge (4 mg total) to the mouth or throat as needed for smoking cessation (Patient not taking: Reported on 4/9/2024) 100 each 0    omeprazole (PriLOSEC) 20 mg delayed release capsule take 1 capsule by mouth twice a day 60 capsule 5    senna-docusate sodium (SENOKOT-S) 8.6-50 mg per tablet Take 3 tablets by mouth as needed        Stiolto Respimat 2.5-2.5 MCG/ACT inhaler inhale 2 puffs by mouth and INTO THE LUNGS once daily 4 g 11     No current facility-administered medications for this visit.        No Known Allergies    Review of Systems    Video Exam    There were no vitals filed for this visit.    Physical Exam

## 2024-05-24 ENCOUNTER — OFFICE VISIT (OUTPATIENT)
Dept: CARDIOLOGY CLINIC | Facility: CLINIC | Age: 59
End: 2024-05-24
Payer: COMMERCIAL

## 2024-05-24 ENCOUNTER — TELEMEDICINE (OUTPATIENT)
Dept: BEHAVIORAL/MENTAL HEALTH CLINIC | Facility: CLINIC | Age: 59
End: 2024-05-24

## 2024-05-24 VITALS
HEART RATE: 84 BPM | HEIGHT: 60 IN | BODY MASS INDEX: 20.73 KG/M2 | OXYGEN SATURATION: 98 % | WEIGHT: 105.6 LBS | SYSTOLIC BLOOD PRESSURE: 112 MMHG | DIASTOLIC BLOOD PRESSURE: 76 MMHG

## 2024-05-24 DIAGNOSIS — F10.11 ALCOHOL ABUSE, IN REMISSION: ICD-10-CM

## 2024-05-24 DIAGNOSIS — F43.10 PTSD (POST-TRAUMATIC STRESS DISORDER): ICD-10-CM

## 2024-05-24 DIAGNOSIS — F41.1 GENERALIZED ANXIETY DISORDER: ICD-10-CM

## 2024-05-24 DIAGNOSIS — F12.90 MARIJUANA USE: Chronic | ICD-10-CM

## 2024-05-24 DIAGNOSIS — F31.81 MODERATE MIXED BIPOLAR II DISORDER (HCC): Primary | ICD-10-CM

## 2024-05-24 DIAGNOSIS — I10 ESSENTIAL HYPERTENSION: Chronic | ICD-10-CM

## 2024-05-24 DIAGNOSIS — F17.210 CIGARETTE NICOTINE DEPENDENCE WITHOUT COMPLICATION: ICD-10-CM

## 2024-05-24 DIAGNOSIS — Z72.0 TOBACCO ABUSE: ICD-10-CM

## 2024-05-24 DIAGNOSIS — I50.22 HEART FAILURE WITH MILDLY REDUCED EJECTION FRACTION (HFMREF) (HCC): Primary | Chronic | ICD-10-CM

## 2024-05-24 LAB — LAMOTRIGINE SERPL-MCNC: 2.3 UG/ML (ref 2–20)

## 2024-05-24 PROCEDURE — 99214 OFFICE O/P EST MOD 30 MIN: CPT | Performed by: PHYSICIAN ASSISTANT

## 2024-05-24 RX ORDER — LOSARTAN POTASSIUM 25 MG/1
25 TABLET ORAL DAILY
Qty: 30 TABLET | Refills: 5 | Status: SHIPPED | OUTPATIENT
Start: 2024-05-24

## 2024-05-24 NOTE — PROGRESS NOTES
"General Cardiology Outpatient Visit    Marie Portillo 58 y.o. female   MRN: [id number]  Encounter: [id number]    Assessment:  Patient Active Problem List    Diagnosis Date Noted    Heart failure with mildly reduced ejection fraction (HFmrEF) ([facility]) 03/25/2024    Cervical stenosis of spinal canal 12/20/2023    Chiari malformation type I (HCC) 12/20/2023    Simple chronic bronchitis (HCC) 10/25/2023    Tobacco abuse 10/25/2023    HPV in female 07/13/2022    COPD with acute exacerbation (HCC) 03/18/2022    PND (post-nasal drip) 03/18/2022    Centrilobular emphysema ([facility]) 12/17/2021    Cigarette nicotine dependence without complication 12/17/2021    Pulmonary nodules 12/17/2021    Gastroesophageal reflux disease without esophagitis 08/18/2020    Essential hypertension 04/16/2020    Other hyperlipidemia 04/16/2020    Generalized anxiety disorder 03/13/2020    PTSD (post-traumatic stress disorder) 03/13/2020    Alcohol abuse, in remission 03/13/2020    Marijuana use 03/13/2020    Moderate mixed bipolar II disorder ([facility]) 08/20/2019    Status post left breast lumpectomy 08/20/2019       Today's Plan:  Start losartan 25 mg daily.   Will price check Entresto and Jardiance prior to next visit.   Recent labs suggestive of new diagnosis of hypothyroidism. Will message patient's PCP regarding this.     Plan:  Chronic HFmrEF; LVEF 40-45%   Etiology: nonischemic. Nuclear stress test in 05/2024 unremarkable. Elevated TSH with low T4. Reported history of chronic high blood pressures during her 30+ year marriage.   TTE 03/25/2024: \"LVEF 40-45%. Mild to moderate global hypokinesis. Grade 1 DD. LA mildly dilated. Mild MR.\"   Nuclear stress test 05/21/2024: \"no perfusion defects.\" Stress EF 55%.     Weight of 106 lbs on 04/09. Today, weighs 105 lbs.    Most recent BMP from 05/21/2024: sodium 141; potassium 4.0; BUN 15; creatinine 0.69; eGFR 97.     Pharmacotherapies / Neurohormonal Blockade:  --Beta Blocker: metoprolol succinate 25 mg " "daily.   --ARNi / ACEi / ARB: losartan 25 mg daily.   --Aldosterone Antagonist: No.   --SGLT2 Inhibitor: No.   --Diuretic: none.     Sudden Cardiac Death Risk Reduction:  --LVEF >35%.    Hypertension   BP of 112/76 mmHg in office today.   Continue on medications as above.     COPD  Tobacco abuse  Carpal tunnel syndrome, bilateral   Bipolar disorder  Chiari malformation  Marijuana use  History of heavy alcohol use     HPI:   Marie Portillo is a 58-year-old woman with a PMH as above who presents to the office for follow-up. Follows with Dr. Rousseau (cardiology fellow).     04/09/2024 with GD: \"Presents today for follow-up. Has been feeling somewhat tired and is short of breath at baseline, no change. No LE swelling. No chest pain. Occasionally lightheaded when getting up. Has had a couple episodes of spinning dizziness since January, but thinks this may be related to her Chiari malformation.      Father has heart failure. Patient has remote history of occasional drug use years ago as well as remote alcohol use. Still smokes tobacco and uses marijuana.  No recent history of viral infections or travel outside the country.  She denies any recent anginal symptoms.\"    05/24/2024: Patient presents with her mother for follow-up. Feeling okay today. Continues with HAILEE, at her baseline. Since last visit has had few episodes of SOB at rest occurring at random; unsure if these are anxiety-induced. Occasional palpitations/racing heart. Denies LH, CP, LE swelling, PND, and orthopnea. Reviewed results of recent labs and nuclear stress test.     Past Medical History:   Diagnosis Date    Bilateral carpal tunnel syndrome 08/20/2019    Cardiomyopathy (HCC)     Carpal tunnel syndrome of right wrist 10/30/2019    Added automatically from request for surgery [id number]    Chlamydia     COPD (chronic obstructive pulmonary disease) (HCC)     Depression     Hypertension     Varicella        Review of Systems   Constitutional:  Negative for " activity change, appetite change, fatigue and unexpected weight change.   Respiratory:  Positive for shortness of breath. Negative for cough and chest tightness.    Cardiovascular:  Positive for palpitations. Negative for chest pain and leg swelling.   Gastrointestinal:  Negative for abdominal distention and abdominal pain.   Genitourinary:  Negative for decreased urine volume, dysuria and urgency.   Musculoskeletal: Negative.    Skin: Negative.    Neurological:  Negative for syncope, weakness and light-headedness.   Psychiatric/Behavioral:  Negative for confusion and sleep disturbance. The patient is nervous/anxious.        No Known Allergies      Current Outpatient Medications:     Acetaminophen 500 MG, Take 2 capsules by mouth every 4 (four) hours as needed for mild pain, Disp: , Rfl:     albuterol (PROVENTIL HFA,VENTOLIN HFA) 90 mcg/act inhaler, inhale 2 puffs by mouth and INTO THE LUNGS every 6 hours IF NEEDED FOR WHEEZING., Disp: 8.5 g, Rfl: 2    buPROPion (WELLBUTRIN XL) 300 mg 24 hr tablet, Take 1 tablet (300 mg total) by mouth daily, Disp: 30 tablet, Rfl: 1    cholecalciferol (VITAMIN D3) 400 units tablet, Take 400 Units by mouth daily, Disp: , Rfl:     fluticasone (FLONASE) 50 mcg/act nasal spray, instill 1 spray into each nostril once daily, Disp: 16 g, Rfl: 2    guaiFENesin (MUCINEX) 600 mg 12 hr tablet, Take 2 tablets (1,200 mg total) by mouth every 12 (twelve) hours, Disp: 60 tablet, Rfl: 3    ipratropium-albuterol (DUO-NEB) 0.5-2.5 mg/3 mL nebulizer solution, Take 3 mL by nebulization 4 (four) times a day, Disp: 360 mL, Rfl: 3    lamoTRIgine (LaMICtal) 200 MG tablet, Take 1 tablet (200 mg total) by mouth daily, Disp: 30 tablet, Rfl: 1    levocetirizine (XYZAL) 5 MG tablet, take 1 tablet by mouth every evening, Disp: 30 tablet, Rfl: 4    losartan (COZAAR) 25 mg tablet, Take 1 tablet (25 mg total) by mouth daily, Disp: 30 tablet, Rfl: 5    metoprolol succinate (TOPROL-XL) 25 mg 24 hr tablet, Take 1  tablet (25 mg total) by mouth daily, Disp: 30 tablet, Rfl: 2    omeprazole (PriLOSEC) 20 mg delayed release capsule, take 1 capsule by mouth twice a day, Disp: 60 capsule, Rfl: 5    Stiolto Respimat 2.5-2.5 MCG/ACT inhaler, inhale 2 puffs by mouth and INTO THE LUNGS once daily, Disp: 4 g, Rfl: 11    methocarbamol (ROBAXIN) 500 mg tablet, Take 500 mg by mouth 2 (two) times a day PRN (Patient not taking: Reported on 5/24/2024), Disp: , Rfl:     nicotine (NICODERM CQ) 21 mg/24 hr TD 24 hr patch, Place 1 patch on the skin over 24 hours every 24 hours (Patient not taking: Reported on 4/9/2024), Disp: 28 patch, Rfl: 0    nicotine polacrilex (COMMIT) 4 MG lozenge, Apply 1 lozenge (4 mg total) to the mouth or throat as needed for smoking cessation (Patient not taking: Reported on 4/9/2024), Disp: 100 each, Rfl: 0    senna-docusate sodium (SENOKOT-S) 8.6-50 mg per tablet, Take 3 tablets by mouth as needed  , Disp: , Rfl:     Social History     Socioeconomic History    Marital status:      Spouse name: Not on file    Number of children: 2    Years of education: Not on file    Highest education level: Not on file   Occupational History    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  She quit on her own without any h/o withdrawal or rehab.    Drug use: Yes     Types: Marijuana     Comment: Smokes THC several times a day.  Also uses CBD OIL at times.   Used cocaine and methamphetamine  fairly steadily for a period of approx 1 year approx 36 years ago.   "Tried LSD, Mushrooms.  No h/o rehab    Sexual activity: Yes     Partners: Male   Other Topics Concern    Not on file   Social History Narrative    Home: lives with 2 best friends.         Education:    Pt states she always got Ds and Fs in school and skipped classes often, but there were no formally diagnosed learning disabilities.  Pt was in alternative schooling.  She learned \"Nothing\" there and they didn't teach much.  Pt was then placed back in regular classes but could not keep up.  Pt reached childhood milestones on time as far as she knows.     Highest grade completed 11th grade    [facility] but no actual certificate     Social Determinants of Health     Financial Resource Strain: Low Risk  (1/5/2024)    [facility]    Overall Financial Resource Strain (CARDIA)     Difficulty of Paying Living Expenses: Not hard at all   Food Insecurity: No Food Insecurity (1/5/2024)    [facility]    Hunger Vital Sign     Worried About Running Out of Food in the Last Year: Never true     Ran Out of Food in the Last Year: Never true   Transportation Needs: No Transportation Needs (1/5/2024)    [facility] from [facility]    PRAPARE - Transportation     Lack of Transportation (Medical): No     Lack of Transportation (Non-Medical): No   Physical Activity: Not on file   Stress: Not on file   Social Connections: Not on file   Intimate Partner Violence: Not At Risk (1/5/2024)    Received from [facility], [facility]    Humiliation, Afraid, Rape, and Kick questionnaire     Fear of Current or Ex-Partner: No     Emotionally Abused: No     Physically Abused: No     Sexually Abused: No   Housing Stability: Low Risk  (1/5/2024)    [facility] from [facility]    Housing " Stability Vital Sign     Unable to Pay for Housing in the Last Year: No     Number of Places Lived in the Last Year: 1     Unstable Housing in the Last Year: No     Family History   Problem Relation Age of Onset    Alcohol abuse Mother     Eating disorder Mother         anorexia and bulimia per Pt    Hodgkin's lymphoma Father     Cancer Father         3rd time lymphoma, prostate, lung    Heart failure Father     No Known Problems Sister     Colon cancer Maternal Grandmother     Alcohol abuse Maternal Grandfather     Colon cancer Paternal Grandmother     No Known Problems Daughter     Breast cancer Maternal Aunt     No Known Problems Maternal Aunt     No Known Problems Maternal Aunt     Colon cancer Maternal Uncle     Anxiety disorder Paternal Aunt     No Known Problems Paternal Aunt     Multiple sclerosis Cousin        Vitals:   Blood pressure 112/76, pulse 84, height 5' (1.524 m), weight 47.9 kg (105 lb 9.6 oz), SpO2 98%.    Wt Readings from Last 10 Encounters:   05/24/24 47.9 kg (105 lb 9.6 oz)   05/21/24 48.1 kg (106 lb)   04/09/24 48.1 kg (106 lb)   03/25/24 50.3 kg (111 lb)   02/20/24 50.3 kg (111 lb)   01/18/24 50.3 kg (111 lb)   01/02/24 49.7 kg (109 lb 9.6 oz)   10/25/23 48.4 kg (106 lb 12.8 oz)   10/19/23 48.9 kg (107 lb 12.8 oz)   09/08/23 50 kg (110 lb 3.2 oz)     Vitals:    05/24/24 1419   BP: 112/76   BP Location: Right arm   Patient Position: Sitting   Cuff Size: Standard   Pulse: 84   SpO2: 98%   Weight: 47.9 kg (105 lb 9.6 oz)   Height: 5' (1.524 m)       Physical Exam  Vitals reviewed.   Constitutional:       General: She is awake. She is not in acute distress.     Appearance: Normal appearance. She is well-developed and underweight. She is not toxic-appearing or diaphoretic.   HENT:      Head: Normocephalic.      Nose: Nose normal.      Mouth/Throat:      Mouth: Mucous membranes are moist.   Eyes:      General: No scleral icterus.     Conjunctiva/sclera: Conjunctivae normal.   Neck:      Vascular:  "No JVD.      Trachea: No tracheal deviation.   Cardiovascular:      Rate and Rhythm: Normal rate and regular rhythm.      Heart sounds: No murmur heard.  Pulmonary:      Effort: Pulmonary effort is normal. No tachypnea, bradypnea or respiratory distress.      Breath sounds: Normal air entry. No decreased air movement. No wheezing or rales.   Abdominal:      General: There is no distension.      Palpations: Abdomen is soft.   Musculoskeletal:      Cervical back: Neck supple.      Right lower leg: No edema.      Left lower leg: No edema.   Skin:     General: Skin is warm and dry.      Coloration: Skin is not jaundiced or pale.   Neurological:      General: No focal deficit present.      Mental Status: She is alert and oriented to person, place, and time.   Psychiatric:         Attention and Perception: Attention normal.         Mood and Affect: Affect normal. Mood is anxious.         Speech: Speech normal.         Behavior: Behavior normal. Behavior is cooperative.         Thought Content: Thought content normal.       Labs & Results:  Lab Results   Component Value Date    WBC 7.64 03/13/2023    HGB 14.7 03/13/2023    HCT 44.8 03/13/2023    MCV 92 03/13/2023     03/13/2023     Lab Results   Component Value Date    SODIUM 141 05/21/2024    K 4.0 05/21/2024     05/21/2024    CO2 27 05/21/2024    BUN 15 05/21/2024    CREATININE 0.69 05/21/2024    GLUC 105 (H) 12/28/2023    CALCIUM 9.7 05/21/2024     Lab Results   Component Value Date    INR 1.0 12/28/2023     Lab Results   Component Value Date    NTBNP 107 04/29/2022      No results found for: \"BNP\"     Sherri Knight PA-C  "

## 2024-05-24 NOTE — PSYCH
Virtual Regular Visit    Verification of patient location:    Patient is located at Home in the following state in which I hold an active license [place]      Assessment/Plan:    Problem List Items Addressed This Visit       Moderate mixed bipolar II disorder (HCC) - Primary    Generalized anxiety disorder    PTSD (post-traumatic stress disorder)    Alcohol abuse, in remission    Marijuana use (Chronic)    Cigarette nicotine dependence without complication    Tobacco abuse       Goals addressed in session: Goal 1 and Goal 2          Reason for visit is No chief complaint on file.       Encounter provider Isabel Watts LPC      Recent Visits  Date Type Provider Dept   05/22/24 Telemedicine Isabel Watts LPC [facility] Therapist [place]   05/20/24 Telephone Isabel Watts LPC [facility] [place]   05/17/24 Telemedicine Isabel Watts LPC [facility] Therapist [place]   Showing recent visits within past 7 days and meeting all other requirements  Future Appointments  No visits were found meeting these conditions.  Showing future appointments within next 150 days and meeting all other requirements       The patient was identified by name and date of birth. Marie Portillo was informed that this is a telemedicine visit and that the visit is being conducted throughthe [employer] Teams platform. She agrees to proceed..  My office door was closed. The patient was notified the following individuals were present in the room other group members.  She acknowledged consent and understanding of privacy and security of the video platform. The patient has agreed to participate and understands they can discontinue the visit at any time.    Patient is aware this is a billable service.     Subjective  Marie Portillo is a 58 y.o. female  .    Data: Marie attended the Relaxation group and was guided through EFT tapping on pain, diaphragmatic breathing, other Vagus nerve stimulation techniques, and  progressive muscle relaxation. Furthermore, attendees were guided through a quick body scan, Meditation on attending to and dealing with pain, imagery of making healthy choices and celebrating others, and ending with affirmations against anxiety, depression, resentment and for self-love and accepting reality as it is. Psychoeducation, EFT, Mindfulness-based, and Somatic approaches were used to achieve the deep state of relaxation and to maintain the serene environment for the exercise's purpose.      Assessment: Marie was in euthymic mood and in congruence with broad affect, followed through guidance and completed the exercises well, showed willingness to continue learning and practicing self-care in weekly Relaxation Group as well as individual therapy.      Plan: Marie will practice deep breathing and other techniques based on each occasion to counter stress and will attend the next group to be able to take part in further guided exercises on chosen topics to build on the techniques learned in the beginning.    HPI     Past Medical History:   Diagnosis Date    Bilateral carpal tunnel syndrome 08/20/2019    Cardiomyopathy (HCC)     Carpal tunnel syndrome of right wrist 10/30/2019    Added automatically from request for surgery [id number]    Chlamydia     COPD (chronic obstructive pulmonary disease) (HCC)     Depression     Hypertension     Varicella        Past Surgical History:   Procedure Laterality Date    BREAST BIOPSY Left 20yrs ago benign    CERVICAL SPINE SURGERY      EGD      HERNIA REPAIR      OOPHORECTOMY Right     WA NDSC WRST SURG W/RLS TRANSVRS CARPL LIGM Right 01/21/2020    Procedure: RELEASE CARPAL TUNNEL ENDOSCOPIC;  Surgeon: Sam Nguyen MD;  Location: [facility];  Service: Orthopedics    WA NDSC WRST SURG W/RLS TRANSVRS CARPL LIGM Left 01/28/2020    Procedure: RELEASE CARPAL TUNNEL ENDOSCOPIC;  Surgeon: Sam Nguyen MD;  Location: [facility];  Service: Orthopedics       Current Outpatient  Medications   Medication Sig Dispense Refill    Acetaminophen 500 MG Take 2 capsules by mouth every 4 (four) hours as needed for mild pain      albuterol (PROVENTIL HFA,VENTOLIN HFA) 90 mcg/act inhaler inhale 2 puffs by mouth and INTO THE LUNGS every 6 hours IF NEEDED FOR WHEEZING. 8.5 g 2    buPROPion (WELLBUTRIN XL) 300 mg 24 hr tablet Take 1 tablet (300 mg total) by mouth daily 30 tablet 1    cholecalciferol (VITAMIN D3) 400 units tablet Take 400 Units by mouth daily      fluticasone (FLONASE) 50 mcg/act nasal spray instill 1 spray into each nostril once daily 16 g 2    guaiFENesin (MUCINEX) 600 mg 12 hr tablet Take 2 tablets (1,200 mg total) by mouth every 12 (twelve) hours 60 tablet 3    ipratropium-albuterol (DUO-NEB) 0.5-2.5 mg/3 mL nebulizer solution Take 3 mL by nebulization 4 (four) times a day 360 mL 3    lamoTRIgine (LaMICtal) 200 MG tablet Take 1 tablet (200 mg total) by mouth daily 30 tablet 1    levocetirizine (XYZAL) 5 MG tablet take 1 tablet by mouth every evening 30 tablet 4    losartan (COZAAR) 25 mg tablet Take 1 tablet (25 mg total) by mouth daily 30 tablet 5    methocarbamol (ROBAXIN) 500 mg tablet Take 500 mg by mouth 2 (two) times a day PRN (Patient not taking: Reported on 5/24/2024)      metoprolol succinate (TOPROL-XL) 25 mg 24 hr tablet Take 1 tablet (25 mg total) by mouth daily 30 tablet 2    nicotine (NICODERM CQ) 21 mg/24 hr TD 24 hr patch Place 1 patch on the skin over 24 hours every 24 hours (Patient not taking: Reported on 4/9/2024) 28 patch 0    nicotine polacrilex (COMMIT) 4 MG lozenge Apply 1 lozenge (4 mg total) to the mouth or throat as needed for smoking cessation (Patient not taking: Reported on 4/9/2024) 100 each 0    omeprazole (PriLOSEC) 20 mg delayed release capsule take 1 capsule by mouth twice a day 60 capsule 5    senna-docusate sodium (SENOKOT-S) 8.6-50 mg per tablet Take 3 tablets by mouth as needed        Stiolto Respimat 2.5-2.5 MCG/ACT inhaler inhale 2 puffs by  mouth and INTO THE LUNGS once daily 4 g 11     No current facility-administered medications for this visit.        No Known Allergies    Review of Systems    Video Exam    There were no vitals filed for this visit.    Physical Exam     05/24/24  Start Time: 0405  Stop Time: 0500  Total Visit Time: 55 minutes

## 2024-05-24 NOTE — PATIENT INSTRUCTIONS
Start losartan 25 mg daily.     Follow-up with PCP about your abnormal thyroid blood work.     Please weigh yourself every day (after emptying your bladder) and keep a detailed log of weights.   Contact the Heart Failure program at [phone] if you gain 3+ lbs overnight or 5+ lbs in 5-7 days.  Limit daily sodium/salt intake to 2000 mg daily to prevent fluid retention.  Avoid canned foods, fast food/Chinese food, and processed meats (hot dogs, lunch meat, and sausage etc.). Caution with condiments.  Limit fluid intake to 2000 mL or 2 liters (about 60-65 ounces) daily.  Avoid electrolyte replacement drinks (such as Gatorade, Pedialyte, Propel, Liquid IV, etc.).  Bring complete list of medications and log of daily weights to your follow-up appointment.

## 2024-05-29 ENCOUNTER — TELEMEDICINE (OUTPATIENT)
Dept: BEHAVIORAL/MENTAL HEALTH CLINIC | Facility: CLINIC | Age: 59
End: 2024-05-29
Payer: COMMERCIAL

## 2024-05-29 DIAGNOSIS — F41.1 GENERALIZED ANXIETY DISORDER: ICD-10-CM

## 2024-05-29 DIAGNOSIS — F17.210 CIGARETTE NICOTINE DEPENDENCE WITHOUT COMPLICATION: ICD-10-CM

## 2024-05-29 DIAGNOSIS — F43.10 PTSD (POST-TRAUMATIC STRESS DISORDER): ICD-10-CM

## 2024-05-29 DIAGNOSIS — Z72.0 TOBACCO ABUSE: ICD-10-CM

## 2024-05-29 DIAGNOSIS — F12.90 MARIJUANA USE: Chronic | ICD-10-CM

## 2024-05-29 DIAGNOSIS — F10.11 ALCOHOL ABUSE, IN REMISSION: ICD-10-CM

## 2024-05-29 DIAGNOSIS — F31.81 MODERATE MIXED BIPOLAR II DISORDER (HCC): Primary | ICD-10-CM

## 2024-05-29 PROCEDURE — 90834 PSYTX W PT 45 MINUTES: CPT | Performed by: COUNSELOR

## 2024-05-29 NOTE — PSYCH
Virtual Regular Visit    Verification of patient location:    Patient is located at Home in the following state in which I hold an active license [place]      Assessment/Plan:    Problem List Items Addressed This Visit       Moderate mixed bipolar II disorder (HCC) - Primary    Generalized anxiety disorder    PTSD (post-traumatic stress disorder)    Alcohol abuse, in remission    Marijuana use (Chronic)    Cigarette nicotine dependence without complication    Tobacco abuse       Goals addressed in session: Goal 1 and Goal 2          Reason for visit is No chief complaint on file.       Encounter provider Isabel Watts LPC      Recent Visits  Date Type Provider Dept   05/24/24 Telemedicine Isabel Watts LPC [facility]rapist [place]   05/22/24 Telemedicine Isabel Watts LPC [facility] Therapist [place]   Showing recent visits within past 7 days and meeting all other requirements  Future Appointments  No visits were found meeting these conditions.  Showing future appointments within next 150 days and meeting all other requirements       The patient was identified by name and date of birth. Marie Portillo was informed that this is a telemedicine visit and that the visit is being conducted throughthe Epic Embedded platform. She agrees to proceed..  My office door was closed. No one else was in the room.  She acknowledged consent and understanding of privacy and security of the video platform. The patient has agreed to participate and understands they can discontinue the visit at any time.    Patient is aware this is a billable service.     Subjective  Marie Portillo is a 58 y.o. female  .      HPI     Past Medical History:   Diagnosis Date    Bilateral carpal tunnel syndrome 08/20/2019    Cardiomyopathy (HCC)     Carpal tunnel syndrome of right wrist 10/30/2019    Added automatically from request for surgery [id number]    Chlamydia     COPD (chronic obstructive pulmonary disease) (HCC)      Depression     Hypertension     Varicella        Past Surgical History:   Procedure Laterality Date    BREAST BIOPSY Left 20yrs ago benign    CERVICAL SPINE SURGERY      EGD      HERNIA REPAIR      OOPHORECTOMY Right     [place] [facility] WRST SURG W/RLS TRANSVRS CARPL LIGM Right 01/21/2020    Procedure: RELEASE CARPAL TUNNEL ENDOSCOPIC;  Surgeon: Sam Nguyen MD;  [facility];  Service: Orthopedics    [place] [facility] WRST SURG W/RLS TRANSVRS CARPL LIGM Left 01/28/2020    Procedure: RELEASE CARPAL TUNNEL ENDOSCOPIC;  Surgeon: Sam Nguyen MD;  [facility];  Service: Orthopedics       Current Outpatient Medications   Medication Sig Dispense Refill    Acetaminophen 500 MG Take 2 capsules by mouth every 4 (four) hours as needed for mild pain      albuterol (PROVENTIL HFA,VENTOLIN HFA) 90 mcg/act inhaler inhale 2 puffs by mouth and INTO THE LUNGS every 6 hours IF NEEDED FOR WHEEZING. 8.5 g 2    buPROPion (WELLBUTRIN XL) 300 mg 24 hr tablet Take 1 tablet (300 mg total) by mouth daily 30 tablet 1    cholecalciferol (VITAMIN D3) 400 units tablet Take 400 Units by mouth daily      fluticasone (FLONASE) 50 mcg/act nasal spray instill 1 spray into each nostril once daily 16 g 2    guaiFENesin (MUCINEX) 600 mg 12 hr tablet Take 2 tablets (1,200 mg total) by mouth every 12 (twelve) hours 60 tablet 3    ipratropium-albuterol (DUO-NEB) 0.5-2.5 mg/3 mL nebulizer solution Take 3 mL by nebulization 4 (four) times a day 360 mL 3    lamoTRIgine (LaMICtal) 200 MG tablet Take 1 tablet (200 mg total) by mouth daily 30 tablet 1    levocetirizine (XYZAL) 5 MG tablet take 1 tablet by mouth every evening 30 tablet 4    losartan (COZAAR) 25 mg tablet Take 1 tablet (25 mg total) by mouth daily 30 tablet 5    methocarbamol (ROBAXIN) 500 mg tablet Take 500 mg by mouth 2 (two) times a day PRN (Patient not taking: Reported on 5/24/2024)      metoprolol succinate (TOPROL-XL) 25 mg 24 hr tablet Take 1 tablet (25 mg total) by mouth daily  30 tablet 2    nicotine (NICODERM CQ) 21 mg/24 hr TD 24 hr patch Place 1 patch on the skin over 24 hours every 24 hours (Patient not taking: Reported on 4/9/2024) 28 patch 0    nicotine polacrilex (COMMIT) 4 MG lozenge Apply 1 lozenge (4 mg total) to the mouth or throat as needed for smoking cessation (Patient not taking: Reported on 4/9/2024) 100 each 0    omeprazole (PriLOSEC) 20 mg delayed release capsule take 1 capsule by mouth twice a day 60 capsule 5    senna-docusate sodium (SENOKOT-S) 8.6-50 mg per tablet Take 3 tablets by mouth as needed        Stiolto Respimat 2.5-2.5 MCG/ACT inhaler inhale 2 puffs by mouth and INTO THE LUNGS once daily 4 g 11     No current facility-administered medications for this visit.        No Known Allergies    Review of Systems    Video Exam    There were no vitals filed for this visit.    Physical Exam

## 2024-05-29 NOTE — PSYCH
"Behavioral Health Psychotherapy Progress Note    Psychotherapy Provided: Individual Psychotherapy     1. Moderate mixed bipolar II disorder (HCC)        2. Generalized anxiety disorder        3. PTSD (post-traumatic stress disorder)        4. Tobacco abuse        5. Marijuana use        6. Cigarette nicotine dependence without complication        7. Alcohol abuse, in remission            Goals addressed in session: Goal 1 and Goal 2     DATA: Marie discussed her being upset from thinking about her relationship with her sister and mother and was encouraged to quit dwelling in negative self-talk about kendra \"a bad sister\" and use self-compassion. Marie was commended for using Mindfulness in hard effort to handle her emotions. She talked about her life's story and presented it as a movie's saga with different chapters. She admitted not having showers lately and was encouraged to go for a walk and have a shower after that as two action steps to counter depression.   During this session, this clinician used the following therapeutic modalities: Client-centered Therapy, Cognitive Behavioral Therapy, Dialectical Behavior Therapy, Mindfulness-based Strategies, and Supportive Psychotherapy    Substance Abuse was not addressed during this session. If the client is diagnosed with a co-occurring substance use disorder, please indicate any changes in the frequency or amount of use: NA. Stage of change for addressing substance use diagnoses: Maintenance     ASSESSMENT:  Marie Portillo presents with a Euthymic/ normal mood.     her affect is Normal range and intensity, which is congruent, with her mood and the content of the session. The client has made progress on their goals.    Marie seemed more present, focused, and balanced emotionally. Marie Portillo presents with a low risk of suicide, minimal risk of self-harm, and none risk of harm to others.    For any risk assessment that surpasses a \"low\" rating, a safety plan must be " developed.    A safety plan was indicated: no  If yes, describe in detail NA    PLAN: Between sessions, Marie Portillo will use her Mindfulness skills to handle intense emotions in healthier ways and will attend the weekly Relaxation Group for self-care. At the next session, the therapist will use Client-centered Therapy, Cognitive Behavioral Therapy, Dialectical Behavior Therapy, Mindfulness-based Strategies, and Supportive Psychotherapy to address self-care, emotion regulation, memory devices.    Behavioral Health Treatment Plan and Discharge Planning: Marie Portillo is aware of and agrees to continue to work on their treatment plan. They have identified and are working toward their discharge goals. yes    Visit start and stop times:    05/29/24  Start Time: 1257  Stop Time: 1336  Total Visit Time: 39 minutes

## 2024-05-30 ENCOUNTER — TELEPHONE (OUTPATIENT)
Dept: PSYCHIATRY | Facility: CLINIC | Age: 59
End: 2024-05-30

## 2024-05-30 NOTE — TELEPHONE ENCOUNTER
Unable to leave voicemail informing patient of the Psych Encounter form needing to be signed as a requirement from the insurance company for billing purposes. If patients contacts office, please make patient aware that the form can be accessed via [employer] [place] to sign electronically and must be signed for each visit as a requirement to continue future visits with provider.

## 2024-05-31 ENCOUNTER — TELEMEDICINE (OUTPATIENT)
Dept: BEHAVIORAL/MENTAL HEALTH CLINIC | Facility: CLINIC | Age: 59
End: 2024-05-31

## 2024-05-31 DIAGNOSIS — F17.210 CIGARETTE NICOTINE DEPENDENCE WITHOUT COMPLICATION: ICD-10-CM

## 2024-05-31 DIAGNOSIS — Z72.0 TOBACCO ABUSE: ICD-10-CM

## 2024-05-31 DIAGNOSIS — F41.1 GENERALIZED ANXIETY DISORDER: ICD-10-CM

## 2024-05-31 DIAGNOSIS — F12.90 MARIJUANA USE: Chronic | ICD-10-CM

## 2024-05-31 DIAGNOSIS — F31.81 MODERATE MIXED BIPOLAR II DISORDER (HCC): Primary | ICD-10-CM

## 2024-05-31 DIAGNOSIS — F43.10 PTSD (POST-TRAUMATIC STRESS DISORDER): ICD-10-CM

## 2024-05-31 DIAGNOSIS — F10.11 ALCOHOL ABUSE, IN REMISSION: ICD-10-CM

## 2024-05-31 NOTE — PSYCH
Virtual Regular Visit    Verification of patient location:    Patient is located at Home in the following state in which I hold an active license [place]      Assessment/Plan:    Problem List Items Addressed This Visit       Moderate mixed bipolar II disorder (HCC) - Primary    Generalized anxiety disorder    PTSD (post-traumatic stress disorder)    Alcohol abuse, in remission    Marijuana use (Chronic)    Cigarette nicotine dependence without complication    Tobacco abuse       Goals addressed in session: Goal 1 and Goal 2          Reason for visit is No chief complaint on file.       Encounter provider Isabel Watts LPC      Recent Visits  Date Type Provider Dept   05/30/24 Telephone Isabel Watts LPC [facility] [place]   05/29/24 Telemedicine Isabel Watts LPC [facility] Therapist [place]   05/24/24 Telemedicine Isabel Watts LPC [facility] Therapist [place]   Showing recent visits within past 7 days and meeting all other requirements  Future Appointments  No visits were found meeting these conditions.  Showing future appointments within next 150 days and meeting all other requirements       The patient was identified by name and date of birth. Marie Portillo was informed that this is a telemedicine visit and that the visit is being conducted throughthe [employer] Teams platform. She agrees to proceed..  My office door was closed. The patient was notified the following individuals were present in the room other virtual group attendees.  She acknowledged consent and understanding of privacy and security of the video platform. The patient has agreed to participate and understands they can discontinue the visit at any time.    Patient is aware this is a billable service.     Subjective  Marie Portillo is a 58 y.o. female  .    Data: Marie attended the Relaxation group and was guided through tapping on trauma, diaphragmatic breathing, other Vagus nerve stimulation  techniques, and progressive muscle relaxation. Furthermore, attendees were guided through a quick body scan, Meditation on shame reduction, inner child healing, and trauma recovery, and ending with affirmations for self-compassion, self-confidence, and forgiveness. Psychoeducation, EFT, Mindfulness-based, and Somatic approaches were used to achieve the deep state of relaxation and to maintain the serene environment for the exercise's purpose.      Assessment: Marie was in euthymic mood and in congruence with broad affect, followed through guidance and completed the exercises well, showed willingness to continue learning and practicing self-care in weekly Relaxation Group as well as individual therapy.      Plan: Marie will practice deep breathing and other techniques based on each occasion to counter stress and will attend the next group to be able to take part in further guided exercises on chosen topics to build on the techniques learned in the beginning.    HPI     Past Medical History:   Diagnosis Date    Bilateral carpal tunnel syndrome 08/20/2019    Cardiomyopathy (HCC)     Carpal tunnel syndrome of right wrist 10/30/2019    Added automatically from request for surgery [id number]    Chlamydia     COPD (chronic obstructive pulmonary disease) (HCC)     Depression     Hypertension     Varicella        Past Surgical History:   Procedure Laterality Date    BREAST BIOPSY Left 20yrs ago benign    CERVICAL SPINE SURGERY      EGD      HERNIA REPAIR      OOPHORECTOMY Right     [place] [facility] WRST SURG W/RLS TRANSVRS CARPL LIGM Right 01/21/2020    Procedure: RELEASE CARPAL TUNNEL ENDOSCOPIC;  Surgeon: Sam Nguyen MD;  [facility];  Service: Orthopedics    [place] [facility] WRST SURG W/RLS TRANSVRS CARPL LIGM Left 01/28/2020    Procedure: RELEASE CARPAL TUNNEL ENDOSCOPIC;  Surgeon: Sam Nguyen MD;  [facility];  Service: Orthopedics       Current Outpatient Medications   Medication Sig Dispense Refill     Acetaminophen 500 MG Take 2 capsules by mouth every 4 (four) hours as needed for mild pain      albuterol (PROVENTIL HFA,VENTOLIN HFA) 90 mcg/act inhaler inhale 2 puffs by mouth and INTO THE LUNGS every 6 hours IF NEEDED FOR WHEEZING. 8.5 g 2    buPROPion (WELLBUTRIN XL) 300 mg 24 hr tablet Take 1 tablet (300 mg total) by mouth daily 30 tablet 1    cholecalciferol (VITAMIN D3) 400 units tablet Take 400 Units by mouth daily      fluticasone (FLONASE) 50 mcg/act nasal spray instill 1 spray into each nostril once daily 16 g 2    guaiFENesin (MUCINEX) 600 mg 12 hr tablet Take 2 tablets (1,200 mg total) by mouth every 12 (twelve) hours 60 tablet 3    ipratropium-albuterol (DUO-NEB) 0.5-2.5 mg/3 mL nebulizer solution Take 3 mL by nebulization 4 (four) times a day 360 mL 3    lamoTRIgine (LaMICtal) 200 MG tablet Take 1 tablet (200 mg total) by mouth daily 30 tablet 1    levocetirizine (XYZAL) 5 MG tablet take 1 tablet by mouth every evening 30 tablet 4    losartan (COZAAR) 25 mg tablet Take 1 tablet (25 mg total) by mouth daily 30 tablet 5    methocarbamol (ROBAXIN) 500 mg tablet Take 500 mg by mouth 2 (two) times a day PRN (Patient not taking: Reported on 5/24/2024)      metoprolol succinate (TOPROL-XL) 25 mg 24 hr tablet Take 1 tablet (25 mg total) by mouth daily 30 tablet 2    nicotine (NICODERM CQ) 21 mg/24 hr TD 24 hr patch Place 1 patch on the skin over 24 hours every 24 hours (Patient not taking: Reported on 4/9/2024) 28 patch 0    nicotine polacrilex (COMMIT) 4 MG lozenge Apply 1 lozenge (4 mg total) to the mouth or throat as needed for smoking cessation (Patient not taking: Reported on 4/9/2024) 100 each 0    omeprazole (PriLOSEC) 20 mg delayed release capsule take 1 capsule by mouth twice a day 60 capsule 5    senna-docusate sodium (SENOKOT-S) 8.6-50 mg per tablet Take 3 tablets by mouth as needed        Stiolto Respimat 2.5-2.5 MCG/ACT inhaler inhale 2 puffs by mouth and INTO THE LUNGS once daily 4 g 11     No  current facility-administered medications for this visit.        No Known Allergies    Review of Systems    Video Exam    There were no vitals filed for this visit.    Physical Exam     05/31/24  Start Time: 0405  Stop Time: 0500  Total Visit Time: 55 minutes

## 2024-06-05 ENCOUNTER — TELEMEDICINE (OUTPATIENT)
Dept: PSYCHIATRY | Facility: CLINIC | Age: 59
End: 2024-06-05

## 2024-06-05 ENCOUNTER — TELEMEDICINE (OUTPATIENT)
Dept: BEHAVIORAL/MENTAL HEALTH CLINIC | Facility: CLINIC | Age: 59
End: 2024-06-05
Payer: COMMERCIAL

## 2024-06-05 ENCOUNTER — TELEPHONE (OUTPATIENT)
Dept: PSYCHIATRY | Facility: CLINIC | Age: 59
End: 2024-06-05

## 2024-06-05 DIAGNOSIS — F43.10 PTSD (POST-TRAUMATIC STRESS DISORDER): ICD-10-CM

## 2024-06-05 DIAGNOSIS — F12.90 MARIJUANA USE: Chronic | ICD-10-CM

## 2024-06-05 DIAGNOSIS — F31.81 MODERATE MIXED BIPOLAR II DISORDER (HCC): Primary | ICD-10-CM

## 2024-06-05 DIAGNOSIS — F17.210 CIGARETTE NICOTINE DEPENDENCE WITHOUT COMPLICATION: ICD-10-CM

## 2024-06-05 DIAGNOSIS — F31.32 BIPOLAR AFFECTIVE DISORDER, CURRENTLY DEPRESSED, MODERATE (HCC): ICD-10-CM

## 2024-06-05 DIAGNOSIS — F41.1 GENERALIZED ANXIETY DISORDER: ICD-10-CM

## 2024-06-05 DIAGNOSIS — Z72.0 TOBACCO ABUSE: ICD-10-CM

## 2024-06-05 DIAGNOSIS — F10.11 ALCOHOL ABUSE, IN REMISSION: ICD-10-CM

## 2024-06-05 PROCEDURE — 90832 PSYTX W PT 30 MINUTES: CPT | Performed by: COUNSELOR

## 2024-06-05 RX ORDER — LAMOTRIGINE 200 MG/1
200 TABLET ORAL DAILY
Qty: 30 TABLET | Refills: 2 | Status: SHIPPED | OUTPATIENT
Start: 2024-06-05 | End: 2024-09-03

## 2024-06-05 RX ORDER — BUPROPION HYDROCHLORIDE 300 MG/1
300 TABLET ORAL DAILY
Qty: 30 TABLET | Refills: 2 | Status: SHIPPED | OUTPATIENT
Start: 2024-06-05 | End: 2024-09-03

## 2024-06-05 NOTE — PSYCH
Virtual Regular Visit    Verification of patient location:    Patient is located at Home in the following state in which I hold an active license [place]      Assessment/Plan:    Problem List Items Addressed This Visit       Moderate mixed bipolar II disorder (HCC) - Primary    Generalized anxiety disorder    PTSD (post-traumatic stress disorder)    Alcohol abuse, in remission    Marijuana use (Chronic)    Cigarette nicotine dependence without complication    Tobacco abuse       Goals addressed in session: Goal 1 and Goal 2          Reason for visit is No chief complaint on file.       Encounter provider Isabel Watts LPC      Recent Visits  Date Type Provider Dept   05/31/24 Telemedicine Isabel Watts LPC [facility] Therapist [place]   05/30/24 Telephone Isabel Watts LPC [facility] [place]   05/29/24 Telemedicine Isabel Watts LPC [facility] Therapist [place]   Showing recent visits within past 7 days and meeting all other requirements  Today's Visits  Date Type Provider Dept   06/05/24 Telephone Isabel Watts LPC [facility] [place]   Showing today's visits and meeting all other requirements  Future Appointments  No visits were found meeting these conditions.  Showing future appointments within next 150 days and meeting all other requirements       The patient was identified by name and date of birth. Marie Portillo was informed that this is a telemedicine visit and that the visit is being conducted throughthe Epic Embedded platform. She agrees to proceed..  My office door was closed. No one else was in the room.  She acknowledged consent and understanding of privacy and security of the video platform. The patient has agreed to participate and understands they can discontinue the visit at any time.    Patient is aware this is a billable service.     Subjective  Marie Portillo is a 58 y.o. female  .      HPI     Past Medical History:   Diagnosis Date     Bilateral carpal tunnel syndrome 08/20/2019    Cardiomyopathy (HCC)     Carpal tunnel syndrome of right wrist 10/30/2019    Added automatically from request for surgery [id number]    Chlamydia     COPD (chronic obstructive pulmonary disease) (HCC)     Depression     Hypertension     Varicella        Past Surgical History:   Procedure Laterality Date    BREAST BIOPSY Left 20yrs ago benign    CERVICAL SPINE SURGERY      EGD      HERNIA REPAIR      OOPHORECTOMY Right     [place] [facility] WRST SURG W/RLS TRANSVRS CARPL LIGM Right 01/21/2020    Procedure: RELEASE CARPAL TUNNEL ENDOSCOPIC;  Surgeon: Sam Nguyen MD;  [facility];  Service: Orthopedics    [place] [facility] WRST SURG W/RLS TRANSVRS CARPL LIGM Left 01/28/2020    Procedure: RELEASE CARPAL TUNNEL ENDOSCOPIC;  Surgeon: Sam Nguyen MD;  [facility];  Service: Orthopedics       Current Outpatient Medications   Medication Sig Dispense Refill    Acetaminophen 500 MG Take 2 capsules by mouth every 4 (four) hours as needed for mild pain      albuterol (PROVENTIL HFA,VENTOLIN HFA) 90 mcg/act inhaler inhale 2 puffs by mouth and INTO THE LUNGS every 6 hours IF NEEDED FOR WHEEZING. 8.5 g 2    buPROPion (WELLBUTRIN XL) 300 mg 24 hr tablet Take 1 tablet (300 mg total) by mouth daily 30 tablet 1    cholecalciferol (VITAMIN D3) 400 units tablet Take 400 Units by mouth daily      fluticasone (FLONASE) 50 mcg/act nasal spray instill 1 spray into each nostril once daily 16 g 2    guaiFENesin (MUCINEX) 600 mg 12 hr tablet Take 2 tablets (1,200 mg total) by mouth every 12 (twelve) hours 60 tablet 3    ipratropium-albuterol (DUO-NEB) 0.5-2.5 mg/3 mL nebulizer solution Take 3 mL by nebulization 4 (four) times a day 360 mL 3    lamoTRIgine (LaMICtal) 200 MG tablet Take 1 tablet (200 mg total) by mouth daily 30 tablet 1    levocetirizine (XYZAL) 5 MG tablet take 1 tablet by mouth every evening 30 tablet 4    losartan (COZAAR) 25 mg tablet Take 1 tablet (25 mg total) by mouth  daily 30 tablet 5    methocarbamol (ROBAXIN) 500 mg tablet Take 500 mg by mouth 2 (two) times a day PRN (Patient not taking: Reported on 5/24/2024)      metoprolol succinate (TOPROL-XL) 25 mg 24 hr tablet Take 1 tablet (25 mg total) by mouth daily 30 tablet 2    nicotine (NICODERM CQ) 21 mg/24 hr TD 24 hr patch Place 1 patch on the skin over 24 hours every 24 hours (Patient not taking: Reported on 4/9/2024) 28 patch 0    nicotine polacrilex (COMMIT) 4 MG lozenge Apply 1 lozenge (4 mg total) to the mouth or throat as needed for smoking cessation (Patient not taking: Reported on 4/9/2024) 100 each 0    omeprazole (PriLOSEC) 20 mg delayed release capsule take 1 capsule by mouth twice a day 60 capsule 5    senna-docusate sodium (SENOKOT-S) 8.6-50 mg per tablet Take 3 tablets by mouth as needed        Stiolto Respimat 2.5-2.5 MCG/ACT inhaler inhale 2 puffs by mouth and INTO THE LUNGS once daily 4 g 11     No current facility-administered medications for this visit.        No Known Allergies    Review of Systems    Video Exam    There were no vitals filed for this visit.    Physical Exam

## 2024-06-05 NOTE — TELEPHONE ENCOUNTER
Unable to leave voicemail informing patient of the Psych Encounter form needing to be signed as a requirement from the insurance company for billing purposes. If patients contacts office, please make patient aware that the form can be accessed via [employer] to sign electronically and must be signed for each visit as a requirement to continue future visits with provider.

## 2024-06-05 NOTE — PSYCH
"Behavioral Health Psychotherapy Progress Note    Psychotherapy Provided: Individual Psychotherapy     1. Moderate mixed bipolar II disorder (HCC)        2. PTSD (post-traumatic stress disorder)        3. Generalized anxiety disorder        4. Cigarette nicotine dependence without complication        5. Tobacco abuse        6. Marijuana use        7. Alcohol abuse, in remission            Goals addressed in session: Goal 1 and Goal 2     DATA: Marie had a short session due to her doctor overlapping his appointment with her therapy appointment. She stayed on the phone while explaining that she cannot connect and does not see any link yet but got on video at one point. Marie discussed quickly her being upset with herself about her forgetfulness and was encouraged not to be harsh on herself and to use her Mindfulness skills to attend to intense emotions healthily.   During this session, this clinician used the following therapeutic modalities: Client-centered Therapy, Cognitive Behavioral Therapy, Dialectical Behavior Therapy, Mindfulness-based Strategies, and Supportive Psychotherapy    Substance Abuse was not addressed during this session. If the client is diagnosed with a co-occurring substance use disorder, please indicate any changes in the frequency or amount of use: NA. Stage of change for addressing substance use diagnoses: Pre-contemplation    ASSESSMENT:  Marie Portillo presents with a Euthymic/ normal, Anxious, and Depressed mood.     her affect is Normal range and intensity, Constricted, and Tearful, at times, which is congruent, with her mood and the content of the session. The client has made progress on their goals.    Marie was upset but easily redirected to more positive topics. Marie Portillo presents with a low risk of suicide, low risk of self-harm, and none risk of harm to others.    For any risk assessment that surpasses a \"low\" rating, a safety plan must be developed.    A safety plan was indicated: " no  If yes, describe in detail NA    PLAN: Between sessions, Marie Portillo will use her Mindfulness skills to be able to deal with emotions better and will attend her weekly Relaxation Hour for self-care . At the next session, the therapist will use Client-centered Therapy, Cognitive Behavioral Therapy, Dialectical Behavior Therapy, Mindfulness-based Strategies, and Supportive Psychotherapy to address self-care and self-blame.    Behavioral Health Treatment Plan and Discharge Planning: Marie Portillo is aware of and agrees to continue to work on their treatment plan. They have identified and are working toward their discharge goals. yes    Visit start and stop times:    06/05/24  Start Time: 1250  Stop Time: 1314  Total Visit Time: 24 minutes

## 2024-06-05 NOTE — PSYCH
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 Status Evaluation:    Appearance:  age appropriate, casually dressed   Behavior:  Increased energy   Motor: no abnormal movements   Speech:  normal pitch, increased rate, talkative   Mood:  frustrated   Affect:  increased in intensity   Thought Process:  circumstantial   Thought Content: {no overt delusions   Perceptual disturbances: no auditory hallucinations, no visual hallucinations   Risk Potential: Suicidal ideation - None at present  Homicidal ideation - None at present  Potential for aggression - Not at present   Cognition: oriented to self and situation, appears to be of average intelligence, and cognition not formally tested   Insight:  fair   Judgment: fair     Note Share:   This note was shared with patient.    Visit Time  Start: 1:16. Stop: 2:14.  I spent 58 minutes directly with the patient during this visit    Clint Kumar DO 06/05/24

## 2024-06-05 NOTE — PATIENT INSTRUCTIONS
Notable Changes  Write notable things down to help remember tasks  Call surgeon today to set up chiari malformation procedure  Other Treatment recommendations  Continue Wellbutrin 300mg XL for depressive symptoms  Continue Lamictal 200mg daily for mood     Please present for your previously scheduled appointment approximately 15 minutes prior to appointment time. If you are running late or are unable to attend your appointment, please call our [place] office at [phone] or our [place] office at [phone] if applicable to notify the office of your absence.    If you are in psychological crisis including not limited to suicidal/homicidal thoughts or thoughts of self-injury, do not hesitate to call/contact your County Crisis hotline (see below) or attend to the nearest emergency department.  [facility] Crisis: [phone]  [facility] Crisis: [phone]  [facility] Crisis: [phone]  [facility] Crisis: [phone]  [facility] Crisis: [phone]  [facility] Crisis: [phone]  [facility] Crisis: [phone]  National Suicide Prevention Hotline: 1-228.116.4887

## 2024-06-07 ENCOUNTER — TELEMEDICINE (OUTPATIENT)
Dept: BEHAVIORAL/MENTAL HEALTH CLINIC | Facility: CLINIC | Age: 59
End: 2024-06-07

## 2024-06-07 DIAGNOSIS — F10.11 ALCOHOL ABUSE, IN REMISSION: ICD-10-CM

## 2024-06-07 DIAGNOSIS — F41.1 GENERALIZED ANXIETY DISORDER: ICD-10-CM

## 2024-06-07 DIAGNOSIS — F43.10 PTSD (POST-TRAUMATIC STRESS DISORDER): ICD-10-CM

## 2024-06-07 DIAGNOSIS — F17.210 CIGARETTE NICOTINE DEPENDENCE WITHOUT COMPLICATION: ICD-10-CM

## 2024-06-07 DIAGNOSIS — Z72.0 TOBACCO ABUSE: ICD-10-CM

## 2024-06-07 DIAGNOSIS — F31.81 MODERATE MIXED BIPOLAR II DISORDER (HCC): Primary | ICD-10-CM

## 2024-06-07 DIAGNOSIS — F12.90 MARIJUANA USE: Chronic | ICD-10-CM

## 2024-06-07 NOTE — PSYCH
Virtual Regular Visit    Verification of patient location:    Patient is located at Home in the following state in which I hold an active license [place]      Assessment/Plan:    Problem List Items Addressed This Visit       Moderate mixed bipolar II disorder (HCC) - Primary    Generalized anxiety disorder    PTSD (post-traumatic stress disorder)    Alcohol abuse, in remission    Marijuana use (Chronic)    Cigarette nicotine dependence without complication    Tobacco abuse       Goals addressed in session: Goal 1 and Goal 2          Reason for visit is No chief complaint on file.       Encounter provider Isabel Watts LPC      Recent Visits  Date Type Provider Dept   06/05/24 Telephone Isabel Watts LPC [facility] [place]   06/05/24 Telemedicine Isabel Watts LPC [facility] [place]   05/31/24 Telemedicine Isabel Watts LPC [facility]rap[facility] [place]   Showing recent visits within past 7 days and meeting all other requirements  Future Appointments  No visits were found meeting these conditions.  Showing future appointments within next 150 days and meeting all other requirements       The patient was identified by name and date of birth. Marie Portillo was informed that this is a telemedicine visit and that the visit is being conducted throughthe Microsoft Teams platform. She agrees to proceed..  My office door was closed. The patient was notified the following individuals were present in the room other group members.  She acknowledged consent and understanding of privacy and security of the video platform. The patient has agreed to participate and understands they can discontinue the visit at any time.    Patient is aware this is a billable service.     Subjective  Marie Portillo is a 58 y.o. female  .    Data: Marie attended the Relaxation group and was guided through tapping on anxiety, diaphragmatic breathing, other Vagus nerve stimulation techniques, and  Massage Therapy Progress Note      Visit type: Office Visit    Visit Sequence: Inpatient    Length of treatment: 15-minute, FREE MASSAGE    Authorization: Patient accepted the offer of massage when asked    Reviewed contraindications/current health status with Patient    No Contraindications to massage therapy exist, Nikole is in labor    Subjective:  Patient complains of:  Low back pain from labor    Pain report prior to treatment:  0/10    Type of treatment requested: Comfort massage    Specific requests:  Low back       Objective Observations:  Hypertonicity noted in:  None noted    Hypotonicity/Ischemia noted in: None noted    Decreased ROM noted in No areas    Additional observations:  None    Assessment:  Patient received Gentle comfort massage techniques to affected tissues.    Additional treatment provided: None at this time    Response to treatment: Patient gave positive verbal feedback    Pain report after treatment:0/10    Education/Resources: None provided this visit    Outcomes:n/a    Plan/Recommendations:  Session concluded   progressive muscle relaxation. Furthermore, attendees were guided through a quick body scan, Meditation on releasing grief and allowing in the positive, on forgiveness, imagery on Wildlife [place], and Experience One's Mind as an [place], and ending with affirmations for power of mind, dedication to one's wellness, and gratitude. Psychoeducation, EFT, Mindfulness-based, and Somatic approaches were used to achieve the deep state of relaxation and to maintain the serene environment for the exercise's purpose.      Assessment: Marie was in euthymic mood and in congruence with broad affect, followed through guidance and completed the exercises well, showed willingness to continue learning and practicing self-care in weekly Relaxation Group as well as individual therapy.      Plan: Marie will practice deep breathing and other techniques based on each occasion to counter stress and will attend the next group to be able to take part in further guided exercises on chosen topics to build on the techniques learned in the beginning.       HPI     Past Medical History:   Diagnosis Date    Bilateral carpal tunnel syndrome 08/20/2019    Cardiomyopathy (HCC)     Carpal tunnel syndrome of right wrist 10/30/2019    Added automatically from request for surgery [id number]    Chlamydia     COPD (chronic obstructive pulmonary disease) (HCC)     Depression     Hypertension     Varicella        Past Surgical History:   Procedure Laterality Date    BREAST BIOPSY Left 20yrs ago benign    CERVICAL SPINE SURGERY      EGD      HERNIA REPAIR      OOPHORECTOMY Right     MT NDSC WRST SURG W/RLS TRANSVRS CARPL LIGM Right 01/21/2020    Procedure: RELEASE CARPAL TUNNEL ENDOSCOPIC;  Surgeon: Sam Nguyen MD;  [facility];  Service: Orthopedics    MT NDSC WRST SURG W/RLS TRANSVRS CARPL LIGM Left 01/28/2020    Procedure: RELEASE CARPAL TUNNEL ENDOSCOPIC;  Surgeon: Sam Nguyen MD;  [facility];  Service: Orthopedics        Current Outpatient Medications   Medication Sig Dispense Refill    Acetaminophen 500 MG Take 2 capsules by mouth every 4 (four) hours as needed for mild pain      albuterol (PROVENTIL HFA,VENTOLIN HFA) 90 mcg/act inhaler inhale 2 puffs by mouth and INTO THE LUNGS every 6 hours IF NEEDED FOR WHEEZING. 8.5 g 2    buPROPion (WELLBUTRIN XL) 300 mg 24 hr tablet Take 1 tablet (300 mg total) by mouth daily 30 tablet 2    cholecalciferol (VITAMIN D3) 400 units tablet Take 400 Units by mouth daily      fluticasone (FLONASE) 50 mcg/act nasal spray instill 1 spray into each nostril once daily 16 g 2    guaiFENesin (MUCINEX) 600 mg 12 hr tablet Take 2 tablets (1,200 mg total) by mouth every 12 (twelve) hours 60 tablet 3    ipratropium-albuterol (DUO-NEB) 0.5-2.5 mg/3 mL nebulizer solution Take 3 mL by nebulization 4 (four) times a day 360 mL 3    lamoTRIgine (LaMICtal) 200 MG tablet Take 1 tablet (200 mg total) by mouth daily 30 tablet 2    levocetirizine (XYZAL) 5 MG tablet take 1 tablet by mouth every evening 30 tablet 4    losartan (COZAAR) 25 mg tablet Take 1 tablet (25 mg total) by mouth daily 30 tablet 5    methocarbamol (ROBAXIN) 500 mg tablet Take 500 mg by mouth 2 (two) times a day PRN (Patient not taking: Reported on 5/24/2024)      metoprolol succinate (TOPROL-XL) 25 mg 24 hr tablet Take 1 tablet (25 mg total) by mouth daily 30 tablet 2    nicotine (NICODERM CQ) 21 mg/24 hr TD 24 hr patch Place 1 patch on the skin over 24 hours every 24 hours (Patient not taking: Reported on 4/9/2024) 28 patch 0    nicotine polacrilex (COMMIT) 4 MG lozenge Apply 1 lozenge (4 mg total) to the mouth or throat as needed for smoking cessation (Patient not taking: Reported on 4/9/2024) 100 each 0    omeprazole (PriLOSEC) 20 mg delayed release capsule take 1 capsule by mouth twice a day 60 capsule 5    senna-docusate sodium (SENOKOT-S) 8.6-50 mg per tablet Take 3 tablets by mouth as needed        Stiolto Respimat 2.5-2.5 MCG/ACT  inhaler inhale 2 puffs by mouth and INTO THE LUNGS once daily 4 g 11     No current facility-administered medications for this visit.        No Known Allergies    Review of Systems    Video Exam    There were no vitals filed for this visit.    Physical Exam     06/07/24  Start Time: 0410  Stop Time: 0502  Total Visit Time: 52 minutes

## 2024-06-10 ENCOUNTER — TELEPHONE (OUTPATIENT)
Dept: PSYCHIATRY | Facility: CLINIC | Age: 59
End: 2024-06-10

## 2024-06-10 NOTE — TELEPHONE ENCOUNTER
Left voicemail informing patient and/or parent/guardian of the Psych Encounter form needing to be signed as a requirement from the insurance company for billing purposes. Patient can access form via [place] and sign electronically.     Please make patient aware this form must be signed for each visit as a requirement to continue future visits with provider.

## 2024-06-12 ENCOUNTER — TELEPHONE (OUTPATIENT)
Dept: PSYCHIATRY | Facility: CLINIC | Age: 59
End: 2024-06-12

## 2024-06-12 ENCOUNTER — TELEMEDICINE (OUTPATIENT)
Dept: BEHAVIORAL/MENTAL HEALTH CLINIC | Facility: CLINIC | Age: 59
End: 2024-06-12
Payer: COMMERCIAL

## 2024-06-12 DIAGNOSIS — F12.90 MARIJUANA USE: Chronic | ICD-10-CM

## 2024-06-12 DIAGNOSIS — F10.11 ALCOHOL ABUSE, IN REMISSION: ICD-10-CM

## 2024-06-12 DIAGNOSIS — F17.210 CIGARETTE NICOTINE DEPENDENCE WITHOUT COMPLICATION: ICD-10-CM

## 2024-06-12 DIAGNOSIS — F31.81 MODERATE MIXED BIPOLAR II DISORDER (HCC): Primary | ICD-10-CM

## 2024-06-12 DIAGNOSIS — Z72.0 TOBACCO ABUSE: ICD-10-CM

## 2024-06-12 DIAGNOSIS — F43.10 PTSD (POST-TRAUMATIC STRESS DISORDER): ICD-10-CM

## 2024-06-12 DIAGNOSIS — F41.1 GENERALIZED ANXIETY DISORDER: ICD-10-CM

## 2024-06-12 PROCEDURE — 90834 PSYTX W PT 45 MINUTES: CPT | Performed by: COUNSELOR

## 2024-06-12 NOTE — PSYCH
Virtual Regular Visit    Verification of patient location:    Patient is located at Home in the following state in which I hold an active license [place]      Assessment/Plan:    Problem List Items Addressed This Visit       Moderate mixed bipolar II disorder (HCC) - Primary    Generalized anxiety disorder    PTSD (post-traumatic stress disorder)    Alcohol abuse, in remission    Marijuana use (Chronic)    Cigarette nicotine dependence without complication    Tobacco abuse       Goals addressed in session: Goal 1 and Goal 2          Reason for visit is No chief complaint on file.       Encounter provider Isabel Watts LPC      Recent Visits  Date Type Provider Dept   06/07/24 Telemedicine Isabel Watts LPC [facility] [place]   06/05/24 Telephone Isabel Watts LPC [facility] [place]   06/05/24 Telemedicine Isabel Watts LPC [facility] [place]   Showing recent visits within past 7 days and meeting all other requirements  Today's Visits  Date Type Provider Dept   06/12/24 Telephone Isabel Watts LPC [facility] [place]   Showing today's visits and meeting all other requirements  Future Appointments  No visits were found meeting these conditions.  Showing future appointments within next 150 days and meeting all other requirements       The patient was identified by name and date of birth. Marie Portillo was informed that this is a telemedicine visit and that the visit is being conducted throughthe Epic Embedded platform. She agrees to proceed..  My office door was closed. No one else was in the room.  She acknowledged consent and understanding of privacy and security of the video platform. The patient has agreed to participate and understands they can discontinue the visit at any time.    Patient is aware this is a billable service.     Subjective  Marie Portillo is a 58 y.o. female  .      HPI     Past Medical History:   Diagnosis Date     Bilateral carpal tunnel syndrome 08/20/2019    Cardiomyopathy (HCC)     Carpal tunnel syndrome of right wrist 10/30/2019    Added automatically from request for surgery [id number]    Chlamydia     COPD (chronic obstructive pulmonary disease) (HCC)     Depression     Hypertension     Varicella        Past Surgical History:   Procedure Laterality Date    BREAST BIOPSY Left 20yrs ago benign    CERVICAL SPINE SURGERY      EGD      HERNIA REPAIR      OOPHORECTOMY Right     SC NDSC WRST SURG W/RLS TRANSVRS CARPL LIGM Right 01/21/2020    Procedure: RELEASE CARPAL TUNNEL ENDOSCOPIC;  Surgeon: Sam Nguyen MD;  [facility];  Service: Orthopedics    SC NDSC WRST SURG W/RLS TRANSVRS CARPL LIGM Left 01/28/2020    Procedure: RELEASE CARPAL TUNNEL ENDOSCOPIC;  Surgeon: Sam Nguyen MD;  [facility];  Service: Orthopedics       Current Outpatient Medications   Medication Sig Dispense Refill    Acetaminophen 500 MG Take 2 capsules by mouth every 4 (four) hours as needed for mild pain      albuterol (PROVENTIL HFA,VENTOLIN HFA) 90 mcg/act inhaler inhale 2 puffs by mouth and INTO THE LUNGS every 6 hours IF NEEDED FOR WHEEZING. 8.5 g 2    buPROPion (WELLBUTRIN XL) 300 mg 24 hr tablet Take 1 tablet (300 mg total) by mouth daily 30 tablet 2    cholecalciferol (VITAMIN D3) 400 units tablet Take 400 Units by mouth daily      fluticasone (FLONASE) 50 mcg/act nasal spray instill 1 spray into each nostril once daily 16 g 2    guaiFENesin (MUCINEX) 600 mg 12 hr tablet Take 2 tablets (1,200 mg total) by mouth every 12 (twelve) hours 60 tablet 3    ipratropium-albuterol (DUO-NEB) 0.5-2.5 mg/3 mL nebulizer solution Take 3 mL by nebulization 4 (four) times a day 360 mL 3    lamoTRIgine (LaMICtal) 200 MG tablet Take 1 tablet (200 mg total) by mouth daily 30 tablet 2    levocetirizine (XYZAL) 5 MG tablet take 1 tablet by mouth every evening 30 tablet 4    losartan (COZAAR) 25 mg tablet Take 1 tablet (25 mg total) by mouth  daily 30 tablet 5    methocarbamol (ROBAXIN) 500 mg tablet Take 500 mg by mouth 2 (two) times a day PRN (Patient not taking: Reported on 5/24/2024)      metoprolol succinate (TOPROL-XL) 25 mg 24 hr tablet Take 1 tablet (25 mg total) by mouth daily 30 tablet 2    nicotine (NICODERM CQ) 21 mg/24 hr TD 24 hr patch Place 1 patch on the skin over 24 hours every 24 hours (Patient not taking: Reported on 4/9/2024) 28 patch 0    nicotine polacrilex (COMMIT) 4 MG lozenge Apply 1 lozenge (4 mg total) to the mouth or throat as needed for smoking cessation (Patient not taking: Reported on 4/9/2024) 100 each 0    omeprazole (PriLOSEC) 20 mg delayed release capsule take 1 capsule by mouth twice a day 60 capsule 5    senna-docusate sodium (SENOKOT-S) 8.6-50 mg per tablet Take 3 tablets by mouth as needed        Stiolto Respimat 2.5-2.5 MCG/ACT inhaler inhale 2 puffs by mouth and INTO THE LUNGS once daily 4 g 11     No current facility-administered medications for this visit.        No Known Allergies    Review of Systems    Video Exam    There were no vitals filed for this visit.    Physical Exam              No

## 2024-06-12 NOTE — PSYCH
Behavioral Health Psychotherapy Progress Note    Psychotherapy Provided: Individual Psychotherapy     1. Moderate mixed bipolar II disorder (HCC)        2. PTSD (post-traumatic stress disorder)        3. Generalized anxiety disorder        4. Cigarette nicotine dependence without complication        5. Tobacco abuse        6. Marijuana use        7. Alcohol abuse, in remission            Goals addressed in session: Goal 1 and Goal 2     DATA: Marie discussed her recent experience with taking notes and forgetting to set up alarms to take notes about important events (appointments, outings, meetings, and conversations) and gets upset with herself. She brought up exercising and limited energy to follow through her planned activities from insufficient sleep. Marie was encouraged to share her experience of handling intense emotions with Mindfulness and praised for her hard work to counter her regrets, self-pity, and doubts.   During this session, this clinician used the following therapeutic modalities: Client-centered Therapy, Cognitive Behavioral Therapy, Dialectical Behavior Therapy, Mindfulness-based Strategies, and Supportive Psychotherapy    Substance Abuse was not addressed during this session. If the client is diagnosed with a co-occurring substance use disorder, please indicate any changes in the frequency or amount of use: NA. Stage of change for addressing substance use diagnoses: Maintenance    ASSESSMENT:  Marie Portillo presents with a Euthymic/ normal and Depressed mood.     her affect is Normal range and intensity and Tearful, at times, which is congruent, with her mood and the content of the session. The client has made progress on their goals.    Marie struggles with cognitive tasks and does well when taking notes to be able to refer to those experiences. Marie Portillo presents with a minimal risk of suicide, minimal risk of self-harm, and none risk of harm to others.    For any risk assessment that  "surpasses a \"low\" rating, a safety plan must be developed.    A safety plan was indicated: no  If yes, describe in detail NA    PLAN: Between sessions, Marie Portillo will take notes about significant events (appointments, outings, meetings, and conversations) to avoid getting upset with herself. At the next session, the therapist will use Client-centered Therapy, Cognitive Behavioral Therapy, Dialectical Behavior Therapy, Mindfulness-based Strategies, and Supportive Psychotherapy to address adequate coverage of important events through taking notes, for which she needs to set up alarms or put her reminders on her wall calendar.    Behavioral Health Treatment Plan and Discharge Planning: Marie Portillo is aware of and agrees to continue to work on their treatment plan. They have identified and are working toward their discharge goals. yes    Visit start and stop times:    06/12/24  Start Time: 1255  Stop Time: 1338  Total Visit Time: 43 minutes  "

## 2024-06-14 ENCOUNTER — TELEMEDICINE (OUTPATIENT)
Dept: BEHAVIORAL/MENTAL HEALTH CLINIC | Facility: CLINIC | Age: 59
End: 2024-06-14

## 2024-06-14 DIAGNOSIS — Z72.0 TOBACCO ABUSE: ICD-10-CM

## 2024-06-14 DIAGNOSIS — F31.81 MODERATE MIXED BIPOLAR II DISORDER (HCC): Primary | ICD-10-CM

## 2024-06-14 DIAGNOSIS — F17.210 CIGARETTE NICOTINE DEPENDENCE WITHOUT COMPLICATION: ICD-10-CM

## 2024-06-14 DIAGNOSIS — F12.90 MARIJUANA USE: Chronic | ICD-10-CM

## 2024-06-14 DIAGNOSIS — F41.1 GENERALIZED ANXIETY DISORDER: ICD-10-CM

## 2024-06-14 DIAGNOSIS — F43.10 PTSD (POST-TRAUMATIC STRESS DISORDER): ICD-10-CM

## 2024-06-14 DIAGNOSIS — F10.11 ALCOHOL ABUSE, IN REMISSION: ICD-10-CM

## 2024-06-14 NOTE — PSYCH
Virtual Regular Visit    Verification of patient location:    Patient is located at Home in the following state in which I hold an active license [place]      Assessment/Plan:    Problem List Items Addressed This Visit       Moderate mixed bipolar II disorder (HCC) - Primary    Generalized anxiety disorder    PTSD (post-traumatic stress disorder)    Alcohol abuse, in remission    Marijuana use (Chronic)    Cigarette nicotine dependence without complication    Tobacco abuse       Goals addressed in session: Goal 1          Reason for visit is No chief complaint on file.       Encounter provider Isabel Watts LPC      Recent Visits  Date Type Provider Dept   06/12/24 Telephone Isabel Watts LPC [facility] [place]   06/12/24 Telemedicine Isabel Watts LPC [facility] [place]   06/07/24 Telemedicine Isabel Watts LPC [facility] [place]   Showing recent visits within past 7 days and meeting all other requirements  Future Appointments  No visits were found meeting these conditions.  Showing future appointments within next 150 days and meeting all other requirements       The patient was identified by name and date of birth. Marie Portillo was informed that this is a telemedicine visit and that the visit is being conducted throughthe Microsoft Teams platform. She agrees to proceed..  My office door was closed. The patient was notified the following individuals were present in the room other virtual group members.  She acknowledged consent and understanding of privacy and security of the video platform. The patient has agreed to participate and understands they can discontinue the visit at any time.    Patient is aware this is a billable service.     Subjective  Marie Portillo is a 58 y.o. female  .    Data: Marie attended the Relaxation group and was guided through tapping on trauma, diaphragmatic breathing, other Vagus nerve stimulation techniques, and  progressive muscle relaxation. Furthermore, attendees were guided through a quick body scan, imagery of Walking [place] a [place], meditation on Directional Compassion and Gratitude and Appreciation,  and ending with affirmations for self-love, interconnectedness, and forgiveness. Psychoeducation, EFT, Mindfulness-based, and Somatic approaches were used to achieve the deep state of relaxation and to maintain the serene environment for the exercise's purpose.      Assessment:  Marie was in euthymic mood and in congruence with broad affect, followed through guidance and completed the exercises well, showed willingness to continue learning and practicing self-care in weekly Relaxation Group as well as individual therapy.      Plan: Marie will practice deep breathing and other techniques based on each occasion to counter stress and will attend the next group to be able to take part in further guided exercises on chosen topics to build on the techniques learned in the beginning.    HPI     Past Medical History:   Diagnosis Date    Bilateral carpal tunnel syndrome 08/20/2019    Cardiomyopathy (HCC)     Carpal tunnel syndrome of right wrist 10/30/2019    Added automatically from request for surgery [id number]    Chlamydia     COPD (chronic obstructive pulmonary disease) (HCC)     Depression     Hypertension     Varicella        Past Surgical History:   Procedure Laterality Date    BREAST BIOPSY Left 20yrs ago benign    CERVICAL SPINE SURGERY      EGD      HERNIA REPAIR      OOPHORECTOMY Right     ND NDSC WRST SURG W/RLS TRANSVRS CARPL LIGM Right 01/21/2020    Procedure: RELEASE CARPAL TUNNEL ENDOSCOPIC;  Surgeon: Sam Nguyen MD;  [facility];  Service: Orthopedics    ND NDSC WRST SURG W/RLS TRANSVRS CARPL LIGM Left 01/28/2020    Procedure: RELEASE CARPAL TUNNEL ENDOSCOPIC;  Surgeon: Sam Nguyen MD;  [facility];  Service: Orthopedics       Current Outpatient Medications   Medication Sig Dispense  Refill    Acetaminophen 500 MG Take 2 capsules by mouth every 4 (four) hours as needed for mild pain      albuterol (PROVENTIL HFA,VENTOLIN HFA) 90 mcg/act inhaler inhale 2 puffs by mouth and INTO THE LUNGS every 6 hours IF NEEDED FOR WHEEZING. 8.5 g 2    buPROPion (WELLBUTRIN XL) 300 mg 24 hr tablet Take 1 tablet (300 mg total) by mouth daily 30 tablet 2    cholecalciferol (VITAMIN D3) 400 units tablet Take 400 Units by mouth daily      fluticasone (FLONASE) 50 mcg/act nasal spray instill 1 spray into each nostril once daily 16 g 2    guaiFENesin (MUCINEX) 600 mg 12 hr tablet Take 2 tablets (1,200 mg total) by mouth every 12 (twelve) hours 60 tablet 3    ipratropium-albuterol (DUO-NEB) 0.5-2.5 mg/3 mL nebulizer solution Take 3 mL by nebulization 4 (four) times a day 360 mL 3    lamoTRIgine (LaMICtal) 200 MG tablet Take 1 tablet (200 mg total) by mouth daily 30 tablet 2    levocetirizine (XYZAL) 5 MG tablet take 1 tablet by mouth every evening 30 tablet 4    losartan (COZAAR) 25 mg tablet Take 1 tablet (25 mg total) by mouth daily 30 tablet 5    methocarbamol (ROBAXIN) 500 mg tablet Take 500 mg by mouth 2 (two) times a day PRN (Patient not taking: Reported on 5/24/2024)      metoprolol succinate (TOPROL-XL) 25 mg 24 hr tablet Take 1 tablet (25 mg total) by mouth daily 30 tablet 2    nicotine (NICODERM CQ) 21 mg/24 hr TD 24 hr patch Place 1 patch on the skin over 24 hours every 24 hours (Patient not taking: Reported on 4/9/2024) 28 patch 0    nicotine polacrilex (COMMIT) 4 MG lozenge Apply 1 lozenge (4 mg total) to the mouth or throat as needed for smoking cessation (Patient not taking: Reported on 4/9/2024) 100 each 0    omeprazole (PriLOSEC) 20 mg delayed release capsule take 1 capsule by mouth twice a day 60 capsule 5    senna-docusate sodium (SENOKOT-S) 8.6-50 mg per tablet Take 3 tablets by mouth as needed        Stiolto Respimat 2.5-2.5 MCG/ACT inhaler inhale 2 puffs by mouth and INTO THE LUNGS once daily 4 g  11     No current facility-administered medications for this visit.        No Known Allergies    Review of Systems    Video Exam    There were no vitals filed for this visit.    Physical Exam     06/14/24  Start Time: 0405  Stop Time: 0500  Total Visit Time: 55 minutes

## 2024-06-19 ENCOUNTER — TELEMEDICINE (OUTPATIENT)
Dept: BEHAVIORAL/MENTAL HEALTH CLINIC | Facility: CLINIC | Age: 59
End: 2024-06-19
Payer: COMMERCIAL

## 2024-06-19 DIAGNOSIS — F12.90 MARIJUANA USE: Chronic | ICD-10-CM

## 2024-06-19 DIAGNOSIS — F10.11 ALCOHOL ABUSE, IN REMISSION: ICD-10-CM

## 2024-06-19 DIAGNOSIS — F17.210 CIGARETTE NICOTINE DEPENDENCE WITHOUT COMPLICATION: ICD-10-CM

## 2024-06-19 DIAGNOSIS — Z72.0 TOBACCO ABUSE: ICD-10-CM

## 2024-06-19 DIAGNOSIS — F31.81 MODERATE MIXED BIPOLAR II DISORDER (HCC): Primary | ICD-10-CM

## 2024-06-19 DIAGNOSIS — F41.1 GENERALIZED ANXIETY DISORDER: ICD-10-CM

## 2024-06-19 DIAGNOSIS — F43.10 PTSD (POST-TRAUMATIC STRESS DISORDER): ICD-10-CM

## 2024-06-19 PROCEDURE — 90834 PSYTX W PT 45 MINUTES: CPT | Performed by: COUNSELOR

## 2024-06-19 NOTE — PSYCH
Virtual Regular Visit    Verification of patient location:    Patient is located at Home in the following state in which I hold an active license [place]      Assessment/Plan:    Problem List Items Addressed This Visit       Moderate mixed bipolar II disorder (HCC) - Primary    Generalized anxiety disorder    PTSD (post-traumatic stress disorder)    Alcohol abuse, in remission    Marijuana use (Chronic)    Cigarette nicotine dependence without complication    Tobacco abuse       Goals addressed in session: Goal 1          Reason for visit is No chief complaint on file.       Encounter provider Isabel Watts LPC      Recent Visits  Date Type Provider Dept   06/14/24 Telemedicine Isabel Watts LPC [facility]rapist [place]   06/12/24 Telephone Isabel Watts LPC [facility] [place]   06/12/24 Telemedicine Isabel Watts LPC [facility]rap[facility] [place]   Showing recent visits within past 7 days and meeting all other requirements  Future Appointments  No visits were found meeting these conditions.  Showing future appointments within next 150 days and meeting all other requirements       The patient was identified by name and date of birth. Marie Portillo was informed that this is a telemedicine visit and that the visit is being conducted throughthe Epic Embedded platform. She agrees to proceed..  My office door was closed. No one else was in the room.  She acknowledged consent and understanding of privacy and security of the video platform. The patient has agreed to participate and understands they can discontinue the visit at any time.    Patient is aware this is a billable service.     Subjective  Marie Portillo is a 58 y.o. female  .      HPI     Past Medical History:   Diagnosis Date    Bilateral carpal tunnel syndrome 08/20/2019    Cardiomyopathy (HCC)     Carpal tunnel syndrome of right wrist 10/30/2019    Added automatically from request for surgery [id number]     Chlamydia     COPD (chronic obstructive pulmonary disease) (HCC)     Depression     Hypertension     Varicella        Past Surgical History:   Procedure Laterality Date    BREAST BIOPSY Left 20yrs ago benign    CERVICAL SPINE SURGERY      EGD      HERNIA REPAIR      OOPHORECTOMY Right     [facility] WRST SURG W/RLS TRANSVRS CARPL LIGM Right 01/21/2020    Procedure: RELEASE CARPAL TUNNEL ENDOSCOPIC;  Surgeon: Sam Nguyen MD;  Location: [facility];  Service: Orthopedics    [facility] WRST SURG W/RLS TRANSVRS CARPL LIGM Left 01/28/2020    Procedure: RELEASE CARPAL TUNNEL ENDOSCOPIC;  Surgeon: Sam Nguyen MD;  Location: [facility];  Service: Orthopedics       Current Outpatient Medications   Medication Sig Dispense Refill    Acetaminophen 500 MG Take 2 capsules by mouth every 4 (four) hours as needed for mild pain      albuterol (PROVENTIL HFA,VENTOLIN HFA) 90 mcg/act inhaler inhale 2 puffs by mouth and INTO THE LUNGS every 6 hours IF NEEDED FOR WHEEZING. 8.5 g 2    buPROPion (WELLBUTRIN XL) 300 mg 24 hr tablet Take 1 tablet (300 mg total) by mouth daily 30 tablet 2    cholecalciferol (VITAMIN D3) 400 units tablet Take 400 Units by mouth daily      fluticasone (FLONASE) 50 mcg/act nasal spray instill 1 spray into each nostril once daily 16 g 2    guaiFENesin (MUCINEX) 600 mg 12 hr tablet Take 2 tablets (1,200 mg total) by mouth every 12 (twelve) hours 60 tablet 3    ipratropium-albuterol (DUO-NEB) 0.5-2.5 mg/3 mL nebulizer solution Take 3 mL by nebulization 4 (four) times a day 360 mL 3    lamoTRIgine (LaMICtal) 200 MG tablet Take 1 tablet (200 mg total) by mouth daily 30 tablet 2    levocetirizine (XYZAL) 5 MG tablet take 1 tablet by mouth every evening 30 tablet 4    losartan (COZAAR) 25 mg tablet Take 1 tablet (25 mg total) by mouth daily 30 tablet 5    methocarbamol (ROBAXIN) 500 mg tablet Take 500 mg by mouth 2 (two) times a day PRN (Patient not taking: Reported on 5/24/2024)      metoprolol succinate  (TOPROL-XL) 25 mg 24 hr tablet Take 1 tablet (25 mg total) by mouth daily 30 tablet 2    nicotine (NICODERM CQ) 21 mg/24 hr TD 24 hr patch Place 1 patch on the skin over 24 hours every 24 hours (Patient not taking: Reported on 4/9/2024) 28 patch 0    nicotine polacrilex (COMMIT) 4 MG lozenge Apply 1 lozenge (4 mg total) to the mouth or throat as needed for smoking cessation (Patient not taking: Reported on 4/9/2024) 100 each 0    omeprazole (PriLOSEC) 20 mg delayed release capsule take 1 capsule by mouth twice a day 60 capsule 5    senna-docusate sodium (SENOKOT-S) 8.6-50 mg per tablet Take 3 tablets by mouth as needed        Stiolto Respimat 2.5-2.5 MCG/ACT inhaler inhale 2 puffs by mouth and INTO THE LUNGS once daily 4 g 11     No current facility-administered medications for this visit.        No Known Allergies    Review of Systems    Video Exam    There were no vitals filed for this visit.    Physical Exam

## 2024-06-19 NOTE — PSYCH
"Behavioral Health Psychotherapy Progress Note    Psychotherapy Provided: Individual Psychotherapy     1. Moderate mixed bipolar II disorder (HCC)        2. PTSD (post-traumatic stress disorder)        3. Generalized anxiety disorder        4. Tobacco abuse        5. Cigarette nicotine dependence without complication        6. Marijuana use        7. Alcohol abuse, in remission            Goals addressed in session: Goal 1 and Goal 2     DATA: Marie discussed some relationships in her life that bring her meaningful experiences and shared that she appreciates very much and wants to remember. Marie was encouraged to talk about taking notes and keeping up with cognitive struggle as well as cognitive exercises that she could use to compensate the decline up to an extent.   During this session, this clinician used the following therapeutic modalities: Client-centered Therapy, Dialectical Behavior Therapy, Mindfulness-based Strategies, and Supportive Psychotherapy    Substance Abuse was not addressed during this session. If the client is diagnosed with a co-occurring substance use disorder, please indicate any changes in the frequency or amount of use: NA. Stage of change for addressing substance use diagnoses: Pre-contemplation    ASSESSMENT:  Marie Portillo presents with a Euthymic/ normal and Depressed mood.     her affect is Normal range and intensity and Constricted, which is congruent, with her mood and the content of the session. The client has made progress on their goals.    Marie seemed very engaged and expressive. She did very well in identifying her further areas of work and her strengths. Marie Portillo presents with a minimal risk of suicide, low risk of self-harm, and none risk of harm to others.    For any risk assessment that surpasses a \"low\" rating, a safety plan must be developed.    A safety plan was indicated: no  If yes, describe in detail NA    PLAN: Between sessions, Marie Portillo will attend the weekly " Relaxation group for self-care and decompressing in the end of the week. At the next session, the therapist will use Client-centered Therapy, Dialectical Behavior Therapy, Mindfulness-based Strategies, and Supportive Psychotherapy and Somatic Experiencing to address processing trauma.    Behavioral Health Treatment Plan and Discharge Planning: Marie Portillo is aware of and agrees to continue to work on their treatment plan. They have identified and are working toward their discharge goals. yes    Visit start and stop times:    06/19/24  Start Time: 1258  Stop Time: 1341  Total Visit Time: 43 minutes

## 2024-06-21 ENCOUNTER — TELEMEDICINE (OUTPATIENT)
Dept: BEHAVIORAL/MENTAL HEALTH CLINIC | Facility: CLINIC | Age: 59
End: 2024-06-21
Payer: COMMERCIAL

## 2024-06-21 DIAGNOSIS — F10.11 ALCOHOL ABUSE, IN REMISSION: ICD-10-CM

## 2024-06-21 DIAGNOSIS — F43.10 PTSD (POST-TRAUMATIC STRESS DISORDER): ICD-10-CM

## 2024-06-21 DIAGNOSIS — F31.81 MODERATE MIXED BIPOLAR II DISORDER (HCC): Primary | ICD-10-CM

## 2024-06-21 DIAGNOSIS — F12.90 MARIJUANA USE: Chronic | ICD-10-CM

## 2024-06-21 DIAGNOSIS — Z72.0 TOBACCO ABUSE: ICD-10-CM

## 2024-06-21 DIAGNOSIS — F41.1 GENERALIZED ANXIETY DISORDER: ICD-10-CM

## 2024-06-21 DIAGNOSIS — F17.210 CIGARETTE NICOTINE DEPENDENCE WITHOUT COMPLICATION: ICD-10-CM

## 2024-06-21 PROCEDURE — 90853 GROUP PSYCHOTHERAPY: CPT | Performed by: COUNSELOR

## 2024-06-21 NOTE — PSYCH
Virtual Regular Visit    Verification of patient location:    Patient is located at Home in the following state in which I hold an active license [place]      Assessment/Plan:    Problem List Items Addressed This Visit       Moderate mixed bipolar II disorder (HCC) - Primary    Generalized anxiety disorder    PTSD (post-traumatic stress disorder)    Alcohol abuse, in remission    Marijuana use (Chronic)    Cigarette nicotine dependence without complication    Tobacco abuse       Goals addressed in session: Goal 1 and Goal 2          Reason for visit is No chief complaint on file.       Encounter provider Isabel Watts LPC      Recent Visits  Date Type Provider Dept   06/19/24 Telemedicine Isabel Watts LPC [facility]rap[facility] [place]   06/14/24 Telemedicine Isabel Watts LPC [facility] Therapist [place]   Showing recent visits within past 7 days and meeting all other requirements  Future Appointments  No visits were found meeting these conditions.  Showing future appointments within next 150 days and meeting all other requirements       The patient was identified by name and date of birth. Marie Portillo was informed that this is a telemedicine visit and that the visit is being conducted throughthe [employer] Teams platform. She agrees to proceed..  My office door was closed. The patient was notified the following individuals were present in the room other virtual group attendees.  She acknowledged consent and understanding of privacy and security of the video platform. The patient has agreed to participate and understands they can discontinue the visit at any time.    Patient is aware this is a billable service.     Subjective  Marie Portillo is a 58 y.o. female  .    Data: Marie attended the Relaxation group and was guided through tapping on trauma, diaphragmatic breathing, other Vagus nerve stimulation techniques, and progressive muscle relaxation. Furthermore, attendees were guided  through a quick body scan, Meditation for Awareness of Thoughts and Feelings, Power of Acceptance, imagery for Healing the Inner Child and ad [place], and ending with affirmations for self-forgiveness, appreciation for small things in life, releasing old habits, and welcoming self-love. Psychoeducation, EFT, Mindfulness-based, and Somatic approaches were used to achieve the deep state of relaxation and to maintain the serene environment for the exercise's purpose.      Assessment: Marie was in euthymic mood and in congruence with broad affect, followed through guidance and completed the exercises well, showed willingness to continue learning and practicing self-care in weekly Relaxation Group as well as individual therapy.      Plan: Marie will practice deep breathing and other techniques based on each occasion to counter stress and will attend the next group to be able to take part in further guided exercises on chosen topics to build on the techniques learned in the beginning.    HPI     Past Medical History:   Diagnosis Date    Bilateral carpal tunnel syndrome 08/20/2019    Cardiomyopathy (HCC)     Carpal tunnel syndrome of right wrist 10/30/2019    Added automatically from request for surgery [id number]    Chlamydia     COPD (chronic obstructive pulmonary disease) (HCC)     Depression     Hypertension     Varicella        Past Surgical History:   Procedure Laterality Date    BREAST BIOPSY Left 20yrs ago benign    CERVICAL SPINE SURGERY      EGD      HERNIA REPAIR      OOPHORECTOMY Right     [facility] WRST SURG W/RLS TRANSVRS CARPL LIGM Right 01/21/2020    Procedure: RELEASE CARPAL TUNNEL ENDOSCOPIC;  Surgeon: Sam Nguyen MD;  [facility];  Service: Orthopedics    [facility] WRST SURG W/RLS TRANSVRS CARPL LIGM Left 01/28/2020    Procedure: RELEASE CARPAL TUNNEL ENDOSCOPIC;  Surgeon: Sam Nguyen MD;  Location[facility];  Service: Orthopedics       Current Outpatient Medications    Medication Sig Dispense Refill    Acetaminophen 500 MG Take 2 capsules by mouth every 4 (four) hours as needed for mild pain      albuterol (PROVENTIL HFA,VENTOLIN HFA) 90 mcg/act inhaler inhale 2 puffs by mouth and INTO THE LUNGS every 6 hours IF NEEDED FOR WHEEZING. 8.5 g 2    buPROPion (WELLBUTRIN XL) 300 mg 24 hr tablet Take 1 tablet (300 mg total) by mouth daily 30 tablet 2    cholecalciferol (VITAMIN D3) 400 units tablet Take 400 Units by mouth daily      fluticasone (FLONASE) 50 mcg/act nasal spray instill 1 spray into each nostril once daily 16 g 2    guaiFENesin (MUCINEX) 600 mg 12 hr tablet Take 2 tablets (1,200 mg total) by mouth every 12 (twelve) hours 60 tablet 3    ipratropium-albuterol (DUO-NEB) 0.5-2.5 mg/3 mL nebulizer solution Take 3 mL by nebulization 4 (four) times a day 360 mL 3    lamoTRIgine (LaMICtal) 200 MG tablet Take 1 tablet (200 mg total) by mouth daily 30 tablet 2    levocetirizine (XYZAL) 5 MG tablet take 1 tablet by mouth every evening 30 tablet 4    losartan (COZAAR) 25 mg tablet Take 1 tablet (25 mg total) by mouth daily 30 tablet 5    methocarbamol (ROBAXIN) 500 mg tablet Take 500 mg by mouth 2 (two) times a day PRN (Patient not taking: Reported on 5/24/2024)      metoprolol succinate (TOPROL-XL) 25 mg 24 hr tablet Take 1 tablet (25 mg total) by mouth daily 30 tablet 2    nicotine (NICODERM CQ) 21 mg/24 hr TD 24 hr patch Place 1 patch on the skin over 24 hours every 24 hours (Patient not taking: Reported on 4/9/2024) 28 patch 0    nicotine polacrilex (COMMIT) 4 MG lozenge Apply 1 lozenge (4 mg total) to the mouth or throat as needed for smoking cessation (Patient not taking: Reported on 4/9/2024) 100 each 0    omeprazole (PriLOSEC) 20 mg delayed release capsule take 1 capsule by mouth twice a day 60 capsule 5    senna-docusate sodium (SENOKOT-S) 8.6-50 mg per tablet Take 3 tablets by mouth as needed        Stiolto Respimat 2.5-2.5 MCG/ACT inhaler inhale 2 puffs by mouth and INTO  THE LUNGS once daily 4 g 11     No current facility-administered medications for this visit.        No Known Allergies    Review of Systems    Video Exam    There were no vitals filed for this visit.    Physical Exam     06/21/24  Start Time: 0400  Stop Time: 0500  Total Visit Time: 60 minutes

## 2024-06-25 ENCOUNTER — OFFICE VISIT (OUTPATIENT)
Dept: CARDIOLOGY CLINIC | Facility: CLINIC | Age: 59
End: 2024-06-25
Payer: COMMERCIAL

## 2024-06-25 VITALS
HEIGHT: 60 IN | DIASTOLIC BLOOD PRESSURE: 76 MMHG | HEART RATE: 93 BPM | OXYGEN SATURATION: 98 % | WEIGHT: 107.6 LBS | SYSTOLIC BLOOD PRESSURE: 116 MMHG | BODY MASS INDEX: 21.13 KG/M2

## 2024-06-25 DIAGNOSIS — I50.20 SYSTOLIC HEART FAILURE, UNSPECIFIED HF CHRONICITY (HCC): Primary | ICD-10-CM

## 2024-06-25 DIAGNOSIS — I50.22 HEART FAILURE WITH MILDLY REDUCED EJECTION FRACTION (HFMREF) (HCC): Chronic | ICD-10-CM

## 2024-06-25 DIAGNOSIS — Z72.0 TOBACCO ABUSE: ICD-10-CM

## 2024-06-25 DIAGNOSIS — I10 ESSENTIAL HYPERTENSION: ICD-10-CM

## 2024-06-25 PROCEDURE — 99214 OFFICE O/P EST MOD 30 MIN: CPT | Performed by: PHYSICIAN ASSISTANT

## 2024-06-25 NOTE — PATIENT INSTRUCTIONS
Please weigh yourself every day (after emptying your bladder) and keep a detailed log of weights.   Contact the Heart Failure program at [phone] if you gain 3+ lbs overnight or 5+ lbs in 5-7 days.  Limit daily sodium/salt intake to 2000 mg daily to prevent fluid retention.  Avoid canned foods, fast food/Chinese food, and processed meats (hot dogs, lunch meat, and sausage etc.). Caution with condiments.  Limit fluid intake to 2000 mL or 2 liters (about 60-65 ounces) daily.  Avoid electrolyte replacement drinks (such as Gatorade, Pedialyte, Propel, Liquid IV, etc.).  Bring complete list of medications and log of daily weights to your follow-up appointment.    Schedule your echocardiogram.  Talk with your PCP about your thyroid studies.

## 2024-06-25 NOTE — PROGRESS NOTES
General Cardiology Outpatient Visit    Marie Portillo 58 y.o. female   MRN: [id number]  Encounter: [id number]    Assessment:  Patient Active Problem List    Diagnosis Date Noted    Heart failure with mildly reduced ejection fraction (HFmrEF) ([facility]) 03/25/2024    Cervical stenosis of spinal canal 12/20/2023    Chiari malformation type I (HCC) 12/20/2023    Simple chronic bronchitis (HCC) 10/25/2023    Tobacco abuse 10/25/2023    HPV in female 07/13/2022    COPD with acute exacerbation (HCC) 03/18/2022    PND (post-nasal drip) 03/18/2022    Centrilobular emphysema (HCC) 12/17/2021    Cigarette nicotine dependence without complication 12/17/2021    Pulmonary nodules 12/17/2021    Gastroesophageal reflux disease without esophagitis 08/18/2020    Essential hypertension 04/16/2020    Other hyperlipidemia 04/16/2020    Generalized anxiety disorder 03/13/2020    PTSD (post-traumatic stress disorder) 03/13/2020    Alcohol abuse, in remission 03/13/2020    Marijuana use 03/13/2020    Moderate mixed bipolar II disorder ([facility]) 08/20/2019    Status post left breast lumpectomy 08/20/2019       Today's Plan:  Warm, euvolemic. No need for diuretic at this time.   Nuclear stress test negative for ischemia.  Repeat echo ordered today.   Recommended patient follow up with PCP regarding thyroid studies.  RTC 3 weeks.  2 g sodium restriction, 2 L fluid restriction, daily weights.      Plan:  HFmrEF; LVEF 40-45%  Etiology: nonischemic.  Longstanding history of hypertension.   TTE 3/25/2024: LVEF 40-45%.  Mild to moderate global hypokinesis.  Grade 1 DD.  LA mildly dilated.  Mild MR.    NM stress 5/21/24:    Stress ECG: Arrhythmias during recovery: rare PVCs. The ECG was not diagnostic due to submaximal stress.    Perfusion: There are no perfusion defects.    Stress Function: Stress ejection fraction is 55%.    Stress Combined Conclusion: Left ventricular perfusion is normal.    Pharmacotherapies / Neurohormonal Blockade:  --Beta Blocker:  metoprolol succinate 25 mg daily  --ARNi / ACEi / ARB: losartan 25 mg daily  --Aldosterone Antagonist: No  --SGLT2 Inhibitor: price check   --Diuretic: No    Sudden Cardiac Death Risk Reduction:  --ICD: LVEF 40-45%.    HTN  Has not previously been on antihypertensives  Tobacco abuse  COPD  Carpal tunnel syndrome, bilateral   Bipolar disorder  Chiari malformation  Marijuana use  History of heavy alcohol use     HPI:   Marie Portillo is a 58-year-old female with a PMH as above who presents for follow up. Follows with Dr. Rousseau.   Per LM: 58 year old female with PMH of Lower extremity edema, Tobacco use and IFG, palpitations who presented for a check up. She has not had any other lower extremity edema since last visit. She denies chest pain, no orthopnea, PND, lightheadedness, syncope, presyncope.   She has COPD and is short of breath  all the time. She is pretty active and walks daily. She still feels occasional palpitations. She is seeing a psychiatrist now and is on medication and she states that this is better now. Still smoking due to stress. About 2 weeks ago she had a microdiscectomy, interbody fusion due to cervical spinal stenosis.      She states from a cardiac standpoint she is feeling well.      She initially presented to our office in 2020 due to palpitations.  2 week zio from 2020 demonstrated a min HR of 67 bpm, max HR of 130 bpm, and avg HR of 99  bpm. Predominant underlying rhythm was Sinus Rhythm. Isolated SVEs were rare (<1.0%), and no SVE Couplets or SVE Triplets were present.  Isolated VEs were rare (<1.0%), VE Couplets were rare (<1.0%), and no VE Triplets were present.    Went for echo, which revealed LVEF of 40 to 45%, reduced from prior.  Instructed to schedule appointment.    4/9/24: Presents today for follow-up. Has been feeling somewhat tired and is short of breath at baseline, no change. No LE swelling. No chest pain. Occasionally lightheaded when getting up. Has had a couple episodes of  spinning dizziness since January, but thinks this may be related to her Chiari malformation.     Father has heart failure.  Patient has remote history of occasional drug use years ago as well as remote alcohol use. Still smokes tobacco and uses marijuana.  No recent history of viral infections or travel outside the country.  She denies any recent anginal symptoms.    05/24/2024 with MJ: Patient presents with her mother for follow-up. Feeling okay today. Continues with STEPHEN, at her baseline. Since last visit has had few episodes of SOB at rest occurring at random; unsure if these are anxiety-induced. Occasional palpitations/racing heart. Denies LH, CP, LE swelling, PND, and orthopnea. Reviewed results of recent labs and nuclear stress test.      6/25/24: Presents today for follow up. Feeling well, some fatigue.  Has noticed occasional episodes of anxiety, but these are intermittent.  Denies shortness of breath, STEPHEN, LE swelling, PND, orthopnea, chest pain.  Plans to follow-up with her PCP about her thyroid studies.  Reviewed sodium and fluid restrictions, as well as daily weights.      Past Medical History:   Diagnosis Date    Bilateral carpal tunnel syndrome 08/20/2019    Cardiomyopathy (HCC)     Carpal tunnel syndrome of right wrist 10/30/2019    Added automatically from request for surgery [id number]    Chlamydia     COPD (chronic obstructive pulmonary disease) (HCC)     Depression     Hypertension     Varicella      Review of Systems   Constitutional:  Positive for fatigue. Negative for chills and fever.   HENT:  Negative for ear pain and sore throat.    Eyes:  Negative for pain and visual disturbance.   Respiratory:  Negative for cough and shortness of breath.    Cardiovascular:  Negative for chest pain, palpitations and leg swelling.   Gastrointestinal:  Negative for abdominal pain and vomiting.   Genitourinary:  Negative for dysuria and hematuria.   Musculoskeletal:  Negative for arthralgias and back pain.   Skin:   Negative for color change and rash.   Neurological:  Negative for seizures and syncope.   All other systems reviewed and are negative.  14-point ROS completed and negative except as stated above and/or in the HPI.      No Known Allergies    Current Outpatient Medications:     Acetaminophen 500 MG, Take 2 capsules by mouth every 4 (four) hours as needed for mild pain, Disp: , Rfl:     albuterol (PROVENTIL HFA,VENTOLIN HFA) 90 mcg/act inhaler, inhale 2 puffs by mouth and INTO THE LUNGS every 6 hours IF NEEDED FOR WHEEZING., Disp: 8.5 g, Rfl: 2    buPROPion (WELLBUTRIN XL) 300 mg 24 hr tablet, Take 1 tablet (300 mg total) by mouth daily, Disp: 30 tablet, Rfl: 2    cholecalciferol (VITAMIN D3) 400 units tablet, Take 400 Units by mouth daily, Disp: , Rfl:     fluticasone (FLONASE) 50 mcg/act nasal spray, instill 1 spray into each nostril once daily, Disp: 16 g, Rfl: 2    guaiFENesin (MUCINEX) 600 mg 12 hr tablet, Take 2 tablets (1,200 mg total) by mouth every 12 (twelve) hours (Patient taking differently: Take 1,200 mg by mouth every 12 (twelve) hours PRN), Disp: 60 tablet, Rfl: 3    ipratropium-albuterol (DUO-NEB) 0.5-2.5 mg/3 mL nebulizer solution, Take 3 mL by nebulization 4 (four) times a day, Disp: 360 mL, Rfl: 3    lamoTRIgine (LaMICtal) 200 MG tablet, Take 1 tablet (200 mg total) by mouth daily, Disp: 30 tablet, Rfl: 2    levocetirizine (XYZAL) 5 MG tablet, take 1 tablet by mouth every evening, Disp: 30 tablet, Rfl: 4    losartan (COZAAR) 25 mg tablet, Take 1 tablet (25 mg total) by mouth daily, Disp: 30 tablet, Rfl: 5    methocarbamol (ROBAXIN) 500 mg tablet, Take 500 mg by mouth 2 (two) times a day PRN, Disp: , Rfl:     metoprolol succinate (TOPROL-XL) 25 mg 24 hr tablet, Take 1 tablet (25 mg total) by mouth daily, Disp: 30 tablet, Rfl: 2    nicotine (NICODERM CQ) 21 mg/24 hr TD 24 hr patch, Place 1 patch on the skin over 24 hours every 24 hours, Disp: 28 patch, Rfl: 0    nicotine polacrilex (COMMIT) 4 MG  "lozenge, Apply 1 lozenge (4 mg total) to the mouth or throat as needed for smoking cessation, Disp: 100 each, Rfl: 0    omeprazole (PriLOSEC) 20 mg delayed release capsule, take 1 capsule by mouth twice a day, Disp: 60 capsule, Rfl: 5    senna-docusate sodium (SENOKOT-S) 8.6-50 mg per tablet, Take 3 tablets by mouth as needed  , Disp: , Rfl:     Stiolto Respimat 2.5-2.5 MCG/ACT inhaler, inhale 2 puffs by mouth and INTO THE LUNGS once daily, Disp: 4 g, Rfl: 11    Social History     Socioeconomic History    Marital status:      Spouse name: Not on file    Number of children: 2    Years of education: Not on file    Highest education level: Not on file   Occupational History    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  She quit on her own without any h/o withdrawal or rehab.    Drug use: Yes     Types: Marijuana     Comment: Smokes THC several times a day.  Also uses CBD OIL at times.   Used cocaine and methamphetamine  fairly steadily for a period of approx 1 year approx 36 years ago.  Tried LSD, Mushrooms.  No h/o rehab    Sexual activity: Yes     Partners: Male   Other Topics Concern    Not on file   Social History Narrative    Home: lives with 2 best friends.         Education:    Pt states she always got Ds and Fs in school and skipped classes often, but there were no formally diagnosed learning disabilities.  Pt was in alternative schooling.  She learned \"Nothing\" there and they didn't teach much.  Pt was then placed back in regular " classes but could not keep up.  Pt reached childhood milestones on time as far as she knows.     Highest grade completed 11th grade    Had some training through [employer] but no actual certificate     Social Determinants of Health     Financial Resource Strain: Low Risk  (1/5/2024)    [facility]    Overall Financial Resource Strain (CARDIA)     Difficulty of Paying Living Expenses: Not hard at all   Food Insecurity: No Food Insecurity (1/5/2024)    [facility]    Hunger Vital Sign     Worried About Running Out of Food in the Last Year: Never true     Ran Out of Food in the Last Year: Never true   Transportation Needs: No Transportation Needs (1/5/2024)    [facility]    PRAPARE - Transportation     Lack of Transportation (Medical): No     Lack of Transportation (Non-Medical): No   Physical Activity: Not on file   Stress: Not on file   Social Connections: Not on file   Intimate Partner Violence: Not At Risk (1/5/2024)    [facility]    Humiliation, Afraid, Rape, and Kick questionnaire     Fear of Current or Ex-Partner: No     Emotionally Abused: No     Physically Abused: No     Sexually Abused: No   Housing Stability: Low Risk  (1/5/2024)    [facility]    Housing Stability Vital Sign     Unable to Pay for Housing in the Last Year: No     Number of Places Lived in the Last Year: 1     Unstable Housing in the Last Year: No     Family History   Problem Relation Age of Onset    Alcohol abuse Mother     Eating disorder Mother         anorexia and bulimia per Pt    Hodgkin's lymphoma Father     Cancer Father         3rd time lymphoma, prostate, lung    Heart failure Father     No Known Problems Sister     Colon cancer Maternal  Grandmother     Alcohol abuse Maternal Grandfather     Colon cancer Paternal Grandmother     No Known Problems Daughter     Breast cancer Maternal Aunt     No Known Problems Maternal Aunt     No Known Problems Maternal Aunt     Colon cancer Maternal Uncle     Anxiety disorder Paternal Aunt     No Known Problems Paternal Aunt     Multiple sclerosis Cousin        Vitals:  Blood pressure 116/76, pulse 93, height 5' (1.524 m), weight 48.8 kg (107 lb 9.6 oz), SpO2 98%.  Body mass index is 21.01 kg/m².  Wt Readings from Last 10 Encounters:   06/25/24 48.8 kg (107 lb 9.6 oz)   05/24/24 47.9 kg (105 lb 9.6 oz)   05/21/24 48.1 kg (106 lb)   04/09/24 48.1 kg (106 lb)   03/25/24 50.3 kg (111 lb)   02/20/24 50.3 kg (111 lb)   01/18/24 50.3 kg (111 lb)   01/02/24 49.7 kg (109 lb 9.6 oz)   10/25/23 48.4 kg (106 lb 12.8 oz)   10/19/23 48.9 kg (107 lb 12.8 oz)     Vitals:    06/25/24 1328   BP: 116/76   BP Location: Left arm   Patient Position: Sitting   Cuff Size: Standard   Pulse: 93   SpO2: 98%   Weight: 48.8 kg (107 lb 9.6 oz)   Height: 5' (1.524 m)         Physical Exam  Constitutional:       Appearance: Normal appearance.   HENT:      Head: Normocephalic.      Nose: Nose normal.      Mouth/Throat:      Mouth: Mucous membranes are moist.   Eyes:      Conjunctiva/sclera: Conjunctivae normal.   Neck:      Vascular: No JVD.   Cardiovascular:      Rate and Rhythm: Normal rate and regular rhythm.   Pulmonary:      Effort: Pulmonary effort is normal.      Breath sounds: Normal breath sounds.   Musculoskeletal:      Cervical back: Neck supple.      Right lower leg: No edema.      Left lower leg: No edema.   Skin:     General: Skin is warm and dry.   Neurological:      General: No focal deficit present.      Mental Status: She is alert and oriented to person, place, and time.   Psychiatric:         Mood and Affect: Mood normal.         Behavior: Behavior normal.         Labs & Results:  Lab Results   Component Value Date    WBC 7.64  "03/13/2023    HGB 14.7 03/13/2023    HCT 44.8 03/13/2023    MCV 92 03/13/2023     03/13/2023     Lab Results   Component Value Date    SODIUM 141 05/21/2024    K 4.0 05/21/2024     05/21/2024    CO2 27 05/21/2024    BUN 15 05/21/2024    CREATININE 0.69 05/21/2024    GLUC 105 (H) 12/28/2023    CALCIUM 9.7 05/21/2024     Lab Results   Component Value Date    INR 1.0 12/28/2023     No results found for: \"BNP\"   Lab Results   Component Value Date    NTBNP 107 04/29/2022      Thank you for the opportunity to participate in the care of this patient.    Kalyn Gonzalez PA-C   "

## 2024-06-26 ENCOUNTER — OFFICE VISIT (OUTPATIENT)
Dept: OBGYN CLINIC | Facility: CLINIC | Age: 59
End: 2024-06-26
Payer: COMMERCIAL

## 2024-06-26 ENCOUNTER — TELEPHONE (OUTPATIENT)
Dept: PSYCHIATRY | Facility: CLINIC | Age: 59
End: 2024-06-26

## 2024-06-26 ENCOUNTER — TELEMEDICINE (OUTPATIENT)
Dept: BEHAVIORAL/MENTAL HEALTH CLINIC | Facility: CLINIC | Age: 59
End: 2024-06-26
Payer: COMMERCIAL

## 2024-06-26 VITALS
DIASTOLIC BLOOD PRESSURE: 90 MMHG | SYSTOLIC BLOOD PRESSURE: 146 MMHG | TEMPERATURE: 97.4 F | BODY MASS INDEX: 21.05 KG/M2 | HEIGHT: 60 IN | WEIGHT: 107.2 LBS | HEART RATE: 87 BPM

## 2024-06-26 DIAGNOSIS — F31.81 MODERATE MIXED BIPOLAR II DISORDER (HCC): Primary | ICD-10-CM

## 2024-06-26 DIAGNOSIS — M25.561 CHRONIC PAIN OF RIGHT KNEE: Primary | ICD-10-CM

## 2024-06-26 DIAGNOSIS — G89.29 CHRONIC PAIN OF LEFT KNEE: ICD-10-CM

## 2024-06-26 DIAGNOSIS — F12.90 MARIJUANA USE: Chronic | ICD-10-CM

## 2024-06-26 DIAGNOSIS — Z72.0 TOBACCO ABUSE: ICD-10-CM

## 2024-06-26 DIAGNOSIS — F43.10 PTSD (POST-TRAUMATIC STRESS DISORDER): ICD-10-CM

## 2024-06-26 DIAGNOSIS — M25.562 CHRONIC PAIN OF LEFT KNEE: ICD-10-CM

## 2024-06-26 DIAGNOSIS — F41.1 GENERALIZED ANXIETY DISORDER: ICD-10-CM

## 2024-06-26 DIAGNOSIS — M17.0 PRIMARY OSTEOARTHRITIS OF BOTH KNEES: ICD-10-CM

## 2024-06-26 DIAGNOSIS — G89.29 CHRONIC PAIN OF RIGHT KNEE: Primary | ICD-10-CM

## 2024-06-26 DIAGNOSIS — F10.11 ALCOHOL ABUSE, IN REMISSION: ICD-10-CM

## 2024-06-26 DIAGNOSIS — F17.210 CIGARETTE NICOTINE DEPENDENCE WITHOUT COMPLICATION: ICD-10-CM

## 2024-06-26 PROCEDURE — 20610 DRAIN/INJ JOINT/BURSA W/O US: CPT | Performed by: FAMILY MEDICINE

## 2024-06-26 PROCEDURE — 90832 PSYTX W PT 30 MINUTES: CPT | Performed by: COUNSELOR

## 2024-06-26 PROCEDURE — 99214 OFFICE O/P EST MOD 30 MIN: CPT | Performed by: FAMILY MEDICINE

## 2024-06-26 RX ORDER — ROPIVACAINE HYDROCHLORIDE 5 MG/ML
10 INJECTION, SOLUTION EPIDURAL; INFILTRATION; PERINEURAL
Status: COMPLETED | OUTPATIENT
Start: 2024-06-26 | End: 2024-06-26

## 2024-06-26 RX ORDER — TRIAMCINOLONE ACETONIDE 40 MG/ML
40 INJECTION, SUSPENSION INTRA-ARTICULAR; INTRAMUSCULAR
Status: COMPLETED | OUTPATIENT
Start: 2024-06-26 | End: 2024-06-26

## 2024-06-26 RX ADMIN — TRIAMCINOLONE ACETONIDE 40 MG: 40 INJECTION, SUSPENSION INTRA-ARTICULAR; INTRAMUSCULAR at 15:30

## 2024-06-26 RX ADMIN — ROPIVACAINE HYDROCHLORIDE 10 ML: 5 INJECTION, SOLUTION EPIDURAL; INFILTRATION; PERINEURAL at 15:30

## 2024-06-26 NOTE — PROGRESS NOTES
[facility] [address] [place]  [facility] [id number] [phone]      Assessment:  1. Chronic pain of right knee  -     XR knee 3 vw right non injury; Future; Expected date: 06/26/2024  2. Chronic pain of left knee  -     XR knee 3 vw left non injury; Future; Expected date: 06/26/2024      Plan:  There are no Patient Instructions on file for this visit.   No follow-ups on file.    Chief Complaint:  Chief Complaint   Patient presents with    Left Knee - Pain    Right Knee - Pain       Subjective:   HPI    Patient ID: Marie Portillo is a 58 y.o. female     Here for f/u B knee pain  She has fallen a few times on her knee due to balance issues.  Injection 6 months ago - helped  Sharp pain  No swelling/locking  Giving out  Tylenol PRN- helps    Review of Systems   Constitutional:  Negative for fatigue and fever.   Respiratory:  Negative for shortness of breath.    Cardiovascular:  Negative for chest pain.   Gastrointestinal:  Negative for abdominal pain and nausea.   Genitourinary:  Negative for dysuria.   Musculoskeletal:  Positive for arthralgias.   Skin:  Negative for rash and wound.   Neurological:  Negative for weakness and headaches.       Objective:  Vitals:  /90 (BP Location: Left arm, Patient Position: Sitting, Cuff Size: Standard)   Pulse 87   Temp (!) 97.4 °F (36.3 °C) (Tympanic)   Ht 5' (1.524 m)   Wt 48.6 kg (107 lb 3.2 oz)   LMP  (LMP Unknown)   BMI 20.94 kg/m²     The following portions of the patient's history were reviewed and updated as appropriate: allergies, current medications, past family history, past medical history, past social history, past surgical history, and problem list.    Physical exam:  Physical Exam  Constitutional:       Appearance: Normal appearance. She is normal weight.   HENT:      Head: Normocephalic.   Eyes:      Extraocular Movements: Extraocular movements intact.   Pulmonary:      Effort: Pulmonary effort is  "normal.   Musculoskeletal:      Cervical back: Normal range of motion.      Right knee: No effusion.      Instability Tests: Medial Toni test negative and lateral Toni test negative.      Left knee: No effusion.      Instability Tests: Medial Toni test negative and lateral Toni test negative.   Skin:     General: Skin is warm and dry.   Neurological:      General: No focal deficit present.      Mental Status: She is alert and oriented to person, place, and time. Mental status is at baseline.   Psychiatric:         Mood and Affect: Mood normal.         Behavior: Behavior normal.         Thought Content: Thought content normal.         Judgment: Judgment normal.       Right Knee Exam   Right knee exam is normal.    Muscle Strength   The patient has normal right knee strength.    Tenderness   The patient is experiencing no tenderness.     Range of Motion   The patient has normal right knee ROM.    Tests   Toni:  Medial - negative Lateral - negative  Varus: negative Valgus: negative    Other   Swelling: none  Effusion: no effusion present      Left Knee Exam   Left knee exam is normal.    Muscle Strength   The patient has normal left knee strength.    Tenderness   The patient is experiencing no tenderness.     Range of Motion   The patient has normal left knee ROM.    Tests   Toni:  Medial - negative Lateral - negative  Varus: negative Valgus: negative    Other   Swelling: none  Effusion: no effusion present          Observations   Left Knee   Negative for effusion.     Right Knee   Negative for effusion.       I have personally reviewed pertinent films in PACS and my interpretation is XR  B knee- medial joint line narrowing/mild/mod OA.  Large joint arthrocentesis: L knee  Universal Protocol:  Consent: Verbal consent obtained.  Risks and benefits: risks, benefits and alternatives were discussed  Consent given by: patient  Time out: Immediately prior to procedure a \"time out\" was called to verify " "the correct patient, procedure, equipment, support staff and site/side marked as required.  Timeout called at: 6/26/2024 3:49 PM.  Site marked: the operative site was marked  Supporting Documentation  Indications: pain   Procedure Details  Location: knee - L knee  Preparation: Patient was prepped and draped in the usual sterile fashion  Needle size: 25 G  Ultrasound guidance: no  Approach: anterolateral  Medications administered: 40 mg triamcinolone acetonide 40 mg/mL; 10 mL ropivacaine 0.5 %    Patient tolerance: patient tolerated the procedure well with no immediate complications  Dressing:  Sterile dressing applied      Large joint arthrocentesis: R knee  Universal Protocol:  Consent: Verbal consent obtained.  Risks and benefits: risks, benefits and alternatives were discussed  Consent given by: patient  Time out: Immediately prior to procedure a \"time out\" was called to verify the correct patient, procedure, equipment, support staff and site/side marked as required.  Timeout called at: 6/26/2024 3:49 PM.  Site marked: the operative site was marked  Supporting Documentation  Indications: pain   Procedure Details  Location: knee - R knee  Preparation: Patient was prepped and draped in the usual sterile fashion  Needle size: 25 G  Ultrasound guidance: no  Approach: anterolateral  Medications administered: 40 mg triamcinolone acetonide 40 mg/mL; 10 mL ropivacaine 0.5 %    Patient tolerance: patient tolerated the procedure well with no immediate complications  Dressing:  Sterile dressing applied            Cody Hager MD   "

## 2024-06-26 NOTE — PSYCH
Behavioral Health Psychotherapy Progress Note    Psychotherapy Provided: Individual Psychotherapy     1. Moderate mixed bipolar II disorder (HCC)        2. PTSD (post-traumatic stress disorder)        3. Generalized anxiety disorder        4. Cigarette nicotine dependence without complication        5. Tobacco abuse        6. Marijuana use        7. Alcohol abuse, in remission            Goals addressed in session: Goal 1     DATA: Marie had a short session due to feeling unwell and not functioning at her best, she reported. She shared her frustration about dealing with her everyday struggle with memory, stress from uncertain future and family dynamics that make her upset deeply, and her numerous doctors follow ups that remind her of her serious health issues. Marie was encouraged to continue using her support system and her learned skills to consistently counter traumatic response with effective tools.   During this session, this clinician used the following therapeutic modalities: Client-centered Therapy, Dialectical Behavior Therapy, Mindfulness-based Strategies, and Supportive Psychotherapy and Somatic Experiencing.     Substance Abuse was not addressed during this session. If the client is diagnosed with a co-occurring substance use disorder, please indicate any changes in the frequency or amount of use: NA. Stage of change for addressing substance use diagnoses: Pre-contemplation    ASSESSMENT:  Marie Portillo presents with a Euthymic/ normal, Anxious, and Depressed mood.     her affect is Normal range and intensity, Constricted, and Tearful, at times, which is congruent, with her mood and the content of the session. The client has made progress on their goals.    Marie still struggles with keeping up with her appointments sometimes, taking notes to be able to refer to and reference, and family relationships and past trauma that impacts her mood dramatically. Marie Portillo presents with a low risk of suicide, low  "risk of self-harm, and none risk of harm to others.    For any risk assessment that surpasses a \"low\" rating, a safety plan must be developed.    A safety plan was indicated: no  If yes, describe in detail Na    PLAN: Between sessions, Marie Portillo will attend the weekly Relaxation Group to practice Mindfulness and other effective techniques that counter stress and help process trauma. At the next session, the therapist will use Client-centered Therapy, Dialectical Behavior Therapy, Mindfulness-based Strategies, and Supportive Psychotherapy to address memories and emotions from dramatic past.    Behavioral Health Treatment Plan and Discharge Planning: Marie Portillo is aware of and agrees to continue to work on their treatment plan. They have identified and are working toward their discharge goals. yes    Visit start and stop times:    06/26/24  Start Time: 0106  Stop Time: 0130  Total Visit Time: 24 minutes  "

## 2024-06-26 NOTE — PSYCH
Virtual Regular Visit    Verification of patient location:    Patient is located at Home in the following state in which I hold an active license [place]      Assessment/Plan:    Problem List Items Addressed This Visit       Moderate mixed bipolar II disorder (HCC) - Primary    Generalized anxiety disorder    PTSD (post-traumatic stress disorder)    Alcohol abuse, in remission    Marijuana use (Chronic)    Cigarette nicotine dependence without complication    Tobacco abuse       Goals addressed in session: Goal 1 and Goal 2          Reason for visit is No chief complaint on file.       Encounter provider Isabel Watts LPC      Recent Visits  Date Type Provider Dept   06/21/24 Telemedicine Isabel Watts LPC [facility] [place]   06/19/24 Telemedicine Isabel Watts LPC [facility]rap[facility] [place]   Showing recent visits within past 7 days and meeting all other requirements  Today's Visits  Date Type Provider Dept   06/26/24 Telephone Isabel Wtats LPC [facility] [place]   Showing today's visits and meeting all other requirements  Future Appointments  No visits were found meeting these conditions.  Showing future appointments within next 150 days and meeting all other requirements       The patient was identified by name and date of birth. Marie Portillo was informed that this is a telemedicine visit and that the visit is being conducted throughthe Epic Embedded platform. She agrees to proceed..  My office door was closed. No one else was in the room.  She acknowledged consent and understanding of privacy and security of the video platform. The patient has agreed to participate and understands they can discontinue the visit at any time.    Patient is aware this is a billable service.     Subjective  Marie Portillo is a 58 y.o. female  .      HPI     Past Medical History:   Diagnosis Date    Bilateral carpal tunnel syndrome 08/20/2019    Cardiomyopathy (HCC)      Carpal tunnel syndrome of right wrist 10/30/2019    Added automatically from request for surgery [id number]    Chlamydia     COPD (chronic obstructive pulmonary disease) (HCC)     Depression     Hypertension     Varicella        Past Surgical History:   Procedure Laterality Date    BREAST BIOPSY Left 20yrs ago benign    CERVICAL SPINE SURGERY      EGD      HERNIA REPAIR      OOPHORECTOMY Right     [place] [facility] SUR[facility] W/RLS TRANSVRS CARPL LIGM Right 01/21/2020    Procedure: RELEASE CARPAL TUNNEL ENDOSCOPIC;  Surgeon: Sam Nguyen MD;  [facility];  Service: Orthopedics    [place] [facility] WR[facility] SURG W/RLS TRANSVRS CARPL LIGM Left 01/28/2020    Procedure: RELEASE CARPAL TUNNEL ENDOSCOPIC;  Surgeon: Sam Nguyen MD;  [facility];  Service: Orthopedics       Current Outpatient Medications   Medication Sig Dispense Refill    Acetaminophen 500 MG Take 2 capsules by mouth every 4 (four) hours as needed for mild pain      albuterol (PROVENTIL HFA,VENTOLIN HFA) 90 mcg/act inhaler inhale 2 puffs by mouth and INTO THE LUNGS every 6 hours IF NEEDED FOR WHEEZING. 8.5 g 2    buPROPion (WELLBUTRIN XL) 300 mg 24 hr tablet Take 1 tablet (300 mg total) by mouth daily 30 tablet 2    cholecalciferol (VITAMIN D3) 400 units tablet Take 400 Units by mouth daily      fluticasone (FLONASE) 50 mcg/act nasal spray instill 1 spray into each nostril once daily 16 g 2    guaiFENesin (MUCINEX) 600 mg 12 hr tablet Take 2 tablets (1,200 mg total) by mouth every 12 (twelve) hours (Patient taking differently: Take 1,200 mg by mouth every 12 (twelve) hours PRN) 60 tablet 3    ipratropium-albuterol (DUO-NEB) 0.5-2.5 mg/3 mL nebulizer solution Take 3 mL by nebulization 4 (four) times a day 360 mL 3    lamoTRIgine (LaMICtal) 200 MG tablet Take 1 tablet (200 mg total) by mouth daily 30 tablet 2    levocetirizine (XYZAL) 5 MG tablet take 1 tablet by mouth every evening 30 tablet 4    losartan (COZAAR) 25 mg tablet Take 1 tablet (25 mg total)  by mouth daily 30 tablet 5    methocarbamol (ROBAXIN) 500 mg tablet Take 500 mg by mouth 2 (two) times a day PRN      metoprolol succinate (TOPROL-XL) 25 mg 24 hr tablet Take 1 tablet (25 mg total) by mouth daily 30 tablet 2    nicotine (NICODERM CQ) 21 mg/24 hr TD 24 hr patch Place 1 patch on the skin over 24 hours every 24 hours 28 patch 0    nicotine polacrilex (COMMIT) 4 MG lozenge Apply 1 lozenge (4 mg total) to the mouth or throat as needed for smoking cessation 100 each 0    omeprazole (PriLOSEC) 20 mg delayed release capsule take 1 capsule by mouth twice a day 60 capsule 5    senna-docusate sodium (SENOKOT-S) 8.6-50 mg per tablet Take 3 tablets by mouth as needed        Stiolto Respimat 2.5-2.5 MCG/ACT inhaler inhale 2 puffs by mouth and INTO THE LUNGS once daily 4 g 11     No current facility-administered medications for this visit.        No Known Allergies    Review of Systems    Video Exam    There were no vitals filed for this visit.    Physical Exam

## 2024-06-28 ENCOUNTER — TELEMEDICINE (OUTPATIENT)
Dept: BEHAVIORAL/MENTAL HEALTH CLINIC | Facility: CLINIC | Age: 59
End: 2024-06-28
Payer: COMMERCIAL

## 2024-06-28 ENCOUNTER — TELEPHONE (OUTPATIENT)
Dept: PSYCHIATRY | Facility: CLINIC | Age: 59
End: 2024-06-28

## 2024-06-28 DIAGNOSIS — Z72.0 TOBACCO ABUSE: ICD-10-CM

## 2024-06-28 DIAGNOSIS — F43.10 PTSD (POST-TRAUMATIC STRESS DISORDER): ICD-10-CM

## 2024-06-28 DIAGNOSIS — F10.11 ALCOHOL ABUSE, IN REMISSION: ICD-10-CM

## 2024-06-28 DIAGNOSIS — F12.90 MARIJUANA USE: Chronic | ICD-10-CM

## 2024-06-28 DIAGNOSIS — F41.1 GENERALIZED ANXIETY DISORDER: ICD-10-CM

## 2024-06-28 DIAGNOSIS — F17.210 CIGARETTE NICOTINE DEPENDENCE WITHOUT COMPLICATION: ICD-10-CM

## 2024-06-28 DIAGNOSIS — F31.81 MODERATE MIXED BIPOLAR II DISORDER (HCC): Primary | ICD-10-CM

## 2024-06-28 PROCEDURE — 90853 GROUP PSYCHOTHERAPY: CPT | Performed by: COUNSELOR

## 2024-06-28 NOTE — TELEPHONE ENCOUNTER
Marie Portillo and/or Parent/Guardian is requesting a letter/form for  to be completed in regard to Soc Sec.      Date due: letter as soon as possible    Special notes: : Ousmane Cox        [address] [place] [id number]  Email: [email or web address]  Contact: Grisel Ramos          Informed Marie Portillo and/or Parent/Guardian that a MERLE must be completed in order for letter/form to be provided. Writer emailed MERLE to patient and let her know a copy of her ID will also be needed when sent back. Email: [email or web address]

## 2024-06-28 NOTE — PSYCH
Virtual Regular Visit    Verification of patient location:    Patient is located at Home in the following state in which I hold an active license [place]      Assessment/Plan:    Problem List Items Addressed This Visit       Moderate mixed bipolar II disorder (HCC) - Primary    Generalized anxiety disorder    PTSD (post-traumatic stress disorder)    Alcohol abuse, in remission    Marijuana use (Chronic)    Cigarette nicotine dependence without complication    Tobacco abuse       Goals addressed in session: Goal 1          Reason for visit is No chief complaint on file.       Encounter provider Isabel Watts LPC      Recent Visits  Date Type Provider Dept   06/26/24 Telephone Isabel Watts LPC [facility] [place]   06/26/24 Telemedicine Isabel Watts LPC [facility] [place]   06/21/24 Telemedicine Isabel Watts LPC [facility]rap[facility] [place]   Showing recent visits within past 7 days and meeting all other requirements  Future Appointments  No visits were found meeting these conditions.  Showing future appointments within next 150 days and meeting all other requirements       The patient was identified by name and date of birth. Marie Portillo was informed that this is a telemedicine visit and that the visit is being conducted throughthe [employer] Teams platform. She agrees to proceed..  My office door was closed. The patient was notified the following individuals were present in the room other group members.  She acknowledged consent and understanding of privacy and security of the video platform. The patient has agreed to participate and understands they can discontinue the visit at any time.    Patient is aware this is a billable service.     Subjective  Marie Portillo is a 58 y.o. female  .    Data: Marie attended the Relaxation group and was guided through tapping on anxiety, diaphragmatic breathing, other Vagus nerve stimulation techniques, and progressive  muscle relaxation. Furthermore, attendees were guided through a quick body scan, Mindfulness-Based Meditation for Breathing [place] for Your Wondering Mind, Power of Acceptance, Cultivating Embodied Awareness, Imagery for Calming Color, and ending with affirmations for self-esteem, living in the present moment, self-confidence, and acceptance. Psychoeducation, EFT, Mindfulness-based, and Somatic approaches were used to achieve the deep state of relaxation and to maintain the serene environment for the exercise's purpose.     No alcohol or drug problems were addressed in the session.    No safety plan was indicated due to no safety issues at the moment.      Assessment: Marie was in normal/euthymic mood and in congruence with broad affect, followed through guidance and completed the exercises well, showed willingness to continue learning and practicing self-care in weekly Relaxation Group as well as individual therapy.      Plan: Marei will practice deep breathing and other techniques based on each occasion to counter stress and will attend the next group to be able to take part in further guided exercises on chosen topics to build on the techniques learned in the beginning.    HPI     Past Medical History:   Diagnosis Date    Bilateral carpal tunnel syndrome 08/20/2019    Cardiomyopathy (HCC)     Carpal tunnel syndrome of right wrist 10/30/2019    Added automatically from request for surgery [id number]    Chlamydia     COPD (chronic obstructive pulmonary disease) (HCC)     Depression     Hypertension     Varicella        Past Surgical History:   Procedure Laterality Date    BACK SURGERY      BREAST BIOPSY Left 20yrs ago benign    CERVICAL SPINE SURGERY      EGD      HERNIA REPAIR      OOPHORECTOMY Right     [place] [facility] W/RLS TRANSVRS CARPL LIGM Right 01/21/2020    Procedure: RELEASE CARPAL TUNNEL ENDOSCOPIC;  Surgeon: Sam Nguyen MD;  [facility];  Service: Orthopedics    [place] [facility] WR[facility] SURG W/RLS  TRANSVRS CARPL LIGM Left 01/28/2020    Procedure: RELEASE CARPAL TUNNEL ENDOSCOPIC;  Surgeon: Sam Nguyen MD;  [facility];  Service: Orthopedics       Current Outpatient Medications   Medication Sig Dispense Refill    Acetaminophen 500 MG Take 2 capsules by mouth every 4 (four) hours as needed for mild pain      albuterol (PROVENTIL HFA,VENTOLIN HFA) 90 mcg/act inhaler inhale 2 puffs by mouth and INTO THE LUNGS every 6 hours IF NEEDED FOR WHEEZING. 8.5 g 2    buPROPion (WELLBUTRIN XL) 300 mg 24 hr tablet Take 1 tablet (300 mg total) by mouth daily 30 tablet 2    cholecalciferol (VITAMIN D3) 400 units tablet Take 400 Units by mouth daily      fluticasone (FLONASE) 50 mcg/act nasal spray instill 1 spray into each nostril once daily 16 g 2    guaiFENesin (MUCINEX) 600 mg 12 hr tablet Take 2 tablets (1,200 mg total) by mouth every 12 (twelve) hours (Patient taking differently: Take 1,200 mg by mouth every 12 (twelve) hours PRN) 60 tablet 3    ipratropium-albuterol (DUO-NEB) 0.5-2.5 mg/3 mL nebulizer solution Take 3 mL by nebulization 4 (four) times a day 360 mL 3    lamoTRIgine (LaMICtal) 200 MG tablet Take 1 tablet (200 mg total) by mouth daily 30 tablet 2    levocetirizine (XYZAL) 5 MG tablet take 1 tablet by mouth every evening 30 tablet 4    losartan (COZAAR) 25 mg tablet Take 1 tablet (25 mg total) by mouth daily 30 tablet 5    methocarbamol (ROBAXIN) 500 mg tablet Take 500 mg by mouth 2 (two) times a day PRN      metoprolol succinate (TOPROL-XL) 25 mg 24 hr tablet Take 1 tablet (25 mg total) by mouth daily 30 tablet 2    nicotine (NICODERM CQ) 21 mg/24 hr TD 24 hr patch Place 1 patch on the skin over 24 hours every 24 hours (Patient not taking: Reported on 6/26/2024) 28 patch 0    nicotine polacrilex (COMMIT) 4 MG lozenge Apply 1 lozenge (4 mg total) to the mouth or throat as needed for smoking cessation (Patient not taking: Reported on 6/26/2024) 100 each 0    omeprazole (PriLOSEC) 20 mg delayed  release capsule take 1 capsule by mouth twice a day 60 capsule 5    senna-docusate sodium (SENOKOT-S) 8.6-50 mg per tablet Take 3 tablets by mouth as needed        Stiolto Respimat 2.5-2.5 MCG/ACT inhaler inhale 2 puffs by mouth and INTO THE LUNGS once daily 4 g 11     No current facility-administered medications for this visit.        No Known Allergies    Review of Systems    Video Exam    There were no vitals filed for this visit.    Physical Exam     06/28/24  Start Time: 0405  Stop Time: 0500  Total Visit Time: 55 minutes

## 2024-07-01 ENCOUNTER — DOCUMENTATION (OUTPATIENT)
Dept: BEHAVIORAL/MENTAL HEALTH CLINIC | Facility: CLINIC | Age: 59
End: 2024-07-01

## 2024-07-01 NOTE — PROGRESS NOTES
Th contacted Pt to help her find the email with the MERLE form needed for her to sign and email back along with a copy of her ID. Pt got very upset and talked over Th, unable to follow directions and complete task. She started crying and did not listen to what Th had to say and hang up the phone. Th left a voicemail to let her know that she can call back when feels better and that  is here to help her complete the task for her .

## 2024-07-02 NOTE — TELEPHONE ENCOUNTER
Thank you, Dr. Jones- I am ready with my letter even though I still don't know what exactly is needed in that document, and the  has not even sent any forms and has placed the vulnerable Pt between their firm and our practice to figure out what to write there and what would be helpful for her to reinstate something so important! I needed to go over electronic documentation with the Pt feeling for her frustration, and still not sure if that would help her case. Let me know!

## 2024-07-03 ENCOUNTER — TELEMEDICINE (OUTPATIENT)
Dept: BEHAVIORAL/MENTAL HEALTH CLINIC | Facility: CLINIC | Age: 59
End: 2024-07-03
Payer: COMMERCIAL

## 2024-07-03 ENCOUNTER — TELEPHONE (OUTPATIENT)
Dept: PSYCHIATRY | Facility: CLINIC | Age: 59
End: 2024-07-03

## 2024-07-03 DIAGNOSIS — Z72.0 TOBACCO ABUSE: ICD-10-CM

## 2024-07-03 DIAGNOSIS — F41.1 GENERALIZED ANXIETY DISORDER: ICD-10-CM

## 2024-07-03 DIAGNOSIS — F10.11 ALCOHOL ABUSE, IN REMISSION: ICD-10-CM

## 2024-07-03 DIAGNOSIS — F17.210 CIGARETTE NICOTINE DEPENDENCE WITHOUT COMPLICATION: ICD-10-CM

## 2024-07-03 DIAGNOSIS — F12.90 MARIJUANA USE: Chronic | ICD-10-CM

## 2024-07-03 DIAGNOSIS — F31.81 MODERATE MIXED BIPOLAR II DISORDER (HCC): Primary | ICD-10-CM

## 2024-07-03 DIAGNOSIS — F43.12 CHRONIC POST-TRAUMATIC STRESS DISORDER (PTSD): ICD-10-CM

## 2024-07-03 PROCEDURE — 90832 PSYTX W PT 30 MINUTES: CPT | Performed by: COUNSELOR

## 2024-07-03 NOTE — PSYCH
"Behavioral Health Psychotherapy Progress Note    Psychotherapy Provided: Individual Psychotherapy     1. Moderate mixed bipolar II disorder (HCC)        2. PTSD (post-traumatic stress disorder)        3. Generalized anxiety disorder        4. Tobacco abuse        5. Cigarette nicotine dependence without complication        6. Marijuana use        7. Alcohol abuse, in remission            Goals addressed in session: Goal 1     DATA: Marie attended her session in a better emotional state than the last conversation and even though she had to go over electronic documents again to complete a task, she handled it and managed to sequence the directions. She made a hard effort to work on her issues and was commended for handling emotions and encouraged to apply her Mindfulness skills and take notes to avoid frustration with memory lapses.    During this session, this clinician used the following therapeutic modalities: Client-centered Therapy, Mindfulness-based Strategies, and Supportive Psychotherapy    Substance Abuse was not addressed during this session. If the client is diagnosed with a co-occurring substance use disorder, please indicate any changes in the frequency or amount of use: NA. Stage of change for addressing substance use diagnoses: Maintenance    ASSESSMENT:  Marie Portillo presents with a Euthymic/ normal mood.     her affect is Normal range and intensity, which is congruent, with her mood and the content of the session. The client has made progress on their goals.    Marie seemed more balanced and centered this time, with an open mind, better listening ability, and sharper focus. Marie Portillo presents with a low risk of suicide, minimal risk of self-harm, and none risk of harm to others.    For any risk assessment that surpasses a \"low\" rating, a safety plan must be developed.    A safety plan was indicated: no  If yes, describe in detail NA    PLAN: Between sessions, Marie Portillo will attend the weekly " Relaxation Group to be able to include self-care and to learn and practice new techniques and exercises. At the next session, the therapist will use Client-centered Therapy, Dialectical Behavior Therapy, Mindfulness-based Strategies, and Supportive Psychotherapy to address self-care, taking notes, memory food, and frustration.    Behavioral Health Treatment Plan and Discharge Planning: Marie Portillo is aware of and agrees to continue to work on their treatment plan. They have identified and are working toward their discharge goals. yes    Visit start and stop times:    07/03/24  Start Time: 0159  Stop Time: 0228  Total Visit Time: 29 minutes      Virtual Regular Visit    Verification of patient location:    Patient is located at Home in the following state in which I hold an active license [place]      Assessment/Plan:    Problem List Items Addressed This Visit       Moderate mixed bipolar II disorder (HCC) - Primary    Generalized anxiety disorder    PTSD (post-traumatic stress disorder)    Alcohol abuse, in remission    Marijuana use (Chronic)    Cigarette nicotine dependence without complication    Tobacco abuse       Goals addressed in session: Goal 1          Reason for visit is No chief complaint on file.       Encounter provider Isabel Watts LPC      Recent Visits  Date Type Provider Dept   06/28/24 Telemedicine Isabel Watts LPC [facility] [place]   06/26/24 Telephone Isabel Watts LPC [facility] [place]   06/26/24 Telemedicine Isabel Watts LPC [facility] [place]   Showing recent visits within past 7 days and meeting all other requirements  Today's Visits  Date Type Provider Dept   07/03/24 Telemedicine Isabel Watts LPC [facility] [place]   Showing today's visits and meeting all other requirements  Future Appointments  No visits were found meeting these conditions.  Showing future appointments within next 150 days  and meeting all other requirements       The patient was identified by name and date of birth. Marie Portillo was informed that this is a telemedicine visit and that the visit is being conducted throughthe Epic Embedded platform. She agrees to proceed..  My office door was closed. No one else was in the room.  She acknowledged consent and understanding of privacy and security of the video platform. The patient has agreed to participate and understands they can discontinue the visit at any time.    Patient is aware this is a billable service.     Subjective  Marie Portillo is a 58 y.o. female.    HPI     Past Medical History:   Diagnosis Date    Bilateral carpal tunnel syndrome 08/20/2019    Cardiomyopathy (HCC)     Carpal tunnel syndrome of right wrist 10/30/2019    Added automatically from request for surgery [id number]    Chlamydia     COPD (chronic obstructive pulmonary disease) (HCC)     Depression     Hypertension     Varicella        Past Surgical History:   Procedure Laterality Date    BACK SURGERY      BREAST BIOPSY Left 20yrs ago benign    CERVICAL SPINE SURGERY      EGD      HERNIA REPAIR      OOPHORECTOMY Right     [place] [facility] WRST SURG W/RLS TRANSVRS CARPL LIGM Right 01/21/2020    Procedure: RELEASE CARPAL TUNNEL ENDOSCOPIC;  Surgeon: Sam Nguyen MD;  [facility];  Service: Orthopedics    [place] [facility] WRST SURG W/RLS TRANSVRS CARPL LIGM Left 01/28/2020    Procedure: RELEASE CARPAL TUNNEL ENDOSCOPIC;  Surgeon: Sam Nguyen MD;  [facility];  Service: Orthopedics       Current Outpatient Medications   Medication Sig Dispense Refill    Acetaminophen 500 MG Take 2 capsules by mouth every 4 (four) hours as needed for mild pain      albuterol (PROVENTIL HFA,VENTOLIN HFA) 90 mcg/act inhaler inhale 2 puffs by mouth and INTO THE LUNGS every 6 hours IF NEEDED FOR WHEEZING. 8.5 g 2    buPROPion (WELLBUTRIN XL) 300 mg 24 hr tablet Take 1 tablet (300 mg total) by mouth daily 30 tablet 2     cholecalciferol (VITAMIN D3) 400 units tablet Take 400 Units by mouth daily      fluticasone (FLONASE) 50 mcg/act nasal spray instill 1 spray into each nostril once daily 16 g 2    guaiFENesin (MUCINEX) 600 mg 12 hr tablet Take 2 tablets (1,200 mg total) by mouth every 12 (twelve) hours (Patient taking differently: Take 1,200 mg by mouth every 12 (twelve) hours PRN) 60 tablet 3    ipratropium-albuterol (DUO-NEB) 0.5-2.5 mg/3 mL nebulizer solution Take 3 mL by nebulization 4 (four) times a day 360 mL 3    lamoTRIgine (LaMICtal) 200 MG tablet Take 1 tablet (200 mg total) by mouth daily 30 tablet 2    levocetirizine (XYZAL) 5 MG tablet take 1 tablet by mouth every evening 30 tablet 4    losartan (COZAAR) 25 mg tablet Take 1 tablet (25 mg total) by mouth daily 30 tablet 5    methocarbamol (ROBAXIN) 500 mg tablet Take 500 mg by mouth 2 (two) times a day PRN      metoprolol succinate (TOPROL-XL) 25 mg 24 hr tablet Take 1 tablet (25 mg total) by mouth daily 30 tablet 2    nicotine (NICODERM CQ) 21 mg/24 hr TD 24 hr patch Place 1 patch on the skin over 24 hours every 24 hours (Patient not taking: Reported on 6/26/2024) 28 patch 0    nicotine polacrilex (COMMIT) 4 MG lozenge Apply 1 lozenge (4 mg total) to the mouth or throat as needed for smoking cessation (Patient not taking: Reported on 6/26/2024) 100 each 0    omeprazole (PriLOSEC) 20 mg delayed release capsule take 1 capsule by mouth twice a day 60 capsule 5    senna-docusate sodium (SENOKOT-S) 8.6-50 mg per tablet Take 3 tablets by mouth as needed        Stiolto Respimat 2.5-2.5 MCG/ACT inhaler inhale 2 puffs by mouth and INTO THE LUNGS once daily 4 g 11     No current facility-administered medications for this visit.        No Known Allergies    Review of Systems    Video Exam    There were no vitals filed for this visit.    Physical Exam

## 2024-07-03 NOTE — TELEPHONE ENCOUNTER
Writer contacted patient, per provider request, to resend completed MERLE for patient to sign for letter to her .     Confirmed email to send MERLE to:    [email or web address]    Writer will call patient back to confirm she received it and emailed it back to office.

## 2024-07-05 ENCOUNTER — TELEMEDICINE (OUTPATIENT)
Dept: BEHAVIORAL/MENTAL HEALTH CLINIC | Facility: CLINIC | Age: 59
End: 2024-07-05
Payer: COMMERCIAL

## 2024-07-05 DIAGNOSIS — F43.12 CHRONIC POST-TRAUMATIC STRESS DISORDER (PTSD): ICD-10-CM

## 2024-07-05 DIAGNOSIS — F41.1 GENERALIZED ANXIETY DISORDER: ICD-10-CM

## 2024-07-05 DIAGNOSIS — F12.90 MARIJUANA USE: Chronic | ICD-10-CM

## 2024-07-05 DIAGNOSIS — F17.210 CIGARETTE NICOTINE DEPENDENCE WITHOUT COMPLICATION: ICD-10-CM

## 2024-07-05 DIAGNOSIS — F10.11 ALCOHOL ABUSE, IN REMISSION: ICD-10-CM

## 2024-07-05 DIAGNOSIS — Z72.0 TOBACCO ABUSE: ICD-10-CM

## 2024-07-05 DIAGNOSIS — F31.81 MODERATE MIXED BIPOLAR II DISORDER (HCC): Primary | ICD-10-CM

## 2024-07-05 PROCEDURE — 90853 GROUP PSYCHOTHERAPY: CPT | Performed by: COUNSELOR

## 2024-07-05 NOTE — PSYCH
Virtual Regular Visit    Verification of patient location:    Patient is located at Home in the following state in which I hold an active license [place]      Assessment/Plan:    Problem List Items Addressed This Visit       Moderate mixed bipolar II disorder (HCC) - Primary    Generalized anxiety disorder    Chronic post-traumatic stress disorder (PTSD)    Alcohol abuse, in remission    Marijuana use (Chronic)    Cigarette nicotine dependence without complication    Tobacco abuse       Goals addressed in session: Goal 1          Reason for visit is No chief complaint on file.       Encounter provider Isabel Watts LPC      Recent Visits  Date Type Provider Dept   07/03/24 Telephone Isabel Watts LPC [facility] [place]   07/03/24 Telemedicine Isabel Watts LPC [facility] [place]   06/28/24 Telemedicine Isabel Watts LPC [facility] [place]   Showing recent visits within past 7 days and meeting all other requirements  Future Appointments  No visits were found meeting these conditions.  Showing future appointments within next 150 days and meeting all other requirements       The patient was identified by name and date of birth. Marie Portillo was informed that this is a telemedicine visit and that the visit is being conducted throughthe [employer] Teams platform. She agrees to proceed..  My office door was closed. The patient was notified the following individuals were present in the room other group members.  She acknowledged consent and understanding of privacy and security of the video platform. The patient has agreed to participate and understands they can discontinue the visit at any time.    Patient is aware this is a billable service.     Subjective  Marie Portillo is a 58 y.o. female  .    Data: Marie attended the Relaxation group and was guided through tapping on pain, diaphragmatic breathing, other Vagus nerve stimulation techniques, and  progressive muscle relaxation. Furthermore, attendees were guided through a quick body scan for pain, Mindfulness-Based Meditation for Dealing with Pain in a Safe Place, Imagery for Pain [place] Col[place], and ending with affirmations for self-compassion, forgiveness, and unconditional self-love. Psychoeducation, EFT, Mindfulness-based, and Somatic approaches were used to achieve the deep state of relaxation and to maintain the serene environment for the exercise's purpose.     No alcohol or drug problems were addressed in the session.    No safety plan was indicated due to no safety issues at the moment.      Assessment: Marie was in normal/euthymic mood and in congruence with broad affect, followed through guidance and completed the exercises well, showed willingness to continue learning and practicing self-care in weekly Relaxation Group as well as individual therapy.      Plan: Marie will practice deep breathing and other techniques based on each occasion to counter stress and will attend the next group to be able to take part in further guided exercises on chosen topics to build on the techniques learned in the beginning.      HPI     Past Medical History:   Diagnosis Date    Bilateral carpal tunnel syndrome 08/20/2019    Cardiomyopathy (HCC)     Carpal tunnel syndrome of right wrist 10/30/2019    Added automatically from request for surgery [id number]    Chlamydia     COPD (chronic obstructive pulmonary disease) (HCC)     Depression     Hypertension     Varicella        Past Surgical History:   Procedure Laterality Date    BACK SURGERY      BREAST BIOPSY Left 20yrs ago benign    CERVICAL SPINE SURGERY      EGD      HERNIA REPAIR      OOPHORECTOMY Right     [place] [facility] WRST SURG W/RLS TRANSVRS CARPL LIGM Right 01/21/2020    Procedure: RELEASE CARPAL TUNNEL ENDOSCOPIC;  Surgeon: Sam Nguyen MD;  [facility];  Service: Orthopedics    [place] [facility] WRST SURG W/RLS TRANSVRS CARPL LIGM Left 01/28/2020    Procedure:  RELEASE CARPAL TUNNEL ENDOSCOPIC;  Surgeon: Sam Nguyen MD;  [facility];  Service: Orthopedics       Current Outpatient Medications   Medication Sig Dispense Refill    Acetaminophen 500 MG Take 2 capsules by mouth every 4 (four) hours as needed for mild pain      albuterol (PROVENTIL HFA,VENTOLIN HFA) 90 mcg/act inhaler inhale 2 puffs by mouth and INTO THE LUNGS every 6 hours IF NEEDED FOR WHEEZING. 8.5 g 2    buPROPion (WELLBUTRIN XL) 300 mg 24 hr tablet Take 1 tablet (300 mg total) by mouth daily 30 tablet 2    cholecalciferol (VITAMIN D3) 400 units tablet Take 400 Units by mouth daily      fluticasone (FLONASE) 50 mcg/act nasal spray instill 1 spray into each nostril once daily 16 g 2    guaiFENesin (MUCINEX) 600 mg 12 hr tablet Take 2 tablets (1,200 mg total) by mouth every 12 (twelve) hours (Patient taking differently: Take 1,200 mg by mouth every 12 (twelve) hours PRN) 60 tablet 3    ipratropium-albuterol (DUO-NEB) 0.5-2.5 mg/3 mL nebulizer solution Take 3 mL by nebulization 4 (four) times a day 360 mL 3    lamoTRIgine (LaMICtal) 200 MG tablet Take 1 tablet (200 mg total) by mouth daily 30 tablet 2    levocetirizine (XYZAL) 5 MG tablet take 1 tablet by mouth every evening 30 tablet 4    losartan (COZAAR) 25 mg tablet Take 1 tablet (25 mg total) by mouth daily 30 tablet 5    methocarbamol (ROBAXIN) 500 mg tablet Take 500 mg by mouth 2 (two) times a day PRN      metoprolol succinate (TOPROL-XL) 25 mg 24 hr tablet Take 1 tablet (25 mg total) by mouth daily 30 tablet 2    nicotine (NICODERM CQ) 21 mg/24 hr TD 24 hr patch Place 1 patch on the skin over 24 hours every 24 hours (Patient not taking: Reported on 6/26/2024) 28 patch 0    nicotine polacrilex (COMMIT) 4 MG lozenge Apply 1 lozenge (4 mg total) to the mouth or throat as needed for smoking cessation (Patient not taking: Reported on 6/26/2024) 100 each 0    omeprazole (PriLOSEC) 20 mg delayed release capsule take 1 capsule by mouth twice a  day 60 capsule 5    senna-docusate sodium (SENOKOT-S) 8.6-50 mg per tablet Take 3 tablets by mouth as needed        Stiolto Respimat 2.5-2.5 MCG/ACT inhaler inhale 2 puffs by mouth and INTO THE LUNGS once daily 4 g 11     No current facility-administered medications for this visit.        No Known Allergies    Review of Systems    Video Exam    There were no vitals filed for this visit.    Physical Exam     07/05/24  Start Time: 0400  Stop Time: 0500  Total Visit Time: 60 minutes

## 2024-07-10 ENCOUNTER — TELEMEDICINE (OUTPATIENT)
Dept: BEHAVIORAL/MENTAL HEALTH CLINIC | Facility: CLINIC | Age: 59
End: 2024-07-10
Payer: COMMERCIAL

## 2024-07-10 DIAGNOSIS — F10.11 ALCOHOL ABUSE, IN REMISSION: ICD-10-CM

## 2024-07-10 DIAGNOSIS — F12.90 MARIJUANA USE: Chronic | ICD-10-CM

## 2024-07-10 DIAGNOSIS — F41.1 GENERALIZED ANXIETY DISORDER: ICD-10-CM

## 2024-07-10 DIAGNOSIS — I50.20 SYSTOLIC HEART FAILURE, UNSPECIFIED HF CHRONICITY (HCC): ICD-10-CM

## 2024-07-10 DIAGNOSIS — Z72.0 TOBACCO ABUSE: ICD-10-CM

## 2024-07-10 DIAGNOSIS — F31.81 MODERATE MIXED BIPOLAR II DISORDER (HCC): Primary | ICD-10-CM

## 2024-07-10 DIAGNOSIS — F43.12 CHRONIC POST-TRAUMATIC STRESS DISORDER (PTSD): ICD-10-CM

## 2024-07-10 DIAGNOSIS — F17.210 CIGARETTE NICOTINE DEPENDENCE WITHOUT COMPLICATION: ICD-10-CM

## 2024-07-10 PROCEDURE — 90834 PSYTX W PT 45 MINUTES: CPT | Performed by: COUNSELOR

## 2024-07-10 RX ORDER — METOPROLOL SUCCINATE 25 MG/1
25 TABLET, EXTENDED RELEASE ORAL DAILY
Qty: 30 TABLET | Refills: 0 | Status: SHIPPED | OUTPATIENT
Start: 2024-07-10

## 2024-07-10 NOTE — PSYCH
Behavioral Health Psychotherapy Progress Note    Psychotherapy Provided: Individual Psychotherapy     1. Moderate mixed bipolar II disorder (HCC)        2. Generalized anxiety disorder        3. Chronic post-traumatic stress disorder (PTSD)        4. Tobacco abuse        5. Cigarette nicotine dependence without complication        6. Marijuana use        7. Alcohol abuse, in remission            Goals addressed in session: Goal 1 and Goal 2     DATA: Marie reported feeling scared about not being safe, about foreign countries' threats, her fears with her health and planning for the brain surgery that would relieve the pressure in the cerebellum to restore the spinal fluid's flow. She was validated and allowed to express her emotions and then encouraged to remember to avoid dwelling and intensifying them and rather go into the other vortex where she could work with sensations in the body and ground herself through her Body Resource.    During this session, this clinician used the following therapeutic modalities: Client-centered Therapy, Dialectical Behavior Therapy, Mindfulness-based Strategies, and Supportive Psychotherapy and Somatic Experiencing.     Substance Abuse was addressed during this session. If the client is diagnosed with a co-occurring substance use disorder, please indicate any changes in the frequency or amount of use: mild to moderate daily smoking. Stage of change for addressing substance use diagnoses: Pre-contemplation    ASSESSMENT:  Marie Portillo presents with a Euthymic/ normal, Anxious, and Depressed mood.     her affect is Normal range and intensity, Constricted, and Tearful, at times, which is congruent with her mood and the content of the session. The client has made progress on their goals.    Marie seemed preoccupied with thoughts about aspects of her life that are out of her control.  Marie Portillo presents with a minimal risk of suicide, minimal risk of self-harm, and none risk of harm to  "others.    For any risk assessment that surpasses a \"low\" rating, a safety plan must be developed.    A safety plan was indicated: no  If yes, describe in detail NA    PLAN: Between sessions, Marie Portillo will attend the weekly Relaxation Group to practice Mindfulness meditation . At the next session, the therapist will use Client-centered Therapy, Mindfulness-based Strategies, Supportive Psychotherapy, and Somatic Experiencing  to address traumatic memories and healthier approach.    Behavioral Health Treatment Plan and Discharge Planning: Marie Portillo is aware of and agrees to continue to work on their treatment plan. They have identified and are working toward their discharge goals. yes    Visit start and stop times:    07/10/24  Start Time: 0157  Stop Time: 0244  Total Visit Time: 47 minutes  "

## 2024-07-10 NOTE — TELEPHONE ENCOUNTER
Writer contacted patient and confirmed signed MERLE was signed and that the letter will be sent from the office.

## 2024-07-10 NOTE — PSYCH
Virtual Regular Visit    Verification of patient location:    Patient is located at Home in the following state in which I hold an active license [place]      Assessment/Plan:    Problem List Items Addressed This Visit       Moderate mixed bipolar II disorder (HCC) - Primary    Generalized anxiety disorder    Chronic post-traumatic stress disorder (PTSD)    Alcohol abuse, in remission    Marijuana use (Chronic)    Cigarette nicotine dependence without complication    Tobacco abuse       Goals addressed in session: Goal 1 and Goal 2          Reason for visit is No chief complaint on file.       Encounter provider Isabel Watts LPC      Recent Visits  Date Type Provider Dept   07/05/24 Telemedicine Isabel Watts LPC [facility] [place]   07/03/24 Telephone Isabel Watts LPC [facility] [place]   07/03/24 Telemedicine Isabel Watts LPC [facility] [place]   Showing recent visits within past 7 days and meeting all other requirements  Future Appointments  No visits were found meeting these conditions.  Showing future appointments within next 150 days and meeting all other requirements       The patient was identified by name and date of birth. Marie Portillo was informed that this is a telemedicine visit and that the visit is being conducted throughthe Epic Embedded platform. She agrees to proceed..  My office door was closed. No one else was in the room.  She acknowledged consent and understanding of privacy and security of the video platform. The patient has agreed to participate and understands they can discontinue the visit at any time.    Patient is aware this is a billable service.     Subjective  Marie Portillo is a 58 y.o. female  .      HPI     Past Medical History:   Diagnosis Date    Bilateral carpal tunnel syndrome 08/20/2019    Cardiomyopathy (HCC)     Carpal tunnel syndrome of right wrist 10/30/2019    Added automatically from request for surgery  [id number]    Chlamydia     COPD (chronic obstructive pulmonary disease) (HCC)     Depression     Hypertension     Varicella        Past Surgical History:   Procedure Laterality Date    BACK SURGERY      BREAST BIOPSY Left 20yrs ago benign    CERVICAL SPINE SURGERY      EGD      HERNIA REPAIR      OOPHORECTOMY Right     [place] [facility] WRST SURG W/RLS TRANSVRS CARPL LIGM Right 01/21/2020    Procedure: RELEASE CARPAL TUNNEL ENDOSCOPIC;  Surgeon: Sam Nguyen MD;  [facility];  Service: Orthopedics    [place] [facility] WRST SURG W/RLS TRANSVRS CARPL LIGM Left 01/28/2020    Procedure: RELEASE CARPAL TUNNEL ENDOSCOPIC;  Surgeon: Sam Nguyen MD;  [facility];  Service: Orthopedics       Current Outpatient Medications   Medication Sig Dispense Refill    Acetaminophen 500 MG Take 2 capsules by mouth every 4 (four) hours as needed for mild pain      albuterol (PROVENTIL HFA,VENTOLIN HFA) 90 mcg/act inhaler inhale 2 puffs by mouth and INTO THE LUNGS every 6 hours IF NEEDED FOR WHEEZING. 8.5 g 2    buPROPion (WELLBUTRIN XL) 300 mg 24 hr tablet Take 1 tablet (300 mg total) by mouth daily 30 tablet 2    cholecalciferol (VITAMIN D3) 400 units tablet Take 400 Units by mouth daily      fluticasone (FLONASE) 50 mcg/act nasal spray instill 1 spray into each nostril once daily 16 g 2    guaiFENesin (MUCINEX) 600 mg 12 hr tablet Take 2 tablets (1,200 mg total) by mouth every 12 (twelve) hours (Patient taking differently: Take 1,200 mg by mouth every 12 (twelve) hours PRN) 60 tablet 3    ipratropium-albuterol (DUO-NEB) 0.5-2.5 mg/3 mL nebulizer solution Take 3 mL by nebulization 4 (four) times a day 360 mL 3    lamoTRIgine (LaMICtal) 200 MG tablet Take 1 tablet (200 mg total) by mouth daily 30 tablet 2    levocetirizine (XYZAL) 5 MG tablet take 1 tablet by mouth every evening 30 tablet 4    losartan (COZAAR) 25 mg tablet Take 1 tablet (25 mg total) by mouth daily 30 tablet 5    methocarbamol (ROBAXIN) 500 mg tablet Take 500  mg by mouth 2 (two) times a day PRN      metoprolol succinate (TOPROL-XL) 25 mg 24 hr tablet Take 1 tablet (25 mg total) by mouth daily 30 tablet 2    nicotine (NICODERM CQ) 21 mg/24 hr TD 24 hr patch Place 1 patch on the skin over 24 hours every 24 hours (Patient not taking: Reported on 6/26/2024) 28 patch 0    nicotine polacrilex (COMMIT) 4 MG lozenge Apply 1 lozenge (4 mg total) to the mouth or throat as needed for smoking cessation (Patient not taking: Reported on 6/26/2024) 100 each 0    omeprazole (PriLOSEC) 20 mg delayed release capsule take 1 capsule by mouth twice a day 60 capsule 5    senna-docusate sodium (SENOKOT-S) 8.6-50 mg per tablet Take 3 tablets by mouth as needed        Stiolto Respimat 2.5-2.5 MCG/ACT inhaler inhale 2 puffs by mouth and INTO THE LUNGS once daily 4 g 11     No current facility-administered medications for this visit.        No Known Allergies    Review of Systems    Video Exam    There were no vitals filed for this visit.    Physical Exam

## 2024-07-11 ENCOUNTER — HOSPITAL ENCOUNTER (OUTPATIENT)
Dept: NON INVASIVE DIAGNOSTICS | Facility: CLINIC | Age: 59
Discharge: HOME | End: 2024-07-11
Payer: COMMERCIAL

## 2024-07-11 VITALS
BODY MASS INDEX: 21.01 KG/M2 | DIASTOLIC BLOOD PRESSURE: 90 MMHG | HEART RATE: 78 BPM | HEIGHT: 60 IN | WEIGHT: 107 LBS | SYSTOLIC BLOOD PRESSURE: 146 MMHG

## 2024-07-11 DIAGNOSIS — I50.20 SYSTOLIC HEART FAILURE, UNSPECIFIED HF CHRONICITY (HCC): ICD-10-CM

## 2024-07-11 LAB
AORTIC ROOT: 2.3 CM
AORTIC VALVE MEAN VELOCITY: 8.9 M/S
APICAL FOUR CHAMBER EJECTION FRACTION: 46 %
AV AREA BY CONTINUOUS VTI: 1.5 CM2
AV AREA PEAK VELOCITY: 1.6 CM2
AV LVOT MEAN GRADIENT: 1 MMHG
AV LVOT PEAK GRADIENT: 2 MMHG
AV MEAN GRADIENT: 4 MMHG
AV PEAK GRADIENT: 8 MMHG
AV VALVE AREA: 1.51 CM2
AV VELOCITY RATIO: 0.51
BSA FOR ECHO PROCEDURE: 1.43 M2
DOP CALC AO PEAK VEL: 1.42 M/S
DOP CALC AO VTI: 30.56 CM
DOP CALC LVOT AREA: 3.14 CM2
DOP CALC LVOT CARDIAC INDEX: 2.38 L/MIN/M2
DOP CALC LVOT CARDIAC OUTPUT: 3.4 L/MIN
DOP CALC LVOT DIAMETER: 2 CM
DOP CALC LVOT PEAK VEL VTI: 14.68 CM
DOP CALC LVOT PEAK VEL: 0.72 M/S
DOP CALC LVOT STROKE INDEX: 30.8 ML/M2
DOP CALC LVOT STROKE VOLUME: 46.1
E WAVE DECELERATION TIME: 190 MS
E/A RATIO: 0.8
FRACTIONAL SHORTENING: 20 (ref 28–44)
GLOBAL LONGITUIDAL STRAIN: -15 %
INTERVENTRICULAR SEPTUM IN DIASTOLE (PARASTERNAL SHORT AXIS VIEW): 1.1 CM
INTERVENTRICULAR SEPTUM: 1.1 CM (ref 0.6–1.1)
LAAS-AP2: 17.7 CM2
LAAS-AP4: 12.4 CM2
LEFT ATRIUM SIZE: 2.3 CM
LEFT ATRIUM VOLUME (MOD BIPLANE): 45 ML
LEFT ATRIUM VOLUME INDEX (MOD BIPLANE): 31.5 ML/M2
LEFT INTERNAL DIMENSION IN SYSTOLE: 3.3 CM (ref 2.1–4)
LEFT VENTRICULAR INTERNAL DIMENSION IN DIASTOLE: 4.1 CM (ref 3.5–6)
LEFT VENTRICULAR POSTERIOR WALL IN END DIASTOLE: 1.2 CM
LEFT VENTRICULAR STROKE VOLUME: 27 ML
LVSV (TEICH): 27 ML
MV E'TISSUE VEL-SEP: 8 CM/S
MV PEAK A VEL: 0.83 M/S
MV PEAK E VEL: 66 CM/S
MV STENOSIS PRESSURE HALF TIME: 55 MS
MV VALVE AREA P 1/2 METHOD: 4
RIGHT ATRIUM AREA SYSTOLE A4C: 8 CM2
RIGHT VENTRICLE ID DIMENSION: 2 CM
SL CV LEFT ATRIUM LENGTH A2C: 4.2 CM
SL CV LV EF: 45
SL CV PED ECHO LEFT VENTRICLE DIASTOLIC VOLUME (MOD BIPLANE) 2D: 73 ML
SL CV PED ECHO LEFT VENTRICLE SYSTOLIC VOLUME (MOD BIPLANE) 2D: 46 ML
TR MAX PG: 18 MMHG
TR PEAK VELOCITY: 2.1 M/S
TRICUSPID VALVE PEAK REGURGITATION VELOCITY: 2.11 M/S

## 2024-07-11 PROCEDURE — 93308 TTE F-UP OR LMTD: CPT

## 2024-07-11 PROCEDURE — 93325 DOPPLER ECHO COLOR FLOW MAPG: CPT | Performed by: INTERNAL MEDICINE

## 2024-07-11 PROCEDURE — 93321 DOPPLER ECHO F-UP/LMTD STD: CPT | Performed by: INTERNAL MEDICINE

## 2024-07-11 PROCEDURE — 93356 MYOCRD STRAIN IMG SPCKL TRCK: CPT | Performed by: INTERNAL MEDICINE

## 2024-07-11 PROCEDURE — 93325 DOPPLER ECHO COLOR FLOW MAPG: CPT

## 2024-07-11 PROCEDURE — 93321 DOPPLER ECHO F-UP/LMTD STD: CPT

## 2024-07-11 PROCEDURE — 93308 TTE F-UP OR LMTD: CPT | Performed by: INTERNAL MEDICINE

## 2024-07-15 ENCOUNTER — TELEPHONE (OUTPATIENT)
Dept: CARDIOLOGY CLINIC | Facility: CLINIC | Age: 59
End: 2024-07-15

## 2024-07-15 NOTE — TELEPHONE ENCOUNTER
----- Message from Kalyn Gonzalez PA-C sent at 7/13/2024 11:08 AM EDT -----  Please let Marie know that her echo shows a slight improvement in her ejection fraction to 45 to 50% from her previous echo.  She should continue doing what she is doing and let us know if she has any questions.   Thanks,  G

## 2024-07-16 ENCOUNTER — TELEPHONE (OUTPATIENT)
Dept: PSYCHIATRY | Facility: CLINIC | Age: 59
End: 2024-07-16

## 2024-07-18 ENCOUNTER — TELEMEDICINE (OUTPATIENT)
Dept: BEHAVIORAL/MENTAL HEALTH CLINIC | Facility: CLINIC | Age: 59
End: 2024-07-18
Payer: COMMERCIAL

## 2024-07-18 DIAGNOSIS — F17.210 CIGARETTE NICOTINE DEPENDENCE WITHOUT COMPLICATION: ICD-10-CM

## 2024-07-18 DIAGNOSIS — F31.81 MODERATE MIXED BIPOLAR II DISORDER (HCC): Primary | ICD-10-CM

## 2024-07-18 DIAGNOSIS — F41.1 GENERALIZED ANXIETY DISORDER: ICD-10-CM

## 2024-07-18 DIAGNOSIS — F10.11 ALCOHOL ABUSE, IN REMISSION: ICD-10-CM

## 2024-07-18 DIAGNOSIS — F12.90 MARIJUANA USE: Chronic | ICD-10-CM

## 2024-07-18 DIAGNOSIS — F43.12 CHRONIC POST-TRAUMATIC STRESS DISORDER (PTSD): ICD-10-CM

## 2024-07-18 DIAGNOSIS — Z72.0 TOBACCO ABUSE: ICD-10-CM

## 2024-07-18 PROCEDURE — 90834 PSYTX W PT 45 MINUTES: CPT | Performed by: COUNSELOR

## 2024-07-18 NOTE — PSYCH
Virtual Regular Visit    Verification of patient location:    Patient is located at Home in the following state in which I hold an active license [place]      Assessment/Plan:    Problem List Items Addressed This Visit       Moderate mixed bipolar II disorder (HCC) - Primary    Generalized anxiety disorder    Chronic post-traumatic stress disorder (PTSD)    Alcohol abuse, in remission    Marijuana use (Chronic)    Cigarette nicotine dependence without complication    Tobacco abuse       Goals addressed in session: Goal 1          Reason for visit is No chief complaint on file.       Encounter provider Isabel Watts LPC      Recent Visits  No visits were found meeting these conditions.  Showing recent visits within past 7 days and meeting all other requirements  Future Appointments  No visits were found meeting these conditions.  Showing future appointments within next 150 days and meeting all other requirements       The patient was identified by name and date of birth. Marie Portillo was informed that this is a telemedicine visit and that the visit is being conducted throughthe Epic Embedded platform. She agrees to proceed..  My office door was closed. No one else was in the room.  She acknowledged consent and understanding of privacy and security of the video platform. The patient has agreed to participate and understands they can discontinue the visit at any time.    Patient is aware this is a billable service.     Subjective  Marie Portillo is a 58 y.o. female  .      HPI     Past Medical History:   Diagnosis Date    Bilateral carpal tunnel syndrome 08/20/2019    Cardiomyopathy (HCC)     Carpal tunnel syndrome of right wrist 10/30/2019    Added automatically from request for surgery [id number]    Chlamydia     COPD (chronic obstructive pulmonary disease) (HCC)     Depression     Hypertension     Varicella        Past Surgical History:   Procedure Laterality Date    BACK SURGERY      BREAST BIOPSY Left 20yrs ago benign     CERVICAL SPINE SURGERY      EGD      HERNIA REPAIR      OOPHORECTOMY Right     [place] [facility] WRST SURG W/RLS TRANSVRS CARPL LIGM Right 01/21/2020    Procedure: RELEASE CARPAL TUNNEL ENDOSCOPIC;  Surgeon: Sam Nguyen MD;  [facility];  Service: Orthopedics    [place] [facility] WRST SURG W/RLS TRANSVRS CARPL LIGM Left 01/28/2020    Procedure: RELEASE CARPAL TUNNEL ENDOSCOPIC;  Surgeon: Sam Nguyen MD;  [facility];  Service: Orthopedics       Current Outpatient Medications   Medication Sig Dispense Refill    Acetaminophen 500 MG Take 2 capsules by mouth every 4 (four) hours as needed for mild pain      albuterol (PROVENTIL HFA,VENTOLIN HFA) 90 mcg/act inhaler inhale 2 puffs by mouth and INTO THE LUNGS every 6 hours IF NEEDED FOR WHEEZING. 8.5 g 2    buPROPion (WELLBUTRIN XL) 300 mg 24 hr tablet Take 1 tablet (300 mg total) by mouth daily 30 tablet 2    cholecalciferol (VITAMIN D3) 400 units tablet Take 400 Units by mouth daily      fluticasone (FLONASE) 50 mcg/act nasal spray instill 1 spray into each nostril once daily 16 g 2    guaiFENesin (MUCINEX) 600 mg 12 hr tablet Take 2 tablets (1,200 mg total) by mouth every 12 (twelve) hours (Patient taking differently: Take 1,200 mg by mouth every 12 (twelve) hours PRN) 60 tablet 3    ipratropium-albuterol (DUO-NEB) 0.5-2.5 mg/3 mL nebulizer solution Take 3 mL by nebulization 4 (four) times a day 360 mL 3    lamoTRIgine (LaMICtal) 200 MG tablet Take 1 tablet (200 mg total) by mouth daily 30 tablet 2    levocetirizine (XYZAL) 5 MG tablet take 1 tablet by mouth every evening 30 tablet 4    losartan (COZAAR) 25 mg tablet Take 1 tablet (25 mg total) by mouth daily 30 tablet 5    methocarbamol (ROBAXIN) 500 mg tablet Take 500 mg by mouth 2 (two) times a day PRN      metoprolol succinate (TOPROL-XL) 25 mg 24 hr tablet take 1 tablet by mouth once daily 30 tablet 0    nicotine (NICODERM CQ) 21 mg/24 hr TD 24 hr patch Place 1 patch on the skin over 24 hours every  24 hours (Patient not taking: Reported on 6/26/2024) 28 patch 0    nicotine polacrilex (COMMIT) 4 MG lozenge Apply 1 lozenge (4 mg total) to the mouth or throat as needed for smoking cessation (Patient not taking: Reported on 6/26/2024) 100 each 0    omeprazole (PriLOSEC) 20 mg delayed release capsule take 1 capsule by mouth twice a day 60 capsule 5    senna-docusate sodium (SENOKOT-S) 8.6-50 mg per tablet Take 3 tablets by mouth as needed        Stiolto Respimat 2.5-2.5 MCG/ACT inhaler inhale 2 puffs by mouth and INTO THE LUNGS once daily 4 g 11     No current facility-administered medications for this visit.        No Known Allergies    Review of Systems    Video Exam    There were no vitals filed for this visit.    Physical Exam

## 2024-07-18 NOTE — PSYCH
Behavioral Health Psychotherapy Progress Note    Psychotherapy Provided: Individual Psychotherapy     1. Moderate mixed bipolar II disorder (HCC)        2. Generalized anxiety disorder        3. Chronic post-traumatic stress disorder (PTSD)        4. Tobacco abuse        5. Cigarette nicotine dependence without complication        6. Marijuana use        7. Alcohol abuse, in remission            Goals addressed in session: Goal 1     DATA: Marie was allowed to express her negative emotions after she read her results from her MRI and CAT scan and got fearful and disappointed. She was encouraged to focus on different subjects or meaningful projects to help herself shift her attention on positive things and things under her control. Marie marked moderately severe depression due to her increased stress from health issues and surgeries with numerous screenings and tests. Marie was encouraged  to use her tools and supports to be engaged in what makes her feel better and take notes to counter forgetfulness.   During this session, this clinician used the following therapeutic modalities: Client-centered Therapy, Cognitive Behavioral Therapy, Mindfulness-based Strategies, and Supportive Psychotherapy    Substance Abuse was not addressed during this session. If the client is diagnosed with a co-occurring substance use disorder, please indicate any changes in the frequency or amount of use: NA. Stage of change for addressing substance use diagnoses: Pre-contemplation    ASSESSMENT:  Marie Portillo presents with a Euthymic/ normal, Anxious, and Depressed mood.     her affect is Normal range and intensity, Constricted, and Tearful, at times, which is congruent, with her mood and the content of the session. The client has made progress on their goals.    Marie seemed worried and cycling in a vicious loop of fear response that she resisted to oppose or counter at first. Marie Portillo presents with a low risk of suicide, minimal risk  "of self-harm, and none risk of harm to others.    For any risk assessment that surpasses a \"low\" rating, a safety plan must be developed.    A safety plan was indicated: no  If yes, describe in detail NA    PLAN: Between sessions, Marie Portillo will attend weekly Relaxation/Mindfulness group to be able to practice self-care and focus on positive topics of healing. At the next session, the therapist will use Client-centered Therapy, Dialectical Behavior Therapy, Mindfulness-based Strategies, and Supportive Psychotherapy to address dwelling in negative self-talk and intensifying emotional turmoil.    Behavioral Health Treatment Plan and Discharge Planning: Marie Portillo is aware of and agrees to continue to work on their treatment plan. They have identified and are working toward their discharge goals. yes    Visit start and stop times:    07/18/24  Start Time: 1255  Stop Time: 1336  Total Visit Time: 41 minutes  "

## 2024-07-24 ENCOUNTER — TELEMEDICINE (OUTPATIENT)
Dept: BEHAVIORAL/MENTAL HEALTH CLINIC | Facility: CLINIC | Age: 59
End: 2024-07-24
Payer: COMMERCIAL

## 2024-07-24 DIAGNOSIS — F31.81 MODERATE MIXED BIPOLAR II DISORDER (HCC): Primary | ICD-10-CM

## 2024-07-24 DIAGNOSIS — F17.210 CIGARETTE NICOTINE DEPENDENCE WITHOUT COMPLICATION: ICD-10-CM

## 2024-07-24 DIAGNOSIS — F10.11 ALCOHOL ABUSE, IN REMISSION: ICD-10-CM

## 2024-07-24 DIAGNOSIS — F43.12 CHRONIC POST-TRAUMATIC STRESS DISORDER (PTSD): ICD-10-CM

## 2024-07-24 DIAGNOSIS — Z72.0 TOBACCO ABUSE: ICD-10-CM

## 2024-07-24 DIAGNOSIS — F41.1 GENERALIZED ANXIETY DISORDER: ICD-10-CM

## 2024-07-24 DIAGNOSIS — F12.90 MARIJUANA USE: Chronic | ICD-10-CM

## 2024-07-24 PROCEDURE — 90834 PSYTX W PT 45 MINUTES: CPT | Performed by: COUNSELOR

## 2024-07-24 NOTE — PSYCH
Behavioral Health Psychotherapy Progress Note    Psychotherapy Provided: Individual Psychotherapy     1. Moderate mixed bipolar II disorder (HCC)        2. Chronic post-traumatic stress disorder (PTSD)        3. Generalized anxiety disorder        4. Tobacco abuse        5. Cigarette nicotine dependence without complication        6. Marijuana use        7. Alcohol abuse, in remission            Goals addressed in session: Goal 1     DATA: Marie was observed crying for her inability to see her grandchildren and let them know that she loves them and thinks of them. She shared that she returned to writing them letters and that she over-smokes marijuana from the morning due to intense emotions and was encouraged to remember her learned Emotion Regulation skills and Mindfulness skills to apply them and practice them when dealing with challenging situations and memories. She was redirected to other topics and family members and became more positive and talkative, bubbly, and joyful.    During this session, this clinician used the following therapeutic modalities: Client-centered Therapy, Cognitive Behavioral Therapy, Dialectical Behavior Therapy, Mindfulness-based Strategies, and Supportive Psychotherapy    Substance Abuse was addressed during this session. If the client is diagnosed with a co-occurring substance use disorder, please indicate any changes in the frequency or amount of use: smoking recently marijuana in the morning, too. Stage of change for addressing substance use diagnoses: Pre-contemplation    ASSESSMENT:  Marie Portillo presents with a Euthymic/ normal, Anxious, and Depressed mood.     her affect is Normal range and intensity and Tearful, which is congruent, with her mood and the content of the session. The client has made progress on their goals.    Marie seemed very upset in the beginning but after redirection, she did well expressing positive memories and emotions. Marie Portillo presents with a low  "risk of suicide, low risk of self-harm, and none risk of harm to others.    For any risk assessment that surpasses a \"low\" rating, a safety plan must be developed.    A safety plan was indicated: no  If yes, describe in detail NA    PLAN: Between sessions, Marie Portillo will use focusing on positive memories to Push Away from upsetting thoughts. At the next session, the therapist will use Client-centered Therapy, Dialectical Behavior Therapy, Mindfulness-based Strategies, and Supportive Psychotherapy to address self-care and intense emotions handling.    Behavioral Health Treatment Plan and Discharge Planning: Marie Portillo is aware of and agrees to continue to work on their treatment plan. They have identified and are working toward their discharge goals. yes    Visit start and stop times:    07/24/24  Start Time: 0154  Stop Time: 0233  Total Visit Time: 39 minutes  "

## 2024-07-24 NOTE — PSYCH
Virtual Regular Visit    Verification of patient location:    Patient is located at Home in the following state in which I hold an active license [place]      Assessment/Plan:    Problem List Items Addressed This Visit       Moderate mixed bipolar II disorder (HCC) - Primary    Generalized anxiety disorder    Chronic post-traumatic stress disorder (PTSD)    Alcohol abuse, in remission    Marijuana use (Chronic)    Cigarette nicotine dependence without complication    Tobacco abuse       Goals addressed in session: Goal 1          Reason for visit is No chief complaint on file.       Encounter provider Isabel Watts LPC      Recent Visits  Date Type Provider Dept   07/18/24 Telemedicine Isabel Watts LPC [facility] Therapist [place]   Showing recent visits within past 7 days and meeting all other requirements  Future Appointments  No visits were found meeting these conditions.  Showing future appointments within next 150 days and meeting all other requirements       The patient was identified by name and date of birth. Marie Portillo was informed that this is a telemedicine visit and that the visit is being conducted throughthe Epic Embedded platform. She agrees to proceed..  My office door was closed. No one else was in the room.  She acknowledged consent and understanding of privacy and security of the video platform. The patient has agreed to participate and understands they can discontinue the visit at any time.    Patient is aware this is a billable service.     Subjective  Marie Portillo is a 58 y.o. female  .      HPI     Past Medical History:   Diagnosis Date    Bilateral carpal tunnel syndrome 08/20/2019    Cardiomyopathy (HCC)     Carpal tunnel syndrome of right wrist 10/30/2019    Added automatically from request for surgery [id number]    Chlamydia     COPD (chronic obstructive pulmonary disease) (HCC)     Depression     Hypertension     Varicella        Past Surgical History:   Procedure  Laterality Date    BACK SURGERY      BREAST BIOPSY Left 20yrs ago benign    CERVICAL SPINE SURGERY      EGD      HERNIA REPAIR      OOPHORECTOMY Right     [place] [facility] WRST SURG W/RLS TRANSVRS CARPL LIGM Right 01/21/2020    Procedure: RELEASE CARPAL TUNNEL ENDOSCOPIC;  Surgeon: Sam Nguyen MD;  [facility]: [facility];  Service: Orthopedics    [place] [facility] WRST SURG W/RLS TRANSVRS CARPL LIGM Left 01/28/2020    Procedure: RELEASE CARPAL TUNNEL ENDOSCOPIC;  Surgeon: Sam Nguyen MD;  Location: [facility];  Service: Orthopedics       Current Outpatient Medications   Medication Sig Dispense Refill    Acetaminophen 500 MG Take 2 capsules by mouth every 4 (four) hours as needed for mild pain      albuterol (PROVENTIL HFA,VENTOLIN HFA) 90 mcg/act inhaler inhale 2 puffs by mouth and INTO THE LUNGS every 6 hours IF NEEDED FOR WHEEZING. 8.5 g 2    buPROPion (WELLBUTRIN XL) 300 mg 24 hr tablet Take 1 tablet (300 mg total) by mouth daily 30 tablet 2    cholecalciferol (VITAMIN D3) 400 units tablet Take 400 Units by mouth daily      fluticasone (FLONASE) 50 mcg/act nasal spray instill 1 spray into each nostril once daily 16 g 2    guaiFENesin (MUCINEX) 600 mg 12 hr tablet Take 2 tablets (1,200 mg total) by mouth every 12 (twelve) hours (Patient taking differently: Take 1,200 mg by mouth every 12 (twelve) hours PRN) 60 tablet 3    ipratropium-albuterol (DUO-NEB) 0.5-2.5 mg/3 mL nebulizer solution Take 3 mL by nebulization 4 (four) times a day 360 mL 3    lamoTRIgine (LaMICtal) 200 MG tablet Take 1 tablet (200 mg total) by mouth daily 30 tablet 2    levocetirizine (XYZAL) 5 MG tablet take 1 tablet by mouth every evening 30 tablet 4    losartan (COZAAR) 25 mg tablet Take 1 tablet (25 mg total) by mouth daily 30 tablet 5    methocarbamol (ROBAXIN) 500 mg tablet Take 500 mg by mouth 2 (two) times a day PRN      metoprolol succinate (TOPROL-XL) 25 mg 24 hr tablet take 1 tablet by mouth once daily 30 tablet 0    nicotine (NICODERM CQ)  21 mg/24 hr TD 24 hr patch Place 1 patch on the skin over 24 hours every 24 hours (Patient not taking: Reported on 6/26/2024) 28 patch 0    nicotine polacrilex (COMMIT) 4 MG lozenge Apply 1 lozenge (4 mg total) to the mouth or throat as needed for smoking cessation (Patient not taking: Reported on 6/26/2024) 100 each 0    omeprazole (PriLOSEC) 20 mg delayed release capsule take 1 capsule by mouth twice a day 60 capsule 5    senna-docusate sodium (SENOKOT-S) 8.6-50 mg per tablet Take 3 tablets by mouth as needed        Stiolto Respimat 2.5-2.5 MCG/ACT inhaler inhale 2 puffs by mouth and INTO THE LUNGS once daily 4 g 11     No current facility-administered medications for this visit.        No Known Allergies    Review of Systems    Video Exam    There were no vitals filed for this visit.    Physical Exam

## 2024-07-26 ENCOUNTER — TELEMEDICINE (OUTPATIENT)
Dept: BEHAVIORAL/MENTAL HEALTH CLINIC | Facility: CLINIC | Age: 59
End: 2024-07-26
Payer: COMMERCIAL

## 2024-07-26 DIAGNOSIS — F43.12 CHRONIC POST-TRAUMATIC STRESS DISORDER (PTSD): ICD-10-CM

## 2024-07-26 DIAGNOSIS — Z72.0 TOBACCO ABUSE: ICD-10-CM

## 2024-07-26 DIAGNOSIS — F17.210 CIGARETTE NICOTINE DEPENDENCE WITHOUT COMPLICATION: ICD-10-CM

## 2024-07-26 DIAGNOSIS — F31.81 MODERATE MIXED BIPOLAR II DISORDER (HCC): Primary | ICD-10-CM

## 2024-07-26 DIAGNOSIS — F41.1 GENERALIZED ANXIETY DISORDER: ICD-10-CM

## 2024-07-26 DIAGNOSIS — F12.90 MARIJUANA USE: Chronic | ICD-10-CM

## 2024-07-26 DIAGNOSIS — F10.11 ALCOHOL ABUSE, IN REMISSION: ICD-10-CM

## 2024-07-26 PROCEDURE — 90853 GROUP PSYCHOTHERAPY: CPT | Performed by: COUNSELOR

## 2024-07-26 NOTE — PSYCH
Virtual Regular Visit    Verification of patient location:    Patient is located at Home in the following state in which I hold an active license [place]      Assessment/Plan:    Problem List Items Addressed This Visit       Moderate mixed bipolar II disorder (HCC) - Primary    Generalized anxiety disorder    Chronic post-traumatic stress disorder (PTSD)    Alcohol abuse, in remission    Marijuana use (Chronic)    Cigarette nicotine dependence without complication    Tobacco abuse       Goals addressed in session: Goal 1          Reason for visit is No chief complaint on file.       Encounter provider Isabel Watts LPC      Recent Visits  Date Type Provider Dept   07/24/24 Telemedicine Isabel Watts LPC [facility] Therapist [place]   Showing recent visits within past 7 days and meeting all other requirements  Future Appointments  No visits were found meeting these conditions.  Showing future appointments within next 150 days and meeting all other requirements       The patient was identified by name and date of birth. Marie Portillo was informed that this is a telemedicine visit and that the visit is being conducted throughthe [employer] Teams platform. She agrees to proceed..  My office door was closed. The patient was notified the following individuals were present in the room other group members.  She acknowledged consent and understanding of privacy and security of the video platform. The patient has agreed to participate and understands they can discontinue the visit at any time.    Patient is aware this is a billable service.     Subjective  Marie Portillo is a 58 y.o. female  .    Data: Marie attended the Relaxation group and was guided through tapping on pain, diaphragmatic breathing, other Vagus nerve stimulation techniques, and progressive muscle relaxation. Furthermore, attendees were guided through a quick body scan for pain, Handwarming Guided Imagery, Shame Reduction Meditation exercise  with Inner Child Healing Trauma recovery, Alleviating Feelings of Anger and Resentment, and ending with affirmations for self-love, forgiveness, and moving on from trauma. Psychoeducation, EFT, Mindfulness-based, and Somatic approaches were used to achieve the deep state of relaxation and to maintain the serene environment for the exercise's purpose.     No alcohol or drug problems were addressed in the session.    No safety plan was indicated due to no safety issues at the moment.      Assessment: Marie was in normal/euthymic mood and in congruence with broad affect, followed through guidance and completed the exercises well, showed willingness to continue learning and practicing self-care in weekly Relaxation Group as well as individual therapy.      Plan: Marie will practice deep breathing and other techniques based on each occasion to counter stress and will attend the next group to be able to take part in further guided exercises on chosen topics to build on the techniques learned in the beginning.    HPI     Past Medical History:   Diagnosis Date    Bilateral carpal tunnel syndrome 08/20/2019    Cardiomyopathy (HCC)     Carpal tunnel syndrome of right wrist 10/30/2019    Added automatically from request for surgery [id number]    Chlamydia     COPD (chronic obstructive pulmonary disease) (HCC)     Depression     Hypertension     Varicella        Past Surgical History:   Procedure Laterality Date    BACK SURGERY      BREAST BIOPSY Left 20yrs ago benign    CERVICAL SPINE SURGERY      EGD      HERNIA REPAIR      OOPHORECTOMY Right     [place] [facility] WRST SURG W/RLS TRANSVRS CARPL LIGM Right 01/21/2020    Procedure: RELEASE CARPAL TUNNEL ENDOSCOPIC;  Surgeon: Sam Nguyen MD;  [facility];  Service: Orthopedics    [place] [facility] WRST SURG W/RLS TRANSVRS CARPL LIGM Left 01/28/2020    Procedure: RELEASE CARPAL TUNNEL ENDOSCOPIC;  Surgeon: Sam Nguyen MD;  [facility];  Service: Orthopedics       Current  Outpatient Medications   Medication Sig Dispense Refill    Acetaminophen 500 MG Take 2 capsules by mouth every 4 (four) hours as needed for mild pain      albuterol (PROVENTIL HFA,VENTOLIN HFA) 90 mcg/act inhaler inhale 2 puffs by mouth and INTO THE LUNGS every 6 hours IF NEEDED FOR WHEEZING. 8.5 g 2    buPROPion (WELLBUTRIN XL) 300 mg 24 hr tablet Take 1 tablet (300 mg total) by mouth daily 30 tablet 2    cholecalciferol (VITAMIN D3) 400 units tablet Take 400 Units by mouth daily      fluticasone (FLONASE) 50 mcg/act nasal spray instill 1 spray into each nostril once daily 16 g 2    guaiFENesin (MUCINEX) 600 mg 12 hr tablet Take 2 tablets (1,200 mg total) by mouth every 12 (twelve) hours (Patient taking differently: Take 1,200 mg by mouth every 12 (twelve) hours PRN) 60 tablet 3    ipratropium-albuterol (DUO-NEB) 0.5-2.5 mg/3 mL nebulizer solution Take 3 mL by nebulization 4 (four) times a day 360 mL 3    lamoTRIgine (LaMICtal) 200 MG tablet Take 1 tablet (200 mg total) by mouth daily 30 tablet 2    levocetirizine (XYZAL) 5 MG tablet take 1 tablet by mouth every evening 30 tablet 4    losartan (COZAAR) 25 mg tablet Take 1 tablet (25 mg total) by mouth daily 30 tablet 5    methocarbamol (ROBAXIN) 500 mg tablet Take 500 mg by mouth 2 (two) times a day PRN      metoprolol succinate (TOPROL-XL) 25 mg 24 hr tablet take 1 tablet by mouth once daily 30 tablet 0    nicotine (NICODERM CQ) 21 mg/24 hr TD 24 hr patch Place 1 patch on the skin over 24 hours every 24 hours (Patient not taking: Reported on 6/26/2024) 28 patch 0    nicotine polacrilex (COMMIT) 4 MG lozenge Apply 1 lozenge (4 mg total) to the mouth or throat as needed for smoking cessation (Patient not taking: Reported on 6/26/2024) 100 each 0    omeprazole (PriLOSEC) 20 mg delayed release capsule take 1 capsule by mouth twice a day 60 capsule 5    senna-docusate sodium (SENOKOT-S) 8.6-50 mg per tablet Take 3 tablets by mouth as needed        Stiolto Respimat  2.5-2.5 MCG/ACT inhaler inhale 2 puffs by mouth and INTO THE LUNGS once daily 4 g 11     No current facility-administered medications for this visit.        No Known Allergies    Review of Systems    Video Exam    There were no vitals filed for this visit.    Physical Exam     07/26/24  Start Time: 0400  Stop Time: 0500  Total Visit Time: 60 minutes

## 2024-07-31 ENCOUNTER — TELEMEDICINE (OUTPATIENT)
Dept: BEHAVIORAL/MENTAL HEALTH CLINIC | Facility: CLINIC | Age: 59
End: 2024-07-31
Payer: COMMERCIAL

## 2024-07-31 DIAGNOSIS — Z72.0 TOBACCO ABUSE: ICD-10-CM

## 2024-07-31 DIAGNOSIS — F17.210 CIGARETTE NICOTINE DEPENDENCE WITHOUT COMPLICATION: ICD-10-CM

## 2024-07-31 DIAGNOSIS — J30.2 SEASONAL ALLERGIES: ICD-10-CM

## 2024-07-31 DIAGNOSIS — F43.12 CHRONIC POST-TRAUMATIC STRESS DISORDER (PTSD): ICD-10-CM

## 2024-07-31 DIAGNOSIS — F12.90 MARIJUANA USE: Chronic | ICD-10-CM

## 2024-07-31 DIAGNOSIS — F31.81 MODERATE MIXED BIPOLAR II DISORDER (HCC): Primary | ICD-10-CM

## 2024-07-31 DIAGNOSIS — F41.1 GENERALIZED ANXIETY DISORDER: ICD-10-CM

## 2024-07-31 DIAGNOSIS — F10.11 ALCOHOL ABUSE, IN REMISSION: ICD-10-CM

## 2024-07-31 PROCEDURE — 90837 PSYTX W PT 60 MINUTES: CPT | Performed by: COUNSELOR

## 2024-07-31 NOTE — PSYCH
"Behavioral Health Psychotherapy Progress Note    Psychotherapy Provided: Individual Psychotherapy     1. Moderate mixed bipolar II disorder (HCC)        2. Generalized anxiety disorder        3. Chronic post-traumatic stress disorder (PTSD)        4. Tobacco abuse        5. Cigarette nicotine dependence without complication        6. Marijuana use        7. Alcohol abuse, in remission            Goals addressed in session: Goal 1     DATA: Marie reported feeling persistently depressed and reported waking up in tears and then dwells in negative self-talk and that keeps her upset. She was validated and redirected to positive topics and observed of being in a better mood, more introspective, and with less barriers to listen diverse opinions. Marie admitted that she recently tends to dwell more in self-defeating thoughts like \"I am nothing,\" I am worthless\" and was encouraged to use more another example that she provided- \"I am wonderful grandmother.\"     During this session, this clinician used the following therapeutic modalities: Client-centered Therapy, Cognitive Behavioral Therapy, Dialectical Behavior Therapy, Mindfulness-based Strategies, and Supportive Psychotherapy    Substance Abuse was not addressed during this session. If the client is diagnosed with a co-occurring substance use disorder, please indicate any changes in the frequency or amount of use: NA. Stage of change for addressing substance use diagnoses: Pre-contemplation    ASSESSMENT:  Marie Portillo presents with a Euthymic/ normal, Anxious, and Depressed mood.     her affect is Normal range and intensity and Tearful, at times, which is congruent, with her mood and the content of the session. The client has made progress on their goals.    Marie seemed very emotional and upset at first, while talking about her daughter and grandchildren, but she managed to focus on other topics and improved her mood. Marie Portillo presents with a low risk of suicide, " "low risk of self-harm, and none risk of harm to others.    For any risk assessment that surpasses a \"low\" rating, a safety plan must be developed.    A safety plan was indicated: no  If yes, describe in detail NA    PLAN: Between sessions, Marie Portillo will use her Mindfulness skills when remembering and will attend . At the next session, the therapist will use Client-centered Therapy, Cognitive Behavioral Therapy, Dialectical Behavior Therapy, Mindfulness-based Strategies, and Supportive Psychotherapy to address self-talk and dwelling in toxic thoughts and beliefs.    Behavioral Health Treatment Plan and Discharge Planning: Marie Portillo is aware of and agrees to continue to work on their treatment plan. They have identified and are working toward their discharge goals. yes    Visit start and stop times:    07/31/24  Start Time: 0157  Stop Time: 0257  Total Visit Time: 60 minutes  "

## 2024-07-31 NOTE — PSYCH
Virtual Regular Visit    Verification of patient location:    Patient is located at Home in the following state in which I hold an active license [place]      Assessment/Plan:    Problem List Items Addressed This Visit       Moderate mixed bipolar II disorder (HCC) - Primary    Generalized anxiety disorder    Chronic post-traumatic stress disorder (PTSD)    Alcohol abuse, in remission    Marijuana use (Chronic)    Cigarette nicotine dependence without complication    Tobacco abuse       Goals addressed in session: Goal 1          Reason for visit is No chief complaint on file.       Encounter provider Isabel Watts LPC      Recent Visits  Date Type Provider Dept   07/26/24 Telemedicine Isabel Watts LPC [facility] Therapist [place]   07/24/24 Telemedicine Isabel Watts LPC [facility]rap[facility] [place]   Showing recent visits within past 7 days and meeting all other requirements  Future Appointments  No visits were found meeting these conditions.  Showing future appointments within next 150 days and meeting all other requirements       The patient was identified by name and date of birth. Marie Portillo was informed that this is a telemedicine visit and that the visit is being conducted throughthe Epic Embedded platform. She agrees to proceed..  My office door was closed. No one else was in the room.  She acknowledged consent and understanding of privacy and security of the video platform. The patient has agreed to participate and understands they can discontinue the visit at any time.    Patient is aware this is a billable service.     Subjective  Marie Portillo is a 58 y.o. female  .      HPI     Past Medical History:   Diagnosis Date    Bilateral carpal tunnel syndrome 08/20/2019    Cardiomyopathy (HCC)     Carpal tunnel syndrome of right wrist 10/30/2019    Added automatically from request for surgery [id number]    Chlamydia     COPD (chronic obstructive pulmonary disease) (HCC)      Depression     Hypertension     Varicella        Past Surgical History:   Procedure Laterality Date    BACK SURGERY      BREAST BIOPSY Left 20yrs ago benign    CERVICAL SPINE SURGERY      EGD      HERNIA REPAIR      OOPHORECTOMY Right     [place] [facility] WRST SURG W/RLS TRANSVRS CARPL LIGM Right 01/21/2020    Procedure: RELEASE CARPAL TUNNEL ENDOSCOPIC;  Surgeon: Sam Nguyen MD;  [facility];  Service: Orthopedics    [place] [facility] WRST SURG W/RLS TRANSVRS CARPL LIGM Left 01/28/2020    Procedure: RELEASE CARPAL TUNNEL ENDOSCOPIC;  Surgeon: Sam Nguyen MD;  [facility];  Service: Orthopedics       Current Outpatient Medications   Medication Sig Dispense Refill    Acetaminophen 500 MG Take 2 capsules by mouth every 4 (four) hours as needed for mild pain      albuterol (PROVENTIL HFA,VENTOLIN HFA) 90 mcg/act inhaler inhale 2 puffs by mouth and INTO THE LUNGS every 6 hours IF NEEDED FOR WHEEZING. 8.5 g 2    buPROPion (WELLBUTRIN XL) 300 mg 24 hr tablet Take 1 tablet (300 mg total) by mouth daily 30 tablet 2    cholecalciferol (VITAMIN D3) 400 units tablet Take 400 Units by mouth daily      fluticasone (FLONASE) 50 mcg/act nasal spray instill 1 spray into each nostril once daily 16 g 2    guaiFENesin (MUCINEX) 600 mg 12 hr tablet Take 2 tablets (1,200 mg total) by mouth every 12 (twelve) hours (Patient taking differently: Take 1,200 mg by mouth every 12 (twelve) hours PRN) 60 tablet 3    ipratropium-albuterol (DUO-NEB) 0.5-2.5 mg/3 mL nebulizer solution Take 3 mL by nebulization 4 (four) times a day 360 mL 3    lamoTRIgine (LaMICtal) 200 MG tablet Take 1 tablet (200 mg total) by mouth daily 30 tablet 2    levocetirizine (XYZAL) 5 MG tablet take 1 tablet by mouth every evening 30 tablet 4    losartan (COZAAR) 25 mg tablet Take 1 tablet (25 mg total) by mouth daily 30 tablet 5    methocarbamol (ROBAXIN) 500 mg tablet Take 500 mg by mouth 2 (two) times a day PRN      metoprolol succinate (TOPROL-XL) 25 mg  24 hr tablet take 1 tablet by mouth once daily 30 tablet 0    nicotine (NICODERM CQ) 21 mg/24 hr TD 24 hr patch Place 1 patch on the skin over 24 hours every 24 hours (Patient not taking: Reported on 6/26/2024) 28 patch 0    nicotine polacrilex (COMMIT) 4 MG lozenge Apply 1 lozenge (4 mg total) to the mouth or throat as needed for smoking cessation (Patient not taking: Reported on 6/26/2024) 100 each 0    omeprazole (PriLOSEC) 20 mg delayed release capsule take 1 capsule by mouth twice a day 60 capsule 5    senna-docusate sodium (SENOKOT-S) 8.6-50 mg per tablet Take 3 tablets by mouth as needed        Stiolto Respimat 2.5-2.5 MCG/ACT inhaler inhale 2 puffs by mouth and INTO THE LUNGS once daily 4 g 11     No current facility-administered medications for this visit.        No Known Allergies    Review of Systems    Video Exam    There were no vitals filed for this visit.    Physical Exam

## 2024-08-01 RX ORDER — LEVOCETIRIZINE DIHYDROCHLORIDE 5 MG/1
TABLET, FILM COATED ORAL
Qty: 30 TABLET | Refills: 4 | Status: SHIPPED | OUTPATIENT
Start: 2024-08-01

## 2024-08-05 ENCOUNTER — TELEPHONE (OUTPATIENT)
Dept: PSYCHIATRY | Facility: CLINIC | Age: 59
End: 2024-08-05

## 2024-08-06 ENCOUNTER — OFFICE VISIT (OUTPATIENT)
Dept: PSYCHIATRY | Facility: CLINIC | Age: 59
End: 2024-08-06
Payer: COMMERCIAL

## 2024-08-06 DIAGNOSIS — F41.9 ANXIETY DISORDER, UNSPECIFIED TYPE: ICD-10-CM

## 2024-08-06 DIAGNOSIS — F43.10 POST TRAUMATIC STRESS DISORDER (PTSD): ICD-10-CM

## 2024-08-06 DIAGNOSIS — F12.90 CANNABIS USE DISORDER: Primary | ICD-10-CM

## 2024-08-06 DIAGNOSIS — F31.32 BIPOLAR AFFECTIVE DISORDER, CURRENTLY DEPRESSED, MODERATE (HCC): ICD-10-CM

## 2024-08-06 DIAGNOSIS — F43.10 PTSD (POST-TRAUMATIC STRESS DISORDER): ICD-10-CM

## 2024-08-06 DIAGNOSIS — F31.62 BIPOLAR DISORDER, CURRENT EPISODE MIXED, MODERATE (HCC): ICD-10-CM

## 2024-08-06 PROCEDURE — 90792 PSYCH DIAG EVAL W/MED SRVCS: CPT | Performed by: PSYCHIATRY & NEUROLOGY

## 2024-08-06 PROCEDURE — 96127 BRIEF EMOTIONAL/BEHAV ASSMT: CPT | Performed by: PSYCHIATRY & NEUROLOGY

## 2024-08-06 RX ORDER — GABAPENTIN 300 MG/1
300 CAPSULE ORAL 3 TIMES DAILY
Qty: 90 CAPSULE | Refills: 0 | Status: SHIPPED | OUTPATIENT
Start: 2024-08-06 | End: 2024-08-13

## 2024-08-06 RX ORDER — BUPROPION HYDROCHLORIDE 150 MG/1
150 TABLET ORAL DAILY
Start: 2024-08-06 | End: 2024-08-13

## 2024-08-06 RX ORDER — OLANZAPINE AND FLUOXETINE 6; 25 MG/1; MG/1
1 CAPSULE ORAL EVERY EVENING
Qty: 30 CAPSULE | Refills: 0 | Status: SHIPPED | OUTPATIENT
Start: 2024-08-06 | End: 2024-09-05

## 2024-08-06 NOTE — PATIENT INSTRUCTIONS
"MEDICATION CHANGE: Taper off of Wellbutrin as follows: Take one 150 mg tablet for 7 days. THEN, stop.  Please call the office nursing staff for medication issues including refills, problems getting medications, bothersome side effects, etc., at [phone].      Please return for a follow up appointment as discussed and arrive approximately 15 minutes prior to your appointment time. If you are running late or are unable to attend your appointment, please call our [place] office at [phone] (fax: [phone]).     Look up \"grounding techniques\" and/or \"anchoring demonstration\" online and try a few to see what may work for you. Practice these skills before you need them, when you are not feeling too anxious or triggered. You can also search for free guided meditation videos online to help improve your head space when you are feeling very anxious or triggered.     Recommendations regarding insomnia:  Wake-up at the same time every day  Refrain from \"napping\".  Refrain from going to bed unless you're tired  Utilize your bedroom for sleep only.  Avoid use of electronics including television and/or cellphone/computers.  Refrain from use of electronics including television and/or cellphones/computers prior to bed  Turn your alarm clock away so the light is not visible.  Attempt relaxation using various means like reading if you're restless in bed for approximately 15-20 minutes.  Participate in regular physical activities like exercise, although avoid approximately 3-4 hours prior to bed.  Morning exercise is ideal.  Avoid caffeine use prior to bedtime.  Consider tapering down excessive use of caffeine.  Avoid tobacco use prior to bedtime.  Avoid alcohol use prior to bedtime.  Consider reading \"No More Sleepless nights\" by Jet Cardenas, Ph.D.  Consider use of online resources including:  http://[employer]/cbt-online-insomnia-treatment.html  http://www.[employer]  CBT-I  [place] on your Smart Phone.  Go! To Sleep " "by the [facility].     Healthy Diet   The American Heart Association and the American College of Cardiology have long recommended a healthy diet for not only patients who are at risk for atherosclerotic cardiovascular disease (ASCVD) but also the general public. In keeping with this evidence-based recommendation, the \"2018 Guideline on the Management of Blood Cholesterol\" stresses that a healthy diet should include adequate intake of these essentials:   Vegetables, fruits, and whole grains   Legumes and nuts   Low-fat dairy products   Low-fat poultry (without the skin)   Fish and seafood   Nontropical vegetable oils      The recent guidelines do provide room for cultural food preferences in a healthy diet, but in general, all patients should limit their intake of saturated and avoid all trans fats, sweets, sugar-sweetened beverages, and red meats.  Please maintain adequate hydration of at least 2 Liters of water per day, and improve nutrition by decreasing portion sizes, avoiding fried foods, fast foods, inappropriate snacking and overly processed and packaged items with 'added sugars' (whether in drinks or foods), and observing nutritional facts information on any items that are packaged to reduce intake of saturated fats and AVOID trans fats as mentioned above. Again, consume whole foods such as vegetables, higher lean protein intake, and using healthier lifestyle plans such as the Mediterranean diet with healthier fats such as those from seeds, nuts, and using organic extra virgin olive oil or avocado oil in lieu of processed vegetable oils or margarine.     Physical Activity   Recommended DAILY exercise for at least 150 minutes of moderate exercise weekly!  In addition to a healthy diet, all patients should include regular physical activity in their weekly routines, at moderate to vigorous intensity. Any activity is better than nothing, so if your patients can't meet the recommendation of vigorous " activity, moderate-intensity activity can still help them reduce their risk of ASCVD.      Below are the American Heart Association's recommendations for physical activity per week (preferably spread throughout the week):      For Overall Cardiovascular Health and Lowering Cholesterol   At least 150 minutes of moderate-intensity physical activity (for example, 30 minutes, 5 days a week), or   At least 75 minutes of vigorous-intensity physical activity (for example, 25 minutes, 3 days a week); or   A combination of moderate- and vigorous-intensity aerobic activity, and   At least 2 days of moderate- to high-intensity muscle-strengthening activities (such as resistance weight training) for additional health benefits     Weight Control   It's important to work with patients to help them reach and maintain a healthy weight (Table 3). You may need to suggest that they adjust their caloric intake to avoid weight gain or, in overweight and obese patients, to promote weight loss.      Table 3. Body Mass Index              If you have thoughts of harming yourself or are otherwise in psychological crisis, do not hesitate to contact your [facility] Crisis hotline, or 911 or go to the nearest emergency room.  Adult Crisis Numbers  Suicide Prevention Hotline - Dial 9-8-8  [facility]: [phone] or [phone]  [facility]: [phone]  [facility]: [phone]  [facility]: [phone]  [place]: [phone] or [phone]  [facility]: [phone]  [facility]: [phone] or [facility]: [phone]  [facility]: [phone] (daytime).                                                   [phone] (after hours, weekends, holidays)                 Child/Adolescent Crisis Numbers              [facility]: [phone]              [facility]: [phone]              [place]: [phone]              [facility]:  [phone]  Please note: Some counties do not have a separate number for Child/Adolescent specific crisis. If your county is not listed under Child/Adolescent, please call the adult number for your county                 National Talk to Text Line              All Ages - [phone]     National Suicide Prevention Hotline: 1-457.831.3565 or call 796.

## 2024-08-06 NOTE — PSYCH
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 26.0  mmHg Final    Ao root 03/25/2024 2.90  cm Final    Asc Ao 03/25/2024 3.1  cm Final    Tricuspid valve peak regurgitation* 03/25/2024 2.56  m/s Final    Left ventricular stroke volume (2D) 03/25/2024 43.00  mL Final    IVS 03/25/2024 1  cm Final    LEFT VENTRICLE SYSTOLIC VOLUME (MO* 03/25/2024 76  mL Final    LV DIASTOLIC VOLUME (MOD BIPLANE) * 03/25/2024 119  mL Final    Left Atrium Area-systolic Four Angie* 03/25/2024 13.2  cm2 Final    Left Atrium Area-systolic Apical T* 03/25/2024 8  cm2 Final    LVSV, 2D 03/25/2024 43  mL Final    LV EF 03/25/2024 45   Final    LVOT area 03/25/2024 7.54  cm2 Final    LV Diastolic Volume Index (BP) 03/25/2024 76.6  mL/m2 Final    LV Systolic Volume Index (BP) 03/25/2024 49.0  mL/m2 Final   Hospital Outpatient Visit on 02/20/2024   Component Date Value Ref Range Status    Case Report 02/20/2024    Final                    Value:Surgical Pathology Report                         Case: [id number]                                  Authorizing Provider:  Oscar Ko DO              Collected:           02/20/2024 1014              Ordering Location:     [facility] Received:            02/20/2024 1101                                     Endoscopy                                                                    Pathologist:           Linwodo Medina MD                                                                 Specimen:    Large Intestine, Left/Descending Colon, POLYP                                              Final Diagnosis 02/20/2024    Final                    Value:A.  Descending colon polyp (biopsy):                             - Portions of tubular adenoma; negative for high-grade dysplasia.                             - Fecal material noted.    Additional Information 02/20/2024    Final                    Value:All reported additional testing was performed with appropriately reactive                           controls.  These tests were developed and their performance                           characteristics determined by [facility] or                           appropriate performing facility, though some tests may be performed on                           tissues which have not been validated for performance characteristics                           (such as staining performed on alcohol exposed cell blocks and decalcified                           tissues).  Results should be interpreted with caution and in the context                           of the patients’ clinical condition. These tests may not be cleared or                           approved by the U.S. Food and Drug Administration, though the FDA has                           determined that such clearance or approval is not necessary. These tests                           are used for clinical purposes and they should not be regarded as                           investigational or for research. This laboratory has been approved by CLIA                           88, designated as a high-complexity laboratory and is qualified to perform                           these tests. Interpretation performed at [facility], [address] [place] [id number]..    Synoptic Checklist 02/20/2024    Final                    Value:                            COLON/RECTUM POLYP  "FORM - GI - All Specimens                                                                                     :    Adenoma(s)      Gross Description 2024    Final                    Value:A. The specimen is received in formalin, labeled with the patient's name                           and hospital number, and is designated \" left/descending colon polyp\".                            The specimen consists of an aggregate of food particles and tan-red,                           rubbery, friable soft tissue fragments measuring 1.2 x 0.7 x 0.2 cm.                            Entirely submitted. Screened cassette.                                                    Note: The estimated total formalin fixation time based upon information                           provided by the submitting clinician and the standard processing schedule                           is under 72 hours.                                                    [facility]       Suicide/Homicide Risk Assessment:    Risk of Harm to Self:  The following ratings are based on assessment at the time of the interview  Demographic risk factors include: ,  status, age: over 50 or older  Historical Risk Factors include: chronic psychiatric problems, chronic anxiety symptoms, chronic mood disorder, alcohol use, history of substance use, history of traumatic experiences, history of legal problems, a relative or close friend who  by suicide  Recent Specific Risk Factors include: diagnosis of mood disorder, current depressive symptoms, current anxiety symptoms, passive death wishes, feelings of guilt or self blame, worries about finances or work, chronic pain, health problems, chronic health problems, unemployed  Protective Factors: no current suicidal ideation, ability to adapt to change, access to mental health treatment, being a parent, compliant with medications, compliant with mental health treatment, connection to community, medical " compliance, personal beliefs, resiliency, restricted access to lethal means, stable living environment, strong relationships, supportive family  Weapons: gun. The following steps have been taken to ensure weapons are properly secured: no access  Based on today's assessment, Marie presents the following risk of harm to self: low    Risk of Harm to Others:  The following ratings are based on assessment at the time of the interview  Demographic Risk Factors include: unemployed.  Historical Risk Factors include: drug abuse, history of substance use.  Recent Specific Risk Factors include: concomitant mood disorder, multiple stressors.  Protective Factors: no current homicidal ideation, ability to adapt to change, access to mental health treatment, being a parent, compliant with medications, compliant with mental health treatment, connection to community, opportunities to participate in community, [employer] beliefs, resilience, restricted access to lethal means, safe and stable living environment, strong relationships  Weapons: gun. The following steps have been taken to ensure weapons are properly secured: no access  Based on today's assessment, Marie presents the following risk of harm to others: low    The following interventions are recommended: no intervention changes needed. Although patient's acute lethality risk is LOW, long-term/chronic lethality risk is mildly elevated given see above., However, at the current moment, patient is future-oriented, forward-thinking, and demonstrates ability to act in a self-preserving manner as evidenced by volitionally seeking psychiatric evaluation and treatment today.. Marie Portillo contracts for safety and is not an imminent risk of harm to self or others. Outpatient level of care is deemed appropriate at this current time. Patient understands that if they can no longer contract for safety, they need to call the office or report to their nearest Emergency Room for immediate  evaluation. At this juncture, inpatient hospitalization is not currently warranted. To mitigate future risk, patient should adhere to treatment recommendations, avoid alcohol/illicit substance use, utilize community-based resources and familiar support, and prioritize mental health treatment.       Assessment/Plan:   Marie Portillo is a 58 y.o. Female, with 2 children (1 ), , HS education, on disability, domiciled with friends, with past medical history of GERD, COPD, hyperlipidemia, Chiari malformation (upcoming surgery), spinal stenosis, chronic pain, and past psychiatric history of Bipolar 1 vs 2 disorder, PTSD, CAROL, r/o personality pathology, cannabis use disorder, history of substance abuse, 0 suicide attempts, 2 prior IPBH, PHP x 2,  who presents to the [facility] outpatient clinic for intake assessment. Patient was last seen by Dr. Clint Kumar on 24 and during that visit patient was continued on Wellbutrin and Lamictal. Patient would benefit from increased mood stabilization and was agreeable to tapering wellbutrin, initiating symbyax and gabapentin.     DSM-5 Diagnoses:     1. Cannabis use disorder    2. Bipolar disorder, current episode mixed, moderate (HCC)    3. PTSD (post-traumatic stress disorder)    4. Anxiety disorder, unspecified type    R/o underlying Cluster B personality pathology    Treatment Recommendations/Precautions:  Taper Wellbutrin as follows: Take 150 mg XL daily x 7 days. THEN, stop.  PARQ completed including serotonin syndrome, SIADH, worsening depression, suicidality, induction of dashawn, GI upset, headaches, activation, sexual side effects, sedation, potential drug interactions, and others.  Initiate Symbiax 6-25 for mood stabilization  PARQ completed including serotonin syndrome, SIADH, worsening depression, suicidality, induction of dashawn, GI upset, headaches, activation, sexual side effects, sedation, potential drug interactions, and  others.  PARQ was completed for second generation antipsychotic medication including sedation, GI distress, dizziness, risk of metabolic syndrome, EPS (akathisia, TD, etc), rare NMS, orthostatic hypotension, cardiovascular risks such as QT prolongation, increased prolactin, and others.  EKG once at stable dosing  Initiate Gabapentin 300 mg TID for anxiety  PARQ for neurontin including depression/suicidality, allergic reactions (SJS, angioedema, rhabdomyolysis, eosinophilia, anaphylaxis), dizziness and somnolence, diarrhea, xerostomia, headaches, drug interactions and others  Continue Lamictal 200 mg daily for mood stabilization (Lamictal level most recently 2.3 on 05/21/2024)  Medication management follow-up in 3 weeks  Continue psychotherapy with own therapist  Aware of need to follow up with family physician for medical issues  Aware of 24 hour and weekend coverage for urgent situations accessed by calling [facility] main practice number  Risks, benefits, and possible side effects of medications explained to Marie and she verbalizes understanding and agreement for treatment.    Controlled Medication Discussion:     Not applicable - controlled prescriptions are not prescribed by this practice    Treatment Plan:    Completed and signed during the session: Not applicable - Treatment Plan not due at this session      Visit Time    Visit Start Time: 3:25 PM  Visit Stop Time: 4:44 PM   Total Visit Duration:  79 minutes    Belgica Chowdhury DO   08/06/24    This note was not shared with the patient due to reasonable likelihood of causing patient harm

## 2024-08-07 ENCOUNTER — TELEMEDICINE (OUTPATIENT)
Dept: BEHAVIORAL/MENTAL HEALTH CLINIC | Facility: CLINIC | Age: 59
End: 2024-08-07
Payer: COMMERCIAL

## 2024-08-07 DIAGNOSIS — Z72.0 TOBACCO ABUSE: ICD-10-CM

## 2024-08-07 DIAGNOSIS — F10.11 ALCOHOL ABUSE, IN REMISSION: ICD-10-CM

## 2024-08-07 DIAGNOSIS — F31.81 MODERATE MIXED BIPOLAR II DISORDER (HCC): Primary | ICD-10-CM

## 2024-08-07 DIAGNOSIS — F41.1 GENERALIZED ANXIETY DISORDER: ICD-10-CM

## 2024-08-07 DIAGNOSIS — F17.210 CIGARETTE NICOTINE DEPENDENCE WITHOUT COMPLICATION: ICD-10-CM

## 2024-08-07 DIAGNOSIS — F12.90 MARIJUANA USE: Chronic | ICD-10-CM

## 2024-08-07 DIAGNOSIS — F43.12 CHRONIC POST-TRAUMATIC STRESS DISORDER (PTSD): ICD-10-CM

## 2024-08-07 PROCEDURE — 90834 PSYTX W PT 45 MINUTES: CPT | Performed by: COUNSELOR

## 2024-08-07 NOTE — PSYCH
"Behavioral Health Psychotherapy Progress Note    Psychotherapy Provided: Individual Psychotherapy     1. Moderate mixed bipolar II disorder (HCC)          2. PTSD (post-traumatic stress disorder)          3. Generalized anxiety disorder          4. Cigarette nicotine dependence without complication          5. Tobacco abuse          6. Marijuana use          7. Alcohol abuse, in remission         Goals addressed in session: Goal 1     DATA: Marie discussed her impression from her first visit with her new psychiatrist and the change of medications- she shared that she would not remember when to start or to stop them- she was advised to talk to her doctor or to the pharmacist to reiterate the exact doctor's directions. Marie was strongly urged to take notes about her experience and to pay attention to the details and to bring up on her follow up.     During this session, this clinician used the following therapeutic modalities: Client-centered Therapy, Cognitive Behavioral Therapy, Dialectical Behavior Therapy, Mindfulness-based Strategies, and Supportive Psychotherapy    Substance Abuse was not addressed during this session. If the client is diagnosed with a co-occurring substance use disorder, please indicate any changes in the frequency or amount of use: NA. Stage of change for addressing substance use diagnoses: Maintenance    ASSESSMENT:  Marie Portillo presents with a Euthymic/ normal, Anxious, and Depressed mood.     her affect is Normal range and intensity, Constricted, and Tearful, at times, which is congruent, with her mood and the content of the session. The client has made progress on their goals.    Marie seemed confused at times,  Marie Portillo presents with a low risk of suicide, low risk of self-harm, and none risk of harm to others.    For any risk assessment that surpasses a \"low\" rating, a safety plan must be developed.    A safety plan was indicated: no  If yes, describe in detail NA    PLAN: Between " sessions, Marie Portillo will take notes about her mood changes, changes in habits, appetite, habits, and sleep with the start of her new meds and will report to her doctor during the follow-up. At the next session, the therapist will use Client-centered Therapy, Dialectical Behavior Therapy, Mindfulness-based Strategies, and Supportive Psychotherapy to address self-care, new medications, adjustment.    Behavioral Health Treatment Plan and Discharge Planning: Marie Portillo is aware of and agrees to continue to work on their treatment plan. They have identified and are working toward their discharge goals. yes    Visit start and stop times:    08/07/24  Start Time: 0202  Stop Time: 0241  Total Visit Time: 39 minutes

## 2024-08-07 NOTE — PSYCH
Virtual Regular Visit    Verification of patient location:    Patient is located at Home in the following state in which I hold an active license [place]      Assessment/Plan:    Problem List Items Addressed This Visit    None      Goals addressed in session: Goal 1          Reason for visit is No chief complaint on file.       Encounter provider Isabel Watts LPC      Recent Visits  Date Type Provider Dept   07/31/24 Telemedicine Isabel Watts LPC [facility] Therapist [place]   Showing recent visits within past 7 days and meeting all other requirements  Future Appointments  No visits were found meeting these conditions.  Showing future appointments within next 150 days and meeting all other requirements       The patient was identified by name and date of birth. Marie Portillo was informed that this is a telemedicine visit and that the visit is being conducted throughthe Epic Embedded platform. She agrees to proceed..  My office door was closed. No one else was in the room.  She acknowledged consent and understanding of privacy and security of the video platform. The patient has agreed to participate and understands they can discontinue the visit at any time.    Patient is aware this is a billable service.     Subjective  Marie Portillo is a 58 y.o. female  .      HPI     Past Medical History:   Diagnosis Date    Bilateral carpal tunnel syndrome 08/20/2019    Cardiomyopathy (HCC)     Carpal tunnel syndrome of right wrist 10/30/2019    Added automatically from request for surgery [id number]    Chlamydia     COPD (chronic obstructive pulmonary disease) (HCC)     Depression     Hypertension     Varicella        Past Surgical History:   Procedure Laterality Date    BACK SURGERY      BREAST BIOPSY Left 20yrs ago benign    CERVICAL SPINE SURGERY      EGD      HERNIA REPAIR      OOPHORECTOMY Right     [place] [facility]SC WRST SURG W/RLS TRANSVRS CARPL LIGM Right 01/21/2020    Procedure: RELEASE CARPAL TUNNEL ENDOSCOPIC;   Surgeon: Sam Nguyen MD;  Location: [facility];  Service: Orthopedics    [place] [facility] WRST SURG W/RLS TRANSVRS CARPL LIGM Left 01/28/2020    Procedure: RELEASE CARPAL TUNNEL ENDOSCOPIC;  Surgeon: Sam Nguyen MD;  Location: [facility];  Service: Orthopedics       Current Outpatient Medications   Medication Sig Dispense Refill    Acetaminophen 500 MG Take 2 capsules by mouth every 4 (four) hours as needed for mild pain      albuterol (PROVENTIL HFA,VENTOLIN HFA) 90 mcg/act inhaler inhale 2 puffs by mouth and INTO THE LUNGS every 6 hours IF NEEDED FOR WHEEZING. 8.5 g 2    buPROPion (WELLBUTRIN XL) 150 mg 24 hr tablet Take 1 tablet (150 mg total) by mouth daily for 7 days Take one 150 mg tablet for 7 days. THEN, stop.      cholecalciferol (VITAMIN D3) 400 units tablet Take 400 Units by mouth daily      fluticasone (FLONASE) 50 mcg/act nasal spray instill 1 spray into each nostril once daily 16 g 2    gabapentin (Neurontin) 300 mg capsule Take 1 capsule (300 mg total) by mouth 3 (three) times a day 90 capsule 0    guaiFENesin (MUCINEX) 600 mg 12 hr tablet Take 2 tablets (1,200 mg total) by mouth every 12 (twelve) hours (Patient taking differently: Take 1,200 mg by mouth every 12 (twelve) hours PRN) 60 tablet 3    ipratropium-albuterol (DUO-NEB) 0.5-2.5 mg/3 mL nebulizer solution Take 3 mL by nebulization 4 (four) times a day (Patient taking differently: Take 3 mL by nebulization in the morning) 360 mL 3    lamoTRIgine (LaMICtal) 200 MG tablet Take 1 tablet (200 mg total) by mouth daily 30 tablet 2    levocetirizine (XYZAL) 5 MG tablet take 1 tablet by mouth every evening 30 tablet 4    losartan (COZAAR) 25 mg tablet Take 1 tablet (25 mg total) by mouth daily 30 tablet 5    methocarbamol (ROBAXIN) 500 mg tablet Take 500 mg by mouth 2 (two) times a day PRN      metoprolol succinate (TOPROL-XL) 25 mg 24 hr tablet take 1 tablet by mouth once daily 30 tablet 0    nicotine (NICODERM CQ) 21 mg/24 hr TD 24 hr patch  Place 1 patch on the skin over 24 hours every 24 hours (Patient not taking: Reported on 6/26/2024) 28 patch 0    nicotine polacrilex (COMMIT) 4 MG lozenge Apply 1 lozenge (4 mg total) to the mouth or throat as needed for smoking cessation (Patient not taking: Reported on 6/26/2024) 100 each 0    OLANZapine-FLUoxetine (SYMBYAX) 6-25 MG per capsule Take 1 capsule by mouth every evening 30 capsule 0    omeprazole (PriLOSEC) 20 mg delayed release capsule take 1 capsule by mouth twice a day 60 capsule 5    senna-docusate sodium (SENOKOT-S) 8.6-50 mg per tablet Take 3 tablets by mouth as needed        Stiolto Respimat 2.5-2.5 MCG/ACT inhaler inhale 2 puffs by mouth and INTO THE LUNGS once daily 4 g 11     No current facility-administered medications for this visit.        No Known Allergies    Review of Systems    Video Exam    There were no vitals filed for this visit.    Physical Exam

## 2024-08-08 DIAGNOSIS — I50.20 SYSTOLIC HEART FAILURE, UNSPECIFIED HF CHRONICITY (HCC): ICD-10-CM

## 2024-08-08 RX ORDER — METOPROLOL SUCCINATE 25 MG/1
25 TABLET, EXTENDED RELEASE ORAL DAILY
Qty: 30 TABLET | Refills: 0 | Status: SHIPPED | OUTPATIENT
Start: 2024-08-08

## 2024-08-09 ENCOUNTER — APPOINTMENT (OUTPATIENT)
Dept: LAB | Facility: CLINIC | Age: 59
End: 2024-08-09
Payer: COMMERCIAL

## 2024-08-09 ENCOUNTER — OFFICE VISIT (OUTPATIENT)
Dept: FAMILY MEDICINE CLINIC | Facility: CLINIC | Age: 59
End: 2024-08-09
Payer: COMMERCIAL

## 2024-08-09 VITALS
DIASTOLIC BLOOD PRESSURE: 84 MMHG | HEART RATE: 89 BPM | SYSTOLIC BLOOD PRESSURE: 134 MMHG | OXYGEN SATURATION: 99 % | WEIGHT: 103 LBS | HEIGHT: 60 IN | TEMPERATURE: 98.2 F | BODY MASS INDEX: 20.22 KG/M2

## 2024-08-09 DIAGNOSIS — E03.9 HYPOTHYROIDISM, UNSPECIFIED TYPE: ICD-10-CM

## 2024-08-09 DIAGNOSIS — R21 RASH OF FACE: Primary | ICD-10-CM

## 2024-08-09 LAB — TSH SERPL DL<=0.05 MIU/L-ACNC: 2.82 UIU/ML (ref 0.45–4.5)

## 2024-08-09 PROCEDURE — 99213 OFFICE O/P EST LOW 20 MIN: CPT | Performed by: NURSE PRACTITIONER

## 2024-08-09 PROCEDURE — 84443 ASSAY THYROID STIM HORMONE: CPT

## 2024-08-09 PROCEDURE — 36415 COLL VENOUS BLD VENIPUNCTURE: CPT

## 2024-08-09 RX ORDER — BETAMETHASONE DIPROPIONATE 0.5 MG/G
CREAM TOPICAL 2 TIMES DAILY
Qty: 30 G | Refills: 0 | Status: SHIPPED | OUTPATIENT
Start: 2024-08-09

## 2024-08-09 NOTE — PROGRESS NOTES
Ambulatory Visit  Name: Marie Portillo      : 1965      MRN: [id number]  Encounter Provider: CAROLINE Dorsey  Encounter Date: 2024   Encounter department: [facility] PRIMARY CARE    Assessment & Plan   1. Rash of face  -     betamethasone dipropionate (DIPROSONE) 0.05 % cream; Apply topically 2 (two) times a day  2. Hypothyroidism, unspecified type  -     TSH, 3rd generation with Free T4 reflex; Future         History of Present Illness     Patient here for follow up visit. Had TSH done 3 months ago showing elevated. Constipation, fatigue, hair loss, dry skin and nails.     Used a lotion causing burning and rash under both eyes.       Review of Systems   Constitutional:  Positive for fatigue. Negative for fever.   Respiratory:  Negative for shortness of breath and wheezing.    Cardiovascular: Negative.  Negative for chest pain and palpitations.   Gastrointestinal:  Positive for constipation.   Endocrine: Positive for cold intolerance.   Skin: Negative.  Negative for rash.        Dry ski, dry nails, hair loss   Neurological: Negative.    Psychiatric/Behavioral: Negative.  Negative for dysphoric mood. The patient is not nervous/anxious.      Past Medical History:   Diagnosis Date    Bilateral carpal tunnel syndrome 2019    Cardiomyopathy (HCC)     Carpal tunnel syndrome of right wrist 10/30/2019    Added automatically from request for surgery [id number]    Chlamydia     COPD (chronic obstructive pulmonary disease) (HCC)     Depression     Hypertension     Varicella      Past Surgical History:   Procedure Laterality Date    BACK SURGERY      BREAST BIOPSY Left 20yrs ago benign    CERVICAL SPINE SURGERY      EGD      HERNIA REPAIR      OOPHORECTOMY Right     DC NDSC WRST SURG W/RLS TRANSVRS CARPL LIGM Right 2020    Procedure: RELEASE CARPAL TUNNEL ENDOSCOPIC;  Surgeon: Sam Nguyen MD;  Location: [facility];  Service: Orthopedics    DC NDSC WRST SURG W/RLS TRANSVRS CARPL LIGM  Left 01/28/2020    Procedure: RELEASE CARPAL TUNNEL ENDOSCOPIC;  Surgeon: Sam Nguyen MD;  Location: [facility];  Service: Orthopedics     Family History   Problem Relation Age of Onset    Alcohol abuse Mother     Eating disorder Mother         anorexia and bulimia per Pt    Hodgkin's lymphoma Father     Cancer Father         3rd time lymphoma, prostate, lung    Heart failure Father     No Known Problems Sister     Colon cancer Maternal Grandmother     Alcohol abuse Maternal Grandfather     Colon cancer Paternal Grandmother     No Known Problems Daughter     Breast cancer Maternal Aunt     No Known Problems Maternal Aunt     No Known Problems Maternal Aunt     Colon cancer Maternal Uncle     Anxiety disorder Paternal Aunt     No Known Problems Paternal Aunt     Multiple sclerosis Cousin      Social History     Tobacco Use    Smoking status: Every Day     Current packs/day: 0.50     Average packs/day: 0.5 packs/day for 45.6 years (22.8 ttl pk-yrs)     Types: Cigarettes     Start date: 1979    Smokeless tobacco: Never   Vaping Use    Vaping status: Some Days   Substance and Sexual Activity    Alcohol use: Not Currently     Comment: H/O ETOH abuse in approx 2010--for a period of 2 years.  She quit on her own without any h/o withdrawal or rehab.    Drug use: Yes     Types: Marijuana     Comment: Smokes THC several times a day.  Also uses CBD OIL at times.   Used cocaine and methamphetamine  fairly steadily for a period of approx 1 year approx 36 years ago.  Tried LSD, Mushrooms.  No h/o rehab    Sexual activity: Yes     Partners: Male     Current Outpatient Medications on File Prior to Visit   Medication Sig    Acetaminophen 500 MG Take 2 capsules by mouth every 4 (four) hours as needed for mild pain    albuterol (PROVENTIL HFA,VENTOLIN HFA) 90 mcg/act inhaler inhale 2 puffs by mouth and INTO THE LUNGS every 6 hours IF NEEDED FOR WHEEZING.    buPROPion (WELLBUTRIN XL) 150 mg 24 hr tablet Take 1 tablet (150 mg  total) by mouth daily for 7 days Take one 150 mg tablet for 7 days. THEN, stop.    cholecalciferol (VITAMIN D3) 400 units tablet Take 400 Units by mouth daily    fluticasone (FLONASE) 50 mcg/act nasal spray instill 1 spray into each nostril once daily    gabapentin (Neurontin) 300 mg capsule Take 1 capsule (300 mg total) by mouth 3 (three) times a day    ipratropium-albuterol (DUO-NEB) 0.5-2.5 mg/3 mL nebulizer solution Take 3 mL by nebulization 4 (four) times a day (Patient taking differently: Take 3 mL by nebulization in the morning)    lamoTRIgine (LaMICtal) 200 MG tablet Take 1 tablet (200 mg total) by mouth daily    levocetirizine (XYZAL) 5 MG tablet take 1 tablet by mouth every evening    losartan (COZAAR) 25 mg tablet Take 1 tablet (25 mg total) by mouth daily    metoprolol succinate (TOPROL-XL) 25 mg 24 hr tablet take 1 tablet by mouth once daily    omeprazole (PriLOSEC) 20 mg delayed release capsule take 1 capsule by mouth twice a day    senna-docusate sodium (SENOKOT-S) 8.6-50 mg per tablet Take 3 tablets by mouth as needed      Stiolto Respimat 2.5-2.5 MCG/ACT inhaler inhale 2 puffs by mouth and INTO THE LUNGS once daily    guaiFENesin (MUCINEX) 600 mg 12 hr tablet Take 2 tablets (1,200 mg total) by mouth every 12 (twelve) hours (Patient not taking: Reported on 8/9/2024)    methocarbamol (ROBAXIN) 500 mg tablet Take 500 mg by mouth 2 (two) times a day PRN (Patient not taking: Reported on 8/9/2024)    nicotine (NICODERM CQ) 21 mg/24 hr TD 24 hr patch Place 1 patch on the skin over 24 hours every 24 hours (Patient not taking: Reported on 6/26/2024)    nicotine polacrilex (COMMIT) 4 MG lozenge Apply 1 lozenge (4 mg total) to the mouth or throat as needed for smoking cessation (Patient not taking: Reported on 6/26/2024)    OLANZapine-FLUoxetine (SYMBYAX) 6-25 MG per capsule Take 1 capsule by mouth every evening (Patient not taking: Reported on 8/9/2024)    [DISCONTINUED] Spiriva Respimat 2.5 MCG/ACT AERS  inhaler inhale 2 puffs by mouth and INTO THE LUNGS once daily     No Known Allergies  Immunization History   Administered Date(s) Administered    COVID-19 MODERNA VACC 0.5 ML IM 05/07/2021, 06/03/2021    H1N1, All Formulations 10/29/2009    INFLUENZA 02/04/2014, 10/20/2014, 11/13/2020, 01/13/2023    Influenza, injectable, quadrivalent, preservative free 0.5 mL 10/25/2023    Influenza, recombinant, quadrivalent,injectable, preservative free 01/13/2022     Objective     /84   Pulse 89   Temp 98.2 °F (36.8 °C)   Ht 5' (1.524 m)   Wt 46.7 kg (103 lb)   LMP  (LMP Unknown)   SpO2 99%   BMI 20.12 kg/m²     Physical Exam

## 2024-08-12 ENCOUNTER — TELEPHONE (OUTPATIENT)
Age: 59
End: 2024-08-12

## 2024-08-12 DIAGNOSIS — F31.62 BIPOLAR DISORDER, CURRENT EPISODE MIXED, MODERATE (HCC): ICD-10-CM

## 2024-08-12 NOTE — TELEPHONE ENCOUNTER
Marie was wondering is she should take the Symbyax once she receives the prescription or wait to be off the wellbutrin first. Clinical will follow up as directed.

## 2024-08-12 NOTE — TELEPHONE ENCOUNTER
PA for OLANZapine-FLUoxetine HCl 6-25MG capsulesSUBMITTED     via    [x]CMM-KEY [id number]  []Surescripts-Case ID #   []Faxed to plan   []Other website   []Phone call Case ID #     Office notes sent, clinical questions answered. Awaiting determination    Turnaround time for your insurance to make a decision on your Prior Authorization can take 7-21 business days.

## 2024-08-12 NOTE — TELEPHONE ENCOUNTER
Called geraldine and asked about the Symbyax. She said she hasn't started it yet which is why she marked not taking but that she is going to be taking it once it is approved and she receives the medicine.

## 2024-08-13 ENCOUNTER — TELEPHONE (OUTPATIENT)
Dept: PSYCHIATRY | Facility: CLINIC | Age: 59
End: 2024-08-13

## 2024-08-13 ENCOUNTER — OFFICE VISIT (OUTPATIENT)
Dept: FAMILY MEDICINE CLINIC | Facility: CLINIC | Age: 59
End: 2024-08-13
Payer: COMMERCIAL

## 2024-08-13 VITALS
HEIGHT: 60 IN | BODY MASS INDEX: 20.22 KG/M2 | HEART RATE: 87 BPM | OXYGEN SATURATION: 98 % | DIASTOLIC BLOOD PRESSURE: 68 MMHG | TEMPERATURE: 98.7 F | SYSTOLIC BLOOD PRESSURE: 128 MMHG | WEIGHT: 103 LBS

## 2024-08-13 DIAGNOSIS — F31.62 BIPOLAR DISORDER, CURRENT EPISODE MIXED, MODERATE (HCC): ICD-10-CM

## 2024-08-13 DIAGNOSIS — G93.5 CHIARI MALFORMATION TYPE I (HCC): ICD-10-CM

## 2024-08-13 DIAGNOSIS — Z00.00 ANNUAL PHYSICAL EXAM: Primary | ICD-10-CM

## 2024-08-13 DIAGNOSIS — F43.10 PTSD (POST-TRAUMATIC STRESS DISORDER): ICD-10-CM

## 2024-08-13 DIAGNOSIS — E03.9 HYPOTHYROIDISM, UNSPECIFIED TYPE: ICD-10-CM

## 2024-08-13 DIAGNOSIS — Z12.31 ENCOUNTER FOR SCREENING MAMMOGRAM FOR BREAST CANCER: ICD-10-CM

## 2024-08-13 DIAGNOSIS — R91.8 PULMONARY NODULES: ICD-10-CM

## 2024-08-13 PROCEDURE — NC001 PR NO CHARGE: Performed by: STUDENT IN AN ORGANIZED HEALTH CARE EDUCATION/TRAINING PROGRAM

## 2024-08-13 PROCEDURE — 99396 PREV VISIT EST AGE 40-64: CPT | Performed by: NURSE PRACTITIONER

## 2024-08-13 NOTE — PROGRESS NOTES
Adult Annual Physical  Name: Marie Portillo      : 1965      MRN: [id number]  Encounter Provider: CAROLINE Dorsey  Encounter Date: 2024   Encounter department: [facility] PRIMARY CARE    Assessment & Plan   1. Annual physical exam  -     Basic metabolic panel; Future  2. Encounter for screening mammogram for breast cancer  -     Mammo screening bilateral w 3d & cad; Future  3. Hypothyroidism, unspecified type  -     TSH, 3rd generation with Free T4 reflex; Future  4. Pulmonary nodules  5. Chiari malformation type I (HCC)    Immunizations and preventive care screenings were discussed with patient today. Appropriate education was printed on patient's after visit summary.    Counseling:  Alcohol/drug use: discussed moderation in alcohol intake, the recommendations for healthy alcohol use, and avoidance of illicit drug use.  Dental Health: discussed importance of regular tooth brushing, flossing, and dental visits.  Sexual health: discussed sexually transmitted diseases, partner selection, use of condoms, avoidance of unintended pregnancy, and contraceptive alternatives.  Exercise: the importance of regular exercise/physical activity was discussed. Recommend exercise 3-5 times per week for at least 30 minutes.          History of Present Illness     Adult Annual Physical:  Patient presents for annual physical.     Diet and Physical Activity:  - Diet/Nutrition: heart healthy (low sodium) diet and frequent junk food.  - Exercise: walking and 5-7 times a week on average.    Depression Screening:  - PHQ-2 Score: 6  - PHQ-9 Score: 27    General Health:  - Sleep: 4-6 hours of sleep on average and sleeps well. wakes up often.  - Hearing: normal hearing right ear and normal hearing left ear.  - Vision: goes for regular eye exams and wears glasses and contacts.  - Dental:. full upper and lower dentures    /GYN Health:  - Follows with GYN: no.   - Menopause: postmenopausal.     Review of Systems    Constitutional: Negative.  Negative for fatigue and fever.   Respiratory: Negative.  Negative for shortness of breath and wheezing.    Cardiovascular: Negative.  Negative for chest pain and palpitations.   Skin: Negative.  Negative for rash.   Neurological: Negative.  Negative for light-headedness.   Psychiatric/Behavioral: Negative.       Medical History Reviewed by provider this encounter:         Objective     /68   Pulse 87   Temp 98.7 °F (37.1 °C)   Ht 5' (1.524 m)   Wt 46.7 kg (103 lb)   LMP  (LMP Unknown)   SpO2 98%   BMI 20.12 kg/m²     Physical Exam  Vitals and nursing note reviewed.   Constitutional:       General: She is not in acute distress.     Appearance: She is well-developed. She is not ill-appearing or diaphoretic.   HENT:      Head: Normocephalic and atraumatic.      Right Ear: Hearing, tympanic membrane and ear canal normal.      Left Ear: Hearing, tympanic membrane and ear canal normal.      Nose: Nose normal.      Mouth/Throat:      Mouth: Oropharynx is clear and moist.      Pharynx: Uvula midline.   Eyes:      General: Lids are normal.      Extraocular Movements: EOM normal.      Conjunctiva/sclera: Conjunctivae normal.   Cardiovascular:      Rate and Rhythm: Normal rate and regular rhythm.      Heart sounds: Normal heart sounds, S1 normal and S2 normal. No murmur heard.  Pulmonary:      Effort: Pulmonary effort is normal. No respiratory distress.      Breath sounds: Normal breath sounds.   Musculoskeletal:         General: No tenderness or edema. Normal range of motion.   Lymphadenopathy:      Cervical: No cervical adenopathy.   Skin:     General: Skin is warm.      Capillary Refill: Capillary refill takes less than 2 seconds.      Findings: No rash.   Neurological:      General: No focal deficit present.      Mental Status: She is alert and oriented to person, place, and time.   Psychiatric:         Mood and Affect: Mood and affect normal.         Behavior: Behavior normal.  Behavior is cooperative.         Thought Content: Thought content normal.         Judgment: Judgment normal.

## 2024-08-13 NOTE — PATIENT INSTRUCTIONS
"Patient Education     Routine physical for adults   The Basics   Written by the doctors and editors at [facility]   What is a physical? -- A physical is a routine visit, or \"check-up,\" with your doctor. You might also hear it called a \"wellness visit\" or \"preventive visit.\"  During each visit, the doctor will:   Ask about your physical and mental health   Ask about your habits, behaviors, and lifestyle   Do an exam   Give you vaccines if needed   Talk to you about any medicines you take   Give advice about your health   Answer your questions  Getting regular check-ups is an important part of taking care of your health. It can help your doctor find and treat any problems you have. But it's also important for preventing health problems.  A routine physical is different from a \"sick visit.\" A sick visit is when you see a doctor because of a health concern or problem. Since physicals are scheduled ahead of time, you can think about what you want to ask the doctor.  How often should I get a physical? -- It depends on your age and health. In general, for people age 21 years and older:   If you are younger than 50 years, you might be able to get a physical every 3 years.   If you are 50 years or older, your doctor might recommend a physical every year.  If you have an ongoing health condition, like diabetes or high blood pressure, your doctor will probably want to see you more often.  What happens during a physical? -- In general, each visit will include:   Physical exam - The doctor or nurse will check your height, weight, heart rate, and blood pressure. They will also look at your eyes and ears. They will ask about how you are feeling and whether you have any symptoms that bother you.   Medicines - It's a good idea to bring a list of all the medicines you take to each doctor visit. Your doctor will talk to you about your medicines and answer any questions. Tell them if you are having any side effects that bother you. You " "should also tell them if you are having trouble paying for any of your medicines.   Habits and behaviors - This includes:   Your diet   Your exercise habits   Whether you smoke, drink alcohol, or use drugs   Whether you are sexually active   Whether you feel safe at home  Your doctor will talk to you about things you can do to improve your health and lower your risk of health problems. They will also offer help and support. For example, if you want to quit smoking, they can give you advice and might prescribe medicines. If you want to improve your diet or get more physical activity, they can help you with this, too.   Lab tests, if needed - The tests you get will depend on your age and situation. For example, your doctor might want to check your:   Cholesterol   Blood sugar   Iron level   Vaccines - The recommended vaccines will depend on your age, health, and what vaccines you already had. Vaccines are very important because they can prevent certain serious or deadly infections.   Discussion of screening - \"Screening\" means checking for diseases or other health problems before they cause symptoms. Your doctor can recommend screening based on your age, risk, and preferences. This might include tests to check for:   Cancer, such as breast, prostate, cervical, ovarian, colorectal, prostate, lung, or skin cancer   Sexually transmitted infections, such as chlamydia and gonorrhea   Mental health conditions like depression and anxiety  Your doctor will talk to you about the different types of screening tests. They can help you decide which screenings to have. They can also explain what the results might mean.   Answering questions - The physical is a good time to ask the doctor or nurse questions about your health. If needed, they can refer you to other doctors or specialists, too.  Adults older than 65 years often need other care, too. As you get older, your doctor will talk to you about:   How to prevent falling at " home   Hearing or vision tests   Memory testing   How to take your medicines safely   Making sure that you have the help and support you need at home  All topics are updated as new evidence becomes available and our peer review process is complete.  This topic retrieved from [employer] on: May 02, 2024.  Topic 786470 Version 1.0  Release: 32.4.3 - C32.122  © 2024 UpToDate, Inc. and/or its affiliates. All rights reserved.  Consumer Information Use and Disclaimer   Disclaimer: This generalized information is a limited summary of diagnosis, treatment, and/or medication information. It is not meant to be comprehensive and should be used as a tool to help the user understand and/or assess potential diagnostic and treatment options. It does NOT include all information about conditions, treatments, medications, side effects, or risks that may apply to a specific patient. It is not intended to be medical advice or a substitute for the medical advice, diagnosis, or treatment of a health care provider based on the health care provider's examination and assessment of a patient's specific and unique circumstances. Patients must speak with a health care provider for complete information about their health, medical questions, and treatment options, including any risks or benefits regarding use of medications. This information does not endorse any treatments or medications as safe, effective, or approved for treating a specific patient. UpToDate, Inc. and its affiliates disclaim any warranty or liability relating to this information or the use thereof.The use of this information is governed by the Terms of Use, available at https://www.woltersSapato.ruuwer.com/en/know/clinical-effectiveness-terms. 2024© UpToDate, Inc. and its affiliates and/or licensors. All rights reserved.  Copyright   © 2024 UpToDate, Inc. and/or its affiliates. All rights reserved.

## 2024-08-13 NOTE — TELEPHONE ENCOUNTER
"Called patient 08/13/24 from 05:18 PM - 05:42 PM, regarding concerns with starting new medication Symbyax. She reports doing \"okay\" and states she looked up the medication online and had some apprehension from what she saw listed. Patient was provided reassurance and side effects of medication discussed in detail with patient.    Patient asks about Ativan as she had previously found this helpful and discussed risks and indications for management of anxiety. Patient continues to acknowledge and endorse symptoms suggestive of mixed bipolar state including racing thoughts, depressed mood with irritability/anger x 2 months, anxiety.      She reports she has not started gabapentin, Wellbutrin taper or Symbyax. She reports she would like to hold off on gabapentin at this time due to having gained weight on it in the past. However, patient continues to complain of decreased appetite concerns. She reports previously doing well on Prozac without sexual side effects. Discussed slower taper for Wellbutrin than discussed at previous appointment and agreeable to taper as follows: 150 mg XL for two weeks prior to stopping and made aware of potential withdrawal effects. She was agreeable to trialing Symbyax for current symptoms and further reassessment and discussion at next visit.    Patient denies SI/HI/AVH, no concerns for acute psychiatric destabilization. Discussed in detail risk for destabilization while titrating medications and patient agrees to call office with concerns, worsening symptoms or presenting to the ED. Aware of 24hr emergency line. Reminded patient of follow up medication management appointment in person. Patient denies further questions or concerns, appreciative of call.  "

## 2024-08-13 NOTE — TELEPHONE ENCOUNTER
PA for OLANZapine-FLUoxetine HCl 6-25MG  Approved     Date(s) approved 8/12/2024 to 8/12/2025    Case #    Patient advised by          [] MyChart Message  [x] Phone call- unable to leave message, VM not set up.   []LMOM  []L/M to call office as no active Communication consent on file  []Unable to leave detailed message as VM not approved on Communication consent       Pharmacy advised by    []Fax  [x]Phone call    Approval letter scanned into Media Yes

## 2024-08-13 NOTE — TELEPHONE ENCOUNTER
Writer saw that f/u appt was r/s in wrong time slot called patient to r/s and put patient back in for 9/4.  Patient also stated that she decided that she does not want to take Sybyax because of the side affects.

## 2024-08-14 ENCOUNTER — TELEMEDICINE (OUTPATIENT)
Dept: BEHAVIORAL/MENTAL HEALTH CLINIC | Facility: CLINIC | Age: 59
End: 2024-08-14
Payer: COMMERCIAL

## 2024-08-14 DIAGNOSIS — F10.11 ALCOHOL ABUSE, IN REMISSION: ICD-10-CM

## 2024-08-14 DIAGNOSIS — F41.1 GENERALIZED ANXIETY DISORDER: ICD-10-CM

## 2024-08-14 DIAGNOSIS — F43.12 CHRONIC POST-TRAUMATIC STRESS DISORDER (PTSD): ICD-10-CM

## 2024-08-14 DIAGNOSIS — F31.81 MODERATE MIXED BIPOLAR II DISORDER (HCC): Primary | ICD-10-CM

## 2024-08-14 DIAGNOSIS — F12.90 MARIJUANA USE: Chronic | ICD-10-CM

## 2024-08-14 DIAGNOSIS — Z72.0 TOBACCO ABUSE: ICD-10-CM

## 2024-08-14 DIAGNOSIS — F17.210 CIGARETTE NICOTINE DEPENDENCE WITHOUT COMPLICATION: ICD-10-CM

## 2024-08-14 PROCEDURE — 90834 PSYTX W PT 45 MINUTES: CPT | Performed by: COUNSELOR

## 2024-08-14 RX ORDER — BUPROPION HYDROCHLORIDE 150 MG/1
150 TABLET ORAL DAILY
Start: 2024-08-14 | End: 2024-08-28

## 2024-08-14 NOTE — PSYCH
"Behavioral Health Psychotherapy Progress Note    Psychotherapy Provided: Individual Psychotherapy     1. Moderate mixed bipolar II disorder (HCC)        2. Generalized anxiety disorder        3. Chronic post-traumatic stress disorder (PTSD)        4. Tobacco abuse        5. Cigarette nicotine dependence without complication        6. Marijuana use        7. Alcohol abuse, in remission            Goals addressed in session: Goal 1     DATA: Marie reported having difficulties accepting certain medications, researched the side-effect, gathered information but also some confusion with previous experience and current intensified emotional state. She was allowed to express her emotions but also urged to pull herself out before down-spiraling and was redirected to focus on strengths and protective factors, things that she is grateful for and hector with.    During this session, this clinician used the following therapeutic modalities: Client-centered Therapy, Cognitive Behavioral Therapy, Dialectical Behavior Therapy, Mindfulness-based Strategies, and Supportive Psychotherapy    Substance Abuse was addressed during this session. If the client is diagnosed with a co-occurring substance use disorder, please indicate any changes in the frequency or amount of use: daily med marijuana. Stage of change for addressing substance use diagnoses: Maintenance.    ASSESSMENT:  Marie Portillo presents with a Euthymic/ normal, Anxious, and Depressed mood.     her affect is Normal range and intensity, Constricted, and Tearful, at times, which is congruent, with her mood and the content of the session. The client has made progress on their goals.    Marie seemed emotional and had difficulties focusing on other topics and was resistant talking about  Marie Portillo presents with a minimal risk of suicide, minimal risk of self-harm, and none risk of harm to others.    For any risk assessment that surpasses a \"low\" rating, a safety plan must be " developed.    A safety plan was indicated: no  If yes, describe in detail NA    PLAN: Between sessions, Marie Portillo will use Mindfulness skills to ground herself when experiencing intense emotions and her Body Resource to shift her mood. At the next session, the therapist will use Client-centered Therapy, Mindfulness-based Strategies, Supportive Psychotherapy, and Somatic Experiencing  to address intense emotions, struggle with memory, inconsistency taking notes.    Behavioral Health Treatment Plan and Discharge Planning: Marie Portillo is aware of and agrees to continue to work on their treatment plan. They have identified and are working toward their discharge goals. yes    Visit start and stop times:    08/14/24  Start Time: 0200  Stop Time: 0243  Total Visit Time: 43 minutes

## 2024-08-14 NOTE — PSYCH
Virtual Regular Visit    Verification of patient location:    Patient is located at Home in the following state in which I hold an active license [place]      Assessment/Plan:    Problem List Items Addressed This Visit       Moderate mixed bipolar II disorder (HCC) - Primary    Generalized anxiety disorder    Chronic post-traumatic stress disorder (PTSD)    Alcohol abuse, in remission    Marijuana use (Chronic)    Cigarette nicotine dependence without complication    Tobacco abuse       Goals addressed in session: Goal 1          Reason for visit is No chief complaint on file.       Encounter provider Isabel Watts LPC      Recent Visits  Date Type Provider Dept   08/13/24 Office Visit [facility]   08/09/24 Office Visit [facility]   08/07/24 Telemedicine Isabel Watts LPC [facility] [place]   Showing recent visits within past 7 days and meeting all other requirements  Future Appointments  No visits were found meeting these conditions.  Showing future appointments within next 150 days and meeting all other requirements       The patient was identified by name and date of birth. Marie Portillo was informed that this is a telemedicine visit and that the visit is being conducted throughthe Epic Embedded platform. She agrees to proceed..  My office door was closed. No one else was in the room.  She acknowledged consent and understanding of privacy and security of the video platform. The patient has agreed to participate and understands they can discontinue the visit at any time.    Patient is aware this is a billable service.     Subjective  Marie Portillo is a 58 y.o. female  .      HPI     Past Medical History:   Diagnosis Date    Bilateral carpal tunnel syndrome 08/20/2019    Cardiomyopathy (HCC)     Carpal tunnel syndrome of right wrist 10/30/2019    Added automatically from request for surgery [id number]    Chlamydia      COPD (chronic obstructive pulmonary disease) (HCC)     Depression     Hypertension     Varicella        Past Surgical History:   Procedure Laterality Date    BACK SURGERY      BREAST BIOPSY Left 20yrs ago benign    CERVICAL SPINE SURGERY      EGD      HERNIA REPAIR      OOPHORECTOMY Right     [place] [facility] WRST SURG W/RLS TRANSVRS CARPL LIGM Right 01/21/2020    Procedure: RELEASE CARPAL TUNNEL ENDOSCOPIC;  Surgeon: Sam Nguyen MD;  [facility];  Service: Orthopedics    [place] [facility] WRST SURG W/RLS TRANSVRS CARPL LIGM Left 01/28/2020    Procedure: RELEASE CARPAL TUNNEL ENDOSCOPIC;  Surgeon: Sam Nguyen MD;  [facility];  Service: Orthopedics       Current Outpatient Medications   Medication Sig Dispense Refill    Acetaminophen 500 MG Take 2 capsules by mouth every 4 (four) hours as needed for mild pain      albuterol (PROVENTIL HFA,VENTOLIN HFA) 90 mcg/act inhaler inhale 2 puffs by mouth and INTO THE LUNGS every 6 hours IF NEEDED FOR WHEEZING. 8.5 g 2    betamethasone dipropionate (DIPROSONE) 0.05 % cream Apply topically 2 (two) times a day 30 g 0    buPROPion (WELLBUTRIN XL) 150 mg 24 hr tablet Take 1 tablet (150 mg total) by mouth daily for 7 days Take one 150 mg tablet for 7 days. THEN, stop.      cholecalciferol (VITAMIN D3) 400 units tablet Take 400 Units by mouth daily      fluticasone (FLONASE) 50 mcg/act nasal spray instill 1 spray into each nostril once daily 16 g 2    guaiFENesin (MUCINEX) 600 mg 12 hr tablet Take 2 tablets (1,200 mg total) by mouth every 12 (twelve) hours (Patient not taking: Reported on 8/9/2024) 60 tablet 3    ipratropium-albuterol (DUO-NEB) 0.5-2.5 mg/3 mL nebulizer solution Take 3 mL by nebulization 4 (four) times a day (Patient taking differently: Take 3 mL by nebulization in the morning) 360 mL 3    lamoTRIgine (LaMICtal) 200 MG tablet Take 1 tablet (200 mg total) by mouth daily 30 tablet 2    levocetirizine (XYZAL) 5 MG tablet take 1 tablet by mouth every  evening 30 tablet 4    losartan (COZAAR) 25 mg tablet Take 1 tablet (25 mg total) by mouth daily 30 tablet 5    methocarbamol (ROBAXIN) 500 mg tablet Take 500 mg by mouth 2 (two) times a day PRN (Patient not taking: Reported on 8/9/2024)      metoprolol succinate (TOPROL-XL) 25 mg 24 hr tablet take 1 tablet by mouth once daily 30 tablet 0    nicotine (NICODERM CQ) 21 mg/24 hr TD 24 hr patch Place 1 patch on the skin over 24 hours every 24 hours (Patient not taking: Reported on 6/26/2024) 28 patch 0    nicotine polacrilex (COMMIT) 4 MG lozenge Apply 1 lozenge (4 mg total) to the mouth or throat as needed for smoking cessation (Patient not taking: Reported on 6/26/2024) 100 each 0    OLANZapine-FLUoxetine (SYMBYAX) 6-25 MG per capsule Take 1 capsule by mouth every evening (Patient not taking: Reported on 8/9/2024) 30 capsule 0    omeprazole (PriLOSEC) 20 mg delayed release capsule take 1 capsule by mouth twice a day 60 capsule 5    senna-docusate sodium (SENOKOT-S) 8.6-50 mg per tablet Take 3 tablets by mouth as needed        Stiolto Respimat 2.5-2.5 MCG/ACT inhaler inhale 2 puffs by mouth and INTO THE LUNGS once daily 4 g 11     No current facility-administered medications for this visit.        No Known Allergies    Review of Systems    Video Exam    There were no vitals filed for this visit.    Physical Exam

## 2024-08-16 ENCOUNTER — TELEMEDICINE (OUTPATIENT)
Dept: BEHAVIORAL/MENTAL HEALTH CLINIC | Facility: CLINIC | Age: 59
End: 2024-08-16
Payer: COMMERCIAL

## 2024-08-16 DIAGNOSIS — F31.81 MODERATE MIXED BIPOLAR II DISORDER (HCC): Primary | ICD-10-CM

## 2024-08-16 DIAGNOSIS — F10.11 ALCOHOL ABUSE, IN REMISSION: ICD-10-CM

## 2024-08-16 DIAGNOSIS — F12.90 MARIJUANA USE: Chronic | ICD-10-CM

## 2024-08-16 DIAGNOSIS — F43.12 CHRONIC POST-TRAUMATIC STRESS DISORDER (PTSD): ICD-10-CM

## 2024-08-16 DIAGNOSIS — F41.1 GENERALIZED ANXIETY DISORDER: ICD-10-CM

## 2024-08-16 DIAGNOSIS — Z72.0 TOBACCO ABUSE: ICD-10-CM

## 2024-08-16 DIAGNOSIS — F17.210 CIGARETTE NICOTINE DEPENDENCE WITHOUT COMPLICATION: ICD-10-CM

## 2024-08-16 PROCEDURE — 90853 GROUP PSYCHOTHERAPY: CPT | Performed by: COUNSELOR

## 2024-08-16 NOTE — PSYCH
Virtual Regular Visit    Verification of patient location:    Patient is located at Home in the following state in which I hold an active license [place]      Assessment/Plan:    Problem List Items Addressed This Visit       Moderate mixed bipolar II disorder (HCC) - Primary    Generalized anxiety disorder    Chronic post-traumatic stress disorder (PTSD)    Alcohol abuse, in remission    Marijuana use (Chronic)    Cigarette nicotine dependence without complication    Tobacco abuse       Goals addressed in session: Goal 1          Reason for visit is No chief complaint on file.       Encounter provider Isabel Watts LPC      Recent Visits  Date Type Provider Dept   08/14/24 Telemedicine Isabel Watts LPC [facility] [place]   08/13/24 Office Visit [facility]   08/09/24 Office Visit [facility]   Showing recent visits within past 7 days and meeting all other requirements  Future Appointments  No visits were found meeting these conditions.  Showing future appointments within next 150 days and meeting all other requirements       The patient was identified by name and date of birth. Marie Portillo was informed that this is a telemedicine visit and that the visit is being conducted throughthe [employer] Teams platform. She agrees to proceed..  My office door was closed. The patient was notified the following individuals were present in the room other group members.  She acknowledged consent and understanding of privacy and security of the video platform. The patient has agreed to participate and understands they can discontinue the visit at any time.    Patient is aware this is a billable service.     Subjective  Marie Portillo is a 58 y.o. female  .    Data: Marie attended the Relaxation group and was guided through tapping on anxiety, diaphragmatic breathing, other Vagus nerve stimulation techniques, and progressive muscle  relaxation. Furthermore, attendees were guided through a quick body scan, Releasing  Grief and Welcoming the Positive meditation, How to [place] with Pain Sunset Colors Imagery, Self-Love meditation, and ending with affirmations for self-compassion, against anxiety and pain endurance, and for self-worth. Psychoeducation, EFT, Mindfulness-based, and Somatic approaches were used to achieve the deep state of relaxation and to maintain the serene environment for the exercise's purpose.      No alcohol or drug problems were addressed in the session.     No safety plan was indicated due to no safety issues at the moment.      Assessment: Marie was in normal/euthymic mood and in congruence with broad affect, followed through guidance and completed the exercises well, showed willingness to continue learning and practicing self-care in weekly Relaxation Group as well as individual therapy.      Plan: Marie will practice deep breathing and other techniques based on each occasion to counter stress and will attend the next group to be able to take part in further guided exercises on chosen topics to build on the techniques learned in the beginning.      HPI     Past Medical History:   Diagnosis Date    Bilateral carpal tunnel syndrome 08/20/2019    Cardiomyopathy (HCC)     Carpal tunnel syndrome of right wrist 10/30/2019    Added automatically from request for surgery [id number]    Chlamydia     COPD (chronic obstructive pulmonary disease) (HCC)     Depression     Hypertension     Varicella        Past Surgical History:   Procedure Laterality Date    BACK SURGERY      BREAST BIOPSY Left 20yrs ago benign    CERVICAL SPINE SURGERY      EGD      HERNIA REPAIR      OOPHORECTOMY Right     [place] [facility] WRST SURG W/RLS TRANSVRS CARPL LIGM Right 01/21/2020    Procedure: RELEASE CARPAL TUNNEL ENDOSCOPIC;  Surgeon: Sam Nguyen MD;  [facility];  Service: Orthopedics    [place] [facility] WRST SURG W/RLS TRANSVRS CARPL LIGM Left 01/28/2020     Procedure: RELEASE CARPAL TUNNEL ENDOSCOPIC;  Surgeon: Sam Nguyen MD;  [facility];  Service: Orthopedics       Current Outpatient Medications   Medication Sig Dispense Refill    Acetaminophen 500 MG Take 2 capsules by mouth every 4 (four) hours as needed for mild pain      albuterol (PROVENTIL HFA,VENTOLIN HFA) 90 mcg/act inhaler inhale 2 puffs by mouth and INTO THE LUNGS every 6 hours IF NEEDED FOR WHEEZING. 8.5 g 2    betamethasone dipropionate (DIPROSONE) 0.05 % cream Apply topically 2 (two) times a day 30 g 0    buPROPion (WELLBUTRIN XL) 150 mg 24 hr tablet Take 1 tablet (150 mg total) by mouth daily for 14 days Take one 150 mg tablet for 14 days. THEN, stop.      cholecalciferol (VITAMIN D3) 400 units tablet Take 400 Units by mouth daily      fluticasone (FLONASE) 50 mcg/act nasal spray instill 1 spray into each nostril once daily 16 g 2    guaiFENesin (MUCINEX) 600 mg 12 hr tablet Take 2 tablets (1,200 mg total) by mouth every 12 (twelve) hours (Patient not taking: Reported on 8/9/2024) 60 tablet 3    ipratropium-albuterol (DUO-NEB) 0.5-2.5 mg/3 mL nebulizer solution Take 3 mL by nebulization 4 (four) times a day (Patient taking differently: Take 3 mL by nebulization in the morning) 360 mL 3    lamoTRIgine (LaMICtal) 200 MG tablet Take 1 tablet (200 mg total) by mouth daily 30 tablet 2    levocetirizine (XYZAL) 5 MG tablet take 1 tablet by mouth every evening 30 tablet 4    losartan (COZAAR) 25 mg tablet Take 1 tablet (25 mg total) by mouth daily 30 tablet 5    methocarbamol (ROBAXIN) 500 mg tablet Take 500 mg by mouth 2 (two) times a day PRN (Patient not taking: Reported on 8/9/2024)      metoprolol succinate (TOPROL-XL) 25 mg 24 hr tablet take 1 tablet by mouth once daily 30 tablet 0    nicotine (NICODERM CQ) 21 mg/24 hr TD 24 hr patch Place 1 patch on the skin over 24 hours every 24 hours (Patient not taking: Reported on 6/26/2024) 28 patch 0    nicotine polacrilex (COMMIT) 4 MG lozenge  Apply 1 lozenge (4 mg total) to the mouth or throat as needed for smoking cessation (Patient not taking: Reported on 6/26/2024) 100 each 0    OLANZapine-FLUoxetine (SYMBYAX) 6-25 MG per capsule Take 1 capsule by mouth every evening (Patient not taking: Reported on 8/9/2024) 30 capsule 0    omeprazole (PriLOSEC) 20 mg delayed release capsule take 1 capsule by mouth twice a day 60 capsule 5    senna-docusate sodium (SENOKOT-S) 8.6-50 mg per tablet Take 3 tablets by mouth as needed        Stiolto Respimat 2.5-2.5 MCG/ACT inhaler inhale 2 puffs by mouth and INTO THE LUNGS once daily 4 g 11     No current facility-administered medications for this visit.        No Known Allergies    Review of Systems    Video Exam    There were no vitals filed for this visit.    Physical Exam     08/16/24  Start Time: 0410  Stop Time: 0500  Total Visit Time: 50 minutes

## 2024-08-20 ENCOUNTER — TELEPHONE (OUTPATIENT)
Age: 59
End: 2024-08-20

## 2024-08-20 NOTE — TELEPHONE ENCOUNTER
Pt called stating they think the appt. on 9/4 is too early due to just starting medication on 8/16 Pt. thinking appt. needs to be pushed back but wants to ask provider.    Message forwarded to provider

## 2024-08-21 ENCOUNTER — TELEMEDICINE (OUTPATIENT)
Dept: BEHAVIORAL/MENTAL HEALTH CLINIC | Facility: CLINIC | Age: 59
End: 2024-08-21
Payer: COMMERCIAL

## 2024-08-21 DIAGNOSIS — F41.1 GENERALIZED ANXIETY DISORDER: ICD-10-CM

## 2024-08-21 DIAGNOSIS — Z72.0 TOBACCO ABUSE: ICD-10-CM

## 2024-08-21 DIAGNOSIS — F12.90 MARIJUANA USE: Chronic | ICD-10-CM

## 2024-08-21 DIAGNOSIS — F10.11 ALCOHOL ABUSE, IN REMISSION: ICD-10-CM

## 2024-08-21 DIAGNOSIS — F43.12 CHRONIC POST-TRAUMATIC STRESS DISORDER (PTSD): ICD-10-CM

## 2024-08-21 DIAGNOSIS — F17.210 CIGARETTE NICOTINE DEPENDENCE WITHOUT COMPLICATION: ICD-10-CM

## 2024-08-21 DIAGNOSIS — F31.81 MODERATE MIXED BIPOLAR II DISORDER (HCC): Primary | ICD-10-CM

## 2024-08-21 PROCEDURE — 90834 PSYTX W PT 45 MINUTES: CPT | Performed by: COUNSELOR

## 2024-08-21 NOTE — PSYCH
"Behavioral Health Psychotherapy Progress Note    Psychotherapy Provided: Individual Psychotherapy     1. Moderate mixed bipolar II disorder (HCC)        2. Generalized anxiety disorder        3. Chronic post-traumatic stress disorder (PTSD)        4. Tobacco abuse        5. Cigarette nicotine dependence without complication        6. Marijuana use        7. Alcohol abuse, in remission            Goals addressed in session: Goal 1     DATA: Marie reported having better mood than previous few weeks, marked by deeper expressiveness and wider consideration to observe her emotions that would help her deal with them healthily. She again expressed her reservations and concerns with the new proposed medication and wanted to discuss this further with her doctor to come up with a different one and with more information about the side-effects.    During this session, this clinician used the following therapeutic modalities: Client-centered Therapy, Cognitive Behavioral Therapy, Dialectical Behavior Therapy, Mindfulness-based Strategies, and Supportive Psychotherapy    Substance Abuse was not addressed during this session. If the client is diagnosed with a co-occurring substance use disorder, please indicate any changes in the frequency or amount of use: NA. Stage of change for addressing substance use diagnoses: Pre-contemplation    ASSESSMENT:  Marie Portillo presents with a Euthymic/ normal mood.     her affect is Normal range and intensity, which is congruent, with her mood and the content of the session. The client has made progress on their goals.    Marie seemed more balanced and present this time, with better insight and reaching out for more guidance to do better with emotions.  Marie Portillo presents with a low risk of suicide, low risk of self-harm, and none risk of harm to others.    For any risk assessment that surpasses a \"low\" rating, a safety plan must be developed.    A safety plan was indicated: no  If yes, " describe in detail NA    PLAN: Between sessions, Marie Portillo will use learned Mindfulness skills to be able to practice more observing than emoting and be able to stay out of the emotional vortex. At the next session, the therapist will use Client-centered Therapy, Cognitive Behavioral Therapy, Dialectical Behavior Therapy, Mindfulness-based Strategies, and Supportive Psychotherapy to address adequate note taking and more regularly.    Behavioral Health Treatment Plan and Discharge Planning: Marie Portillo is aware of and agrees to continue to work on their treatment plan. They have identified and are working toward their discharge goals. yes    Visit start and stop times:    08/21/24  Start Time: 0100  Stop Time: 0145  Total Visit Time: 45 minutes

## 2024-08-21 NOTE — PSYCH
Virtual Regular Visit    Verification of patient location:    Patient is located at Home in the following state in which I hold an active license [place]      Assessment/Plan:    Problem List Items Addressed This Visit       Moderate mixed bipolar II disorder (HCC) - Primary    Generalized anxiety disorder    Chronic post-traumatic stress disorder (PTSD)    Alcohol abuse, in remission    Marijuana use (Chronic)    Cigarette nicotine dependence without complication    Tobacco abuse       Goals addressed in session: Goal 1          Reason for visit is No chief complaint on file.       Encounter provider Isabel Watts LPC      Recent Visits  Date Type Provider Dept   08/16/24 Telemedicine Isabel Watts L[facility] [place]   08/14/24 Telemedicine Isabel Watts LPC [facility] [place]   Showing recent visits within past 7 days and meeting all other requirements  Future Appointments  No visits were found meeting these conditions.  Showing future appointments within next 150 days and meeting all other requirements       The patient was identified by name and date of birth. Marie Portillo was informed that this is a telemedicine visit and that the visit is being conducted throughthe Epic Embedded platform. She agrees to proceed..  My office door was closed. No one else was in the room.  She acknowledged consent and understanding of privacy and security of the video platform. The patient has agreed to participate and understands they can discontinue the visit at any time.    Patient is aware this is a billable service.     Subjective  Marie Portillo is a 58 y.o. female  .      HPI     Past Medical History:   Diagnosis Date    Bilateral carpal tunnel syndrome 08/20/2019    Cardiomyopathy (HCC)     Carpal tunnel syndrome of right wrist 10/30/2019    Added automatically from request for surgery [id number]    Chlamydia     COPD (chronic obstructive pulmonary disease) (HCC)      Depression     Hypertension     Varicella        Past Surgical History:   Procedure Laterality Date    BACK SURGERY      BREAST BIOPSY Left 20yrs ago benign    CERVICAL SPINE SURGERY      EGD      HERNIA REPAIR      OOPHORECTOMY Right     [place] [facility] WRST SURG W/RLS TRANSVRS CARPL LIGM Right 01/21/2020    Procedure: RELEASE CARPAL TUNNEL ENDOSCOPIC;  Surgeon: Sam Nguyen MD;  [facility];  Service: Orthopedics    [place] [facility] WRST SURG W/RLS TRANSVRS CARPL LIGM Left 01/28/2020    Procedure: RELEASE CARPAL TUNNEL ENDOSCOPIC;  Surgeon: Sam Nguyen MD;  [facility];  Service: Orthopedics       Current Outpatient Medications   Medication Sig Dispense Refill    Acetaminophen 500 MG Take 2 capsules by mouth every 4 (four) hours as needed for mild pain      albuterol (PROVENTIL HFA,VENTOLIN HFA) 90 mcg/act inhaler inhale 2 puffs by mouth and INTO THE LUNGS every 6 hours IF NEEDED FOR WHEEZING. 8.5 g 2    betamethasone dipropionate (DIPROSONE) 0.05 % cream Apply topically 2 (two) times a day 30 g 0    buPROPion (WELLBUTRIN XL) 150 mg 24 hr tablet Take 1 tablet (150 mg total) by mouth daily for 14 days Take one 150 mg tablet for 14 days. THEN, stop.      cholecalciferol (VITAMIN D3) 400 units tablet Take 400 Units by mouth daily      fluticasone (FLONASE) 50 mcg/act nasal spray instill 1 spray into each nostril once daily 16 g 2    guaiFENesin (MUCINEX) 600 mg 12 hr tablet Take 2 tablets (1,200 mg total) by mouth every 12 (twelve) hours (Patient not taking: Reported on 8/9/2024) 60 tablet 3    ipratropium-albuterol (DUO-NEB) 0.5-2.5 mg/3 mL nebulizer solution Take 3 mL by nebulization 4 (four) times a day (Patient taking differently: Take 3 mL by nebulization in the morning) 360 mL 3    lamoTRIgine (LaMICtal) 200 MG tablet Take 1 tablet (200 mg total) by mouth daily 30 tablet 2    levocetirizine (XYZAL) 5 MG tablet take 1 tablet by mouth every evening 30 tablet 4    losartan (COZAAR) 25 mg tablet Take  1 tablet (25 mg total) by mouth daily 30 tablet 5    methocarbamol (ROBAXIN) 500 mg tablet Take 500 mg by mouth 2 (two) times a day PRN (Patient not taking: Reported on 8/9/2024)      metoprolol succinate (TOPROL-XL) 25 mg 24 hr tablet take 1 tablet by mouth once daily 30 tablet 0    nicotine (NICODERM CQ) 21 mg/24 hr TD 24 hr patch Place 1 patch on the skin over 24 hours every 24 hours (Patient not taking: Reported on 6/26/2024) 28 patch 0    nicotine polacrilex (COMMIT) 4 MG lozenge Apply 1 lozenge (4 mg total) to the mouth or throat as needed for smoking cessation (Patient not taking: Reported on 6/26/2024) 100 each 0    OLANZapine-FLUoxetine (SYMBYAX) 6-25 MG per capsule Take 1 capsule by mouth every evening (Patient not taking: Reported on 8/9/2024) 30 capsule 0    omeprazole (PriLOSEC) 20 mg delayed release capsule take 1 capsule by mouth twice a day 60 capsule 5    senna-docusate sodium (SENOKOT-S) 8.6-50 mg per tablet Take 3 tablets by mouth as needed        Stiolto Respimat 2.5-2.5 MCG/ACT inhaler inhale 2 puffs by mouth and INTO THE LUNGS once daily 4 g 11     No current facility-administered medications for this visit.        No Known Allergies    Review of Systems    Video Exam    There were no vitals filed for this visit.    Physical Exam

## 2024-08-23 ENCOUNTER — TELEMEDICINE (OUTPATIENT)
Dept: BEHAVIORAL/MENTAL HEALTH CLINIC | Facility: CLINIC | Age: 59
End: 2024-08-23
Payer: COMMERCIAL

## 2024-08-23 DIAGNOSIS — F12.90 MARIJUANA USE: Chronic | ICD-10-CM

## 2024-08-23 DIAGNOSIS — F17.210 CIGARETTE NICOTINE DEPENDENCE WITHOUT COMPLICATION: ICD-10-CM

## 2024-08-23 DIAGNOSIS — Z72.0 TOBACCO ABUSE: ICD-10-CM

## 2024-08-23 DIAGNOSIS — F43.12 CHRONIC POST-TRAUMATIC STRESS DISORDER (PTSD): ICD-10-CM

## 2024-08-23 DIAGNOSIS — F41.1 GENERALIZED ANXIETY DISORDER: ICD-10-CM

## 2024-08-23 DIAGNOSIS — F31.81 MODERATE MIXED BIPOLAR II DISORDER (HCC): Primary | ICD-10-CM

## 2024-08-23 DIAGNOSIS — F10.11 ALCOHOL ABUSE, IN REMISSION: ICD-10-CM

## 2024-08-23 PROCEDURE — 90853 GROUP PSYCHOTHERAPY: CPT | Performed by: COUNSELOR

## 2024-08-23 NOTE — PSYCH
Virtual Regular Visit    Verification of patient location:    Patient is located at Home in the following state in which I hold an active license [place]      Assessment/Plan:    Problem List Items Addressed This Visit       Moderate mixed bipolar II disorder (HCC) - Primary    Generalized anxiety disorder    Chronic post-traumatic stress disorder (PTSD)    Alcohol abuse, in remission    Marijuana use (Chronic)    Cigarette nicotine dependence without complication    Tobacco abuse       Goals addressed in session: Goal 1          Reason for visit is No chief complaint on file.       Encounter provider Isabel Watts LPC      Recent Visits  Date Type Provider Dept   08/21/24 Telemedicine Isabel Watts LPC [facility] Therapist [place]   08/16/24 Telemedicine Isabel Watts LPC [facility] Therapist [place]   Showing recent visits within past 7 days and meeting all other requirements  Future Appointments  No visits were found meeting these conditions.  Showing future appointments within next 150 days and meeting all other requirements       The patient was identified by name and date of birth. Marie Portillo was informed that this is a telemedicine visit and that the visit is being conducted throughthe [employer] Teams platform. She agrees to proceed..  My office door was closed. The patient was notified the following individuals were present in the room other group members.  She acknowledged consent and understanding of privacy and security of the video platform. The patient has agreed to participate and understands they can discontinue the visit at any time.    Patient is aware this is a billable service.     Subjective  Marie Portillo is a 58 y.o. female  .    Data:Marie attended the Relaxation group and was guided through tapping on pain, diaphragmatic breathing, other Vagus nerve stimulation techniques, and progressive muscle relaxation. Furthermore, attendees were guided through a deep  body scan, Mindfulness-based meditation for Being Present with Negative Emotions and Alleviating Feelings of Anger and Resentment, the Inner Critique, Inner Child Connection imagery, and ending with affirmations for self-forgiveness, self-love, courage, and optimism. EFT, Mindfulness-based, and Somatic approaches were used to achieve the deep state of relaxation and to maintain the serene environment for the exercise's purpose.      No alcohol or drug problems were addressed in the session.     No safety plan was indicated due to no safety issues at the moment.      Assessment: Marie was in normal/euthymic mood and in congruence with broad affect, followed through guidance and completed the exercises well, showed willingness to continue learning and practicing self-care in weekly Relaxation Group as well as individual therapy.      Plan: Marie will practice deep breathing and other techniques based on each occasion to counter stress and will attend the next group to be able to take part in further guided exercises on chosen topics to build on the techniques learned in the beginning.    HPI     Past Medical History:   Diagnosis Date    Bilateral carpal tunnel syndrome 08/20/2019    Cardiomyopathy (HCC)     Carpal tunnel syndrome of right wrist 10/30/2019    Added automatically from request for surgery [id number]    Chlamydia     COPD (chronic obstructive pulmonary disease) (HCC)     Depression     Hypertension     Varicella        Past Surgical History:   Procedure Laterality Date    BACK SURGERY      BREAST BIOPSY Left 20yrs ago benign    CERVICAL SPINE SURGERY      EGD      HERNIA REPAIR      OOPHORECTOMY Right     [place] [facility] WRST SURG W/RLS TRANSVRS CARPL LIGM Right 01/21/2020    Procedure: RELEASE CARPAL TUNNEL ENDOSCOPIC;  Surgeon: Sam Nguyen MD;  [facility];  Service: Orthopedics    [place] [facility] WRST SURG W/RLS TRANSVRS CARPL LIGM Left 01/28/2020    Procedure: RELEASE CARPAL TUNNEL ENDOSCOPIC;  Surgeon:  Sam Nguyen MD;  Location: [facility];  Service: Orthopedics       Current Outpatient Medications   Medication Sig Dispense Refill    Acetaminophen 500 MG Take 2 capsules by mouth every 4 (four) hours as needed for mild pain      albuterol (PROVENTIL HFA,VENTOLIN HFA) 90 mcg/act inhaler inhale 2 puffs by mouth and INTO THE LUNGS every 6 hours IF NEEDED FOR WHEEZING. 8.5 g 2    betamethasone dipropionate (DIPROSONE) 0.05 % cream Apply topically 2 (two) times a day 30 g 0    buPROPion (WELLBUTRIN XL) 150 mg 24 hr tablet Take 1 tablet (150 mg total) by mouth daily for 14 days Take one 150 mg tablet for 14 days. THEN, stop.      cholecalciferol (VITAMIN D3) 400 units tablet Take 400 Units by mouth daily      fluticasone (FLONASE) 50 mcg/act nasal spray instill 1 spray into each nostril once daily 16 g 2    guaiFENesin (MUCINEX) 600 mg 12 hr tablet Take 2 tablets (1,200 mg total) by mouth every 12 (twelve) hours (Patient not taking: Reported on 8/9/2024) 60 tablet 3    ipratropium-albuterol (DUO-NEB) 0.5-2.5 mg/3 mL nebulizer solution Take 3 mL by nebulization 4 (four) times a day (Patient taking differently: Take 3 mL by nebulization in the morning) 360 mL 3    lamoTRIgine (LaMICtal) 200 MG tablet Take 1 tablet (200 mg total) by mouth daily 30 tablet 2    levocetirizine (XYZAL) 5 MG tablet take 1 tablet by mouth every evening 30 tablet 4    losartan (COZAAR) 25 mg tablet Take 1 tablet (25 mg total) by mouth daily 30 tablet 5    methocarbamol (ROBAXIN) 500 mg tablet Take 500 mg by mouth 2 (two) times a day PRN (Patient not taking: Reported on 8/9/2024)      metoprolol succinate (TOPROL-XL) 25 mg 24 hr tablet take 1 tablet by mouth once daily 30 tablet 0    nicotine (NICODERM CQ) 21 mg/24 hr TD 24 hr patch Place 1 patch on the skin over 24 hours every 24 hours (Patient not taking: Reported on 6/26/2024) 28 patch 0    nicotine polacrilex (COMMIT) 4 MG lozenge Apply 1 lozenge (4 mg total) to the mouth or throat as  needed for smoking cessation (Patient not taking: Reported on 6/26/2024) 100 each 0    OLANZapine-FLUoxetine (SYMBYAX) 6-25 MG per capsule Take 1 capsule by mouth every evening (Patient not taking: Reported on 8/9/2024) 30 capsule 0    omeprazole (PriLOSEC) 20 mg delayed release capsule take 1 capsule by mouth twice a day 60 capsule 5    senna-docusate sodium (SENOKOT-S) 8.6-50 mg per tablet Take 3 tablets by mouth as needed        Stiolto Respimat 2.5-2.5 MCG/ACT inhaler inhale 2 puffs by mouth and INTO THE LUNGS once daily 4 g 11     No current facility-administered medications for this visit.        No Known Allergies    Review of Systems    Video Exam    There were no vitals filed for this visit.    Physical Exam     08/23/24  Start Time: 0405  Stop Time: 0500  Total Visit Time: 55 minutes

## 2024-08-26 ENCOUNTER — TELEPHONE (OUTPATIENT)
Dept: PSYCHIATRY | Facility: CLINIC | Age: 59
End: 2024-08-26

## 2024-08-28 ENCOUNTER — TELEMEDICINE (OUTPATIENT)
Dept: BEHAVIORAL/MENTAL HEALTH CLINIC | Facility: CLINIC | Age: 59
End: 2024-08-28
Payer: COMMERCIAL

## 2024-08-28 DIAGNOSIS — F12.90 MARIJUANA USE: Chronic | ICD-10-CM

## 2024-08-28 DIAGNOSIS — F10.11 ALCOHOL ABUSE, IN REMISSION: ICD-10-CM

## 2024-08-28 DIAGNOSIS — F43.12 CHRONIC POST-TRAUMATIC STRESS DISORDER (PTSD): ICD-10-CM

## 2024-08-28 DIAGNOSIS — Z72.0 TOBACCO ABUSE: ICD-10-CM

## 2024-08-28 DIAGNOSIS — F17.210 CIGARETTE NICOTINE DEPENDENCE WITHOUT COMPLICATION: ICD-10-CM

## 2024-08-28 DIAGNOSIS — F31.81 MODERATE MIXED BIPOLAR II DISORDER (HCC): Primary | ICD-10-CM

## 2024-08-28 DIAGNOSIS — F41.1 GENERALIZED ANXIETY DISORDER: ICD-10-CM

## 2024-08-28 PROCEDURE — 90834 PSYTX W PT 45 MINUTES: CPT | Performed by: COUNSELOR

## 2024-08-28 NOTE — PSYCH
Virtual Regular Visit    Verification of patient location:    Patient is located at Home in the following state in which I hold an active license [place]      Assessment/Plan:    Problem List Items Addressed This Visit       Moderate mixed bipolar II disorder (HCC) - Primary    Generalized anxiety disorder    Chronic post-traumatic stress disorder (PTSD)    Alcohol abuse, in remission    Marijuana use (Chronic)    Cigarette nicotine dependence without complication    Tobacco abuse       Goals addressed in session: Goal 1          Reason for visit is No chief complaint on file.       Encounter provider Isabel Watts LPC      Recent Visits  Date Type Provider Dept   08/23/24 Telemedicine Isabel Watts LPC [facility]rap[facility] [place]   08/21/24 Telemedicine Isabel Watts LPC [facility]rap[facility] [place]   Showing recent visits within past 7 days and meeting all other requirements  Today's Visits  Date Type Provider Dept   08/28/24 Telemedicine Isabel Watts LPC [facility]rap[facility] [place]   Showing today's visits and meeting all other requirements  Future Appointments  No visits were found meeting these conditions.  Showing future appointments within next 150 days and meeting all other requirements       The patient was identified by name and date of birth. Marie Portillo was informed that this is a telemedicine visit and that the visit is being conducted throughthe Epic Embedded platform. She agrees to proceed..  My office door was closed. No one else was in the room.  She acknowledged consent and understanding of privacy and security of the video platform. The patient has agreed to participate and understands they can discontinue the visit at any time.    Patient is aware this is a billable service.     Subjective  Marie Portillo is a 58 y.o. female  .      HPI     Past Medical History:   Diagnosis Date    Bilateral carpal tunnel syndrome 08/20/2019    Cardiomyopathy  (HCC)     Carpal tunnel syndrome of right wrist 10/30/2019    Added automatically from request for surgery [id number]    Chlamydia     COPD (chronic obstructive pulmonary disease) (HCC)     Depression     Hypertension     Varicella        Past Surgical History:   Procedure Laterality Date    BACK SURGERY      BREAST BIOPSY Left 20yrs ago benign    CERVICAL SPINE SURGERY      EGD      HERNIA REPAIR      OOPHORECTOMY Right     [place] [facility] WRST SURG W/RLS TRANSVRS CARPL LIGM Right 01/21/2020    Procedure: RELEASE CARPAL TUNNEL ENDOSCOPIC;  Surgeon: Sam Nguyen MD;  [facility];  Service: Orthopedics    [place] [facility] WRST SURG W/RLS TRANSVRS CARPL LIGM Left 01/28/2020    Procedure: RELEASE CARPAL TUNNEL ENDOSCOPIC;  Surgeon: Sam Nguyen MD;  [facility];  Service: Orthopedics       Current Outpatient Medications   Medication Sig Dispense Refill    Acetaminophen 500 MG Take 2 capsules by mouth every 4 (four) hours as needed for mild pain      albuterol (PROVENTIL HFA,VENTOLIN HFA) 90 mcg/act inhaler inhale 2 puffs by mouth and INTO THE LUNGS every 6 hours IF NEEDED FOR WHEEZING. 8.5 g 2    betamethasone dipropionate (DIPROSONE) 0.05 % cream Apply topically 2 (two) times a day 30 g 0    buPROPion (WELLBUTRIN XL) 150 mg 24 hr tablet Take 1 tablet (150 mg total) by mouth daily for 14 days Take one 150 mg tablet for 14 days. THEN, stop.      cholecalciferol (VITAMIN D3) 400 units tablet Take 400 Units by mouth daily      fluticasone (FLONASE) 50 mcg/act nasal spray instill 1 spray into each nostril once daily 16 g 2    guaiFENesin (MUCINEX) 600 mg 12 hr tablet Take 2 tablets (1,200 mg total) by mouth every 12 (twelve) hours (Patient not taking: Reported on 8/9/2024) 60 tablet 3    ipratropium-albuterol (DUO-NEB) 0.5-2.5 mg/3 mL nebulizer solution Take 3 mL by nebulization 4 (four) times a day (Patient taking differently: Take 3 mL by nebulization in the morning) 360 mL 3    lamoTRIgine (LaMICtal) 200 MG  tablet Take 1 tablet (200 mg total) by mouth daily 30 tablet 2    levocetirizine (XYZAL) 5 MG tablet take 1 tablet by mouth every evening 30 tablet 4    losartan (COZAAR) 25 mg tablet Take 1 tablet (25 mg total) by mouth daily 30 tablet 5    methocarbamol (ROBAXIN) 500 mg tablet Take 500 mg by mouth 2 (two) times a day PRN (Patient not taking: Reported on 8/9/2024)      metoprolol succinate (TOPROL-XL) 25 mg 24 hr tablet take 1 tablet by mouth once daily 30 tablet 0    nicotine (NICODERM CQ) 21 mg/24 hr TD 24 hr patch Place 1 patch on the skin over 24 hours every 24 hours (Patient not taking: Reported on 6/26/2024) 28 patch 0    nicotine polacrilex (COMMIT) 4 MG lozenge Apply 1 lozenge (4 mg total) to the mouth or throat as needed for smoking cessation (Patient not taking: Reported on 6/26/2024) 100 each 0    OLANZapine-FLUoxetine (SYMBYAX) 6-25 MG per capsule Take 1 capsule by mouth every evening (Patient not taking: Reported on 8/9/2024) 30 capsule 0    omeprazole (PriLOSEC) 20 mg delayed release capsule take 1 capsule by mouth twice a day 60 capsule 5    senna-docusate sodium (SENOKOT-S) 8.6-50 mg per tablet Take 3 tablets by mouth as needed        Stiolto Respimat 2.5-2.5 MCG/ACT inhaler inhale 2 puffs by mouth and INTO THE LUNGS once daily 4 g 11     No current facility-administered medications for this visit.        No Known Allergies    Review of Systems    Video Exam    There were no vitals filed for this visit.    Physical Exam     Behavioral Health Psychotherapy Progress Note    Psychotherapy Provided: Individual Psychotherapy     1. Moderate mixed bipolar II disorder (HCC)        2. Generalized anxiety disorder        3. Chronic post-traumatic stress disorder (PTSD)        4. Tobacco abuse        5. Cigarette nicotine dependence without complication        6. Marijuana use        7. Alcohol abuse, in remission          Goals addressed in session: Goal 1     DATA: Marie discussed her impression and  "emotions after her appointment where they said that she won't be having her Chiari malformation surgery for relief. She was allowed to express herself and was validated about her concerns with the new suggested medication and was encouraged to communicate with her doctor and address her reservations. She was encouraged to practice her Emotion regulation skills and approach mindfully each challenging situation that she can. Marie provided examples of using Core Mindfulness skills and was commended for her hard and consistent effort.    During this session, this clinician used the following therapeutic modalities: Client-centered Therapy, Cognitive Behavioral Therapy, Dialectical Behavior Therapy, Mindfulness-based Strategies, and Supportive Psychotherapy    Substance Abuse was addressed during this session. If the client is diagnosed with a co-occurring substance use disorder, please indicate any changes in the frequency or amount of use: 9 cigarettes a day, decreased from 35. Stage of change for addressing substance use diagnoses: Action    ASSESSMENT:  Marie Portillo presents with a Euthymic/ normal and Anxious mood.     her affect is Normal range and intensity and Constricted, which is not congruent, with her mood and the content of the session. The client has made progress on their goals.    Marie seemed emotional,  Marie Portillo presents with a low risk of suicide, low risk of self-harm, and none risk of harm to others.    For any risk assessment that surpasses a \"low\" rating, a safety plan must be developed.    A safety plan was indicated: no  If yes, describe in detail NA    PLAN: Between sessions, Marie Portillo will attend the weekly Relaxation/Mindfulness group for self-care . At the next session, the therapist will use Client-centered Therapy, Cognitive Behavioral Therapy, Dialectical Behavior Therapy, Mindfulness-based Strategies, and Supportive Psychotherapy to address handling emotions.    Behavioral Health " Treatment Plan and Discharge Planning: Marie Portillo is aware of and agrees to continue to work on their treatment plan. They have identified and are working toward their discharge goals. yes    Visit start and stop times:    08/28/24  Start Time: 1257  Stop Time: 1349  Total Visit Time: 52 minutes

## 2024-08-30 ENCOUNTER — TELEMEDICINE (OUTPATIENT)
Dept: BEHAVIORAL/MENTAL HEALTH CLINIC | Facility: CLINIC | Age: 59
End: 2024-08-30
Payer: COMMERCIAL

## 2024-08-30 DIAGNOSIS — F31.81 MODERATE MIXED BIPOLAR II DISORDER (HCC): Primary | ICD-10-CM

## 2024-08-30 DIAGNOSIS — F17.210 CIGARETTE NICOTINE DEPENDENCE WITHOUT COMPLICATION: ICD-10-CM

## 2024-08-30 DIAGNOSIS — Z72.0 TOBACCO ABUSE: ICD-10-CM

## 2024-08-30 DIAGNOSIS — F12.90 MARIJUANA USE: Chronic | ICD-10-CM

## 2024-08-30 DIAGNOSIS — F43.12 CHRONIC POST-TRAUMATIC STRESS DISORDER (PTSD): ICD-10-CM

## 2024-08-30 DIAGNOSIS — F10.11 ALCOHOL ABUSE, IN REMISSION: ICD-10-CM

## 2024-08-30 DIAGNOSIS — F41.1 GENERALIZED ANXIETY DISORDER: ICD-10-CM

## 2024-08-30 PROCEDURE — 90853 GROUP PSYCHOTHERAPY: CPT | Performed by: COUNSELOR

## 2024-08-30 NOTE — PSYCH
Virtual Regular Visit    Verification of patient location:    Patient is located at Home in the following state in which I hold an active license [place]      Assessment/Plan:    Problem List Items Addressed This Visit       Moderate mixed bipolar II disorder (HCC) - Primary    Generalized anxiety disorder    Chronic post-traumatic stress disorder (PTSD)    Alcohol abuse, in remission    Marijuana use (Chronic)    Cigarette nicotine dependence without complication    Tobacco abuse       Goals addressed in session: Goal 1          Reason for visit is No chief complaint on file.       Encounter provider Isabel Watts LPC      Recent Visits  Date Type Provider Dept   08/28/24 Telemedicine Isabel Watts LPC [facility]rap[facility] [place]   08/23/24 Telemedicine Isabel Watts LPC [facility]rap[facility] [place]   Showing recent visits within past 7 days and meeting all other requirements  Future Appointments  No visits were found meeting these conditions.  Showing future appointments within next 150 days and meeting all other requirements       The patient was identified by name and date of birth. Marie Portillo was informed that this is a telemedicine visit and that the visit is being conducted throughthe Microsoft Teams platform. She agrees to proceed..  My office door was closed. The patient was notified the following individuals were present in the room other group members.  She acknowledged consent and understanding of privacy and security of the video platform. The patient has agreed to participate and understands they can discontinue the visit at any time.    Patient is aware this is a billable service.     Subjective  Marie Portillo is a 58 y.o. female  .    Data: Marie attended the Relaxation group and was guided through tapping on anxiety, diaphragmatic breathing, other Vagus nerve stimulation techniques, and progressive muscle relaxation. Furthermore, attendees were guided through a  body scan, Mindfulness-based meditation for a Safe Place to Deal with Pain, Stopping Obsessive Thoughts About the Past, Peaceful Waves relaxation with imagery,  Finding Calm and Equanimity, and ending with affirmations for self-compassion, letting go with gratitude, and celebrating others' success. EFT, Mindfulness-based, and Somatic approaches were used to achieve the deep state of relaxation and to maintain the serene environment for the exercise's purpose.      No alcohol or drug problems were addressed in the session.     No safety plan was indicated due to no safety issues at the moment.      Assessment: Marie was in normal/euthymic mood and in congruence with broad affect, followed through guidance and completed the exercises well, showed willingness to continue learning and practicing self-care in weekly Relaxation Group as well as individual therapy.      Plan: Marie will practice deep breathing and other techniques based on each occasion to counter stress and will attend the next group to be able to take part in further guided exercises on chosen topics to build on the techniques learned in the beginning.       HPI     Past Medical History:   Diagnosis Date    Bilateral carpal tunnel syndrome 08/20/2019    Cardiomyopathy (HCC)     Carpal tunnel syndrome of right wrist 10/30/2019    Added automatically from request for surgery [id number]    Chlamydia     COPD (chronic obstructive pulmonary disease) (HCC)     Depression     Hypertension     Varicella        Past Surgical History:   Procedure Laterality Date    BACK SURGERY      BREAST BIOPSY Left 20yrs ago benign    CERVICAL SPINE SURGERY      EGD      HERNIA REPAIR      OOPHORECTOMY Right     [place] [facility] WRST SURG W/RLS TRANSVRS CARPL LIGM Right 01/21/2020    Procedure: RELEASE CARPAL TUNNEL ENDOSCOPIC;  Surgeon: Sam Nguyen MD;  [facility];  Service: Orthopedics    [place] NDSC WRST SURG W/RLS TRANSVRS CARPL LIGM Left 01/28/2020    Procedure: RELEASE  CARPAL TUNNEL ENDOSCOPIC;  Surgeon: Sam Nguyen MD;  [facility];  Service: Orthopedics       Current Outpatient Medications   Medication Sig Dispense Refill    Acetaminophen 500 MG Take 2 capsules by mouth every 4 (four) hours as needed for mild pain      albuterol (PROVENTIL HFA,VENTOLIN HFA) 90 mcg/act inhaler inhale 2 puffs by mouth and INTO THE LUNGS every 6 hours IF NEEDED FOR WHEEZING. 8.5 g 2    betamethasone dipropionate (DIPROSONE) 0.05 % cream Apply topically 2 (two) times a day 30 g 0    buPROPion (WELLBUTRIN XL) 150 mg 24 hr tablet Take 1 tablet (150 mg total) by mouth daily for 14 days Take one 150 mg tablet for 14 days. THEN, stop.      cholecalciferol (VITAMIN D3) 400 units tablet Take 400 Units by mouth daily      fluticasone (FLONASE) 50 mcg/act nasal spray instill 1 spray into each nostril once daily 16 g 2    guaiFENesin (MUCINEX) 600 mg 12 hr tablet Take 2 tablets (1,200 mg total) by mouth every 12 (twelve) hours (Patient not taking: Reported on 8/9/2024) 60 tablet 3    ipratropium-albuterol (DUO-NEB) 0.5-2.5 mg/3 mL nebulizer solution Take 3 mL by nebulization 4 (four) times a day (Patient taking differently: Take 3 mL by nebulization in the morning) 360 mL 3    lamoTRIgine (LaMICtal) 200 MG tablet Take 1 tablet (200 mg total) by mouth daily 30 tablet 2    levocetirizine (XYZAL) 5 MG tablet take 1 tablet by mouth every evening 30 tablet 4    losartan (COZAAR) 25 mg tablet Take 1 tablet (25 mg total) by mouth daily 30 tablet 5    methocarbamol (ROBAXIN) 500 mg tablet Take 500 mg by mouth 2 (two) times a day PRN (Patient not taking: Reported on 8/9/2024)      metoprolol succinate (TOPROL-XL) 25 mg 24 hr tablet take 1 tablet by mouth once daily 30 tablet 0    nicotine (NICODERM CQ) 21 mg/24 hr TD 24 hr patch Place 1 patch on the skin over 24 hours every 24 hours (Patient not taking: Reported on 6/26/2024) 28 patch 0    nicotine polacrilex (COMMIT) 4 MG lozenge Apply 1 lozenge (4 mg  total) to the mouth or throat as needed for smoking cessation (Patient not taking: Reported on 6/26/2024) 100 each 0    OLANZapine-FLUoxetine (SYMBYAX) 6-25 MG per capsule Take 1 capsule by mouth every evening (Patient not taking: Reported on 8/9/2024) 30 capsule 0    omeprazole (PriLOSEC) 20 mg delayed release capsule take 1 capsule by mouth twice a day 60 capsule 5    senna-docusate sodium (SENOKOT-S) 8.6-50 mg per tablet Take 3 tablets by mouth as needed        Stiolto Respimat 2.5-2.5 MCG/ACT inhaler inhale 2 puffs by mouth and INTO THE LUNGS once daily 4 g 11     No current facility-administered medications for this visit.        No Known Allergies    Review of Systems    Video Exam    There were no vitals filed for this visit.    Physical Exam     08/30/24  Start Time: 0400  Stop Time: 0500  Total Visit Time: 60 minutes

## 2024-09-03 ENCOUNTER — TELEPHONE (OUTPATIENT)
Dept: PSYCHIATRY | Facility: CLINIC | Age: 59
End: 2024-09-03

## 2024-09-04 ENCOUNTER — TELEMEDICINE (OUTPATIENT)
Dept: BEHAVIORAL/MENTAL HEALTH CLINIC | Facility: CLINIC | Age: 59
End: 2024-09-04
Payer: COMMERCIAL

## 2024-09-04 ENCOUNTER — OFFICE VISIT (OUTPATIENT)
Dept: PSYCHIATRY | Facility: CLINIC | Age: 59
End: 2024-09-04
Payer: COMMERCIAL

## 2024-09-04 DIAGNOSIS — Z72.0 TOBACCO ABUSE: ICD-10-CM

## 2024-09-04 DIAGNOSIS — F43.10 PTSD (POST-TRAUMATIC STRESS DISORDER): ICD-10-CM

## 2024-09-04 DIAGNOSIS — F41.1 GENERALIZED ANXIETY DISORDER: ICD-10-CM

## 2024-09-04 DIAGNOSIS — F12.90 CANNABIS USE DISORDER: ICD-10-CM

## 2024-09-04 DIAGNOSIS — F31.81 MODERATE MIXED BIPOLAR II DISORDER (HCC): Primary | ICD-10-CM

## 2024-09-04 DIAGNOSIS — F43.12 CHRONIC POST-TRAUMATIC STRESS DISORDER (PTSD): ICD-10-CM

## 2024-09-04 DIAGNOSIS — F10.11 ALCOHOL ABUSE, IN REMISSION: ICD-10-CM

## 2024-09-04 DIAGNOSIS — F17.210 CIGARETTE NICOTINE DEPENDENCE WITHOUT COMPLICATION: ICD-10-CM

## 2024-09-04 DIAGNOSIS — F12.90 MARIJUANA USE: Chronic | ICD-10-CM

## 2024-09-04 DIAGNOSIS — F31.62 BIPOLAR DISORDER, CURRENT EPISODE MIXED, MODERATE (HCC): Primary | ICD-10-CM

## 2024-09-04 PROCEDURE — 99213 OFFICE O/P EST LOW 20 MIN: CPT | Performed by: PSYCHIATRY & NEUROLOGY

## 2024-09-04 PROCEDURE — 90832 PSYTX W PT 30 MINUTES: CPT | Performed by: COUNSELOR

## 2024-09-04 RX ORDER — BUPROPION HYDROCHLORIDE 300 MG/1
300 TABLET ORAL DAILY
Qty: 30 TABLET | Refills: 1 | Status: SHIPPED | OUTPATIENT
Start: 2024-09-04 | End: 2024-11-03

## 2024-09-04 RX ORDER — LAMOTRIGINE 200 MG/1
200 TABLET ORAL DAILY
Qty: 30 TABLET | Refills: 1 | Status: SHIPPED | OUTPATIENT
Start: 2024-09-04 | End: 2024-11-03

## 2024-09-04 NOTE — PSYCH
Virtual Regular Visit    Verification of patient location:    Patient is located at Other in the following state in which I hold an active license [place]      Assessment/Plan:    Problem List Items Addressed This Visit       Moderate mixed bipolar II disorder (HCC) - Primary    Generalized anxiety disorder    Chronic post-traumatic stress disorder (PTSD)    Alcohol abuse, in remission    Marijuana use (Chronic)    Cigarette nicotine dependence without complication    Tobacco abuse       Goals addressed in session: Goal 1          Reason for visit is No chief complaint on file.       Encounter provider Isabel Watts LPC      Recent Visits  Date Type Provider Dept   08/30/24 Telemedicine Isabel Watts LPC [facility] Therapist [place]   08/28/24 Telemedicine Isabel Watts LPC [facility] Therapist [place]   Showing recent visits within past 7 days and meeting all other requirements  Future Appointments  No visits were found meeting these conditions.  Showing future appointments within next 150 days and meeting all other requirements       The patient was identified by name and date of birth. Marie Portillo was informed that this is a telemedicine visit and that the visit is being conducted throughthe [employer] Teams platform. She agrees to proceed..  My office door was closed. No one else was in the room.  She acknowledged consent and understanding of privacy and security of the video platform. The patient has agreed to participate and understands they can discontinue the visit at any time.    Patient is aware this is a billable service.     Subjective  Marie Portillo is a 58 y.o. female  .      HPI     Past Medical History:   Diagnosis Date    Bilateral carpal tunnel syndrome 08/20/2019    Cardiomyopathy (HCC)     Carpal tunnel syndrome of right wrist 10/30/2019    Added automatically from request for surgery [id number]    Chlamydia     COPD (chronic obstructive pulmonary disease) (HCC)      Depression     Hypertension     Varicella        Past Surgical History:   Procedure Laterality Date    BACK SURGERY      BREAST BIOPSY Left 20yrs ago benign    CERVICAL SPINE SURGERY      EGD      HERNIA REPAIR      OOPHORECTOMY Right     [facility] WRST SURG W/RLS TRANSVRS CARPL LIGM Right 01/21/2020    Procedure: RELEASE CARPAL TUNNEL ENDOSCOPIC;  Surgeon: Sam Nguyen MD;  Location: [facility];  Service: Orthopedics    [facility] WRST SURG W/RLS TRANSVRS CARPL LIGM Left 01/28/2020    Procedure: RELEASE CARPAL TUNNEL ENDOSCOPIC;  Surgeon: Sam Nguyen MD;  Location: [facility];  Service: Orthopedics       Current Outpatient Medications   Medication Sig Dispense Refill    Acetaminophen 500 MG Take 2 capsules by mouth every 4 (four) hours as needed for mild pain      albuterol (PROVENTIL HFA,VENTOLIN HFA) 90 mcg/act inhaler inhale 2 puffs by mouth and INTO THE LUNGS every 6 hours IF NEEDED FOR WHEEZING. 8.5 g 2    betamethasone dipropionate (DIPROSONE) 0.05 % cream Apply topically 2 (two) times a day 30 g 0    buPROPion (WELLBUTRIN XL) 150 mg 24 hr tablet Take 1 tablet (150 mg total) by mouth daily for 14 days Take one 150 mg tablet for 14 days. THEN, stop.      cholecalciferol (VITAMIN D3) 400 units tablet Take 400 Units by mouth daily      fluticasone (FLONASE) 50 mcg/act nasal spray instill 1 spray into each nostril once daily 16 g 2    guaiFENesin (MUCINEX) 600 mg 12 hr tablet Take 2 tablets (1,200 mg total) by mouth every 12 (twelve) hours (Patient not taking: Reported on 8/9/2024) 60 tablet 3    ipratropium-albuterol (DUO-NEB) 0.5-2.5 mg/3 mL nebulizer solution Take 3 mL by nebulization 4 (four) times a day (Patient taking differently: Take 3 mL by nebulization in the morning) 360 mL 3    lamoTRIgine (LaMICtal) 200 MG tablet Take 1 tablet (200 mg total) by mouth daily 30 tablet 2    levocetirizine (XYZAL) 5 MG tablet take 1 tablet by mouth every evening 30 tablet 4    losartan (COZAAR) 25 mg tablet Take  1 tablet (25 mg total) by mouth daily 30 tablet 5    methocarbamol (ROBAXIN) 500 mg tablet Take 500 mg by mouth 2 (two) times a day PRN (Patient not taking: Reported on 8/9/2024)      metoprolol succinate (TOPROL-XL) 25 mg 24 hr tablet take 1 tablet by mouth once daily 30 tablet 0    nicotine (NICODERM CQ) 21 mg/24 hr TD 24 hr patch Place 1 patch on the skin over 24 hours every 24 hours (Patient not taking: Reported on 6/26/2024) 28 patch 0    nicotine polacrilex (COMMIT) 4 MG lozenge Apply 1 lozenge (4 mg total) to the mouth or throat as needed for smoking cessation (Patient not taking: Reported on 6/26/2024) 100 each 0    OLANZapine-FLUoxetine (SYMBYAX) 6-25 MG per capsule Take 1 capsule by mouth every evening (Patient not taking: Reported on 8/9/2024) 30 capsule 0    omeprazole (PriLOSEC) 20 mg delayed release capsule take 1 capsule by mouth twice a day 60 capsule 5    senna-docusate sodium (SENOKOT-S) 8.6-50 mg per tablet Take 3 tablets by mouth as needed        Stiolto Respimat 2.5-2.5 MCG/ACT inhaler inhale 2 puffs by mouth and INTO THE LUNGS once daily 4 g 11     No current facility-administered medications for this visit.        No Known Allergies    Review of Systems    Video Exam    There were no vitals filed for this visit.    Physical Exam

## 2024-09-04 NOTE — PSYCH
Behavioral Health Psychotherapy Progress Note    Psychotherapy Provided: Individual Psychotherapy     1. Moderate mixed bipolar II disorder (HCC)        2. Generalized anxiety disorder        3. Chronic post-traumatic stress disorder (PTSD)        4. Tobacco abuse        5. Cigarette nicotine dependence without complication        6. Marijuana use        7. Alcohol abuse, in remission            Goals addressed in session: Goal 1     DATA: Marie stayed on the phone for the first part of the session due to the unstable connection in the car, being driven to her doctor's appointment by her parents, with limited privacy. She addressed her sound concern about the recently proposed new combined psych medication and needed support and validation for her fears. Marie was validated and encouraged to speak up for herself and to put her wellbeing first and let the providers worry. She was praised for her full toolbox with strategies and techniques that she can use at all times       During this session, this clinician used the following therapeutic modalities: Client-centered Therapy, Cognitive Behavioral Therapy, Dialectical Behavior Therapy, Mindfulness-based Strategies, and Supportive Psychotherapy    Substance Abuse was not addressed during this session. If the client is diagnosed with a co-occurring substance use disorder, please indicate any changes in the frequency or amount of use: NA. Stage of change for addressing substance use diagnoses: Pre-contemplation    ASSESSMENT:  Marie Portillo presents with a Euthymic/ normal and Anxious mood.     her affect is Normal range and intensity and Constricted, which is congruent, with her mood and the content of the session. The client has made progress on their goals.    Marie seemed very anxious about communicating her thoughts and concerns about the newly-proposed combined medication. Marie Portillo presents with a low risk of suicide, low risk of self-harm, and none risk of harm  "to others.    For any risk assessment that surpasses a \"low\" rating, a safety plan must be developed.    A safety plan was indicated: no  If yes, describe in detail NA    PLAN: Between sessions, Marie Portillo will use her communication to address her concerns and advocate for herself with providers. At the next session, the therapist will use Client-centered Therapy, Cognitive Behavioral Therapy, Dialectical Behavior Therapy, Mindfulness-based Strategies, and Supportive Psychotherapy to address self-care, communication with providers.    Behavioral Health Treatment Plan and Discharge Planning: Marie Portillo is aware of and agrees to continue to work on their treatment plan. They have identified and are working toward their discharge goals. yes    Visit start and stop times:    09/04/24  Start Time: 0205  Stop Time: 0225  Total Visit Time: 20 minutes  "
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
## 2024-09-06 ENCOUNTER — HOSPITAL ENCOUNTER (OUTPATIENT)
Dept: MAMMOGRAPHY | Facility: HOSPITAL | Age: 59
End: 2024-09-06
Payer: COMMERCIAL

## 2024-09-06 DIAGNOSIS — I50.20 SYSTOLIC HEART FAILURE, UNSPECIFIED HF CHRONICITY (HCC): ICD-10-CM

## 2024-09-06 DIAGNOSIS — K21.9 GASTROESOPHAGEAL REFLUX DISEASE WITHOUT ESOPHAGITIS: ICD-10-CM

## 2024-09-06 DIAGNOSIS — Z12.31 ENCOUNTER FOR SCREENING MAMMOGRAM FOR BREAST CANCER: ICD-10-CM

## 2024-09-06 PROCEDURE — 77063 BREAST TOMOSYNTHESIS BI: CPT

## 2024-09-06 PROCEDURE — 77067 SCR MAMMO BI INCL CAD: CPT

## 2024-09-06 RX ORDER — METOPROLOL SUCCINATE 25 MG/1
25 TABLET, EXTENDED RELEASE ORAL DAILY
Qty: 30 TABLET | Refills: 5 | Status: SHIPPED | OUTPATIENT
Start: 2024-09-06

## 2024-09-10 ENCOUNTER — TELEPHONE (OUTPATIENT)
Dept: PSYCHIATRY | Facility: CLINIC | Age: 59
End: 2024-09-10

## 2024-09-11 ENCOUNTER — TELEMEDICINE (OUTPATIENT)
Dept: BEHAVIORAL/MENTAL HEALTH CLINIC | Facility: CLINIC | Age: 59
End: 2024-09-11
Payer: COMMERCIAL

## 2024-09-11 DIAGNOSIS — F12.90 MARIJUANA USE: Chronic | ICD-10-CM

## 2024-09-11 DIAGNOSIS — F43.12 CHRONIC POST-TRAUMATIC STRESS DISORDER (PTSD): ICD-10-CM

## 2024-09-11 DIAGNOSIS — F17.210 CIGARETTE NICOTINE DEPENDENCE WITHOUT COMPLICATION: ICD-10-CM

## 2024-09-11 DIAGNOSIS — F41.1 GENERALIZED ANXIETY DISORDER: ICD-10-CM

## 2024-09-11 DIAGNOSIS — F10.11 ALCOHOL ABUSE, IN REMISSION: ICD-10-CM

## 2024-09-11 DIAGNOSIS — F31.81 MODERATE MIXED BIPOLAR II DISORDER (HCC): Primary | ICD-10-CM

## 2024-09-11 DIAGNOSIS — Z72.0 TOBACCO ABUSE: ICD-10-CM

## 2024-09-11 PROCEDURE — 90834 PSYTX W PT 45 MINUTES: CPT | Performed by: COUNSELOR

## 2024-09-11 NOTE — PSYCH
Virtual Regular Visit    Verification of patient location:    Patient is located at Home in the following state in which I hold an active license [place]      Assessment/Plan:    Problem List Items Addressed This Visit       Moderate mixed bipolar II disorder (HCC) - Primary    Generalized anxiety disorder    Chronic post-traumatic stress disorder (PTSD)    Alcohol abuse, in remission    Marijuana use (Chronic)    Cigarette nicotine dependence without complication    Tobacco abuse       Goals addressed in session: Goal 1          Reason for visit is No chief complaint on file.       Encounter provider Isabel Watts LPC      Recent Visits  Date Type Provider Dept   09/04/24 Telemedicine Isabel Watts LPC [facility] Therapist [place]   Showing recent visits within past 7 days and meeting all other requirements  Future Appointments  No visits were found meeting these conditions.  Showing future appointments within next 150 days and meeting all other requirements       The patient was identified by name and date of birth. Marie Portillo was informed that this is a telemedicine visit and that the visit is being conducted throughthe Epic Embedded platform. She agrees to proceed..  My office door was closed. No one else was in the room.  She acknowledged consent and understanding of privacy and security of the video platform. The patient has agreed to participate and understands they can discontinue the visit at any time.    Patient is aware this is a billable service.     Subjective  Marie Portillo is a 58 y.o. female  .      HPI     Past Medical History:   Diagnosis Date    Bilateral carpal tunnel syndrome 08/20/2019    Cardiomyopathy (HCC)     Carpal tunnel syndrome of right wrist 10/30/2019    Added automatically from request for surgery [id number]    Chlamydia     COPD (chronic obstructive pulmonary disease) (HCC)     Depression     Hypertension     Varicella        Past Surgical History:   Procedure  Laterality Date    BACK SURGERY      BREAST BIOPSY Left 20yrs ago benign    CERVICAL SPINE SURGERY      EGD      HERNIA REPAIR      OOPHORECTOMY Right     DE NDSC WRST SURG W/RLS TRANSVRS CARPL LIGM Right 01/21/2020    Procedure: RELEASE CARPAL TUNNEL ENDOSCOPIC;  Surgeon: Sam Nugyen MD;  [facility];  Service: Orthopedics    DE NDSC WRST SURG W/RLS TRANSVRS CARPL LIGM Left 01/28/2020    Procedure: RELEASE CARPAL TUNNEL ENDOSCOPIC;  Surgeon: Sam Nguyen MD;  [facility];  Service: Orthopedics       Current Outpatient Medications   Medication Sig Dispense Refill    Acetaminophen 500 MG Take 2 capsules by mouth every 4 (four) hours as needed for mild pain      albuterol (PROVENTIL HFA,VENTOLIN HFA) 90 mcg/act inhaler inhale 2 puffs by mouth and INTO THE LUNGS every 6 hours IF NEEDED FOR WHEEZING. 8.5 g 2    betamethasone dipropionate (DIPROSONE) 0.05 % cream Apply topically 2 (two) times a day 30 g 0    buPROPion (WELLBUTRIN XL) 300 mg 24 hr tablet Take 1 tablet (300 mg total) by mouth daily 30 tablet 1    cholecalciferol (VITAMIN D3) 400 units tablet Take 400 Units by mouth daily      fluticasone (FLONASE) 50 mcg/act nasal spray instill 1 spray into each nostril once daily 16 g 2    guaiFENesin (MUCINEX) 600 mg 12 hr tablet Take 2 tablets (1,200 mg total) by mouth every 12 (twelve) hours (Patient not taking: Reported on 8/9/2024) 60 tablet 3    ipratropium-albuterol (DUO-NEB) 0.5-2.5 mg/3 mL nebulizer solution Take 3 mL by nebulization 4 (four) times a day (Patient taking differently: Take 3 mL by nebulization in the morning) 360 mL 3    lamoTRIgine (LaMICtal) 200 MG tablet Take 1 tablet (200 mg total) by mouth daily 30 tablet 1    levocetirizine (XYZAL) 5 MG tablet take 1 tablet by mouth every evening 30 tablet 4    losartan (COZAAR) 25 mg tablet Take 1 tablet (25 mg total) by mouth daily 30 tablet 5    methocarbamol (ROBAXIN) 500 mg tablet Take 500 mg by mouth 2 (two) times a day  PRN (Patient not taking: Reported on 8/9/2024)      metoprolol succinate (TOPROL-XL) 25 mg 24 hr tablet take 1 tablet by mouth once daily 30 tablet 5    nicotine (NICODERM CQ) 21 mg/24 hr TD 24 hr patch Place 1 patch on the skin over 24 hours every 24 hours (Patient not taking: Reported on 6/26/2024) 28 patch 0    nicotine polacrilex (COMMIT) 4 MG lozenge Apply 1 lozenge (4 mg total) to the mouth or throat as needed for smoking cessation (Patient not taking: Reported on 6/26/2024) 100 each 0    OLANZapine-FLUoxetine (SYMBYAX) 6-25 MG per capsule Take 1 capsule by mouth every evening (Patient not taking: Reported on 8/9/2024) 30 capsule 0    omeprazole (PriLOSEC) 20 mg delayed release capsule take 1 capsule by mouth twice a day 60 capsule 5    senna-docusate sodium (SENOKOT-S) 8.6-50 mg per tablet Take 3 tablets by mouth as needed        Stiolto Respimat 2.5-2.5 MCG/ACT inhaler inhale 2 puffs by mouth and INTO THE LUNGS once daily 4 g 11     No current facility-administered medications for this visit.        No Known Allergies    Review of Systems    Video Exam    There were no vitals filed for this visit.    Physical Exam

## 2024-09-11 NOTE — PSYCH
Behavioral Health Psychotherapy Progress Note    Psychotherapy Provided: Individual Psychotherapy     1. Moderate mixed bipolar II disorder (HCC)        2. Generalized anxiety disorder        3. Chronic post-traumatic stress disorder (PTSD)        4. Tobacco abuse        5. Cigarette nicotine dependence without complication        6. Marijuana use        7. Alcohol abuse, in remission            Goals addressed in session: Goal 1     DATA: Marie reported feeling better after her concerns about her medications were heard and validated- she was allowed to express herself and was praised for speaking up and setting up firm boundaries that she needed to feel safe and understood. She discussed also her memories about her job and shared that she had mostly positive stories to tell and yet no regrets that she is not there anymore. Marie was encouraged to use her learned Mindfulness skills when dealing with intense emotions and try to observe them without judgment when possible for her and to take notes about her experience and discuss it during sessions.   During this session, this clinician used the following therapeutic modalities: Cognitive Behavioral Therapy, Dialectical Behavior Therapy, Mindfulness-based Strategies, and Supportive Psychotherapy    Substance Abuse was not addressed during this session. If the client is diagnosed with a co-occurring substance use disorder, please indicate any changes in the frequency or amount of use: NA. Stage of change for addressing substance use diagnoses: Pre-contemplation    ASSESSMENT:  Marie Portillo presents with a Euthymic/ normal mood.     her affect is Normal range and intensity, which is congruent, with her mood and the content of the session. The client has made progress on their goals.    Marie was more at ease and calmer than the previous time- more present and focused. Marie Portillo presents with a minimal risk of suicide, minimal risk of self-harm, and none risk of harm  "to others.    For any risk assessment that surpasses a \"low\" rating, a safety plan must be developed.    A safety plan was indicated: no  If yes, describe in detail NA    PLAN: Between sessions, Marie Portillo will use Mindfulness skills and will attend her weekly Relaxation group for self-care. At the next session, the therapist will use Client-centered Therapy, Cognitive Behavioral Therapy, Dialectical Behavior Therapy, Mindfulness-based Strategies, and Supportive Psychotherapy to address self-care, dealing with intense emotions healthily.    Behavioral Health Treatment Plan and Discharge Planning: Marie Portillo is aware of and agrees to continue to work on their treatment plan. They have identified and are working toward their discharge goals. yes    Visit start and stop times:    09/11/24  Start Time: 0100  Stop Time: 0139  Total Visit Time: 39 minutes  "

## 2024-09-13 ENCOUNTER — TELEMEDICINE (OUTPATIENT)
Dept: BEHAVIORAL/MENTAL HEALTH CLINIC | Facility: CLINIC | Age: 59
End: 2024-09-13

## 2024-09-13 DIAGNOSIS — F10.11 ALCOHOL ABUSE, IN REMISSION: ICD-10-CM

## 2024-09-13 DIAGNOSIS — Z72.0 TOBACCO ABUSE: ICD-10-CM

## 2024-09-13 DIAGNOSIS — F12.90 MARIJUANA USE: Chronic | ICD-10-CM

## 2024-09-13 DIAGNOSIS — F17.210 CIGARETTE NICOTINE DEPENDENCE WITHOUT COMPLICATION: ICD-10-CM

## 2024-09-13 DIAGNOSIS — F31.81 MODERATE MIXED BIPOLAR II DISORDER (HCC): Primary | ICD-10-CM

## 2024-09-13 DIAGNOSIS — F43.12 CHRONIC POST-TRAUMATIC STRESS DISORDER (PTSD): ICD-10-CM

## 2024-09-13 DIAGNOSIS — F41.1 GENERALIZED ANXIETY DISORDER: ICD-10-CM

## 2024-09-13 NOTE — PSYCH
Virtual Regular Visit    Verification of patient location:    Patient is located at Home in the following state in which I hold an active license [place]      Assessment/Plan:    Problem List Items Addressed This Visit       Moderate mixed bipolar II disorder (HCC) - Primary    Generalized anxiety disorder    Chronic post-traumatic stress disorder (PTSD)    Alcohol abuse, in remission    Marijuana use (Chronic)    Cigarette nicotine dependence without complication    Tobacco abuse       Goals addressed in session: Goal 1          Reason for visit is No chief complaint on file.       Encounter provider Isabel Watts LPC      Recent Visits  Date Type Provider Dept   09/11/24 Telemedicine Isabel Watts LPC [facility] Therapist [place]   Showing recent visits within past 7 days and meeting all other requirements  Future Appointments  No visits were found meeting these conditions.  Showing future appointments within next 150 days and meeting all other requirements       The patient was identified by name and date of birth. Marie Portillo was informed that this is a telemedicine visit and that the visit is being conducted throughthe Microsoft Teams platform. She agrees to proceed..  My office door was closed. The patient was notified the following individuals were present in the room other group members.  She acknowledged consent and understanding of privacy and security of the video platform. The patient has agreed to participate and understands they can discontinue the visit at any time.    Patient is aware this is a billable service.     Subjective  Marie Portillo is a 58 y.o. female  .    Data: marie attended the Relaxation group and was guided through tapping on anxiety, diaphragmatic breathing, other Vagus nerve stimulation techniques, and progressive muscle relaxation. Furthermore, the attendees were guided through Mindfulness-based meditation series on the topics of Working with Difficult Emotions,  the Child in the [place] visualization, Exploring Interconnectedness, and ending with affirmations for self-confidence, releasing self-limiting beliefs, and welcoming self-acceptance and forgiveness. EFT, Mindfulness-based, and Somatic approaches were used to achieve the deep state of relaxation and to maintain the serene environment for the exercise's purpose.      No alcohol or drug problems were addressed in the session.     No safety plan was indicated due to no safety issues at the moment.      Assessment: Marie was in normal/euthymic mood and in congruence with broad affect, followed through guidance and completed the exercises well, showed willingness to continue learning and practicing self-care in weekly Relaxation Group as well as individual therapy.      Plan: Marie will practice deep breathing and other techniques based on each occasion to counter stress and will attend the next group to be able to take part in further guided exercises on chosen topics to build on the techniques learned in the beginning.       HPI     Past Medical History:   Diagnosis Date    Bilateral carpal tunnel syndrome 08/20/2019    Cardiomyopathy (HCC)     Carpal tunnel syndrome of right wrist 10/30/2019    Added automatically from request for surgery [id number]    Chlamydia     COPD (chronic obstructive pulmonary disease) (HCC)     Depression     Hypertension     Varicella        Past Surgical History:   Procedure Laterality Date    BACK SURGERY      BREAST BIOPSY Left 20yrs ago benign    CERVICAL SPINE SURGERY      EGD      HERNIA REPAIR      OOPHORECTOMY Right     SC NDSC WRST SURG W/RLS TRANSVRS CARPL LIGM Right 01/21/2020    Procedure: RELEASE CARPAL TUNNEL ENDOSCOPIC;  Surgeon: Sam Nguyen MD;  [facility];  Service: Orthopedics    SC NDSC WRST SURG W/RLS TRANSVRS CARPL LIGM Left 01/28/2020    Procedure: RELEASE CARPAL TUNNEL ENDOSCOPIC;  Surgeon: Sam Nguyen MD;  [facility];  Service:  Orthopedics       Current Outpatient Medications   Medication Sig Dispense Refill    Acetaminophen 500 MG Take 2 capsules by mouth every 4 (four) hours as needed for mild pain      albuterol (PROVENTIL HFA,VENTOLIN HFA) 90 mcg/act inhaler inhale 2 puffs by mouth and INTO THE LUNGS every 6 hours IF NEEDED FOR WHEEZING. 8.5 g 2    betamethasone dipropionate (DIPROSONE) 0.05 % cream Apply topically 2 (two) times a day 30 g 0    buPROPion (WELLBUTRIN XL) 300 mg 24 hr tablet Take 1 tablet (300 mg total) by mouth daily 30 tablet 1    cholecalciferol (VITAMIN D3) 400 units tablet Take 400 Units by mouth daily      fluticasone (FLONASE) 50 mcg/act nasal spray instill 1 spray into each nostril once daily 16 g 2    guaiFENesin (MUCINEX) 600 mg 12 hr tablet Take 2 tablets (1,200 mg total) by mouth every 12 (twelve) hours (Patient not taking: Reported on 8/9/2024) 60 tablet 3    ipratropium-albuterol (DUO-NEB) 0.5-2.5 mg/3 mL nebulizer solution Take 3 mL by nebulization 4 (four) times a day (Patient taking differently: Take 3 mL by nebulization in the morning) 360 mL 3    lamoTRIgine (LaMICtal) 200 MG tablet Take 1 tablet (200 mg total) by mouth daily 30 tablet 1    levocetirizine (XYZAL) 5 MG tablet take 1 tablet by mouth every evening 30 tablet 4    losartan (COZAAR) 25 mg tablet Take 1 tablet (25 mg total) by mouth daily 30 tablet 5    methocarbamol (ROBAXIN) 500 mg tablet Take 500 mg by mouth 2 (two) times a day PRN (Patient not taking: Reported on 8/9/2024)      metoprolol succinate (TOPROL-XL) 25 mg 24 hr tablet take 1 tablet by mouth once daily 30 tablet 5    nicotine (NICODERM CQ) 21 mg/24 hr TD 24 hr patch Place 1 patch on the skin over 24 hours every 24 hours (Patient not taking: Reported on 6/26/2024) 28 patch 0    nicotine polacrilex (COMMIT) 4 MG lozenge Apply 1 lozenge (4 mg total) to the mouth or throat as needed for smoking cessation (Patient not taking: Reported on 6/26/2024) 100 each 0    OLANZapine-FLUoxetine  (SYMBYAX) 6-25 MG per capsule Take 1 capsule by mouth every evening (Patient not taking: Reported on 8/9/2024) 30 capsule 0    omeprazole (PriLOSEC) 20 mg delayed release capsule take 1 capsule by mouth twice a day 60 capsule 5    senna-docusate sodium (SENOKOT-S) 8.6-50 mg per tablet Take 3 tablets by mouth as needed        Stiolto Respimat 2.5-2.5 MCG/ACT inhaler inhale 2 puffs by mouth and INTO THE LUNGS once daily 4 g 11     No current facility-administered medications for this visit.        No Known Allergies    Review of Systems    Video Exam    There were no vitals filed for this visit.    Physical Exam     09/13/24  Start Time: 0405  Stop Time: 0500  Total Visit Time: 55 minutes       no

## 2024-09-16 ENCOUNTER — TELEPHONE (OUTPATIENT)
Dept: PSYCHIATRY | Facility: CLINIC | Age: 59
End: 2024-09-16

## 2024-09-18 ENCOUNTER — TELEMEDICINE (OUTPATIENT)
Dept: BEHAVIORAL/MENTAL HEALTH CLINIC | Facility: CLINIC | Age: 59
End: 2024-09-18
Payer: COMMERCIAL

## 2024-09-18 DIAGNOSIS — F43.12 CHRONIC POST-TRAUMATIC STRESS DISORDER (PTSD): ICD-10-CM

## 2024-09-18 DIAGNOSIS — F12.90 MARIJUANA USE: Chronic | ICD-10-CM

## 2024-09-18 DIAGNOSIS — F10.11 ALCOHOL ABUSE, IN REMISSION: ICD-10-CM

## 2024-09-18 DIAGNOSIS — Z72.0 TOBACCO ABUSE: ICD-10-CM

## 2024-09-18 DIAGNOSIS — F17.210 CIGARETTE NICOTINE DEPENDENCE WITHOUT COMPLICATION: ICD-10-CM

## 2024-09-18 DIAGNOSIS — F31.81 MODERATE MIXED BIPOLAR II DISORDER (HCC): Primary | ICD-10-CM

## 2024-09-18 DIAGNOSIS — F41.1 GENERALIZED ANXIETY DISORDER: ICD-10-CM

## 2024-09-18 PROCEDURE — 90834 PSYTX W PT 45 MINUTES: CPT | Performed by: COUNSELOR

## 2024-09-18 NOTE — PSYCH
Virtual Regular Visit    Verification of patient location:    Patient is located at Home in the following state in which I hold an active license [place]      Assessment/Plan:    Problem List Items Addressed This Visit       Moderate mixed bipolar II disorder (HCC) - Primary    Generalized anxiety disorder    Chronic post-traumatic stress disorder (PTSD)    Alcohol abuse, in remission    Marijuana use (Chronic)    Cigarette nicotine dependence without complication    Tobacco abuse       Goals addressed in session: Goal 1          Reason for visit is No chief complaint on file.       Encounter provider Isabel Watts LPC      Recent Visits  Date Type Provider Dept   09/13/24 Telemedicine Isabel Watts LPC [facility]rapist [place]   09/11/24 Telemedicine Isabel Watts LPC [facility] Therapist [place]   Showing recent visits within past 7 days and meeting all other requirements  Future Appointments  No visits were found meeting these conditions.  Showing future appointments within next 150 days and meeting all other requirements       The patient was identified by name and date of birth. Marie Portillo was informed that this is a telemedicine visit and that the visit is being conducted throughthe Epic Embedded platform. She agrees to proceed..  My office door was closed. No one else was in the room.  She acknowledged consent and understanding of privacy and security of the video platform. The patient has agreed to participate and understands they can discontinue the visit at any time.    Patient is aware this is a billable service.     Subjective  Marie Portillo is a 58 y.o. female  .      HPI     Past Medical History:   Diagnosis Date    Bilateral carpal tunnel syndrome 08/20/2019    Cardiomyopathy (HCC)     Carpal tunnel syndrome of right wrist 10/30/2019    Added automatically from request for surgery [id number]    Chlamydia     COPD (chronic obstructive pulmonary disease) (HCC)      Depression     Hypertension     Varicella        Past Surgical History:   Procedure Laterality Date    BACK SURGERY      BREAST BIOPSY Left 20yrs ago benign    CERVICAL SPINE SURGERY      EGD      HERNIA REPAIR      OOPHORECTOMY Right     [place] [facility] WRST SURG W/RLS TRANSVRS CARPL LIGM Right 01/21/2020    Procedure: RELEASE CARPAL TUNNEL ENDOSCOPIC;  Surgeon: Sam Nguyen MD;  [facility];  Service: Orthopedics    [place] [facility] WRST SURG W/RLS TRANSVRS CARPL LIGM Left 01/28/2020    Procedure: RELEASE CARPAL TUNNEL ENDOSCOPIC;  Surgeon: Sam Nguyen MD;  [facility];  Service: Orthopedics       Current Outpatient Medications   Medication Sig Dispense Refill    Acetaminophen 500 MG Take 2 capsules by mouth every 4 (four) hours as needed for mild pain      albuterol (PROVENTIL HFA,VENTOLIN HFA) 90 mcg/act inhaler inhale 2 puffs by mouth and INTO THE LUNGS every 6 hours IF NEEDED FOR WHEEZING. 8.5 g 2    betamethasone dipropionate (DIPROSONE) 0.05 % cream Apply topically 2 (two) times a day 30 g 0    buPROPion (WELLBUTRIN XL) 300 mg 24 hr tablet Take 1 tablet (300 mg total) by mouth daily 30 tablet 1    cholecalciferol (VITAMIN D3) 400 units tablet Take 400 Units by mouth daily      fluticasone (FLONASE) 50 mcg/act nasal spray instill 1 spray into each nostril once daily 16 g 2    guaiFENesin (MUCINEX) 600 mg 12 hr tablet Take 2 tablets (1,200 mg total) by mouth every 12 (twelve) hours (Patient not taking: Reported on 8/9/2024) 60 tablet 3    ipratropium-albuterol (DUO-NEB) 0.5-2.5 mg/3 mL nebulizer solution Take 3 mL by nebulization 4 (four) times a day (Patient taking differently: Take 3 mL by nebulization in the morning) 360 mL 3    lamoTRIgine (LaMICtal) 200 MG tablet Take 1 tablet (200 mg total) by mouth daily 30 tablet 1    levocetirizine (XYZAL) 5 MG tablet take 1 tablet by mouth every evening 30 tablet 4    losartan (COZAAR) 25 mg tablet Take 1 tablet (25 mg total) by mouth daily 30 tablet 5     methocarbamol (ROBAXIN) 500 mg tablet Take 500 mg by mouth 2 (two) times a day PRN (Patient not taking: Reported on 8/9/2024)      metoprolol succinate (TOPROL-XL) 25 mg 24 hr tablet take 1 tablet by mouth once daily 30 tablet 5    nicotine (NICODERM CQ) 21 mg/24 hr TD 24 hr patch Place 1 patch on the skin over 24 hours every 24 hours (Patient not taking: Reported on 6/26/2024) 28 patch 0    nicotine polacrilex (COMMIT) 4 MG lozenge Apply 1 lozenge (4 mg total) to the mouth or throat as needed for smoking cessation (Patient not taking: Reported on 6/26/2024) 100 each 0    OLANZapine-FLUoxetine (SYMBYAX) 6-25 MG per capsule Take 1 capsule by mouth every evening (Patient not taking: Reported on 8/9/2024) 30 capsule 0    omeprazole (PriLOSEC) 20 mg delayed release capsule take 1 capsule by mouth twice a day 60 capsule 5    senna-docusate sodium (SENOKOT-S) 8.6-50 mg per tablet Take 3 tablets by mouth as needed        Stiolto Respimat 2.5-2.5 MCG/ACT inhaler inhale 2 puffs by mouth and INTO THE LUNGS once daily 4 g 11     No current facility-administered medications for this visit.        No Known Allergies    Review of Systems    Video Exam    There were no vitals filed for this visit.    Physical Exam

## 2024-09-18 NOTE — PSYCH
Behavioral Health Psychotherapy Progress Note    Psychotherapy Provided: Individual Psychotherapy     1. Moderate mixed bipolar II disorder (HCC)        2. Generalized anxiety disorder        3. Chronic post-traumatic stress disorder (PTSD)        4. Tobacco abuse        5. Cigarette nicotine dependence without complication        6. Marijuana use        7. Alcohol abuse, in remission            Goals addressed in session: Goal 1     DATA: Marie shared her concerns about her healing from the infusion and that her Chiari malformation should be monitored, not intervened just yet. Handling emotions and memories were brought up, and a toolbox filled with effective strategies and methods was highlighted, with exercising gratitude and forgiveness that are encouraged every session and also during the weekly Mindfulness-based relaxation group.    During this session, this clinician used the following therapeutic modalities: Client-centered Therapy, Cognitive Behavioral Therapy, Dialectical Behavior Therapy, Mindfulness-based Strategies, and Supportive Psychotherapy    Substance Abuse was not addressed during this session. If the client is diagnosed with a co-occurring substance use disorder, please indicate any changes in the frequency or amount of use: NA. Stage of change for addressing substance use diagnoses: Pre-contemplation    ASSESSMENT:  Marie Portillo presents with a Euthymic/ normal, Anxious, and Depressed mood.     her affect is Normal range and intensity, Constricted, and Tearful, at times, which is congruent, with her mood and the content of the session. The client has made progress on their goals.    Marie seemed having difficulties handling her thoughts about the past trauma and was easily redirected to remember the emotional vortex vs the healing vortex, and provided good examples of sensations. Marie Portillo presents with a low risk of suicide, low risk of self-harm, and none risk of harm to others.    For any  "risk assessment that surpasses a \"low\" rating, a safety plan must be developed.    A safety plan was indicated: no  If yes, describe in detail NA    PLAN: Between sessions, Marie Portillo will use her learned Core Mindfulness skills, will take notes, and will pay attention to details. At the next session, the therapist will use Client-centered Therapy, Cognitive Behavioral Therapy, Dialectical Behavior Therapy, Mindfulness-based Strategies, and Supportive Psychotherapy and Somatic Experiencing to address handling emotions.    Behavioral Health Treatment Plan and Discharge Planning: Marie Portillo is aware of and agrees to continue to work on their treatment plan. They have identified and are working toward their discharge goals. yes    Visit start and stop times:    09/18/24  Start Time: 0155  Stop Time: 0238  Total Visit Time: 43 minutes  "

## 2024-09-23 ENCOUNTER — TELEPHONE (OUTPATIENT)
Dept: PSYCHIATRY | Facility: CLINIC | Age: 59
End: 2024-09-23

## 2024-09-25 ENCOUNTER — TELEMEDICINE (OUTPATIENT)
Dept: BEHAVIORAL/MENTAL HEALTH CLINIC | Facility: CLINIC | Age: 59
End: 2024-09-25
Payer: COMMERCIAL

## 2024-09-25 DIAGNOSIS — F10.11 ALCOHOL ABUSE, IN REMISSION: ICD-10-CM

## 2024-09-25 DIAGNOSIS — F41.1 GENERALIZED ANXIETY DISORDER: ICD-10-CM

## 2024-09-25 DIAGNOSIS — F17.210 CIGARETTE NICOTINE DEPENDENCE WITHOUT COMPLICATION: ICD-10-CM

## 2024-09-25 DIAGNOSIS — Z72.0 TOBACCO ABUSE: ICD-10-CM

## 2024-09-25 DIAGNOSIS — F12.90 MARIJUANA USE: ICD-10-CM

## 2024-09-25 DIAGNOSIS — F43.12 CHRONIC POST-TRAUMATIC STRESS DISORDER (PTSD): ICD-10-CM

## 2024-09-25 DIAGNOSIS — F31.81 MODERATE MIXED BIPOLAR II DISORDER (HCC): Primary | ICD-10-CM

## 2024-09-25 PROCEDURE — 90834 PSYTX W PT 45 MINUTES: CPT | Performed by: COUNSELOR

## 2024-09-25 NOTE — PSYCH
Virtual Regular Visit    Verification of patient location:    Patient is located at Home in the following state in which I hold an active license [place]      Assessment/Plan:    Problem List Items Addressed This Visit       Moderate mixed bipolar II disorder (HCC) - Primary    Generalized anxiety disorder    Chronic post-traumatic stress disorder (PTSD)    Alcohol abuse, in remission    Marijuana use (Chronic)    Cigarette nicotine dependence without complication    Tobacco abuse       Goals addressed in session: Goal 1          Reason for visit is No chief complaint on file.       Encounter provider Isabel Watts LPC      Recent Visits  Date Type Provider Dept   09/20/24 Telephone Isabel Watts LPC [facility]rap[facility] [place]   09/18/24 Telemedicine Isabel Watts LPC [facility]rap[facility] [place]   Showing recent visits within past 7 days and meeting all other requirements  Today's Visits  Date Type Provider Dept   09/25/24 Telemedicine Isabel Watts LPC [facility]rap[facility] [place]   Showing today's visits and meeting all other requirements  Future Appointments  No visits were found meeting these conditions.  Showing future appointments within next 150 days and meeting all other requirements       The patient was identified by name and date of birth. Marie Portillo was informed that this is a telemedicine visit and that the visit is being conducted throughthe Epic Embedded platform. She agrees to proceed..  My office door was closed. No one else was in the room.  She acknowledged consent and understanding of privacy and security of the video platform. The patient has agreed to participate and understands they can discontinue the visit at any time.    Patient is aware this is a billable service.     Subjective  Marie Portillo is a 58 y.o. female  .      HPI     Past Medical History:   Diagnosis Date    Bilateral carpal tunnel syndrome 08/20/2019    Cardiomyopathy (HCC)      Carpal tunnel syndrome of right wrist 10/30/2019    Added automatically from request for surgery [id number]    Chlamydia     COPD (chronic obstructive pulmonary disease) (HCC)     Depression     Hypertension     Varicella        Past Surgical History:   Procedure Laterality Date    BACK SURGERY      BREAST BIOPSY Left 20yrs ago benign    CERVICAL SPINE SURGERY      EGD      HERNIA REPAIR      OOPHORECTOMY Right     [place] [facility] WR[facility] SURG W/RLS TRANSVRS CARPL LIGM Right 01/21/2020    Procedure: RELEASE CARPAL TUNNEL ENDOSCOPIC;  Surgeon: Sam Nguyen MD;  [facility];  Service: Orthopedics    [place] [facility] WRST SURG W/RLS TRANSVRS CARPL LIGM Left 01/28/2020    Procedure: RELEASE CARPAL TUNNEL ENDOSCOPIC;  Surgeon: Sam Nguyen MD;  [facility];  Service: Orthopedics       Current Outpatient Medications   Medication Sig Dispense Refill    Acetaminophen 500 MG Take 2 capsules by mouth every 4 (four) hours as needed for mild pain      albuterol (PROVENTIL HFA,VENTOLIN HFA) 90 mcg/act inhaler inhale 2 puffs by mouth and INTO THE LUNGS every 6 hours IF NEEDED FOR WHEEZING. 8.5 g 2    betamethasone dipropionate (DIPROSONE) 0.05 % cream Apply topically 2 (two) times a day 30 g 0    buPROPion (WELLBUTRIN XL) 300 mg 24 hr tablet Take 1 tablet (300 mg total) by mouth daily 30 tablet 1    cholecalciferol (VITAMIN D3) 400 units tablet Take 400 Units by mouth daily      fluticasone (FLONASE) 50 mcg/act nasal spray instill 1 spray into each nostril once daily 16 g 2    guaiFENesin (MUCINEX) 600 mg 12 hr tablet Take 2 tablets (1,200 mg total) by mouth every 12 (twelve) hours (Patient not taking: Reported on 8/9/2024) 60 tablet 3    ipratropium-albuterol (DUO-NEB) 0.5-2.5 mg/3 mL nebulizer solution Take 3 mL by nebulization 4 (four) times a day (Patient taking differently: Take 3 mL by nebulization in the morning) 360 mL 3    lamoTRIgine (LaMICtal) 200 MG tablet Take 1 tablet (200 mg total) by mouth daily 30  tablet 1    levocetirizine (XYZAL) 5 MG tablet take 1 tablet by mouth every evening 30 tablet 4    losartan (COZAAR) 25 mg tablet Take 1 tablet (25 mg total) by mouth daily 30 tablet 5    methocarbamol (ROBAXIN) 500 mg tablet Take 500 mg by mouth 2 (two) times a day PRN (Patient not taking: Reported on 8/9/2024)      metoprolol succinate (TOPROL-XL) 25 mg 24 hr tablet take 1 tablet by mouth once daily 30 tablet 5    nicotine (NICODERM CQ) 21 mg/24 hr TD 24 hr patch Place 1 patch on the skin over 24 hours every 24 hours (Patient not taking: Reported on 6/26/2024) 28 patch 0    nicotine polacrilex (COMMIT) 4 MG lozenge Apply 1 lozenge (4 mg total) to the mouth or throat as needed for smoking cessation (Patient not taking: Reported on 6/26/2024) 100 each 0    OLANZapine-FLUoxetine (SYMBYAX) 6-25 MG per capsule Take 1 capsule by mouth every evening (Patient not taking: Reported on 8/9/2024) 30 capsule 0    omeprazole (PriLOSEC) 20 mg delayed release capsule take 1 capsule by mouth twice a day 60 capsule 5    senna-docusate sodium (SENOKOT-S) 8.6-50 mg per tablet Take 3 tablets by mouth as needed        Stiolto Respimat 2.5-2.5 MCG/ACT inhaler inhale 2 puffs by mouth and INTO THE LUNGS once daily 4 g 11     No current facility-administered medications for this visit.        No Known Allergies    Review of Systems    Video Exam    There were no vitals filed for this visit.    Physical Exam     Behavioral Health Psychotherapy Progress Note    Psychotherapy Provided: Individual Psychotherapy     1. Moderate mixed bipolar II disorder (HCC)          2. PTSD (post-traumatic stress disorder)          3. Generalized anxiety disorder          4. Cigarette nicotine dependence without complication          5. Tobacco abuse          6. Marijuana use          7. Alcohol abuse, in remission         Goals addressed in session: Goal 1     DATA: Marie reported feeling better than her recent worsening that kept her in a negative  for  "a few months. She was redirected several times during the session towards positive topics about her protective factors, support figures, strengths, and learned skills to be able to see much she has and focus on gratitude and marked quick improvement in mood and emotional reactions.    During this session, this clinician used the following therapeutic modalities: Client-centered Therapy, Cognitive Behavioral Therapy, Dialectical Behavior Therapy, Mindfulness-based Strategies, and Supportive Psychotherapy    Substance Abuse was not addressed during this session. If the client is diagnosed with a co-occurring substance use disorder, please indicate any changes in the frequency or amount of use: NA. Stage of change for addressing substance use diagnoses: Pre-contemplation    ASSESSMENT:  Marie Portillo presents with a Euthymic/ normal and Anxious mood.     her affect is Normal range and intensity and Constricted, which is congruent, with her mood and the content of the session. The client has made progress on their goals.    Marie seemed more talkative and expressive than recently and managed to identify more areas for further work, especially dwelling in toxic inner talk about the past.  Marie Portillo presents with a low risk of suicide, low risk of self-harm, and none risk of harm to others.    For any risk assessment that surpasses a \"low\" rating, a safety plan must be developed.    A safety plan was indicated: no  If yes, describe in detail NA    PLAN: Between sessions, Marie Portillo will use her learned DBT and Mindfulness skills to handle her emotions as an observer. At the next session, the therapist will use Client-centered Therapy, Cognitive Behavioral Therapy, Dialectical Behavior Therapy, Mindfulness-based Strategies, and Supportive Psychotherapy to address self-care, taking notes, and dealing with inner talk about past trauma.    Behavioral Health Treatment Plan and Discharge Planning: Marie Portillo is aware of " and agrees to continue to work on their treatment plan. They have identified and are working toward their discharge goals. yes    Visit start and stop times:    09/25/24  Start Time: 0158  Stop Time: 0241  Total Visit Time: 43 minutes

## 2024-09-26 DIAGNOSIS — I10 ESSENTIAL HYPERTENSION: Chronic | ICD-10-CM

## 2024-09-26 DIAGNOSIS — I50.22 HEART FAILURE WITH MILDLY REDUCED EJECTION FRACTION (HFMREF) (HCC): Chronic | ICD-10-CM

## 2024-09-26 RX ORDER — LOSARTAN POTASSIUM 25 MG/1
25 TABLET ORAL DAILY
Qty: 90 TABLET | Refills: 1 | Status: SHIPPED | OUTPATIENT
Start: 2024-09-26

## 2024-09-27 ENCOUNTER — TELEMEDICINE (OUTPATIENT)
Dept: BEHAVIORAL/MENTAL HEALTH CLINIC | Facility: CLINIC | Age: 59
End: 2024-09-27
Payer: COMMERCIAL

## 2024-09-27 DIAGNOSIS — F17.210 CIGARETTE NICOTINE DEPENDENCE WITHOUT COMPLICATION: ICD-10-CM

## 2024-09-27 DIAGNOSIS — F43.12 CHRONIC POST-TRAUMATIC STRESS DISORDER (PTSD): ICD-10-CM

## 2024-09-27 DIAGNOSIS — F41.1 GENERALIZED ANXIETY DISORDER: ICD-10-CM

## 2024-09-27 DIAGNOSIS — F10.11 ALCOHOL ABUSE, IN REMISSION: ICD-10-CM

## 2024-09-27 DIAGNOSIS — F12.90 MARIJUANA USE: Chronic | ICD-10-CM

## 2024-09-27 DIAGNOSIS — F31.81 MODERATE MIXED BIPOLAR II DISORDER (HCC): Primary | ICD-10-CM

## 2024-09-27 DIAGNOSIS — Z72.0 TOBACCO ABUSE: ICD-10-CM

## 2024-09-27 PROCEDURE — 90853 GROUP PSYCHOTHERAPY: CPT | Performed by: COUNSELOR

## 2024-09-27 NOTE — PSYCH
Virtual Regular Visit    Verification of patient location:    Patient is located at Home in the following state in which I hold an active license [place]      Assessment/Plan:    Problem List Items Addressed This Visit       Moderate mixed bipolar II disorder (HCC) - Primary    Generalized anxiety disorder    Chronic post-traumatic stress disorder (PTSD)    Alcohol abuse, in remission    Marijuana use (Chronic)    Cigarette nicotine dependence without complication    Tobacco abuse       Goals addressed in session: Goal 1          Reason for visit is No chief complaint on file.       Encounter provider Isabel Watts LPC      Recent Visits  Date Type Provider Dept   09/25/24 Telemedicine Isabel Watts LPC [facility] [place]   09/20/24 Telephone Isabel Watts LPC [facility]rap[facility] [place]   Showing recent visits within past 7 days and meeting all other requirements  Future Appointments  No visits were found meeting these conditions.  Showing future appointments within next 150 days and meeting all other requirements       The patient was identified by name and date of birth. Marie Portillo was informed that this is a telemedicine visit and that the visit is being conducted throughthe [employer] Teams platform. She agrees to proceed..  My office door was closed. The patient was notified the following individuals were present in the room other group members.  She acknowledged consent and understanding of privacy and security of the video platform. The patient has agreed to participate and understands they can discontinue the visit at any time.    Patient is aware this is a billable service.     Subjective  Marie Portillo is a 58 y.o. female  .    Data: Marie attended the Relaxation group and was guided through tapping on pain, diaphragmatic breathing through two different protocols, humming with other Vagus nerve stimulation techniques, and progressive muscle relaxation paring the  different breathing aspects. Furthermore, the attendees were guided through Mindfulness-based meditation series on the topics of Deep Body Scan for Pain, Guided Imagery for Self-Esteem and Experiencing Your Mind as an Ocean, Connecting to the Inner Child, Forgiveness Meditation, and ending with positive self-affirmations for compassion, confidence, self-love, and celebrating life and success without fear.     EFT, Mindfulness-based, and Somatic approaches were used to achieve the deep state of relaxation and to maintain the serene environment for the exercise's purpose.      No alcohol or drug problems were addressed in the session.     No safety plan was indicated due to no safety issues at the moment.      Assessment: Marie was in normal/euthymic mood and in congruence with broad affect, followed through the guidance and completed the exercises well, showed willingness to continue learning and practicing self-care in weekly Relaxation Group as well as individual therapy.      Plan: Marie will practice deep breathing and other techniques based on each occasion to counter stress and will attend the next group to be able to take part in further guided exercises on chosen topics to build on the techniques learned in the beginning.       HPI     Past Medical History:   Diagnosis Date    Bilateral carpal tunnel syndrome 08/20/2019    Cardiomyopathy (HCC)     Carpal tunnel syndrome of right wrist 10/30/2019    Added automatically from request for surgery [id number]    Chlamydia     COPD (chronic obstructive pulmonary disease) (HCC)     Depression     Hypertension     Varicella        Past Surgical History:   Procedure Laterality Date    BACK SURGERY      BREAST BIOPSY Left 20yrs ago benign    CERVICAL SPINE SURGERY      EGD      HERNIA REPAIR      OOPHORECTOMY Right     [place] [facility] WRST SURG W/RLS TRANSVRS CARPL LIGM Right 01/21/2020    Procedure: RELEASE CARPAL TUNNEL ENDOSCOPIC;  Surgeon: Sam Nguyen MD;  [facility];  Service: Orthopedics    [place] [facility] WRST SURG W/RLS TRANSVRS CARPL LIGM Left 01/28/2020    Procedure: RELEASE CARPAL TUNNEL ENDOSCOPIC;  Surgeon: Sam Nguyen MD;  [facility];  Service: Orthopedics       Current Outpatient Medications   Medication Sig Dispense Refill    Acetaminophen 500 MG Take 2 capsules by mouth every 4 (four) hours as needed for mild pain      albuterol (PROVENTIL HFA,VENTOLIN HFA) 90 mcg/act inhaler inhale 2 puffs by mouth and INTO THE LUNGS every 6 hours IF NEEDED FOR WHEEZING. 8.5 g 2    betamethasone dipropionate (DIPROSONE) 0.05 % cream Apply topically 2 (two) times a day 30 g 0    buPROPion (WELLBUTRIN XL) 300 mg 24 hr tablet Take 1 tablet (300 mg total) by mouth daily 30 tablet 1    cholecalciferol (VITAMIN D3) 400 units tablet Take 400 Units by mouth daily      fluticasone (FLONASE) 50 mcg/act nasal spray instill 1 spray into each nostril once daily 16 g 2    guaiFENesin (MUCINEX) 600 mg 12 hr tablet Take 2 tablets (1,200 mg total) by mouth every 12 (twelve) hours (Patient not taking: Reported on 8/9/2024) 60 tablet 3    ipratropium-albuterol (DUO-NEB) 0.5-2.5 mg/3 mL nebulizer solution Take 3 mL by nebulization 4 (four) times a day (Patient taking differently: Take 3 mL by nebulization in the morning) 360 mL 3    lamoTRIgine (LaMICtal) 200 MG tablet Take 1 tablet (200 mg total) by mouth daily 30 tablet 1    levocetirizine (XYZAL) 5 MG tablet take 1 tablet by mouth every evening 30 tablet 4    losartan (COZAAR) 25 mg tablet take 1 tablet by mouth once daily 90 tablet 1    methocarbamol (ROBAXIN) 500 mg tablet Take 500 mg by mouth 2 (two) times a day PRN (Patient not taking: Reported on 8/9/2024)      metoprolol succinate (TOPROL-XL) 25 mg 24 hr tablet take 1 tablet by mouth once daily 30 tablet 5    nicotine (NICODERM CQ) 21 mg/24 hr TD 24 hr patch Place 1 patch on the skin over 24 hours every 24 hours (Patient not taking: Reported on 6/26/2024) 28 patch 0     nicotine polacrilex (COMMIT) 4 MG lozenge Apply 1 lozenge (4 mg total) to the mouth or throat as needed for smoking cessation (Patient not taking: Reported on 6/26/2024) 100 each 0    OLANZapine-FLUoxetine (SYMBYAX) 6-25 MG per capsule Take 1 capsule by mouth every evening (Patient not taking: Reported on 8/9/2024) 30 capsule 0    omeprazole (PriLOSEC) 20 mg delayed release capsule take 1 capsule by mouth twice a day 60 capsule 5    senna-docusate sodium (SENOKOT-S) 8.6-50 mg per tablet Take 3 tablets by mouth as needed        Stiolto Respimat 2.5-2.5 MCG/ACT inhaler inhale 2 puffs by mouth and INTO THE LUNGS once daily 4 g 11     No current facility-administered medications for this visit.        No Known Allergies    Review of Systems    Video Exam    There were no vitals filed for this visit.    Physical Exam     09/27/24  Start Time: 0400  Stop Time: 0500  Total Visit Time: 60 minutes

## 2024-10-02 ENCOUNTER — TELEMEDICINE (OUTPATIENT)
Dept: BEHAVIORAL/MENTAL HEALTH CLINIC | Facility: CLINIC | Age: 59
End: 2024-10-02
Payer: COMMERCIAL

## 2024-10-02 DIAGNOSIS — F17.210 CIGARETTE NICOTINE DEPENDENCE WITHOUT COMPLICATION: ICD-10-CM

## 2024-10-02 DIAGNOSIS — F43.12 CHRONIC POST-TRAUMATIC STRESS DISORDER (PTSD): ICD-10-CM

## 2024-10-02 DIAGNOSIS — F12.90 MARIJUANA USE: Chronic | ICD-10-CM

## 2024-10-02 DIAGNOSIS — Z72.0 TOBACCO ABUSE: ICD-10-CM

## 2024-10-02 DIAGNOSIS — F41.1 GENERALIZED ANXIETY DISORDER: ICD-10-CM

## 2024-10-02 DIAGNOSIS — F10.11 ALCOHOL ABUSE, IN REMISSION: ICD-10-CM

## 2024-10-02 DIAGNOSIS — F31.81 MODERATE MIXED BIPOLAR II DISORDER (HCC): Primary | ICD-10-CM

## 2024-10-02 PROCEDURE — 90834 PSYTX W PT 45 MINUTES: CPT | Performed by: COUNSELOR

## 2024-10-02 NOTE — BH TREATMENT PLAN
Outpatient Behavioral Health Psychotherapy Treatment Plan    Marie Portillo  1965     Date of Initial Psychotherapy Assessment: 3/22/23   Date of Current Treatment Plan: 10/02/24  Treatment Plan Target Date: TBD  Treatment Plan Expiration Date: 03/30/25     Diagnosis:      1. Moderate mixed bipolar II disorder (HCC)          2. PTSD (post-traumatic stress disorder)          3. Generalized anxiety disorder          4. Cigarette nicotine dependence without complication          5. Tobacco abuse          6. Marijuana use          7. Alcohol abuse, in remission             Area(s) of Need: emotion regulation, cognitive issues and decline      Long Term Goal 1 (in the client's own words): I want to feel better when I wake up in the morning      Stage of Change: Preparation to Action      Target Date for completion: TBD             Anticipated therapeutic modalities: Person-Centered, TF-CBT,  Mindfulness, DBT, Somatic Experiencing              People identified to complete this goal: Patient, Doctors, Therapist                     Objective 1: (identify the means of measuring success in meeting the objective): will process her trauma during sessions and will start re-connecting with her body and identifying how emotions are sensed in the body and will describe with Somatic language and will identify the Body Resource to stay connected there at least once during intense memories                       Objective 2: (identify the means of measuring success in meeting the objective): will use Mindfulness attendance to difficult emotions and will practice What and How Core Mindfulness skills [facility]               Objective 3: (identify the means of measuring success in meeting the objective): will follow a strict medication management schedule as prescribed by her doctor       Long Term Goal 2 (in the client's own words): I want to handle stress better       Stage of Change: Action     Target Date for  "completion: TBD             Anticipated therapeutic modalities: Person-Centered, DBT, CBT, Mindfulness, and Somatic              People identified to complete this goal: Patent, Doctors, Therapist                     Objective 1: (identify the means of measuring success in meeting the objective): will practice relaxation techniques at least once daily to counter stress and will take rapid and vigorous inhales with long exhales and will attend Relaxation Group weekly for self-care                    Objective 2: (identify the means of measuring success in meeting the objective): will follow a strict medication management schedule as prescribed      I am currently under the care of a [facility] psychiatric provider: yes     My [facility] psychiatric provider is: Dr. Chowdhury      I am currently taking psychiatric medications: Yes, as prescribed     I feel that I will be ready for discharge from mental health care when I reach the following (measurable goal/objective): \"I gotta think about it- happy and sad that I am ready and will be missing the people I met\"      For children and adults who have a legal guardian:          Has there been any change to custody orders and/or guardianship status? NA. If yes, attach updated documentation.     I have created my Crisis Plan and have been offered a copy of this plan     Behavioral Health Treatment Plan [facility]: Diagnosis and Treatment Plan explained to Marie Sanchezanderson Marie Portillo acknowledges an understanding of their diagnosis. Marie Bandar agrees to this treatment plan.     I have been offered a copy of this Treatment Plan. Yes    Marie Portillo, 1965, actively participated in the review and update of this treatment plan during a virtual session, using the Epic Embedded platform.   Marie Portillo  provided verbal consent on 10/2/2024 at 2:40 PM. The treatment plan was transcribed into the Electronic Health Record at a later time.    If unsuccessful signing electronically, Pt " would be mailed two copies of this plan through [facility] for her actual signature.

## 2024-10-02 NOTE — PSYCH
Virtual Regular Visit    Verification of patient location:    Patient is located at Home in the following state in which I hold an active license [place]      Assessment/Plan:    Problem List Items Addressed This Visit       Moderate mixed bipolar II disorder (HCC) - Primary    Generalized anxiety disorder    Chronic post-traumatic stress disorder (PTSD)    Alcohol abuse, in remission    Marijuana use (Chronic)    Cigarette nicotine dependence without complication    Tobacco abuse       Goals addressed in session: Goal 1          Reason for visit is No chief complaint on file.       Encounter provider Isabel Watts LPC      Recent Visits  Date Type Provider Dept   09/27/24 Telemedicine Isabel Watts LPC [facility] Therapist [place]   09/25/24 Telemedicine Isabel Watts LPC [facility] Therapist [place]   Showing recent visits within past 7 days and meeting all other requirements  Today's Visits  Date Type Provider Dept   10/02/24 Telemedicine Isabel Watts LPC [facility] Therapist [place]   Showing today's visits and meeting all other requirements  Future Appointments  No visits were found meeting these conditions.  Showing future appointments within next 150 days and meeting all other requirements       The patient was identified by name and date of birth. Marie Portillo was informed that this is a telemedicine visit and that the visit is being conducted throughthe Epic Embedded platform. She agrees to proceed..  My office door was closed. No one else was in the room.  She acknowledged consent and understanding of privacy and security of the video platform. The patient has agreed to participate and understands they can discontinue the visit at any time.    Patient is aware this is a billable service.     Subjective  Marie Portillo is a 58 y.o. female  .      HPI     Past Medical History:   Diagnosis Date    Bilateral carpal tunnel syndrome 08/20/2019    Cardiomyopathy  (HCC)     Carpal tunnel syndrome of right wrist 10/30/2019    Added automatically from request for surgery [id number]    Chlamydia     COPD (chronic obstructive pulmonary disease) (HCC)     Depression     Hypertension     Varicella        Past Surgical History:   Procedure Laterality Date    BACK SURGERY      BREAST BIOPSY Left 20yrs ago benign    CERVICAL SPINE SURGERY      EGD      HERNIA REPAIR      OOPHORECTOMY Right     [place] [facility] WRST SURG W/RLS TRANSVRS CARPL LIGM Right 01/21/2020    Procedure: RELEASE CARPAL TUNNEL ENDOSCOPIC;  Surgeon: Sam Nguyen MD;  [facility];  Service: Orthopedics    [place] [facility] WRST SURG W/RLS TRANSVRS CARPL LIGM Left 01/28/2020    Procedure: RELEASE CARPAL TUNNEL ENDOSCOPIC;  Surgeon: Sam Nguyen MD;  [facility];  Service: Orthopedics       Current Outpatient Medications   Medication Sig Dispense Refill    Acetaminophen 500 MG Take 2 capsules by mouth every 4 (four) hours as needed for mild pain      albuterol (PROVENTIL HFA,VENTOLIN HFA) 90 mcg/act inhaler inhale 2 puffs by mouth and INTO THE LUNGS every 6 hours IF NEEDED FOR WHEEZING. 8.5 g 2    betamethasone dipropionate (DIPROSONE) 0.05 % cream Apply topically 2 (two) times a day 30 g 0    buPROPion (WELLBUTRIN XL) 300 mg 24 hr tablet Take 1 tablet (300 mg total) by mouth daily 30 tablet 1    cholecalciferol (VITAMIN D3) 400 units tablet Take 400 Units by mouth daily      fluticasone (FLONASE) 50 mcg/act nasal spray instill 1 spray into each nostril once daily 16 g 2    guaiFENesin (MUCINEX) 600 mg 12 hr tablet Take 2 tablets (1,200 mg total) by mouth every 12 (twelve) hours (Patient not taking: Reported on 8/9/2024) 60 tablet 3    ipratropium-albuterol (DUO-NEB) 0.5-2.5 mg/3 mL nebulizer solution Take 3 mL by nebulization 4 (four) times a day (Patient taking differently: Take 3 mL by nebulization in the morning) 360 mL 3    lamoTRIgine (LaMICtal) 200 MG tablet Take 1 tablet (200 mg total) by mouth daily  30 tablet 1    levocetirizine (XYZAL) 5 MG tablet take 1 tablet by mouth every evening 30 tablet 4    losartan (COZAAR) 25 mg tablet take 1 tablet by mouth once daily 90 tablet 1    methocarbamol (ROBAXIN) 500 mg tablet Take 500 mg by mouth 2 (two) times a day PRN (Patient not taking: Reported on 8/9/2024)      metoprolol succinate (TOPROL-XL) 25 mg 24 hr tablet take 1 tablet by mouth once daily 30 tablet 5    nicotine (NICODERM CQ) 21 mg/24 hr TD 24 hr patch Place 1 patch on the skin over 24 hours every 24 hours (Patient not taking: Reported on 6/26/2024) 28 patch 0    nicotine polacrilex (COMMIT) 4 MG lozenge Apply 1 lozenge (4 mg total) to the mouth or throat as needed for smoking cessation (Patient not taking: Reported on 6/26/2024) 100 each 0    OLANZapine-FLUoxetine (SYMBYAX) 6-25 MG per capsule Take 1 capsule by mouth every evening (Patient not taking: Reported on 8/9/2024) 30 capsule 0    omeprazole (PriLOSEC) 20 mg delayed release capsule take 1 capsule by mouth twice a day 60 capsule 5    senna-docusate sodium (SENOKOT-S) 8.6-50 mg per tablet Take 3 tablets by mouth as needed        Stiolto Respimat 2.5-2.5 MCG/ACT inhaler inhale 2 puffs by mouth and INTO THE LUNGS once daily 4 g 11     No current facility-administered medications for this visit.        No Known Allergies    Review of Systems    Video Exam    There were no vitals filed for this visit.    Physical Exam

## 2024-10-02 NOTE — PSYCH
"Behavioral Health Psychotherapy Progress Note    Psychotherapy Provided: Individual Psychotherapy     1. Moderate mixed bipolar II disorder (HCC)        2. Generalized anxiety disorder        3. Chronic post-traumatic stress disorder (PTSD)        4. Tobacco abuse        5. Cigarette nicotine dependence without complication        6. Marijuana use        7. Alcohol abuse, in remission            Goals addressed in session: Goal 1     DATA: Marie focused on her strengths, protective factors, and supportive system to identify her goals and objectives. She talked about her daughter and got emotional    During this session, this clinician used the following therapeutic modalities: Client-centered Therapy, Cognitive Behavioral Therapy, Dialectical Behavior Therapy, Mindfulness-based Strategies, and Supportive Psychotherapy    Substance Abuse was not addressed during this session. If the client is diagnosed with a co-occurring substance use disorder, please indicate any changes in the frequency or amount of use: NA. Stage of change for addressing substance use diagnoses: Pre-contemplation    ASSESSMENT:  Marie Portillo presents with a Euthymic/ normal, Anxious, and Depressed mood.     her affect is Normal range and intensity, Constricted, and Tearful, at times, which is congruent, with her mood and the content of the session. The client has made progress on their goals.    Marie seemed very emotional at times and had difficulties regulating the intensity. Marie Portillo presents with a low risk of suicide, low risk of self-harm, and none risk of harm to others.    For any risk assessment that surpasses a \"low\" rating, a safety plan must be developed.    A safety plan was indicated: no  If yes, describe in detail NA    PLAN: Between sessions, Marie Portillo will use her Core Mindfulness skills to attend to difficult emotions in healthy ways. At the next session, the therapist will use Client-centered Therapy, Cognitive " Behavioral Therapy, Dialectical Behavior Therapy, Mindfulness-based Strategies, and Supportive Psychotherapy to address self-care and objectives.    Behavioral Health Treatment Plan and Discharge Planning: Marie Portillo is aware of and agrees to continue to work on their treatment plan. They have identified and are working toward their discharge goals. yes    Visit start and stop times:    10/02/24  Start Time: 0200  Stop Time: 0241  Total Visit Time: 41 minutes

## 2024-10-04 ENCOUNTER — TELEMEDICINE (OUTPATIENT)
Dept: BEHAVIORAL/MENTAL HEALTH CLINIC | Facility: CLINIC | Age: 59
End: 2024-10-04
Payer: COMMERCIAL

## 2024-10-04 DIAGNOSIS — F41.1 GENERALIZED ANXIETY DISORDER: ICD-10-CM

## 2024-10-04 DIAGNOSIS — F43.12 CHRONIC POST-TRAUMATIC STRESS DISORDER (PTSD): ICD-10-CM

## 2024-10-04 DIAGNOSIS — F10.11 ALCOHOL ABUSE, IN REMISSION: ICD-10-CM

## 2024-10-04 DIAGNOSIS — Z72.0 TOBACCO ABUSE: ICD-10-CM

## 2024-10-04 DIAGNOSIS — F12.90 MARIJUANA USE: Chronic | ICD-10-CM

## 2024-10-04 DIAGNOSIS — F31.81 MODERATE MIXED BIPOLAR II DISORDER (HCC): Primary | ICD-10-CM

## 2024-10-04 DIAGNOSIS — F17.210 CIGARETTE NICOTINE DEPENDENCE WITHOUT COMPLICATION: ICD-10-CM

## 2024-10-04 PROCEDURE — 90853 GROUP PSYCHOTHERAPY: CPT | Performed by: COUNSELOR

## 2024-10-04 NOTE — PSYCH
Virtual Regular Visit    Verification of patient location:    Patient is located at Home in the following state in which I hold an active license [place]      Assessment/Plan:    Problem List Items Addressed This Visit       Moderate mixed bipolar II disorder (HCC) - Primary    Generalized anxiety disorder    Chronic post-traumatic stress disorder (PTSD)    Alcohol abuse, in remission    Marijuana use (Chronic)    Cigarette nicotine dependence without complication    Tobacco abuse       Goals addressed in session: Goal 1          Reason for visit is No chief complaint on file.       Encounter provider Isabel Watts LPC      Recent Visits  Date Type Provider Dept   10/02/24 Telemedicine Isabel Watts LPC [facility]rap[facility] [place]   09/27/24 Telemedicine Isabel Watts LPC [facility] Therap[facility] [place]   Showing recent visits within past 7 days and meeting all other requirements  Future Appointments  No visits were found meeting these conditions.  Showing future appointments within next 150 days and meeting all other requirements       The patient was identified by name and date of birth. Marie Portillo was informed that this is a telemedicine visit and that the visit is being conducted throughthe [employer] Teams platform. She agrees to proceed..  My office door was closed. The patient was notified the following individuals were present in the room other group members.  She acknowledged consent and understanding of privacy and security of the video platform. The patient has agreed to participate and understands they can discontinue the visit at any time.    Patient is aware this is a billable service.     Subjective  Marie Portillo is a 58 y.o. female  .    Data: Marie attended the Relaxation group and was guided through tapping on anxiety, diaphragmatic breathing through two different protocols, humming with other Vagus nerve stimulation techniques, and progressive muscle relaxation  paring the different breathing aspects. Furthermore, the attendees were guided through Mindfulness-based meditation series on the topics of  Quick Body Scan, Guided Imagery for Safe Place to Deal with Pain and attending to difficult emotions, manifesting forgiveness and gratitude, and ending with positive self-affirmations for self-love, self-compassion, against self-limiting tendencies and self-loathing, and for breaking habits of impatience and harsh criticism and judgment.      EFT, Mindfulness-based, and Somatic approaches were used to achieve the deep state of relaxation and to maintain the serene environment for the exercise's purpose.      No alcohol or drug problems were addressed in the session.     No safety plan was indicated due to no safety issues at the moment.      Assessment: Marie was in normal/euthymic mood and in congruence with broad affect, followed through the guidance and completed the exercises well, showed willingness to continue learning and practicing self-care in weekly Relaxation Group as well as individual therapy.      Plan: Marie will practice deep breathing and other techniques based on each occasion to counter stress and will attend the next group to be able to take part in further guided exercises on chosen topics to build on the techniques learned in the beginning.    HPI     Past Medical History:   Diagnosis Date    Bilateral carpal tunnel syndrome 08/20/2019    Cardiomyopathy (HCC)     Carpal tunnel syndrome of right wrist 10/30/2019    Added automatically from request for surgery [id number]    Chlamydia     COPD (chronic obstructive pulmonary disease) (HCC)     Depression     Hypertension     Varicella        Past Surgical History:   Procedure Laterality Date    BACK SURGERY      BREAST BIOPSY Left 20yrs ago benign    CERVICAL SPINE SURGERY      EGD      HERNIA REPAIR      OOPHORECTOMY Right     [place] [facility] WRST SURG W/RLS TRANSVRS CARPL LIGM Right 01/21/2020    Procedure: RELEASE  CARPAL TUNNEL ENDOSCOPIC;  Surgeon: Sam Nguyen MD;  [facility];  Service: Orthopedics    [place] [facility] WRST SURG W/RLS TRANSVRS CARPL LIGM Left 01/28/2020    Procedure: RELEASE CARPAL TUNNEL ENDOSCOPIC;  Surgeon: Sam Nguyen MD;  [facility];  Service: Orthopedics       Current Outpatient Medications   Medication Sig Dispense Refill    Acetaminophen 500 MG Take 2 capsules by mouth every 4 (four) hours as needed for mild pain      albuterol (PROVENTIL HFA,VENTOLIN HFA) 90 mcg/act inhaler inhale 2 puffs by mouth and INTO THE LUNGS every 6 hours IF NEEDED FOR WHEEZING. 8.5 g 2    betamethasone dipropionate (DIPROSONE) 0.05 % cream Apply topically 2 (two) times a day 30 g 0    buPROPion (WELLBUTRIN XL) 300 mg 24 hr tablet Take 1 tablet (300 mg total) by mouth daily 30 tablet 1    cholecalciferol (VITAMIN D3) 400 units tablet Take 400 Units by mouth daily      fluticasone (FLONASE) 50 mcg/act nasal spray instill 1 spray into each nostril once daily 16 g 2    guaiFENesin (MUCINEX) 600 mg 12 hr tablet Take 2 tablets (1,200 mg total) by mouth every 12 (twelve) hours (Patient not taking: Reported on 8/9/2024) 60 tablet 3    ipratropium-albuterol (DUO-NEB) 0.5-2.5 mg/3 mL nebulizer solution Take 3 mL by nebulization 4 (four) times a day (Patient taking differently: Take 3 mL by nebulization in the morning) 360 mL 3    lamoTRIgine (LaMICtal) 200 MG tablet Take 1 tablet (200 mg total) by mouth daily 30 tablet 1    levocetirizine (XYZAL) 5 MG tablet take 1 tablet by mouth every evening 30 tablet 4    losartan (COZAAR) 25 mg tablet take 1 tablet by mouth once daily 90 tablet 1    methocarbamol (ROBAXIN) 500 mg tablet Take 500 mg by mouth 2 (two) times a day PRN (Patient not taking: Reported on 8/9/2024)      metoprolol succinate (TOPROL-XL) 25 mg 24 hr tablet take 1 tablet by mouth once daily 30 tablet 5    nicotine (NICODERM CQ) 21 mg/24 hr TD 24 hr patch Place 1 patch on the skin over 24 hours every  24 hours (Patient not taking: Reported on 6/26/2024) 28 patch 0    nicotine polacrilex (COMMIT) 4 MG lozenge Apply 1 lozenge (4 mg total) to the mouth or throat as needed for smoking cessation (Patient not taking: Reported on 6/26/2024) 100 each 0    OLANZapine-FLUoxetine (SYMBYAX) 6-25 MG per capsule Take 1 capsule by mouth every evening (Patient not taking: Reported on 8/9/2024) 30 capsule 0    omeprazole (PriLOSEC) 20 mg delayed release capsule take 1 capsule by mouth twice a day 60 capsule 5    senna-docusate sodium (SENOKOT-S) 8.6-50 mg per tablet Take 3 tablets by mouth as needed        Stiolto Respimat 2.5-2.5 MCG/ACT inhaler inhale 2 puffs by mouth and INTO THE LUNGS once daily 4 g 11     No current facility-administered medications for this visit.        No Known Allergies    Review of Systems    Video Exam    There were no vitals filed for this visit.    Physical Exam     10/04/24  Start Time: 0405  Stop Time: 0500  Total Visit Time: 55 minutes

## 2024-10-05 DIAGNOSIS — K21.9 GASTROESOPHAGEAL REFLUX DISEASE WITHOUT ESOPHAGITIS: ICD-10-CM

## 2024-10-05 DIAGNOSIS — I50.20 SYSTOLIC HEART FAILURE, UNSPECIFIED HF CHRONICITY (HCC): ICD-10-CM

## 2024-10-07 RX ORDER — METOPROLOL SUCCINATE 25 MG/1
25 TABLET, EXTENDED RELEASE ORAL DAILY
Qty: 90 TABLET | Refills: 1 | Status: SHIPPED | OUTPATIENT
Start: 2024-10-07

## 2024-10-09 ENCOUNTER — TELEMEDICINE (OUTPATIENT)
Dept: BEHAVIORAL/MENTAL HEALTH CLINIC | Facility: CLINIC | Age: 59
End: 2024-10-09
Payer: COMMERCIAL

## 2024-10-09 DIAGNOSIS — F10.11 ALCOHOL ABUSE, IN REMISSION: ICD-10-CM

## 2024-10-09 DIAGNOSIS — F31.81 MODERATE MIXED BIPOLAR II DISORDER (HCC): Primary | ICD-10-CM

## 2024-10-09 DIAGNOSIS — F43.12 CHRONIC POST-TRAUMATIC STRESS DISORDER (PTSD): ICD-10-CM

## 2024-10-09 DIAGNOSIS — Z72.0 TOBACCO ABUSE: ICD-10-CM

## 2024-10-09 DIAGNOSIS — F41.1 GENERALIZED ANXIETY DISORDER: ICD-10-CM

## 2024-10-09 DIAGNOSIS — F17.210 CIGARETTE NICOTINE DEPENDENCE WITHOUT COMPLICATION: ICD-10-CM

## 2024-10-09 DIAGNOSIS — F12.90 MARIJUANA USE: Chronic | ICD-10-CM

## 2024-10-09 PROCEDURE — 90832 PSYTX W PT 30 MINUTES: CPT | Performed by: COUNSELOR

## 2024-10-09 NOTE — PSYCH
Behavioral Health Psychotherapy Progress Note    Psychotherapy Provided: Individual Psychotherapy     1. Moderate mixed bipolar II disorder (HCC)        2. Generalized anxiety disorder        3. Chronic post-traumatic stress disorder (PTSD)        4. Tobacco abuse        5. Cigarette nicotine dependence without complication        6. Marijuana use        7. Alcohol abuse, in remission            Goals addressed in session: Goal 1     DATA: Marie called to report that she would be late due to reported feeling negatively with intrusive and persisting thoughts again about her daughter and her parents, catastrophizing about them and lingering in distress considerably long time. She was encouraged to provide her strongest examples of irrational thoughts and catastrophizing and to journal about less negative versions of those to discuss within the next session. Marie asked for help and was encouraged to put some effort to figure that on her own and was commended for her willingness after she was provided with psychoeducation about disputing irrational thoughts and searching for evidence and replacing.    During this session, this clinician used the following therapeutic modalities: Client-centered Therapy, Cognitive Behavioral Therapy, Dialectical Behavior Therapy, Mindfulness-based Strategies, and Supportive Psychotherapy    Substance Abuse was not addressed during this session. If the client is diagnosed with a co-occurring substance use disorder, please indicate any changes in the frequency or amount of use: NA. Stage of change for addressing substance use diagnoses: Pre-contemplation    ASSESSMENT:  Marie Portillo presents with a Euthymic/ normal, Anxious, and Depressed mood.     her affect is Normal range and intensity, Constricted, and Tearful, at times, which is congruent, with her mood and the content of the session. The client has made progress on their goals.    Marie had difficulties  Marie Portillo presents with a  "low risk of suicide, low risk of self-harm, and none risk of harm to others.    For any risk assessment that surpasses a \"low\" rating, a safety plan must be developed.    A safety plan was indicated: no  If yes, describe in detail NA    PLAN: Between sessions, Marie Portillo will journal about her irrational thoughts and some examples of less negative substitutes. At the next session, the therapist will use Client-centered Therapy, Cognitive Behavioral Therapy, Dialectical Behavior Therapy, Mindfulness-based Strategies, and Supportive Psychotherapy to address self-defeating thoughts and catastrophizing.    Behavioral Health Treatment Plan and Discharge Planning: Marie Portillo is aware of and agrees to continue to work on their treatment plan. They have identified and are working toward their discharge goals. yes    Visit start and stop times:    10/09/24  Start Time: 0220  Stop Time: 0254  Total Visit Time: 34 minutes  "

## 2024-10-09 NOTE — PSYCH
Virtual Regular Visit    Verification of patient location:    Patient is located at Home in the following state in which I hold an active license [place]      Assessment/Plan:    Problem List Items Addressed This Visit       Moderate mixed bipolar II disorder (HCC) - Primary    Generalized anxiety disorder    Chronic post-traumatic stress disorder (PTSD)    Alcohol abuse, in remission    Marijuana use (Chronic)    Cigarette nicotine dependence without complication    Tobacco abuse       Goals addressed in session: Goal 1          Reason for visit is No chief complaint on file.       Encounter provider Isabel Watts LPC      Recent Visits  Date Type Provider Dept   10/04/24 Telemedicine Isabel Watts LPC [facility] Therapist [place]   10/02/24 Telemedicine Iasbel Watts LPC [facility] Therapist [place]   Showing recent visits within past 7 days and meeting all other requirements  Future Appointments  No visits were found meeting these conditions.  Showing future appointments within next 150 days and meeting all other requirements       The patient was identified by name and date of birth. Marie Portillo was informed that this is a telemedicine visit and that the visit is being conducted throughthe Epic Embedded platform. She agrees to proceed..  My office door was closed. No one else was in the room.  She acknowledged consent and understanding of privacy and security of the video platform. The patient has agreed to participate and understands they can discontinue the visit at any time.    Patient is aware this is a billable service.     Subjective  Marie Portillo is a 58 y.o. female  .      HPI     Past Medical History:   Diagnosis Date    Bilateral carpal tunnel syndrome 08/20/2019    Cardiomyopathy (HCC)     Carpal tunnel syndrome of right wrist 10/30/2019    Added automatically from request for surgery [id number]    Chlamydia     COPD (chronic obstructive pulmonary disease) (HCC)      Depression     Hypertension     Varicella        Past Surgical History:   Procedure Laterality Date    BACK SURGERY      BREAST BIOPSY Left 20yrs ago benign    CERVICAL SPINE SURGERY      EGD      HERNIA REPAIR      OOPHORECTOMY Right     MD [facility]SC WRST SURG W/RLS TRANSVRS CARPL LIGM Right 01/21/2020    Procedure: RELEASE CARPAL TUNNEL ENDOSCOPIC;  Surgeon: Sam Nguyen MD;  [facility];  Service: Orthopedics    MD NDSC WRST SURG W/RLS TRANSVRS CARPL LIGM Left 01/28/2020    Procedure: RELEASE CARPAL TUNNEL ENDOSCOPIC;  Surgeon: Sam Nguyen MD;  Location: [facility];  Service: Orthopedics       Current Outpatient Medications   Medication Sig Dispense Refill    Acetaminophen 500 MG Take 2 capsules by mouth every 4 (four) hours as needed for mild pain      albuterol (PROVENTIL HFA,VENTOLIN HFA) 90 mcg/act inhaler inhale 2 puffs by mouth and INTO THE LUNGS every 6 hours IF NEEDED FOR WHEEZING. 8.5 g 2    betamethasone dipropionate (DIPROSONE) 0.05 % cream Apply topically 2 (two) times a day 30 g 0    buPROPion (WELLBUTRIN XL) 300 mg 24 hr tablet Take 1 tablet (300 mg total) by mouth daily 30 tablet 1    cholecalciferol (VITAMIN D3) 400 units tablet Take 400 Units by mouth daily      fluticasone (FLONASE) 50 mcg/act nasal spray instill 1 spray into each nostril once daily 16 g 2    guaiFENesin (MUCINEX) 600 mg 12 hr tablet Take 2 tablets (1,200 mg total) by mouth every 12 (twelve) hours (Patient not taking: Reported on 8/9/2024) 60 tablet 3    ipratropium-albuterol (DUO-NEB) 0.5-2.5 mg/3 mL nebulizer solution Take 3 mL by nebulization 4 (four) times a day (Patient taking differently: Take 3 mL by nebulization in the morning) 360 mL 3    lamoTRIgine (LaMICtal) 200 MG tablet Take 1 tablet (200 mg total) by mouth daily 30 tablet 1    levocetirizine (XYZAL) 5 MG tablet take 1 tablet by mouth every evening 30 tablet 4    losartan (COZAAR) 25 mg tablet take 1 tablet by mouth once daily 90 tablet 1     methocarbamol (ROBAXIN) 500 mg tablet Take 500 mg by mouth 2 (two) times a day PRN (Patient not taking: Reported on 8/9/2024)      metoprolol succinate (TOPROL-XL) 25 mg 24 hr tablet take 1 tablet by mouth once daily 90 tablet 1    nicotine (NICODERM CQ) 21 mg/24 hr TD 24 hr patch Place 1 patch on the skin over 24 hours every 24 hours (Patient not taking: Reported on 6/26/2024) 28 patch 0    nicotine polacrilex (COMMIT) 4 MG lozenge Apply 1 lozenge (4 mg total) to the mouth or throat as needed for smoking cessation (Patient not taking: Reported on 6/26/2024) 100 each 0    OLANZapine-FLUoxetine (SYMBYAX) 6-25 MG per capsule Take 1 capsule by mouth every evening (Patient not taking: Reported on 8/9/2024) 30 capsule 0    omeprazole (PriLOSEC) 20 mg delayed release capsule take 1 capsule by mouth twice a day 180 capsule 1    senna-docusate sodium (SENOKOT-S) 8.6-50 mg per tablet Take 3 tablets by mouth as needed        Stiolto Respimat 2.5-2.5 MCG/ACT inhaler inhale 2 puffs by mouth and INTO THE LUNGS once daily 4 g 11     No current facility-administered medications for this visit.        No Known Allergies    Review of Systems    Video Exam    There were no vitals filed for this visit.    Physical Exam

## 2024-10-11 ENCOUNTER — TELEMEDICINE (OUTPATIENT)
Dept: BEHAVIORAL/MENTAL HEALTH CLINIC | Facility: CLINIC | Age: 59
End: 2024-10-11
Payer: COMMERCIAL

## 2024-10-11 DIAGNOSIS — F10.11 ALCOHOL ABUSE, IN REMISSION: ICD-10-CM

## 2024-10-11 DIAGNOSIS — F43.12 CHRONIC POST-TRAUMATIC STRESS DISORDER (PTSD): ICD-10-CM

## 2024-10-11 DIAGNOSIS — F31.81 MODERATE MIXED BIPOLAR II DISORDER (HCC): Primary | ICD-10-CM

## 2024-10-11 DIAGNOSIS — F12.90 MARIJUANA USE: Chronic | ICD-10-CM

## 2024-10-11 DIAGNOSIS — F41.1 GENERALIZED ANXIETY DISORDER: ICD-10-CM

## 2024-10-11 DIAGNOSIS — F17.210 CIGARETTE NICOTINE DEPENDENCE WITHOUT COMPLICATION: ICD-10-CM

## 2024-10-11 DIAGNOSIS — Z72.0 TOBACCO ABUSE: ICD-10-CM

## 2024-10-11 PROCEDURE — 90853 GROUP PSYCHOTHERAPY: CPT | Performed by: COUNSELOR

## 2024-10-11 NOTE — PSYCH
Virtual Regular Visit    Verification of patient location:    Patient is located at Home in the following state in which I hold an active license [place]      Assessment/Plan:    Problem List Items Addressed This Visit       Moderate mixed bipolar II disorder (HCC) - Primary    Generalized anxiety disorder    Chronic post-traumatic stress disorder (PTSD)    Alcohol abuse, in remission    Marijuana use (Chronic)    Cigarette nicotine dependence without complication    Tobacco abuse       Goals addressed in session: Goal 1          Reason for visit is   Chief Complaint   Patient presents with    Virtual Regular Visit          Encounter provider Isabel Watts LPC      Recent Visits  Date Type Provider Dept   10/09/24 Telemedicine Isabel Watts LPC [facility] Therapist [place]   10/04/24 Telemedicine Isabel Watts LPC [facility] Therap[facility] [place]   Showing recent visits within past 7 days and meeting all other requirements  Today's Visits  Date Type Provider Dept   10/11/24 Telemedicine Isabel Watts LPC [facility] Therap[facility] [place]   Showing today's visits and meeting all other requirements  Future Appointments  No visits were found meeting these conditions.  Showing future appointments within next 150 days and meeting all other requirements       The patient was identified by name and date of birth. Marie Portillo was informed that this is a telemedicine visit and that the visit is being conducted throughthe [employer] Teams platform. She agrees to proceed..  My office door was closed. The patient was notified the following individuals were present in the room other group members.  She acknowledged consent and understanding of privacy and security of the video platform. The patient has agreed to participate and understands they can discontinue the visit at any time.    Patient is aware this is a billable service.     Subjective  Marie Portillo is a 58 y.o. female  .    Data:  Marie attended the Relaxation group and was guided through tapping on pain, diaphragmatic breathing through two different protocols, humming with other Vagus nerve stimulation techniques, and progressive muscle relaxation pared with the different breathing aspects. Furthermore, the attendees were guided through Mindfulness-based meditation series on the topics of Quick Pain Body Scan, Guided Imagery for [place] in [place] and [place], meditation of Self-Esteem and Holding on the Emotional Body, and ending with positive self-affirmations for self-confidence, forgiveness, willingness to practice good self-care and to find the positive in the challenges with an open mind and optimism.     EFT, Mindfulness-based, and Somatic approaches were used to achieve the deep state of relaxation and to maintain the serene environment for the exercise's purpose.      No alcohol or drug problems were addressed in the session.     No safety plan was indicated due to no safety issues at the moment.      Assessment: Marie was in normal/euthymic mood and in congruence with broad affect, followed through the guidance and completed the exercises well, showed willingness to continue learning and practicing self-care in weekly Relaxation Group as well as individual therapy.      Plan: Marie will practice deep breathing and other techniques based on each occasion to counter stress and will attend the next group to be able to take part in further guided exercises on chosen topics to build on the techniques learned in the beginning.    HPI     Past Medical History:   Diagnosis Date    Bilateral carpal tunnel syndrome 08/20/2019    Cardiomyopathy (HCC)     Carpal tunnel syndrome of right wrist 10/30/2019    Added automatically from request for surgery [id number]    Chlamydia     COPD (chronic obstructive pulmonary disease) (HCC)     Depression     Hypertension     Varicella        Past Surgical History:   Procedure Laterality  Date    BACK SURGERY      BREAST BIOPSY Left 20yrs ago benign    CERVICAL SPINE SURGERY      EGD      HERNIA REPAIR      OOPHORECTOMY Right     [facility] NDSC WRST SURG W/RLS TRANSVRS CARPL LIGM Right 01/21/2020    Procedure: RELEASE CARPAL TUNNEL ENDOSCOPIC;  Surgeon: Sam Nguyen MD;  [facility];  Service: Orthopedics    [facility] NDSC WRST SURG W/RLS TRANSVRS CARPL LIGM Left 01/28/2020    Procedure: RELEASE CARPAL TUNNEL ENDOSCOPIC;  Surgeon: Sam Nguyen MD;  [facility];  Service: Orthopedics       Current Outpatient Medications   Medication Sig Dispense Refill    Acetaminophen 500 MG Take 2 capsules by mouth every 4 (four) hours as needed for mild pain      albuterol (PROVENTIL HFA,VENTOLIN HFA) 90 mcg/act inhaler inhale 2 puffs by mouth and INTO THE LUNGS every 6 hours IF NEEDED FOR WHEEZING. 8.5 g 2    betamethasone dipropionate (DIPROSONE) 0.05 % cream Apply topically 2 (two) times a day 30 g 0    buPROPion (WELLBUTRIN XL) 300 mg 24 hr tablet Take 1 tablet (300 mg total) by mouth daily 30 tablet 1    cholecalciferol (VITAMIN D3) 400 units tablet Take 400 Units by mouth daily      fluticasone (FLONASE) 50 mcg/act nasal spray instill 1 spray into each nostril once daily 16 g 2    guaiFENesin (MUCINEX) 600 mg 12 hr tablet Take 2 tablets (1,200 mg total) by mouth every 12 (twelve) hours (Patient not taking: Reported on 8/9/2024) 60 tablet 3    ipratropium-albuterol (DUO-NEB) 0.5-2.5 mg/3 mL nebulizer solution Take 3 mL by nebulization 4 (four) times a day (Patient taking differently: Take 3 mL by nebulization in the morning) 360 mL 3    lamoTRIgine (LaMICtal) 200 MG tablet Take 1 tablet (200 mg total) by mouth daily 30 tablet 1    levocetirizine (XYZAL) 5 MG tablet take 1 tablet by mouth every evening 30 tablet 4    losartan (COZAAR) 25 mg tablet take 1 tablet by mouth once daily 90 tablet 1    methocarbamol (ROBAXIN) 500 mg tablet Take 500 mg by mouth 2 (two) times a day PRN (Patient not  taking: Reported on 8/9/2024)      metoprolol succinate (TOPROL-XL) 25 mg 24 hr tablet take 1 tablet by mouth once daily 90 tablet 1    nicotine (NICODERM CQ) 21 mg/24 hr TD 24 hr patch Place 1 patch on the skin over 24 hours every 24 hours (Patient not taking: Reported on 6/26/2024) 28 patch 0    nicotine polacrilex (COMMIT) 4 MG lozenge Apply 1 lozenge (4 mg total) to the mouth or throat as needed for smoking cessation (Patient not taking: Reported on 6/26/2024) 100 each 0    OLANZapine-FLUoxetine (SYMBYAX) 6-25 MG per capsule Take 1 capsule by mouth every evening (Patient not taking: Reported on 8/9/2024) 30 capsule 0    omeprazole (PriLOSEC) 20 mg delayed release capsule take 1 capsule by mouth twice a day 180 capsule 1    senna-docusate sodium (SENOKOT-S) 8.6-50 mg per tablet Take 3 tablets by mouth as needed        Stiolto Respimat 2.5-2.5 MCG/ACT inhaler inhale 2 puffs by mouth and INTO THE LUNGS once daily 4 g 11     No current facility-administered medications for this visit.        No Known Allergies    Review of Systems    Video Exam    There were no vitals filed for this visit.    Physical Exam     10/11/24  Start Time: 0400  Stop Time: 0500  Total Visit Time: 60 minutes

## 2024-10-14 ENCOUNTER — ANNUAL EXAM (OUTPATIENT)
Dept: OBGYN CLINIC | Facility: CLINIC | Age: 59
End: 2024-10-14
Payer: COMMERCIAL

## 2024-10-14 VITALS
SYSTOLIC BLOOD PRESSURE: 134 MMHG | WEIGHT: 104 LBS | BODY MASS INDEX: 20.42 KG/M2 | DIASTOLIC BLOOD PRESSURE: 84 MMHG | HEIGHT: 60 IN

## 2024-10-14 DIAGNOSIS — Z01.419 ROUTINE GYNECOLOGICAL EXAMINATION: Primary | ICD-10-CM

## 2024-10-14 DIAGNOSIS — Z12.31 ENCOUNTER FOR SCREENING MAMMOGRAM FOR BREAST CANCER: ICD-10-CM

## 2024-10-14 DIAGNOSIS — R87.612 LOW GRADE SQUAMOUS INTRAEPITHELIAL LESION ON CYTOLOGIC SMEAR OF CERVIX (LGSIL): ICD-10-CM

## 2024-10-14 DIAGNOSIS — Z72.51 HIGH RISK SEXUAL BEHAVIOR, UNSPECIFIED TYPE: ICD-10-CM

## 2024-10-14 PROCEDURE — G0476 HPV COMBO ASSAY CA SCREEN: HCPCS | Performed by: ADVANCED PRACTICE MIDWIFE

## 2024-10-14 PROCEDURE — 87491 CHLMYD TRACH DNA AMP PROBE: CPT | Performed by: ADVANCED PRACTICE MIDWIFE

## 2024-10-14 PROCEDURE — 99396 PREV VISIT EST AGE 40-64: CPT | Performed by: ADVANCED PRACTICE MIDWIFE

## 2024-10-14 PROCEDURE — G0145 SCR C/V CYTO,THINLAYER,RESCR: HCPCS | Performed by: SPECIALIST

## 2024-10-14 PROCEDURE — 87591 N.GONORRHOEAE DNA AMP PROB: CPT | Performed by: ADVANCED PRACTICE MIDWIFE

## 2024-10-14 NOTE — PROGRESS NOTES
[facility] [address] [facility] [place]    ASSESSMENT/PLAN: Marie Portillo is a 58 y.o.  who presents for annual gynecologic exam.    Encounter for routine gynecologic examination  - Routine well woman exam completed today.  - Cervical Cancer Screening: Current ASCCP Guidelines reviewed. Last Pap: 2023 abnormal. Next Pap Due: today  - HPV Vaccination status: Not immunized  - STI screening offered including HIV: desired GC/Chlam testing  - Breast Cancer Screening: Last Mammogram 2024, script in system  - Colorectal cancer screening was not ordered.  - The following were reviewed in today's visit: breast self exam, mammography screening ordered, menopause, adequate intake of calcium and vitamin D, and exercise  - RTO 1 yr annual exam, will follow-up pap smear and HPV testing    Additional problems addressed at this visit:  1. Routine gynecological examination  -     Mammo screening bilateral w 3d and cad; Future; Expected date: 2025  -     Chlamydia/GC amplified DNA by PCR  -     Liquid-based pap, screening  2. Low grade squamous intraepithelial lesion on cytologic smear of cervix (LGSIL)  3. Encounter for screening mammogram for breast cancer  -     Mammo screening bilateral w 3d and cad; Future; Expected date: 2025  4. High risk sexual behavior, unspecified type  -     Chlamydia/GC amplified DNA by PCR        CC:  Annual Gynecologic Examination    HPI: Marie Portillo is a 58 y.o.  who presents for annual gynecologic examination.  Marie presents today for gyn exam. No vaginal bleeding since onset of menopause.  last pap smear- abnormal HPV/ASCUS, Hx of abnormal pap smear - hx of abnormal ,,- follow-up colpos. Sexually active- yes, with partner for 4 yrs- off and on. Does desire GC/Chlam testing.  2024 mammogram- normal, and 2024 colonoscopy- repeat 7 yrs. Reports 3-6 hrs of sleep daily, Occasional servings of calcium rich foods daily.  Exercises : 7 days per week. Minimal servings of caffeine daily. Breast changes - none. Safe at home- yes.  Concerns: none.       The following portions of the patient's history were reviewed and updated as appropriate: allergies, current medications, past family history, past medical history, obstetric history, gynecologic history, past social history, past surgical history and problem list.    Review of Systems   Constitutional:  Positive for fatigue. Negative for chills and fever.   Respiratory:  Positive for cough and shortness of breath.    Cardiovascular:  Negative for chest pain, palpitations and leg swelling.   Gastrointestinal:  Positive for constipation. Negative for diarrhea.        Takes Senakot   Genitourinary:  Positive for vaginal discharge. Negative for difficulty urinating, dysuria, frequency, pelvic pain, urgency, vaginal bleeding and vaginal pain.   Neurological:  Negative for light-headedness and headaches.   Psychiatric/Behavioral:  Negative for self-injury. The patient is nervous/anxious.          Objective:  /84   Ht 5' (1.524 m)   Wt 47.2 kg (104 lb)   LMP  (LMP Unknown)   BMI 20.31 kg/m²    Physical Exam  Vitals reviewed.   Constitutional:       Appearance: Normal appearance.   HENT:      Head: Normocephalic.   Neck:      Thyroid: No thyroid mass or thyroid tenderness.   Cardiovascular:      Rate and Rhythm: Normal rate and regular rhythm.      Heart sounds: Normal heart sounds.   Pulmonary:      Effort: Pulmonary effort is normal.      Breath sounds: Wheezing present.   Chest:   Breasts:     Right: No mass, nipple discharge, skin change or tenderness.      Left: No mass, nipple discharge, skin change or tenderness.   Abdominal:      General: There is no distension.      Palpations: There is no mass.      Tenderness: There is no abdominal tenderness. There is no guarding.   Genitourinary:     General: Normal vulva.      Exam position: Lithotomy position.      Labia:         Right:  No rash, tenderness or lesion.         Left: No rash, tenderness or lesion.       Urethra: No urethral pain, urethral swelling or urethral lesion.      Vagina: No vaginal discharge, erythema, tenderness, bleeding or lesions.      Cervix: No cervical motion tenderness, discharge, friability, lesion, erythema or cervical bleeding.      Uterus: Normal. Not enlarged and not tender.       Adnexa:         Right: No mass, tenderness or fullness.          Left: No mass, tenderness or fullness.     Musculoskeletal:      Cervical back: Normal range of motion.   Lymphadenopathy:      Upper Body:      Right upper body: No axillary adenopathy.      Left upper body: No axillary adenopathy.   Skin:     General: Skin is warm and dry.   Neurological:      Mental Status: She is alert.   Psychiatric:         Mood and Affect: Mood normal.         Behavior: Behavior normal.         Judgment: Judgment normal.             Savannah Stanley CNM  [facility]  10/14/24 2:25 PM

## 2024-10-15 ENCOUNTER — TELEMEDICINE (OUTPATIENT)
Dept: PSYCHIATRY | Facility: CLINIC | Age: 59
End: 2024-10-15
Payer: COMMERCIAL

## 2024-10-15 DIAGNOSIS — F12.90 CANNABIS USE DISORDER: ICD-10-CM

## 2024-10-15 DIAGNOSIS — F31.70 BIPOLAR AFFECTIVE DISORDER IN REMISSION (HCC): ICD-10-CM

## 2024-10-15 DIAGNOSIS — F43.10 POST TRAUMATIC STRESS DISORDER (PTSD): ICD-10-CM

## 2024-10-15 DIAGNOSIS — F41.1 GENERALIZED ANXIETY DISORDER: Primary | ICD-10-CM

## 2024-10-15 PROCEDURE — 99213 OFFICE O/P EST LOW 20 MIN: CPT | Performed by: PSYCHIATRY & NEUROLOGY
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
## 2024-10-16 ENCOUNTER — TELEMEDICINE (OUTPATIENT)
Dept: BEHAVIORAL/MENTAL HEALTH CLINIC | Facility: CLINIC | Age: 59
End: 2024-10-16
Payer: COMMERCIAL

## 2024-10-16 DIAGNOSIS — Z72.0 TOBACCO ABUSE: ICD-10-CM

## 2024-10-16 DIAGNOSIS — F43.12 CHRONIC POST-TRAUMATIC STRESS DISORDER (PTSD): ICD-10-CM

## 2024-10-16 DIAGNOSIS — F10.11 ALCOHOL ABUSE, IN REMISSION: ICD-10-CM

## 2024-10-16 DIAGNOSIS — F41.1 GENERALIZED ANXIETY DISORDER: ICD-10-CM

## 2024-10-16 DIAGNOSIS — F12.90 MARIJUANA USE: Chronic | ICD-10-CM

## 2024-10-16 DIAGNOSIS — F17.210 CIGARETTE NICOTINE DEPENDENCE WITHOUT COMPLICATION: ICD-10-CM

## 2024-10-16 DIAGNOSIS — F31.81 MODERATE MIXED BIPOLAR II DISORDER (HCC): Primary | ICD-10-CM

## 2024-10-16 LAB
C TRACH DNA SPEC QL NAA+PROBE: NEGATIVE
N GONORRHOEA DNA SPEC QL NAA+PROBE: NEGATIVE

## 2024-10-16 PROCEDURE — 90834 PSYTX W PT 45 MINUTES: CPT | Performed by: COUNSELOR

## 2024-10-16 NOTE — PSYCH
Behavioral Health Psychotherapy Progress Note    Psychotherapy Provided: Individual Psychotherapy     1. Moderate mixed bipolar II disorder (HCC)        2. Generalized anxiety disorder        3. Chronic post-traumatic stress disorder (PTSD)        4. Tobacco abuse        5. Cigarette nicotine dependence without complication        6. Marijuana use        7. Alcohol abuse, in remission            Goals addressed in session: Goal 1     DATA: Marie discussed her difficulties dealing with emotions, with her wishes to give back to her parents, to be like sister and be able to do what she does for them and was encouraged to use her learned skills and be able to embrace her dark side and work with what she has and who she is. She worked on affirmations and thoughts that she wanted to share with her parents but did not know how to and was praised for her openness and willingness.   During this session, this clinician used the following therapeutic modalities: Client-centered Therapy, Cognitive Behavioral Therapy, Dialectical Behavior Therapy, Mindfulness-based Strategies, and Supportive Psychotherapy    Substance Abuse was not addressed during this session. If the client is diagnosed with a co-occurring substance use disorder, please indicate any changes in the frequency or amount of use: NA. Stage of change for addressing substance use diagnoses: Pre-contemplation    ASSESSMENT:  Marie Portillo presents with a Euthymic/ normal, Anxious, and Depressed mood.     her affect is Normal range and intensity, Constricted, and Tearful, at times, which is congruent, with her mood and the content of the session. The client has made progress on their goals.    Marie makes progress easily and then easily regresses, giving in to her emotions and not using her learned skills to observe her challenges, showing resistance to regulate emotions as she learned and is willing at other times. Marie Portillo presents with a minimal risk of suicide,  "minimal risk of self-harm, and none risk of harm to others.    For any risk assessment that surpasses a \"low\" rating, a safety plan must be developed.    A safety plan was indicated: no  If yes, describe in detail NA    PLAN: Between sessions, Marie Portillo will use Emotion Regulation and Core Mindfulness skills to counter the intensity of her emotions. At the next session, the therapist will use Client-centered Therapy, Cognitive Behavioral Therapy, Dialectical Behavior Therapy, Mindfulness-based Strategies, and Supportive Psychotherapy to address self-care, emotion regulation skills.    Behavioral Health Treatment Plan and Discharge Planning: Marie Portillo is aware of and agrees to continue to work on their treatment plan. They have identified and are working toward their discharge goals. yes    Visit start and stop times:    10/16/24  Start Time: 0200  Stop Time: 0246  Total Visit Time: 46 minutes  "

## 2024-10-16 NOTE — PSYCH
Virtual Regular Visit    Verification of patient location:    Patient is located at Home in the following state in which I hold an active license [place]      Assessment/Plan:    Problem List Items Addressed This Visit       Moderate mixed bipolar II disorder (HCC) - Primary    Generalized anxiety disorder    Chronic post-traumatic stress disorder (PTSD)    Alcohol abuse, in remission    Marijuana use (Chronic)    Cigarette nicotine dependence without complication    Tobacco abuse       Goals addressed in session: Goal 1          Reason for visit is No chief complaint on file.       Encounter provider Isabel Watts LPC      Recent Visits  Date Type Provider Dept   10/11/24 Telemedicine Isabel Watts LPC [facility] Therapist [place]   10/09/24 Telemedicine Isabel Watts LPC [facility] Therapist [place]   Showing recent visits within past 7 days and meeting all other requirements  Future Appointments  No visits were found meeting these conditions.  Showing future appointments within next 150 days and meeting all other requirements       The patient was identified by name and date of birth. Marie Portillo was informed that this is a telemedicine visit and that the visit is being conducted throughthe Epic Embedded platform. She agrees to proceed..  My office door was closed. No one else was in the room.  She acknowledged consent and understanding of privacy and security of the video platform. The patient has agreed to participate and understands they can discontinue the visit at any time.    Patient is aware this is a billable service.     Subjective  Marie Portillo is a 58 y.o. female  .      HPI     Past Medical History:   Diagnosis Date    Bilateral carpal tunnel syndrome 08/20/2019    Cardiomyopathy (HCC)     Carpal tunnel syndrome of right wrist 10/30/2019    Added automatically from request for surgery [id number]    Chlamydia     COPD (chronic obstructive pulmonary disease) (HCC)      Depression     Hypertension     Varicella        Past Surgical History:   Procedure Laterality Date    BACK SURGERY      BREAST BIOPSY Left 20yrs ago benign    CERVICAL SPINE SURGERY      EGD      HERNIA REPAIR      OOPHORECTOMY Right     [facility] WRST SURG W/RLS TRANSVRS CARPL LIGM Right 01/21/2020    Procedure: RELEASE CARPAL TUNNEL ENDOSCOPIC;  Surgeon: Sam Nguyen MD;  [facility];  Service: Orthopedics    [facility] NDSC WRST SURG W/RLS TRANSVRS CARPL LIGM Left 01/28/2020    Procedure: RELEASE CARPAL TUNNEL ENDOSCOPIC;  Surgeon: Sam Nguyen MD;  [facility];  Service: Orthopedics       Current Outpatient Medications   Medication Sig Dispense Refill    Acetaminophen 500 MG Take 2 capsules by mouth every 4 (four) hours as needed for mild pain      albuterol (PROVENTIL HFA,VENTOLIN HFA) 90 mcg/act inhaler inhale 2 puffs by mouth and INTO THE LUNGS every 6 hours IF NEEDED FOR WHEEZING. 8.5 g 2    betamethasone dipropionate (DIPROSONE) 0.05 % cream Apply topically 2 (two) times a day (Patient not taking: Reported on 10/14/2024) 30 g 0    buPROPion (WELLBUTRIN XL) 300 mg 24 hr tablet Take 1 tablet (300 mg total) by mouth daily 30 tablet 1    cholecalciferol (VITAMIN D3) 400 units tablet Take 400 Units by mouth daily      fluticasone (FLONASE) 50 mcg/act nasal spray instill 1 spray into each nostril once daily 16 g 2    guaiFENesin (MUCINEX) 600 mg 12 hr tablet Take 2 tablets (1,200 mg total) by mouth every 12 (twelve) hours 60 tablet 3    ipratropium-albuterol (DUO-NEB) 0.5-2.5 mg/3 mL nebulizer solution Take 3 mL by nebulization 4 (four) times a day 360 mL 3    lamoTRIgine (LaMICtal) 200 MG tablet Take 1 tablet (200 mg total) by mouth daily 30 tablet 1    levocetirizine (XYZAL) 5 MG tablet take 1 tablet by mouth every evening 30 tablet 4    losartan (COZAAR) 25 mg tablet take 1 tablet by mouth once daily 90 tablet 1    methocarbamol (ROBAXIN) 500 mg tablet Take 500 mg by mouth 2 (two) times a  day PRN (Patient not taking: Reported on 8/9/2024)      metoprolol succinate (TOPROL-XL) 25 mg 24 hr tablet take 1 tablet by mouth once daily 90 tablet 1    nicotine (NICODERM CQ) 21 mg/24 hr TD 24 hr patch Place 1 patch on the skin over 24 hours every 24 hours (Patient not taking: Reported on 6/26/2024) 28 patch 0    nicotine polacrilex (COMMIT) 4 MG lozenge Apply 1 lozenge (4 mg total) to the mouth or throat as needed for smoking cessation (Patient not taking: Reported on 6/26/2024) 100 each 0    OLANZapine-FLUoxetine (SYMBYAX) 6-25 MG per capsule Take 1 capsule by mouth every evening (Patient not taking: Reported on 8/9/2024) 30 capsule 0    omeprazole (PriLOSEC) 20 mg delayed release capsule take 1 capsule by mouth twice a day 180 capsule 1    senna-docusate sodium (SENOKOT-S) 8.6-50 mg per tablet Take 3 tablets by mouth as needed        Stiolto Respimat 2.5-2.5 MCG/ACT inhaler inhale 2 puffs by mouth and INTO THE LUNGS once daily 4 g 11     No current facility-administered medications for this visit.        No Known Allergies    Review of Systems    Video Exam    There were no vitals filed for this visit.    Physical Exam

## 2024-10-18 ENCOUNTER — TELEPHONE (OUTPATIENT)
Dept: PSYCHIATRY | Facility: CLINIC | Age: 59
End: 2024-10-18

## 2024-10-18 ENCOUNTER — TELEMEDICINE (OUTPATIENT)
Dept: BEHAVIORAL/MENTAL HEALTH CLINIC | Facility: CLINIC | Age: 59
End: 2024-10-18
Payer: COMMERCIAL

## 2024-10-18 DIAGNOSIS — Z72.0 TOBACCO ABUSE: ICD-10-CM

## 2024-10-18 DIAGNOSIS — F43.12 CHRONIC POST-TRAUMATIC STRESS DISORDER (PTSD): ICD-10-CM

## 2024-10-18 DIAGNOSIS — F10.11 ALCOHOL ABUSE, IN REMISSION: ICD-10-CM

## 2024-10-18 DIAGNOSIS — F41.1 GENERALIZED ANXIETY DISORDER: ICD-10-CM

## 2024-10-18 DIAGNOSIS — F12.90 MARIJUANA USE: Chronic | ICD-10-CM

## 2024-10-18 DIAGNOSIS — F31.81 MODERATE MIXED BIPOLAR II DISORDER (HCC): Primary | ICD-10-CM

## 2024-10-18 DIAGNOSIS — F17.210 CIGARETTE NICOTINE DEPENDENCE WITHOUT COMPLICATION: ICD-10-CM

## 2024-10-18 PROCEDURE — 90853 GROUP PSYCHOTHERAPY: CPT | Performed by: COUNSELOR

## 2024-10-18 NOTE — PSYCH
Virtual Regular Visit    Verification of patient location:    Patient is located at Home in the following state in which I hold an active license [place]      Assessment/Plan:    Problem List Items Addressed This Visit       Moderate mixed bipolar II disorder (HCC) - Primary    Generalized anxiety disorder    Chronic post-traumatic stress disorder (PTSD)    Alcohol abuse, in remission    Marijuana use (Chronic)    Cigarette nicotine dependence without complication    Tobacco abuse       Goals addressed in session: Goal 1          Reason for visit is No chief complaint on file.       Encounter provider Isabel Watts LPC      Recent Visits  Date Type Provider Dept   10/16/24 Telemedicine Isabel Watts LPC [facility] Therapist [place]   10/11/24 Telemedicine Isabel Watts LPC [facility] Therapist [place]   Showing recent visits within past 7 days and meeting all other requirements  Future Appointments  No visits were found meeting these conditions.  Showing future appointments within next 150 days and meeting all other requirements       The patient was identified by name and date of birth. Marie Portillo was informed that this is a telemedicine visit and that the visit is being conducted throughthe [employer] Teams platform. She agrees to proceed..  My office door was closed. The patient was notified the following individuals were present in the room other group members.  She acknowledged consent and understanding of privacy and security of the video platform. The patient has agreed to participate and understands they can discontinue the visit at any time.    Patient is aware this is a billable service.     Subjective  Marie Portillo is a 58 y.o. female  .    Data: Marie attended the Relaxation group and was guided through tapping on trauma, diaphragmatic breathing through two different protocols, humming with other Vagus nerve stimulation techniques, and progressive muscle relaxation manohar  with the different breathing aspects. Furthermore, the attendees were guided through Mindfulness-based meditation series on the topics of Changing Perspective on Pain, Awareness of Three Parts- thoughts, Senses & Garret Body, Peaceful Place visualization, Rewriting the End of a Nightmare, Embracing Your Inner Critique, and ending with positive self-affirmations for forgiveness, compassion for others and self, gratitude for what one has, and releasing control.     EFT, Mindfulness-based, and Somatic approaches were used to achieve the deep state of relaxation and to maintain the serene environment for the exercise's purpose.      No alcohol or drug problems were addressed in the session.     No safety plan was indicated due to no safety issues at the moment.      Assessment: Marie was in normal/euthymic mood and in congruence with broad affect, followed through the guidance and completed the exercises well, showed willingness to continue learning and practicing self-care in weekly Relaxation Group as well as individual therapy.      Plan: Marie will practice deep breathing and other techniques based on each occasion to counter stress and will attend the next group to be able to take part in further guided exercises on chosen topics to build on the techniques learned in the beginning.    HPI     Past Medical History:   Diagnosis Date    Bilateral carpal tunnel syndrome 08/20/2019    Cardiomyopathy (HCC)     Carpal tunnel syndrome of right wrist 10/30/2019    Added automatically from request for surgery [id number]    Chlamydia     COPD (chronic obstructive pulmonary disease) (HCC)     Depression     Hypertension     Varicella        Past Surgical History:   Procedure Laterality Date    BACK SURGERY      BREAST BIOPSY Left 20yrs ago benign    CERVICAL SPINE SURGERY      EGD      HERNIA REPAIR      OOPHORECTOMY Right     [facility] WRST SURG W/RLS TRANSVRS CARPL LIGM Right 01/21/2020    Procedure: RELEASE CARPAL TUNNEL  ENDOSCOPIC;  Surgeon: Sam Nguyen MD;  [facility];  Service: Orthopedics    [facility] NDSC WRST SURG W/RLS TRANSVRS CARPL LIGM Left 01/28/2020    Procedure: RELEASE CARPAL TUNNEL ENDOSCOPIC;  Surgeon: Sam Nguyen MD;  [facility];  Service: Orthopedics       Current Outpatient Medications   Medication Sig Dispense Refill    Acetaminophen 500 MG Take 2 capsules by mouth every 4 (four) hours as needed for mild pain      albuterol (PROVENTIL HFA,VENTOLIN HFA) 90 mcg/act inhaler inhale 2 puffs by mouth and INTO THE LUNGS every 6 hours IF NEEDED FOR WHEEZING. 8.5 g 2    betamethasone dipropionate (DIPROSONE) 0.05 % cream Apply topically 2 (two) times a day (Patient not taking: Reported on 10/14/2024) 30 g 0    buPROPion (WELLBUTRIN XL) 300 mg 24 hr tablet Take 1 tablet (300 mg total) by mouth daily 30 tablet 1    cholecalciferol (VITAMIN D3) 400 units tablet Take 400 Units by mouth daily      fluticasone (FLONASE) 50 mcg/act nasal spray instill 1 spray into each nostril once daily 16 g 2    guaiFENesin (MUCINEX) 600 mg 12 hr tablet Take 2 tablets (1,200 mg total) by mouth every 12 (twelve) hours 60 tablet 3    ipratropium-albuterol (DUO-NEB) 0.5-2.5 mg/3 mL nebulizer solution Take 3 mL by nebulization 4 (four) times a day 360 mL 3    lamoTRIgine (LaMICtal) 200 MG tablet Take 1 tablet (200 mg total) by mouth daily 30 tablet 1    levocetirizine (XYZAL) 5 MG tablet take 1 tablet by mouth every evening 30 tablet 4    losartan (COZAAR) 25 mg tablet take 1 tablet by mouth once daily 90 tablet 1    methocarbamol (ROBAXIN) 500 mg tablet Take 500 mg by mouth 2 (two) times a day PRN (Patient not taking: Reported on 8/9/2024)      metoprolol succinate (TOPROL-XL) 25 mg 24 hr tablet take 1 tablet by mouth once daily 90 tablet 1    nicotine (NICODERM CQ) 21 mg/24 hr TD 24 hr patch Place 1 patch on the skin over 24 hours every 24 hours (Patient not taking: Reported on 6/26/2024) 28 patch 0    nicotine  polacrilex (COMMIT) 4 MG lozenge Apply 1 lozenge (4 mg total) to the mouth or throat as needed for smoking cessation (Patient not taking: Reported on 6/26/2024) 100 each 0    OLANZapine-FLUoxetine (SYMBYAX) 6-25 MG per capsule Take 1 capsule by mouth every evening (Patient not taking: Reported on 8/9/2024) 30 capsule 0    omeprazole (PriLOSEC) 20 mg delayed release capsule take 1 capsule by mouth twice a day 180 capsule 1    senna-docusate sodium (SENOKOT-S) 8.6-50 mg per tablet Take 3 tablets by mouth as needed        Stiolto Respimat 2.5-2.5 MCG/ACT inhaler inhale 2 puffs by mouth and INTO THE LUNGS once daily 4 g 11     No current facility-administered medications for this visit.        No Known Allergies    Review of Systems    Video Exam    There were no vitals filed for this visit.    Physical Exam     10/18/24  Start Time: 0400  Stop Time: 0500  Total Visit Time: 60 minutes

## 2024-10-21 LAB
LAB AP GYN PRIMARY INTERPRETATION: ABNORMAL
Lab: ABNORMAL
PATH INTERP SPEC-IMP: ABNORMAL

## 2024-10-23 ENCOUNTER — TELEMEDICINE (OUTPATIENT)
Dept: BEHAVIORAL/MENTAL HEALTH CLINIC | Facility: CLINIC | Age: 59
End: 2024-10-23
Payer: COMMERCIAL

## 2024-10-23 DIAGNOSIS — Z72.0 TOBACCO ABUSE: ICD-10-CM

## 2024-10-23 DIAGNOSIS — F10.11 ALCOHOL ABUSE, IN REMISSION: ICD-10-CM

## 2024-10-23 DIAGNOSIS — F12.90 MARIJUANA USE: Chronic | ICD-10-CM

## 2024-10-23 DIAGNOSIS — F31.81 MODERATE MIXED BIPOLAR II DISORDER (HCC): Primary | ICD-10-CM

## 2024-10-23 DIAGNOSIS — F17.210 CIGARETTE NICOTINE DEPENDENCE WITHOUT COMPLICATION: ICD-10-CM

## 2024-10-23 DIAGNOSIS — F43.12 CHRONIC POST-TRAUMATIC STRESS DISORDER (PTSD): ICD-10-CM

## 2024-10-23 DIAGNOSIS — F41.1 GENERALIZED ANXIETY DISORDER: ICD-10-CM

## 2024-10-23 PROCEDURE — 90834 PSYTX W PT 45 MINUTES: CPT | Performed by: COUNSELOR

## 2024-10-23 NOTE — PSYCH
"Behavioral Health Psychotherapy Progress Note    Psychotherapy Provided: Individual Psychotherapy     1. Moderate mixed bipolar II disorder (HCC)        2. Generalized anxiety disorder        3. Chronic post-traumatic stress disorder (PTSD)        4. Tobacco abuse        5. Cigarette nicotine dependence without complication        6. Alcohol abuse, in remission        7. Marijuana use            Goals addressed in session: Goal 1     DATA: Marie reported speaking up for herself with providers and was commended for her quality and encouraged to expand on that with adding her skills from Mindfulness module that would help her additionally with neutral observation through challenges and intense emotions. She talked about \"junk journal\" and how it adds to her activities for arts and crafts that she may expand on for the winter to keep herself cognitively involved in meaningful and interesting projects with her light therapy box to avoid boosting depression in the winter. Marie discussed how a person from her [place] told her that people may avoid her due to her tendency to be harsh on her self, name-calling herself., putting herself down and shared that she remembered from our first sessions to avoid name-calling and judgment with others and self.  During this session, this clinician used the following therapeutic modalities: Client-centered Therapy, Cognitive Behavioral Therapy, Dialectical Behavior Therapy, Mindfulness-based Strategies, and Supportive Psychotherapy    Substance Abuse was not addressed during this session. If the client is diagnosed with a co-occurring substance use disorder, please indicate any changes in the frequency or amount of use: NA. Stage of change for addressing substance use diagnoses: Pre-contemplation    ASSESSMENT:  Marie Portillo presents with a Euthymic/ normal mood.     her affect is Normal range and intensity, which is congruent, with her mood and the content of the session. The client " "has made progress on their goals.    Marie seemed very engaged and present while talking about meaningful projects. Marie Portillo presents with a minimal risk of suicide, minimal risk of self-harm, and none risk of harm to others.    For any risk assessment that surpasses a \"low\" rating, a safety plan must be developed.    A safety plan was indicated: no  If yes, describe in detail NA    PLAN: Between sessions, Marie Portillo will use her learned Mindfulness skills to observe intense emotions. At the next session, the therapist will use Client-centered Therapy, Cognitive Behavioral Therapy, Dialectical Behavior Therapy, Mindfulness-based Strategies, and Supportive Psychotherapy to address self-blame and self verbal abuse.    Behavioral Health Treatment Plan and Discharge Planning: Marie Portillo is aware of and agrees to continue to work on their treatment plan. They have identified and are working toward their discharge goals. yes    Visit start and stop times:    10/23/24  Start Time: 0158  Stop Time: 0241  Total Visit Time: 43 minutes  "

## 2024-10-23 NOTE — PSYCH
Virtual Regular Visit    Verification of patient location:    Patient is located at Home in the following state in which I hold an active license [place]      Assessment/Plan:    Problem List Items Addressed This Visit       Moderate mixed bipolar II disorder (HCC) - Primary    Generalized anxiety disorder    Chronic post-traumatic stress disorder (PTSD)    Alcohol abuse, in remission    Marijuana use (Chronic)    Cigarette nicotine dependence without complication    Tobacco abuse       Goals addressed in session: Goal 1          Reason for visit is No chief complaint on file.       Encounter provider Isabel Watts LPC      Recent Visits  Date Type Provider Dept   10/18/24 Telemedicine Isabel Watts LPC [facility]rap[facility] [place]   10/16/24 Telemedicine Isabel Watts LPC [facility]rap[facility] [place]   Showing recent visits within past 7 days and meeting all other requirements  Future Appointments  No visits were found meeting these conditions.  Showing future appointments within next 150 days and meeting all other requirements       The patient was identified by name and date of birth. Marie Portillo was informed that this is a telemedicine visit and that the visit is being conducted throughthe Epic Embedded platform. She agrees to proceed..  My office door was closed. No one else was in the room.  She acknowledged consent and understanding of privacy and security of the video platform. The patient has agreed to participate and understands they can discontinue the visit at any time.    Patient is aware this is a billable service.     Subjective  Marie Portillo is a 58 y.o. female  .      HPI     Past Medical History:   Diagnosis Date    Bilateral carpal tunnel syndrome 08/20/2019    Cardiomyopathy (HCC)     Carpal tunnel syndrome of right wrist 10/30/2019    Added automatically from request for surgery [id number]    Chlamydia     COPD (chronic obstructive pulmonary disease) (HCC)      Depression     Hypertension     Varicella        Past Surgical History:   Procedure Laterality Date    BACK SURGERY      BREAST BIOPSY Left 20yrs ago benign    CERVICAL SPINE SURGERY      EGD      HERNIA REPAIR      OOPHORECTOMY Right     [place] [facility] WRST SURG W/RLS TRANSVRS CARPL LIGM Right 01/21/2020    Procedure: RELEASE CARPAL TUNNEL ENDOSCOPIC;  Surgeon: Sam Nguyen MD;  [facility];  Service: Orthopedics    [facility] WRST SURG W/RLS TRANSVRS CARPL LIGM Left 01/28/2020    Procedure: RELEASE CARPAL TUNNEL ENDOSCOPIC;  Surgeon: Sam Nguyen MD;  [facility];  Service: Orthopedics       Current Outpatient Medications   Medication Sig Dispense Refill    Acetaminophen 500 MG Take 2 capsules by mouth every 4 (four) hours as needed for mild pain      albuterol (PROVENTIL HFA,VENTOLIN HFA) 90 mcg/act inhaler inhale 2 puffs by mouth and INTO THE LUNGS every 6 hours IF NEEDED FOR WHEEZING. 8.5 g 2    betamethasone dipropionate (DIPROSONE) 0.05 % cream Apply topically 2 (two) times a day (Patient not taking: Reported on 10/14/2024) 30 g 0    buPROPion (WELLBUTRIN XL) 300 mg 24 hr tablet Take 1 tablet (300 mg total) by mouth daily 30 tablet 1    cholecalciferol (VITAMIN D3) 400 units tablet Take 400 Units by mouth daily      fluticasone (FLONASE) 50 mcg/act nasal spray instill 1 spray into each nostril once daily 16 g 2    guaiFENesin (MUCINEX) 600 mg 12 hr tablet Take 2 tablets (1,200 mg total) by mouth every 12 (twelve) hours 60 tablet 3    ipratropium-albuterol (DUO-NEB) 0.5-2.5 mg/3 mL nebulizer solution Take 3 mL by nebulization 4 (four) times a day 360 mL 3    lamoTRIgine (LaMICtal) 200 MG tablet Take 1 tablet (200 mg total) by mouth daily 30 tablet 1    levocetirizine (XYZAL) 5 MG tablet take 1 tablet by mouth every evening 30 tablet 4    losartan (COZAAR) 25 mg tablet take 1 tablet by mouth once daily 90 tablet 1    methocarbamol (ROBAXIN) 500 mg tablet Take 500 mg by mouth 2 (two) times a  day PRN (Patient not taking: Reported on 8/9/2024)      metoprolol succinate (TOPROL-XL) 25 mg 24 hr tablet take 1 tablet by mouth once daily 90 tablet 1    nicotine (NICODERM CQ) 21 mg/24 hr TD 24 hr patch Place 1 patch on the skin over 24 hours every 24 hours (Patient not taking: Reported on 6/26/2024) 28 patch 0    nicotine polacrilex (COMMIT) 4 MG lozenge Apply 1 lozenge (4 mg total) to the mouth or throat as needed for smoking cessation (Patient not taking: Reported on 6/26/2024) 100 each 0    OLANZapine-FLUoxetine (SYMBYAX) 6-25 MG per capsule Take 1 capsule by mouth every evening (Patient not taking: Reported on 8/9/2024) 30 capsule 0    omeprazole (PriLOSEC) 20 mg delayed release capsule take 1 capsule by mouth twice a day 180 capsule 1    senna-docusate sodium (SENOKOT-S) 8.6-50 mg per tablet Take 3 tablets by mouth as needed        Stiolto Respimat 2.5-2.5 MCG/ACT inhaler inhale 2 puffs by mouth and INTO THE LUNGS once daily 4 g 11     No current facility-administered medications for this visit.        No Known Allergies    Review of Systems    Video Exam    There were no vitals filed for this visit.    Physical Exam

## 2024-10-25 ENCOUNTER — TELEMEDICINE (OUTPATIENT)
Dept: BEHAVIORAL/MENTAL HEALTH CLINIC | Facility: CLINIC | Age: 59
End: 2024-10-25
Payer: COMMERCIAL

## 2024-10-25 DIAGNOSIS — F43.12 CHRONIC POST-TRAUMATIC STRESS DISORDER (PTSD): ICD-10-CM

## 2024-10-25 DIAGNOSIS — F41.1 GENERALIZED ANXIETY DISORDER: ICD-10-CM

## 2024-10-25 DIAGNOSIS — F10.11 ALCOHOL ABUSE, IN REMISSION: ICD-10-CM

## 2024-10-25 DIAGNOSIS — F17.210 CIGARETTE NICOTINE DEPENDENCE WITHOUT COMPLICATION: ICD-10-CM

## 2024-10-25 DIAGNOSIS — Z72.0 TOBACCO ABUSE: ICD-10-CM

## 2024-10-25 DIAGNOSIS — F31.81 MODERATE MIXED BIPOLAR II DISORDER (HCC): Primary | ICD-10-CM

## 2024-10-25 DIAGNOSIS — F12.90 MARIJUANA USE: Chronic | ICD-10-CM

## 2024-10-25 PROCEDURE — 90853 GROUP PSYCHOTHERAPY: CPT | Performed by: COUNSELOR

## 2024-10-25 NOTE — PSYCH
Virtual Regular Visit    Verification of patient location:    Patient is located at Home in the following state in which I hold an active license [place]      Assessment/Plan:    Problem List Items Addressed This Visit       Moderate mixed bipolar II disorder (HCC) - Primary    Generalized anxiety disorder    Chronic post-traumatic stress disorder (PTSD)    Alcohol abuse, in remission    Marijuana use (Chronic)    Cigarette nicotine dependence without complication    Tobacco abuse       Goals addressed in session: Goal 1          Reason for visit is No chief complaint on file.       Encounter provider Isabel Watts LPC      Recent Visits  Date Type Provider Dept   10/23/24 Telemedicine Isabel Watts LPC [facility]rap[facility] [place]   10/18/24 Telemedicine Isabel Watts L[facility]rap[facility] [place]   Showing recent visits within past 7 days and meeting all other requirements  Future Appointments  No visits were found meeting these conditions.  Showing future appointments within next 150 days and meeting all other requirements       The patient was identified by name and date of birth. Marie Portillo was informed that this is a telemedicine visit and that the visit is being conducted throughthe Microsoft Teams platform. She agrees to proceed..  My office door was closed. No one else was in the room.  She acknowledged consent and understanding of privacy and security of the video platform. The patient has agreed to participate and understands they can discontinue the visit at any time.    Patient is aware this is a billable service.     Subjective  Marie Portillo is a 58 y.o. female  .    Data: Marie attended the Relaxation group and was guided through tapping on pain, diaphragmatic breathing through two different protocols, humming with other Vagus nerve stimulation techniques, and progressive muscle relaxation pared with the different breathing aspects. Furthermore, the attendees were  guided through Mindfulness-based meditation series on the topics of Turning Toward meditation for Emotional Issues, Protective Light Visualization, Urge Surfing and Self-Esteem Imagery, Embracing Your Inner Critique, and ending with positive self-affirmations for detaching from models that no longer serve healthily, embracing learning experiences, and setting clear boundaries with others and our own self-limiting tendencies.      EFT, Mindfulness-based, and Somatic approaches were used to achieve the deep state of relaxation and to maintain the serene environment for the exercise's purpose.      No alcohol or drug problems were addressed in the session.     No safety plan was indicated due to no safety issues at the moment.      Assessment: Marie was in normal/euthymic mood and in congruence with broad affect, followed through the guidance and completed the exercises well, showed willingness to continue learning and practicing self-care in weekly Relaxation Group as well as individual therapy.      Plan: Marie will practice deep breathing and other techniques based on each occasion to counter stress and will attend the next group to be able to take part in further guided exercises on chosen topics to build on the techniques learned in the beginning.    HPI     Past Medical History:   Diagnosis Date    Bilateral carpal tunnel syndrome 08/20/2019    Cardiomyopathy (HCC)     Carpal tunnel syndrome of right wrist 10/30/2019    Added automatically from request for surgery [id number]    Chlamydia     COPD (chronic obstructive pulmonary disease) (HCC)     Depression     Hypertension     Varicella        Past Surgical History:   Procedure Laterality Date    BACK SURGERY      BREAST BIOPSY Left 20yrs ago benign    CERVICAL SPINE SURGERY      EGD      HERNIA REPAIR      OOPHORECTOMY Right     [place] [facility] WRST SURG W/RLS TRANSVRS CARPL LIGM Right 01/21/2020    Procedure: RELEASE CARPAL TUNNEL ENDOSCOPIC;  Surgeon: Sam Nguyen  MD;  [facility];  Service: Orthopedics    [place] [facility] WRST SURG W/RLS TRANSVRS CARPL LIGM Left 01/28/2020    Procedure: RELEASE CARPAL TUNNEL ENDOSCOPIC;  Surgeon: Sam Nguyen MD;  [facility];  Service: Orthopedics       Current Outpatient Medications   Medication Sig Dispense Refill    Acetaminophen 500 MG Take 2 capsules by mouth every 4 (four) hours as needed for mild pain      albuterol (PROVENTIL HFA,VENTOLIN HFA) 90 mcg/act inhaler inhale 2 puffs by mouth and INTO THE LUNGS every 6 hours IF NEEDED FOR WHEEZING. 8.5 g 2    betamethasone dipropionate (DIPROSONE) 0.05 % cream Apply topically 2 (two) times a day (Patient not taking: Reported on 10/14/2024) 30 g 0    buPROPion (WELLBUTRIN XL) 300 mg 24 hr tablet Take 1 tablet (300 mg total) by mouth daily 30 tablet 1    cholecalciferol (VITAMIN D3) 400 units tablet Take 400 Units by mouth daily      fluticasone (FLONASE) 50 mcg/act nasal spray instill 1 spray into each nostril once daily 16 g 2    guaiFENesin (MUCINEX) 600 mg 12 hr tablet Take 2 tablets (1,200 mg total) by mouth every 12 (twelve) hours 60 tablet 3    ipratropium-albuterol (DUO-NEB) 0.5-2.5 mg/3 mL nebulizer solution Take 3 mL by nebulization 4 (four) times a day 360 mL 3    lamoTRIgine (LaMICtal) 200 MG tablet Take 1 tablet (200 mg total) by mouth daily 30 tablet 1    levocetirizine (XYZAL) 5 MG tablet take 1 tablet by mouth every evening 30 tablet 4    losartan (COZAAR) 25 mg tablet take 1 tablet by mouth once daily 90 tablet 1    methocarbamol (ROBAXIN) 500 mg tablet Take 500 mg by mouth 2 (two) times a day PRN (Patient not taking: Reported on 8/9/2024)      metoprolol succinate (TOPROL-XL) 25 mg 24 hr tablet take 1 tablet by mouth once daily 90 tablet 1    nicotine (NICODERM CQ) 21 mg/24 hr TD 24 hr patch Place 1 patch on the skin over 24 hours every 24 hours (Patient not taking: Reported on 6/26/2024) 28 patch 0    nicotine polacrilex (COMMIT) 4 MG lozenge Apply 1 lozenge  (4 mg total) to the mouth or throat as needed for smoking cessation (Patient not taking: Reported on 6/26/2024) 100 each 0    OLANZapine-FLUoxetine (SYMBYAX) 6-25 MG per capsule Take 1 capsule by mouth every evening (Patient not taking: Reported on 8/9/2024) 30 capsule 0    omeprazole (PriLOSEC) 20 mg delayed release capsule take 1 capsule by mouth twice a day 180 capsule 1    senna-docusate sodium (SENOKOT-S) 8.6-50 mg per tablet Take 3 tablets by mouth as needed        Stiolto Respimat 2.5-2.5 MCG/ACT inhaler inhale 2 puffs by mouth and INTO THE LUNGS once daily 4 g 11     No current facility-administered medications for this visit.        No Known Allergies    Review of Systems    Video Exam    There were no vitals filed for this visit.    Physical Exam     10/25/24  Start Time: 0405  Stop Time: 0500  Total Visit Time: 55 minutes

## 2024-10-30 ENCOUNTER — TELEPHONE (OUTPATIENT)
Age: 59
End: 2024-10-30

## 2024-10-30 ENCOUNTER — TELEMEDICINE (OUTPATIENT)
Dept: BEHAVIORAL/MENTAL HEALTH CLINIC | Facility: CLINIC | Age: 59
End: 2024-10-30
Payer: COMMERCIAL

## 2024-10-30 DIAGNOSIS — F41.1 GENERALIZED ANXIETY DISORDER: ICD-10-CM

## 2024-10-30 DIAGNOSIS — F12.90 MARIJUANA USE: Chronic | ICD-10-CM

## 2024-10-30 DIAGNOSIS — F31.81 MODERATE MIXED BIPOLAR II DISORDER (HCC): Primary | ICD-10-CM

## 2024-10-30 DIAGNOSIS — F10.11 ALCOHOL ABUSE, IN REMISSION: ICD-10-CM

## 2024-10-30 DIAGNOSIS — Z72.0 TOBACCO ABUSE: ICD-10-CM

## 2024-10-30 DIAGNOSIS — F17.210 CIGARETTE NICOTINE DEPENDENCE WITHOUT COMPLICATION: ICD-10-CM

## 2024-10-30 DIAGNOSIS — F43.12 CHRONIC POST-TRAUMATIC STRESS DISORDER (PTSD): ICD-10-CM

## 2024-10-30 PROCEDURE — 90832 PSYTX W PT 30 MINUTES: CPT | Performed by: COUNSELOR

## 2024-10-30 NOTE — PSYCH
Virtual Regular Visit    Verification of patient location:    Patient is located at Home in the following state in which I hold an active license [place]      Assessment/Plan:    Problem List Items Addressed This Visit       Moderate mixed bipolar II disorder (HCC) - Primary    Generalized anxiety disorder    Chronic post-traumatic stress disorder (PTSD)    Alcohol abuse, in remission    Marijuana use (Chronic)    Cigarette nicotine dependence without complication    Tobacco abuse       Goals addressed in session: Goal 1          Reason for visit is No chief complaint on file.       Encounter provider Isabel Watts LPC      Recent Visits  Date Type Provider Dept   10/25/24 Telemedicine Isabel Watts [facility] [place]   10/23/24 Telemedicine Isabel Watts L[facility] [place]   Showing recent visits within past 7 days and meeting all other requirements  Future Appointments  No visits were found meeting these conditions.  Showing future appointments within next 150 days and meeting all other requirements       The patient was identified by name and date of birth. Marie Portillo was informed that this is a telemedicine visit and that the visit is being conducted through the Microsoft Teams platform. She agrees to proceed..  My office door was closed. No one else was in the room.  She acknowledged consent and understanding of privacy and security of the video platform. The patient has agreed to participate and understands they can discontinue the visit at any time.    Patient is aware this is a billable service.     Subjective  Marie Portillo is a 58 y.o. female  .      HPI     Past Medical History:   Diagnosis Date    Bilateral carpal tunnel syndrome 08/20/2019    Cardiomyopathy (HCC)     Carpal tunnel syndrome of right wrist 10/30/2019    Added automatically from request for surgery [id number]    Chlamydia     COPD (chronic obstructive pulmonary disease) (HCC)      Depression     Hypertension     Varicella        Past Surgical History:   Procedure Laterality Date    BACK SURGERY      BREAST BIOPSY Left 20yrs ago benign    CERVICAL SPINE SURGERY      EGD      HERNIA REPAIR      OOPHORECTOMY Right     [place] [facility] WRST SURG W/RLS TRANSVRS CARPL LIGM Right 01/21/2020    Procedure: RELEASE CARPAL TUNNEL ENDOSCOPIC;  Surgeon: Sam Nguyen MD;  [facility];  Service: Orthopedics    [place] [facility] WRST SURG W/RLS TRANSVRS CARPL LIGM Left 01/28/2020    Procedure: RELEASE CARPAL TUNNEL ENDOSCOPIC;  Surgeon: Sam Nguyen MD;  [facility];  Service: Orthopedics       Current Outpatient Medications   Medication Sig Dispense Refill    Acetaminophen 500 MG Take 2 capsules by mouth every 4 (four) hours as needed for mild pain      albuterol (PROVENTIL HFA,VENTOLIN HFA) 90 mcg/act inhaler inhale 2 puffs by mouth and INTO THE LUNGS every 6 hours IF NEEDED FOR WHEEZING. 8.5 g 2    betamethasone dipropionate (DIPROSONE) 0.05 % cream Apply topically 2 (two) times a day (Patient not taking: Reported on 10/14/2024) 30 g 0    buPROPion (WELLBUTRIN XL) 300 mg 24 hr tablet Take 1 tablet (300 mg total) by mouth daily 30 tablet 1    cholecalciferol (VITAMIN D3) 400 units tablet Take 400 Units by mouth daily      fluticasone (FLONASE) 50 mcg/act nasal spray instill 1 spray into each nostril once daily 16 g 2    guaiFENesin (MUCINEX) 600 mg 12 hr tablet Take 2 tablets (1,200 mg total) by mouth every 12 (twelve) hours 60 tablet 3    ipratropium-albuterol (DUO-NEB) 0.5-2.5 mg/3 mL nebulizer solution Take 3 mL by nebulization 4 (four) times a day 360 mL 3    lamoTRIgine (LaMICtal) 200 MG tablet Take 1 tablet (200 mg total) by mouth daily 30 tablet 1    levocetirizine (XYZAL) 5 MG tablet take 1 tablet by mouth every evening 30 tablet 4    losartan (COZAAR) 25 mg tablet take 1 tablet by mouth once daily 90 tablet 1    methocarbamol (ROBAXIN) 500 mg tablet Take 500 mg by mouth 2 (two) times a  day PRN (Patient not taking: Reported on 8/9/2024)      metoprolol succinate (TOPROL-XL) 25 mg 24 hr tablet take 1 tablet by mouth once daily 90 tablet 1    nicotine (NICODERM CQ) 21 mg/24 hr TD 24 hr patch Place 1 patch on the skin over 24 hours every 24 hours (Patient not taking: Reported on 6/26/2024) 28 patch 0    nicotine polacrilex (COMMIT) 4 MG lozenge Apply 1 lozenge (4 mg total) to the mouth or throat as needed for smoking cessation (Patient not taking: Reported on 6/26/2024) 100 each 0    OLANZapine-FLUoxetine (SYMBYAX) 6-25 MG per capsule Take 1 capsule by mouth every evening (Patient not taking: Reported on 8/9/2024) 30 capsule 0    omeprazole (PriLOSEC) 20 mg delayed release capsule take 1 capsule by mouth twice a day 180 capsule 1    senna-docusate sodium (SENOKOT-S) 8.6-50 mg per tablet Take 3 tablets by mouth as needed        Stiolto Respimat 2.5-2.5 MCG/ACT inhaler inhale 2 puffs by mouth and INTO THE LUNGS once daily 4 g 11     No current facility-administered medications for this visit.        No Known Allergies    Review of Systems    Video Exam    There were no vitals filed for this visit.    Physical Exam

## 2024-10-30 NOTE — PSYCH
"Behavioral Health Psychotherapy Progress Note    Psychotherapy Provided: Individual Psychotherapy     1. Moderate mixed bipolar II disorder (HCC)        2. Generalized anxiety disorder        3. Chronic post-traumatic stress disorder (PTSD)        4. Tobacco abuse        5. Cigarette nicotine dependence without complication        6. Marijuana use        7. Alcohol abuse, in remission            Goals addressed in session: Goal 1     DATA: Marie had problems connecting with the new platform within Epic and tried around 15 minutes without success due to her microphone and camera being \"in use.\" She succeeded with Teams due to having experience with that platform through the weekly group. Marie reported feeling confused and anxious about the suggested surgery that she feels she needs to communicate more about with the specialist to be able to make an informed decision. She was allowed to express her concerns and validated for her fears and encouraged to be transparent and genuine with her provider and let them know what are her worries.     During this session, this clinician used the following therapeutic modalities: Client-centered Therapy, Cognitive Behavioral Therapy, Dialectical Behavior Therapy, Mindfulness-based Strategies, and Supportive Psychotherapy    Substance Abuse was not addressed during this session. If the client is diagnosed with a co-occurring substance use disorder, please indicate any changes in the frequency or amount of use: NA. Stage of change for addressing substance use diagnoses: Pre-contemplation    ASSESSMENT:  Marie Portillo presents with a Euthymic/ normal and Anxious mood.     her affect is Normal range and intensity and Constricted, which is congruent, with her mood and the content of the session. The client has made progress on their goals.    Marie seemed doubtful like she felt before when needed to communicate her strong feelings with her providers. Marie Portillo presents with a low " "risk of suicide, low risk of self-harm, and none risk of harm to others.    For any risk assessment that surpasses a \"low\" rating, a safety plan must be developed.    A safety plan was indicated: no  If yes, describe in detail NA    PLAN: Between sessions, Marie Portillo will use her learned DBT skills and attend the weekly Relaxation/Mindfulness group for self-care. At the next session, the therapist will use Client-centered Therapy, Cognitive Behavioral Therapy, Dialectical Behavior Therapy, Mindfulness-based Strategies, and Supportive Psychotherapy to address self-care.    Behavioral Health Treatment Plan and Discharge Planning: Marie Portillo is aware of and agrees to continue to work on their treatment plan. They have identified and are working toward their discharge goals. yes    Visit start and stop times:    10/30/24  Start Time: 0207  Stop Time: 0244  Total Visit Time: 37 minutes  "

## 2024-10-30 NOTE — TELEPHONE ENCOUNTER
Patient called and stated that right after her virtual visit a call came in from a [employer]'s phone number but patient was not able to get the phone quick enough and missed the call. Patient wondered if provider tried to call back.     If provider did, she can call patient back.   Writer did not see any other msgs in chart for a call back either.

## 2024-11-01 ENCOUNTER — TELEMEDICINE (OUTPATIENT)
Dept: BEHAVIORAL/MENTAL HEALTH CLINIC | Facility: CLINIC | Age: 59
End: 2024-11-01
Payer: COMMERCIAL

## 2024-11-01 DIAGNOSIS — F43.12 CHRONIC POST-TRAUMATIC STRESS DISORDER (PTSD): ICD-10-CM

## 2024-11-01 DIAGNOSIS — F31.81 MODERATE MIXED BIPOLAR II DISORDER (HCC): Primary | ICD-10-CM

## 2024-11-01 DIAGNOSIS — F17.210 CIGARETTE NICOTINE DEPENDENCE WITHOUT COMPLICATION: ICD-10-CM

## 2024-11-01 DIAGNOSIS — F12.90 MARIJUANA USE: Chronic | ICD-10-CM

## 2024-11-01 DIAGNOSIS — Z72.0 TOBACCO ABUSE: ICD-10-CM

## 2024-11-01 DIAGNOSIS — F41.1 GENERALIZED ANXIETY DISORDER: ICD-10-CM

## 2024-11-01 DIAGNOSIS — F10.11 ALCOHOL ABUSE, IN REMISSION: ICD-10-CM

## 2024-11-01 PROCEDURE — 90853 GROUP PSYCHOTHERAPY: CPT | Performed by: COUNSELOR

## 2024-11-01 NOTE — PSYCH
Virtual Regular Visit    Verification of patient location:    Patient is located at Home in the following state in which I hold an active license [place]      Assessment/Plan:    Problem List Items Addressed This Visit       Moderate mixed bipolar II disorder (HCC) - Primary    Generalized anxiety disorder    Chronic post-traumatic stress disorder (PTSD)    Alcohol abuse, in remission    Marijuana use (Chronic)    Cigarette nicotine dependence without complication    Tobacco abuse       Goals addressed in session: Goal 1          Reason for visit is No chief complaint on file.       Encounter provider Isabel Watts LPC      Recent Visits  Date Type Provider Dept   10/30/24 Telemedicine Isabel Watts LPC [facility]rapist [place]   10/25/24 Telemedicine Isabel Watts LPC [facility] Therapist [place]   Showing recent visits within past 7 days and meeting all other requirements  Future Appointments  No visits were found meeting these conditions.  Showing future appointments within next 150 days and meeting all other requirements       The patient was identified by name and date of birth. Marie Portillo was informed that this is a telemedicine visit and that the visit is being conducted throughthe Microsoft Teams platform. She agrees to proceed..  My office door was closed. No one else was in the room.  She acknowledged consent and understanding of privacy and security of the video platform. The patient has agreed to participate and understands they can discontinue the visit at any time.    Patient is aware this is a billable service.     Subjective  Marie Portillo is a 58 y.o. female  .    Data: Marie attended the Relaxation group and was guided through tapping on anxiety, diaphragmatic breathing through two different protocols, humming with other Vagus nerve stimulation techniques, and progressive muscle relaxation pared with the different breathing aspects. Furthermore, the attendees  were guided through Mindfulness-based meditation series on the topics of Peaceful Place and Self-Esteem Imagery, Connecting to Your Inner Child, Self-Love meditation, and ending with positive self-affirmations for detaching from old beliefs, embracing others' success, and setting clear boundaries with self and others.    EFT, Mindfulness-based, and Somatic approaches were used to achieve the deep state of relaxation and to maintain the serene environment for the exercise's purpose.      No alcohol or drug problems were addressed in the session.     No safety plan was indicated due to no safety issues at the moment.      Assessment: Marie was in normal/euthymic mood and in congruence with broad affect, followed through the guidance and completed the exercises well, showed willingness to continue learning and practicing self-care in weekly Relaxation Group as well as individual therapy.      Plan: Marie will practice deep breathing and other techniques based on each occasion to counter stress and will attend the next group to be able to take part in further guided exercises on chosen topics to build on the techniques learned in the beginning.    HPI     Past Medical History:   Diagnosis Date    Bilateral carpal tunnel syndrome 08/20/2019    Cardiomyopathy (HCC)     Carpal tunnel syndrome of right wrist 10/30/2019    Added automatically from request for surgery [id number]    Chlamydia     COPD (chronic obstructive pulmonary disease) (HCC)     Depression     Hypertension     Varicella        Past Surgical History:   Procedure Laterality Date    BACK SURGERY      BREAST BIOPSY Left 20yrs ago benign    CERVICAL SPINE SURGERY      EGD      HERNIA REPAIR      OOPHORECTOMY Right     [place] [facility] WRST SURG W/RLS TRANSVRS CARPL LIGM Right 01/21/2020    Procedure: RELEASE CARPAL TUNNEL ENDOSCOPIC;  Surgeon: Sam Nguyen MD;  [facility];  Service: Orthopedics    [place] [facility] WRST SURG W/RLS TRANSVRS CARPL LIGM Left 01/28/2020     Procedure: RELEASE CARPAL TUNNEL ENDOSCOPIC;  Surgeon: Sam Nguyen MD;  [facility];  Service: Orthopedics       Current Outpatient Medications   Medication Sig Dispense Refill    Acetaminophen 500 MG Take 2 capsules by mouth every 4 (four) hours as needed for mild pain      albuterol (PROVENTIL HFA,VENTOLIN HFA) 90 mcg/act inhaler inhale 2 puffs by mouth and INTO THE LUNGS every 6 hours IF NEEDED FOR WHEEZING. 8.5 g 2    betamethasone dipropionate (DIPROSONE) 0.05 % cream Apply topically 2 (two) times a day (Patient not taking: Reported on 10/14/2024) 30 g 0    buPROPion (WELLBUTRIN XL) 300 mg 24 hr tablet Take 1 tablet (300 mg total) by mouth daily 30 tablet 1    cholecalciferol (VITAMIN D3) 400 units tablet Take 400 Units by mouth daily      fluticasone (FLONASE) 50 mcg/act nasal spray instill 1 spray into each nostril once daily 16 g 2    guaiFENesin (MUCINEX) 600 mg 12 hr tablet Take 2 tablets (1,200 mg total) by mouth every 12 (twelve) hours 60 tablet 3    ipratropium-albuterol (DUO-NEB) 0.5-2.5 mg/3 mL nebulizer solution Take 3 mL by nebulization 4 (four) times a day 360 mL 3    lamoTRIgine (LaMICtal) 200 MG tablet Take 1 tablet (200 mg total) by mouth daily 30 tablet 1    levocetirizine (XYZAL) 5 MG tablet take 1 tablet by mouth every evening 30 tablet 4    losartan (COZAAR) 25 mg tablet take 1 tablet by mouth once daily 90 tablet 1    methocarbamol (ROBAXIN) 500 mg tablet Take 500 mg by mouth 2 (two) times a day PRN (Patient not taking: Reported on 8/9/2024)      metoprolol succinate (TOPROL-XL) 25 mg 24 hr tablet take 1 tablet by mouth once daily 90 tablet 1    nicotine (NICODERM CQ) 21 mg/24 hr TD 24 hr patch Place 1 patch on the skin over 24 hours every 24 hours (Patient not taking: Reported on 6/26/2024) 28 patch 0    nicotine polacrilex (COMMIT) 4 MG lozenge Apply 1 lozenge (4 mg total) to the mouth or throat as needed for smoking cessation (Patient not taking: Reported on 6/26/2024)  100 each 0    OLANZapine-FLUoxetine (SYMBYAX) 6-25 MG per capsule Take 1 capsule by mouth every evening (Patient not taking: Reported on 8/9/2024) 30 capsule 0    omeprazole (PriLOSEC) 20 mg delayed release capsule take 1 capsule by mouth twice a day 180 capsule 1    senna-docusate sodium (SENOKOT-S) 8.6-50 mg per tablet Take 3 tablets by mouth as needed        Stiolto Respimat 2.5-2.5 MCG/ACT inhaler inhale 2 puffs by mouth and INTO THE LUNGS once daily 4 g 11     No current facility-administered medications for this visit.        No Known Allergies    Review of Systems    Video Exam    There were no vitals filed for this visit.    Physical Exam     11/01/24  Start Time: 0405  Stop Time: 0500  Total Visit Time: 55 minutes

## 2024-11-04 ENCOUNTER — TELEPHONE (OUTPATIENT)
Dept: PSYCHIATRY | Facility: CLINIC | Age: 59
End: 2024-11-04

## 2024-11-05 ENCOUNTER — VBI (OUTPATIENT)
Dept: ADMINISTRATIVE | Facility: OTHER | Age: 59
End: 2024-11-05

## 2024-11-05 NOTE — TELEPHONE ENCOUNTER
11/05/24 8:25 AM     Chart reviewed for BP was/were submitted to the patient's insurance.     David Mendoza MA   [facility]

## 2024-11-06 ENCOUNTER — TELEMEDICINE (OUTPATIENT)
Dept: BEHAVIORAL/MENTAL HEALTH CLINIC | Facility: CLINIC | Age: 59
End: 2024-11-06
Payer: COMMERCIAL

## 2024-11-06 DIAGNOSIS — F12.90 MARIJUANA USE: Chronic | ICD-10-CM

## 2024-11-06 DIAGNOSIS — F41.1 GENERALIZED ANXIETY DISORDER: ICD-10-CM

## 2024-11-06 DIAGNOSIS — F17.210 CIGARETTE NICOTINE DEPENDENCE WITHOUT COMPLICATION: ICD-10-CM

## 2024-11-06 DIAGNOSIS — F10.11 ALCOHOL ABUSE, IN REMISSION: ICD-10-CM

## 2024-11-06 DIAGNOSIS — F43.12 CHRONIC POST-TRAUMATIC STRESS DISORDER (PTSD): ICD-10-CM

## 2024-11-06 DIAGNOSIS — F31.81 MODERATE MIXED BIPOLAR II DISORDER (HCC): Primary | ICD-10-CM

## 2024-11-06 DIAGNOSIS — Z72.0 TOBACCO ABUSE: ICD-10-CM

## 2024-11-06 PROCEDURE — 90832 PSYTX W PT 30 MINUTES: CPT | Performed by: COUNSELOR

## 2024-11-06 NOTE — PSYCH
Virtual Regular Visit    Verification of patient location:    Patient is located at Home in the following state in which I hold an active license [place]      Assessment/Plan:    Problem List Items Addressed This Visit       Moderate mixed bipolar II disorder (HCC) - Primary    Generalized anxiety disorder    Chronic post-traumatic stress disorder (PTSD)    Alcohol abuse, in remission    Marijuana use (Chronic)    Cigarette nicotine dependence without complication    Tobacco abuse       Goals addressed in session: Goal 1          Reason for visit is No chief complaint on file.       Encounter provider Isabel Watts LPC      Recent Visits  Date Type Provider Dept   11/01/24 Telemedicine Isabel Watts LPC [facility]rap[facility] [place]   10/30/24 Telemedicine Isabel Watts LPC [facility]rap[facility] [place]   Showing recent visits within past 7 days and meeting all other requirements  Future Appointments  No visits were found meeting these conditions.  Showing future appointments within next 150 days and meeting all other requirements       The patient was identified by name and date of birth. Marie Portillo was informed that this is a telemedicine visit and that the visit is being conducted throughthe Epic Embedded platform. She agrees to proceed..  My office door was closed. No one else was in the room.  She acknowledged consent and understanding of privacy and security of the video platform. The patient has agreed to participate and understands they can discontinue the visit at any time.    Patient is aware this is a billable service.     Subjective  Marie Portillo is a 58 y.o. female  .      HPI     Past Medical History:   Diagnosis Date    Bilateral carpal tunnel syndrome 08/20/2019    Cardiomyopathy (HCC)     Carpal tunnel syndrome of right wrist 10/30/2019    Added automatically from request for surgery [id number]    Chlamydia     COPD (chronic obstructive pulmonary disease) (HCC)      Depression     Hypertension     Varicella        Past Surgical History:   Procedure Laterality Date    BACK SURGERY      BREAST BIOPSY Left 20yrs ago benign    CERVICAL SPINE SURGERY      EGD      HERNIA REPAIR      OOPHORECTOMY Right     [place] [facility] WRST SURG W/RLS TRANSVRS CARPL LIGM Right 01/21/2020    Procedure: RELEASE CARPAL TUNNEL ENDOSCOPIC;  Surgeon: Sam Nguyen MD;  [facility];  Service: Orthopedics    [place] ND[place] WRST SURG W/RLS TRANSVRS CARPL LIGM Left 01/28/2020    Procedure: RELEASE CARPAL TUNNEL ENDOSCOPIC;  Surgeon: Sam Nguyen MD;  [facility];  Service: Orthopedics       Current Outpatient Medications   Medication Sig Dispense Refill    Acetaminophen 500 MG Take 2 capsules by mouth every 4 (four) hours as needed for mild pain      albuterol (PROVENTIL HFA,VENTOLIN HFA) 90 mcg/act inhaler inhale 2 puffs by mouth and INTO THE LUNGS every 6 hours IF NEEDED FOR WHEEZING. 8.5 g 2    betamethasone dipropionate (DIPROSONE) 0.05 % cream Apply topically 2 (two) times a day (Patient not taking: Reported on 10/14/2024) 30 g 0    buPROPion (WELLBUTRIN XL) 300 mg 24 hr tablet Take 1 tablet (300 mg total) by mouth daily 30 tablet 1    cholecalciferol (VITAMIN D3) 400 units tablet Take 400 Units by mouth daily      fluticasone (FLONASE) 50 mcg/act nasal spray instill 1 spray into each nostril once daily 16 g 2    guaiFENesin (MUCINEX) 600 mg 12 hr tablet Take 2 tablets (1,200 mg total) by mouth every 12 (twelve) hours 60 tablet 3    ipratropium-albuterol (DUO-NEB) 0.5-2.5 mg/3 mL nebulizer solution Take 3 mL by nebulization 4 (four) times a day 360 mL 3    lamoTRIgine (LaMICtal) 200 MG tablet Take 1 tablet (200 mg total) by mouth daily 30 tablet 1    levocetirizine (XYZAL) 5 MG tablet take 1 tablet by mouth every evening 30 tablet 4    losartan (COZAAR) 25 mg tablet take 1 tablet by mouth once daily 90 tablet 1    methocarbamol (ROBAXIN) 500 mg tablet Take 500 mg by mouth 2 (two) times a  day PRN (Patient not taking: Reported on 8/9/2024)      metoprolol succinate (TOPROL-XL) 25 mg 24 hr tablet take 1 tablet by mouth once daily 90 tablet 1    nicotine (NICODERM CQ) 21 mg/24 hr TD 24 hr patch Place 1 patch on the skin over 24 hours every 24 hours (Patient not taking: Reported on 6/26/2024) 28 patch 0    nicotine polacrilex (COMMIT) 4 MG lozenge Apply 1 lozenge (4 mg total) to the mouth or throat as needed for smoking cessation (Patient not taking: Reported on 6/26/2024) 100 each 0    OLANZapine-FLUoxetine (SYMBYAX) 6-25 MG per capsule Take 1 capsule by mouth every evening (Patient not taking: Reported on 8/9/2024) 30 capsule 0    omeprazole (PriLOSEC) 20 mg delayed release capsule take 1 capsule by mouth twice a day 180 capsule 1    senna-docusate sodium (SENOKOT-S) 8.6-50 mg per tablet Take 3 tablets by mouth as needed        Stiolto Respimat 2.5-2.5 MCG/ACT inhaler inhale 2 puffs by mouth and INTO THE LUNGS once daily 4 g 11     No current facility-administered medications for this visit.        No Known Allergies    Review of Systems    Video Exam    There were no vitals filed for this visit.    Physical Exam

## 2024-11-06 NOTE — PSYCH
Behavioral Health Psychotherapy Progress Note    Psychotherapy Provided: Individual Psychotherapy     1. Moderate mixed bipolar II disorder (HCC)        2. Generalized anxiety disorder        3. Chronic post-traumatic stress disorder (PTSD)        4. Tobacco abuse        5. Cigarette nicotine dependence without complication        6. Marijuana use        7. Alcohol abuse, in remission            Goals addressed in session: Goal 1     DATA: Marie read from and discussed her entries in her journal meant to be a letter to her daughter- she wanted to tell her that she is afraid there is not much time left for them to reconcile and that she would like for her to make effort to meet her before it is too late and that she feels so unfair that her father influenced her with negative attitudes towards her. Marie was introduced to parts from Internal Family Systems and was educated about blending, unblending, and unburdening through therapeutic process of mapping them first through trail heads and then helping them to become aware of provide space for Self.     During this session, this clinician used the following therapeutic modalities: Client-centered Therapy, Cognitive Behavioral Therapy, Dialectical Behavior Therapy, Mindfulness-based Strategies, Supportive Psychotherapy, and Internal Family Systems.    Substance Abuse was addressed during this session. If the client is diagnosed with a co-occurring substance use disorder, please indicate any changes in the frequency or amount of use: 10-13 cig daily. Stage of change for addressing substance use diagnoses: Preparation    ASSESSMENT:  Marie Portillo presents with a Euthymic/ normal, Anxious, and Depressed mood.     her affect is Normal range and intensity, Constricted, and Tearful, which is congruent, with her mood and the content of the session. The client has made progress on their goals.    Marie seemed very emotional, able to focus on parts but with high difficulties,  "yet open and interested in the new approach. Marie Portillo presents with a low risk of suicide, low risk of self-harm, and none risk of harm to others.    For any risk assessment that surpasses a \"low\" rating, a safety plan must be developed.    A safety plan was indicated: no  If yes, describe in detail NA    PLAN: Between sessions, Marie Portillo will attend the Mindfulness group and practice connecting with protective parts and helping them to unblend and unburden. At the next session, the therapist will use Client-centered Therapy, Cognitive Behavioral Therapy, Dialectical Behavior Therapy, Mindfulness-based Strategies, Supportive Psychotherapy, and IFS  to address protective parts- angry and shut down parts.    Behavioral Health Treatment Plan and Discharge Planning: Marie Portillo is aware of and agrees to continue to work on their treatment plan. They have identified and are working toward their discharge goals. yes    Visit start and stop times:    11/06/24  Start Time: 0101  Stop Time: 0134  Total Visit Time: 33 minutes  "

## 2024-11-08 ENCOUNTER — TELEMEDICINE (OUTPATIENT)
Dept: BEHAVIORAL/MENTAL HEALTH CLINIC | Facility: CLINIC | Age: 59
End: 2024-11-08
Payer: COMMERCIAL

## 2024-11-08 DIAGNOSIS — F31.81 MODERATE MIXED BIPOLAR II DISORDER (HCC): Primary | ICD-10-CM

## 2024-11-08 DIAGNOSIS — F17.210 CIGARETTE NICOTINE DEPENDENCE WITHOUT COMPLICATION: ICD-10-CM

## 2024-11-08 DIAGNOSIS — Z72.0 TOBACCO ABUSE: ICD-10-CM

## 2024-11-08 DIAGNOSIS — F10.11 ALCOHOL ABUSE, IN REMISSION: ICD-10-CM

## 2024-11-08 DIAGNOSIS — F12.90 MARIJUANA USE: Chronic | ICD-10-CM

## 2024-11-08 DIAGNOSIS — F43.12 CHRONIC POST-TRAUMATIC STRESS DISORDER (PTSD): ICD-10-CM

## 2024-11-08 DIAGNOSIS — F41.1 GENERALIZED ANXIETY DISORDER: ICD-10-CM

## 2024-11-08 PROCEDURE — 90853 GROUP PSYCHOTHERAPY: CPT | Performed by: COUNSELOR

## 2024-11-08 NOTE — PSYCH
Virtual Regular Visit    Verification of patient location:    Patient is located at Home in the following state in which I hold an active license [place]      Assessment/Plan:    Problem List Items Addressed This Visit       Moderate mixed bipolar II disorder (HCC) - Primary    Generalized anxiety disorder    Chronic post-traumatic stress disorder (PTSD)    Alcohol abuse, in remission    Marijuana use (Chronic)    Cigarette nicotine dependence without complication    Tobacco abuse       Goals addressed in session: Goal 1          Reason for visit is No chief complaint on file.       Encounter provider Isabel Watts LPC      Recent Visits  Date Type Provider Dept   11/06/24 Telemedicine Isabel Watts LPC [facility] [place]   11/01/24 Telemedicine Isabel Watts LPC [facility]rap[facility] [place]   Showing recent visits within past 7 days and meeting all other requirements  Future Appointments  No visits were found meeting these conditions.  Showing future appointments within next 150 days and meeting all other requirements       The patient was identified by name and date of birth. Marie Portillo was informed that this is a telemedicine visit and that the visit is being conducted throughthe Microsoft Teams platform. She agrees to proceed..  My office door was closed. No one else was in the room.  She acknowledged consent and understanding of privacy and security of the video platform. The patient has agreed to participate and understands they can discontinue the visit at any time.    Patient is aware this is a billable service.     Subjective  Marie Portillo is a 58 y.o. female  .    Data: Marie attended the Relaxation group and was guided through tapping on anxiety, diaphragmatic breathing through two different protocols, humming with other Vagus nerve stimulation techniques, and progressive muscle relaxation pared with the different breathing aspects. Furthermore, the attendees  were guided through Mindfulness-based meditation series on the topics of [place] visualization, Gratefulness guided exercise, Self-Compassion with [place] [facility], and Mindful Internal Family Systems' introduction with basic exercise of Getting to Know Protective Parts, and ending with positive self-affirmations for letting go off the past, self-confidence, and empowering through taking the reigns of optimism and perseverance.       EFT, Mindfulness, Internal Family Systems, and Somatic approaches were used to achieve the deep state of relaxation and to maintain the serene environment for the exercise's purpose.      No alcohol or drug problems were addressed in the session.     No safety plan was indicated due to no safety issues at the moment.      Assessment: Marie was in normal/euthymic mood and in congruence with broad affect, followed through the guidance and completed the exercises well, showed willingness to continue learning and practicing self-care in weekly Relaxation Group as well as individual therapy.      Plan: Marie will practice deep breathing and other techniques based on each occasion to counter stress and will attend the next group to be able to take part in further guided exercises on chosen topics to build on the techniques learned in the beginning.    HPI     Past Medical History:   Diagnosis Date    Bilateral carpal tunnel syndrome 08/20/2019    Cardiomyopathy (HCC)     Carpal tunnel syndrome of right wrist 10/30/2019    Added automatically from request for surgery [id number]    Chlamydia     COPD (chronic obstructive pulmonary disease) (HCC)     Depression     Hypertension     Varicella        Past Surgical History:   Procedure Laterality Date    BACK SURGERY      BREAST BIOPSY Left 20yrs ago benign    CERVICAL SPINE SURGERY      EGD      HERNIA REPAIR      OOPHORECTOMY Right     [place] [facility] WRST SURG W/RLS TRANSVRS CARPL LIGM Right 01/21/2020    Procedure: RELEASE CARPAL TUNNEL ENDOSCOPIC;   Surgeon: Sam Nguyen MD;  [facility];  Service: Orthopedics    [place] ND[place] WRST SURG W/RLS TRANSVRS CARPL LIGM Left 01/28/2020    Procedure: RELEASE CARPAL TUNNEL ENDOSCOPIC;  Surgeon: Sam Nguyen MD;  [facility];  Service: Orthopedics       Current Outpatient Medications   Medication Sig Dispense Refill    Acetaminophen 500 MG Take 2 capsules by mouth every 4 (four) hours as needed for mild pain      albuterol (PROVENTIL HFA,VENTOLIN HFA) 90 mcg/act inhaler inhale 2 puffs by mouth and INTO THE LUNGS every 6 hours IF NEEDED FOR WHEEZING. 8.5 g 2    betamethasone dipropionate (DIPROSONE) 0.05 % cream Apply topically 2 (two) times a day (Patient not taking: Reported on 10/14/2024) 30 g 0    buPROPion (WELLBUTRIN XL) 300 mg 24 hr tablet Take 1 tablet (300 mg total) by mouth daily 30 tablet 1    cholecalciferol (VITAMIN D3) 400 units tablet Take 400 Units by mouth daily      fluticasone (FLONASE) 50 mcg/act nasal spray instill 1 spray into each nostril once daily 16 g 2    guaiFENesin (MUCINEX) 600 mg 12 hr tablet Take 2 tablets (1,200 mg total) by mouth every 12 (twelve) hours 60 tablet 3    ipratropium-albuterol (DUO-NEB) 0.5-2.5 mg/3 mL nebulizer solution Take 3 mL by nebulization 4 (four) times a day 360 mL 3    lamoTRIgine (LaMICtal) 200 MG tablet Take 1 tablet (200 mg total) by mouth daily 30 tablet 1    levocetirizine (XYZAL) 5 MG tablet take 1 tablet by mouth every evening 30 tablet 4    losartan (COZAAR) 25 mg tablet take 1 tablet by mouth once daily 90 tablet 1    methocarbamol (ROBAXIN) 500 mg tablet Take 500 mg by mouth 2 (two) times a day PRN (Patient not taking: Reported on 8/9/2024)      metoprolol succinate (TOPROL-XL) 25 mg 24 hr tablet take 1 tablet by mouth once daily 90 tablet 1    nicotine (NICODERM CQ) 21 mg/24 hr TD 24 hr patch Place 1 patch on the skin over 24 hours every 24 hours (Patient not taking: Reported on 6/26/2024) 28 patch 0    nicotine polacrilex (COMMIT)  4 MG lozenge Apply 1 lozenge (4 mg total) to the mouth or throat as needed for smoking cessation (Patient not taking: Reported on 6/26/2024) 100 each 0    OLANZapine-FLUoxetine (SYMBYAX) 6-25 MG per capsule Take 1 capsule by mouth every evening (Patient not taking: Reported on 8/9/2024) 30 capsule 0    omeprazole (PriLOSEC) 20 mg delayed release capsule take 1 capsule by mouth twice a day 180 capsule 1    senna-docusate sodium (SENOKOT-S) 8.6-50 mg per tablet Take 3 tablets by mouth as needed        Stiolto Respimat 2.5-2.5 MCG/ACT inhaler inhale 2 puffs by mouth and INTO THE LUNGS once daily 4 g 11     No current facility-administered medications for this visit.        No Known Allergies    Review of Systems    Video Exam    There were no vitals filed for this visit.    Physical Exam     11/08/24  Start Time: 0400  Stop Time: 0500  Total Visit Time: 60 minutes

## 2024-11-12 DIAGNOSIS — R06.2 WHEEZING: ICD-10-CM

## 2024-11-12 DIAGNOSIS — J43.2 CENTRILOBULAR EMPHYSEMA (HCC): ICD-10-CM

## 2024-11-13 ENCOUNTER — TELEMEDICINE (OUTPATIENT)
Dept: BEHAVIORAL/MENTAL HEALTH CLINIC | Facility: CLINIC | Age: 59
End: 2024-11-13
Payer: COMMERCIAL

## 2024-11-13 DIAGNOSIS — Z72.0 TOBACCO ABUSE: ICD-10-CM

## 2024-11-13 DIAGNOSIS — F31.81 MODERATE MIXED BIPOLAR II DISORDER (HCC): Primary | ICD-10-CM

## 2024-11-13 DIAGNOSIS — F43.12 CHRONIC POST-TRAUMATIC STRESS DISORDER (PTSD): ICD-10-CM

## 2024-11-13 DIAGNOSIS — F10.11 ALCOHOL ABUSE, IN REMISSION: ICD-10-CM

## 2024-11-13 DIAGNOSIS — F17.210 CIGARETTE NICOTINE DEPENDENCE WITHOUT COMPLICATION: ICD-10-CM

## 2024-11-13 DIAGNOSIS — F41.1 GENERALIZED ANXIETY DISORDER: ICD-10-CM

## 2024-11-13 DIAGNOSIS — F12.90 MARIJUANA USE: Chronic | ICD-10-CM

## 2024-11-13 PROCEDURE — 90834 PSYTX W PT 45 MINUTES: CPT | Performed by: COUNSELOR

## 2024-11-13 NOTE — PSYCH
Virtual Regular Visit    Verification of patient location:    Patient is located at Home in the following state in which I hold an active license [place]      Assessment/Plan:    Problem List Items Addressed This Visit       Moderate mixed bipolar II disorder (HCC) - Primary    Generalized anxiety disorder    Chronic post-traumatic stress disorder (PTSD)    Alcohol abuse, in remission    Marijuana use (Chronic)    Cigarette nicotine dependence without complication    Tobacco abuse       Goals addressed in session: Goal 1          Reason for visit is No chief complaint on file.       Encounter provider Isabel Watts LPC      Recent Visits  Date Type Provider Dept   11/08/24 Telemedicine Isabel Watts LPC [facility]rap[facility] [place]   11/06/24 Telemedicine Isabel Watts LPC [facility]rap[facility] [place]   Showing recent visits within past 7 days and meeting all other requirements  Today's Visits  Date Type Provider Dept   11/13/24 Telemedicine Isabel Watts LPC [facility] Therap[facility] [place]   Showing today's visits and meeting all other requirements  Future Appointments  No visits were found meeting these conditions.  Showing future appointments within next 150 days and meeting all other requirements       The patient was identified by name and date of birth. Marie Portillo was informed that this is a telemedicine visit and that the visit is being conducted throughthe [employer] platform. She agrees to proceed..  My office door was closed. No one else was in the room.  She acknowledged consent and understanding of privacy and security of the video platform. The patient has agreed to participate and understands they can discontinue the visit at any time.    Patient is aware this is a billable service.     Subjective  Marie Portillo is a 59 y.o. female  .      HPI     Past Medical History:   Diagnosis Date    Bilateral carpal tunnel syndrome 08/20/2019    Cardiomyopathy (HCC)      Carpal tunnel syndrome of right wrist 10/30/2019    Added automatically from request for surgery [id number]    Chlamydia     COPD (chronic obstructive pulmonary disease) (HCC)     Depression     Hypertension     Varicella        Past Surgical History:   Procedure Laterality Date    BACK SURGERY      BREAST BIOPSY Left 20yrs ago benign    CERVICAL SPINE SURGERY      EGD      HERNIA REPAIR      OOPHORECTOMY Right     [place] [facility] WRST SURG W/RLS TRANSVRS CARPL LIGM Right 01/21/2020    Procedure: RELEASE CARPAL TUNNEL ENDOSCOPIC;  Surgeon: Sam Nguyen MD;  [facility];  Service: Orthopedics    [place] [facility] WRST SURG W/RLS TRANSVRS CARPL LIGM Left 01/28/2020    Procedure: RELEASE CARPAL TUNNEL ENDOSCOPIC;  Surgeon: Sam Nguyen MD;  [facility];  Service: Orthopedics       Current Outpatient Medications   Medication Sig Dispense Refill    Acetaminophen 500 MG Take 2 capsules by mouth every 4 (four) hours as needed for mild pain      albuterol (PROVENTIL HFA,VENTOLIN HFA) 90 mcg/act inhaler inhale 2 puffs by mouth and INTO THE LUNGS every 6 hours IF NEEDED FOR WHEEZING. 8.5 g 2    betamethasone dipropionate (DIPROSONE) 0.05 % cream Apply topically 2 (two) times a day (Patient not taking: Reported on 10/14/2024) 30 g 0    buPROPion (WELLBUTRIN XL) 300 mg 24 hr tablet Take 1 tablet (300 mg total) by mouth daily 30 tablet 1    cholecalciferol (VITAMIN D3) 400 units tablet Take 400 Units by mouth daily      fluticasone (FLONASE) 50 mcg/act nasal spray instill 1 spray into each nostril once daily 16 g 2    guaiFENesin (MUCINEX) 600 mg 12 hr tablet Take 2 tablets (1,200 mg total) by mouth every 12 (twelve) hours 60 tablet 3    ipratropium-albuterol (DUO-NEB) 0.5-2.5 mg/3 mL nebulizer solution Take 3 mL by nebulization 4 (four) times a day 360 mL 3    lamoTRIgine (LaMICtal) 200 MG tablet Take 1 tablet (200 mg total) by mouth daily 30 tablet 1    levocetirizine (XYZAL) 5 MG tablet take 1 tablet by mouth  every evening 30 tablet 4    losartan (COZAAR) 25 mg tablet take 1 tablet by mouth once daily 90 tablet 1    methocarbamol (ROBAXIN) 500 mg tablet Take 500 mg by mouth 2 (two) times a day PRN (Patient not taking: Reported on 8/9/2024)      metoprolol succinate (TOPROL-XL) 25 mg 24 hr tablet take 1 tablet by mouth once daily 90 tablet 1    nicotine (NICODERM CQ) 21 mg/24 hr TD 24 hr patch Place 1 patch on the skin over 24 hours every 24 hours (Patient not taking: Reported on 6/26/2024) 28 patch 0    nicotine polacrilex (COMMIT) 4 MG lozenge Apply 1 lozenge (4 mg total) to the mouth or throat as needed for smoking cessation (Patient not taking: Reported on 6/26/2024) 100 each 0    OLANZapine-FLUoxetine (SYMBYAX) 6-25 MG per capsule Take 1 capsule by mouth every evening (Patient not taking: Reported on 8/9/2024) 30 capsule 0    omeprazole (PriLOSEC) 20 mg delayed release capsule take 1 capsule by mouth twice a day 180 capsule 1    senna-docusate sodium (SENOKOT-S) 8.6-50 mg per tablet Take 3 tablets by mouth as needed        Stiolto Respimat 2.5-2.5 MCG/ACT inhaler inhale 2 puffs by mouth and INTO THE LUNGS once daily 4 g 11     No current facility-administered medications for this visit.        No Known Allergies    Review of Systems    Video Exam    There were no vitals filed for this visit.    Physical Exam     Behavioral Health Psychotherapy Progress Note    Psychotherapy Provided: Individual Psychotherapy     1. Moderate mixed bipolar II disorder (HCC)        2. Generalized anxiety disorder        3. Chronic post-traumatic stress disorder (PTSD)        4. Tobacco abuse        5. Cigarette nicotine dependence without complication        6. Marijuana use        7. Alcohol abuse, in remission            Goals addressed in session: Goal 1     DATA: Marie reported feeling intensely emotional breakdowns before the holidays and was guided to find her safe space and process her feelings as an observer. She was urged to  "work with the learned skills and to use her mindfulness approach to separate from her emotions and to add the insight from the IFS exercise included in the last group that would allow her to map and connect some of her protecting parts and help them unblend.    During this session, this clinician used the following therapeutic modalities: Client-centered Therapy, Cognitive Behavioral Therapy, Dialectical Behavior Therapy, Mindfulness-based Strategies, and Supportive Psychotherapy and Internal Family Systems.     Substance Abuse was not addressed during this session. If the client is diagnosed with a co-occurring substance use disorder, please indicate any changes in the frequency or amount of use: NA. Stage of change for addressing substance use diagnoses: Pre-contemplation    ASSESSMENT:  Marie Portillo presents with a Euthymic/ normal and Depressed mood.     her affect is Normal range and intensity and Tearful, at times, which is congruent, with her mood and the content of the session. The client has made progress on their goals.    Marie seemed emotionally  Marie Portillo presents with a minimal risk of suicide, minimal risk of self-harm, and none risk of harm to others.    For any risk assessment that surpasses a \"low\" rating, a safety plan must be developed.    A safety plan was indicated: no  If yes, describe in detail NA    PLAN: Between sessions, Marie Portillo will use the learned way to map and connect with some of her protective parts and work to earn their trust to unblend and unload. At the next session, the therapist will use Client-centered Therapy, Cognitive Behavioral Therapy, Dialectical Behavior Therapy, Mindfulness-based Strategies, Supportive Psychotherapy, and Internal Family systems  to address protectors.    Behavioral Health Treatment Plan and Discharge Planning: Marie Portillo is aware of and agrees to continue to work on their treatment plan. They have identified and are working toward their " discharge goals. yes    Visit start and stop times:    11/13/24  Start Time: 0105  Stop Time: 0145  Total Visit Time: 40 minutes

## 2024-11-14 ENCOUNTER — TELEPHONE (OUTPATIENT)
Age: 59
End: 2024-11-14

## 2024-11-14 RX ORDER — ALBUTEROL SULFATE 90 UG/1
INHALANT RESPIRATORY (INHALATION)
Qty: 8.5 G | Refills: 2 | Status: SHIPPED | OUTPATIENT
Start: 2024-11-14

## 2024-11-14 RX ORDER — TIOTROPIUM BROMIDE AND OLODATEROL 3.124; 2.736 UG/1; UG/1
SPRAY, METERED RESPIRATORY (INHALATION)
Qty: 4 G | Refills: 11 | Status: SHIPPED | OUTPATIENT
Start: 2024-11-14

## 2024-11-14 NOTE — TELEPHONE ENCOUNTER
Patient called in regarding the results of her pap and HPV testing. States she feels as if she should be doing more and asking if she should do another colposcopy like last year. Reviewed in detail with recommendations as per DANIELA Stanley and the ASCCP guidelines. Patient states she hates not doing anything and would like Savannah to review and determine if this is something she should have done now or just repeat pap as recommended in 1 year. Please advise.

## 2024-11-15 ENCOUNTER — TELEMEDICINE (OUTPATIENT)
Dept: BEHAVIORAL/MENTAL HEALTH CLINIC | Facility: CLINIC | Age: 59
End: 2024-11-15
Payer: COMMERCIAL

## 2024-11-15 DIAGNOSIS — Z72.0 TOBACCO ABUSE: ICD-10-CM

## 2024-11-15 DIAGNOSIS — F12.90 MARIJUANA USE: Chronic | ICD-10-CM

## 2024-11-15 DIAGNOSIS — F10.11 ALCOHOL ABUSE, IN REMISSION: ICD-10-CM

## 2024-11-15 DIAGNOSIS — F17.210 CIGARETTE NICOTINE DEPENDENCE WITHOUT COMPLICATION: ICD-10-CM

## 2024-11-15 DIAGNOSIS — F43.12 CHRONIC POST-TRAUMATIC STRESS DISORDER (PTSD): ICD-10-CM

## 2024-11-15 DIAGNOSIS — F41.1 GENERALIZED ANXIETY DISORDER: ICD-10-CM

## 2024-11-15 DIAGNOSIS — F31.81 MODERATE MIXED BIPOLAR II DISORDER (HCC): Primary | ICD-10-CM

## 2024-11-15 PROCEDURE — 90853 GROUP PSYCHOTHERAPY: CPT | Performed by: COUNSELOR

## 2024-11-15 NOTE — TELEPHONE ENCOUNTER
Outgoing call to patient. Unable to leave message due to voicemail not set up. Chegue.lÃ¡t message sent with providers response. Patient to call back if wanting to schedule colpo.

## 2024-11-15 NOTE — PSYCH
Virtual Regular Visit    Verification of patient location:    Patient is located at Home in the following state in which I hold an active license [place]      Assessment/Plan:    Problem List Items Addressed This Visit       Moderate mixed bipolar II disorder (HCC) - Primary    Generalized anxiety disorder    Chronic post-traumatic stress disorder (PTSD)    Alcohol abuse, in remission    Marijuana use (Chronic)    Cigarette nicotine dependence without complication    Tobacco abuse       Goals addressed in session: Goal 1          Reason for visit is No chief complaint on file.       Encounter provider Isabel Watts LPC      Recent Visits  Date Type Provider Dept   11/13/24 Telemedicine Isabel Watts LPC [facility]rap[facility] [place]   11/08/24 Telemedicine Isabel Watts LPC [facility]rap[facility] [place]   Showing recent visits within past 7 days and meeting all other requirements  Future Appointments  No visits were found meeting these conditions.  Showing future appointments within next 150 days and meeting all other requirements       The patient was identified by name and date of birth. Marie Portillo was informed that this is a telemedicine visit and that the visit is being conducted throughthe [employer] Teams platform. She agrees to proceed..  My office door was closed. No one else was in the room.  She acknowledged consent and understanding of privacy and security of the video platform. The patient has agreed to participate and understands they can discontinue the visit at any time.    Patient is aware this is a billable service.     Subjective  Marie Portillo is a 59 y.o. female  .    Data: Marie attended the Relaxation group and was guided through tapping on pain, diaphragmatic breathing through two different protocols, humming with other Vagus nerve stimulation techniques, and progressive muscle relaxation pared with the different breathing aspects. Furthermore, the attendees were  guided through Mindfulness-based meditation series on the topics of Safe Place to deal with Pain, Reduce Envy & Celebrate Others, Mindful Internal Family Systems' introduction with basic exercise of Getting to Know Protective Parts and Mapping Some Parts, and ending with positive self-affirmations for letting go off the past, self-confidence, and empowering through taking the reigns of optimism and perseverance.       EFT, Mindfulness, Internal Family Systems, and Somatic approaches were used to achieve the deep state of relaxation and to maintain the serene environment for the exercise's purpose.      No alcohol or drug problems were addressed in the session.     No safety plan was indicated due to no safety issues at the moment.      Assessment: Marie was in normal/euthymic mood and in congruence with broad affect, followed through the guidance and completed the exercises well, showed willingness to continue learning and practicing self-care in weekly Relaxation Group as well as individual therapy.      Plan: Marie will practice deep breathing and other techniques based on each occasion to counter stress and will attend the next group to be able to take part in further guided exercises on chosen topics to build on the techniques learned in the beginning.       HPI     Past Medical History:   Diagnosis Date    Bilateral carpal tunnel syndrome 08/20/2019    Cardiomyopathy (HCC)     Carpal tunnel syndrome of right wrist 10/30/2019    Added automatically from request for surgery [id number]    Chlamydia     COPD (chronic obstructive pulmonary disease) (HCC)     Depression     Hypertension     Varicella        Past Surgical History:   Procedure Laterality Date    BACK SURGERY      BREAST BIOPSY Left 20yrs ago benign    CERVICAL SPINE SURGERY      EGD      HERNIA REPAIR      OOPHORECTOMY Right     [place] [facility] WRST SURG W/RLS TRANSVRS CARPL LIGM Right 01/21/2020    Procedure: RELEASE CARPAL TUNNEL ENDOSCOPIC;  Surgeon: Sam  MD Patrick;  [facility];  Service: Orthopedics    [place] [facility] WRST SURG W/RLS TRANSVRS CARPL LIGM Left 01/28/2020    Procedure: RELEASE CARPAL TUNNEL ENDOSCOPIC;  Surgeon: Sam Nguyen MD;  [facility];  Service: Orthopedics       Current Outpatient Medications   Medication Sig Dispense Refill    Acetaminophen 500 MG Take 2 capsules by mouth every 4 (four) hours as needed for mild pain      albuterol (PROVENTIL HFA,VENTOLIN HFA) 90 mcg/act inhaler inhale 2 puffs by mouth and INTO THE LUNGS every 6 hours IF NEEDED FOR WHEEZING. 8.5 g 2    betamethasone dipropionate (DIPROSONE) 0.05 % cream Apply topically 2 (two) times a day (Patient not taking: Reported on 10/14/2024) 30 g 0    buPROPion (WELLBUTRIN XL) 300 mg 24 hr tablet Take 1 tablet (300 mg total) by mouth daily 30 tablet 1    cholecalciferol (VITAMIN D3) 400 units tablet Take 400 Units by mouth daily      fluticasone (FLONASE) 50 mcg/act nasal spray instill 1 spray into each nostril once daily 16 g 2    guaiFENesin (MUCINEX) 600 mg 12 hr tablet Take 2 tablets (1,200 mg total) by mouth every 12 (twelve) hours 60 tablet 3    ipratropium-albuterol (DUO-NEB) 0.5-2.5 mg/3 mL nebulizer solution Take 3 mL by nebulization 4 (four) times a day 360 mL 3    lamoTRIgine (LaMICtal) 200 MG tablet Take 1 tablet (200 mg total) by mouth daily 30 tablet 1    levocetirizine (XYZAL) 5 MG tablet take 1 tablet by mouth every evening 30 tablet 4    losartan (COZAAR) 25 mg tablet take 1 tablet by mouth once daily 90 tablet 1    methocarbamol (ROBAXIN) 500 mg tablet Take 500 mg by mouth 2 (two) times a day PRN (Patient not taking: Reported on 8/9/2024)      metoprolol succinate (TOPROL-XL) 25 mg 24 hr tablet take 1 tablet by mouth once daily 90 tablet 1    nicotine (NICODERM CQ) 21 mg/24 hr TD 24 hr patch Place 1 patch on the skin over 24 hours every 24 hours (Patient not taking: Reported on 6/26/2024) 28 patch 0    nicotine polacrilex (COMMIT) 4 MG lozenge Apply  1 lozenge (4 mg total) to the mouth or throat as needed for smoking cessation (Patient not taking: Reported on 6/26/2024) 100 each 0    OLANZapine-FLUoxetine (SYMBYAX) 6-25 MG per capsule Take 1 capsule by mouth every evening (Patient not taking: Reported on 8/9/2024) 30 capsule 0    omeprazole (PriLOSEC) 20 mg delayed release capsule take 1 capsule by mouth twice a day 180 capsule 1    senna-docusate sodium (SENOKOT-S) 8.6-50 mg per tablet Take 3 tablets by mouth as needed        Stiolto Respimat 2.5-2.5 MCG/ACT inhaler inhale 2 puffs by mouth and INTO THE LUNGS once daily 4 g 11     No current facility-administered medications for this visit.        No Known Allergies    Review of Systems    Video Exam    There were no vitals filed for this visit.    Physical Exam     11/15/24  Start Time: 0405  Stop Time: 0500  Total Visit Time: 55 minutes

## 2024-11-20 ENCOUNTER — TELEPHONE (OUTPATIENT)
Age: 59
End: 2024-11-20

## 2024-11-20 ENCOUNTER — TELEMEDICINE (OUTPATIENT)
Dept: BEHAVIORAL/MENTAL HEALTH CLINIC | Facility: CLINIC | Age: 59
End: 2024-11-20
Payer: COMMERCIAL

## 2024-11-20 DIAGNOSIS — F43.12 CHRONIC POST-TRAUMATIC STRESS DISORDER (PTSD): ICD-10-CM

## 2024-11-20 DIAGNOSIS — F41.1 GENERALIZED ANXIETY DISORDER: ICD-10-CM

## 2024-11-20 DIAGNOSIS — F17.210 CIGARETTE NICOTINE DEPENDENCE WITHOUT COMPLICATION: ICD-10-CM

## 2024-11-20 DIAGNOSIS — F10.11 ALCOHOL ABUSE, IN REMISSION: ICD-10-CM

## 2024-11-20 DIAGNOSIS — Z72.0 TOBACCO ABUSE: ICD-10-CM

## 2024-11-20 DIAGNOSIS — F12.90 MARIJUANA USE: Chronic | ICD-10-CM

## 2024-11-20 DIAGNOSIS — F31.81 MODERATE MIXED BIPOLAR II DISORDER (HCC): Primary | ICD-10-CM

## 2024-11-20 PROCEDURE — 90834 PSYTX W PT 45 MINUTES: CPT | Performed by: COUNSELOR

## 2024-11-20 NOTE — PSYCH
Virtual Regular Visit    Verification of patient location:    Patient is located at Home in the following state in which I hold an active license [place]      Assessment/Plan:    Problem List Items Addressed This Visit       Moderate mixed bipolar II disorder (HCC) - Primary    Generalized anxiety disorder    Chronic post-traumatic stress disorder (PTSD)    Alcohol abuse, in remission    Marijuana use (Chronic)    Cigarette nicotine dependence without complication    Tobacco abuse       Goals addressed in session: Goal 1          Reason for visit is No chief complaint on file.       Encounter provider Isabel Watts LPC      Recent Visits  Date Type Provider Dept   11/15/24 Telemedicine Isabel Watts LPC [facility] [place]   11/13/24 Telemedicine Isabel Watts LPC [facility] [place]   Showing recent visits within past 7 days and meeting all other requirements  Future Appointments  No visits were found meeting these conditions.  Showing future appointments within next 150 days and meeting all other requirements       The patient was identified by name and date of birth. Marie Portillo was informed that this is a telemedicine visit and that the visit is being conducted throughthe [employer] Teams platform. She agrees to proceed..  My office door was closed. No one else was in the room.  She acknowledged consent and understanding of privacy and security of the video platform. The patient has agreed to participate and understands they can discontinue the visit at any time.    Patient is aware this is a billable service.     Subjective  Marie Portillo is a 59 y.o. female  .      HPI     Past Medical History:   Diagnosis Date    Bilateral carpal tunnel syndrome 08/20/2019    Cardiomyopathy (HCC)     Carpal tunnel syndrome of right wrist 10/30/2019    Added automatically from request for surgery [id number]    Chlamydia     COPD (chronic obstructive pulmonary disease) (HCC)      Depression     Hypertension     Varicella        Past Surgical History:   Procedure Laterality Date    BACK SURGERY      BREAST BIOPSY Left 20yrs ago benign    CERVICAL SPINE SURGERY      EGD      HERNIA REPAIR      OOPHORECTOMY Right     [place] [facility] WRST SURG W/RLS TRANSVRS CARPL LIGM Right 01/21/2020    Procedure: RELEASE CARPAL TUNNEL ENDOSCOPIC;  Surgeon: Sam Nguyen MD;  [facility];  Service: Orthopedics    [place] [facility] WRST SURG W/RLS TRANSVRS CARPL LIGM Left 01/28/2020    Procedure: RELEASE CARPAL TUNNEL ENDOSCOPIC;  Surgeon: Sam Nguyen MD;  [facility];  Service: Orthopedics       Current Outpatient Medications   Medication Sig Dispense Refill    Acetaminophen 500 MG Take 2 capsules by mouth every 4 (four) hours as needed for mild pain      albuterol (PROVENTIL HFA,VENTOLIN HFA) 90 mcg/act inhaler inhale 2 puffs by mouth and INTO THE LUNGS every 6 hours IF NEEDED FOR WHEEZING. 8.5 g 2    betamethasone dipropionate (DIPROSONE) 0.05 % cream Apply topically 2 (two) times a day (Patient not taking: Reported on 10/14/2024) 30 g 0    buPROPion (WELLBUTRIN XL) 300 mg 24 hr tablet Take 1 tablet (300 mg total) by mouth daily 30 tablet 1    cholecalciferol (VITAMIN D3) 400 units tablet Take 400 Units by mouth daily      fluticasone (FLONASE) 50 mcg/act nasal spray instill 1 spray into each nostril once daily 16 g 2    guaiFENesin (MUCINEX) 600 mg 12 hr tablet Take 2 tablets (1,200 mg total) by mouth every 12 (twelve) hours 60 tablet 3    ipratropium-albuterol (DUO-NEB) 0.5-2.5 mg/3 mL nebulizer solution Take 3 mL by nebulization 4 (four) times a day 360 mL 3    lamoTRIgine (LaMICtal) 200 MG tablet Take 1 tablet (200 mg total) by mouth daily 30 tablet 1    levocetirizine (XYZAL) 5 MG tablet take 1 tablet by mouth every evening 30 tablet 4    losartan (COZAAR) 25 mg tablet take 1 tablet by mouth once daily 90 tablet 1    methocarbamol (ROBAXIN) 500 mg tablet Take 500 mg by mouth 2 (two) times a  day PRN (Patient not taking: Reported on 8/9/2024)      metoprolol succinate (TOPROL-XL) 25 mg 24 hr tablet take 1 tablet by mouth once daily 90 tablet 1    nicotine (NICODERM CQ) 21 mg/24 hr TD 24 hr patch Place 1 patch on the skin over 24 hours every 24 hours (Patient not taking: Reported on 6/26/2024) 28 patch 0    nicotine polacrilex (COMMIT) 4 MG lozenge Apply 1 lozenge (4 mg total) to the mouth or throat as needed for smoking cessation (Patient not taking: Reported on 6/26/2024) 100 each 0    OLANZapine-FLUoxetine (SYMBYAX) 6-25 MG per capsule Take 1 capsule by mouth every evening (Patient not taking: Reported on 8/9/2024) 30 capsule 0    omeprazole (PriLOSEC) 20 mg delayed release capsule take 1 capsule by mouth twice a day 180 capsule 1    senna-docusate sodium (SENOKOT-S) 8.6-50 mg per tablet Take 3 tablets by mouth as needed        Stiolto Respimat 2.5-2.5 MCG/ACT inhaler inhale 2 puffs by mouth and INTO THE LUNGS once daily 4 g 11     No current facility-administered medications for this visit.        No Known Allergies    Review of Systems    Video Exam    There were no vitals filed for this visit.    Physical Exam

## 2024-11-20 NOTE — PSYCH
"Behavioral Health Psychotherapy Progress Note    Psychotherapy Provided: Individual Psychotherapy     1. Moderate mixed bipolar II disorder (HCC)        2. Generalized anxiety disorder        3. Chronic post-traumatic stress disorder (PTSD)        4. Tobacco abuse        5. Cigarette nicotine dependence without complication        6. Alcohol abuse, in remission        7. Marijuana use            Goals addressed in session: Goal 1     DATA: aMrie could not connect to the first given link, as previously experienced, the system showed her that her microphone and camera are in use. Teams link was created for her, and she shared about her plans for Thanksgiving with her friends Peter and Alaina and stayed on positive topics throughout the session and was encouraged to do her best not to dwell on negative self-talk and to communicate more with her friends and landlords, to cook, to laugh with them, and to practice taking notes to be able to refer to her experience.  During this session, this clinician used the following therapeutic modalities: Client-centered Therapy, Cognitive Behavioral Therapy, Dialectical Behavior Therapy, Mindfulness-based Strategies, and Supportive Psychotherapy    Substance Abuse was not addressed during this session. If the client is diagnosed with a co-occurring substance use disorder, please indicate any changes in the frequency or amount of use: NA. Stage of change for addressing substance use diagnoses: Pre-contemplation    ASSESSMENT:  Marie Portillo presents with a Euthymic/ normal mood.     her affect is Normal range and intensity, which is congruent, with her mood and the content of the session. The client has made progress on their goals.    Marie seemed more optimistic, hopeful, positive, and centered this time. Marie Portillo presents with a low risk of suicide, low risk of self-harm, and none risk of harm to others.    For any risk assessment that surpasses a \"low\" rating, a safety plan " must be developed.    A safety plan was indicated: no  If yes, describe in detail NA    PLAN: Between sessions, Marie Portillo will use her time and energy to stay engaged in meaningful and positive projects (junk journaling). At the next session, the therapist will use Client-centered Therapy, Cognitive Behavioral Therapy, Dialectical Behavior Therapy, Mindfulness-based Strategies, and Supportive Psychotherapy to address self-care and minimizing negative inner talk.    Behavioral Health Treatment Plan and Discharge Planning: Marie Portillo is aware of and agrees to continue to work on their treatment plan. They have identified and are working toward their discharge goals. yes    Visit start and stop times:    11/20/24  Start Time: 0107  Stop Time: 0147  Total Visit Time: 40 minutes

## 2024-11-22 ENCOUNTER — TELEMEDICINE (OUTPATIENT)
Dept: BEHAVIORAL/MENTAL HEALTH CLINIC | Facility: CLINIC | Age: 59
End: 2024-11-22
Payer: COMMERCIAL

## 2024-11-22 DIAGNOSIS — Z72.0 TOBACCO ABUSE: ICD-10-CM

## 2024-11-22 DIAGNOSIS — F31.81 MODERATE MIXED BIPOLAR II DISORDER (HCC): Primary | ICD-10-CM

## 2024-11-22 DIAGNOSIS — F10.11 ALCOHOL ABUSE, IN REMISSION: ICD-10-CM

## 2024-11-22 DIAGNOSIS — F41.1 GENERALIZED ANXIETY DISORDER: ICD-10-CM

## 2024-11-22 DIAGNOSIS — F17.210 CIGARETTE NICOTINE DEPENDENCE WITHOUT COMPLICATION: ICD-10-CM

## 2024-11-22 DIAGNOSIS — F12.90 MARIJUANA USE: Chronic | ICD-10-CM

## 2024-11-22 DIAGNOSIS — F43.12 CHRONIC POST-TRAUMATIC STRESS DISORDER (PTSD): ICD-10-CM

## 2024-11-22 PROCEDURE — 90853 GROUP PSYCHOTHERAPY: CPT | Performed by: COUNSELOR

## 2024-11-22 NOTE — PSYCH
Virtual Regular Visit    Verification of patient location:    Patient is located at Home in the following state in which I hold an active license [place]      Assessment/Plan:    Problem List Items Addressed This Visit       Moderate mixed bipolar II disorder (HCC) - Primary    Generalized anxiety disorder    Chronic post-traumatic stress disorder (PTSD)    Alcohol abuse, in remission    Marijuana use (Chronic)    Cigarette nicotine dependence without complication    Tobacco abuse       Goals addressed in session: Goal 1          Reason for visit is No chief complaint on file.       Encounter provider Isabel Watts LPC      Recent Visits  Date Type Provider Dept   11/20/24 Telemedicine Isabel Watts LPC [facility] Therapist [place]   11/15/24 Telemedicine Isabel Watts LPC [facility] Therapist [place]   Showing recent visits within past 7 days and meeting all other requirements  Future Appointments  No visits were found meeting these conditions.  Showing future appointments within next 150 days and meeting all other requirements       The patient was identified by name and date of birth. Marie Portillo was informed that this is a telemedicine visit and that the visit is being conducted throughthe [employer] Teams platform. She agrees to proceed..  My office door was closed. No one else was in the room.  She acknowledged consent and understanding of privacy and security of the video platform. The patient has agreed to participate and understands they can discontinue the visit at any time.    Patient is aware this is a billable service.     Subjective  Marie Portillo is a 59 y.o. female  .    Data: Marie attended the Relaxation group and was guided through tapping on anxiety, diaphragmatic breathing through two different protocols, humming with other Vagus nerve stimulation techniques, and progressive muscle relaxation pared with the different breathing aspects. Furthermore, the attendees  were guided through Mindfulness-based meditation series on the topics on Deep Body Scan for Pain, Self-Esteem imagery, Calming Color Visualization, Heart Chakra meditation, and ending with positive self-affirmations for letting go off the self-blame and guilt, for self-compassion and gratitude for inner and outer resources creating strong support system.     EFT, Mindfulness, Internal Family Systems, and Somatic approaches were used to achieve the deep state of relaxation and to maintain the serene environment for the exercise's purpose.      No alcohol or drug problems were addressed in the session.     No safety plan was indicated due to no safety issues at the moment.      Assessment: Marie was in normal/euthymic mood and in congruence with broad affect, followed through the guidance and completed the exercises well, showed willingness to continue learning and practicing self-care in weekly Relaxation Group as well as individual therapy.      Plan: Marie will practice deep breathing and other techniques based on each occasion to counter stress and will attend the next group to be able to take part in further guided exercises on chosen topics to build on the techniques learned in the beginning.       HPI     Past Medical History:   Diagnosis Date    Bilateral carpal tunnel syndrome 08/20/2019    Cardiomyopathy (HCC)     Carpal tunnel syndrome of right wrist 10/30/2019    Added automatically from request for surgery [id number]    Chlamydia     COPD (chronic obstructive pulmonary disease) (HCC)     Depression     Hypertension     Varicella        Past Surgical History:   Procedure Laterality Date    BACK SURGERY      BREAST BIOPSY Left 20yrs ago benign    CERVICAL SPINE SURGERY      EGD      HERNIA REPAIR      OOPHORECTOMY Right     [facility] WRST SURG W/RLS TRANSVRS CARPL LIGM Right 01/21/2020    Procedure: RELEASE CARPAL TUNNEL ENDOSCOPIC;  Surgeon: Sam Nguyen MD;  Location: [facility];  Service: Orthopedics     [facility] WRST SURG W/RLS TRANSVRS CARPL LIGM Left 01/28/2020    Procedure: RELEASE CARPAL TUNNEL ENDOSCOPIC;  Surgeon: Sam Nguyen MD;  Location: [facility];  Service: Orthopedics       Current Outpatient Medications   Medication Sig Dispense Refill    Acetaminophen 500 MG Take 2 capsules by mouth every 4 (four) hours as needed for mild pain      albuterol (PROVENTIL HFA,VENTOLIN HFA) 90 mcg/act inhaler inhale 2 puffs by mouth and INTO THE LUNGS every 6 hours IF NEEDED FOR WHEEZING. 8.5 g 2    betamethasone dipropionate (DIPROSONE) 0.05 % cream Apply topically 2 (two) times a day (Patient not taking: Reported on 10/14/2024) 30 g 0    buPROPion (WELLBUTRIN XL) 300 mg 24 hr tablet Take 1 tablet (300 mg total) by mouth daily 30 tablet 1    cholecalciferol (VITAMIN D3) 400 units tablet Take 400 Units by mouth daily      fluticasone (FLONASE) 50 mcg/act nasal spray instill 1 spray into each nostril once daily 16 g 2    guaiFENesin (MUCINEX) 600 mg 12 hr tablet Take 2 tablets (1,200 mg total) by mouth every 12 (twelve) hours 60 tablet 3    ipratropium-albuterol (DUO-NEB) 0.5-2.5 mg/3 mL nebulizer solution Take 3 mL by nebulization 4 (four) times a day 360 mL 3    lamoTRIgine (LaMICtal) 200 MG tablet Take 1 tablet (200 mg total) by mouth daily 30 tablet 1    levocetirizine (XYZAL) 5 MG tablet take 1 tablet by mouth every evening 30 tablet 4    losartan (COZAAR) 25 mg tablet take 1 tablet by mouth once daily 90 tablet 1    methocarbamol (ROBAXIN) 500 mg tablet Take 500 mg by mouth 2 (two) times a day PRN (Patient not taking: Reported on 8/9/2024)      metoprolol succinate (TOPROL-XL) 25 mg 24 hr tablet take 1 tablet by mouth once daily 90 tablet 1    nicotine (NICODERM CQ) 21 mg/24 hr TD 24 hr patch Place 1 patch on the skin over 24 hours every 24 hours (Patient not taking: Reported on 6/26/2024) 28 patch 0    nicotine polacrilex (COMMIT) 4 MG lozenge Apply 1 lozenge (4 mg total) to the mouth or throat as needed for  smoking cessation (Patient not taking: Reported on 6/26/2024) 100 each 0    OLANZapine-FLUoxetine (SYMBYAX) 6-25 MG per capsule Take 1 capsule by mouth every evening (Patient not taking: Reported on 8/9/2024) 30 capsule 0    omeprazole (PriLOSEC) 20 mg delayed release capsule take 1 capsule by mouth twice a day 180 capsule 1    senna-docusate sodium (SENOKOT-S) 8.6-50 mg per tablet Take 3 tablets by mouth as needed        Stiolto Respimat 2.5-2.5 MCG/ACT inhaler inhale 2 puffs by mouth and INTO THE LUNGS once daily 4 g 11     No current facility-administered medications for this visit.        No Known Allergies    Review of Systems    Video Exam    There were no vitals filed for this visit.    Physical Exam     11/22/24  Start Time: 0400  Stop Time: 0500  Total Visit Time: 60 minutes

## 2024-11-25 ENCOUNTER — TELEPHONE (OUTPATIENT)
Dept: PSYCHIATRY | Facility: CLINIC | Age: 59
End: 2024-11-25

## 2024-11-27 ENCOUNTER — TELEMEDICINE (OUTPATIENT)
Dept: BEHAVIORAL/MENTAL HEALTH CLINIC | Facility: CLINIC | Age: 59
End: 2024-11-27
Payer: COMMERCIAL

## 2024-11-27 DIAGNOSIS — Z72.0 TOBACCO ABUSE: ICD-10-CM

## 2024-11-27 DIAGNOSIS — F12.90 MARIJUANA USE: Chronic | ICD-10-CM

## 2024-11-27 DIAGNOSIS — F41.1 GENERALIZED ANXIETY DISORDER: ICD-10-CM

## 2024-11-27 DIAGNOSIS — F10.11 ALCOHOL ABUSE, IN REMISSION: ICD-10-CM

## 2024-11-27 DIAGNOSIS — F31.81 MODERATE MIXED BIPOLAR II DISORDER (HCC): Primary | ICD-10-CM

## 2024-11-27 DIAGNOSIS — F17.210 CIGARETTE NICOTINE DEPENDENCE WITHOUT COMPLICATION: ICD-10-CM

## 2024-11-27 DIAGNOSIS — F43.12 CHRONIC POST-TRAUMATIC STRESS DISORDER (PTSD): ICD-10-CM

## 2024-11-27 PROCEDURE — 90834 PSYTX W PT 45 MINUTES: CPT | Performed by: COUNSELOR

## 2024-11-27 NOTE — PSYCH
Behavioral Health Psychotherapy Progress Note    Psychotherapy Provided: Individual Psychotherapy     1. Moderate mixed bipolar II disorder (HCC)        2. Generalized anxiety disorder        3. Chronic post-traumatic stress disorder (PTSD)        4. Tobacco abuse        5. Cigarette nicotine dependence without complication        6. Marijuana use        7. Alcohol abuse, in remission            Goals addressed in session: Goal 1     DATA: Marie discussed her mood around holidays and shared her thoughts about tradition, friends, family, and children. She was allowed to express her emotions and was redirected several times from negative escalation of emotions towards positive topics about the holiday preparations with Peter and Alaina, her friends and landlords, cooking, and what unites them and brings them together. She was encouraged to take notes or journal about her experience and be able to refer to that and avoid struggling with memory.  During this session, this clinician used the following therapeutic modalities: Client-centered Therapy, Cognitive Behavioral Therapy, Mindfulness-based Strategies, and Supportive Psychotherapy    Substance Abuse was not addressed during this session. If the client is diagnosed with a co-occurring substance use disorder, please indicate any changes in the frequency or amount of use: NA. Stage of change for addressing substance use diagnoses: Contemplation    ASSESSMENT:  Marie Portillo presents with a Euthymic/ normal, Anxious, and Depressed mood.     her affect is Normal range and intensity, Constricted, and Tearful, at times, which is congruent, with her mood and the content of the session. The client has made progress on their goals.    Marie seemed present, expressive, talkative, and emotional yet able to be easily redirected towards positive topics and towards observing her emotions. Marie Portillo presents with a low risk of suicide, low risk of self-harm, and none risk of  "harm to others.    For any risk assessment that surpasses a \"low\" rating, a safety plan must be developed.    A safety plan was indicated: no  If yes, describe in detail NA    PLAN: Between sessions, Marie Portillo will attend weekly Relaxation/Mindfulness Group for self-care. At the next session, the therapist will use Client-centered Therapy, Cognitive Behavioral Therapy, Dialectical Behavior Therapy, Mindfulness-based Strategies, and Supportive Psychotherapy to address self-care, taking notes, and observation of emotions.    Behavioral Health Treatment Plan and Discharge Planning: Marie Portillo is aware of and agrees to continue to work on their treatment plan. They have identified and are working toward their discharge goals. yes    Visit start and stop times:    11/27/24  Start Time: 1258  Stop Time: 1339  Total Visit Time: 41 minutes  "

## 2024-11-27 NOTE — PSYCH
Virtual Regular Visit    Verification of patient location:    Patient is located at Home in the following state in which I hold an active license [place]      Assessment/Plan:    Problem List Items Addressed This Visit       Moderate mixed bipolar II disorder (HCC) - Primary    Generalized anxiety disorder    Chronic post-traumatic stress disorder (PTSD)    Alcohol abuse, in remission    Marijuana use (Chronic)    Cigarette nicotine dependence without complication    Tobacco abuse       Goals addressed in session: Goal 1          Reason for visit is No chief complaint on file.       Encounter provider Isabel Watts LPC      Recent Visits  Date Type Provider Dept   11/22/24 Telemedicine Isabel Watts LPC [facility] Therapist [place]   11/20/24 Telemedicine Isabel Watts LPC [facility] Therapist [place]   Showing recent visits within past 7 days and meeting all other requirements  Future Appointments  No visits were found meeting these conditions.  Showing future appointments within next 150 days and meeting all other requirements       The patient was identified by name and date of birth. Marie Portillo was informed that this is a telemedicine visit and that the visit is being conducted throughthe Microsoft Teams platform. She agrees to proceed..  My office door was closed. No one else was in the room.  She acknowledged consent and understanding of privacy and security of the video platform. The patient has agreed to participate and understands they can discontinue the visit at any time.    Patient is aware this is a billable service.     Subjective  Marie Portillo is a 59 y.o. female  .      HPI     Past Medical History:   Diagnosis Date    Bilateral carpal tunnel syndrome 08/20/2019    Cardiomyopathy (HCC)     Carpal tunnel syndrome of right wrist 10/30/2019    Added automatically from request for surgery [id number]    Chlamydia     COPD (chronic obstructive pulmonary disease) (HCC)      Medicare Part B Preventive Services  Limitations  Recommendation/Date completed if known  Scheduled/ Next Due    Bone Mass Measurement  (age 72 & older, biennial)  Requires diagnosis related to osteoporosis or estrogen deficiency. Biennial benefit unless patient has history of long-term glucocorticoid tx or baseline is needed because initial test was by other method  N/A N/A   Cardiovascular Screening Blood Tests (every 5 years)  Total cholesterol, HDL, Triglycerides  Order as a panel if possible  Completed 9/2018      Recommended annually  Due 9/2019   Colorectal Cancer Screening  -Fecal occult blood test (annual)  -Flexible sigmoidoscopy (5y)  -Screening colonoscopy (10y)  -Barium Enema     Completed 9/15/15 with Dr. Chantelle Thrasher  Recommended in 5 years  Due 9/2020    Counseling to Prevent Tobacco Use (up to 8 sessions per year)  - Counseling greater than 3 and up to 10 minutes  - Counseling greater than 10 minutes  Patients must be asymptomatic of tobacco-related conditions to receive as preventive service  Former smoker Keep up the good work! Diabetes Screening Tests (at least every 3 years, Medicare covers annually or at 6-month intervals for prediabetic patients)      Fasting blood sugar (FBS) or glucose tolerance test (GTT)     Patient must be diagnosed with one of the following:  -Hypertension, Dyslipidemia, obesity, previous impaired FBS or GTT  Or any two of the following: overweight, FH of diabetes, age ? 72, history of gestational diabetes, birth of baby weighing more than 9 pounds  Completed 12/2018 with A1C 6.1      Recommended every 3-6 months Due now - 6/2019   Diabetes Self-Management Training (DSMT) (no USPSTF recommendation)  Requires referral by treating physician for patient with diabetes or renal disease. 10 hours of initial DSMT session of no less than 30 minutes each in a continuous 12-month period. 2 hours of follow-up DSMT in subsequent years.   N/A N/A   Glaucoma Screening (no USPSTF Depression     Hypertension     Varicella        Past Surgical History:   Procedure Laterality Date    BACK SURGERY      BREAST BIOPSY Left 20yrs ago benign    CERVICAL SPINE SURGERY      EGD      HERNIA REPAIR      OOPHORECTOMY Right     [facility] WRST SURG W/RLS TRANSVRS CARPL LIGM Right 01/21/2020    Procedure: RELEASE CARPAL TUNNEL ENDOSCOPIC;  Surgeon: Sam Nguyen MD;  [facility];  Service: Orthopedics    [facility] NDSC WRST SURG W/RLS TRANSVRS CARPL LIGM Left 01/28/2020    Procedure: RELEASE CARPAL TUNNEL ENDOSCOPIC;  Surgeon: Sam Nguyen MD;  [facility];  Service: Orthopedics       Current Outpatient Medications   Medication Sig Dispense Refill    Acetaminophen 500 MG Take 2 capsules by mouth every 4 (four) hours as needed for mild pain      albuterol (PROVENTIL HFA,VENTOLIN HFA) 90 mcg/act inhaler inhale 2 puffs by mouth and INTO THE LUNGS every 6 hours IF NEEDED FOR WHEEZING. 8.5 g 2    betamethasone dipropionate (DIPROSONE) 0.05 % cream Apply topically 2 (two) times a day (Patient not taking: Reported on 10/14/2024) 30 g 0    buPROPion (WELLBUTRIN XL) 300 mg 24 hr tablet Take 1 tablet (300 mg total) by mouth daily 30 tablet 1    cholecalciferol (VITAMIN D3) 400 units tablet Take 400 Units by mouth daily      fluticasone (FLONASE) 50 mcg/act nasal spray instill 1 spray into each nostril once daily 16 g 2    guaiFENesin (MUCINEX) 600 mg 12 hr tablet Take 2 tablets (1,200 mg total) by mouth every 12 (twelve) hours 60 tablet 3    ipratropium-albuterol (DUO-NEB) 0.5-2.5 mg/3 mL nebulizer solution Take 3 mL by nebulization 4 (four) times a day 360 mL 3    lamoTRIgine (LaMICtal) 200 MG tablet Take 1 tablet (200 mg total) by mouth daily 30 tablet 1    levocetirizine (XYZAL) 5 MG tablet take 1 tablet by mouth every evening 30 tablet 4    losartan (COZAAR) 25 mg tablet take 1 tablet by mouth once daily 90 tablet 1    methocarbamol (ROBAXIN) 500 mg tablet Take 500 mg by mouth 2 (two) times a  recommendation)  Diabetes mellitus, family history, , age 48 or over,  American, age 72 or over  Completed 9/2018       Recommended annually  Due 9/2019   Human Immunodeficiency Virus (HIV) Screening (annually for increased risk patients)  HIV-1 and HIV-2 by EIA, RUKHSANA, rapid antibody test, or oral mucosa transudate  Patient must be at increased risk for HIV infection per USPSTF guidelines or pregnant. Tests covered annually for patients at increased risk. Pregnant patients may receive up to 3 test during pregnancy. N/A N/A   Medical Nutrition Therapy (MNT) (for diabetes or renal disease not recommended schedule)  Requires referral by treating physician for patient with diabetes or renal disease. Can be provided in same year as diabetes self-management training (DSMT), and CMS recommends medical nutrition therapy take place after DSMT. Up to 3 hours for initial year and 2 hours in subsequent years. N/A N/A   Prostate Cancer Screening (annually up to age 76)  - Digital rectal exam (NORA)  - Prostate specific antigen (PSA)  Annually (age 48 or over), NORA not paid separately when covered E/M service is provided on same date  Completed 4/2018      Recommended annually  Due per Dr Festus Enriquez Vaccination (annually)     Never completed      Recommended annually Declines   Pneumococcal Vaccination (once after 72)     Pneumovax - 12/2012     Prevnar 13 - never completed     Both recommended once over the age of 72 Completed       Declines         Hepatitis B Vaccinations (if medium/high risk)  Medium/high risk factors: End-stage renal disease,  Hemophiliacs who received Factor VIII or IX concentrates, Clients of institutions for the mentally retarded, Persons who live in the same house as a HepB virus carrier, Homosexual men, Illicit injectable drug abusers.   N/A N/A   Ultrasound Screening for Abdominal Aortic Aneurysm (AAA) (once)  Patient must be referred through Critical access hospital and not have day PRN (Patient not taking: Reported on 8/9/2024)      metoprolol succinate (TOPROL-XL) 25 mg 24 hr tablet take 1 tablet by mouth once daily 90 tablet 1    nicotine (NICODERM CQ) 21 mg/24 hr TD 24 hr patch Place 1 patch on the skin over 24 hours every 24 hours (Patient not taking: Reported on 6/26/2024) 28 patch 0    nicotine polacrilex (COMMIT) 4 MG lozenge Apply 1 lozenge (4 mg total) to the mouth or throat as needed for smoking cessation (Patient not taking: Reported on 6/26/2024) 100 each 0    OLANZapine-FLUoxetine (SYMBYAX) 6-25 MG per capsule Take 1 capsule by mouth every evening (Patient not taking: Reported on 8/9/2024) 30 capsule 0    omeprazole (PriLOSEC) 20 mg delayed release capsule take 1 capsule by mouth twice a day 180 capsule 1    senna-docusate sodium (SENOKOT-S) 8.6-50 mg per tablet Take 3 tablets by mouth as needed        Stiolto Respimat 2.5-2.5 MCG/ACT inhaler inhale 2 puffs by mouth and INTO THE LUNGS once daily 4 g 11     No current facility-administered medications for this visit.        No Known Allergies    Review of Systems    Video Exam    There were no vitals filed for this visit.    Physical Exam              had a screening for abdominal aortic aneurysm before under Medicare.  Limited to patients who meet one of the following criteria:  - Men who are 73-68 years old and have smoked more than 100 cigarettes in their lifetime.  -Anyone with a FH of AAA  -Anyone recommended for screening by USPSTF  US kidneys 5/2016 - negative Completed

## 2024-11-29 ENCOUNTER — TELEMEDICINE (OUTPATIENT)
Dept: BEHAVIORAL/MENTAL HEALTH CLINIC | Facility: CLINIC | Age: 59
End: 2024-11-29
Payer: COMMERCIAL

## 2024-11-29 DIAGNOSIS — F43.12 CHRONIC POST-TRAUMATIC STRESS DISORDER (PTSD): ICD-10-CM

## 2024-11-29 DIAGNOSIS — F10.11 ALCOHOL ABUSE, IN REMISSION: ICD-10-CM

## 2024-11-29 DIAGNOSIS — Z72.0 TOBACCO ABUSE: ICD-10-CM

## 2024-11-29 DIAGNOSIS — F12.90 MARIJUANA USE: Chronic | ICD-10-CM

## 2024-11-29 DIAGNOSIS — F41.1 GENERALIZED ANXIETY DISORDER: ICD-10-CM

## 2024-11-29 DIAGNOSIS — F31.81 MODERATE MIXED BIPOLAR II DISORDER (HCC): Primary | ICD-10-CM

## 2024-11-29 DIAGNOSIS — F17.210 CIGARETTE NICOTINE DEPENDENCE WITHOUT COMPLICATION: ICD-10-CM

## 2024-11-29 PROCEDURE — 90853 GROUP PSYCHOTHERAPY: CPT | Performed by: COUNSELOR

## 2024-11-29 NOTE — PSYCH
Virtual Regular Visit    Verification of patient location:    Patient is located at Home in the following state in which I hold an active license [place]      Assessment/Plan:    Problem List Items Addressed This Visit       Moderate mixed bipolar II disorder (HCC) - Primary    Generalized anxiety disorder    Chronic post-traumatic stress disorder (PTSD)    Alcohol abuse, in remission    Marijuana use (Chronic)    Cigarette nicotine dependence without complication    Tobacco abuse       Goals addressed in session: Goal 1          Reason for visit is No chief complaint on file.       Encounter provider Isabel Watts LPC      Recent Visits  Date Type Provider Dept   11/27/24 Telemedicine Isabel Watts LPC [facility] Therapist [place]   11/22/24 Telemedicine Isabel Watts LPC [facility] Therapist [place]   Showing recent visits within past 7 days and meeting all other requirements  Future Appointments  No visits were found meeting these conditions.  Showing future appointments within next 150 days and meeting all other requirements       The patient was identified by name and date of birth. Marie Portillo was informed that this is a telemedicine visit and that the visit is being conducted throughthe [employer] Teams platform. She agrees to proceed..  My office door was closed. No one else was in the room.  She acknowledged consent and understanding of privacy and security of the video platform. The patient has agreed to participate and understands they can discontinue the visit at any time.    Patient is aware this is a billable service.     Subjective  Marie Portillo is a 59 y.o. female  .    Data: Marie attended the Relaxation group and was guided through tapping on trauma, diaphragmatic breathing through two different protocols, humming with other Vagus nerve stimulation techniques, and progressive muscle relaxation pared with the different breathing aspects. Furthermore, the attendees  were guided through Mindfulness-based meditation series on the topics of Breathe Away Anxious Thoughts, Observe Judging with Awareness, protective Parts' Dilemma Meditation, Throat Chakra meditation, and ending with positive self-affirmations for self-forgiveness, connectedness, and gratitude.     EFT, Mindfulness, Internal Family Systems, and Somatic approaches were used to achieve the deep state of relaxation and to maintain the serene environment for the exercise's purpose.      No alcohol or drug problems were addressed in the session.     No safety plan was indicated due to no safety issues at the moment.      Assessment: Marie was in normal/euthymic mood and in congruence with broad affect, followed through the guidance and completed the exercises well, showed willingness to continue learning and practicing self-care in weekly Relaxation Group as well as individual therapy.      Plan: Marie will practice deep breathing and other techniques based on each occasion to counter stress and will attend the next group to be able to take part in further guided exercises on chosen topics to build on the techniques learned in the beginning.       HPI     Past Medical History:   Diagnosis Date    Bilateral carpal tunnel syndrome 08/20/2019    Cardiomyopathy (HCC)     Carpal tunnel syndrome of right wrist 10/30/2019    Added automatically from request for surgery [id number]    Chlamydia     COPD (chronic obstructive pulmonary disease) (HCC)     Depression     Hypertension     Varicella        Past Surgical History:   Procedure Laterality Date    BACK SURGERY      BREAST BIOPSY Left 20yrs ago benign    CERVICAL SPINE SURGERY      EGD      HERNIA REPAIR      OOPHORECTOMY Right     [place] [facility]G W/RLS TRANSVRS CARPL LIGM Right 01/21/2020    Procedure: RELEASE CARPAL TUNNEL ENDOSCOPIC;  Surgeon: Sam Nguyen MD;  [facility];  Service: Orthopedics    [place] [facility] SURG W/RLS TRANSVRS CARPL LIGM Left 01/28/2020     Procedure: RELEASE CARPAL TUNNEL ENDOSCOPIC;  Surgeon: Sam Nguyen MD;  [facility];  Service: Orthopedics       Current Outpatient Medications   Medication Sig Dispense Refill    Acetaminophen 500 MG Take 2 capsules by mouth every 4 (four) hours as needed for mild pain      albuterol (PROVENTIL HFA,VENTOLIN HFA) 90 mcg/act inhaler inhale 2 puffs by mouth and INTO THE LUNGS every 6 hours IF NEEDED FOR WHEEZING. 8.5 g 2    betamethasone dipropionate (DIPROSONE) 0.05 % cream Apply topically 2 (two) times a day (Patient not taking: Reported on 10/14/2024) 30 g 0    buPROPion (WELLBUTRIN XL) 300 mg 24 hr tablet Take 1 tablet (300 mg total) by mouth daily 30 tablet 1    cholecalciferol (VITAMIN D3) 400 units tablet Take 400 Units by mouth daily      fluticasone (FLONASE) 50 mcg/act nasal spray instill 1 spray into each nostril once daily 16 g 2    guaiFENesin (MUCINEX) 600 mg 12 hr tablet Take 2 tablets (1,200 mg total) by mouth every 12 (twelve) hours 60 tablet 3    ipratropium-albuterol (DUO-NEB) 0.5-2.5 mg/3 mL nebulizer solution Take 3 mL by nebulization 4 (four) times a day 360 mL 3    lamoTRIgine (LaMICtal) 200 MG tablet Take 1 tablet (200 mg total) by mouth daily 30 tablet 1    levocetirizine (XYZAL) 5 MG tablet take 1 tablet by mouth every evening 30 tablet 4    losartan (COZAAR) 25 mg tablet take 1 tablet by mouth once daily 90 tablet 1    methocarbamol (ROBAXIN) 500 mg tablet Take 500 mg by mouth 2 (two) times a day PRN (Patient not taking: Reported on 8/9/2024)      metoprolol succinate (TOPROL-XL) 25 mg 24 hr tablet take 1 tablet by mouth once daily 90 tablet 1    nicotine (NICODERM CQ) 21 mg/24 hr TD 24 hr patch Place 1 patch on the skin over 24 hours every 24 hours (Patient not taking: Reported on 6/26/2024) 28 patch 0    nicotine polacrilex (COMMIT) 4 MG lozenge Apply 1 lozenge (4 mg total) to the mouth or throat as needed for smoking cessation (Patient not taking: Reported on 6/26/2024)  100 each 0    OLANZapine-FLUoxetine (SYMBYAX) 6-25 MG per capsule Take 1 capsule by mouth every evening (Patient not taking: Reported on 8/9/2024) 30 capsule 0    omeprazole (PriLOSEC) 20 mg delayed release capsule take 1 capsule by mouth twice a day 180 capsule 1    senna-docusate sodium (SENOKOT-S) 8.6-50 mg per tablet Take 3 tablets by mouth as needed        Stiolto Respimat 2.5-2.5 MCG/ACT inhaler inhale 2 puffs by mouth and INTO THE LUNGS once daily 4 g 11     No current facility-administered medications for this visit.        No Known Allergies    Review of Systems    Video Exam    There were no vitals filed for this visit.    Physical Exam     11/29/24  Start Time: 0405  Stop Time: 0500  Total Visit Time: 55 minutes

## 2024-12-04 ENCOUNTER — TELEMEDICINE (OUTPATIENT)
Dept: BEHAVIORAL/MENTAL HEALTH CLINIC | Facility: CLINIC | Age: 59
End: 2024-12-04
Payer: COMMERCIAL

## 2024-12-04 DIAGNOSIS — F43.12 CHRONIC POST-TRAUMATIC STRESS DISORDER (PTSD): ICD-10-CM

## 2024-12-04 DIAGNOSIS — F10.11 ALCOHOL ABUSE, IN REMISSION: ICD-10-CM

## 2024-12-04 DIAGNOSIS — F17.210 CIGARETTE NICOTINE DEPENDENCE WITHOUT COMPLICATION: ICD-10-CM

## 2024-12-04 DIAGNOSIS — F12.90 MARIJUANA USE: Chronic | ICD-10-CM

## 2024-12-04 DIAGNOSIS — F41.1 GENERALIZED ANXIETY DISORDER: ICD-10-CM

## 2024-12-04 DIAGNOSIS — F31.81 MODERATE MIXED BIPOLAR II DISORDER (HCC): Primary | ICD-10-CM

## 2024-12-04 DIAGNOSIS — Z72.0 TOBACCO ABUSE: ICD-10-CM

## 2024-12-04 PROCEDURE — 90834 PSYTX W PT 45 MINUTES: CPT | Performed by: COUNSELOR

## 2024-12-05 NOTE — PSYCH
Behavioral Health Psychotherapy Progress Note    Psychotherapy Provided: Individual Psychotherapy     1. Moderate mixed bipolar II disorder (HCC)        2. Generalized anxiety disorder        3. Chronic post-traumatic stress disorder (PTSD)        4. Tobacco abuse        5. Cigarette nicotine dependence without complication        6. Marijuana use        7. Alcohol abuse, in remission            Goals addressed in session: Goal 1     DATA:  Marie discussed having thoughts about the holidays form the past, with people who are no longer in her life and who are her biggest pain and disappointment, like her daughter who is reportedly poisoned against her by her father, Marie's ex, who apparently had narcissistic tendencies. Marie was given information about the brain structural changes and shrinkage happening with long-term depression and severity and was encouraged to work further on taking notes about her everyday experience and not to repeat herself to remember and then feel hurt. Marie was urged to challenge her negative thoughts and to provide substitutes for them and was commended for her consistent participation in the Mindfulness/Relaxation group every week and for using her protective factors  and strengths to stay focused on different topics than defeat and the unchangeable past. Marie was reminded and encouraged to change how she thinks and feels about the past.  During this session, this clinician used the following therapeutic modalities: Bereavement Therapy, Client-centered Therapy, Cognitive Behavioral Therapy, Mindfulness-based Strategies, and Supportive Psychotherapy    Substance Abuse was not addressed during this session. If the client is diagnosed with a co-occurring substance use disorder, please indicate any changes in the frequency or amount of use: NA. Stage of change for addressing substance use diagnoses: Pre-contemplation    ASSESSMENT:  Marie Portillo presents with a Euthymic/ normal and  "Depressed mood.     her affect is Normal range and intensity and Tearful, at times, which is congruent, with her mood and the content of the session. The client has made progress on their goals.    Marie seemed in the usual changeable mood and thought process btu did well in redirecting her energy and focus toward positive topics to avoid emotion intensifying.    Marie Portillo presents with a low risk of suicide, low risk of self-harm, and none risk of harm to others.    For any risk assessment that surpasses a \"low\" rating, a safety plan must be developed.    A safety plan was indicated: no  If yes, describe in detail NA    PLAN: Between sessions, Marie Portillo will attend the weekly relaxation/Mindfulness group for self-care in the end of the week and for practicing the needed skills to maintain a neutral observational stance. At the next session, the therapist will use Client-centered Therapy, Cognitive Behavioral Therapy, Dialectical Behavior Therapy, Mindfulness-based Strategies, and Supportive Psychotherapy to address self-care and emotion regulation.    Behavioral Health Treatment Plan and Discharge Planning: Marie Portillo is aware of and agrees to continue to work on their treatment plan. They have identified and are working toward their discharge goals. yes    Visit start and stop times:    12/04/24  Start Time: 0101  Stop Time: 0142  Total Visit Time: 41 minutes  "

## 2024-12-05 NOTE — PSYCH
Virtual Regular Visit    Verification of patient location:    Patient is located at Home in the following state in which I hold an active license [place]      Assessment/Plan:    Problem List Items Addressed This Visit       Moderate mixed bipolar II disorder (HCC) - Primary    Generalized anxiety disorder    Chronic post-traumatic stress disorder (PTSD)    Alcohol abuse, in remission    Marijuana use (Chronic)    Cigarette nicotine dependence without complication    Tobacco abuse       Goals addressed in session: Goal 1          Reason for visit is No chief complaint on file.       Encounter provider Isabel Watts LPC      Recent Visits  Date Type Provider Dept   12/04/24 Telemedicine Isabel Watts LPC [facility]rap[facility] [place]   11/29/24 Telemedicine Isabel Watts LPC [facility] Therap[facility] [place]   Showing recent visits within past 7 days and meeting all other requirements  Future Appointments  No visits were found meeting these conditions.  Showing future appointments within next 150 days and meeting all other requirements       The patient was identified by name and date of birth. Marie Portillo was informed that this is a telemedicine visit and that the visit is being conducted throughthe [employer] Teams platform. She agrees to proceed..  My office door was closed. No one else was in the room.  She acknowledged consent and understanding of privacy and security of the video platform. The patient has agreed to participate and understands they can discontinue the visit at any time.    Patient is aware this is a billable service.     Subjective  Marie Portillo is a 59 y.o. female  .      HPI     Past Medical History:   Diagnosis Date    Bilateral carpal tunnel syndrome 08/20/2019    Cardiomyopathy (HCC)     Carpal tunnel syndrome of right wrist 10/30/2019    Added automatically from request for surgery [id number]    Chlamydia     COPD (chronic obstructive pulmonary disease) (HCC)      Depression     Hypertension     Varicella        Past Surgical History:   Procedure Laterality Date    BACK SURGERY      BREAST BIOPSY Left 20yrs ago benign    CERVICAL SPINE SURGERY      EGD      HERNIA REPAIR      OOPHORECTOMY Right     [facility] W/[facility]S TRANSVRS CARPL LIGM Right 01/21/2020    Procedure: RELEASE CARPAL TUNNEL ENDOSCOPIC;  Surgeon: Sam Nguyen MD;  [facility];  Service: Orthopedics    [facility] W/RLS TRANSVRS CARPL LIGM Left 01/28/2020    Procedure: RELEASE CARPAL TUNNEL ENDOSCOPIC;  Surgeon: Sam Nguyen MD;  [facility];  Service: Orthopedics       Current Outpatient Medications   Medication Sig Dispense Refill    Acetaminophen 500 MG Take 2 capsules by mouth every 4 (four) hours as needed for mild pain      albuterol (PROVENTIL HFA,VENTOLIN HFA) 90 mcg/act inhaler inhale 2 puffs by mouth and INTO THE LUNGS every 6 hours IF NEEDED FOR WHEEZING. 8.5 g 2    betamethasone dipropionate (DIPROSONE) 0.05 % cream Apply topically 2 (two) times a day (Patient not taking: Reported on 10/14/2024) 30 g 0    buPROPion (WELLBUTRIN XL) 300 mg 24 hr tablet Take 1 tablet (300 mg total) by mouth daily 30 tablet 1    cholecalciferol (VITAMIN D3) 400 units tablet Take 400 Units by mouth daily      fluticasone (FLONASE) 50 mcg/act nasal spray instill 1 spray into each nostril once daily 16 g 2    guaiFENesin (MUCINEX) 600 mg 12 hr tablet Take 2 tablets (1,200 mg total) by mouth every 12 (twelve) hours 60 tablet 3    ipratropium-albuterol (DUO-NEB) 0.5-2.5 mg/3 mL nebulizer solution Take 3 mL by nebulization 4 (four) times a day 360 mL 3    lamoTRIgine (LaMICtal) 200 MG tablet Take 1 tablet (200 mg total) by mouth daily 30 tablet 1    levocetirizine (XYZAL) 5 MG tablet take 1 tablet by mouth every evening 30 tablet 4    losartan (COZAAR) 25 mg tablet take 1 tablet by mouth once daily 90 tablet 1    methocarbamol (ROBAXIN) 500 mg tablet Take 500 mg by mouth 2 (two) times a  day PRN (Patient not taking: Reported on 8/9/2024)      metoprolol succinate (TOPROL-XL) 25 mg 24 hr tablet take 1 tablet by mouth once daily 90 tablet 1    nicotine (NICODERM CQ) 21 mg/24 hr TD 24 hr patch Place 1 patch on the skin over 24 hours every 24 hours (Patient not taking: Reported on 6/26/2024) 28 patch 0    nicotine polacrilex (COMMIT) 4 MG lozenge Apply 1 lozenge (4 mg total) to the mouth or throat as needed for smoking cessation (Patient not taking: Reported on 6/26/2024) 100 each 0    OLANZapine-FLUoxetine (SYMBYAX) 6-25 MG per capsule Take 1 capsule by mouth every evening (Patient not taking: Reported on 8/9/2024) 30 capsule 0    omeprazole (PriLOSEC) 20 mg delayed release capsule take 1 capsule by mouth twice a day 180 capsule 1    senna-docusate sodium (SENOKOT-S) 8.6-50 mg per tablet Take 3 tablets by mouth as needed        Stiolto Respimat 2.5-2.5 MCG/ACT inhaler inhale 2 puffs by mouth and INTO THE LUNGS once daily 4 g 11     No current facility-administered medications for this visit.        No Known Allergies    Review of Systems    Video Exam    There were no vitals filed for this visit.    Physical Exam

## 2024-12-06 ENCOUNTER — TELEMEDICINE (OUTPATIENT)
Dept: BEHAVIORAL/MENTAL HEALTH CLINIC | Facility: CLINIC | Age: 59
End: 2024-12-06
Payer: COMMERCIAL

## 2024-12-06 DIAGNOSIS — F10.11 ALCOHOL ABUSE, IN REMISSION: ICD-10-CM

## 2024-12-06 DIAGNOSIS — F41.1 GENERALIZED ANXIETY DISORDER: ICD-10-CM

## 2024-12-06 DIAGNOSIS — F31.81 MODERATE MIXED BIPOLAR II DISORDER (HCC): Primary | ICD-10-CM

## 2024-12-06 DIAGNOSIS — F12.90 MARIJUANA USE: Chronic | ICD-10-CM

## 2024-12-06 DIAGNOSIS — F43.12 CHRONIC POST-TRAUMATIC STRESS DISORDER (PTSD): ICD-10-CM

## 2024-12-06 DIAGNOSIS — Z72.0 TOBACCO ABUSE: ICD-10-CM

## 2024-12-06 DIAGNOSIS — F17.210 CIGARETTE NICOTINE DEPENDENCE WITHOUT COMPLICATION: ICD-10-CM

## 2024-12-06 PROCEDURE — 90853 GROUP PSYCHOTHERAPY: CPT | Performed by: COUNSELOR

## 2024-12-06 NOTE — PSYCH
Virtual Regular Visit    Verification of patient location:    Patient is located at Home in the following state in which I hold an active license [place]      Assessment/Plan:    Problem List Items Addressed This Visit       Moderate mixed bipolar II disorder (HCC) - Primary    Generalized anxiety disorder    Chronic post-traumatic stress disorder (PTSD)    Alcohol abuse, in remission    Marijuana use (Chronic)    Cigarette nicotine dependence without complication    Tobacco abuse       Goals addressed in session: Goal 1          Reason for visit is No chief complaint on file.       Encounter provider Isabel Watts LPC      Recent Visits  Date Type Provider Dept   12/04/24 Telemedicine Isabel Watts LPC [facility]rap[facility] [place]   11/29/24 Telemedicine Isabel Watts LPC [facility]rap[facility] [place]   Showing recent visits within past 7 days and meeting all other requirements  Future Appointments  No visits were found meeting these conditions.  Showing future appointments within next 150 days and meeting all other requirements       The patient was identified by name and date of birth. Marie Portillo was informed that this is a telemedicine visit and that the visit is being conducted throughthe Microsoft Teams platform. She agrees to proceed..  My office door was closed. No one else was in the room.  She acknowledged consent and understanding of privacy and security of the video platform. The patient has agreed to participate and understands they can discontinue the visit at any time.    Patient is aware this is a billable service.     Subjective  Marie Portillo is a 59 y.o. female  .      Data: Marie attended the Relaxation group and was guided through tapping on anxiety, diaphragmatic breathing through two different protocols, humming with other Vagus nerve stimulation techniques, and progressive muscle relaxation pared with the different breathing aspects. Furthermore, the attendees  were guided through Mindfulness-based meditation series on the topics of Deep Body Scan for Pain, Dealing with Anger and Grief, IFS-based Meditation on Protective Parts and Dealing with Challenging Protectors, Connecting to the Inner Child, Third Eye Chakra meditation, and ending with positive self-affirmations for self-forgiveness, connectedness, and gratitude.      The Clinician used EFT, Mindfulness, Internal Family Systems, and Somatic approaches were used to achieve the deep state of relaxation and to maintain the serene environment for the exercise's purpose.       Assessment: Marie was in normal/euthymic mood and in congruence with broad affect, followed through the guidance and completed the exercises well, showed willingness to continue learning and practicing self-care in weekly Relaxation Group as well as individual therapy.     No alcohol or drug problems were addressed in the session.     No safety plan was indicated. No safety issues are reported. No presence of SI or behaviors is determined based on self-reports.      Plan: Marie  will practice deep breathing and other techniques based on each occasion to counter stress and will attend the next group to be able to take part in further guided exercises on chosen topics to build on the techniques learned in the beginning.      HPI     Past Medical History:   Diagnosis Date    Bilateral carpal tunnel syndrome 08/20/2019    Cardiomyopathy (HCC)     Carpal tunnel syndrome of right wrist 10/30/2019    Added automatically from request for surgery [id number]    Chlamydia     COPD (chronic obstructive pulmonary disease) (HCC)     Depression     Hypertension     Varicella        Past Surgical History:   Procedure Laterality Date    BACK SURGERY      BREAST BIOPSY Left 20yrs ago benign    CERVICAL SPINE SURGERY      EGD      HERNIA REPAIR      OOPHORECTOMY Right     [place] [facility] SUR[facility] W/RLS TRANSVRS CARPL LIGM Right 01/21/2020    Procedure: RELEASE CARPAL TUNNEL  ENDOSCOPIC;  Surgeon: Sam Nguyen MD;  [facility];  Service: Orthopedics    [place] [facility] SUR[facility] W/RLS TRANSVRS CARPL LIGM Left 01/28/2020    Procedure: RELEASE CARPAL TUNNEL ENDOSCOPIC;  Surgeon: Sam Nguyen MD;  [facility];  Service: Orthopedics       Current Outpatient Medications   Medication Sig Dispense Refill    Acetaminophen 500 MG Take 2 capsules by mouth every 4 (four) hours as needed for mild pain      albuterol (PROVENTIL HFA,VENTOLIN HFA) 90 mcg/act inhaler inhale 2 puffs by mouth and INTO THE LUNGS every 6 hours IF NEEDED FOR WHEEZING. 8.5 g 2    betamethasone dipropionate (DIPROSONE) 0.05 % cream Apply topically 2 (two) times a day (Patient not taking: Reported on 10/14/2024) 30 g 0    buPROPion (WELLBUTRIN XL) 300 mg 24 hr tablet Take 1 tablet (300 mg total) by mouth daily 30 tablet 1    cholecalciferol (VITAMIN D3) 400 units tablet Take 400 Units by mouth daily      fluticasone (FLONASE) 50 mcg/act nasal spray instill 1 spray into each nostril once daily 16 g 2    guaiFENesin (MUCINEX) 600 mg 12 hr tablet Take 2 tablets (1,200 mg total) by mouth every 12 (twelve) hours 60 tablet 3    ipratropium-albuterol (DUO-NEB) 0.5-2.5 mg/3 mL nebulizer solution Take 3 mL by nebulization 4 (four) times a day 360 mL 3    lamoTRIgine (LaMICtal) 200 MG tablet Take 1 tablet (200 mg total) by mouth daily 30 tablet 1    levocetirizine (XYZAL) 5 MG tablet take 1 tablet by mouth every evening 30 tablet 4    losartan (COZAAR) 25 mg tablet take 1 tablet by mouth once daily 90 tablet 1    methocarbamol (ROBAXIN) 500 mg tablet Take 500 mg by mouth 2 (two) times a day PRN (Patient not taking: Reported on 8/9/2024)      metoprolol succinate (TOPROL-XL) 25 mg 24 hr tablet take 1 tablet by mouth once daily 90 tablet 1    nicotine (NICODERM CQ) 21 mg/24 hr TD 24 hr patch Place 1 patch on the skin over 24 hours every 24 hours (Patient not taking: Reported on 6/26/2024) 28 patch 0    nicotine  polacrilex (COMMIT) 4 MG lozenge Apply 1 lozenge (4 mg total) to the mouth or throat as needed for smoking cessation (Patient not taking: Reported on 6/26/2024) 100 each 0    OLANZapine-FLUoxetine (SYMBYAX) 6-25 MG per capsule Take 1 capsule by mouth every evening (Patient not taking: Reported on 8/9/2024) 30 capsule 0    omeprazole (PriLOSEC) 20 mg delayed release capsule take 1 capsule by mouth twice a day 180 capsule 1    senna-docusate sodium (SENOKOT-S) 8.6-50 mg per tablet Take 3 tablets by mouth as needed        Stiolto Respimat 2.5-2.5 MCG/ACT inhaler inhale 2 puffs by mouth and INTO THE LUNGS once daily 4 g 11     No current facility-administered medications for this visit.        No Known Allergies    Review of Systems    Video Exam    There were no vitals filed for this visit.    Physical Exam       12/06/24  Start Time: 0410  Stop Time: 0500  Total Visit Time: 50 minutes

## 2024-12-08 DIAGNOSIS — J30.2 SEASONAL ALLERGIES: ICD-10-CM

## 2024-12-10 RX ORDER — LEVOCETIRIZINE DIHYDROCHLORIDE 5 MG/1
5 TABLET, FILM COATED ORAL EVERY EVENING
Qty: 30 TABLET | Refills: 5 | Status: SHIPPED | OUTPATIENT
Start: 2024-12-10 | End: 2024-12-11 | Stop reason: SDUPTHER

## 2024-12-11 ENCOUNTER — TELEMEDICINE (OUTPATIENT)
Dept: BEHAVIORAL/MENTAL HEALTH CLINIC | Facility: CLINIC | Age: 59
End: 2024-12-11
Payer: COMMERCIAL

## 2024-12-11 ENCOUNTER — OFFICE VISIT (OUTPATIENT)
Dept: FAMILY MEDICINE CLINIC | Facility: CLINIC | Age: 59
End: 2024-12-11
Payer: COMMERCIAL

## 2024-12-11 ENCOUNTER — TELEPHONE (OUTPATIENT)
Age: 59
End: 2024-12-11

## 2024-12-11 VITALS
RESPIRATION RATE: 18 BRPM | DIASTOLIC BLOOD PRESSURE: 60 MMHG | SYSTOLIC BLOOD PRESSURE: 102 MMHG | HEIGHT: 60 IN | TEMPERATURE: 98.1 F | BODY MASS INDEX: 21.01 KG/M2 | WEIGHT: 107 LBS | HEART RATE: 81 BPM | OXYGEN SATURATION: 98 %

## 2024-12-11 DIAGNOSIS — J30.2 SEASONAL ALLERGIES: ICD-10-CM

## 2024-12-11 DIAGNOSIS — R09.82 PND (POST-NASAL DRIP): ICD-10-CM

## 2024-12-11 DIAGNOSIS — H18.822 CORNEAL ABRASION DUE TO CONTACT LENS, LEFT: Primary | ICD-10-CM

## 2024-12-11 DIAGNOSIS — Z72.0 TOBACCO ABUSE: ICD-10-CM

## 2024-12-11 DIAGNOSIS — F31.81 MODERATE MIXED BIPOLAR II DISORDER (HCC): Primary | ICD-10-CM

## 2024-12-11 DIAGNOSIS — F12.90 MARIJUANA USE: Chronic | ICD-10-CM

## 2024-12-11 DIAGNOSIS — F10.11 ALCOHOL ABUSE, IN REMISSION: ICD-10-CM

## 2024-12-11 DIAGNOSIS — F17.210 CIGARETTE NICOTINE DEPENDENCE WITHOUT COMPLICATION: ICD-10-CM

## 2024-12-11 DIAGNOSIS — F41.1 GENERALIZED ANXIETY DISORDER: ICD-10-CM

## 2024-12-11 DIAGNOSIS — F43.12 CHRONIC POST-TRAUMATIC STRESS DISORDER (PTSD): ICD-10-CM

## 2024-12-11 PROCEDURE — 99214 OFFICE O/P EST MOD 30 MIN: CPT | Performed by: NURSE PRACTITIONER

## 2024-12-11 PROCEDURE — 90834 PSYTX W PT 45 MINUTES: CPT | Performed by: COUNSELOR

## 2024-12-11 RX ORDER — OFLOXACIN 3 MG/ML
1 SOLUTION/ DROPS OPHTHALMIC 4 TIMES DAILY
Qty: 5 ML | Refills: 0 | Status: SHIPPED | OUTPATIENT
Start: 2024-12-11

## 2024-12-11 RX ORDER — LEVOCETIRIZINE DIHYDROCHLORIDE 5 MG/1
5 TABLET, FILM COATED ORAL EVERY EVENING
Qty: 30 TABLET | Refills: 11 | Status: SHIPPED | OUTPATIENT
Start: 2024-12-11

## 2024-12-11 RX ORDER — FLUTICASONE PROPIONATE 50 MCG
2 SPRAY, SUSPENSION (ML) NASAL DAILY
Qty: 16 G | Refills: 11 | Status: SHIPPED | OUTPATIENT
Start: 2024-12-11

## 2024-12-11 NOTE — PSYCH
Virtual Regular Visit    Verification of patient location:    Patient is located at Home in the following state in which I hold an active license [place]      Assessment/Plan:    Problem List Items Addressed This Visit       Moderate mixed bipolar II disorder (HCC) - Primary    Generalized anxiety disorder    Chronic post-traumatic stress disorder (PTSD)    Alcohol abuse, in remission    Marijuana use (Chronic)    Cigarette nicotine dependence without complication    Tobacco abuse       Goals addressed in session: Goal 1     Depression Follow-up Plan Completed: Not applicable    Reason for visit is No chief complaint on file.       Encounter provider Isabel Watts LPC      Recent Visits  Date Type Provider Dept   12/06/24 Telemedicine Isabel [facility] [place]   12/04/24 Telemedicine Isabel Watts LPC [facility] [place]   Showing recent visits within past 7 days and meeting all other requirements  Today's Visits  Date Type Provider Dept   12/11/24 Telemedicine Isabel Watts LPC [facility] [place]   12/11/24 Office Visit CAROLINE Dorsey [facility]   Showing today's visits and meeting all other requirements  Future Appointments  No visits were found meeting these conditions.  Showing future appointments within next 150 days and meeting all other requirements       The patient was identified by name and date of birth. Marie Portillo was informed that this is a telemedicine visit and that the visit is being conducted throughthe Microsoft Teams platform. She agrees to proceed..  My office door was closed. No one else was in the room.  She acknowledged consent and understanding of privacy and security of the video platform. The patient has agreed to participate and understands they can discontinue the visit at any time.    Patient is aware this is a billable service.     Subjective  Marie Portillo is a 59 y.o. female   .      HPI     Past Medical History:   Diagnosis Date    Bilateral carpal tunnel syndrome 08/20/2019    Cardiomyopathy (HCC)     Carpal tunnel syndrome of right wrist 10/30/2019    Added automatically from request for surgery [id number]    Chlamydia     COPD (chronic obstructive pulmonary disease) (HCC)     Depression     Hypertension     Varicella        Past Surgical History:   Procedure Laterality Date    BACK SURGERY      BREAST BIOPSY Left 20yrs ago benign    CERVICAL SPINE SURGERY      EGD      HERNIA REPAIR      OOPHORECTOMY Right     [place] [facility] WRST SURG W/RLS TRANSVRS CARPL LIGM Right 01/21/2020    Procedure: RELEASE CARPAL TUNNEL ENDOSCOPIC;  Surgeon: Sam Nguyen MD;  [facility];  Service: Orthopedics    [place] [facility] WRST SURG W/RLS TRANSVRS CARPL LIGM Left 01/28/2020    Procedure: RELEASE CARPAL TUNNEL ENDOSCOPIC;  Surgeon: Sam Nguyen MD;  [facility];  Service: Orthopedics       Current Outpatient Medications   Medication Sig Dispense Refill    Acetaminophen 500 MG Take 2 capsules by mouth every 4 (four) hours as needed for mild pain      albuterol (PROVENTIL HFA,VENTOLIN HFA) 90 mcg/act inhaler inhale 2 puffs by mouth and INTO THE LUNGS every 6 hours IF NEEDED FOR WHEEZING. 8.5 g 2    betamethasone dipropionate (DIPROSONE) 0.05 % cream Apply topically 2 (two) times a day (Patient not taking: Reported on 12/11/2024) 30 g 0    buPROPion (WELLBUTRIN XL) 300 mg 24 hr tablet Take 1 tablet (300 mg total) by mouth daily 30 tablet 1    cholecalciferol (VITAMIN D3) 400 units tablet Take 400 Units by mouth daily      fluticasone (FLONASE) 50 mcg/act nasal spray 2 sprays into each nostril daily 16 g 11    guaiFENesin (MUCINEX) 600 mg 12 hr tablet Take 2 tablets (1,200 mg total) by mouth every 12 (twelve) hours 60 tablet 3    ipratropium-albuterol (DUO-NEB) 0.5-2.5 mg/3 mL nebulizer solution Take 3 mL by nebulization 4 (four) times a day 360 mL 3    lamoTRIgine (LaMICtal) 200 MG tablet Take  1 tablet (200 mg total) by mouth daily 30 tablet 1    levocetirizine (XYZAL) 5 MG tablet Take 1 tablet (5 mg total) by mouth every evening 30 tablet 11    losartan (COZAAR) 25 mg tablet take 1 tablet by mouth once daily 90 tablet 1    metoprolol succinate (TOPROL-XL) 25 mg 24 hr tablet take 1 tablet by mouth once daily 90 tablet 1    ofloxacin (OCUFLOX) 0.3 % ophthalmic solution Administer 1 drop into the left eye 4 (four) times a day 5 mL 0    OLANZapine-FLUoxetine (SYMBYAX) 6-25 MG per capsule Take 1 capsule by mouth every evening (Patient not taking: Reported on 8/9/2024) 30 capsule 0    omeprazole (PriLOSEC) 20 mg delayed release capsule take 1 capsule by mouth twice a day 180 capsule 1    senna-docusate sodium (SENOKOT-S) 8.6-50 mg per tablet Take 3 tablets by mouth as needed        Stiolto Respimat 2.5-2.5 MCG/ACT inhaler inhale 2 puffs by mouth and INTO THE LUNGS once daily 4 g 11     No current facility-administered medications for this visit.        No Known Allergies    Review of Systems    Video Exam    There were no vitals filed for this visit.    Physical Exam

## 2024-12-11 NOTE — PROGRESS NOTES
Name: Marie Portillo      : 1965      MRN: [id number]  Encounter Provider: CAROLINE Dorsey  Encounter Date: 2024   Encounter department: [facility]  :  Assessment & Plan  Corneal abrasion due to contact lens, left    Orders:  •  ofloxacin (OCUFLOX) 0.3 % ophthalmic solution; Administer 1 drop into the left eye 4 (four) times a day    PND (post-nasal drip)    Orders:  •  fluticasone (FLONASE) 50 mcg/act nasal spray; 2 sprays into each nostril daily    Seasonal allergies    Orders:  •  levocetirizine (XYZAL) 5 MG tablet; Take 1 tablet (5 mg total) by mouth every evening           History of Present Illness     Patient here because she had her contact stuck in her eye. Feeling better today.  Having some light sensitivity. Eye was tearing.  Tenderness to touch.       Review of Systems   Eyes:  Positive for photophobia, discharge and redness. Negative for itching.       Objective   /60   Pulse 81   Temp 98.1 °F (36.7 °C)   Resp 18   Ht 5' (1.524 m)   Wt 48.5 kg (107 lb)   LMP  (LMP Unknown)   SpO2 98%   BMI 20.90 kg/m²      Physical Exam  Eyes:      General: Lids are normal.      Conjunctiva/sclera:      Right eye: Right conjunctiva is not injected. No exudate.     Left eye: Left conjunctiva is injected. No exudate.

## 2024-12-11 NOTE — TELEPHONE ENCOUNTER
Patient is calling regarding cancelling an appointment.    Date/Time: 12/11 @1 pm virtually.     Reason: conflicting scheuled    Patient was rescheduled: YES [] NO [x]  If yes, when was Patient reschedule for:     Patient requesting call back to reschedule: YES [] NO [x]    Patient is requesting a call from provider.     Patient shared she forgot she had an eye appt at 12:20pm.    Patient also shared she will be emailing provider today.

## 2024-12-11 NOTE — PSYCH
Behavioral Health Psychotherapy Progress Note    Psychotherapy Provided: Individual Psychotherapy     1. Moderate mixed bipolar II disorder (HCC)        2. Chronic post-traumatic stress disorder (PTSD)        3. Generalized anxiety disorder        4. Tobacco abuse        5. Cigarette nicotine dependence without complication        6. Marijuana use        7. Alcohol abuse, in remission            Goals addressed in session: Goal 1     DATA: Marie reported finishing her conflicting appointment earlier and attending to this one with a little delay. She was welcomed into the session and commended for her hard work and willingness and dedication to her therapeutic process. Marie shared examples form her experience where she had to handle intense emotions from her side and others' and was commended for her willingness, strengths, and support system. Marie was encouraged to continue expanding on her skill application and continuous learning.  Missing medication management appointment was discussed, and Marie was encouraged to call and let them know that she did not have that in her book and was not intentional and reschedule.   During this session, this clinician used the following therapeutic modalities: Client-centered Therapy, Cognitive Behavioral Therapy, Mindfulness-based Strategies, and Supportive Psychotherapy    Substance Abuse was addressed during this session. If the client is diagnosed with a co-occurring substance use disorder, please indicate any changes in the frequency or amount of use: mild to moderate smoking a day- 9 to 11 cigarettes daily . Stage of change for addressing substance use diagnoses: Contemplation    ASSESSMENT:  Marie Portillo presents with a Euthymic/ normal, Anxious, and Depressed mood.     her affect is Normal range and intensity and Constricted, which is congruent, with her mood and the content of the session. The client has made progress on their goals.    Marie seemed in a better mood  "today as opposed to the last time, with a better attention and focus. Marie Portillo presents with a low risk of suicide, low risk of self-harm, and none risk of harm to others.    For any risk assessment that surpasses a \"low\" rating, a safety plan must be developed.    A safety plan was indicated: no  If yes, describe in detail Na    PLAN: Between sessions, Marie Portillo will attend weekly Relaxation/Mindfulness group for self-care and practicing learned skills consistently. At the next session, the therapist will use Client-centered Therapy, Cognitive Behavioral Therapy, Mindfulness-based Strategies, and Supportive Psychotherapy to address self-care and handling emotions.    Behavioral Health Treatment Plan and Discharge Planning: Marie Portillo is aware of and agrees to continue to work on their treatment plan. They have identified and are working toward their discharge goals. yes    Depression Follow-up Plan Completed: Not applicable    Visit start and stop times:    12/11/24  Start Time: 0110  Stop Time: 0150  Total Visit Time: 40 minutes  "

## 2024-12-12 ENCOUNTER — TELEPHONE (OUTPATIENT)
Age: 59
End: 2024-12-12

## 2024-12-12 NOTE — TELEPHONE ENCOUNTER
Patient called in to reschedule their medication management appointment.    Writer was unsuccessful in transferring the call to the  and stated that a message would be sent to have them reach out to reschedule.  
Patient is scheduled for 1/09 @3:15  
Taking carbido levodopa  Continue same  Maintain appointment with provider

## 2024-12-13 ENCOUNTER — TELEMEDICINE (OUTPATIENT)
Dept: BEHAVIORAL/MENTAL HEALTH CLINIC | Facility: CLINIC | Age: 59
End: 2024-12-13
Payer: COMMERCIAL

## 2024-12-13 DIAGNOSIS — F17.210 CIGARETTE NICOTINE DEPENDENCE WITHOUT COMPLICATION: ICD-10-CM

## 2024-12-13 DIAGNOSIS — F10.11 ALCOHOL ABUSE, IN REMISSION: ICD-10-CM

## 2024-12-13 DIAGNOSIS — F41.1 GENERALIZED ANXIETY DISORDER: ICD-10-CM

## 2024-12-13 DIAGNOSIS — F43.12 CHRONIC POST-TRAUMATIC STRESS DISORDER (PTSD): ICD-10-CM

## 2024-12-13 DIAGNOSIS — Z72.0 TOBACCO ABUSE: ICD-10-CM

## 2024-12-13 DIAGNOSIS — F31.81 MODERATE MIXED BIPOLAR II DISORDER (HCC): Primary | ICD-10-CM

## 2024-12-13 DIAGNOSIS — F12.90 MARIJUANA USE: Chronic | ICD-10-CM

## 2024-12-13 PROCEDURE — 90853 GROUP PSYCHOTHERAPY: CPT | Performed by: COUNSELOR

## 2024-12-16 ENCOUNTER — TELEPHONE (OUTPATIENT)
Dept: PSYCHIATRY | Facility: CLINIC | Age: 59
End: 2024-12-16

## 2024-12-16 NOTE — TELEPHONE ENCOUNTER
Left voicemail informing patient and/or parent/guardian of the Psych Encounter form needing to be signed as a requirement from the insurance company for billing purposes. Patient can access form via [employer] and sign electronically.     Please make patient aware this form must be signed for each visit as a requirement to continue future visits with provider.

## 2024-12-16 NOTE — PSYCH
Virtual Regular Visit    Verification of patient location:    Patient is located at Home in the following state in which I hold an active license [place]      Assessment/Plan:    Problem List Items Addressed This Visit       Moderate mixed bipolar II disorder (HCC) - Primary    Generalized anxiety disorder    Chronic post-traumatic stress disorder (PTSD)    Alcohol abuse, in remission    Marijuana use (Chronic)    Cigarette nicotine dependence without complication    Tobacco abuse       Goals addressed in session: Goal 1     Depression Follow-up Plan Completed: Not applicable    Reason for visit is No chief complaint on file.       Encounter provider Isabel Watts LPC      Recent Visits  Date Type Provider Dept   12/13/24 Telemedicine Isabel Watts LPC [facility] [place]   12/11/24 Telemedicine Isabel Watts LPC [facility] [place]   12/11/24 Office Visit CAROLINE Dorsey [facility]   Showing recent visits within past 7 days and meeting all other requirements  Future Appointments  No visits were found meeting these conditions.  Showing future appointments within next 150 days and meeting all other requirements       The patient was identified by name and date of birth. Marie Portillo was informed that this is a telemedicine visit and that the visit is being conducted throughthe Microsoft Teams platform. She agrees to proceed..  My office door was closed. No one else was in the room.  She acknowledged consent and understanding of privacy and security of the video platform. The patient has agreed to participate and understands they can discontinue the visit at any time.    Patient is aware this is a billable service.     Subjective  Marie Portillo is a 59 y.o. female  .    Data: Marie attended the Relaxation group and was guided through tapping on trauma, diaphragmatic breathing through two different protocols, humming with other Vagus nerve  stimulation techniques, and progressive muscle relaxation pared with the different breathing aspects. Furthermore, the attendees were guided through Mindfulness-based meditation series on the topics of Breathing & Body Scan for Pain, IFS-based Meditation on Mapping Protective Parts and Checking In, Short Breathing Space to [place] with Difficult Situations, Crown Chakra meditation, and ending with positive self-affirmations for self-compassion, confidence, releasing the old and moving on, and self-worth.         The Clinician used EFT, Mindfulness, Internal Family Systems, and Somatic approaches were used to achieve the deep state of relaxation and to maintain the serene environment for the exercise's purpose.          Assessment: Marie was in normal/euthymic mood and in congruence with broad affect, followed through the guidance and completed the exercises well, showed willingness to continue learning and practicing self-care in weekly Relaxation Group as well as individual therapy.         No alcohol or drug problems were addressed in the session.         No safety plan was indicated. No safety issues are reported. No presence of SI or behaviors is determined based on self-reports.         Plan: Marie  will practice deep breathing and other techniques based on each occasion to counter stress and will attend the next group to be able to take part in further guided exercises on chosen topics to build on the techniques learned in the beginning.  HPI     Past Medical History:   Diagnosis Date    Bilateral carpal tunnel syndrome 08/20/2019    Cardiomyopathy (HCC)     Carpal tunnel syndrome of right wrist 10/30/2019    Added automatically from request for surgery [id number]    Chlamydia     COPD (chronic obstructive pulmonary disease) (HCC)     Depression     Hypertension     Varicella        Past Surgical History:   Procedure Laterality Date    BACK SURGERY      BREAST BIOPSY Left 20yrs ago benign    CERVICAL SPINE SURGERY       EGD      HERNIA REPAIR      OOPHORECTOMY Right     [place] [facility] WRST SURG W/RLS TRANSVRS CARPL LIGM Right 01/21/2020    Procedure: RELEASE CARPAL TUNNEL ENDOSCOPIC;  Surgeon: Sam Nguyen MD;  [facility];  Service: Orthopedics    [place] [facility] WRST SURG W/RLS TRANSVRS CARPL LIGM Left 01/28/2020    Procedure: RELEASE CARPAL TUNNEL ENDOSCOPIC;  Surgeon: Sam Nguyen MD;  [facility];  Service: Orthopedics       Current Outpatient Medications   Medication Sig Dispense Refill    Acetaminophen 500 MG Take 2 capsules by mouth every 4 (four) hours as needed for mild pain      albuterol (PROVENTIL HFA,VENTOLIN HFA) 90 mcg/act inhaler inhale 2 puffs by mouth and INTO THE LUNGS every 6 hours IF NEEDED FOR WHEEZING. 8.5 g 2    betamethasone dipropionate (DIPROSONE) 0.05 % cream Apply topically 2 (two) times a day (Patient not taking: Reported on 12/11/2024) 30 g 0    buPROPion (WELLBUTRIN XL) 300 mg 24 hr tablet Take 1 tablet (300 mg total) by mouth daily 30 tablet 1    cholecalciferol (VITAMIN D3) 400 units tablet Take 400 Units by mouth daily      fluticasone (FLONASE) 50 mcg/act nasal spray 2 sprays into each nostril daily 16 g 11    guaiFENesin (MUCINEX) 600 mg 12 hr tablet Take 2 tablets (1,200 mg total) by mouth every 12 (twelve) hours 60 tablet 3    ipratropium-albuterol (DUO-NEB) 0.5-2.5 mg/3 mL nebulizer solution Take 3 mL by nebulization 4 (four) times a day 360 mL 3    lamoTRIgine (LaMICtal) 200 MG tablet Take 1 tablet (200 mg total) by mouth daily 30 tablet 1    levocetirizine (XYZAL) 5 MG tablet Take 1 tablet (5 mg total) by mouth every evening 30 tablet 11    losartan (COZAAR) 25 mg tablet take 1 tablet by mouth once daily 90 tablet 1    metoprolol succinate (TOPROL-XL) 25 mg 24 hr tablet take 1 tablet by mouth once daily 90 tablet 1    ofloxacin (OCUFLOX) 0.3 % ophthalmic solution Administer 1 drop into the left eye 4 (four) times a day 5 mL 0    OLANZapine-FLUoxetine (SYMBYAX) 6-25 MG per  capsule Take 1 capsule by mouth every evening (Patient not taking: Reported on 8/9/2024) 30 capsule 0    omeprazole (PriLOSEC) 20 mg delayed release capsule take 1 capsule by mouth twice a day 180 capsule 1    senna-docusate sodium (SENOKOT-S) 8.6-50 mg per tablet Take 3 tablets by mouth as needed        Stiolto Respimat 2.5-2.5 MCG/ACT inhaler inhale 2 puffs by mouth and INTO THE LUNGS once daily 4 g 11     No current facility-administered medications for this visit.        No Known Allergies    Review of Systems    Video Exam    There were no vitals filed for this visit.    Physical Exam     12/13/24  Start Time: 0400  Stop Time: 0500  Total Visit Time: 60 minutes

## 2024-12-18 ENCOUNTER — TELEMEDICINE (OUTPATIENT)
Dept: BEHAVIORAL/MENTAL HEALTH CLINIC | Facility: CLINIC | Age: 59
End: 2024-12-18
Payer: COMMERCIAL

## 2024-12-18 DIAGNOSIS — Z72.0 TOBACCO ABUSE: ICD-10-CM

## 2024-12-18 DIAGNOSIS — F41.1 GENERALIZED ANXIETY DISORDER: ICD-10-CM

## 2024-12-18 DIAGNOSIS — F12.90 MARIJUANA USE: Chronic | ICD-10-CM

## 2024-12-18 DIAGNOSIS — F10.11 ALCOHOL ABUSE, IN REMISSION: ICD-10-CM

## 2024-12-18 DIAGNOSIS — F17.210 CIGARETTE NICOTINE DEPENDENCE WITHOUT COMPLICATION: ICD-10-CM

## 2024-12-18 DIAGNOSIS — F43.12 CHRONIC POST-TRAUMATIC STRESS DISORDER (PTSD): ICD-10-CM

## 2024-12-18 DIAGNOSIS — F31.81 MODERATE MIXED BIPOLAR II DISORDER (HCC): Primary | ICD-10-CM

## 2024-12-18 PROCEDURE — 90834 PSYTX W PT 45 MINUTES: CPT | Performed by: COUNSELOR

## 2024-12-18 NOTE — PSYCH
Virtual Regular Visit    Verification of patient location:    Patient is located at Home in the following state in which I hold an active license [place]      Assessment/Plan:    Problem List Items Addressed This Visit       Moderate mixed bipolar II disorder (HCC) - Primary    Generalized anxiety disorder    Chronic post-traumatic stress disorder (PTSD)    Alcohol abuse, in remission    Marijuana use (Chronic)    Cigarette nicotine dependence without complication    Tobacco abuse       Goals addressed in session: Goal 1     Depression Follow-up Plan Completed: Not applicable    Reason for visit is No chief complaint on file.       Encounter provider Isabel Watts LPC      Recent Visits  Date Type Provider Dept   12/16/24 Telephone Isabel Watts LPC [facility] [place]   12/13/24 Telemedicine Isabel Wtats LPC [facility] [place]   12/11/24 Telemedicine Isabel Watts LPC [facility] [place]   12/11/24 Office Visit CAROLINE Dorsey [facility]   Showing recent visits within past 7 days and meeting all other requirements  Future Appointments  No visits were found meeting these conditions.  Showing future appointments within next 150 days and meeting all other requirements       The patient was identified by name and date of birth. Marie Portillo was informed that this is a telemedicine visit and that the visit is being conducted throughthe Microsoft Teams platform. She agrees to proceed..  My office door was closed. No one else was in the room.  She acknowledged consent and understanding of privacy and security of the video platform. The patient has agreed to participate and understands they can discontinue the visit at any time.    Patient is aware this is a billable service.     Subjective  Marie Portillo is a 59 y.o. female  .      HPI     Past Medical History:   Diagnosis Date    Bilateral carpal tunnel syndrome 08/20/2019     Cardiomyopathy (HCC)     Carpal tunnel syndrome of right wrist 10/30/2019    Added automatically from request for surgery [id number]    Chlamydia     COPD (chronic obstructive pulmonary disease) (HCC)     Depression     Hypertension     Varicella        Past Surgical History:   Procedure Laterality Date    BACK SURGERY      BREAST BIOPSY Left 20yrs ago benign    CERVICAL SPINE SURGERY      EGD      HERNIA REPAIR      OOPHORECTOMY Right     [place] [facility] WRST SURG W/RLS TRANSVRS CARPL LIGM Right 01/21/2020    Procedure: RELEASE CARPAL TUNNEL ENDOSCOPIC;  Surgeon: Sam Nguyen MD;  [facility];  Service: Orthopedics    [place] [facility] WRST SURG W/RLS TRANSVRS CARPL LIGM Left 01/28/2020    Procedure: RELEASE CARPAL TUNNEL ENDOSCOPIC;  Surgeon: Sam Nguyen MD;  [facility];  Service: Orthopedics       Current Outpatient Medications   Medication Sig Dispense Refill    Acetaminophen 500 MG Take 2 capsules by mouth every 4 (four) hours as needed for mild pain      albuterol (PROVENTIL HFA,VENTOLIN HFA) 90 mcg/act inhaler inhale 2 puffs by mouth and INTO THE LUNGS every 6 hours IF NEEDED FOR WHEEZING. 8.5 g 2    betamethasone dipropionate (DIPROSONE) 0.05 % cream Apply topically 2 (two) times a day (Patient not taking: Reported on 12/11/2024) 30 g 0    buPROPion (WELLBUTRIN XL) 300 mg 24 hr tablet Take 1 tablet (300 mg total) by mouth daily 30 tablet 1    cholecalciferol (VITAMIN D3) 400 units tablet Take 400 Units by mouth daily      fluticasone (FLONASE) 50 mcg/act nasal spray 2 sprays into each nostril daily 16 g 11    guaiFENesin (MUCINEX) 600 mg 12 hr tablet Take 2 tablets (1,200 mg total) by mouth every 12 (twelve) hours 60 tablet 3    ipratropium-albuterol (DUO-NEB) 0.5-2.5 mg/3 mL nebulizer solution Take 3 mL by nebulization 4 (four) times a day 360 mL 3    lamoTRIgine (LaMICtal) 200 MG tablet Take 1 tablet (200 mg total) by mouth daily 30 tablet 1    levocetirizine (XYZAL) 5 MG tablet Take 1 tablet (5  mg total) by mouth every evening 30 tablet 11    losartan (COZAAR) 25 mg tablet take 1 tablet by mouth once daily 90 tablet 1    metoprolol succinate (TOPROL-XL) 25 mg 24 hr tablet take 1 tablet by mouth once daily 90 tablet 1    ofloxacin (OCUFLOX) 0.3 % ophthalmic solution Administer 1 drop into the left eye 4 (four) times a day 5 mL 0    OLANZapine-FLUoxetine (SYMBYAX) 6-25 MG per capsule Take 1 capsule by mouth every evening (Patient not taking: Reported on 8/9/2024) 30 capsule 0    omeprazole (PriLOSEC) 20 mg delayed release capsule take 1 capsule by mouth twice a day 180 capsule 1    senna-docusate sodium (SENOKOT-S) 8.6-50 mg per tablet Take 3 tablets by mouth as needed        Stiolto Respimat 2.5-2.5 MCG/ACT inhaler inhale 2 puffs by mouth and INTO THE LUNGS once daily 4 g 11     No current facility-administered medications for this visit.        No Known Allergies    Review of Systems    Video Exam    There were no vitals filed for this visit.    Physical Exam

## 2024-12-18 NOTE — PSYCH
"Behavioral Health Psychotherapy Progress Note    Psychotherapy Provided: Individual Psychotherapy     1. Moderate mixed bipolar II disorder (HCC)        2. Generalized anxiety disorder        3. Chronic post-traumatic stress disorder (PTSD)        4. Tobacco abuse        5. Cigarette nicotine dependence without complication        6. Marijuana use        7. Alcohol abuse, in remission            Goals addressed in session: Goal 1     DATA: Marie reported that she don't feel like going out to spend time with her mother or sister and was urged to avoid giving in to the depressive down-spiraling and to list a few skills that would help her avoid the cycle. She was commended for her willingness and encouraged to follow her action steps- socialize, go out of bed, talk about different topics with her friends for distraction, and focus on junk-journaling.      During this session, this clinician used the following therapeutic modalities: Client-centered Therapy, Cognitive Behavioral Therapy, Mindfulness-based Strategies, and Supportive Psychotherapy    Substance Abuse was not addressed during this session. If the client is diagnosed with a co-occurring substance use disorder, please indicate any changes in the frequency or amount of use: NA. Stage of change for addressing substance use diagnoses: Contemplation    ASSESSMENT:  Marie Portillo presents with a Euthymic/ normal and Depressed mood.     her affect is Normal range and intensity and Tearful, which is congruent, with her mood and the content of the session. The client has made progress on their goals.    Marie seemed reluctant to  Marie Portillo presents with a low risk of suicide, low risk of self-harm, and none risk of harm to others.    For any risk assessment that surpasses a \"low\" rating, a safety plan must be developed.    A safety plan was indicated: no  If yes, describe in detail NA    PLAN: Between sessions, Marie Portillo will use her learned skills, especially " Radical Acceptance and Acting Opposite. At the next session, the therapist will use Client-centered Therapy, Cognitive Behavioral Therapy, Dialectical Behavior Therapy, Mindfulness-based Strategies, and Supportive Psychotherapy to address self-care and avoiding the cycle of holiday depression.    Behavioral Health Treatment Plan and Discharge Planning: Marie Portillo is aware of and agrees to continue to work on their treatment plan. They have identified and are working toward their discharge goals. yes    Depression Follow-up Plan Completed: Not applicable    Visit start and stop times:    12/18/24  Start Time: 1255  Stop Time: 1340  Total Visit Time: 45 minutes

## 2024-12-20 ENCOUNTER — TELEMEDICINE (OUTPATIENT)
Dept: BEHAVIORAL/MENTAL HEALTH CLINIC | Facility: CLINIC | Age: 59
End: 2024-12-20
Payer: COMMERCIAL

## 2024-12-20 DIAGNOSIS — F12.90 MARIJUANA USE: Chronic | ICD-10-CM

## 2024-12-20 DIAGNOSIS — F31.81 MODERATE MIXED BIPOLAR II DISORDER (HCC): Primary | ICD-10-CM

## 2024-12-20 DIAGNOSIS — Z72.0 TOBACCO ABUSE: ICD-10-CM

## 2024-12-20 DIAGNOSIS — F10.11 ALCOHOL ABUSE, IN REMISSION: ICD-10-CM

## 2024-12-20 DIAGNOSIS — F43.12 CHRONIC POST-TRAUMATIC STRESS DISORDER (PTSD): ICD-10-CM

## 2024-12-20 DIAGNOSIS — F41.1 GENERALIZED ANXIETY DISORDER: ICD-10-CM

## 2024-12-20 DIAGNOSIS — F17.210 CIGARETTE NICOTINE DEPENDENCE WITHOUT COMPLICATION: ICD-10-CM

## 2024-12-20 PROCEDURE — 90853 GROUP PSYCHOTHERAPY: CPT | Performed by: COUNSELOR

## 2024-12-23 NOTE — PSYCH
Virtual Regular Visit    Verification of patient location:    Patient is located at Home in the following state in which I hold an active license [place]      Assessment/Plan:    Problem List Items Addressed This Visit       Moderate mixed bipolar II disorder (HCC) - Primary    Generalized anxiety disorder    Chronic post-traumatic stress disorder (PTSD)    Alcohol abuse, in remission    Marijuana use (Chronic)    Cigarette nicotine dependence without complication    Tobacco abuse       Goals addressed in session: Goal 1     Depression Follow-up Plan Completed: Yes    Reason for visit is No chief complaint on file.       Encounter provider Isabel Watts LPC      Recent Visits  Date Type Provider Dept   12/18/24 Telemedicine Isabel Watts LPC [facility] Therapist [place]   12/16/24 Telephone Isabel Watts LPC [facility] [place]   Showing recent visits within past 7 days and meeting all other requirements  Future Appointments  No visits were found meeting these conditions.  Showing future appointments within next 150 days and meeting all other requirements       The patient was identified by name and date of birth. Marie Portillo was informed that this is a telemedicine visit and that the visit is being conducted throughthe [employer] Teams platform. She agrees to proceed..  My office door was closed. No one else was in the room.  She acknowledged consent and understanding of privacy and security of the video platform. The patient has agreed to participate and understands they can discontinue the visit at any time.    Patient is aware this is a billable service.     Subjective  Marie Portillo is a 59 y.o. female  .    Data: Marie attended the Relaxation group and was guided through tapping on pain, diaphragmatic breathing through two different protocols, humming with other Vagus nerve stimulation techniques, and progressive muscle relaxation pared with the different breathing aspects.  Furthermore, the attendees were guided through Mindfulness-based meditation series on the topics of Breathing & Body Scan for Pain, IFS-based Meditation on Checking In, Urge Surfing Visualization, Durand of Love for Self-Compassion imagery, Root Chakra meditation, and ending with positive self-affirmations for self-forgiveness, inner resources, willingness, and resilience.          The Clinician used EFT, Mindfulness, Internal Family Systems, and Somatic approaches were used to achieve the deep state of relaxation and to maintain the serene environment for the exercise's purpose.          Assessment: Marie was in normal/euthymic mood and in congruence with broad affect, followed through the guidance and completed the exercises well, showed willingness to continue learning and practicing self-care in weekly Relaxation Group as well as individual therapy.         No alcohol or drug problems were addressed in the session.         No safety plan was indicated. No safety issues are reported. No presence of SI or behaviors is determined based on self-reports.         Plan: Marie  will practice deep breathing and other techniques based on each occasion to counter stress and will attend the next group to be able to take part in further guided exercises on chosen topics to build on the techniques learned in the beginning.  HPI     Past Medical History:   Diagnosis Date    Bilateral carpal tunnel syndrome 08/20/2019    Cardiomyopathy (HCC)     Carpal tunnel syndrome of right wrist 10/30/2019    Added automatically from request for surgery [id number]    Chlamydia     COPD (chronic obstructive pulmonary disease) (HCC)     Depression     Hypertension     Varicella        Past Surgical History:   Procedure Laterality Date    BACK SURGERY      BREAST BIOPSY Left 20yrs ago benign    CERVICAL SPINE SURGERY      EGD      HERNIA REPAIR      OOPHORECTOMY Right     [place] [facility] WRST SURG W/RLS TRANSVRS CARPL LIGM Right 01/21/2020    Procedure:  RELEASE CARPAL TUNNEL ENDOSCOPIC;  Surgeon: Sam Nguyen MD;  [facility];  Service: Orthopedics    [place] [facility] WRST SURG W/RLS TRANSVRS CARPL LIGM Left 01/28/2020    Procedure: RELEASE CARPAL TUNNEL ENDOSCOPIC;  Surgeon: Sam Nguyen MD;  [facility];  Service: Orthopedics       Current Outpatient Medications   Medication Sig Dispense Refill    Acetaminophen 500 MG Take 2 capsules by mouth every 4 (four) hours as needed for mild pain      albuterol (PROVENTIL HFA,VENTOLIN HFA) 90 mcg/act inhaler inhale 2 puffs by mouth and INTO THE LUNGS every 6 hours IF NEEDED FOR WHEEZING. 8.5 g 2    betamethasone dipropionate (DIPROSONE) 0.05 % cream Apply topically 2 (two) times a day (Patient not taking: Reported on 12/11/2024) 30 g 0    buPROPion (WELLBUTRIN XL) 300 mg 24 hr tablet Take 1 tablet (300 mg total) by mouth daily 30 tablet 1    cholecalciferol (VITAMIN D3) 400 units tablet Take 400 Units by mouth daily      fluticasone (FLONASE) 50 mcg/act nasal spray 2 sprays into each nostril daily 16 g 11    guaiFENesin (MUCINEX) 600 mg 12 hr tablet Take 2 tablets (1,200 mg total) by mouth every 12 (twelve) hours 60 tablet 3    ipratropium-albuterol (DUO-NEB) 0.5-2.5 mg/3 mL nebulizer solution Take 3 mL by nebulization 4 (four) times a day 360 mL 3    lamoTRIgine (LaMICtal) 200 MG tablet Take 1 tablet (200 mg total) by mouth daily 30 tablet 1    levocetirizine (XYZAL) 5 MG tablet Take 1 tablet (5 mg total) by mouth every evening 30 tablet 11    losartan (COZAAR) 25 mg tablet take 1 tablet by mouth once daily 90 tablet 1    metoprolol succinate (TOPROL-XL) 25 mg 24 hr tablet take 1 tablet by mouth once daily 90 tablet 1    ofloxacin (OCUFLOX) 0.3 % ophthalmic solution Administer 1 drop into the left eye 4 (four) times a day 5 mL 0    OLANZapine-FLUoxetine (SYMBYAX) 6-25 MG per capsule Take 1 capsule by mouth every evening (Patient not taking: Reported on 8/9/2024) 30 capsule 0    omeprazole (PriLOSEC)  20 mg delayed release capsule take 1 capsule by mouth twice a day 180 capsule 1    senna-docusate sodium (SENOKOT-S) 8.6-50 mg per tablet Take 3 tablets by mouth as needed        Stiolto Respimat 2.5-2.5 MCG/ACT inhaler inhale 2 puffs by mouth and INTO THE LUNGS once daily 4 g 11     No current facility-administered medications for this visit.        No Known Allergies    Review of Systems    Video Exam    There were no vitals filed for this visit.    Physical Exam     12/20/24  Start Time: 0400  Stop Time: 0500  Total Visit Time: 60 minutes

## 2024-12-24 ENCOUNTER — TELEMEDICINE (OUTPATIENT)
Dept: BEHAVIORAL/MENTAL HEALTH CLINIC | Facility: CLINIC | Age: 59
End: 2024-12-24
Payer: COMMERCIAL

## 2024-12-24 DIAGNOSIS — F43.12 CHRONIC POST-TRAUMATIC STRESS DISORDER (PTSD): ICD-10-CM

## 2024-12-24 DIAGNOSIS — F31.81 MODERATE MIXED BIPOLAR II DISORDER (HCC): Primary | ICD-10-CM

## 2024-12-24 DIAGNOSIS — F12.90 MARIJUANA USE: Chronic | ICD-10-CM

## 2024-12-24 DIAGNOSIS — F10.11 ALCOHOL ABUSE, IN REMISSION: ICD-10-CM

## 2024-12-24 DIAGNOSIS — F17.210 CIGARETTE NICOTINE DEPENDENCE WITHOUT COMPLICATION: ICD-10-CM

## 2024-12-24 DIAGNOSIS — F41.1 GENERALIZED ANXIETY DISORDER: ICD-10-CM

## 2024-12-24 DIAGNOSIS — Z72.0 TOBACCO ABUSE: ICD-10-CM

## 2024-12-24 PROCEDURE — 90832 PSYTX W PT 30 MINUTES: CPT | Performed by: COUNSELOR

## 2024-12-24 NOTE — PSYCH
Virtual Regular Visit    Verification of patient location:    Patient is located at Home in the following state in which I hold an active license [place]      Assessment/Plan:    Problem List Items Addressed This Visit       Moderate mixed bipolar II disorder (HCC) - Primary    Generalized anxiety disorder    Chronic post-traumatic stress disorder (PTSD)    Alcohol abuse, in remission    Marijuana use (Chronic)    Cigarette nicotine dependence without complication    Tobacco abuse       Goals addressed in session: Goal 1     Depression Follow-up Plan Completed: Not applicable    Reason for visit is No chief complaint on file.       Encounter provider Isabel Watts LPC      Recent Visits  Date Type Provider Dept   12/20/24 Telemedicine Isabel Watts LPC [facility]rap[facility] [place]   12/18/24 Telemedicine Isabel Watts LPC [facility] Therapist [place]   Showing recent visits within past 7 days and meeting all other requirements  Today's Visits  Date Type Provider Dept   12/24/24 Telemedicine Isabel Watts LPC [facility] Therapist [place]   Showing today's visits and meeting all other requirements  Future Appointments  No visits were found meeting these conditions.  Showing future appointments within next 150 days and meeting all other requirements       The patient was identified by name and date of birth. Marie Portillo was informed that this is a telemedicine visit and that the visit is being conducted throughthe Microsoft Teams platform. She agrees to proceed..  My office door was closed. No one else was in the room.  She acknowledged consent and understanding of privacy and security of the video platform. The patient has agreed to participate and understands they can discontinue the visit at any time.    Patient is aware this is a billable service.     Subjective  Marie Portillo is a 59 y.o. female  .      HPI     Past Medical History:   Diagnosis Date    Bilateral  carpal tunnel syndrome 08/20/2019    Cardiomyopathy (HCC)     Carpal tunnel syndrome of right wrist 10/30/2019    Added automatically from request for surgery [id number]    Chlamydia     COPD (chronic obstructive pulmonary disease) (HCC)     Depression     Hypertension     Varicella        Past Surgical History:   Procedure Laterality Date    BACK SURGERY      BREAST BIOPSY Left 20yrs ago benign    CERVICAL SPINE SURGERY      EGD      HERNIA REPAIR      OOPHORECTOMY Right     [place] [facility] WRST SURG W/RLS TRANSVRS CARPL LIGM Right 01/21/2020    Procedure: RELEASE CARPAL TUNNEL ENDOSCOPIC;  Surgeon: Sam Nguyen MD;  [facility];  Service: Orthopedics    [place] [facility] WRST SURG W/RLS TRANSVRS CARPL LIGM Left 01/28/2020    Procedure: RELEASE CARPAL TUNNEL ENDOSCOPIC;  Surgeon: Sam Nguyen MD;  [facility];  Service: Orthopedics       Current Outpatient Medications   Medication Sig Dispense Refill    Acetaminophen 500 MG Take 2 capsules by mouth every 4 (four) hours as needed for mild pain      albuterol (PROVENTIL HFA,VENTOLIN HFA) 90 mcg/act inhaler inhale 2 puffs by mouth and INTO THE LUNGS every 6 hours IF NEEDED FOR WHEEZING. 8.5 g 2    betamethasone dipropionate (DIPROSONE) 0.05 % cream Apply topically 2 (two) times a day (Patient not taking: Reported on 12/11/2024) 30 g 0    buPROPion (WELLBUTRIN XL) 300 mg 24 hr tablet Take 1 tablet (300 mg total) by mouth daily 30 tablet 1    cholecalciferol (VITAMIN D3) 400 units tablet Take 400 Units by mouth daily      fluticasone (FLONASE) 50 mcg/act nasal spray 2 sprays into each nostril daily 16 g 11    guaiFENesin (MUCINEX) 600 mg 12 hr tablet Take 2 tablets (1,200 mg total) by mouth every 12 (twelve) hours 60 tablet 3    ipratropium-albuterol (DUO-NEB) 0.5-2.5 mg/3 mL nebulizer solution Take 3 mL by nebulization 4 (four) times a day 360 mL 3    lamoTRIgine (LaMICtal) 200 MG tablet Take 1 tablet (200 mg total) by mouth daily 30 tablet 1    levocetirizine  (XYZAL) 5 MG tablet Take 1 tablet (5 mg total) by mouth every evening 30 tablet 11    losartan (COZAAR) 25 mg tablet take 1 tablet by mouth once daily 90 tablet 1    metoprolol succinate (TOPROL-XL) 25 mg 24 hr tablet take 1 tablet by mouth once daily 90 tablet 1    ofloxacin (OCUFLOX) 0.3 % ophthalmic solution Administer 1 drop into the left eye 4 (four) times a day 5 mL 0    OLANZapine-FLUoxetine (SYMBYAX) 6-25 MG per capsule Take 1 capsule by mouth every evening (Patient not taking: Reported on 8/9/2024) 30 capsule 0    omeprazole (PriLOSEC) 20 mg delayed release capsule take 1 capsule by mouth twice a day 180 capsule 1    senna-docusate sodium (SENOKOT-S) 8.6-50 mg per tablet Take 3 tablets by mouth as needed        Stiolto Respimat 2.5-2.5 MCG/ACT inhaler inhale 2 puffs by mouth and INTO THE LUNGS once daily 4 g 11     No current facility-administered medications for this visit.        No Known Allergies    Review of Systems    Video Exam    There were no vitals filed for this visit.    Physical Exam

## 2024-12-24 NOTE — PSYCH
Behavioral Health Psychotherapy Progress Note    Psychotherapy Provided: Individual Psychotherapy     1. Moderate mixed bipolar II disorder (HCC)        2. Generalized anxiety disorder        3. Chronic post-traumatic stress disorder (PTSD)        4. Tobacco abuse        5. Cigarette nicotine dependence without complication        6. Marijuana use        7. Alcohol abuse, in remission            Goals addressed in session: Goal 1     DATA: Marie had a shorter session due to being in the middle of her preparation for the celebration with her friends and landlords [employer].  Marie shared that on 21st was the anniversary from her son's death and was allowed to express her emotions but was encouraged to avoid dwelling into negative self-talk and refer to her experience of intensifying her emotions. She was encouraged to remember and list three of her favorite self-affirmations and to share how they help her push through difficult moments.   During this session, this clinician used the following therapeutic modalities: Client-centered Therapy, Cognitive Behavioral Therapy, Mindfulness-based Strategies, and Supportive Psychotherapy    Substance Abuse was not addressed during this session. If the client is diagnosed with a co-occurring substance use disorder, please indicate any changes in the frequency or amount of use: NA. Stage of change for addressing substance use diagnoses: Pre-contemplation    ASSESSMENT:  Marie Portillo presents with a Euthymic/ normal, Anxious, and Depressed mood.     her affect is Normal range and intensity, Constricted, and Tearful, which is congruent, with her mood and the content of the session. The client has made progress on their goals.    Marie seemed distracted, being in the past where she lost her son, and unable to handle emotions well this time. Marie Portillo presents with a low risk of suicide, low risk of self-harm, and none risk of harm to others.    For any risk assessment  "that surpasses a \"low\" rating, a safety plan must be developed.    A safety plan was indicated: no  If yes, describe in detail NA    PLAN: Between sessions, Marie Portillo will use affirmations, will take notes to be tiffanie to refer to her experiences and avoid getting angry with memory issues, and will put more effort to ground herself in the present . At the next session, the therapist will use Client-centered Therapy, Cognitive Behavioral Therapy, Dialectical Behavior Therapy, Mindfulness-based Strategies, and Supportive Psychotherapy to address self-care and living in stress mode as a default mode.    Behavioral Health Treatment Plan and Discharge Planning: Marie Portillo is aware of and agrees to continue to work on their treatment plan. They have identified and are working toward their discharge goals. no    Depression Follow-up Plan Completed: Not applicable    Visit start and stop times:    12/24/24  Start Time: 1157  Stop Time: 1222  Total Visit Time: 25 minutes  "

## 2024-12-27 ENCOUNTER — TELEMEDICINE (OUTPATIENT)
Dept: BEHAVIORAL/MENTAL HEALTH CLINIC | Facility: CLINIC | Age: 59
End: 2024-12-27
Payer: COMMERCIAL

## 2024-12-27 DIAGNOSIS — F43.12 CHRONIC POST-TRAUMATIC STRESS DISORDER (PTSD): ICD-10-CM

## 2024-12-27 DIAGNOSIS — F17.210 CIGARETTE NICOTINE DEPENDENCE WITHOUT COMPLICATION: ICD-10-CM

## 2024-12-27 DIAGNOSIS — F41.1 GENERALIZED ANXIETY DISORDER: ICD-10-CM

## 2024-12-27 DIAGNOSIS — F10.11 ALCOHOL ABUSE, IN REMISSION: ICD-10-CM

## 2024-12-27 DIAGNOSIS — Z72.0 TOBACCO ABUSE: ICD-10-CM

## 2024-12-27 DIAGNOSIS — F12.90 MARIJUANA USE: Chronic | ICD-10-CM

## 2024-12-27 DIAGNOSIS — F31.81 MODERATE MIXED BIPOLAR II DISORDER (HCC): Primary | ICD-10-CM

## 2024-12-27 PROCEDURE — 90853 GROUP PSYCHOTHERAPY: CPT | Performed by: COUNSELOR

## 2024-12-27 NOTE — PSYCH
Virtual Regular Visit    Verification of patient location:    Patient is located at Home in the following state in which I hold an active license [place]      Assessment/Plan:    Problem List Items Addressed This Visit       Moderate mixed bipolar II disorder (HCC) - Primary    Generalized anxiety disorder    Chronic post-traumatic stress disorder (PTSD)    Alcohol abuse, in remission    Marijuana use (Chronic)    Cigarette nicotine dependence without complication    Tobacco abuse       Goals addressed in session: Goal 1     Depression Follow-up Plan Completed: Not applicable    Reason for visit is No chief complaint on file.       Encounter provider Isabel Watts LPC      Recent Visits  Date Type Provider Dept   12/24/24 Telemedicine Isabel Watts LPC [facility]rapist [place]   12/20/24 Telemedicine Isabel Watts LPC [facility] Therapist [place]   Showing recent visits within past 7 days and meeting all other requirements  Today's Visits  Date Type Provider Dept   12/27/24 Telemedicine Isabel Watts LPC [facility] Therap[facility] [place]   Showing today's visits and meeting all other requirements  Future Appointments  No visits were found meeting these conditions.  Showing future appointments within next 150 days and meeting all other requirements       The patient was identified by name and date of birth. Marie Portillo was informed that this is a telemedicine visit and that the visit is being conducted throughthe Microsoft Teams platform. She agrees to proceed..  My office door was closed. No one else was in the room.  She acknowledged consent and understanding of privacy and security of the video platform. The patient has agreed to participate and understands they can discontinue the visit at any time.    Patient is aware this is a billable service.     Subjective  Marie Portillo is a 59 y.o. female  .    Data: Marie attended the Relaxation group and was guided through  tapping on pain, diaphragmatic breathing through two different protocols, humming with other Vagus nerve stimulation techniques, and progressive muscle relaxation pared with the different breathing aspects. Furthermore, the attendees were guided through Mindfulness-based meditation series on the topics of Breathing & Body Scan, Short Breathing Space to [place] with Difficult Experience, Inner Child Healing, the Sacral Chakra Meditation, and ending with positive self-affirmations for unity with others, connectedness, trust, calmness, and courage.          The Clinician used EFT, Mindfulness, Internal Family Systems, and Somatic approaches were used to achieve the deep state of relaxation and to maintain the serene environment for the exercise's purpose.          Assessment: Marie was in normal/euthymic mood and in congruence with broad affect, followed through the guidance and completed the exercises well, showed willingness to continue learning and practicing self-care in weekly Relaxation Group as well as individual therapy.         No alcohol or drug problems were addressed in the session.         No safety plan was indicated. No safety issues are reported. No presence of SI or behaviors is determined based on self-reports.         Plan: Marie  will practice deep breathing and other techniques based on each occasion to counter stress and will attend the next group to be able to take part in further guided exercises on chosen topics to build on the techniques learned in the beginning.       HPI     Past Medical History:   Diagnosis Date    Bilateral carpal tunnel syndrome 08/20/2019    Cardiomyopathy (HCC)     Carpal tunnel syndrome of right wrist 10/30/2019    Added automatically from request for surgery [id number]    Chlamydia     COPD (chronic obstructive pulmonary disease) (HCC)     Depression     Hypertension     Varicella        Past Surgical History:   Procedure Laterality Date    BACK SURGERY      BREAST BIOPSY  Left 20yrs ago benign    CERVICAL SPINE SURGERY      EGD      HERNIA REPAIR      OOPHORECTOMY Right     [place] [facility] WRST SURG W/RLS TRANSVRS CARPL LIGM Right 01/21/2020    Procedure: RELEASE CARPAL TUNNEL ENDOSCOPIC;  Surgeon: Sam Nguyen MD;  [facility];  Service: Orthopedics    [place] [facility] WRST SURG W/RLS TRANSVRS CARPL LIGM Left 01/28/2020    Procedure: RELEASE CARPAL TUNNEL ENDOSCOPIC;  Surgeon: Sam Nguyen MD;  [facility];  Service: Orthopedics       Current Outpatient Medications   Medication Sig Dispense Refill    Acetaminophen 500 MG Take 2 capsules by mouth every 4 (four) hours as needed for mild pain      albuterol (PROVENTIL HFA,VENTOLIN HFA) 90 mcg/act inhaler inhale 2 puffs by mouth and INTO THE LUNGS every 6 hours IF NEEDED FOR WHEEZING. 8.5 g 2    betamethasone dipropionate (DIPROSONE) 0.05 % cream Apply topically 2 (two) times a day (Patient not taking: Reported on 12/11/2024) 30 g 0    buPROPion (WELLBUTRIN XL) 300 mg 24 hr tablet Take 1 tablet (300 mg total) by mouth daily 30 tablet 1    cholecalciferol (VITAMIN D3) 400 units tablet Take 400 Units by mouth daily      fluticasone (FLONASE) 50 mcg/act nasal spray 2 sprays into each nostril daily 16 g 11    guaiFENesin (MUCINEX) 600 mg 12 hr tablet Take 2 tablets (1,200 mg total) by mouth every 12 (twelve) hours 60 tablet 3    ipratropium-albuterol (DUO-NEB) 0.5-2.5 mg/3 mL nebulizer solution Take 3 mL by nebulization 4 (four) times a day 360 mL 3    lamoTRIgine (LaMICtal) 200 MG tablet Take 1 tablet (200 mg total) by mouth daily 30 tablet 1    levocetirizine (XYZAL) 5 MG tablet Take 1 tablet (5 mg total) by mouth every evening 30 tablet 11    losartan (COZAAR) 25 mg tablet take 1 tablet by mouth once daily 90 tablet 1    metoprolol succinate (TOPROL-XL) 25 mg 24 hr tablet take 1 tablet by mouth once daily 90 tablet 1    ofloxacin (OCUFLOX) 0.3 % ophthalmic solution Administer 1 drop into the left eye 4 (four) times a day 5  mL 0    OLANZapine-FLUoxetine (SYMBYAX) 6-25 MG per capsule Take 1 capsule by mouth every evening (Patient not taking: Reported on 8/9/2024) 30 capsule 0    omeprazole (PriLOSEC) 20 mg delayed release capsule take 1 capsule by mouth twice a day 180 capsule 1    senna-docusate sodium (SENOKOT-S) 8.6-50 mg per tablet Take 3 tablets by mouth as needed        Stiolto Respimat 2.5-2.5 MCG/ACT inhaler inhale 2 puffs by mouth and INTO THE LUNGS once daily 4 g 11     No current facility-administered medications for this visit.        No Known Allergies    Review of Systems    Video Exam    There were no vitals filed for this visit.    Physical Exam     12/27/24  Start Time: 0400  Stop Time: 0500  Total Visit Time: 60 minutes

## 2025-01-08 ENCOUNTER — TELEPHONE (OUTPATIENT)
Age: 60
End: 2025-01-08

## 2025-01-08 ENCOUNTER — TELEMEDICINE (OUTPATIENT)
Dept: BEHAVIORAL/MENTAL HEALTH CLINIC | Facility: CLINIC | Age: 60
End: 2025-01-08
Payer: COMMERCIAL

## 2025-01-08 DIAGNOSIS — F43.12 CHRONIC POST-TRAUMATIC STRESS DISORDER (PTSD): ICD-10-CM

## 2025-01-08 DIAGNOSIS — F12.90 MARIJUANA USE: Chronic | ICD-10-CM

## 2025-01-08 DIAGNOSIS — F31.81 MODERATE MIXED BIPOLAR II DISORDER (HCC): Primary | ICD-10-CM

## 2025-01-08 DIAGNOSIS — F10.11 ALCOHOL ABUSE, IN REMISSION: ICD-10-CM

## 2025-01-08 DIAGNOSIS — Z72.0 TOBACCO ABUSE: ICD-10-CM

## 2025-01-08 DIAGNOSIS — F41.1 GENERALIZED ANXIETY DISORDER: ICD-10-CM

## 2025-01-08 DIAGNOSIS — F17.210 CIGARETTE NICOTINE DEPENDENCE WITHOUT COMPLICATION: ICD-10-CM

## 2025-01-08 PROCEDURE — 90832 PSYTX W PT 30 MINUTES: CPT | Performed by: COUNSELOR
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
## 2025-01-08 NOTE — TELEPHONE ENCOUNTER
Pt called to get her appt for 1/9/25 at 3:15 switched to a virtual visit due to no transportation to appt.  Writer switched appt.

## 2025-01-08 NOTE — PSYCH
Virtual Regular Visit    Verification of patient location:    Patient is located at Home in the following state in which I hold an active license [place]      Assessment/Plan:    Problem List Items Addressed This Visit       Moderate mixed bipolar II disorder (HCC) - Primary    Generalized anxiety disorder    Chronic post-traumatic stress disorder (PTSD)    Alcohol abuse, in remission    Marijuana use (Chronic)    Cigarette nicotine dependence without complication    Tobacco abuse       Goals addressed in session: Goal 1     Depression Follow-up Plan Completed: Not applicable    Reason for visit is No chief complaint on file.       Encounter provider Isabel Watts LPC      Recent Visits  No visits were found meeting these conditions.  Showing recent visits within past 7 days and meeting all other requirements  Today's Visits  Date Type Provider Dept   01/08/25 Telemedicine Isabel Watts LPC [facility] Therapist [place]   Showing today's visits and meeting all other requirements  Future Appointments  No visits were found meeting these conditions.  Showing future appointments within next 150 days and meeting all other requirements       The patient was identified by name and date of birth. Marie Portillo was informed that this is a telemedicine visit and that the visit is being conducted throughthe [employer] Teams platform. She agrees to proceed..  My office door was closed. No one else was in the room.  She acknowledged consent and understanding of privacy and security of the video platform. The patient has agreed to participate and understands they can discontinue the visit at any time.    Patient is aware this is a billable service.     Subjective  Marie Portillo is a 59 y.o. female  .      HPI     Past Medical History:   Diagnosis Date    Bilateral carpal tunnel syndrome 08/20/2019    Cardiomyopathy (HCC)     Carpal tunnel syndrome of right wrist 10/30/2019    Added automatically from request for  surgery [id number]    Chlamydia     COPD (chronic obstructive pulmonary disease) (HCC)     Depression     Hypertension     Varicella        Past Surgical History:   Procedure Laterality Date    BACK SURGERY      BREAST BIOPSY Left 20yrs ago benign    CERVICAL SPINE SURGERY      EGD      HERNIA REPAIR      OOPHORECTOMY Right     SD NDSC WRST SURG W/RLS TRANSVRS CARPL LIGM Right 01/21/2020    Procedure: RELEASE CARPAL TUNNEL ENDOSCOPIC;  Surgeon: Sam Nguyen MD;  Location: [facility];  Service: Orthopedics    SD NDSC WRST SURG W/RLS TRANSVRS CARPL LIGM Left 01/28/2020    Procedure: RELEASE CARPAL TUNNEL ENDOSCOPIC;  Surgeon: Sam Nguyen MD;  Location: [facility];  Service: Orthopedics       Current Outpatient Medications   Medication Sig Dispense Refill    Acetaminophen 500 MG Take 2 capsules by mouth every 4 (four) hours as needed for mild pain      albuterol (PROVENTIL HFA,VENTOLIN HFA) 90 mcg/act inhaler inhale 2 puffs by mouth and INTO THE LUNGS every 6 hours IF NEEDED FOR WHEEZING. 8.5 g 2    betamethasone dipropionate (DIPROSONE) 0.05 % cream Apply topically 2 (two) times a day (Patient not taking: Reported on 12/11/2024) 30 g 0    buPROPion (WELLBUTRIN XL) 300 mg 24 hr tablet Take 1 tablet (300 mg total) by mouth daily 30 tablet 1    cholecalciferol (VITAMIN D3) 400 units tablet Take 400 Units by mouth daily      fluticasone (FLONASE) 50 mcg/act nasal spray 2 sprays into each nostril daily 16 g 11    guaiFENesin (MUCINEX) 600 mg 12 hr tablet Take 2 tablets (1,200 mg total) by mouth every 12 (twelve) hours 60 tablet 3    ipratropium-albuterol (DUO-NEB) 0.5-2.5 mg/3 mL nebulizer solution Take 3 mL by nebulization 4 (four) times a day 360 mL 3    lamoTRIgine (LaMICtal) 200 MG tablet Take 1 tablet (200 mg total) by mouth daily 30 tablet 1    levocetirizine (XYZAL) 5 MG tablet Take 1 tablet (5 mg total) by mouth every evening 30 tablet 11    losartan (COZAAR) 25 mg tablet take 1 tablet by mouth once daily  90 tablet 1    metoprolol succinate (TOPROL-XL) 25 mg 24 hr tablet take 1 tablet by mouth once daily 90 tablet 1    ofloxacin (OCUFLOX) 0.3 % ophthalmic solution Administer 1 drop into the left eye 4 (four) times a day 5 mL 0    OLANZapine-FLUoxetine (SYMBYAX) 6-25 MG per capsule Take 1 capsule by mouth every evening (Patient not taking: Reported on 8/9/2024) 30 capsule 0    omeprazole (PriLOSEC) 20 mg delayed release capsule take 1 capsule by mouth twice a day 180 capsule 1    senna-docusate sodium (SENOKOT-S) 8.6-50 mg per tablet Take 3 tablets by mouth as needed        Stiolto Respimat 2.5-2.5 MCG/ACT inhaler inhale 2 puffs by mouth and INTO THE LUNGS once daily 4 g 11     No current facility-administered medications for this visit.        No Known Allergies    Review of Systems    Video Exam    There were no vitals filed for this visit.    Physical Exam

## 2025-01-09 ENCOUNTER — TELEMEDICINE (OUTPATIENT)
Dept: PSYCHIATRY | Facility: CLINIC | Age: 60
End: 2025-01-09
Payer: COMMERCIAL

## 2025-01-09 DIAGNOSIS — F41.1 GENERALIZED ANXIETY DISORDER: ICD-10-CM

## 2025-01-09 DIAGNOSIS — F43.10 PTSD (POST-TRAUMATIC STRESS DISORDER): ICD-10-CM

## 2025-01-09 DIAGNOSIS — F31.62 BIPOLAR DISORDER, CURRENT EPISODE MIXED, MODERATE (HCC): Primary | ICD-10-CM

## 2025-01-09 DIAGNOSIS — F12.90 CANNABIS USE DISORDER: ICD-10-CM

## 2025-01-09 PROCEDURE — 90833 PSYTX W PT W E/M 30 MIN: CPT | Performed by: STUDENT IN AN ORGANIZED HEALTH CARE EDUCATION/TRAINING PROGRAM

## 2025-01-09 PROCEDURE — 99213 OFFICE O/P EST LOW 20 MIN: CPT | Performed by: STUDENT IN AN ORGANIZED HEALTH CARE EDUCATION/TRAINING PROGRAM

## 2025-01-09 RX ORDER — BUPROPION HYDROCHLORIDE 300 MG/1
300 TABLET ORAL DAILY
Qty: 30 TABLET | Refills: 1 | Status: SHIPPED | OUTPATIENT
Start: 2025-01-09 | End: 2025-03-10

## 2025-01-09 RX ORDER — LAMOTRIGINE 200 MG/1
200 TABLET ORAL DAILY
Qty: 30 TABLET | Refills: 1 | Status: SHIPPED | OUTPATIENT
Start: 2025-01-09 | End: 2025-03-10
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
## 2025-01-10 ENCOUNTER — TELEMEDICINE (OUTPATIENT)
Dept: BEHAVIORAL/MENTAL HEALTH CLINIC | Facility: CLINIC | Age: 60
End: 2025-01-10
Payer: COMMERCIAL

## 2025-01-10 DIAGNOSIS — F43.12 CHRONIC POST-TRAUMATIC STRESS DISORDER (PTSD): ICD-10-CM

## 2025-01-10 DIAGNOSIS — F10.11 ALCOHOL ABUSE, IN REMISSION: ICD-10-CM

## 2025-01-10 DIAGNOSIS — Z72.0 TOBACCO ABUSE: ICD-10-CM

## 2025-01-10 DIAGNOSIS — F17.210 CIGARETTE NICOTINE DEPENDENCE WITHOUT COMPLICATION: ICD-10-CM

## 2025-01-10 DIAGNOSIS — F31.81 MODERATE MIXED BIPOLAR II DISORDER (HCC): Primary | ICD-10-CM

## 2025-01-10 DIAGNOSIS — F41.1 GENERALIZED ANXIETY DISORDER: ICD-10-CM

## 2025-01-10 DIAGNOSIS — F12.90 MARIJUANA USE: Chronic | ICD-10-CM

## 2025-01-10 PROCEDURE — 90853 GROUP PSYCHOTHERAPY: CPT | Performed by: COUNSELOR

## 2025-01-11 NOTE — PSYCH
Virtual Regular Visit    Verification of patient location:    Patient is located at Home in the following state in which I hold an active license [place]      Assessment/Plan:    Problem List Items Addressed This Visit       Moderate mixed bipolar II disorder (HCC) - Primary    Generalized anxiety disorder    Chronic post-traumatic stress disorder (PTSD)    Alcohol abuse, in remission    Marijuana use (Chronic)    Cigarette nicotine dependence without complication    Tobacco abuse       Goals addressed in session: Goal 1     Depression Follow-up Plan Completed: Not applicable    Reason for visit is No chief complaint on file.       Encounter provider Isabel Watts LPC      Recent Visits  Date Type Provider Dept   01/08/25 Telemedicine Isabel Watts LPC [facility] Therapist [place]   Showing recent visits within past 7 days and meeting all other requirements  Future Appointments  No visits were found meeting these conditions.  Showing future appointments within next 150 days and meeting all other requirements       The patient was identified by name and date of birth. Marie Portillo was informed that this is a telemedicine visit and that the visit is being conducted throughthe [employer] Teams platform. She agrees to proceed..  My office door was closed. No one else was in the room.  She acknowledged consent and understanding of privacy and security of the video platform. The patient has agreed to participate and understands they can discontinue the visit at any time.    Patient is aware this is a billable service.     Subjective  Marie Portillo is a 59 y.o. female  .    Data: Marie attended the Relaxation group and was guided through tapping on pain, diaphragmatic breathing through two different protocols, humming with other Vagus nerve stimulation techniques, and progressive muscle relaxation paired with the different breathing aspects. Intro to pelvic floor relaxation was presented. Furthermore, the  attendees were guided through Mindfulness-based meditation series on the topics of Changing Your Perspective on Pain, Starry David Imagery, and Heart Chakra Meditation, and ending with positive self-affirmations for unconditional love and acceptance, cutting the cords with old beliefs, and for courage.          The Clinician used EFT, Mindfulness, Internal Family Systems, and Somatic approaches were used to achieve the deep state of relaxation and to maintain the serene environment for the exercise's purpose.          Assessment: Marie was in normal/euthymic mood and in congruence with broad affect, followed through the guidance and completed the exercises well, showed willingness to continue learning and practicing self-care in weekly Relaxation Group as well as individual therapy.         No alcohol or drug problems were addressed in the session.         No safety plan was indicated. No safety issues are reported. No presence of SI or behaviors is determined based on self-reports.         Plan: Marie  will practice deep breathing and other techniques based on each occasion to counter stress and will attend the next group to be able to take part in further guided exercises on chosen topics to build on the techniques learned in the beginning.       HPI     Past Medical History:   Diagnosis Date    Bilateral carpal tunnel syndrome 08/20/2019    Cardiomyopathy (HCC)     Carpal tunnel syndrome of right wrist 10/30/2019    Added automatically from request for surgery [id number]    Chlamydia     COPD (chronic obstructive pulmonary disease) (HCC)     Depression     Hypertension     Varicella        Past Surgical History:   Procedure Laterality Date    BACK SURGERY      BREAST BIOPSY Left 20yrs ago benign    CERVICAL SPINE SURGERY      EGD      HERNIA REPAIR      OOPHORECTOMY Right     [place] NDSC WRST SURG W/RLS TRANSVRS CARPL LIGM Right 01/21/2020    Procedure: RELEASE CARPAL TUNNEL ENDOSCOPIC;  Surgeon: Sam Nguyen MD;   [facility];  Service: Orthopedics    [place] NDSC WRST SURG W/RLS TRANSVRS CARPL LIGM Left 01/28/2020    Procedure: RELEASE CARPAL TUNNEL ENDOSCOPIC;  Surgeon: Sam Nguyen MD;  Location: [facility];  Service: Orthopedics       Current Outpatient Medications   Medication Sig Dispense Refill    Acetaminophen 500 MG Take 2 capsules by mouth every 4 (four) hours as needed for mild pain      albuterol (PROVENTIL HFA,VENTOLIN HFA) 90 mcg/act inhaler inhale 2 puffs by mouth and INTO THE LUNGS every 6 hours IF NEEDED FOR WHEEZING. 8.5 g 2    betamethasone dipropionate (DIPROSONE) 0.05 % cream Apply topically 2 (two) times a day (Patient not taking: Reported on 12/11/2024) 30 g 0    buPROPion (WELLBUTRIN XL) 300 mg 24 hr tablet Take 1 tablet (300 mg total) by mouth daily 30 tablet 1    cholecalciferol (VITAMIN D3) 400 units tablet Take 400 Units by mouth daily      fluticasone (FLONASE) 50 mcg/act nasal spray 2 sprays into each nostril daily 16 g 11    guaiFENesin (MUCINEX) 600 mg 12 hr tablet Take 2 tablets (1,200 mg total) by mouth every 12 (twelve) hours 60 tablet 3    ipratropium-albuterol (DUO-NEB) 0.5-2.5 mg/3 mL nebulizer solution Take 3 mL by nebulization 4 (four) times a day 360 mL 3    lamoTRIgine (LaMICtal) 200 MG tablet Take 1 tablet (200 mg total) by mouth daily 30 tablet 1    levocetirizine (XYZAL) 5 MG tablet Take 1 tablet (5 mg total) by mouth every evening 30 tablet 11    losartan (COZAAR) 25 mg tablet take 1 tablet by mouth once daily 90 tablet 1    metoprolol succinate (TOPROL-XL) 25 mg 24 hr tablet take 1 tablet by mouth once daily 90 tablet 1    ofloxacin (OCUFLOX) 0.3 % ophthalmic solution Administer 1 drop into the left eye 4 (four) times a day 5 mL 0    omeprazole (PriLOSEC) 20 mg delayed release capsule take 1 capsule by mouth twice a day 180 capsule 1    senna-docusate sodium (SENOKOT-S) 8.6-50 mg per tablet Take 3 tablets by mouth as needed        Stiolto Respimat 2.5-2.5 MCG/ACT  inhaler inhale 2 puffs by mouth and INTO THE LUNGS once daily 4 g 11     No current facility-administered medications for this visit.        No Known Allergies    Review of Systems    Video Exam    There were no vitals filed for this visit.    Physical Exam     01/10/25  Start Time: 0400  Stop Time: 0500  Total Visit Time: 60 minutes

## 2025-01-13 ENCOUNTER — TELEPHONE (OUTPATIENT)
Dept: PSYCHIATRY | Facility: CLINIC | Age: 60
End: 2025-01-13

## 2025-01-15 ENCOUNTER — TELEMEDICINE (OUTPATIENT)
Dept: BEHAVIORAL/MENTAL HEALTH CLINIC | Facility: CLINIC | Age: 60
End: 2025-01-15
Payer: COMMERCIAL

## 2025-01-15 DIAGNOSIS — Z72.0 TOBACCO ABUSE: ICD-10-CM

## 2025-01-15 DIAGNOSIS — F43.12 CHRONIC POST-TRAUMATIC STRESS DISORDER (PTSD): ICD-10-CM

## 2025-01-15 DIAGNOSIS — F17.210 CIGARETTE NICOTINE DEPENDENCE WITHOUT COMPLICATION: ICD-10-CM

## 2025-01-15 DIAGNOSIS — F31.81 MODERATE MIXED BIPOLAR II DISORDER (HCC): Primary | ICD-10-CM

## 2025-01-15 DIAGNOSIS — F10.11 ALCOHOL ABUSE, IN REMISSION: ICD-10-CM

## 2025-01-15 DIAGNOSIS — F41.1 GENERALIZED ANXIETY DISORDER: ICD-10-CM

## 2025-01-15 DIAGNOSIS — F12.90 MARIJUANA USE: Chronic | ICD-10-CM

## 2025-01-15 PROCEDURE — 90834 PSYTX W PT 45 MINUTES: CPT | Performed by: COUNSELOR

## 2025-01-15 NOTE — PSYCH
Virtual Regular Visit    Verification of patient location:    Patient is located at Home in the following state in which I hold an active license [place]      Assessment/Plan:    Problem List Items Addressed This Visit       Moderate mixed bipolar II disorder (HCC) - Primary    Generalized anxiety disorder    Chronic post-traumatic stress disorder (PTSD)    Alcohol abuse, in remission    Marijuana use (Chronic)    Cigarette nicotine dependence without complication    Tobacco abuse       Goals addressed in session: Goal 1     Depression Follow-up Plan Completed: Not applicable    Reason for visit is No chief complaint on file.       Encounter provider Isabel Watts LPC      Recent Visits  Date Type Provider Dept   01/13/25 Telephone Isabel Watts LPC [facility] [place]   01/10/25 Telemedicine Isabel Watts LPC [facility] [place]   01/08/25 Telemedicine Isabel Watts LPC [facility] [place]   Showing recent visits within past 7 days and meeting all other requirements  Future Appointments  No visits were found meeting these conditions.  Showing future appointments within next 150 days and meeting all other requirements       The patient was identified by name and date of birth. Marie Portillo was informed that this is a telemedicine visit and that the visit is being conducted throughthe Microsoft Teams platform. She agrees to proceed..  My office door was closed. No one else was in the room.  She acknowledged consent and understanding of privacy and security of the video platform. The patient has agreed to participate and understands they can discontinue the visit at any time.    Patient is aware this is a billable service.     Subjective  Marie Portillo is a 59 y.o. female  .      HPI     Past Medical History:   Diagnosis Date    Bilateral carpal tunnel syndrome 08/20/2019    Cardiomyopathy (HCC)     Carpal tunnel syndrome of right wrist 10/30/2019     Added automatically from request for surgery [id number]    Chlamydia     COPD (chronic obstructive pulmonary disease) (HCC)     Depression     Hypertension     Varicella        Past Surgical History:   Procedure Laterality Date    BACK SURGERY      BREAST BIOPSY Left 20yrs ago benign    CERVICAL SPINE SURGERY      EGD      HERNIA REPAIR      OOPHORECTOMY Right     [place] NDSC WRST SURG W/RLS TRANSVRS CARPL LIGM Right 01/21/2020    Procedure: RELEASE CARPAL TUNNEL ENDOSCOPIC;  Surgeon: Sam Nguyen MD;  [facility];  Service: Orthopedics    [place] NDSC WRST SURG W/RLS TRANSVRS CARPL LIGM Left 01/28/2020    Procedure: RELEASE CARPAL TUNNEL ENDOSCOPIC;  Surgeon: Sam Nguyen MD;  [facility];  Service: Orthopedics       Current Outpatient Medications   Medication Sig Dispense Refill    Acetaminophen 500 MG Take 2 capsules by mouth every 4 (four) hours as needed for mild pain      albuterol (PROVENTIL HFA,VENTOLIN HFA) 90 mcg/act inhaler inhale 2 puffs by mouth and INTO THE LUNGS every 6 hours IF NEEDED FOR WHEEZING. 8.5 g 2    betamethasone dipropionate (DIPROSONE) 0.05 % cream Apply topically 2 (two) times a day (Patient not taking: Reported on 12/11/2024) 30 g 0    buPROPion (WELLBUTRIN XL) 300 mg 24 hr tablet Take 1 tablet (300 mg total) by mouth daily 30 tablet 1    cholecalciferol (VITAMIN D3) 400 units tablet Take 400 Units by mouth daily      fluticasone (FLONASE) 50 mcg/act nasal spray 2 sprays into each nostril daily 16 g 11    guaiFENesin (MUCINEX) 600 mg 12 hr tablet Take 2 tablets (1,200 mg total) by mouth every 12 (twelve) hours 60 tablet 3    ipratropium-albuterol (DUO-NEB) 0.5-2.5 mg/3 mL nebulizer solution Take 3 mL by nebulization 4 (four) times a day 360 mL 3    lamoTRIgine (LaMICtal) 200 MG tablet Take 1 tablet (200 mg total) by mouth daily 30 tablet 1    levocetirizine (XYZAL) 5 MG tablet Take 1 tablet (5 mg total) by mouth every evening 30 tablet 11    losartan (COZAAR) 25 mg  tablet take 1 tablet by mouth once daily 90 tablet 1    metoprolol succinate (TOPROL-XL) 25 mg 24 hr tablet take 1 tablet by mouth once daily 90 tablet 1    ofloxacin (OCUFLOX) 0.3 % ophthalmic solution Administer 1 drop into the left eye 4 (four) times a day 5 mL 0    omeprazole (PriLOSEC) 20 mg delayed release capsule take 1 capsule by mouth twice a day 180 capsule 1    senna-docusate sodium (SENOKOT-S) 8.6-50 mg per tablet Take 3 tablets by mouth as needed        Stiolto Respimat 2.5-2.5 MCG/ACT inhaler inhale 2 puffs by mouth and INTO THE LUNGS once daily 4 g 11     No current facility-administered medications for this visit.        No Known Allergies    Review of Systems    Video Exam    There were no vitals filed for this visit.    Physical Exam

## 2025-01-15 NOTE — PSYCH
Behavioral Health Psychotherapy Progress Note    Psychotherapy Provided: Individual Psychotherapy     1. Moderate mixed bipolar II disorder (HCC)        2. Generalized anxiety disorder        3. Chronic post-traumatic stress disorder (PTSD)        4. Tobacco abuse        5. Cigarette nicotine dependence without complication        6. Marijuana use        7. Alcohol abuse, in remission            Goals addressed in session: Goal 1     DATA: Marie reported forgetting for seconds - at one moment she is about to join the meeting, and at the next, she is about to start cleaning the kitchen floor, and then remembering again and joining the session with two minutes delay. She was encouraged again to make notes about her activities and experiences so that she could be able to refer to her notes and talk about her experience without feeling frustrated. She reported using thought stopping when wants to avoid spiraling down and that is one of her successful tools.   During this session, this clinician used the following therapeutic modalities: Client-centered Therapy, Cognitive Behavioral Therapy, Dialectical Behavior Therapy, Mindfulness-based Strategies, and Supportive Psychotherapy    Substance Abuse was addressed during this session. If the client is diagnosed with a co-occurring substance use disorder, please indicate any changes in the frequency or amount of use: daily moderate smoking. Stage of change for addressing substance use diagnoses: Contemplation    ASSESSMENT:  Marie Portillo presents with a Euthymic/ normal, Anxious, and Depressed mood.     her affect is Normal range and intensity, Constricted, and Tearful, at times, which is congruent, with her mood and the content of the session. The client has made progress on their goals.    Marie seemed more present  and expressive. Marie Portillo presents with a minimal risk of suicide, minimal risk of self-harm, and none risk of harm to others.    For any risk assessment  "that surpasses a \"low\" rating, a safety plan must be developed.    A safety plan was indicated: no  If yes, describe in detail NA    PLAN: Between sessions, Marie Portillo will attend weekly group sessions for self-care. At the next session, the therapist will use Client-centered Therapy, Cognitive Behavioral Therapy, Dialectical Behavior Therapy, Mindfulness-based Strategies, and Supportive Psychotherapy to address self-care, cognitive decline, and lingering in traumatic memories.    Behavioral Health Treatment Plan and Discharge Planning: Marie Portillo is aware of and agrees to continue to work on their treatment plan. They have identified and are working toward their discharge goals. no    Depression Follow-up Plan Completed: Not applicable    Visit start and stop times:    01/15/25  Start Time: 0202  Stop Time: 0245  Total Visit Time: 43 minutes  "

## 2025-01-17 ENCOUNTER — TELEMEDICINE (OUTPATIENT)
Dept: BEHAVIORAL/MENTAL HEALTH CLINIC | Facility: CLINIC | Age: 60
End: 2025-01-17
Payer: COMMERCIAL

## 2025-01-17 DIAGNOSIS — Z72.0 TOBACCO ABUSE: ICD-10-CM

## 2025-01-17 DIAGNOSIS — F43.12 CHRONIC POST-TRAUMATIC STRESS DISORDER (PTSD): ICD-10-CM

## 2025-01-17 DIAGNOSIS — F31.81 MODERATE MIXED BIPOLAR II DISORDER (HCC): Primary | ICD-10-CM

## 2025-01-17 DIAGNOSIS — F17.210 CIGARETTE NICOTINE DEPENDENCE WITHOUT COMPLICATION: ICD-10-CM

## 2025-01-17 DIAGNOSIS — F12.90 MARIJUANA USE: Chronic | ICD-10-CM

## 2025-01-17 DIAGNOSIS — F41.1 GENERALIZED ANXIETY DISORDER: ICD-10-CM

## 2025-01-17 DIAGNOSIS — F10.11 ALCOHOL ABUSE, IN REMISSION: ICD-10-CM

## 2025-01-17 PROCEDURE — 90853 GROUP PSYCHOTHERAPY: CPT | Performed by: COUNSELOR

## 2025-01-17 NOTE — PSYCH
Virtual Regular Visit    Verification of patient location:    Patient is located at Home in the following state in which I hold an active license [place]      Assessment/Plan:    Problem List Items Addressed This Visit       Moderate mixed bipolar II disorder (HCC) - Primary    Generalized anxiety disorder    Chronic post-traumatic stress disorder (PTSD)    Alcohol abuse, in remission    Marijuana use (Chronic)    Cigarette nicotine dependence without complication    Tobacco abuse       Goals addressed in session: Goal 1     Depression Follow-up Plan Completed: Not applicable    Reason for visit is No chief complaint on file.       Encounter provider Isabel Watts LPC      Recent Visits  Date Type Provider Dept   01/15/25 Telemedicine Isabel Watts LPC [facility]rap[facility] [place]   01/13/25 Telephone Isabel Watts LPC [facility] [place]   01/10/25 Telemedicine Isabel Watts LPC [facility] [place]   Showing recent visits within past 7 days and meeting all other requirements  Future Appointments  No visits were found meeting these conditions.  Showing future appointments within next 150 days and meeting all other requirements       The patient was identified by name and date of birth. Marie Portillo was informed that this is a telemedicine visit and that the visit is being conducted throughthe Microsoft Teams platform. She agrees to proceed..  My office door was closed. No one else was in the room.  She acknowledged consent and understanding of privacy and security of the video platform. The patient has agreed to participate and understands they can discontinue the visit at any time.    Patient is aware this is a billable service.     Subjective  Marie Portillo is a 59 y.o. female  .    Data: Marie attended the Relaxation group and was guided through tapping on anxiety, diaphragmatic breathing through two different protocols, voice vibrations through outbreath  with other Vagus nerve stimulation techniques, and progressive muscle relaxation paired with the different breathing parts. Furthermore, the attendees were guided through Mindfulness-based meditation series on the topics of Cultivating Embodied Awareness, Embracing the Inner Critic, Geeting to Know a Protector, Throat Chakra Meditation, and ending with positive self-affirmations for cutting the cords with the past, embracing opportunities to learn and grow, self-forgiveness, and radical acceptance.          The Clinician used EFT, Mindfulness, Internal Family Systems, and Somatic approaches were used to achieve the deep state of relaxation and to maintain the serene environment for the exercise's purpose.          Assessment: Marie was in normal/euthymic mood and in congruence with broad affect, followed through the guidance and completed the exercises well, showed willingness to continue learning and practicing self-care in weekly Relaxation Group as well as individual therapy.         No alcohol or drug problems were addressed in the session.         No safety plan was indicated. No safety issues are reported. No presence of SI or behaviors is determined based on self-reports.         Plan: Marie  will practice deep breathing and other techniques based on each occasion to counter stress and will attend the next group to be able to take part in further guided exercises on chosen topics to build on the techniques learned in the beginning.         HPI     Past Medical History:   Diagnosis Date    Bilateral carpal tunnel syndrome 08/20/2019    Cardiomyopathy (HCC)     Carpal tunnel syndrome of right wrist 10/30/2019    Added automatically from request for surgery [id number]    Chlamydia     COPD (chronic obstructive pulmonary disease) (HCC)     Depression     Hypertension     Varicella        Past Surgical History:   Procedure Laterality Date    BACK SURGERY      BREAST BIOPSY Left 20yrs ago benign    CERVICAL SPINE  SURGERY      EGD      HERNIA REPAIR      OOPHORECTOMY Right     [place] NDSC WRST SURG W/RLS TRANSVRS CARPL LIGM Right 01/21/2020    Procedure: RELEASE CARPAL TUNNEL ENDOSCOPIC;  Surgeon: Sam Nguyen MD;  [facility];  Service: Orthopedics    [place] NDSC WRST SURG W/RLS TRANSVRS CARPL LIGM Left 01/28/2020    Procedure: RELEASE CARPAL TUNNEL ENDOSCOPIC;  Surgeon: Sam Nguyen MD;  [facility];  Service: Orthopedics       Current Outpatient Medications   Medication Sig Dispense Refill    Acetaminophen 500 MG Take 2 capsules by mouth every 4 (four) hours as needed for mild pain      albuterol (PROVENTIL HFA,VENTOLIN HFA) 90 mcg/act inhaler inhale 2 puffs by mouth and INTO THE LUNGS every 6 hours IF NEEDED FOR WHEEZING. 8.5 g 2    betamethasone dipropionate (DIPROSONE) 0.05 % cream Apply topically 2 (two) times a day (Patient not taking: Reported on 12/11/2024) 30 g 0    buPROPion (WELLBUTRIN XL) 300 mg 24 hr tablet Take 1 tablet (300 mg total) by mouth daily 30 tablet 1    cholecalciferol (VITAMIN D3) 400 units tablet Take 400 Units by mouth daily      fluticasone (FLONASE) 50 mcg/act nasal spray 2 sprays into each nostril daily 16 g 11    guaiFENesin (MUCINEX) 600 mg 12 hr tablet Take 2 tablets (1,200 mg total) by mouth every 12 (twelve) hours 60 tablet 3    ipratropium-albuterol (DUO-NEB) 0.5-2.5 mg/3 mL nebulizer solution Take 3 mL by nebulization 4 (four) times a day 360 mL 3    lamoTRIgine (LaMICtal) 200 MG tablet Take 1 tablet (200 mg total) by mouth daily 30 tablet 1    levocetirizine (XYZAL) 5 MG tablet Take 1 tablet (5 mg total) by mouth every evening 30 tablet 11    losartan (COZAAR) 25 mg tablet take 1 tablet by mouth once daily 90 tablet 1    metoprolol succinate (TOPROL-XL) 25 mg 24 hr tablet take 1 tablet by mouth once daily 90 tablet 1    ofloxacin (OCUFLOX) 0.3 % ophthalmic solution Administer 1 drop into the left eye 4 (four) times a day 5 mL 0    omeprazole (PriLOSEC) 20 mg  delayed release capsule take 1 capsule by mouth twice a day 180 capsule 1    senna-docusate sodium (SENOKOT-S) 8.6-50 mg per tablet Take 3 tablets by mouth as needed        Stiolto Respimat 2.5-2.5 MCG/ACT inhaler inhale 2 puffs by mouth and INTO THE LUNGS once daily 4 g 11     No current facility-administered medications for this visit.        No Known Allergies    Review of Systems    Video Exam    There were no vitals filed for this visit.    Physical Exam     01/17/25  Start Time: 0405  Stop Time: 0500  Total Visit Time: 55 minutes

## 2025-01-22 ENCOUNTER — TELEMEDICINE (OUTPATIENT)
Dept: BEHAVIORAL/MENTAL HEALTH CLINIC | Facility: CLINIC | Age: 60
End: 2025-01-22
Payer: COMMERCIAL

## 2025-01-22 DIAGNOSIS — Z72.0 TOBACCO ABUSE: ICD-10-CM

## 2025-01-22 DIAGNOSIS — F10.11 ALCOHOL ABUSE, IN REMISSION: ICD-10-CM

## 2025-01-22 DIAGNOSIS — F31.81 MODERATE MIXED BIPOLAR II DISORDER (HCC): Primary | ICD-10-CM

## 2025-01-22 DIAGNOSIS — F41.1 GENERALIZED ANXIETY DISORDER: ICD-10-CM

## 2025-01-22 DIAGNOSIS — F12.90 MARIJUANA USE: Chronic | ICD-10-CM

## 2025-01-22 DIAGNOSIS — F17.210 CIGARETTE NICOTINE DEPENDENCE WITHOUT COMPLICATION: ICD-10-CM

## 2025-01-22 DIAGNOSIS — F43.12 CHRONIC POST-TRAUMATIC STRESS DISORDER (PTSD): ICD-10-CM

## 2025-01-22 PROCEDURE — 90834 PSYTX W PT 45 MINUTES: CPT | Performed by: COUNSELOR
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
## 2025-01-22 NOTE — PSYCH
Virtual Regular Visit    Verification of patient location:    Patient is located at Home in the following state in which I hold an active license [place]      Assessment/Plan:    Problem List Items Addressed This Visit       Moderate mixed bipolar II disorder (HCC) - Primary    Generalized anxiety disorder    Chronic post-traumatic stress disorder (PTSD)    Alcohol abuse, in remission    Marijuana use (Chronic)    Cigarette nicotine dependence without complication    Tobacco abuse       Goals addressed in session: Goal 1     Depression Follow-up Plan Completed: Yes    Reason for visit is No chief complaint on file.       Encounter provider Isabel Watts LPC      Recent Visits  Date Type Provider Dept   01/17/25 Telemedicine Isabel Watts LPC [facility] [place]   01/15/25 Telemedicine Isabel Watts LPC [facility] [place]   Showing recent visits within past 7 days and meeting all other requirements  Today's Visits  Date Type Provider Dept   01/22/25 Telemedicine Isabel Watts LPC [facility]rap[facility] [place]   Showing today's visits and meeting all other requirements  Future Appointments  No visits were found meeting these conditions.  Showing future appointments within next 150 days and meeting all other requirements       The patient was identified by name and date of birth. Marie Portillo was informed that this is a telemedicine visit and that the visit is being conducted throughthe Microsoft Teams platform. She agrees to proceed..  My office door was closed. No one else was in the room.  She acknowledged consent and understanding of privacy and security of the video platform. The patient has agreed to participate and understands they can discontinue the visit at any time.    Patient is aware this is a billable service.     Subjective  Marie Portillo is a 59 y.o. female  .      HPI     Past Medical History:   Diagnosis Date    Bilateral carpal tunnel  syndrome 08/20/2019    Cardiomyopathy (HCC)     Carpal tunnel syndrome of right wrist 10/30/2019    Added automatically from request for surgery [id number]    Chlamydia     COPD (chronic obstructive pulmonary disease) (HCC)     Depression     Hypertension     Varicella        Past Surgical History:   Procedure Laterality Date    BACK SURGERY      BREAST BIOPSY Left 20yrs ago benign    CERVICAL SPINE SURGERY      EGD      HERNIA REPAIR      OOPHORECTOMY Right     [place] [facility] WRST SURG W/RLS TRANSVRS CARPL LIGM Right 01/21/2020    Procedure: RELEASE CARPAL TUNNEL ENDOSCOPIC;  Surgeon: Sam Nguyen MD;  [facility];  Service: Orthopedics    [place] [facility] WRST SURG W/RLS TRANSVRS CARPL LIGM Left 01/28/2020    Procedure: RELEASE CARPAL TUNNEL ENDOSCOPIC;  Surgeon: Sam Nguyen MD;  [facility];  Service: Orthopedics       Current Outpatient Medications   Medication Sig Dispense Refill    Acetaminophen 500 MG Take 2 capsules by mouth every 4 (four) hours as needed for mild pain      albuterol (PROVENTIL HFA,VENTOLIN HFA) 90 mcg/act inhaler inhale 2 puffs by mouth and INTO THE LUNGS every 6 hours IF NEEDED FOR WHEEZING. 8.5 g 2    betamethasone dipropionate (DIPROSONE) 0.05 % cream Apply topically 2 (two) times a day (Patient not taking: Reported on 12/11/2024) 30 g 0    buPROPion (WELLBUTRIN XL) 300 mg 24 hr tablet Take 1 tablet (300 mg total) by mouth daily 30 tablet 1    cholecalciferol (VITAMIN D3) 400 units tablet Take 400 Units by mouth daily      fluticasone (FLONASE) 50 mcg/act nasal spray 2 sprays into each nostril daily 16 g 11    guaiFENesin (MUCINEX) 600 mg 12 hr tablet Take 2 tablets (1,200 mg total) by mouth every 12 (twelve) hours 60 tablet 3    ipratropium-albuterol (DUO-NEB) 0.5-2.5 mg/3 mL nebulizer solution Take 3 mL by nebulization 4 (four) times a day 360 mL 3    lamoTRIgine (LaMICtal) 200 MG tablet Take 1 tablet (200 mg total) by mouth daily 30 tablet 1    levocetirizine (XYZAL) 5 MG  tablet Take 1 tablet (5 mg total) by mouth every evening 30 tablet 11    losartan (COZAAR) 25 mg tablet take 1 tablet by mouth once daily 90 tablet 1    metoprolol succinate (TOPROL-XL) 25 mg 24 hr tablet take 1 tablet by mouth once daily 90 tablet 1    ofloxacin (OCUFLOX) 0.3 % ophthalmic solution Administer 1 drop into the left eye 4 (four) times a day 5 mL 0    omeprazole (PriLOSEC) 20 mg delayed release capsule take 1 capsule by mouth twice a day 180 capsule 1    senna-docusate sodium (SENOKOT-S) 8.6-50 mg per tablet Take 3 tablets by mouth as needed        Stiolto Respimat 2.5-2.5 MCG/ACT inhaler inhale 2 puffs by mouth and INTO THE LUNGS once daily 4 g 11     No current facility-administered medications for this visit.        No Known Allergies    Review of Systems    Video Exam    There were no vitals filed for this visit.    Physical Exam     Behavioral Health Psychotherapy Progress Note    Psychotherapy Provided: Individual Psychotherapy     1. Moderate mixed bipolar II disorder (HCC)        2. Generalized anxiety disorder        3. Chronic post-traumatic stress disorder (PTSD)        4. Tobacco abuse        5. Cigarette nicotine dependence without complication        6. Marijuana use        7. Alcohol abuse, in remission            Goals addressed in session: Goal 1     DATA: Marie focused on the updates of her Crisis plan and talked about distracting activities that help her avoid down-spiraling but also admitted that she has difficulties practicing that coping skill and very often gives in to the depressive symptom. Marie was reminded about other skills that she learned from her DBT program and was praised for her persisting attendance to the Relaxation/Mindfulness weekly group where she practices many of those skills and learn more ways to counter her self-defeating thinking and to attend to her intense emotions healthily.     During this session, this clinician used the following therapeutic  "modalities: Client-centered Therapy, Cognitive Behavioral Therapy, Dialectical Behavior Therapy, Mindfulness-based Strategies, and Supportive Psychotherapy    Substance Abuse was not addressed during this session. If the client is diagnosed with a co-occurring substance use disorder, please indicate any changes in the frequency or amount of use: NA. Stage of change for addressing substance use diagnoses: Pre-contemplation    ASSESSMENT:  Marie Portillo presents with a Euthymic/ normal, Anxious, and Depressed mood.     her affect is Normal range and intensity and Constricted, and Tearful at times, which is congruent, with her mood and the content of the session. The client has made progress on their goals.    Marie seemed open to give in to the depressive tendencies and allow the downward spiral to suck her in.  Marie Portillo presents with a low risk of suicide, low risk of self-harm, and none risk of harm to others.    For any risk assessment that surpasses a \"low\" rating, a safety plan must be developed.    A safety plan was indicated: no  If yes, describe in detail NA    PLAN: Between sessions, Marie Portillo will attend weekly Relaxation group for self-care. At the next session, the therapist will use Client-centered Therapy, Cognitive Behavioral Therapy, Dialectical Behavior Therapy, Mindfulness-based Strategies, and Supportive Psychotherapy to address self-care and down-spiraling starting from dwelling in thoughts about the past .    Behavioral Health Treatment Plan and Discharge Planning: Marie Portillo is aware of and agrees to continue to work on their treatment plan. They have identified and are working toward their discharge goals. yes    Depression Follow-up Plan Completed: Not applicable    Visit start and stop times:    01/22/25  Start Time: 0200  Stop Time: 0242  Total Visit Time: 42 minutes        "

## 2025-01-24 ENCOUNTER — TELEMEDICINE (OUTPATIENT)
Dept: BEHAVIORAL/MENTAL HEALTH CLINIC | Facility: CLINIC | Age: 60
End: 2025-01-24
Payer: COMMERCIAL

## 2025-01-24 DIAGNOSIS — F10.11 ALCOHOL ABUSE, IN REMISSION: ICD-10-CM

## 2025-01-24 DIAGNOSIS — Z72.0 TOBACCO ABUSE: ICD-10-CM

## 2025-01-24 DIAGNOSIS — F31.81 MODERATE MIXED BIPOLAR II DISORDER (HCC): Primary | ICD-10-CM

## 2025-01-24 DIAGNOSIS — F17.210 CIGARETTE NICOTINE DEPENDENCE WITHOUT COMPLICATION: ICD-10-CM

## 2025-01-24 DIAGNOSIS — F43.12 CHRONIC POST-TRAUMATIC STRESS DISORDER (PTSD): ICD-10-CM

## 2025-01-24 DIAGNOSIS — F12.90 MARIJUANA USE: Chronic | ICD-10-CM

## 2025-01-24 DIAGNOSIS — F41.1 GENERALIZED ANXIETY DISORDER: ICD-10-CM

## 2025-01-24 PROCEDURE — 90853 GROUP PSYCHOTHERAPY: CPT | Performed by: COUNSELOR

## 2025-01-24 NOTE — PSYCH
Virtual Regular Visit    Verification of patient location:    Patient is located at Home in the following state in which I hold an active license [place]      Assessment/Plan:    Problem List Items Addressed This Visit       Moderate mixed bipolar II disorder (HCC) - Primary    Generalized anxiety disorder    Chronic post-traumatic stress disorder (PTSD)    Alcohol abuse, in remission    Marijuana use (Chronic)    Cigarette nicotine dependence without complication    Tobacco abuse       Goals addressed in session: Goal 1     Depression Follow-up Plan Completed: Not applicable    Reason for visit is No chief complaint on file.       Encounter provider Isabel Watts LPC      Recent Visits  Date Type Provider Dept   01/22/25 Telemedicine Isabel Watts LPC [facility] [place]   01/17/25 Telemedicine Isabel Watts LPC [facility] [place]   Showing recent visits within past 7 days and meeting all other requirements  Future Appointments  No visits were found meeting these conditions.  Showing future appointments within next 150 days and meeting all other requirements       The patient was identified by name and date of birth. Marie Portillo was informed that this is a telemedicine visit and that the visit is being conducted throughthe Microsoft Teams platform. She agrees to proceed..  My office door was closed. No one else was in the room.  She acknowledged consent and understanding of privacy and security of the video platform. The patient has agreed to participate and understands they can discontinue the visit at any time.    Patient is aware this is a billable service.     Subjective  Marie Portillo is a 59 y.o. female  .    Data: Marie attended the Relaxation group and was guided through tapping on trauma, diaphragmatic breathing through two different protocols, voice vibration through outbreath with other Vagus nerve stimulation techniques, and progressive muscle  relaxation paired with the different breathing parts. Furthermore, the attendees were guided through Mindfulness-based meditation series on the topics of Deep Body Scan for Pain, Letting Go Meditation, Go Home Inner Child Healing Visualization, Third Eye Chakra Meditation, and ending with positive self-affirmations for self-compassion, celebrating others' success, turning around negative energy and redirecting towards productive activity, and gratitude.           The Clinician used EFT, Mindfulness, Internal Family Systems, and Somatic approaches were used to achieve the deep state of relaxation and to maintain the serene environment for the exercise's purpose.          Assessment: Marie was in normal/euthymic mood and in congruence with broad affect, followed through the guidance and completed the exercises well, showed willingness to continue learning and practicing self-care in weekly Relaxation Group as well as individual therapy.         No alcohol or drug problems were addressed in the session.         No safety plan was indicated. No safety issues are reported. No presence of SI or behaviors is determined based on self-reports.         Plan:  Marie will practice deep breathing and other techniques based on each occasion to counter stress and will attend the next group to be able to take part in further guided exercises on chosen topics to build on the techniques learned in the beginning.  HPI     Past Medical History:   Diagnosis Date    Bilateral carpal tunnel syndrome 08/20/2019    Cardiomyopathy (HCC)     Carpal tunnel syndrome of right wrist 10/30/2019    Added automatically from request for surgery [id number]    Chlamydia     COPD (chronic obstructive pulmonary disease) (HCC)     Depression     Hypertension     Varicella        Past Surgical History:   Procedure Laterality Date    BACK SURGERY      BREAST BIOPSY Left 20yrs ago benign    CERVICAL SPINE SURGERY      EGD      HERNIA REPAIR      OOPHORECTOMY  Right     [place] [facility] WRST SURG W/RLS TRANSVRS CARPL LIGM Right 01/21/2020    Procedure: RELEASE CARPAL TUNNEL ENDOSCOPIC;  Surgeon: Sam Nguyen MD;  [facility];  Service: Orthopedics    [place] [facility] WRST SURG W/RLS TRANSVRS CARPL LIGM Left 01/28/2020    Procedure: RELEASE CARPAL TUNNEL ENDOSCOPIC;  Surgeon: Sam Nguyen MD;  [facility];  Service: Orthopedics       Current Outpatient Medications   Medication Sig Dispense Refill    Acetaminophen 500 MG Take 2 capsules by mouth every 4 (four) hours as needed for mild pain      albuterol (PROVENTIL HFA,VENTOLIN HFA) 90 mcg/act inhaler inhale 2 puffs by mouth and INTO THE LUNGS every 6 hours IF NEEDED FOR WHEEZING. 8.5 g 2    betamethasone dipropionate (DIPROSONE) 0.05 % cream Apply topically 2 (two) times a day (Patient not taking: Reported on 12/11/2024) 30 g 0    buPROPion (WELLBUTRIN XL) 300 mg 24 hr tablet Take 1 tablet (300 mg total) by mouth daily 30 tablet 1    cholecalciferol (VITAMIN D3) 400 units tablet Take 400 Units by mouth daily      fluticasone (FLONASE) 50 mcg/act nasal spray 2 sprays into each nostril daily 16 g 11    guaiFENesin (MUCINEX) 600 mg 12 hr tablet Take 2 tablets (1,200 mg total) by mouth every 12 (twelve) hours 60 tablet 3    ipratropium-albuterol (DUO-NEB) 0.5-2.5 mg/3 mL nebulizer solution Take 3 mL by nebulization 4 (four) times a day 360 mL 3    lamoTRIgine (LaMICtal) 200 MG tablet Take 1 tablet (200 mg total) by mouth daily 30 tablet 1    levocetirizine (XYZAL) 5 MG tablet Take 1 tablet (5 mg total) by mouth every evening 30 tablet 11    losartan (COZAAR) 25 mg tablet take 1 tablet by mouth once daily 90 tablet 1    metoprolol succinate (TOPROL-XL) 25 mg 24 hr tablet take 1 tablet by mouth once daily 90 tablet 1    ofloxacin (OCUFLOX) 0.3 % ophthalmic solution Administer 1 drop into the left eye 4 (four) times a day 5 mL 0    omeprazole (PriLOSEC) 20 mg delayed release capsule take 1 capsule by mouth twice a day  180 capsule 1    senna-docusate sodium (SENOKOT-S) 8.6-50 mg per tablet Take 3 tablets by mouth as needed        Stiolto Respimat 2.5-2.5 MCG/ACT inhaler inhale 2 puffs by mouth and INTO THE LUNGS once daily 4 g 11     No current facility-administered medications for this visit.        No Known Allergies    Review of Systems    Video Exam    There were no vitals filed for this visit.    Physical Exam     01/24/25  Start Time: 0400  Stop Time: 0500  Total Visit Time: 60 minutes

## 2025-01-27 ENCOUNTER — TELEPHONE (OUTPATIENT)
Dept: PSYCHIATRY | Facility: CLINIC | Age: 60
End: 2025-01-27

## 2025-01-29 ENCOUNTER — TELEMEDICINE (OUTPATIENT)
Dept: BEHAVIORAL/MENTAL HEALTH CLINIC | Facility: CLINIC | Age: 60
End: 2025-01-29
Payer: COMMERCIAL

## 2025-01-29 DIAGNOSIS — F41.1 GENERALIZED ANXIETY DISORDER: ICD-10-CM

## 2025-01-29 DIAGNOSIS — F12.90 MARIJUANA USE: Chronic | ICD-10-CM

## 2025-01-29 DIAGNOSIS — F17.210 CIGARETTE NICOTINE DEPENDENCE WITHOUT COMPLICATION: ICD-10-CM

## 2025-01-29 DIAGNOSIS — F31.81 MODERATE MIXED BIPOLAR II DISORDER (HCC): Primary | ICD-10-CM

## 2025-01-29 DIAGNOSIS — Z72.0 TOBACCO ABUSE: ICD-10-CM

## 2025-01-29 DIAGNOSIS — F43.12 CHRONIC POST-TRAUMATIC STRESS DISORDER (PTSD): ICD-10-CM

## 2025-01-29 DIAGNOSIS — F10.11 ALCOHOL ABUSE, IN REMISSION: ICD-10-CM

## 2025-01-29 PROCEDURE — 90834 PSYTX W PT 45 MINUTES: CPT | Performed by: COUNSELOR

## 2025-01-29 NOTE — PSYCH
Virtual Regular Visit    Verification of patient location:    Patient is located at Home in the following state in which I hold an active license [place]      Assessment/Plan:    Problem List Items Addressed This Visit       Moderate mixed bipolar II disorder (HCC) - Primary    Generalized anxiety disorder    Chronic post-traumatic stress disorder (PTSD)    Alcohol abuse, in remission    Marijuana use (Chronic)    Cigarette nicotine dependence without complication    Tobacco abuse       Goals addressed in session: Goal 1     Depression Follow-up Plan Completed: Not applicable    Reason for visit is No chief complaint on file.       Encounter provider Isabel Watts LPC      Recent Visits  Date Type Provider Dept   01/24/25 Telemedicine Isabel Watts LPC [facility] [place]   01/22/25 Telemedicine Isabel Watts LPC [facility] [place]   Showing recent visits within past 7 days and meeting all other requirements  Future Appointments  No visits were found meeting these conditions.  Showing future appointments within next 150 days and meeting all other requirements       The patient was identified by name and date of birth. Marie Portillo was informed that this is a telemedicine visit and that the visit is being conducted throughthe Microsoft Teams platform. She agrees to proceed..  My office door was closed. No one else was in the room.  She acknowledged consent and understanding of privacy and security of the video platform. The patient has agreed to participate and understands they can discontinue the visit at any time.    Patient is aware this is a billable service.     Subjective  Marie Portillo is a 59 y.o. female  .      HPI     Past Medical History:   Diagnosis Date    Bilateral carpal tunnel syndrome 08/20/2019    Cardiomyopathy (HCC)     Carpal tunnel syndrome of right wrist 10/30/2019    Added automatically from request for surgery [id number]    Chlamydia     COPD  (chronic obstructive pulmonary disease) (HCC)     Depression     Hypertension     Varicella        Past Surgical History:   Procedure Laterality Date    BACK SURGERY      BREAST BIOPSY Left 20yrs ago benign    CERVICAL SPINE SURGERY      EGD      HERNIA REPAIR      OOPHORECTOMY Right     [place] [facility] WRST SURG W/RLS TRANSVRS CARPL LIGM Right 01/21/2020    Procedure: RELEASE CARPAL TUNNEL ENDOSCOPIC;  Surgeon: Sam Nguyen MD;  [facility];  Service: Orthopedics    [place] [facility] WRST SURG W/RLS TRANSVRS CARPL LIGM Left 01/28/2020    Procedure: RELEASE CARPAL TUNNEL ENDOSCOPIC;  Surgeon: Sam Nguyen MD;  [facility];  Service: Orthopedics       Current Outpatient Medications   Medication Sig Dispense Refill    Acetaminophen 500 MG Take 2 capsules by mouth every 4 (four) hours as needed for mild pain      albuterol (PROVENTIL HFA,VENTOLIN HFA) 90 mcg/act inhaler inhale 2 puffs by mouth and INTO THE LUNGS every 6 hours IF NEEDED FOR WHEEZING. 8.5 g 2    betamethasone dipropionate (DIPROSONE) 0.05 % cream Apply topically 2 (two) times a day (Patient not taking: Reported on 12/11/2024) 30 g 0    buPROPion (WELLBUTRIN XL) 300 mg 24 hr tablet Take 1 tablet (300 mg total) by mouth daily 30 tablet 1    cholecalciferol (VITAMIN D3) 400 units tablet Take 400 Units by mouth daily      fluticasone (FLONASE) 50 mcg/act nasal spray 2 sprays into each nostril daily 16 g 11    guaiFENesin (MUCINEX) 600 mg 12 hr tablet Take 2 tablets (1,200 mg total) by mouth every 12 (twelve) hours 60 tablet 3    ipratropium-albuterol (DUO-NEB) 0.5-2.5 mg/3 mL nebulizer solution Take 3 mL by nebulization 4 (four) times a day 360 mL 3    lamoTRIgine (LaMICtal) 200 MG tablet Take 1 tablet (200 mg total) by mouth daily 30 tablet 1    levocetirizine (XYZAL) 5 MG tablet Take 1 tablet (5 mg total) by mouth every evening 30 tablet 11    losartan (COZAAR) 25 mg tablet take 1 tablet by mouth once daily 90 tablet 1    metoprolol succinate  (TOPROL-XL) 25 mg 24 hr tablet take 1 tablet by mouth once daily 90 tablet 1    ofloxacin (OCUFLOX) 0.3 % ophthalmic solution Administer 1 drop into the left eye 4 (four) times a day 5 mL 0    omeprazole (PriLOSEC) 20 mg delayed release capsule take 1 capsule by mouth twice a day 180 capsule 1    senna-docusate sodium (SENOKOT-S) 8.6-50 mg per tablet Take 3 tablets by mouth as needed        Stiolto Respimat 2.5-2.5 MCG/ACT inhaler inhale 2 puffs by mouth and INTO THE LUNGS once daily 4 g 11     No current facility-administered medications for this visit.        No Known Allergies    Review of Systems    Video Exam    There were no vitals filed for this visit.    Physical Exam

## 2025-01-29 NOTE — PSYCH
"Behavioral Health Psychotherapy Progress Note    Psychotherapy Provided: Individual Psychotherapy     1. Moderate mixed bipolar II disorder (HCC)        2. Generalized anxiety disorder        3. Chronic post-traumatic stress disorder (PTSD)        4. Tobacco abuse        5. Cigarette nicotine dependence without complication        6. Marijuana use        7. Alcohol abuse, in remission            Goals addressed in session: Goal 1     DATA: Marie reported worrying about her landlords who are in [place] taking care of one of their loved ones and was commended for her compassionate perspective and redirected towards her resources, protective factors, and support system. Marie provided examples of her ways to deal with difficult emotions and was praised for her diligence and willingness to practice Mindfulness and learn more skills in the weekly Relaxation group.   During this session, this clinician used the following therapeutic modalities: Client-centered Therapy, Cognitive Behavioral Therapy, Dialectical Behavior Therapy, Mindfulness-based Strategies, and Supportive Psychotherapy    Substance Abuse was not addressed during this session. If the client is diagnosed with a co-occurring substance use disorder, please indicate any changes in the frequency or amount of use: NA. Stage of change for addressing substance use diagnoses: No substance use/Not applicable    ASSESSMENT:  Marie Portillo presents with a Euthymic/ normal, Anxious, and Depressed mood.     her affect is Normal range and intensity, which is congruent, with her mood and the content of the session. The client has made progress on their goals.    Marie seemed distracted and had difficulties to focus in the brought up topics. Marie Portillo presents with a low risk of suicide, low risk of self-harm, and none risk of harm to others.    For any risk assessment that surpasses a \"low\" rating, a safety plan must be developed.    A safety plan was indicated: no  If yes, " describe in detail NA    PLAN: Between sessions, Marie Portillo will attend the weekly Mindfulness group for self-care. At the next session, the therapist will use Client-centered Therapy, Cognitive Behavioral Therapy, Mindfulness-based Strategies, and Supportive Psychotherapy to address self-care and emotion regulation.    Behavioral Health Treatment Plan and Discharge Planning: Marie Portlilo is aware of and agrees to continue to work on their treatment plan. They have identified and are working toward their discharge goals. yes    Depression Follow-up Plan Completed: Not applicable    Visit start and stop times:    01/29/25  Start Time: 0155  Stop Time: 0242  Total Visit Time: 47 minutes

## 2025-01-31 ENCOUNTER — TELEMEDICINE (OUTPATIENT)
Dept: BEHAVIORAL/MENTAL HEALTH CLINIC | Facility: CLINIC | Age: 60
End: 2025-01-31
Payer: COMMERCIAL

## 2025-01-31 DIAGNOSIS — F43.12 CHRONIC POST-TRAUMATIC STRESS DISORDER (PTSD): ICD-10-CM

## 2025-01-31 DIAGNOSIS — F17.210 CIGARETTE NICOTINE DEPENDENCE WITHOUT COMPLICATION: ICD-10-CM

## 2025-01-31 DIAGNOSIS — Z72.0 TOBACCO ABUSE: ICD-10-CM

## 2025-01-31 DIAGNOSIS — F12.90 MARIJUANA USE: Chronic | ICD-10-CM

## 2025-01-31 DIAGNOSIS — F10.11 ALCOHOL ABUSE, IN REMISSION: ICD-10-CM

## 2025-01-31 DIAGNOSIS — F41.1 GENERALIZED ANXIETY DISORDER: ICD-10-CM

## 2025-01-31 DIAGNOSIS — F31.81 MODERATE MIXED BIPOLAR II DISORDER (HCC): Primary | ICD-10-CM

## 2025-01-31 PROCEDURE — 90853 GROUP PSYCHOTHERAPY: CPT | Performed by: COUNSELOR

## 2025-02-01 NOTE — GROUP NOTE
Behavioral Health Psychotherapy Progress Note    1. Moderate mixed bipolar II disorder (HCC)        2. Chronic post-traumatic stress disorder (PTSD)        3. Generalized anxiety disorder        4. Tobacco abuse        5. Cigarette nicotine dependence without complication        6. Marijuana use        7. Alcohol abuse, in remission            Attendees were guided through tapping on pain, diaphragmatic breathing through two different protocols, voice vibration through outbreath with other Vagus nerve stimulation techniques, and progressive muscle relaxation paired with the different breathing parts. Furthermore, the attendees were guided through Mindfulness-based meditation series on the topics of Deep Scan for Pain, Dilemma Meditation/Visualization on Two Challenging Protectors, Self-Love Meditation, Crown Chakra Meditation, and ending with positive self-affirmations for self-confidence, inner resources, trust in self-regulation, and moving on with optimism.      Goals addressed in session Goal 1        Data: Patient attended the Relaxation group and was guided through tapping on pain, diaphragmatic breathing through two different protocols, voice vibration through outbreath with other Vagus nerve stimulation techniques, and progressive muscle relaxation paired with the different breathing parts. Furthermore, the attendees were guided through Mindfulness-based meditation series on the topics of Deep Scan for Pain, Dilemma Meditation/Visualization on Two Challenging Protectors, Self-Love Meditation, Crown Chakra Meditation, and ending with positive self-affirmations for self-confidence, inner resources, trust in self-regulation, and moving on with optimism.          The Clinician used EFT, Mindfulness, Internal Family Systems, and Somatic approaches were used to achieve the deep state of relaxation and to maintain the serene environment for the exercise's purpose.     No substance abuse or alcohol problems were  addressed in the session. Stage of change NA     Assessment: Patient was in normal/euthymic mood and in congruence with broad affect, followed through the guidance and completed the exercises well, showed willingness to continue learning and practicing self-care in weekly Relaxation Group as well as individual therapy. No interactions between members occur after the start of the guidance except for some chat emojis and expressions of gratitude.       No safety plan was indicated. No safety issues are reported. No presence of SI or behaviors is determined based on self-reports.     Plan: Patient will practice deep breathing and other techniques based on each occasion to counter stress and will attend the next group to be able to take part in further guided exercises on chosen topics to build on the techniques learned in the beginning.     Behavioral Health Treatment Plan and Discharge Planning: Patient is aware of and agrees to continue to work on their treatment plan. They have identified and are working toward their discharge goals. Yes    01/31/25  Start Time: 0400  Stop Time: 0500  Total Visit Time: 60 minutes

## 2025-02-05 ENCOUNTER — TELEMEDICINE (OUTPATIENT)
Dept: BEHAVIORAL/MENTAL HEALTH CLINIC | Facility: CLINIC | Age: 60
End: 2025-02-05
Payer: COMMERCIAL

## 2025-02-05 DIAGNOSIS — F17.210 CIGARETTE NICOTINE DEPENDENCE WITHOUT COMPLICATION: ICD-10-CM

## 2025-02-05 DIAGNOSIS — F31.81 MODERATE MIXED BIPOLAR II DISORDER (HCC): Primary | ICD-10-CM

## 2025-02-05 DIAGNOSIS — F43.12 CHRONIC POST-TRAUMATIC STRESS DISORDER (PTSD): ICD-10-CM

## 2025-02-05 DIAGNOSIS — F41.1 GENERALIZED ANXIETY DISORDER: ICD-10-CM

## 2025-02-05 DIAGNOSIS — F10.11 ALCOHOL ABUSE, IN REMISSION: ICD-10-CM

## 2025-02-05 DIAGNOSIS — Z72.0 TOBACCO ABUSE: ICD-10-CM

## 2025-02-05 DIAGNOSIS — F12.90 MARIJUANA USE: Chronic | ICD-10-CM

## 2025-02-05 PROCEDURE — 90832 PSYTX W PT 30 MINUTES: CPT | Performed by: COUNSELOR

## 2025-02-05 NOTE — PSYCH
Virtual Regular Visit    Verification of patient location:    Patient is located at Home in the following state in which I hold an active license [place]      Assessment/Plan:    Problem List Items Addressed This Visit       Moderate mixed bipolar II disorder (HCC) - Primary    Generalized anxiety disorder    Chronic post-traumatic stress disorder (PTSD)    Alcohol abuse, in remission    Marijuana use (Chronic)    Cigarette nicotine dependence without complication    Tobacco abuse       Goals addressed in session: Goal 1     Depression Follow-up Plan Completed: Not applicable    Reason for visit is No chief complaint on file.       Encounter provider Isabel Watts LPC      Recent Visits  Date Type Provider Dept   01/31/25 Telemedicine Isabel Watts LPC [facility] Therapist [place]   01/29/25 Telemedicine Isabel Watts LPC [facility] Therapist [place]   Showing recent visits within past 7 days and meeting all other requirements  Future Appointments  No visits were found meeting these conditions.  Showing future appointments within next 150 days and meeting all other requirements       The patient was identified by name and date of birth. Marie Portillo was informed that this is a telemedicine visit and that the visit is being conducted throughthe [employer] Teams platform. She agrees to proceed..  My office door was closed. No one else was in the room.  She acknowledged consent and understanding of privacy and security of the video platform. The patient has agreed to participate and understands they can discontinue the visit at any time.    Patient is aware this is a billable service.     Subjective  Marie Portillo is a 59 y.o. female  .      HPI     Past Medical History:   Diagnosis Date    Bilateral carpal tunnel syndrome 08/20/2019    Cardiomyopathy (HCC)     Carpal tunnel syndrome of right wrist 10/30/2019    Added automatically from request for surgery [id number]    Chlamydia     COPD  (chronic obstructive pulmonary disease) (HCC)     Depression     Hypertension     Varicella        Past Surgical History:   Procedure Laterality Date    BACK SURGERY      BREAST BIOPSY Left 20yrs ago benign    CERVICAL SPINE SURGERY      EGD      HERNIA REPAIR      OOPHORECTOMY Right     [place] [facility] W/RLS TRANSVRS CARPL LIGM Right 01/21/2020    Procedure: RELEASE CARPAL TUNNEL ENDOSCOPIC;  Surgeon: Sam Nguyen MD;  [facility];  Service: Orthopedics    [place] [facility]G W/RLS TRANSVRS CARPL LIGM Left 01/28/2020    Procedure: RELEASE CARPAL TUNNEL ENDOSCOPIC;  Surgeon: Sam Nguyen MD;  [facility];  Service: Orthopedics       Current Outpatient Medications   Medication Sig Dispense Refill    Acetaminophen 500 MG Take 2 capsules by mouth every 4 (four) hours as needed for mild pain      albuterol (PROVENTIL HFA,VENTOLIN HFA) 90 mcg/act inhaler inhale 2 puffs by mouth and INTO THE LUNGS every 6 hours IF NEEDED FOR WHEEZING. 8.5 g 2    betamethasone dipropionate (DIPROSONE) 0.05 % cream Apply topically 2 (two) times a day (Patient not taking: Reported on 12/11/2024) 30 g 0    buPROPion (WELLBUTRIN XL) 300 mg 24 hr tablet Take 1 tablet (300 mg total) by mouth daily 30 tablet 1    cholecalciferol (VITAMIN D3) 400 units tablet Take 400 Units by mouth daily      fluticasone (FLONASE) 50 mcg/act nasal spray 2 sprays into each nostril daily 16 g 11    guaiFENesin (MUCINEX) 600 mg 12 hr tablet Take 2 tablets (1,200 mg total) by mouth every 12 (twelve) hours 60 tablet 3    ipratropium-albuterol (DUO-NEB) 0.5-2.5 mg/3 mL nebulizer solution Take 3 mL by nebulization 4 (four) times a day 360 mL 3    lamoTRIgine (LaMICtal) 200 MG tablet Take 1 tablet (200 mg total) by mouth daily 30 tablet 1    levocetirizine (XYZAL) 5 MG tablet Take 1 tablet (5 mg total) by mouth every evening 30 tablet 11    losartan (COZAAR) 25 mg tablet take 1 tablet by mouth once daily 90 tablet 1    metoprolol succinate  (TOPROL-XL) 25 mg 24 hr tablet take 1 tablet by mouth once daily 90 tablet 1    ofloxacin (OCUFLOX) 0.3 % ophthalmic solution Administer 1 drop into the left eye 4 (four) times a day 5 mL 0    omeprazole (PriLOSEC) 20 mg delayed release capsule take 1 capsule by mouth twice a day 180 capsule 1    senna-docusate sodium (SENOKOT-S) 8.6-50 mg per tablet Take 3 tablets by mouth as needed        Stiolto Respimat 2.5-2.5 MCG/ACT inhaler inhale 2 puffs by mouth and INTO THE LUNGS once daily 4 g 11     No current facility-administered medications for this visit.        No Known Allergies    Review of Systems    Video Exam    There were no vitals filed for this visit.    Physical Exam

## 2025-02-05 NOTE — PSYCH
"Behavioral Health Psychotherapy Progress Note    Psychotherapy Provided: Individual Psychotherapy     1. Moderate mixed bipolar II disorder (HCC)        2. Generalized anxiety disorder        3. Chronic post-traumatic stress disorder (PTSD)        4. Tobacco abuse        5. Cigarette nicotine dependence without complication        6. Marijuana use        7. Alcohol abuse, in remission            Goals addressed in session: Goal 1     DATA: Marie reported feeling a bit concerned about her future and roof over her head due to her landlords changed situation where one of their sisters got terminal and is moving with them from [place]. She was allowed to express her emotions and was encouraged to go over her worst scenarios to alleviate fear response and envisioned a possible residence with one of her friends. Marie was praised for her self-care activities and encouraged to expand on the effective ones.    During this session, this clinician used the following therapeutic modalities: Client-centered Therapy, Cognitive Behavioral Therapy, Mindfulness-based Strategies, and Supportive Psychotherapy    Substance Abuse was not addressed during this session. If the client is diagnosed with a co-occurring substance use disorder, please indicate any changes in the frequency or amount of use: NA. Stage of change for addressing substance use diagnoses: Pre-Contemplation.     ASSESSMENT:  Marie Portillo presents with a Euthymic/ normal mood.     her affect is Normal range and intensity, which is congruent, with her mood and the content of the session. The client has made progress on their goals.    Marie seemed more centered and focused than previously noted and observed. Marie Portillo presents with a minimal risk of suicide, minimal risk of self-harm, and none risk of harm to others.    For any risk assessment that surpasses a \"low\" rating, a safety plan must be developed.    A safety plan was indicated: no  If yes, describe in detail " NA    PLAN: Between sessions, Marie Portillo will use her learned skills to attend to intense emotions better and will attend weekly relaxation/Mindfulness group for continuous self-care. At the next session, the therapist will use Client-centered Therapy, Cognitive Behavioral Therapy, Mindfulness-based Strategies, and Supportive Psychotherapy to address self-care and residence.    Behavioral Health Treatment Plan and Discharge Planning: Marie Portillo is aware of and agrees to continue to work on their treatment plan. They have identified and are working toward their discharge goals. yes    Depression Follow-up Plan Completed: Not applicable    Visit start and stop times:    02/05/25  Start Time: 0155  Stop Time: 0222  Total Visit Time: 27 minutes

## 2025-02-07 ENCOUNTER — TELEMEDICINE (OUTPATIENT)
Dept: BEHAVIORAL/MENTAL HEALTH CLINIC | Facility: CLINIC | Age: 60
End: 2025-02-07
Payer: COMMERCIAL

## 2025-02-07 DIAGNOSIS — Z72.0 TOBACCO ABUSE: ICD-10-CM

## 2025-02-07 DIAGNOSIS — F10.11 ALCOHOL ABUSE, IN REMISSION: ICD-10-CM

## 2025-02-07 DIAGNOSIS — F12.90 MARIJUANA USE: Chronic | ICD-10-CM

## 2025-02-07 DIAGNOSIS — F41.1 GENERALIZED ANXIETY DISORDER: ICD-10-CM

## 2025-02-07 DIAGNOSIS — F43.12 CHRONIC POST-TRAUMATIC STRESS DISORDER (PTSD): ICD-10-CM

## 2025-02-07 DIAGNOSIS — F17.210 CIGARETTE NICOTINE DEPENDENCE WITHOUT COMPLICATION: ICD-10-CM

## 2025-02-07 DIAGNOSIS — F31.81 MODERATE MIXED BIPOLAR II DISORDER (HCC): Primary | ICD-10-CM

## 2025-02-07 PROCEDURE — 90853 GROUP PSYCHOTHERAPY: CPT | Performed by: COUNSELOR

## 2025-02-08 NOTE — GROUP NOTE
Behavioral Health Psychotherapy Progress Note    1. Moderate mixed bipolar II disorder (HCC)        2. Generalized anxiety disorder        3. Chronic post-traumatic stress disorder (PTSD)        4. Tobacco abuse        5. Cigarette nicotine dependence without complication        6. Marijuana use        7. Alcohol abuse, in remission                The attendees were guided through tapping on anxiety, diaphragmatic breathing through two different protocols, voice vibration through outbreath with other Vagus nerve stimulation techniques, and progressive muscle relaxation paired with the different breathing parts. Furthermore, the attendees were guided through Mindfulness-based meditation series on the topics of Tranquility [place] with Peaceful Waves Visualization, Meeting the Inner Critic, Integration with the Inner Child, Self-Compassion and Root Chakra Meditation, with Imagery and ending with positive self-affirmations for self-love, self-esteem, forgiveness, and gratitude.        Goals addressed in session Goal 1        Data: Patient attended the Relaxation group and was guided through tapping on anxiety, diaphragmatic breathing through two different protocols, voice vibration through outbreath with other Vagus nerve stimulation techniques, and progressive muscle relaxation paired with the different breathing parts. Furthermore, the attendees were guided through Mindfulness-based meditation series on the topics of Tranquility [place] with Peaceful Waves Visualization, Meeting the Inner Critic, Integration with the Inner Child, Self-Compassion and Root Chakra Meditation, with Imagery and ending with positive self-affirmations for self-love, self-esteem, forgiveness, and gratitude.          During the Session, the Clinician used EFT, Mindfulness, Internal Family Systems, and Somatic approaches were used to achieve the deep state of relaxation and to maintain the serene environment for the exercise's purpose.     No  substance abuse or alcohol problems were addressed in the session. Stage of change NA     Assessment: Patient was in normal/euthymic mood and in congruence with broad affect, followed through the guidance and completed the exercises well, showed willingness to continue learning and practicing self-care in weekly Relaxation Group as well as individual therapy. No interactions between members occur after the start of the guidance except for some chat emojis and expressions of gratitude.       No safety plan was indicated. No safety issues are reported. No presence of SI or behaviors is determined based on self-reports.     Plan: The next group is scheduled for Friday depending on the Pt's availability. Patient will practice deep breathing and other techniques based on each occasion to counter stress and will attend the next group to be able to take part in further guided exercises on chosen topics to build on the techniques learned in the beginning.     Behavioral Health Treatment Plan and Discharge Planning: Patient is aware of and agrees to continue to work on their treatment plan. They have identified and are working toward their discharge goals. Yes    02/07/25  Start Time: 0415  Stop Time: 0500  Total Visit Time: 45 minutes

## 2025-02-12 ENCOUNTER — TELEMEDICINE (OUTPATIENT)
Dept: BEHAVIORAL/MENTAL HEALTH CLINIC | Facility: CLINIC | Age: 60
End: 2025-02-12
Payer: COMMERCIAL

## 2025-02-12 DIAGNOSIS — F31.81 MODERATE MIXED BIPOLAR II DISORDER (HCC): Primary | ICD-10-CM

## 2025-02-12 DIAGNOSIS — F43.12 CHRONIC POST-TRAUMATIC STRESS DISORDER (PTSD): ICD-10-CM

## 2025-02-12 DIAGNOSIS — F17.210 CIGARETTE NICOTINE DEPENDENCE WITHOUT COMPLICATION: ICD-10-CM

## 2025-02-12 DIAGNOSIS — F41.1 GENERALIZED ANXIETY DISORDER: ICD-10-CM

## 2025-02-12 DIAGNOSIS — F10.11 ALCOHOL ABUSE, IN REMISSION: ICD-10-CM

## 2025-02-12 DIAGNOSIS — Z72.0 TOBACCO ABUSE: ICD-10-CM

## 2025-02-12 DIAGNOSIS — F12.90 MARIJUANA USE: Chronic | ICD-10-CM

## 2025-02-12 PROCEDURE — 90832 PSYTX W PT 30 MINUTES: CPT | Performed by: COUNSELOR

## 2025-02-13 NOTE — PSYCH
Behavioral Health Psychotherapy Progress Note    Psychotherapy Provided: Individual Psychotherapy     1. Moderate mixed bipolar II disorder (HCC)        2. Chronic post-traumatic stress disorder (PTSD)        3. Generalized anxiety disorder        4. Tobacco abuse        5. Cigarette nicotine dependence without complication        6. Marijuana use        7. Alcohol abuse, in remission            Goals addressed in session: Goal 1     DATA: Marie reported having partial privacy and her session was short. She stated that the family of her landlords and friends Peter and Alaina got a dog that bit Peter twice and gave them a hard time. She was allowed to express herself and validated for ehr concerns and protest for the dog-protective services that did not respond promptly to remove the dog from there. Marie was commended for being a good and dedicated friend and helping them move on from this unfortunate incident and thinking how she may help Peter's sister who will be moving there from [place] in April.   During this session, this clinician used the following therapeutic modalities: Client-centered Therapy, Cognitive Behavioral Therapy, Mindfulness-based Strategies, Prolonged Exposure, and Solution-Focused Therapy    Substance Abuse was not addressed during this session. If the client is diagnosed with a co-occurring substance use disorder, please indicate any changes in the frequency or amount of use: NA. Stage of change for addressing substance use diagnoses: Pre-contemplation    ASSESSMENT:  Marie Portillo presents with a Euthymic/ normal mood.     her affect is Normal range and intensity, which is congruent, with her mood and the content of the session. The client has made progress on their goals.    Marie was calmer than usual and more present and introspective. Marie Portillo presents with a minimal risk of suicide, minimal risk of self-harm, and none risk of harm to others.    For any risk assessment that surpasses a  "\"low\" rating, a safety plan must be developed.    A safety plan was indicated: no  If yes, describe in detail NA    PLAN: Between sessions, Marie Portillo will attend weekly Relaxation group for self-care. At the next session, the therapist will use Client-centered Therapy, Cognitive Behavioral Therapy, Dialectical Behavior Therapy, Mindfulness-based Strategies, Supportive Psychotherapy, and Somatic strategies  to address adequate self-care and somatic approach.    Behavioral Health Treatment Plan and Discharge Planning: Marie Portillo is aware of and agrees to continue to work on their treatment plan. They have identified and are working toward their discharge goals. yes    Depression Follow-up Plan Completed: Not applicable    Visit start and stop times:    02/12/25  Start Time: 0152  Stop Time: 0212  Total Visit Time: 20 minutes  "

## 2025-02-13 NOTE — PSYCH
Virtual Regular Visit    Verification of patient location:    Patient is located at Home in the following state in which I hold an active license [place]      Assessment/Plan:    Problem List Items Addressed This Visit     Moderate mixed bipolar II disorder (HCC) - Primary    Generalized anxiety disorder    Chronic post-traumatic stress disorder (PTSD)    Alcohol abuse, in remission    Marijuana use (Chronic)    Cigarette nicotine dependence without complication    Tobacco abuse       Goals addressed in session: Goal 1     Depression Follow-up Plan Completed: Not applicable    Reason for visit is   Chief Complaint   Patient presents with   • Virtual Regular Visit          Encounter provider Isabel Watts LPC      Recent Visits  Date Type Provider Dept   02/12/25 Telemedicine Isabel Watts LPC [facility] Therapist [place]   02/07/25 Telemedicine Isabel Watts LPC [facility] Therapist [place]   Showing recent visits within past 7 days and meeting all other requirements  Future Appointments  No visits were found meeting these conditions.  Showing future appointments within next 150 days and meeting all other requirements       The patient was identified by name and date of birth. Marie Portillo was informed that this is a telemedicine visit and that the visit is being conducted throughthe [employer] Teams platform. She agrees to proceed..  My office door was closed. No one else was in the room.  She acknowledged consent and understanding of privacy and security of the video platform. The patient has agreed to participate and understands they can discontinue the visit at any time.    Patient is aware this is a billable service.     Subjective  Marie Portillo is a 59 y.o. female  .      HPI     Past Medical History:   Diagnosis Date   • Bilateral carpal tunnel syndrome 08/20/2019   • Cardiomyopathy (HCC)    • Carpal tunnel syndrome of right wrist 10/30/2019    Added automatically from request  for surgery [id number]   • Chlamydia    • COPD (chronic obstructive pulmonary disease) (HCC)    • Depression    • Hypertension    • Varicella        Past Surgical History:   Procedure Laterality Date   • BACK SURGERY     • BREAST BIOPSY Left 20yrs ago benign   • CERVICAL SPINE SURGERY     • EGD     • HERNIA REPAIR     • OOPHORECTOMY Right    • [place] [facility] WRST SURG W/RLS TRANSVRS CARPL LIGM Right 01/21/2020    Procedure: RELEASE CARPAL TUNNEL ENDOSCOPIC;  Surgeon: Sam Nguyen MD;  [facility];  Service: Orthopedics   • [place] [facility] WRST SURG W/RLS TRANSVRS CARPL LIGM Left 01/28/2020    Procedure: RELEASE CARPAL TUNNEL ENDOSCOPIC;  Surgeon: Sam Nguyen MD;  [facility];  Service: Orthopedics       Current Outpatient Medications   Medication Sig Dispense Refill   • Acetaminophen 500 MG Take 2 capsules by mouth every 4 (four) hours as needed for mild pain     • albuterol (PROVENTIL HFA,VENTOLIN HFA) 90 mcg/act inhaler inhale 2 puffs by mouth and INTO THE LUNGS every 6 hours IF NEEDED FOR WHEEZING. 8.5 g 2   • betamethasone dipropionate (DIPROSONE) 0.05 % cream Apply topically 2 (two) times a day (Patient not taking: Reported on 12/11/2024) 30 g 0   • buPROPion (WELLBUTRIN XL) 300 mg 24 hr tablet Take 1 tablet (300 mg total) by mouth daily 30 tablet 1   • cholecalciferol (VITAMIN D3) 400 units tablet Take 400 Units by mouth daily     • fluticasone (FLONASE) 50 mcg/act nasal spray 2 sprays into each nostril daily 16 g 11   • guaiFENesin (MUCINEX) 600 mg 12 hr tablet Take 2 tablets (1,200 mg total) by mouth every 12 (twelve) hours 60 tablet 3   • ipratropium-albuterol (DUO-NEB) 0.5-2.5 mg/3 mL nebulizer solution Take 3 mL by nebulization 4 (four) times a day 360 mL 3   • lamoTRIgine (LaMICtal) 200 MG tablet Take 1 tablet (200 mg total) by mouth daily 30 tablet 1   • levocetirizine (XYZAL) 5 MG tablet Take 1 tablet (5 mg total) by mouth every evening 30 tablet 11   • losartan (COZAAR) 25 mg tablet take  1 tablet by mouth once daily 90 tablet 1   • metoprolol succinate (TOPROL-XL) 25 mg 24 hr tablet take 1 tablet by mouth once daily 90 tablet 1   • ofloxacin (OCUFLOX) 0.3 % ophthalmic solution Administer 1 drop into the left eye 4 (four) times a day 5 mL 0   • omeprazole (PriLOSEC) 20 mg delayed release capsule take 1 capsule by mouth twice a day 180 capsule 1   • senna-docusate sodium (SENOKOT-S) 8.6-50 mg per tablet Take 3 tablets by mouth as needed       • Stiolto Respimat 2.5-2.5 MCG/ACT inhaler inhale 2 puffs by mouth and INTO THE LUNGS once daily 4 g 11     No current facility-administered medications for this visit.        No Known Allergies    Review of Systems    Video Exam    There were no vitals filed for this visit.    Physical Exam

## 2025-02-14 ENCOUNTER — TELEMEDICINE (OUTPATIENT)
Dept: BEHAVIORAL/MENTAL HEALTH CLINIC | Facility: CLINIC | Age: 60
End: 2025-02-14

## 2025-02-14 DIAGNOSIS — Z72.0 TOBACCO ABUSE: ICD-10-CM

## 2025-02-14 DIAGNOSIS — F43.12 CHRONIC POST-TRAUMATIC STRESS DISORDER (PTSD): ICD-10-CM

## 2025-02-14 DIAGNOSIS — F17.210 CIGARETTE NICOTINE DEPENDENCE WITHOUT COMPLICATION: ICD-10-CM

## 2025-02-14 DIAGNOSIS — F41.1 GENERALIZED ANXIETY DISORDER: ICD-10-CM

## 2025-02-14 DIAGNOSIS — F31.81 MODERATE MIXED BIPOLAR II DISORDER (HCC): Primary | ICD-10-CM

## 2025-02-14 DIAGNOSIS — F10.11 ALCOHOL ABUSE, IN REMISSION: ICD-10-CM

## 2025-02-14 DIAGNOSIS — F12.90 MARIJUANA USE: Chronic | ICD-10-CM

## 2025-02-17 NOTE — GROUP NOTE
Behavioral Health Psychotherapy Progress Note      1. Moderate mixed bipolar II disorder (HCC)          2. PTSD (post-traumatic stress disorder)          3. Generalized anxiety disorder          4. Cigarette nicotine dependence without complication          5. Tobacco abuse          6. Marijuana use          7. Alcohol abuse, in remission           Attendees guided through tapping on trauma, diaphragmatic breathing through two different protocols, voice vibration through outbreath or humming with other Vagus nerve stimulation techniques, and progressive muscle relaxation paired with the different breathing parts. Furthermore, the attendees were guided through Mindfulness-based meditation series on the topics of Compassionate Body Scan, Gratitude & Appreciation for Little Things in Life, Observation of Judgment with Awareness, Wildlife Sanctuary Imagery, Sacral Chakra Meditation, and ending with positive self-affirmations for confidence, healthy boundaries, respect for self and others, and gratitude for what one's has.        Data: Patient attended the Relaxation group and was guided through tapping on trauma, diaphragmatic breathing through two different protocols, voice vibration through outbreath or humming with other Vagus nerve stimulation techniques, and progressive muscle relaxation paired with the different breathing parts. Furthermore, the attendees were guided through Mindfulness-based meditation series on the topics of Compassionate Body Scan, Gratitude & Appreciation for Little Things in Life, Observation of Judgment with Awareness, Wildlife Sanctuary Imagery, Sacral Chakra Meditation, and ending with positive self-affirmations for confidence, healthy boundaries, respect for self and others, and gratitude for what one's has.      During the Session, the Clinician used EFT, Mindfulness, Internal Family Systems, and Somatic approaches were used to achieve the deep state of relaxation and to maintain the  serene environment for the exercise's purpose.     No substance abuse or alcohol problems were addressed in the session. Stage of change NA     Assessment: Patient was in normal/euthymic mood and in congruence with broad affect, followed through the guidance and completed the exercises well, showed willingness to continue learning and practicing self-care in weekly Relaxation Group as well as individual therapy. No interactions between members occur after the start of the guidance except for some chat emojis and expressions of gratitude.       No safety plan was indicated. No safety issues are reported. No presence of SI or behaviors is determined based on self-reports.     Plan: The next group is scheduled for Friday depending on the Pt's availability. Patient will practice deep breathing and other techniques based on each occasion to counter stress and will attend the next group to be able to take part in further guided exercises on chosen topics to build on the techniques learned in the beginning.     Behavioral Health Treatment Plan and Discharge Planning: Patient is aware of and agrees to continue to work on their treatment plan. They have identified and are working toward their discharge goals. Yes

## 2025-02-19 ENCOUNTER — TELEMEDICINE (OUTPATIENT)
Dept: BEHAVIORAL/MENTAL HEALTH CLINIC | Facility: CLINIC | Age: 60
End: 2025-02-19
Payer: COMMERCIAL

## 2025-02-19 DIAGNOSIS — Z72.0 TOBACCO ABUSE: ICD-10-CM

## 2025-02-19 DIAGNOSIS — F43.12 CHRONIC POST-TRAUMATIC STRESS DISORDER (PTSD): ICD-10-CM

## 2025-02-19 DIAGNOSIS — F41.1 GENERALIZED ANXIETY DISORDER: ICD-10-CM

## 2025-02-19 DIAGNOSIS — F31.81 MODERATE MIXED BIPOLAR II DISORDER (HCC): Primary | ICD-10-CM

## 2025-02-19 DIAGNOSIS — F12.90 MARIJUANA USE: Chronic | ICD-10-CM

## 2025-02-19 DIAGNOSIS — F10.11 ALCOHOL ABUSE, IN REMISSION: ICD-10-CM

## 2025-02-19 DIAGNOSIS — F17.210 CIGARETTE NICOTINE DEPENDENCE WITHOUT COMPLICATION: ICD-10-CM

## 2025-02-19 PROCEDURE — 90834 PSYTX W PT 45 MINUTES: CPT | Performed by: COUNSELOR

## 2025-02-19 NOTE — PSYCH
Virtual Regular VisitName: Marie Portillo      : 1965      MRN: [id number]  Encounter Provider: Isabel Watts LPC  Encounter Date: 2025   Encounter department: [facility] [place]  :  Assessment & Plan  Moderate mixed bipolar II disorder (HCC)         Chronic post-traumatic stress disorder (PTSD)         Generalized anxiety disorder         Tobacco abuse         Cigarette nicotine dependence without complication         Alcohol abuse, in remission         Marijuana use             Goals addressed in session: Goal 1     Depression Follow-up Plan Completed: Not applicable    Reason for visit is No chief complaint on file.     Recent Visits  Date Type Provider Dept   25 Telemedicine Isabel Watts LPC [facility] [place]   25 Telemedicine Isabel Watts LPC [facility] [place]   Showing recent visits within past 7 days and meeting all other requirements  Today's Visits  Date Type Provider Dept   25 Telemedicine Isabel Watts LPC [facility] [place]   Showing today's visits and meeting all other requirements  Future Appointments  No visits were found meeting these conditions.  Showing future appointments within next 150 days and meeting all other requirements     History of Present Illness     Marie Portillo is a 59 y.o. female  .  HPI    Past Medical History:   Diagnosis Date   • Bilateral carpal tunnel syndrome 2019   • Cardiomyopathy (HCC)    • Carpal tunnel syndrome of right wrist 10/30/2019    Added automatically from request for surgery [id number]   • Chlamydia    • COPD (chronic obstructive pulmonary disease) (HCC)    • Depression    • Hypertension    • Varicella      Past Surgical History:   Procedure Laterality Date   • BACK SURGERY     • BREAST BIOPSY Left 20yrs ago benign   • CERVICAL SPINE SURGERY     • EGD     • HERNIA REPAIR     • OOPHORECTOMY Right    • NM NDSC  WRST SURG W/RLS TRANSVRS CARPL LIGM Right 01/21/2020    Procedure: RELEASE CARPAL TUNNEL ENDOSCOPIC;  Surgeon: Sam Nguyen MD;  [facility];  Service: Orthopedics   • ID NDSC WRST SURG W/RLS TRANSVRS CARPL LIGM Left 01/28/2020    Procedure: RELEASE CARPAL TUNNEL ENDOSCOPIC;  Surgeon: Sam Nguyen MD;  Location: [facility];  Service: Orthopedics     Current Outpatient Medications   Medication Instructions   • Acetaminophen 500 MG 2 capsules, Every 4 hours PRN   • albuterol (PROVENTIL HFA,VENTOLIN HFA) 90 mcg/act inhaler inhale 2 puffs by mouth and INTO THE LUNGS every 6 hours IF NEEDED FOR WHEEZING.   • betamethasone dipropionate (DIPROSONE) 0.05 % cream Topical, 2 times daily   • buPROPion (WELLBUTRIN XL) 300 mg, Oral, Daily   • cholecalciferol (VITAMIN D3) 400 Units, Daily   • fluticasone (FLONASE) 50 mcg/act nasal spray 2 sprays, Nasal, Daily   • guaiFENesin (MUCINEX) 1,200 mg, Oral, Every 12 hours scheduled   • ipratropium-albuterol (DUO-NEB) 0.5-2.5 mg/3 mL nebulizer solution 3 mL, Nebulization, 4 times daily   • lamoTRIgine (LAMICTAL) 200 mg, Oral, Daily   • levocetirizine (XYZAL) 5 mg, Oral, Every evening   • losartan (COZAAR) 25 mg, Oral, Daily   • metoprolol succinate (TOPROL-XL) 25 mg, Oral, Daily   • ofloxacin (OCUFLOX) 0.3 % ophthalmic solution 1 drop, Left Eye, 4 times daily   • omeprazole (PriLOSEC) 20 mg delayed release capsule take 1 capsule by mouth twice a day   • senna-docusate sodium (SENOKOT-S) 8.6-50 mg per tablet 3 tablets, As needed   • Stiolto Respimat 2.5-2.5 MCG/ACT inhaler inhale 2 puffs by mouth and INTO THE LUNGS once daily     No Known Allergies    Review of Systems    Objective   LMP  (LMP Unknown)     Video Exam  Physical Exam     Administrative Statements   Encounter provider Isabel Watts LPC    The Patient is located at Home and in the following state in which I hold an active license [place].    The patient was identified by name and date of birth. Marie  Bandar was informed that this is a telemedicine visit and that the visit is being conducted through the [employer] Teams platform. She agrees to proceed..  My office door was closed. No one else was in the room.  She acknowledged consent and understanding of privacy and security of the video platform. The patient has agreed to participate and understands they can discontinue the visit at any time.    Behavioral Health Psychotherapy Progress Note    Psychotherapy Provided: Individual Psychotherapy     1. Moderate mixed bipolar II disorder (HCC)        2. Chronic post-traumatic stress disorder (PTSD)        3. Generalized anxiety disorder        4. Tobacco abuse        5. Cigarette nicotine dependence without complication        6. Alcohol abuse, in remission        7. Marijuana use            Goals addressed in session: Goal 1     DATA: Marie reported sensing big pressure headache a little time ago and now feeling drained and was allowed to express herself and her frustration due to fear of the head/neck surgery that could resolve this to some extent. She was validated for her concerns and fears and was commended for being transparent and considering to discus this with her surgeon.    During this session, this clinician used the following therapeutic modalities: Client-centered Therapy, Cognitive Behavioral Therapy, Mindfulness-based Strategies, and Supportive Psychotherapy    Substance Abuse was not addressed during this session. If the client is diagnosed with a co-occurring substance use disorder, please indicate any changes in the frequency or amount of use: NA. Stage of change for addressing substance use diagnoses: Pre-contemplation    ASSESSMENT:  Marie Portillo presents with a Euthymic/ normal mood.     her affect is Normal range and intensity, which is congruent, with her mood and the content of the session. The client has made progress on their goals.    Marie was partially present and expressive and did well  "in conveying her thoughts and ideas. Marie Portillo presents with a low risk of suicide, low risk of self-harm, and none risk of harm to others.    For any risk assessment that surpasses a \"low\" rating, a safety plan must be developed.    A safety plan was indicated: no  If yes, describe in detail NA    PLAN: Between sessions, Marie Portillo will attend weekly Relaxation group for self-care and practicing more skills regularly. At the next session, the therapist will use Client-centered Therapy, Cognitive Behavioral Therapy, Mindfulness-based Strategies, and Supportive Psychotherapy to address self-care.    Behavioral Health Treatment Plan and Discharge Planning: Marie Portillo is aware of and agrees to continue to work on their treatment plan. They have identified and are working toward their discharge goals. yes    Depression Follow-up Plan Completed: Not applicable    Visit start and stop times:    02/19/25  Start Time: 0205  Stop Time: 0244  Total Visit Time: 39 minutes      "

## 2025-02-21 ENCOUNTER — TELEMEDICINE (OUTPATIENT)
Dept: BEHAVIORAL/MENTAL HEALTH CLINIC | Facility: CLINIC | Age: 60
End: 2025-02-21
Payer: COMMERCIAL

## 2025-02-21 DIAGNOSIS — F10.11 ALCOHOL ABUSE, IN REMISSION: ICD-10-CM

## 2025-02-21 DIAGNOSIS — F31.81 MODERATE MIXED BIPOLAR II DISORDER (HCC): Primary | ICD-10-CM

## 2025-02-21 DIAGNOSIS — F43.12 CHRONIC POST-TRAUMATIC STRESS DISORDER (PTSD): ICD-10-CM

## 2025-02-21 DIAGNOSIS — F12.90 MARIJUANA USE: Chronic | ICD-10-CM

## 2025-02-21 DIAGNOSIS — F41.1 GENERALIZED ANXIETY DISORDER: ICD-10-CM

## 2025-02-21 DIAGNOSIS — Z72.0 TOBACCO ABUSE: ICD-10-CM

## 2025-02-21 DIAGNOSIS — F17.210 CIGARETTE NICOTINE DEPENDENCE WITHOUT COMPLICATION: ICD-10-CM

## 2025-02-21 PROCEDURE — 90853 GROUP PSYCHOTHERAPY: CPT | Performed by: COUNSELOR

## 2025-02-22 NOTE — GROUP NOTE
Behavioral Health Psychotherapy Progress Note    1. Moderate mixed bipolar II disorder (HCC)        2. Generalized anxiety disorder        3. Chronic post-traumatic stress disorder (PTSD)        4. Tobacco abuse        5. Cigarette nicotine dependence without complication        6. Marijuana use        7. Alcohol abuse, in remission              Attendees were guided through series of breathing and muscle relaxation exercises and Mindfulness-based guided meditation exercises wrapped up with positive self-affirmations.      Goals addressed in session Goal 1    Data: Patient attended the virtual Relaxation group and was guided through tapping on pain, diaphragmatic breathing through two different protocols, voice vibration through outbreath or humming with other Vagus nerve stimulation techniques, and progressive muscle relaxation paired with the different breathing parts. Furthermore, within the virtual hour, the attendees were guided through Mindfulness-based meditation series on topics of Deep Body Scan, Tranquility Train Imagery, Inner Child Healing, Solar Plexus Chakra Meditation, and ending with positive self-affirmations for self-compassion, self-esteem, inner strengths and resources, independence and empowerment, and growing from attachment to old habits and counterproductive models.    During the Session, the Clinician used EFT, Mindfulness, Internal Family Systems, and Somatic approaches were used to achieve the deep state of relaxation and to maintain the serene environment for the exercise's purpose.     No substance abuse or alcohol problems were addressed in the session. Stage of change NA     Assessment: Patient was in normal/euthymic mood and in congruence with broad affect, followed through the guidance and completed the exercises well, showed willingness to continue learning and practicing self-care in weekly Relaxation Group as well as individual therapy. No interactions between members occur after  the start of the guidance except for some chat emojis and expressions of gratitude.       No safety plan was indicated. No safety issues are reported. No presence of SI or behaviors is determined based on self-reports.     Plan: The next group is scheduled for Friday depending on the Pt's availability. Patient will practice deep breathing and other techniques based on each occasion to counter stress and will attend the next group to be able to take part in further guided exercises on chosen topics to build on the techniques learned in the beginning.     Behavioral Health Treatment Plan and Discharge Planning: Patient is aware of and agrees to continue to work on their treatment plan. They have identified and are working toward their discharge goals. Yes      Visit start and stop times:    02/21/25  Start Time: 0405  Stop Time: 0500  Total Visit Time: 55 minutes

## 2025-02-26 ENCOUNTER — TELEMEDICINE (OUTPATIENT)
Dept: BEHAVIORAL/MENTAL HEALTH CLINIC | Facility: CLINIC | Age: 60
End: 2025-02-26
Payer: COMMERCIAL

## 2025-02-26 DIAGNOSIS — F10.11 ALCOHOL ABUSE, IN REMISSION: ICD-10-CM

## 2025-02-26 DIAGNOSIS — F43.12 CHRONIC POST-TRAUMATIC STRESS DISORDER (PTSD): ICD-10-CM

## 2025-02-26 DIAGNOSIS — F31.81 MODERATE MIXED BIPOLAR II DISORDER (HCC): Primary | ICD-10-CM

## 2025-02-26 DIAGNOSIS — F17.210 CIGARETTE NICOTINE DEPENDENCE WITHOUT COMPLICATION: ICD-10-CM

## 2025-02-26 DIAGNOSIS — Z72.0 TOBACCO ABUSE: ICD-10-CM

## 2025-02-26 DIAGNOSIS — F41.1 GENERALIZED ANXIETY DISORDER: ICD-10-CM

## 2025-02-26 PROCEDURE — 90832 PSYTX W PT 30 MINUTES: CPT | Performed by: COUNSELOR

## 2025-02-26 NOTE — PSYCH
Virtual Regular VisitName: Marie Portillo      : 1965      MRN: [id number]  Encounter Provider: Isabel Watts LPC  Encounter Date: 2025   Encounter department: [facility] [place]  :  Assessment & Plan  Moderate mixed bipolar II disorder (HCC)         Chronic post-traumatic stress disorder (PTSD)         Generalized anxiety disorder         Tobacco abuse         Cigarette nicotine dependence without complication         Alcohol abuse, in remission             Goals addressed in session: Goal 1     Depression Follow-up Plan Completed: Yes    Reason for visit is No chief complaint on file.     Recent Visits  Date Type Provider Dept   25 Telemedicine Isabel Watts LPC [facility] [place]   25 Telemedicine Isabel Watts LPC [facility] [place]   Showing recent visits within past 7 days and meeting all other requirements  Today's Visits  Date Type Provider Dept   25 Telemedicine Isabel Watts LPC [facility] [place]   Showing today's visits and meeting all other requirements  Future Appointments  No visits were found meeting these conditions.  Showing future appointments within next 150 days and meeting all other requirements     History of Present Illness     Marie Portillo is a 59 y.o. female  .  HPI    Past Medical History:   Diagnosis Date   • Bilateral carpal tunnel syndrome 2019   • Cardiomyopathy (HCC)    • Carpal tunnel syndrome of right wrist 10/30/2019    Added automatically from request for surgery [id number]   • Chlamydia    • COPD (chronic obstructive pulmonary disease) (HCC)    • Depression    • Hypertension    • Varicella      Past Surgical History:   Procedure Laterality Date   • BACK SURGERY     • BREAST BIOPSY Left 20yrs ago benign   • CERVICAL SPINE SURGERY     • EGD     • HERNIA REPAIR     • OOPHORECTOMY Right    • ND NDSC WRST SURG W/RLS TRANSVRS CARPL  LIGM Right 01/21/2020    Procedure: RELEASE CARPAL TUNNEL ENDOSCOPIC;  Surgeon: Sam Nguyen MD;  [facility];  Service: Orthopedics   • MD NDSC WRST SURG W/RLS TRANSVRS CARPL LIGM Left 01/28/2020    Procedure: RELEASE CARPAL TUNNEL ENDOSCOPIC;  Surgeon: Sam Nguyen MD;  Location[facility];  Service: Orthopedics     Current Outpatient Medications   Medication Instructions   • Acetaminophen 500 MG 2 capsules, Every 4 hours PRN   • albuterol (PROVENTIL HFA,VENTOLIN HFA) 90 mcg/act inhaler inhale 2 puffs by mouth and INTO THE LUNGS every 6 hours IF NEEDED FOR WHEEZING.   • betamethasone dipropionate (DIPROSONE) 0.05 % cream Topical, 2 times daily   • buPROPion (WELLBUTRIN XL) 300 mg, Oral, Daily   • cholecalciferol (VITAMIN D3) 400 Units, Daily   • fluticasone (FLONASE) 50 mcg/act nasal spray 2 sprays, Nasal, Daily   • guaiFENesin (MUCINEX) 1,200 mg, Oral, Every 12 hours scheduled   • ipratropium-albuterol (DUO-NEB) 0.5-2.5 mg/3 mL nebulizer solution 3 mL, Nebulization, 4 times daily   • lamoTRIgine (LAMICTAL) 200 mg, Oral, Daily   • levocetirizine (XYZAL) 5 mg, Oral, Every evening   • losartan (COZAAR) 25 mg, Oral, Daily   • metoprolol succinate (TOPROL-XL) 25 mg, Oral, Daily   • ofloxacin (OCUFLOX) 0.3 % ophthalmic solution 1 drop, Left Eye, 4 times daily   • omeprazole (PriLOSEC) 20 mg delayed release capsule take 1 capsule by mouth twice a day   • senna-docusate sodium (SENOKOT-S) 8.6-50 mg per tablet 3 tablets, As needed   • Stiolto Respimat 2.5-2.5 MCG/ACT inhaler inhale 2 puffs by mouth and INTO THE LUNGS once daily     No Known Allergies    Review of Systems    Objective   LMP  (LMP Unknown)     Video Exam  Physical Exam     Administrative Statements   Encounter provider Isabel Watts LPC    The Patient is located at Home and in the following state in which I hold an active license [place].    The patient was identified by name and date of birth. Marie Portillo was informed that this is a  telemedicine visit and that the visit is being conducted through the [employer] and then [employer] Teams platform. She agrees to proceed..  My office door was closed. No one else was in the room.  She acknowledged consent and understanding of privacy and security of the video platform. The patient has agreed to participate and understands they can discontinue the visit at any time.    Behavioral Health Psychotherapy Progress Note    Psychotherapy Provided: Individual Psychotherapy     1. Moderate mixed bipolar II disorder (HCC)        2. Chronic post-traumatic stress disorder (PTSD)        3. Generalized anxiety disorder        4. Tobacco abuse        5. Cigarette nicotine dependence without complication        6. Alcohol abuse, in remission            Goals addressed in session: Goal 1     DATA: Marie connected twice, trying [employer] outside of the system due to not having [employer] on her phone (her tablet was not working) and then connected longer and more successfully through Teams and discussed her mood and emotions that were giving her a hard time. She had a shorter session due to frustration that was dealt with in the beginning and given encouragement and praise for her successful work in the past where she handled her impulse with Mindfulness-based techniques and Emotion Regulation tools.     During this session, this clinician used the following therapeutic modalities: Client-centered Therapy, Cognitive Behavioral Therapy, Mindfulness-based Strategies, and Supportive Psychotherapy    Substance Abuse was not addressed during this session. If the client is diagnosed with a co-occurring substance use disorder, please indicate any changes in the frequency or amount of use: NA. Stage of change for addressing substance use diagnoses: Pre-contemplation    ASSESSMENT:  Marei Portillo presents with a Euthymic/ normal, Anxious, and Depressed mood.     her affect is Normal range and intensity and Constricted, which is  "congruent, with her mood and the content of the session. The client has made progress on their goals.    Maire makes some progress and then regresses due to giving into her emotion regulation difficulties due to brain changes and shrinkage. She did well with taking a step back and more time to go through directions and deal with frustration better.  Marie Portillo presents with a low risk of suicide, low risk of self-harm, and none risk of harm to others.    For any risk assessment that surpasses a \"low\" rating, a safety plan must be developed.    A safety plan was indicated: no  If yes, describe in detail NA    PLAN: Between sessions, Marie Portillo will attend the weekly Relaxation group for self-care and practicing coping skills. At the next session, the therapist will use Client-centered Therapy, Cognitive Behavioral Therapy, Dialectical Behavior Therapy, Mindfulness-based Strategies, and Supportive Psychotherapy to address self-care and dealign with emotions.    Behavioral Health Treatment Plan and Discharge Planning: Marie Poritllo is aware of and agrees to continue to work on their treatment plan. They have identified and are working toward their discharge goals. yes    Depression Follow-up Plan Completed: Not applicable    Visit start and stop times:    02/26/25  Start Time: 0205  Stop Time: 0224  Total Visit Time: 19 minutes    "

## 2025-02-27 ENCOUNTER — TELEMEDICINE (OUTPATIENT)
Dept: PSYCHIATRY | Facility: CLINIC | Age: 60
End: 2025-02-27

## 2025-02-27 DIAGNOSIS — Z91.199 NO-SHOW FOR APPOINTMENT: Primary | ICD-10-CM

## 2025-02-27 PROCEDURE — NOSHOW: Performed by: STUDENT IN AN ORGANIZED HEALTH CARE EDUCATION/TRAINING PROGRAM

## 2025-02-27 NOTE — PSYCH
Marie Portillo did not attend today's (02/27/25) appointment and did not inform clerical staff of their potential absence on days prior to the evaluation. Email and text links were sent. I was present on the virtual platform ([employer]) until 1:15 PM without success as Marie did not log-on or connect. Treatment team will be informed, to attempt outreach and reschedule the patient accordingly.    Treatment Plan not due at this session
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
## 2025-02-28 ENCOUNTER — TELEMEDICINE (OUTPATIENT)
Dept: BEHAVIORAL/MENTAL HEALTH CLINIC | Facility: CLINIC | Age: 60
End: 2025-02-28
Payer: COMMERCIAL

## 2025-02-28 DIAGNOSIS — Z72.0 TOBACCO ABUSE: ICD-10-CM

## 2025-02-28 DIAGNOSIS — F31.81 MODERATE MIXED BIPOLAR II DISORDER (HCC): Primary | ICD-10-CM

## 2025-02-28 DIAGNOSIS — F12.90 MARIJUANA USE: Chronic | ICD-10-CM

## 2025-02-28 DIAGNOSIS — F43.12 CHRONIC POST-TRAUMATIC STRESS DISORDER (PTSD): ICD-10-CM

## 2025-02-28 DIAGNOSIS — F17.210 CIGARETTE NICOTINE DEPENDENCE WITHOUT COMPLICATION: ICD-10-CM

## 2025-02-28 DIAGNOSIS — F41.1 GENERALIZED ANXIETY DISORDER: ICD-10-CM

## 2025-02-28 DIAGNOSIS — F10.11 ALCOHOL ABUSE, IN REMISSION: ICD-10-CM

## 2025-02-28 PROCEDURE — 90853 GROUP PSYCHOTHERAPY: CPT | Performed by: COUNSELOR

## 2025-03-03 ENCOUNTER — TELEPHONE (OUTPATIENT)
Dept: PSYCHIATRY | Facility: CLINIC | Age: 60
End: 2025-03-03

## 2025-03-03 NOTE — GROUP NOTE
Behavioral Health Psychotherapy Progress Note    1. Moderate mixed bipolar II disorder (HCC)        2. Generalized anxiety disorder        3. Chronic post-traumatic stress disorder (PTSD)        4. Tobacco abuse        5. Cigarette nicotine dependence without complication        6. Marijuana use        7. Alcohol abuse, in remission              Within this virtual hour, the attendees were guided through EFT Tapping on Anxiety, Breathing and Progressive Muscle Relaxation, and Mindfulness-based meditation series on topics of Thought Are Not Facts, Turning Around Envy and Celebrate Others, Exploring Interconnectedness, Heart Chakra Meditation, and ending with positive self-affirmations for confidence, inner resources and strengths, moving on with healthy boundaries, and intuition.     Goals addressed in session Goal 1    Data: Patient attended the virtual Relaxation group and was guided through tapping on anxiety, diaphragmatic breathing through two different protocols, voice vibration through outbreath or humming with other Vagus nerve stimulation techniques, and progressive muscle relaxation paired with the different breathing parts. Furthermore, within the virtual hour, the attendees were guided through Mindfulness-based meditation series on topics of Quick Body Scan, Tranquility Train Imagery, Turning Around Envy and Celebrate Others, Working with Challenging Protectors, Heart Chakra Meditation, and ending with positive self-affirmations for confidence, inner resources and strengths, moving on with healthy boundaries, and intuition.     During the Session, the Clinician used EFT, Mindfulness, Internal Family Systems, and Somatic approaches were used to achieve the deep state of relaxation and to maintain the serene environment for the exercise's purpose.     No substance abuse or alcohol problems were addressed in the session. Stage of change NA     Assessment: Patient was in normal/euthymic mood and in congruence  with broad affect, followed through the guidance and completed the exercises well, showed willingness to continue learning and practicing self-care in weekly Relaxation Group as well as individual therapy. No interactions between members occur after the start of the guidance except for some chat emojis and expressions of gratitude.       No safety plan was indicated. No safety issues are reported. No presence of SI or behaviors is determined based on self-reports.     Plan: The next group is scheduled for Friday depending on the Pt's availability. Patient will practice deep breathing and other techniques based on each occasion to counter stress and will attend the next group to be able to take part in further guided exercises on chosen topics to build on the techniques learned in the beginning.     Behavioral Health Treatment Plan and Discharge Planning: Patient is aware of and agrees to continue to work on their treatment plan. They have identified and are working toward their discharge goals. Yes    Visit start and stop times:    02/28/25  Start Time: 0400  Stop Time: 0500  Total Visit Time: 60 minutes

## 2025-03-04 ENCOUNTER — TELEMEDICINE (OUTPATIENT)
Dept: BEHAVIORAL/MENTAL HEALTH CLINIC | Facility: CLINIC | Age: 60
End: 2025-03-04
Payer: COMMERCIAL

## 2025-03-04 ENCOUNTER — TELEPHONE (OUTPATIENT)
Age: 60
End: 2025-03-04

## 2025-03-04 DIAGNOSIS — F41.1 GENERALIZED ANXIETY DISORDER: ICD-10-CM

## 2025-03-04 DIAGNOSIS — F12.90 MARIJUANA USE: Chronic | ICD-10-CM

## 2025-03-04 DIAGNOSIS — F17.210 CIGARETTE NICOTINE DEPENDENCE WITHOUT COMPLICATION: ICD-10-CM

## 2025-03-04 DIAGNOSIS — Z72.0 TOBACCO ABUSE: ICD-10-CM

## 2025-03-04 DIAGNOSIS — F10.11 ALCOHOL ABUSE, IN REMISSION: ICD-10-CM

## 2025-03-04 DIAGNOSIS — F31.81 MODERATE MIXED BIPOLAR II DISORDER (HCC): Primary | ICD-10-CM

## 2025-03-04 DIAGNOSIS — F43.12 CHRONIC POST-TRAUMATIC STRESS DISORDER (PTSD): ICD-10-CM

## 2025-03-04 PROCEDURE — 90832 PSYTX W PT 30 MINUTES: CPT | Performed by: COUNSELOR

## 2025-03-04 NOTE — PSYCH
Virtual Regular Visit    Verification of patient location:    Patient is located at Home in the following state in which I hold an active license [place]      Assessment/Plan:    Problem List Items Addressed This Visit     Moderate mixed bipolar II disorder (HCC) - Primary    Generalized anxiety disorder    Chronic post-traumatic stress disorder (PTSD)    Alcohol abuse, in remission    Marijuana use (Chronic)    Cigarette nicotine dependence without complication    Tobacco abuse       Goals addressed in session: Goal 1     Depression Follow-up Plan Completed: Not applicable    Reason for visit is No chief complaint on file.       Encounter provider Isabel Watts LPC      Recent Visits  Date Type Provider Dept   02/28/25 Telemedicine Isabel Watts LPC [facility]rap[facility] [place]   02/26/25 Telemedicine Isabel Watts LPC [facility]rap[facility] [place]   Showing recent visits within past 7 days and meeting all other requirements  Today's Visits  Date Type Provider Dept   03/04/25 Telemedicine Isabel Watts LPC [facility]rap[facility] [place]   Showing today's visits and meeting all other requirements  Future Appointments  No visits were found meeting these conditions.  Showing future appointments within next 150 days and meeting all other requirements       The patient was identified by name and date of birth. Marie Portillo was informed that this is a telemedicine visit and that the visit is being conducted through the [employer] Teams platform. She agrees to proceed..  My office door was closed. No one else was in the room.  She acknowledged consent and understanding of privacy and security of the video platform. The patient has agreed to participate and understands they can discontinue the visit at any time.    Patient is aware this is a billable service.     Subjective  Marie Portillo is a 59 y.o. female  .      HPI     Past Medical History:   Diagnosis Date   • Bilateral  carpal tunnel syndrome 08/20/2019   • Cardiomyopathy (HCC)    • Carpal tunnel syndrome of right wrist 10/30/2019    Added automatically from request for surgery [id number]   • Chlamydia    • COPD (chronic obstructive pulmonary disease) (HCC)    • Depression    • Hypertension    • Varicella        Past Surgical History:   Procedure Laterality Date   • BACK SURGERY     • BREAST BIOPSY Left 20yrs ago benign   • CERVICAL SPINE SURGERY     • EGD     • HERNIA REPAIR     • OOPHORECTOMY Right    • [place] [facility] WRST SURG W/RLS TRANSVRS CARPL LIGM Right 01/21/2020    Procedure: RELEASE CARPAL TUNNEL ENDOSCOPIC;  Surgeon: Sam Nguyen MD;  Location: [facility];  Service: Orthopedics   • [place] [facility] WRST SURG W/RLS TRANSVRS CARPL LIGM Left 01/28/2020    Procedure: RELEASE CARPAL TUNNEL ENDOSCOPIC;  Surgeon: Sam Nguyen MD;  Location: [facility];  Service: Orthopedics       Current Outpatient Medications   Medication Sig Dispense Refill   • Acetaminophen 500 MG Take 2 capsules by mouth every 4 (four) hours as needed for mild pain     • albuterol (PROVENTIL HFA,VENTOLIN HFA) 90 mcg/act inhaler inhale 2 puffs by mouth and INTO THE LUNGS every 6 hours IF NEEDED FOR WHEEZING. 8.5 g 2   • betamethasone dipropionate (DIPROSONE) 0.05 % cream Apply topically 2 (two) times a day (Patient not taking: Reported on 12/11/2024) 30 g 0   • buPROPion (WELLBUTRIN XL) 300 mg 24 hr tablet Take 1 tablet (300 mg total) by mouth daily 30 tablet 1   • cholecalciferol (VITAMIN D3) 400 units tablet Take 400 Units by mouth daily     • fluticasone (FLONASE) 50 mcg/act nasal spray 2 sprays into each nostril daily 16 g 11   • guaiFENesin (MUCINEX) 600 mg 12 hr tablet Take 2 tablets (1,200 mg total) by mouth every 12 (twelve) hours 60 tablet 3   • ipratropium-albuterol (DUO-NEB) 0.5-2.5 mg/3 mL nebulizer solution Take 3 mL by nebulization 4 (four) times a day 360 mL 3   • lamoTRIgine (LaMICtal) 200 MG tablet Take 1 tablet (200 mg total) by mouth daily 30  tablet 1   • levocetirizine (XYZAL) 5 MG tablet Take 1 tablet (5 mg total) by mouth every evening 30 tablet 11   • losartan (COZAAR) 25 mg tablet take 1 tablet by mouth once daily 90 tablet 1   • metoprolol succinate (TOPROL-XL) 25 mg 24 hr tablet take 1 tablet by mouth once daily 90 tablet 1   • ofloxacin (OCUFLOX) 0.3 % ophthalmic solution Administer 1 drop into the left eye 4 (four) times a day 5 mL 0   • omeprazole (PriLOSEC) 20 mg delayed release capsule take 1 capsule by mouth twice a day 180 capsule 1   • senna-docusate sodium (SENOKOT-S) 8.6-50 mg per tablet Take 3 tablets by mouth as needed       • Stiolto Respimat 2.5-2.5 MCG/ACT inhaler inhale 2 puffs by mouth and INTO THE LUNGS once daily 4 g 11     No current facility-administered medications for this visit.        No Known Allergies    Review of Systems    Video Exam    There were no vitals filed for this visit.    Physical Exam     Behavioral Health Psychotherapy Progress Note    Psychotherapy Provided: Individual Psychotherapy     1. Moderate mixed bipolar II disorder (HCC)        2. Generalized anxiety disorder        3. Chronic post-traumatic stress disorder (PTSD)        4. Tobacco abuse        5. Cigarette nicotine dependence without complication        6. Marijuana use        7. Alcohol abuse, in remission            Goals addressed in session: Goal 1     DATA: Marie finished abruptly her session due to becoming upset for missing her appointment for medication management and not having anything more scheduled. She reported feeling intense frustration from her mind playing tricks- her being so certain of her experience but then being proven wrong and was reminded that her brain has shrinkage from chronic depression and damage and difficulties after head injuries that demand a different perspective, self-compassion, gentleness, and self-love and acceptance.    During this session, this clinician used the following therapeutic modalities:  "Client-centered Therapy, Cognitive Behavioral Therapy, Mindfulness-based Strategies, and Supportive Psychotherapy    Substance Abuse was not addressed during this session. If the client is diagnosed with a co-occurring substance use disorder, please indicate any changes in the frequency or amount of use: NA. Stage of change for addressing substance use diagnoses: Contemplation    ASSESSMENT:  Marie Portillo presents with a Euthymic/ normal, Anxious, and Depressed mood.     her affect is Normal range and intensity, Constricted, and Tearful, at times, which is congruent, with her mood and the content of the session. The client has made progress on their goals.    Marie seemed to be able to ground herself well during the session and discussed her important topics and identify areas of further work.  Marie Portillo presents with a minimal risk of suicide, minimal risk of self-harm, and none risk of harm to others.    For any risk assessment that surpasses a \"low\" rating, a safety plan must be developed.    A safety plan was indicated: no  If yes, describe in detail NA    PLAN: Between sessions, Marie Portillo will attend weekly Mindfulness group for self-care. At the next session, the therapist will use Client-centered Therapy, Cognitive Behavioral Therapy, Mindfulness-based Strategies, and Supportive Psychotherapy to address self-care and balance stress and emotional intensity with mindfulness-based meditation.    Behavioral Health Treatment Plan and Discharge Planning: Marie Portillo is aware of and agrees to continue to work on their treatment plan. They have identified and are working toward their discharge goals. yes    Depression Follow-up Plan Completed: Not applicable    Visit start and stop times:    03/04/25  Start Time: 0200  Stop Time: 0237  Total Visit Time: 37 minutes        "

## 2025-03-04 NOTE — TELEPHONE ENCOUNTER
Patient called in to schedule a f/u MM appt.     Patients is now scheduled for 3/27 @1pm virtually.

## 2025-03-06 ENCOUNTER — TELEPHONE (OUTPATIENT)
Age: 60
End: 2025-03-06

## 2025-03-07 ENCOUNTER — TELEPHONE (OUTPATIENT)
Dept: PSYCHIATRY | Facility: CLINIC | Age: 60
End: 2025-03-07

## 2025-03-07 ENCOUNTER — TELEMEDICINE (OUTPATIENT)
Dept: BEHAVIORAL/MENTAL HEALTH CLINIC | Facility: CLINIC | Age: 60
End: 2025-03-07
Payer: COMMERCIAL

## 2025-03-07 DIAGNOSIS — F10.11 ALCOHOL ABUSE, IN REMISSION: ICD-10-CM

## 2025-03-07 DIAGNOSIS — F17.210 CIGARETTE NICOTINE DEPENDENCE WITHOUT COMPLICATION: ICD-10-CM

## 2025-03-07 DIAGNOSIS — F12.90 MARIJUANA USE: Chronic | ICD-10-CM

## 2025-03-07 DIAGNOSIS — F31.81 MODERATE MIXED BIPOLAR II DISORDER (HCC): Primary | ICD-10-CM

## 2025-03-07 DIAGNOSIS — F41.1 GENERALIZED ANXIETY DISORDER: ICD-10-CM

## 2025-03-07 DIAGNOSIS — F43.12 CHRONIC POST-TRAUMATIC STRESS DISORDER (PTSD): ICD-10-CM

## 2025-03-07 DIAGNOSIS — Z72.0 TOBACCO ABUSE: ICD-10-CM

## 2025-03-07 PROCEDURE — 90853 GROUP PSYCHOTHERAPY: CPT | Performed by: COUNSELOR

## 2025-03-09 DIAGNOSIS — J43.2 CENTRILOBULAR EMPHYSEMA (HCC): ICD-10-CM

## 2025-03-10 NOTE — PSYCH
See Group Note  Pt was at Home (location) and used [employer] Teams platform to attend this virtual session

## 2025-03-10 NOTE — GROUP NOTE
Behavioral Health Psychotherapy Progress Note    1. Moderate mixed bipolar II disorder (HCC)        2. Chronic post-traumatic stress disorder (PTSD)        3. Generalized anxiety disorder        4. Cigarette nicotine dependence without complication        5. Tobacco abuse        6. Marijuana use        7. Alcohol abuse, in remission              In the virtual Relaxation group, attendees were guided through tapping on trauma, diaphragmatic breathing through two different protocols, voice vibration through outbreath or humming with other Vagus nerve stimulation techniques, and progressive muscle relaxation paired with the different breathing parts. Furthermore, the attendees were guided through Mindfulness-based meditation series on topics of Quick Body Scan, Creating Safe Space to Deal with Difficult Emotions, Embodied Awareness, Bringing Back Wondering Mind, Imagining Mind as a Lake, Throat Chakra Meditation, and ending with positive self-affirmations for  elf-forgiveness, inner wisdom, child-like curiosity, trusting the processes in life, and releasing control.       Goals addressed in session Goal 1    Data: Patient attended the virtual Relaxation group and was guided through tapping on trauma, diaphragmatic breathing through two different protocols, voice vibration through outbreath or humming with other Vagus nerve stimulation techniques, and progressive muscle relaxation paired with the different breathing parts. Furthermore, within the virtual hour, the attendees were guided through Mindfulness-based meditation series on topics of Quick Body Scan, Creating Safe Space to Deal with Difficult Emotions, Embodied Awareness, Bringing Back Wondering Mind, Imagining Mind as a Lake, Throat Chakra Meditation, and ending with positive self-affirmations for  elf-forgiveness, inner wisdom, child-like curiosity, trusting the processes in life, and releasing control.     During the Session, the Clinician used EFT,  Mindfulness, Internal Family Systems, and Somatic approaches were used to achieve the deep state of relaxation and to maintain the serene environment for the exercise's purpose.     No substance abuse or alcohol problems were addressed in the session. Stage of change NA     Assessment: Patient was in normal/euthymic mood and in congruence with broad affect, followed through the guidance and completed the exercises well, showed willingness to continue learning and practicing self-care in weekly Relaxation Group as well as individual therapy. No interactions between members occur after the start of the guidance except for some chat emojis and expressions of gratitude.       No safety plan was indicated. No safety issues are reported. No presence of SI or behaviors is determined based on self-reports.     Plan: The next group is scheduled for Friday depending on the Pt's availability. Patient will practice deep breathing and other techniques based on each occasion to counter stress and will attend the next group to be able to take part in further guided exercises on chosen topics to build on the techniques learned in the beginning.     Behavioral Health Treatment Plan and Discharge Planning: Patient is aware of and agrees to continue to work on their treatment plan. They have identified and are working toward their discharge goals. Yes        Visit start and stop times:    03/10/25  Start Time: 0405  Stop Time: 0500  Total Visit Time: 55 minutes

## 2025-03-11 ENCOUNTER — TELEMEDICINE (OUTPATIENT)
Dept: BEHAVIORAL/MENTAL HEALTH CLINIC | Facility: CLINIC | Age: 60
End: 2025-03-11
Payer: COMMERCIAL

## 2025-03-11 DIAGNOSIS — Z72.0 TOBACCO ABUSE: ICD-10-CM

## 2025-03-11 DIAGNOSIS — F17.210 CIGARETTE NICOTINE DEPENDENCE WITHOUT COMPLICATION: ICD-10-CM

## 2025-03-11 DIAGNOSIS — F31.81 MODERATE MIXED BIPOLAR II DISORDER (HCC): Primary | ICD-10-CM

## 2025-03-11 DIAGNOSIS — F12.90 MARIJUANA USE: Chronic | ICD-10-CM

## 2025-03-11 DIAGNOSIS — F43.12 CHRONIC POST-TRAUMATIC STRESS DISORDER (PTSD): ICD-10-CM

## 2025-03-11 DIAGNOSIS — F10.11 ALCOHOL ABUSE, IN REMISSION: ICD-10-CM

## 2025-03-11 DIAGNOSIS — F41.1 GENERALIZED ANXIETY DISORDER: ICD-10-CM

## 2025-03-11 PROCEDURE — 90834 PSYTX W PT 45 MINUTES: CPT | Performed by: COUNSELOR

## 2025-03-11 NOTE — PSYCH
Virtual Regular Visit    Verification of patient location:    Patient is located at Home in the following state in which I hold an active license [place]      Assessment/Plan:    Problem List Items Addressed This Visit     Moderate mixed bipolar II disorder (HCC) - Primary    Generalized anxiety disorder    Chronic post-traumatic stress disorder (PTSD)    Alcohol abuse, in remission    Marijuana use (Chronic)    Cigarette nicotine dependence without complication    Tobacco abuse       Goals addressed in session: Goal 1     Depression Follow-up Plan Completed: Not applicable    Reason for visit is No chief complaint on file.       Encounter provider Isabel Watts LPC      Recent Visits  Date Type Provider Dept   03/07/25 Telemedicine Isabel Watts LPC [facility]rap[facility] [place]   03/04/25 Telemedicine Isabel Watts LPC [facility]rap[facility] [place]   Showing recent visits within past 7 days and meeting all other requirements  Today's Visits  Date Type Provider Dept   03/11/25 Telemedicine Isabel Watts LPC [facility]rap[facility] [place]   Showing today's visits and meeting all other requirements  Future Appointments  No visits were found meeting these conditions.  Showing future appointments within next 150 days and meeting all other requirements       The patient was identified by name and date of birth. Marie Portillo was informed that this is a telemedicine visit and that the visit is being conducted throughthe [employer] Teams platform. She agrees to proceed..  My office door was closed. No one else was in the room.  She acknowledged consent and understanding of privacy and security of the video platform. The patient has agreed to participate and understands they can discontinue the visit at any time.    Patient is aware this is a billable service.     Subjective  Marie Portillo is a 59 y.o. female.      HPI     Past Medical History:   Diagnosis Date   • Bilateral carpal  tunnel syndrome 08/20/2019   • Cardiomyopathy (HCC)    • Carpal tunnel syndrome of right wrist 10/30/2019    Added automatically from request for surgery [id number]   • Chlamydia    • COPD (chronic obstructive pulmonary disease) (HCC)    • Depression    • Hypertension    • Varicella        Past Surgical History:   Procedure Laterality Date   • BACK SURGERY     • BREAST BIOPSY Left 20yrs ago benign   • CERVICAL SPINE SURGERY     • EGD     • HERNIA REPAIR     • OOPHORECTOMY Right    • [facility] WRST SURG W/RLS TRANSVRS CARPL LIGM Right 01/21/2020    Procedure: RELEASE CARPAL TUNNEL ENDOSCOPIC;  Surgeon: Sam Nguyen MD;  [facility];  Service: Orthopedics   • [facility] WRST SURG W/RLS TRANSVRS CARPL LIGM Left 01/28/2020    Procedure: RELEASE CARPAL TUNNEL ENDOSCOPIC;  Surgeon: Sam Nguyen MD;  Location[facility];  Service: Orthopedics       Current Outpatient Medications   Medication Sig Dispense Refill   • Acetaminophen 500 MG Take 2 capsules by mouth every 4 (four) hours as needed for mild pain     • albuterol (PROVENTIL HFA,VENTOLIN HFA) 90 mcg/act inhaler inhale 2 puffs by mouth and INTO THE LUNGS every 6 hours IF NEEDED FOR WHEEZING. 8.5 g 2   • betamethasone dipropionate (DIPROSONE) 0.05 % cream Apply topically 2 (two) times a day (Patient not taking: Reported on 12/11/2024) 30 g 0   • buPROPion (WELLBUTRIN XL) 300 mg 24 hr tablet Take 1 tablet (300 mg total) by mouth daily 30 tablet 1   • cholecalciferol (VITAMIN D3) 400 units tablet Take 400 Units by mouth daily     • fluticasone (FLONASE) 50 mcg/act nasal spray 2 sprays into each nostril daily 16 g 11   • guaiFENesin (MUCINEX) 600 mg 12 hr tablet Take 2 tablets (1,200 mg total) by mouth every 12 (twelve) hours 60 tablet 3   • ipratropium-albuterol (DUO-NEB) 0.5-2.5 mg/3 mL nebulizer solution Take 3 mL by nebulization 4 (four) times a day 360 mL 3   • lamoTRIgine (LaMICtal) 200 MG tablet Take 1 tablet (200 mg total) by mouth daily 30 tablet 1    • levocetirizine (XYZAL) 5 MG tablet Take 1 tablet (5 mg total) by mouth every evening 30 tablet 11   • losartan (COZAAR) 25 mg tablet take 1 tablet by mouth once daily 90 tablet 1   • metoprolol succinate (TOPROL-XL) 25 mg 24 hr tablet take 1 tablet by mouth once daily 90 tablet 1   • ofloxacin (OCUFLOX) 0.3 % ophthalmic solution Administer 1 drop into the left eye 4 (four) times a day 5 mL 0   • omeprazole (PriLOSEC) 20 mg delayed release capsule take 1 capsule by mouth twice a day 180 capsule 1   • senna-docusate sodium (SENOKOT-S) 8.6-50 mg per tablet Take 3 tablets by mouth as needed       • Stiolto Respimat 2.5-2.5 MCG/ACT inhaler inhale 2 puffs by mouth and INTO THE LUNGS once daily 4 g 11     No current facility-administered medications for this visit.        No Known Allergies    Review of Systems    Video Exam    There were no vitals filed for this visit.    Physical Exam     Behavioral Health Psychotherapy Progress Note    Psychotherapy Provided: Individual Psychotherapy     1. Moderate mixed bipolar II disorder (HCC)        2. Generalized anxiety disorder        3. Chronic post-traumatic stress disorder (PTSD)        4. Tobacco abuse        5. Cigarette nicotine dependence without complication        6. Marijuana use        7. Alcohol abuse, in remission            Goals addressed in session: Goal 1     DATA: Marie shared her deep regrets about her missed medication management appointments and stated that now it is additionally challenging for her after she lost her organizer. Marie was urged and planned to buy a calendar that hangs on the wall and she could write her appointments within each square for each date. She liked the themed calendars with beautiful nature pictures  and took a note for herself to go to the [employer] and choose one immediately after the session and after she calls her doctor for a follow up. Marie was encouraged to take notes about her most important experiences through  "the day until she resolves the issue with the lost organizer.     During this session, this clinician used the following therapeutic modalities: Client-centered Therapy, Cognitive Behavioral Therapy, Mindfulness-based Strategies, and Supportive Psychotherapy    Substance Abuse was not addressed during this session. If the client is diagnosed with a co-occurring substance use disorder, please indicate any changes in the frequency or amount of use: NA. Stage of change for addressing substance use diagnoses: Pre-contemplation    ASSESSMENT:  Marie Portillo presents with a Euthymic/ normal, Anxious, and Depressed mood.     her affect is Normal range and intensity, Constricted, and Tearful, at times, which is congruent, with her mood and the content of the session. The client has made progress on their goals.    Marie seemed upset at times but managed to take notes and be present with additional relaxation techniques  Marie Portillo presents with a low risk of suicide, low risk of self-harm, and none risk of harm to others.    For any risk assessment that surpasses a \"low\" rating, a safety plan must be developed.    A safety plan was indicated: no  If yes, describe in detail NA    PLAN: Between sessions, Marie Portillo will use her notes to refer to her experience and to follow up with her appointments and will attend weekly Mindfulness group to meditate. At the next session, the therapist will use Client-centered Therapy, Cognitive Behavioral Therapy, Mindfulness-based Strategies, and Supportive Psychotherapy to address self-care and coping with cognitive decline.    Behavioral Health Treatment Plan and Discharge Planning: Marie Portillo is aware of and agrees to continue to work on their treatment plan. They have identified and are working toward their discharge goals. yes    Depression Follow-up Plan Completed: Not applicable    Visit start and stop times:    03/11/25  Start Time: 0200  Stop Time: 0239  Total Visit Time: 39 " minutes

## 2025-03-14 ENCOUNTER — TELEPHONE (OUTPATIENT)
Dept: PSYCHIATRY | Facility: CLINIC | Age: 60
End: 2025-03-14

## 2025-03-14 ENCOUNTER — TELEMEDICINE (OUTPATIENT)
Dept: BEHAVIORAL/MENTAL HEALTH CLINIC | Facility: CLINIC | Age: 60
End: 2025-03-14
Payer: COMMERCIAL

## 2025-03-14 DIAGNOSIS — F41.1 GENERALIZED ANXIETY DISORDER: ICD-10-CM

## 2025-03-14 DIAGNOSIS — F10.11 ALCOHOL ABUSE, IN REMISSION: ICD-10-CM

## 2025-03-14 DIAGNOSIS — F12.90 MARIJUANA USE: Chronic | ICD-10-CM

## 2025-03-14 DIAGNOSIS — Z72.0 TOBACCO ABUSE: ICD-10-CM

## 2025-03-14 DIAGNOSIS — F17.210 CIGARETTE NICOTINE DEPENDENCE WITHOUT COMPLICATION: ICD-10-CM

## 2025-03-14 DIAGNOSIS — F31.81 MODERATE MIXED BIPOLAR II DISORDER (HCC): Primary | ICD-10-CM

## 2025-03-14 DIAGNOSIS — F43.12 CHRONIC POST-TRAUMATIC STRESS DISORDER (PTSD): ICD-10-CM

## 2025-03-14 PROCEDURE — 90853 GROUP PSYCHOTHERAPY: CPT | Performed by: COUNSELOR

## 2025-03-14 NOTE — TELEPHONE ENCOUNTER
Unable to leave message voice mailbox not set up yet. Virtual Encounter form 3/11/25 call #1 3/14/25

## 2025-03-15 NOTE — GROUP NOTE
Behavioral Health Psychotherapy Progress Note    1. Moderate mixed bipolar II disorder (HCC)        2. Chronic post-traumatic stress disorder (PTSD)        3. Generalized anxiety disorder        4. Cigarette nicotine dependence without complication        5. Tobacco abuse        6. Marijuana use        7. Alcohol abuse, in remission            During the virtual Relaxation group, attendees were guided through tapping on anxiety, diaphragmatic breathing through two different protocols, voice vibration through outbreath or humming with other Vagus nerve stimulation techniques, and progressive muscle relaxation paired with the different breathing parts. Furthermore, within the virtual hour, the attendees were guided through Mindfulness-based meditation series on topics of Body Scan, Creating Safe Space to Deal with Anger and Resentment, Turner Kindness, Gratitude and Appreciation, Third Eye Chakra Meditation, and ending with positive self-affirmations for forgiveness, moving on without old models, and with inner resources and power.     Goals addressed in session Goal 1    Data: Patient attended the virtual Relaxation group and was guided through tapping on anxiety, diaphragmatic breathing through two different protocols, voice vibration through outbreath or humming with other Vagus nerve stimulation techniques, and progressive muscle relaxation paired with the different breathing parts. Furthermore, within the virtual hour, the attendees were guided through Mindfulness-based meditation series on topics of Body Scan, Creating Safe Space to Deal with Anger and Resentment, Turner Kindness, Gratitude and Appreciation, Third Eye Chakra Meditation, and ending with positive self-affirmations for forgiveness, moving on without old models, and with inner resources and power.     During the Session, the Clinician used EFT, Mindfulness, Internal Family Systems, and Somatic approaches were used to achieve the deep state of  relaxation and to maintain the serene environment for the exercise's purpose.     No substance abuse or alcohol problems were addressed in the session. Stage of change NA     Assessment: Patient was in normal/euthymic mood and in congruence with broad affect, followed through the guidance and completed the exercises well, showed willingness to continue learning and practicing self-care in weekly Relaxation Group as well as individual therapy. No interactions between members occur after the start of the guidance except for some chat emojis and expressions of gratitude.       No safety plan was indicated. No safety issues are reported. No presence of SI or behaviors is determined based on self-reports.     Plan: The next group is scheduled for Friday depending on the Pt's availability. Patient will practice deep breathing and other techniques based on each occasion to counter stress and will attend the next group to be able to take part in further guided exercises on chosen topics to build on the techniques learned in the beginning.     Behavioral Health Treatment Plan and Discharge Planning: Patient is aware of and agrees to continue to work on their treatment plan. They have identified and are working toward their discharge goals. Yes    Visit start and stop times:    03/14/25  Start Time: 0400  Stop Time: 0500  Total Visit Time: 60 minutes

## 2025-03-16 ENCOUNTER — HOSPITAL ENCOUNTER (EMERGENCY)
Facility: HOSPITAL | Age: 60
Discharge: HOME | End: 2025-03-16
Attending: EMERGENCY MEDICINE
Payer: COMMERCIAL

## 2025-03-16 VITALS
TEMPERATURE: 97.8 F | RESPIRATION RATE: 18 BRPM | DIASTOLIC BLOOD PRESSURE: 78 MMHG | OXYGEN SATURATION: 97 % | HEART RATE: 93 BPM | SYSTOLIC BLOOD PRESSURE: 144 MMHG

## 2025-03-16 PROCEDURE — 90715 TDAP VACCINE 7 YRS/> IM: CPT

## 2025-03-16 PROCEDURE — 90471 IMMUNIZATION ADMIN: CPT

## 2025-03-16 RX ADMIN — TETANUS TOXOID, REDUCED DIPHTHERIA TOXOID AND ACELLULAR PERTUSSIS VACCINE, ADSORBED 0.5 ML: 5; 2.5; 8; 8; 2.5 SUSPENSION INTRAMUSCULAR at 22:05

## 2025-03-17 ENCOUNTER — TELEPHONE (OUTPATIENT)
Age: 60
End: 2025-03-17

## 2025-03-18 ENCOUNTER — TELEMEDICINE (OUTPATIENT)
Dept: BEHAVIORAL/MENTAL HEALTH CLINIC | Facility: CLINIC | Age: 60
End: 2025-03-18
Payer: COMMERCIAL

## 2025-03-18 DIAGNOSIS — F12.90 MARIJUANA USE: Chronic | ICD-10-CM

## 2025-03-18 DIAGNOSIS — Z72.0 TOBACCO ABUSE: ICD-10-CM

## 2025-03-18 DIAGNOSIS — F41.1 GENERALIZED ANXIETY DISORDER: ICD-10-CM

## 2025-03-18 DIAGNOSIS — F10.11 ALCOHOL ABUSE, IN REMISSION: ICD-10-CM

## 2025-03-18 DIAGNOSIS — F31.81 MODERATE MIXED BIPOLAR II DISORDER (HCC): Primary | ICD-10-CM

## 2025-03-18 DIAGNOSIS — F17.210 CIGARETTE NICOTINE DEPENDENCE WITHOUT COMPLICATION: ICD-10-CM

## 2025-03-18 DIAGNOSIS — F43.12 CHRONIC POST-TRAUMATIC STRESS DISORDER (PTSD): ICD-10-CM

## 2025-03-18 PROCEDURE — 90832 PSYTX W PT 30 MINUTES: CPT | Performed by: COUNSELOR

## 2025-03-18 NOTE — PSYCH
Virtual Regular VisitName: Marie Portillo      : 1965      MRN: [id number]  Encounter Provider: Isabel Watts LPC  Encounter Date: 3/18/2025   Encounter department: [facility] THERAPIST [place]  :  Assessment & Plan  Moderate mixed bipolar II disorder (HCC)         Chronic post-traumatic stress disorder (PTSD)         Generalized anxiety disorder         Tobacco abuse         Cigarette nicotine dependence without complication         Marijuana use         Alcohol abuse, in remission           Goals addressed in session: Goal 1     DATA: Marie apologized for her delay- she was visiting a friend and was still there at the time of her appointment but had partial privacy for a short session. She discussed forgetfulness again and became frustrated and upset with herself but was encouraged to take notes and refer to them in cases of confusion or memory struggle. Marie talked about spending time with her parents and her friend who was driving her back home and was praised for having fun activities and going out of her room.   During this session, this clinician used the following therapeutic modalities: Client-centered Therapy, Cognitive Behavioral Therapy, Mindfulness-based Strategies, and Supportive Psychotherapy    Substance Abuse was not addressed during this session. If the client is diagnosed with a co-occurring substance use disorder, please indicate any changes in the frequency or amount of use: NA. Stage of change for addressing substance use diagnoses: Contemplation    ASSESSMENT:  Marie presents with a Euthymic/ normal and Anxious mood. Marie's affect is Normal range and intensity and Constricted, which is congruent, with their mood and the content of the session. The client has made progress on their goals as evidenced by putting hard effort to practice Mindfulness-based strategy to be non-judgmental and to let emotions occur, sit with them, and release/let go.    Marie  "presents with a minimal risk of suicide, low risk of self-harm, and none risk of harm to others.    For any risk assessment that surpasses a \"low\" rating, a safety plan must be developed.    A safety plan was indicated: no  If yes, describe in detail NA    PLAN: Between sessions, Marie will use notes ot be able to refer to and avoid frustration with her cognitive decline. At the next session, the therapist will use Client-centered Therapy, Cognitive Behavioral Therapy, Mindfulness-based Strategies, and Supportive Psychotherapy to address cognitive issues and appointment attendance.    Behavioral Health Treatment Plan [facility]: Diagnosis and Treatment Plan explained to Marie, Marie relates understanding diagnosis and is agreeable to Treatment Plan. Yes     Depression Follow-up Plan Completed: Not applicable     Reason for visit is   Chief Complaint   Patient presents with   • Virtual Regular Visit        Recent Visits  Date Type Provider Dept   03/14/25 Telemedicine Isabel Watts LPC [facility]rapist [place]   03/11/25 [facility] Isabel Watts LPC [facility]rap[facility] [place]   Showing recent visits within past 7 days and meeting all other requirements  Today's Visits  Date Type Provider Dept   03/18/25 Telemedicine Isabel Watts LPC [facility] Therapist [place]   Showing today's visits and meeting all other requirements  Future Appointments  No visits were found meeting these conditions.  Showing future appointments within next 150 days and meeting all other requirements     History of Present Illness     HPI    Past Medical History   Past Medical History:   Diagnosis Date   • Bilateral carpal tunnel syndrome 08/20/2019   • Cardiomyopathy (HCC)    • Carpal tunnel syndrome of right wrist 10/30/2019    Added automatically from request for surgery [id number]   • Chlamydia    • COPD (chronic obstructive pulmonary disease) (HCC)    • Depression    • Hypertension    • " Varicella      Past Surgical History:   Procedure Laterality Date   • BACK SURGERY     • BREAST BIOPSY Left 20yrs ago benign   • CERVICAL SPINE SURGERY     • EGD     • HERNIA REPAIR     • OOPHORECTOMY Right    • [place] NDSC WRST SURG W/RLS TRANSVRS CARPL LIGM Right 01/21/2020    Procedure: RELEASE CARPAL TUNNEL ENDOSCOPIC;  Surgeon: Sam Nguyen MD;  [facility];  Service: Orthopedics   • NE NDSC WRST SURG W/RLS TRANSVRS CARPL LIGM Left 01/28/2020    Procedure: RELEASE CARPAL TUNNEL ENDOSCOPIC;  Surgeon: Sam Nguyen MD;  [facility];  Service: Orthopedics     Current Outpatient Medications   Medication Instructions   • Acetaminophen 500 MG 2 capsules, Every 4 hours PRN   • albuterol (PROVENTIL HFA,VENTOLIN HFA) 90 mcg/act inhaler inhale 2 puffs by mouth and INTO THE LUNGS every 6 hours IF NEEDED FOR WHEEZING.   • betamethasone dipropionate (DIPROSONE) 0.05 % cream Topical, 2 times daily   • buPROPion (WELLBUTRIN XL) 300 mg, Oral, Daily   • cholecalciferol (VITAMIN D3) 400 Units, Daily   • fluticasone (FLONASE) 50 mcg/act nasal spray 2 sprays, Nasal, Daily   • guaiFENesin (MUCINEX) 1,200 mg, Oral, Every 12 hours scheduled   • ipratropium-albuterol (DUO-NEB) 0.5-2.5 mg/3 mL nebulizer solution 3 mL, Nebulization, 4 times daily   • lamoTRIgine (LAMICTAL) 200 mg, Oral, Daily   • levocetirizine (XYZAL) 5 mg, Oral, Every evening   • losartan (COZAAR) 25 mg, Oral, Daily   • metoprolol succinate (TOPROL-XL) 25 mg, Oral, Daily   • ofloxacin (OCUFLOX) 0.3 % ophthalmic solution 1 drop, Left Eye, 4 times daily   • omeprazole (PriLOSEC) 20 mg delayed release capsule take 1 capsule by mouth twice a day   • senna-docusate sodium (SENOKOT-S) 8.6-50 mg per tablet 3 tablets, As needed   • Stiolto Respimat 2.5-2.5 MCG/ACT inhaler inhale 2 puffs by mouth and INTO THE LUNGS once daily     No Known Allergies    Objective   LMP  (LMP Unknown)     Video Exam  Physical Exam     Administrative Statements   Encounter  provider Isabel Watts LPC    The Patient is located at Other and in the following state in which I hold an active license [place].    The patient was identified by name and date of birth. Marie Portillo was informed that this is a telemedicine visit and that the visit is being conducted through the [employer] Teams platform. She agrees to proceed..  My office door was closed. No one else was in the room.  She acknowledged consent and understanding of privacy and security of the video platform. The patient has agreed to participate and understands they can discontinue the visit at any time.    I have spent a total time of 33 minutes in caring for this patient on the day of the visit/encounter including Counseling / Coordination of care, not including the time spent for establishing the audio/video connection.    Visit Time  Start Time: 0208  Stop Time: 0241  Total Visit Time: 33 minutes

## 2025-03-19 ENCOUNTER — DOCUMENTATION (OUTPATIENT)
Dept: BEHAVIORAL/MENTAL HEALTH CLINIC | Facility: CLINIC | Age: 60
End: 2025-03-19

## 2025-03-19 ENCOUNTER — TELEPHONE (OUTPATIENT)
Age: 60
End: 2025-03-19
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
## 2025-03-19 NOTE — TELEPHONE ENCOUNTER
"Marie Portillo calling and/or requested a call back to discuss something she forgot to tell you. She would not disclose what it is. Writer tried to ask different reasons for call. She asked if provider Isabel Watts could call as soon as possible. Writer asked her if she is ok and she said yes. She stated \"It's not an emergency but if she could call me as soon as she can. She can be reached at P# [phone]. Her home phone is the number to use.    Thank you.  "

## 2025-03-21 ENCOUNTER — TELEPHONE (OUTPATIENT)
Dept: PSYCHIATRY | Facility: CLINIC | Age: 60
End: 2025-03-21

## 2025-03-24 ENCOUNTER — DOCUMENTATION (OUTPATIENT)
Dept: BEHAVIORAL/MENTAL HEALTH CLINIC | Facility: CLINIC | Age: 60
End: 2025-03-24
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
## 2025-03-25 ENCOUNTER — TELEMEDICINE (OUTPATIENT)
Dept: BEHAVIORAL/MENTAL HEALTH CLINIC | Facility: CLINIC | Age: 60
End: 2025-03-25
Payer: COMMERCIAL

## 2025-03-25 DIAGNOSIS — F31.81 MODERATE MIXED BIPOLAR II DISORDER (HCC): Primary | ICD-10-CM

## 2025-03-25 DIAGNOSIS — F43.12 CHRONIC POST-TRAUMATIC STRESS DISORDER (PTSD): ICD-10-CM

## 2025-03-25 DIAGNOSIS — F41.1 GENERALIZED ANXIETY DISORDER: ICD-10-CM

## 2025-03-25 DIAGNOSIS — Z72.0 TOBACCO ABUSE: ICD-10-CM

## 2025-03-25 DIAGNOSIS — F10.11 ALCOHOL ABUSE, IN REMISSION: ICD-10-CM

## 2025-03-25 DIAGNOSIS — F12.90 MARIJUANA USE: Chronic | ICD-10-CM

## 2025-03-25 DIAGNOSIS — F17.210 CIGARETTE NICOTINE DEPENDENCE WITHOUT COMPLICATION: ICD-10-CM

## 2025-03-25 PROCEDURE — 90832 PSYTX W PT 30 MINUTES: CPT | Performed by: COUNSELOR
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
## 2025-03-25 NOTE — BH TREATMENT PLAN
Outpatient Behavioral Health Psychotherapy Treatment Plan    Marie Portillo  1965     Date of Initial Psychotherapy Assessment: 3/22/23   Date of Current Treatment Plan: 03/25/25  Treatment Plan Target Date: TBD  Treatment Plan Expiration Date: 09/23/25     Diagnosis:      1. Moderate mixed bipolar II disorder (HCC)          2. PTSD (post-traumatic stress disorder)          3. Generalized anxiety disorder          4. Cigarette nicotine dependence without complication          5. Tobacco abuse          6. Marijuana use          7. Alcohol abuse, in remission             Area(s) of Need: emotion regulation, cognitive issues and decline      Long Term Goal 1 (in the client's own words): I want to feel better when I wake up in the morning      Stage of Change: Preparation to Action      Target Date for completion: TBD             Anticipated therapeutic modalities: Person-Centered, DBT, Mindfulness, DBT, Somatic Experiencing              People identified to complete this goal: Patient, Doctors, Therapist                     Objective 1: (identify the means of measuring success in meeting the objective): will process her trauma during sessions and will start re-connecting with her body and identifying how emotions are sensed in the body and will describe with Somatic language and will identify the Body Resource to stay connected there at least once during intense memories                       Objective 2: (identify the means of measuring success in meeting the objective): will use Mindfulness attendance to difficult emotions and will practice What and How Core Mindfulness skills [facility]               Objective 3: (identify the means of measuring success in meeting the objective): will follow a strict medication management schedule as prescribed by her doctor       Long Term Goal 2 (in the client's own words): I want to handle stress better       Stage of Change: Action     Target Date for  "completion: TBD             Anticipated therapeutic modalities: Person-Centered, DBT, CBT, Mindfulness, and Somatic Experiencing               People identified to complete this goal: Patent, Doctors, Therapist                     Objective 1: (identify the means of measuring success in meeting the objective): will practice relaxation techniques at least once daily to counter stress and will take rapid and vigorous inhales with long exhales and will attend Relaxation Group weekly for self-care                    Objective 2: (identify the means of measuring success in meeting the objective): will follow a strict medication management schedule as prescribed      I am currently under the care of a [facility] psychiatric provider: yes     My [facility] psychiatric provider is: Dr. Chowdhury      I am currently taking psychiatric medications: Yes, as prescribed     I feel that I will be ready for discharge from mental health care when I reach the following (measurable goal/objective): \"I gotta think about it- happy and sad that I am ready and will be missing the people I met\"      For children and adults who have a legal guardian:          Has there been any change to custody orders and/or guardianship status? NA. If yes, attach updated documentation.     I have created my Crisis Plan and have been offered a copy of this plan     Behavioral Health Treatment Plan [facility]: Diagnosis and Treatment Plan explained to Marie Bandar Hortoncy Bandar acknowledges an understanding of their diagnosis. Marie Portillo agrees to this treatment plan.     I have been offered a copy of this Treatment Plan. Yes     Marie Portillo, 1965, actively participated in the review and update of this treatment plan during a virtual session, using the [employer] platform.   Marie Portillo  provided verbal consent on 3/25/2025 at 2:31 PM. The treatment plan was transcribed into the Electronic Health Record at a later time.     If unsuccessful signing " electronically, Pt would be mailed two copies of this plan through [facility] for her actual signature.

## 2025-03-26 ENCOUNTER — TELEPHONE (OUTPATIENT)
Age: 60
End: 2025-03-26

## 2025-03-26 NOTE — TELEPHONE ENCOUNTER
Patients called in for clarification on their April behavioral health appointments.    Writer gave time and dates and stated they were virtual appointments.

## 2025-03-28 ENCOUNTER — TELEPHONE (OUTPATIENT)
Dept: PSYCHIATRY | Facility: CLINIC | Age: 60
End: 2025-03-28

## 2025-03-28 ENCOUNTER — DOCUMENTATION (OUTPATIENT)
Dept: BEHAVIORAL/MENTAL HEALTH CLINIC | Facility: CLINIC | Age: 60
End: 2025-03-28
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
## 2025-04-01 ENCOUNTER — TELEMEDICINE (OUTPATIENT)
Dept: PSYCHIATRY | Facility: CLINIC | Age: 60
End: 2025-04-01
Payer: COMMERCIAL

## 2025-04-01 ENCOUNTER — TELEMEDICINE (OUTPATIENT)
Dept: BEHAVIORAL/MENTAL HEALTH CLINIC | Facility: CLINIC | Age: 60
End: 2025-04-01
Payer: COMMERCIAL

## 2025-04-01 ENCOUNTER — TELEPHONE (OUTPATIENT)
Age: 60
End: 2025-04-01

## 2025-04-01 DIAGNOSIS — F17.200 NICOTINE DEPENDENCE WITH CURRENT USE: ICD-10-CM

## 2025-04-01 DIAGNOSIS — Z72.0 TOBACCO ABUSE: ICD-10-CM

## 2025-04-01 DIAGNOSIS — F10.11 ALCOHOL ABUSE, IN REMISSION: ICD-10-CM

## 2025-04-01 DIAGNOSIS — F43.12 CHRONIC POST-TRAUMATIC STRESS DISORDER (PTSD): ICD-10-CM

## 2025-04-01 DIAGNOSIS — F41.1 GENERALIZED ANXIETY DISORDER: ICD-10-CM

## 2025-04-01 DIAGNOSIS — F17.210 CIGARETTE NICOTINE DEPENDENCE WITHOUT COMPLICATION: ICD-10-CM

## 2025-04-01 DIAGNOSIS — F17.200 TOBACCO USE DISORDER: ICD-10-CM

## 2025-04-01 DIAGNOSIS — F50.819 BINGE EATING DISORDER, UNSPECIFIED SEVERITY: ICD-10-CM

## 2025-04-01 DIAGNOSIS — F43.10 PTSD (POST-TRAUMATIC STRESS DISORDER): ICD-10-CM

## 2025-04-01 DIAGNOSIS — F12.90 CANNABIS USE DISORDER: ICD-10-CM

## 2025-04-01 DIAGNOSIS — F31.81 MODERATE MIXED BIPOLAR II DISORDER (HCC): Primary | ICD-10-CM

## 2025-04-01 DIAGNOSIS — F12.90 MARIJUANA USE: Chronic | ICD-10-CM

## 2025-04-01 DIAGNOSIS — F31.62 BIPOLAR DISORDER, CURRENT EPISODE MIXED, MODERATE (HCC): Primary | ICD-10-CM

## 2025-04-01 PROCEDURE — 99214 OFFICE O/P EST MOD 30 MIN: CPT | Performed by: STUDENT IN AN ORGANIZED HEALTH CARE EDUCATION/TRAINING PROGRAM

## 2025-04-01 PROCEDURE — 90833 PSYTX W PT W E/M 30 MIN: CPT | Performed by: STUDENT IN AN ORGANIZED HEALTH CARE EDUCATION/TRAINING PROGRAM

## 2025-04-01 PROCEDURE — 90834 PSYTX W PT 45 MINUTES: CPT | Performed by: COUNSELOR

## 2025-04-01 RX ORDER — LAMOTRIGINE 25 MG/1
50 TABLET ORAL DAILY
Qty: 60 TABLET | Refills: 1 | Status: SHIPPED | OUTPATIENT
Start: 2025-04-01

## 2025-04-01 RX ORDER — LAMOTRIGINE 200 MG/1
200 TABLET ORAL DAILY
Start: 2025-04-01 | End: 2025-05-31

## 2025-04-01 NOTE — PSYCH
Virtual Regular VisitName: Marie Portillo      : 1965      MRN: [id number]  Encounter Provider: Isabel Watts LPC  Encounter Date: 2025   Encounter department: [facility] [place]  :  Assessment & Plan  Moderate mixed bipolar II disorder (HCC)         Marijuana use         Generalized anxiety disorder         Chronic post-traumatic stress disorder (PTSD)         Tobacco abuse         Cigarette nicotine dependence without complication         Alcohol abuse, in remission           Goals addressed in session: Goal 1     DATA: Marie discussed increased depression- not wanting to go to have a bath/shower and feeling unmotivated to do things and feeling down, unable to focus and Act Opposite except for getting up from bed in the morning. She was encouraged to add and expand on her Act Opposite skill to be able to counter the increased stress adequately as well as the depression's tendencies by using her experience from the past, her strengths, and her protective factors, inner resources.   During this session, this clinician used the following therapeutic modalities: Client-centered Therapy, Mindfulness-based Strategies, and Supportive Psychotherapy    Substance Abuse was not addressed during this session. If the client is diagnosed with a co-occurring substance use disorder, please indicate any changes in the frequency or amount of use: NA. Stage of change for addressing substance use diagnoses: Pre-contemplation    ASSESSMENT:  Marie presents with a Euthymic/ normal, Anxious, and Depressed mood. Marie's affect is Normal range and intensity and Constricted, which is congruent, with their mood and the content of the session. The client has made progress on their goals as evidenced by taking notes for important meetings and appointments.    Marie presents with a low risk of suicide, low risk of self-harm, and none risk of harm to others.    For any risk assessment that  "surpasses a \"low\" rating, a safety plan must be developed.    A safety plan was indicated: no  If yes, describe in detail NA    PLAN: Between sessions, Marie will attend weekly Mindfulness group for self-care. At the next session, the therapist will use Client-centered Therapy, Cognitive Behavioral Therapy, Mindfulness-based Strategies, and Supportive Psychotherapy to address adequate self-care.    Behavioral Health Treatment Plan [facility]: Diagnosis and Treatment Plan explained to Marie, Marie relates understanding diagnosis and is agreeable to Treatment Plan. Yes     Depression Follow-up Plan Completed: Not applicable     Reason for visit is No chief complaint on file.     Recent Visits  Date Type Provider Dept   03/25/25 Telemedicine Isabel Watts LPC [facility] [place]   Showing recent visits within past 7 days and meeting all other requirements  Today's Visits  Date Type Provider Dept   04/01/25 Telemedicine Isabel Watts LPC [facility] [place]   Showing today's visits and meeting all other requirements  Future Appointments  No visits were found meeting these conditions.  Showing future appointments within next 150 days and meeting all other requirements     History of Present Illness     HPI    Past Medical History   Past Medical History:   Diagnosis Date    Bilateral carpal tunnel syndrome 08/20/2019    Cardiomyopathy (HCC)     Carpal tunnel syndrome of right wrist 10/30/2019    Added automatically from request for surgery [id number]    Chlamydia     COPD (chronic obstructive pulmonary disease) (HCC)     Depression     Hypertension     Varicella      Past Surgical History:   Procedure Laterality Date    BACK SURGERY      BREAST BIOPSY Left 20yrs ago benign    CERVICAL SPINE SURGERY      EGD      HERNIA REPAIR      OOPHORECTOMY Right     [place] [facility] WRST SURG W/RLS TRANSVRS CARPL LIGM Right 01/21/2020    Procedure: RELEASE CARPAL TUNNEL ENDOSCOPIC;  Surgeon: " Sam Nguyen MD;  Location: [facility];  Service: Orthopedics    [place] [facility] WRST SURG W/RLS TRANSVRS CARPL LIGM Left 01/28/2020    Procedure: RELEASE CARPAL TUNNEL ENDOSCOPIC;  Surgeon: Sam Nguyen MD;  Location: [facility];  Service: Orthopedics     Current Outpatient Medications   Medication Instructions    Acetaminophen 500 MG 2 capsules, Every 4 hours PRN    albuterol (PROVENTIL HFA,VENTOLIN HFA) 90 mcg/act inhaler inhale 2 puffs by mouth and INTO THE LUNGS every 6 hours IF NEEDED FOR WHEEZING.    betamethasone dipropionate (DIPROSONE) 0.05 % cream Topical, 2 times daily    buPROPion (WELLBUTRIN XL) 300 mg, Oral, Daily    cholecalciferol (VITAMIN D3) 400 Units, Daily    fluticasone (FLONASE) 50 mcg/act nasal spray 2 sprays, Nasal, Daily    guaiFENesin (MUCINEX) 1,200 mg, Oral, Every 12 hours scheduled    ipratropium-albuterol (DUO-NEB) 0.5-2.5 mg/3 mL nebulizer solution 3 mL, Nebulization, 4 times daily    lamoTRIgine (LAMICTAL) 50 mg, Oral, Daily, Take 2 tablets (50 mg) by mouth daily with one 200 mg tablet for daily total of 250 mg.    lamoTRIgine (LAMICTAL) 200 mg, Oral, Daily, Take 2 tablets (50 mg) by mouth daily with one 200 mg tablet for daily total of 250 mg    levocetirizine (XYZAL) 5 mg, Oral, Every evening    losartan (COZAAR) 25 mg, Oral, Daily    metoprolol succinate (TOPROL-XL) 25 mg, Oral, Daily    ofloxacin (OCUFLOX) 0.3 % ophthalmic solution 1 drop, Left Eye, 4 times daily    omeprazole (PriLOSEC) 20 mg delayed release capsule take 1 capsule by mouth twice a day    senna-docusate sodium (SENOKOT-S) 8.6-50 mg per tablet 3 tablets, As needed    Stiolto Respimat 2.5-2.5 MCG/ACT inhaler inhale 2 puffs by mouth and INTO THE LUNGS once daily     No Known Allergies    Objective   LMP  (LMP Unknown)     Video Exam  Physical Exam     Administrative Statements   Encounter provider Isabel Watts LPC    The Patient is located at Home and in the following state in which I hold an active license  [place].    The patient was identified by name and date of birth. Marie Portillo was informed that this is a telemedicine visit and that the visit is being conducted through the e27 platform. She agrees to proceed..  My office door was closed. No one else was in the room.  She acknowledged consent and understanding of privacy and security of the video platform. The patient has agreed to participate and understands they can discontinue the visit at any time.    I have spent a total time of 43 minutes in caring for this patient on the day of the visit/encounter including  therapy , not including the time spent for establishing the audio/video connection.    Visit Time  Start Time: 0252  Stop Time: 0335  Total Visit Time: 43 minutes

## 2025-04-01 NOTE — TELEPHONE ENCOUNTER
Patient called to give her provider a different phone number to use instead of hers. She was having issues with her phone number. Writer sent the provider the phone number and they were able to connect.
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
## 2025-04-04 ENCOUNTER — TELEPHONE (OUTPATIENT)
Dept: PSYCHIATRY | Facility: CLINIC | Age: 60
End: 2025-04-04

## 2025-04-04 ENCOUNTER — TELEMEDICINE (OUTPATIENT)
Dept: BEHAVIORAL/MENTAL HEALTH CLINIC | Facility: CLINIC | Age: 60
End: 2025-04-04
Payer: COMMERCIAL

## 2025-04-04 DIAGNOSIS — F41.1 GENERALIZED ANXIETY DISORDER: ICD-10-CM

## 2025-04-04 DIAGNOSIS — Z72.0 TOBACCO ABUSE: ICD-10-CM

## 2025-04-04 DIAGNOSIS — F17.210 CIGARETTE NICOTINE DEPENDENCE WITHOUT COMPLICATION: ICD-10-CM

## 2025-04-04 DIAGNOSIS — F31.81 MODERATE MIXED BIPOLAR II DISORDER (HCC): Primary | ICD-10-CM

## 2025-04-04 DIAGNOSIS — F12.90 MARIJUANA USE: Chronic | ICD-10-CM

## 2025-04-04 DIAGNOSIS — F10.11 ALCOHOL ABUSE, IN REMISSION: ICD-10-CM

## 2025-04-04 DIAGNOSIS — F43.12 CHRONIC POST-TRAUMATIC STRESS DISORDER (PTSD): ICD-10-CM

## 2025-04-04 PROCEDURE — 90853 GROUP PSYCHOTHERAPY: CPT | Performed by: COUNSELOR

## 2025-04-04 NOTE — GROUP NOTE
Behavioral Health Psychotherapy Progress Note    1. Moderate mixed bipolar II disorder (HCC)        2. Generalized anxiety disorder        3. Alcohol abuse, in remission        4. Tobacco abuse        5. Marijuana use        6. Cigarette nicotine dependence without complication        7. Chronic post-traumatic stress disorder (PTSD)              Attendees were guided through tapping on anxiety, diaphragmatic breathing through three different protocols, voice vibration through outbreath with other Vagus nerve stimulation techniques, and progressive muscle relaxation paired with the different breathing parts. Furthermore, the attendees were guided through Autogenic Training and Mindfulness-based meditation series on topics of Calming Colors Visualization, Directional Compassion, Sacral Chakra Meditation, and ending with positive self-affirmations spiritual and character growth, moving on with forgiveness, and potential fulfilling.     Administrative Statements    Encounter Provider Isabel Watts LPC     Verification of patient location: Patient is located at Home in the following state in which I hold an active license [place]    The patient was informed that this is a telemedicine visit and that the visit is being conducted through Microsoft Teams platform. Pt was informed about the virtual presence of other group attendees. Consent and understanding were acknowledged as well as privacy and security of the video platform. The patient has agreed to participate and understands   they can discontinue the visit at any time.       Patient is aware this is a billable service.    Goals addressed in session Goal 1   Data: Patient attended the virtual Relaxation group and was guided through tapping on anxiety, diaphragmatic breathing through three different protocols, voice vibration through outbreath with other Vagus nerve stimulation techniques, and progressive muscle relaxation paired with the different breathing parts.  Furthermore, the attendees were guided through Autogenic Training and Mindfulness-based meditation series on topics of Directional Compassion, Sacral Chakra Meditation, and ending with positive self-affirmations spiritual and character growth, moving on with forgiveness, and potential fulfilling.      During the Session, the Clinician used EFT, Mindfulness, Internal Family Systems, and Somatic approaches were used to achieve the deep state of relaxation and to maintain the serene environment for the exercise's purpose.     No substance abuse or alcohol problems were addressed in the session. Stage of change NA      Assessment: Patient was in normal/euthymic mood and in congruence with broad affect, followed through the guidance and completed the exercises well, showed willingness to continue learning and practicing self-care in weekly Relaxation Group as well as individual therapy. No interactions between members occur after the start of the guidance except for some chat emojis, photos of pets, and expressions of gratitude.       No safety plan was indicated. No safety issues are reported. No presence of SI or behaviors is determined based on self-reports.     Plan: The next group is scheduled for Friday depending on the Pt's availability. Patient will practice deep breathing and other techniques based on each occasion to counter stress and will attend the next group to be able to take part in further guided exercises on chosen topics to build on the techniques learned in the beginning.     Behavioral Health Treatment Plan and Discharge Planning: Patient is aware of and agrees to continue to work on their treatment plan. They have identified and are working toward their discharge goals. Yes      Visit start and stop times:    04/04/25  Start Time: 0400  Stop Time: 0500  Total Visit Time: 60 minutes

## 2025-04-04 NOTE — TELEPHONE ENCOUNTER
Spoke with patient gave verbal consent to sign off on 3/28/25 4/1/25 Virtual Encounter forms 4/4/25

## 2025-04-08 ENCOUNTER — DOCUMENTATION (OUTPATIENT)
Dept: BEHAVIORAL/MENTAL HEALTH CLINIC | Facility: CLINIC | Age: 60
End: 2025-04-08

## 2025-04-08 ENCOUNTER — TELEMEDICINE (OUTPATIENT)
Dept: BEHAVIORAL/MENTAL HEALTH CLINIC | Facility: CLINIC | Age: 60
End: 2025-04-08
Payer: COMMERCIAL

## 2025-04-08 DIAGNOSIS — F17.210 CIGARETTE NICOTINE DEPENDENCE WITHOUT COMPLICATION: ICD-10-CM

## 2025-04-08 DIAGNOSIS — F31.81 MODERATE MIXED BIPOLAR II DISORDER (HCC): Primary | ICD-10-CM

## 2025-04-08 DIAGNOSIS — F43.12 CHRONIC POST-TRAUMATIC STRESS DISORDER (PTSD): ICD-10-CM

## 2025-04-08 DIAGNOSIS — F10.11 ALCOHOL ABUSE, IN REMISSION: ICD-10-CM

## 2025-04-08 DIAGNOSIS — F41.1 GENERALIZED ANXIETY DISORDER: ICD-10-CM

## 2025-04-08 DIAGNOSIS — F12.90 MARIJUANA USE: Chronic | ICD-10-CM

## 2025-04-08 DIAGNOSIS — Z72.0 TOBACCO ABUSE: ICD-10-CM

## 2025-04-08 PROCEDURE — 90832 PSYTX W PT 30 MINUTES: CPT | Performed by: COUNSELOR

## 2025-04-08 NOTE — PSYCH
Virtual Regular VisitName: Marie Portillo      : 1965      MRN: [id number]  Encounter Provider: Isabel Watts LPC  Encounter Date: 2025   Encounter department: [facility] THERAPIST [place]  :  Assessment & Plan  Moderate mixed bipolar II disorder (HCC)         Chronic post-traumatic stress disorder (PTSD)         Generalized anxiety disorder         Tobacco abuse         Marijuana use         Cigarette nicotine dependence without complication         Alcohol abuse, in remission             Goals addressed in session: Goal 1     DATA: Marie reported experiencing a severe headache, waking up with it, and had a shorter session also due to a low quality of audio connection. She addressed her fears for her Social Security being denied and worrying about her income and her landlords taking care of her, for which Marie feels very badly. She was encouraged to take notes again and to focus on her inner resources and support system and was reassured that this Th is here to help her go through the challenge and provide everything that is needed.    During this session, this clinician used the following therapeutic modalities: Client-centered Therapy, Cognitive Behavioral Therapy, Mindfulness-based Strategies, and Supportive Psychotherapy    Substance Abuse was not addressed during this session. If the client is diagnosed with a co-occurring substance use disorder, please indicate any changes in the frequency or amount of use: NA. Stage of change for addressing substance use diagnoses: Contemplation    ASSESSMENT:  Marie presents with a Euthymic/ normal, Anxious, and Depressed mood. Marie's affect is Normal range and intensity, Constricted, and Tearful, at times, which is congruent, with their mood and the content of the session. The client has not made progress on their goals as evidenced by regressing cognitive function and parallel emotion dysregulation.    Marei presents with a low  "risk of suicide, low risk of self-harm, and none risk of harm to others.    For any risk assessment that surpasses a \"low\" rating, a safety plan must be developed.    A safety plan was indicated: no  If yes, describe in detail NA    PLAN: Between sessions, Marie will attend weekly Relaxation group for self-care. At the next session, the therapist will use Client-centered Therapy, Cognitive Behavioral Therapy, Dialectical Behavior Therapy, Mindfulness-based Strategies, and Supportive Psychotherapy to address self-care, taking notes to avoid getting frustrated with cognitive decline.    Behavioral Health Treatment Plan [facility]: Diagnosis and Treatment Plan explained to Marie, Marie relates understanding diagnosis and is agreeable to Treatment Plan. Yes     Depression Follow-up Plan Completed: Not applicable     Reason for visit is No chief complaint on file.     Recent Visits  Date Type Provider Dept   04/04/25 [facility] Isabel Watts LPC [facility] Therapist [place]   04/01/25 [facility] Isabel Watts LPC [facility] Therapist [place]   Showing recent visits within past 7 days and meeting all other requirements  Today's Visits  Date Type Provider Dept   04/08/25 [facility] Isabel Watts LPC [facility] Therapist [place]   Showing today's visits and meeting all other requirements  Future Appointments  No visits were found meeting these conditions.  Showing future appointments within next 150 days and meeting all other requirements     History of Present Illness     HPI    Past Medical History   Past Medical History:   Diagnosis Date    Bilateral carpal tunnel syndrome 08/20/2019    Cardiomyopathy (HCC)     Carpal tunnel syndrome of right wrist 10/30/2019    Added automatically from request for surgery [id number]    Chlamydia     COPD (chronic obstructive pulmonary disease) (HCC)     Depression     Hypertension     Varicella      Past Surgical History:   Procedure " Laterality Date    BACK SURGERY      BREAST BIOPSY Left 20yrs ago benign    CERVICAL SPINE SURGERY      EGD      HERNIA REPAIR      OOPHORECTOMY Right     [facility]SC WRST SURG W/RLS TRANSVRS CARPL LIGM Right 01/21/2020    Procedure: RELEASE CARPAL TUNNEL ENDOSCOPIC;  Surgeon: Sam Nguyen MD;  Location: [facility];  Service: Orthopedics    [facility]SC WRST SURG W/RLS TRANSVRS CARPL LIGM Left 01/28/2020    Procedure: RELEASE CARPAL TUNNEL ENDOSCOPIC;  Surgeon: Sam Nguyen MD;  Location: [facility];  Service: Orthopedics     Current Outpatient Medications   Medication Instructions    Acetaminophen 500 MG 2 capsules, Every 4 hours PRN    albuterol (PROVENTIL HFA,VENTOLIN HFA) 90 mcg/act inhaler inhale 2 puffs by mouth and INTO THE LUNGS every 6 hours IF NEEDED FOR WHEEZING.    betamethasone dipropionate (DIPROSONE) 0.05 % cream Topical, 2 times daily    buPROPion (WELLBUTRIN XL) 300 mg, Oral, Daily    cholecalciferol (VITAMIN D3) 400 Units, Daily    fluticasone (FLONASE) 50 mcg/act nasal spray 2 sprays, Nasal, Daily    guaiFENesin (MUCINEX) 1,200 mg, Oral, Every 12 hours scheduled    ipratropium-albuterol (DUO-NEB) 0.5-2.5 mg/3 mL nebulizer solution 3 mL, Nebulization, 4 times daily    lamoTRIgine (LAMICTAL) 50 mg, Oral, Daily, Take 2 tablets (50 mg) by mouth daily with one 200 mg tablet for daily total of 250 mg.    lamoTRIgine (LAMICTAL) 200 mg, Oral, Daily, Take 2 tablets (50 mg) by mouth daily with one 200 mg tablet for daily total of 250 mg    levocetirizine (XYZAL) 5 mg, Oral, Every evening    losartan (COZAAR) 25 mg, Oral, Daily    metoprolol succinate (TOPROL-XL) 25 mg, Oral, Daily    ofloxacin (OCUFLOX) 0.3 % ophthalmic solution 1 drop, Left Eye, 4 times daily    omeprazole (PriLOSEC) 20 mg delayed release capsule take 1 capsule by mouth twice a day    senna-docusate sodium (SENOKOT-S) 8.6-50 mg per tablet 3 tablets, As needed    Stiolto Respimat 2.5-2.5 MCG/ACT inhaler inhale 2 puffs by mouth and INTO  THE LUNGS once daily     No Known Allergies    Objective   LMP  (LMP Unknown)     Video Exam  Physical Exam     Administrative Statements   Encounter provider Isabel Watts LPC    The Patient is located at Home and in the following state in which I hold an active license [place].    The patient was identified by name and date of birth. Marie Portillo was informed that this is a telemedicine visit and that the visit is being conducted through the Epic Embedded platform. She agrees to proceed..  My office door was closed. No one else was in the room.  She acknowledged consent and understanding of privacy and security of the video platform. The patient has agreed to participate and understands they can discontinue the visit at any time.    I have spent a total time of 31 minutes in caring for this patient on the day of the visit/encounter including  therapy , not including the time spent for establishing the audio/video connection.    Visit Time  Start Time: 0148  Stop Time: 0219  Total Visit Time: 31 minutes
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
## 2025-04-11 ENCOUNTER — TELEPHONE (OUTPATIENT)
Dept: PSYCHIATRY | Facility: CLINIC | Age: 60
End: 2025-04-11

## 2025-04-11 ENCOUNTER — TELEMEDICINE (OUTPATIENT)
Dept: BEHAVIORAL/MENTAL HEALTH CLINIC | Facility: CLINIC | Age: 60
End: 2025-04-11
Payer: COMMERCIAL

## 2025-04-11 DIAGNOSIS — F12.90 MARIJUANA USE: Chronic | ICD-10-CM

## 2025-04-11 DIAGNOSIS — F10.11 ALCOHOL ABUSE, IN REMISSION: ICD-10-CM

## 2025-04-11 DIAGNOSIS — F17.210 CIGARETTE NICOTINE DEPENDENCE WITHOUT COMPLICATION: ICD-10-CM

## 2025-04-11 DIAGNOSIS — Z72.0 TOBACCO ABUSE: ICD-10-CM

## 2025-04-11 DIAGNOSIS — F31.81 MODERATE MIXED BIPOLAR II DISORDER (HCC): Primary | ICD-10-CM

## 2025-04-11 DIAGNOSIS — F43.12 CHRONIC POST-TRAUMATIC STRESS DISORDER (PTSD): ICD-10-CM

## 2025-04-11 DIAGNOSIS — F41.1 GENERALIZED ANXIETY DISORDER: ICD-10-CM

## 2025-04-11 PROCEDURE — 90853 GROUP PSYCHOTHERAPY: CPT | Performed by: COUNSELOR

## 2025-04-11 NOTE — GROUP NOTE
Behavioral Health Psychotherapy Progress Note    1. Moderate mixed bipolar II disorder (HCC)        2. Generalized anxiety disorder        3. Chronic post-traumatic stress disorder (PTSD)        4. Tobacco abuse        5. Cigarette nicotine dependence without complication        6. Marijuana use        7. Alcohol abuse, in remission            This week's activities started with tapping on pain, diaphragmatic breathing through three different protocols, voice vibration through outbreath with other Vagus nerve stimulation techniques, and progressive muscle relaxation paired with the different breathing parts. Furthermore, the attendees were guided through Autogenic Training and Mindfulness-based meditation series on topics of  Calming Color Visualization, Solar Plexus Chakra Meditation, and ending with positive self-affirmations for unlearning old models, releasing control to move on forward, and self-compassion.    Administrative Statements     Encounter Provider Isabel Watts LPC     Verification of patient location: Patient is located at Home in the following state in which I hold an active license [place]     The patient was informed that this is a telemedicine visit and that the visit is being conducted through Microsoft Teams platform. Pt was informed about the virtual presence of other group attendees. Consent and understanding were acknowledged as well as privacy and security of the video platform. The patient has agreed to participate and understands  they can discontinue the visit at any time.        Patient is aware this is a billable service.     Goals addressed in session Goal 1     Data: Patient attended the virtual Relaxation group and was guided through tapping on pain, diaphragmatic breathing through three different protocols, voice vibration through outbreath with other Vagus nerve stimulation techniques, and progressive muscle relaxation paired with the different breathing parts. Furthermore, the  attendees were guided through Autogenic Training and Mindfulness-based meditation series on topics of  Calming Color Visualization, Solar Plexus Chakra Meditation, and ending with positive self-affirmations for unlearning old models, releasing control to move on forward, and self-compassion.     During the Session, the Clinician used EFT, Mindfulness, Internal Family Systems, and Somatic approaches were used to achieve the deep state of relaxation and to maintain the serene environment for the exercise's purpose.     No substance abuse or alcohol problems were addressed in the session. Stage of change NA      Assessment: Patient was in normal/euthymic mood and in congruence with broad affect, followed through the guidance and completed the exercises well, showed willingness to continue learning and practicing self-care in weekly Relaxation Group as well as individual therapy. No interactions between members occur after the start of the guidance except for some chat emojis, photos of pets, and expressions of gratitude.       No safety plan was indicated. No safety issues are reported. No presence of SI or behaviors is determined based on self-reports.     Plan: The next group is scheduled for Friday depending on the Pt's availability. Patient will practice deep breathing and other techniques based on each occasion to counter stress and will attend the next group to be able to take part in further guided exercises on chosen topics to build on the techniques learned in the beginning.     Behavioral Health Treatment Plan and Discharge Planning: Patient is aware of and agrees to continue to work on their treatment plan. They have identified and are working toward their discharge goals. Yes      Visit start and stop times:    04/11/25  Start Time: 0410  Stop Time: 0500  Total Visit Time: 50 minutes

## 2025-04-11 NOTE — TELEPHONE ENCOUNTER
Left voicemail informing patient and/or parent/guardian of the Psych Encounter form needing to be signed as a requirement from the insurance company for billing purposes. Patient can access form via [employer] and sign electronically.     Please make patient aware this form must be signed for each visit as a requirement to continue future visits with provider.    Virtual Encounter form 4/8/25 call #1

## 2025-04-15 ENCOUNTER — TELEPHONE (OUTPATIENT)
Age: 60
End: 2025-04-15

## 2025-04-15 ENCOUNTER — TELEMEDICINE (OUTPATIENT)
Dept: BEHAVIORAL/MENTAL HEALTH CLINIC | Facility: CLINIC | Age: 60
End: 2025-04-15
Payer: COMMERCIAL

## 2025-04-15 DIAGNOSIS — F17.210 CIGARETTE NICOTINE DEPENDENCE WITHOUT COMPLICATION: ICD-10-CM

## 2025-04-15 DIAGNOSIS — F12.90 MARIJUANA USE: Chronic | ICD-10-CM

## 2025-04-15 DIAGNOSIS — F31.81 MODERATE MIXED BIPOLAR II DISORDER (HCC): Primary | ICD-10-CM

## 2025-04-15 DIAGNOSIS — Z72.0 TOBACCO ABUSE: ICD-10-CM

## 2025-04-15 DIAGNOSIS — F43.12 CHRONIC POST-TRAUMATIC STRESS DISORDER (PTSD): ICD-10-CM

## 2025-04-15 DIAGNOSIS — F41.1 GENERALIZED ANXIETY DISORDER: ICD-10-CM

## 2025-04-15 DIAGNOSIS — F10.11 ALCOHOL ABUSE, IN REMISSION: ICD-10-CM

## 2025-04-15 PROCEDURE — 90832 PSYTX W PT 30 MINUTES: CPT | Performed by: COUNSELOR

## 2025-04-15 NOTE — PSYCH
Virtual Regular VisitName: Marie Portillo      : 1965      MRN: [id number]  Encounter Provider: Isabel Watts LPC  Encounter Date: 4/15/2025   Encounter department: [facility] THERAPIST [place]  :  Assessment & Plan  Moderate mixed bipolar II disorder (HCC)         Chronic post-traumatic stress disorder (PTSD)         Generalized anxiety disorder         Cigarette nicotine dependence without complication         Tobacco abuse         Marijuana use         Alcohol abuse, in remission             Goals addressed in session: Goal 1     DATA: Marie had difficulties connecting and tried the two options before connecting to Teams from a temporary phone sim-card. Marie was guided through a breathing exercise to be able to calm down due to intense emotions and managed to achieve a better mental status and had a shorter session. She also talked about her challenges communicating her needs and dealing with her forgetfulness and was encouraged again to take notes and put in writing her experience and be able to refer to it at any point when challenged with memory.   During this session, this clinician used the following therapeutic modalities: Client-centered Therapy, Cognitive Behavioral Therapy, Mindfulness-based Strategies, and Supportive Psychotherapy    Substance Abuse was not addressed during this session. If the client is diagnosed with a co-occurring substance use disorder, please indicate any changes in the frequency or amount of use: NA. Stage of change for addressing substance use diagnoses: Pre-contemplation    ASSESSMENT:  Marie presents with a Euthymic/ normal and Anxious mood. Marie's affect is Normal range and intensity and Constricted, which is congruent, with their mood and the content of the session. The client has made progress on their goals as evidenced by reportedly putting effort to separate from emotions and managing sometimes to react differently, with more  "self-awareness.    Marie presents with a minimal risk of suicide, minimal risk of self-harm, and none risk of harm to others.    For any risk assessment that surpasses a \"low\" rating, a safety plan must be developed.    A safety plan was indicated: no  If yes, describe in detail NA    PLAN: Between sessions, Marie will attend weekly Relaxation group for self-care. At the next session, the therapist will use Client-centered Therapy, Cognitive Behavioral Therapy, Mindfulness-based Strategies, and Supportive Psychotherapy to address adequate virtual access to sessions after restoration of phone device.    Behavioral Health Treatment Plan [facility]: Diagnosis and Treatment Plan explained to Marie, Marie relates understanding diagnosis and is agreeable to Treatment Plan. Yes     Depression Follow-up Plan Completed: Not applicable     Reason for visit is No chief complaint on file.     Recent Visits  Date Type Provider Dept   04/11/25 Telemedicine Isabel Watts LPC [facility] Therapist [place]   04/08/25 [facility] [place]   Showing recent visits within past 7 days and meeting all other requirements  Today's Visits  Date Type Provider Dept   04/15/25 Telemedicine Isabel Watts LPC [facility]rap[facility] [place]   Showing today's visits and meeting all other requirements  Future Appointments  No visits were found meeting these conditions.  Showing future appointments within next 150 days and meeting all other requirements     History of Present Illness     HPI    Past Medical History   Past Medical History:   Diagnosis Date    Bilateral carpal tunnel syndrome 08/20/2019    Cardiomyopathy (HCC)     Carpal tunnel syndrome of right wrist 10/30/2019    Added automatically from request for surgery [id number]    Chlamydia     COPD (chronic obstructive pulmonary disease) (HCC)     Depression     Hypertension     Varicella      Past Surgical History: "   Procedure Laterality Date    BACK SURGERY      BREAST BIOPSY Left 20yrs ago benign    CERVICAL SPINE SURGERY      EGD      HERNIA REPAIR      OOPHORECTOMY Right     [place] WRST SURG W/RLS TRANSVRS CARPL LIGM Right 01/21/2020    Procedure: RELEASE CARPAL TUNNEL ENDOSCOPIC;  Surgeon: Sam Nguyen MD;  [facility];  Service: Orthopedics    [place] WRST SURG W/RLS TRANSVRS CARPL LIGM Left 01/28/2020    Procedure: RELEASE CARPAL TUNNEL ENDOSCOPIC;  Surgeon: Sam Nguyen MD;  [facility];  Service: Orthopedics     Current Outpatient Medications   Medication Instructions    Acetaminophen 500 MG 2 capsules, Every 4 hours PRN    albuterol (PROVENTIL HFA,VENTOLIN HFA) 90 mcg/act inhaler inhale 2 puffs by mouth and INTO THE LUNGS every 6 hours IF NEEDED FOR WHEEZING.    betamethasone dipropionate (DIPROSONE) 0.05 % cream Topical, 2 times daily    buPROPion (WELLBUTRIN XL) 300 mg, Oral, Daily    cholecalciferol (VITAMIN D3) 400 Units, Daily    fluticasone (FLONASE) 50 mcg/act nasal spray 2 sprays, Nasal, Daily    guaiFENesin (MUCINEX) 1,200 mg, Oral, Every 12 hours scheduled    ipratropium-albuterol (DUO-NEB) 0.5-2.5 mg/3 mL nebulizer solution 3 mL, Nebulization, 4 times daily    lamoTRIgine (LAMICTAL) 50 mg, Oral, Daily, Take 2 tablets (50 mg) by mouth daily with one 200 mg tablet for daily total of 250 mg.    lamoTRIgine (LAMICTAL) 200 mg, Oral, Daily, Take 2 tablets (50 mg) by mouth daily with one 200 mg tablet for daily total of 250 mg    levocetirizine (XYZAL) 5 mg, Oral, Every evening    losartan (COZAAR) 25 mg, Oral, Daily    metoprolol succinate (TOPROL-XL) 25 mg, Oral, Daily    ofloxacin (OCUFLOX) 0.3 % ophthalmic solution 1 drop, Left Eye, 4 times daily    omeprazole (PriLOSEC) 20 mg delayed release capsule take 1 capsule by mouth twice a day    senna-docusate sodium (SENOKOT-S) 8.6-50 mg per tablet 3 tablets, As needed    Stiolto Respimat 2.5-2.5 MCG/ACT inhaler inhale 2 puffs by  mouth and INTO THE LUNGS once daily     No Known Allergies    Objective   LMP  (LMP Unknown)     Video Exam  Physical Exam     Administrative Statements   Encounter provider Isabel Watts LPC    The Patient is located at Home and in the following state in which I hold an active license [place].    The patient was identified by name and date of birth. Marie Portillo was informed that this is a telemedicine visit and that the visit is being conducted through the [employer] Teams platform. She agrees to proceed..  My office door was closed. No one else was in the room.  She acknowledged consent and understanding of privacy and security of the video platform. The patient has agreed to participate and understands they can discontinue the visit at any time.    I have spent a total time of 35 minutes in caring for this patient on the day of the visit/encounter including  therapy , not including the time spent for establishing the audio/video connection.    Visit Time  Start Time: 0208  Stop Time: 0243  Total Visit Time: 35 minutes

## 2025-04-18 ENCOUNTER — TELEPHONE (OUTPATIENT)
Age: 60
End: 2025-04-18

## 2025-04-18 NOTE — TELEPHONE ENCOUNTER
Pt called and wanted provider to text the link to [phone] for 4pm appt today. Writer also asked about Virtual consent form to be signed and pt said that provider has that worked out for pt.

## 2025-04-22 ENCOUNTER — TELEMEDICINE (OUTPATIENT)
Dept: BEHAVIORAL/MENTAL HEALTH CLINIC | Facility: CLINIC | Age: 60
End: 2025-04-22
Payer: COMMERCIAL

## 2025-04-22 DIAGNOSIS — F12.90 MARIJUANA USE: Chronic | ICD-10-CM

## 2025-04-22 DIAGNOSIS — F43.12 CHRONIC POST-TRAUMATIC STRESS DISORDER (PTSD): ICD-10-CM

## 2025-04-22 DIAGNOSIS — F41.1 GENERALIZED ANXIETY DISORDER: ICD-10-CM

## 2025-04-22 DIAGNOSIS — F17.210 CIGARETTE NICOTINE DEPENDENCE WITHOUT COMPLICATION: ICD-10-CM

## 2025-04-22 DIAGNOSIS — F10.11 ALCOHOL ABUSE, IN REMISSION: ICD-10-CM

## 2025-04-22 DIAGNOSIS — Z72.0 TOBACCO ABUSE: ICD-10-CM

## 2025-04-22 DIAGNOSIS — F31.81 MODERATE MIXED BIPOLAR II DISORDER (HCC): Primary | ICD-10-CM

## 2025-04-22 PROCEDURE — 90834 PSYTX W PT 45 MINUTES: CPT | Performed by: COUNSELOR

## 2025-04-22 NOTE — PSYCH
Virtual Regular VisitName: Marie Portillo      : 1965      MRN: [id number]  Encounter Provider: Isabel Watts LPC  Encounter Date: 2025   Encounter department: [facility] THERAPIST [place]  :  Assessment & Plan  Moderate mixed bipolar II disorder (HCC)         Generalized anxiety disorder         Chronic post-traumatic stress disorder (PTSD)         Tobacco abuse         Marijuana use         Cigarette nicotine dependence without complication         Alcohol abuse, in remission             Goals addressed in session: Goal 1 Goal 2    DATA: Marie was observed being upset due to her new phone and sim-card that do not have her information yet for all her appointments challenge her to do all the steps on her own, without help from a friend. She was commended for her persistence and determination to find a way again and use her patience and learned skills to deal with the challenge.    During this session, this clinician used the following therapeutic modalities: Client-centered Therapy, Cognitive Behavioral Therapy, Mindfulness-based Strategies, and Supportive Psychotherapy    Substance Abuse was not addressed during this session. If the client is diagnosed with a co-occurring substance use disorder, please indicate any changes in the frequency or amount of use: NA. Stage of change for addressing substance use diagnoses: Pre-contemplation    ASSESSMENT:  Marie presents with a Euthymic/ normal, Anxious, and Depressed mood. Marie's affect is Normal range and intensity, Constricted, and Tearful, at times, which is congruent, with their mood and the content of the session. The client has not made progress on their goals as evidenced by experienced cognitive decline that affects her mental status negatively, she sounds more frustrated and challenged when dealing with strong emotions.    Marie presents with a low risk of suicide, low risk of self-harm, and none risk of harm to  "others.    For any risk assessment that surpasses a \"low\" rating, a safety plan must be developed.    A safety plan was indicated: no  If yes, describe in detail NA    PLAN: Between sessions, Marie will attend the weekly Mindfulness-based stress reduction sessions for self-care and practicing learned skills under guidance. At the next session, the therapist will use Client-centered Therapy, Cognitive Behavioral Therapy, Dialectical Behavior Therapy, Mindfulness-based Strategies, and Supportive Psychotherapy to address emotion regulation and taking notes.    Behavioral Health Treatment Plan [facility]: Diagnosis and Treatment Plan explained to Marie, Marie relates understanding diagnosis and is agreeable to Treatment Plan. Yes     Depression Follow-up Plan Completed: Not applicable     Reason for visit is   Chief Complaint   Patient presents with    Virtual Regular Visit      Recent Visits  Date Type Provider Dept   04/15/25 Telemedicine Isabel Watts LPC [facility] Therapist [place]   Showing recent visits within past 7 days and meeting all other requirements  Today's Visits  Date Type Provider Dept   04/22/25 Telemedicine Isabel Watts LPC [facility] Therap[facility] [place]   Showing today's visits and meeting all other requirements  Future Appointments  No visits were found meeting these conditions.  Showing future appointments within next 150 days and meeting all other requirements     History of Present Illness     HPI    Past Medical History   Past Medical History:   Diagnosis Date    Bilateral carpal tunnel syndrome 08/20/2019    Cardiomyopathy (HCC)     Carpal tunnel syndrome of right wrist 10/30/2019    Added automatically from request for surgery [id number]    Chlamydia     COPD (chronic obstructive pulmonary disease) (HCC)     Depression     Hypertension     Varicella      Past Surgical History:   Procedure Laterality Date    BACK SURGERY      BREAST BIOPSY Left 20yrs ago benign    " CERVICAL SPINE SURGERY      EGD      HERNIA REPAIR      OOPHORECTOMY Right     [place] [facility] WRST SURG W/RLS TRANSVRS CARPL LIGM Right 01/21/2020    Procedure: RELEASE CARPAL TUNNEL ENDOSCOPIC;  Surgeon: Sam Nguyen MD;  [facility];  Service: Orthopedics    [place] WRST SURG W/RLS TRANSVRS CARPL LIGM Left 01/28/2020    Procedure: RELEASE CARPAL TUNNEL ENDOSCOPIC;  Surgeon: Sam Nguyen MD;  [facility];  Service: Orthopedics     Current Outpatient Medications   Medication Instructions    Acetaminophen 500 MG 2 capsules, Every 4 hours PRN    albuterol (PROVENTIL HFA,VENTOLIN HFA) 90 mcg/act inhaler inhale 2 puffs by mouth and INTO THE LUNGS every 6 hours IF NEEDED FOR WHEEZING.    betamethasone dipropionate (DIPROSONE) 0.05 % cream Topical, 2 times daily    buPROPion (WELLBUTRIN XL) 300 mg, Oral, Daily    cholecalciferol (VITAMIN D3) 400 Units, Daily    fluticasone (FLONASE) 50 mcg/act nasal spray 2 sprays, Nasal, Daily    guaiFENesin (MUCINEX) 1,200 mg, Oral, Every 12 hours scheduled    ipratropium-albuterol (DUO-NEB) 0.5-2.5 mg/3 mL nebulizer solution 3 mL, Nebulization, 4 times daily    lamoTRIgine (LAMICTAL) 50 mg, Oral, Daily, Take 2 tablets (50 mg) by mouth daily with one 200 mg tablet for daily total of 250 mg.    lamoTRIgine (LAMICTAL) 200 mg, Oral, Daily, Take 2 tablets (50 mg) by mouth daily with one 200 mg tablet for daily total of 250 mg    levocetirizine (XYZAL) 5 mg, Oral, Every evening    losartan (COZAAR) 25 mg, Oral, Daily    metoprolol succinate (TOPROL-XL) 25 mg, Oral, Daily    ofloxacin (OCUFLOX) 0.3 % ophthalmic solution 1 drop, Left Eye, 4 times daily    omeprazole (PriLOSEC) 20 mg delayed release capsule take 1 capsule by mouth twice a day    senna-docusate sodium (SENOKOT-S) 8.6-50 mg per tablet 3 tablets, As needed    Stiolto Respimat 2.5-2.5 MCG/ACT inhaler inhale 2 puffs by mouth and INTO THE LUNGS once daily     No Known Allergies    Objective   LMP  (LMP Unknown)      Video Exam  Physical Exam     Administrative Statements   Encounter provider Isabel Watts LPC    The Patient is located at Home and in the following state in which I hold an active license [place].    The patient was identified by name and date of birth. Marie Portillo was informed that this is a telemedicine visit and that the visit is being conducted through the Microsoft Teams platform. She agrees to proceed..  My office door was closed. No one else was in the room.  She acknowledged consent and understanding of privacy and security of the video platform. The patient has agreed to participate and understands they can discontinue the visit at any time.    I have spent a total time of 42 minutes in caring for this patient on the day of the visit/encounter including  therapy , not including the time spent for establishing the audio/video connection.    Visit Time  Start Time: 0210  Stop Time: 0252  Total Visit Time: 42 minutes

## 2025-04-27 DIAGNOSIS — K21.9 GASTROESOPHAGEAL REFLUX DISEASE WITHOUT ESOPHAGITIS: ICD-10-CM

## 2025-04-28 RX ORDER — OMEPRAZOLE 20 MG/1
20 CAPSULE, DELAYED RELEASE ORAL 2 TIMES DAILY
Qty: 180 CAPSULE | Refills: 1 | Status: SHIPPED | OUTPATIENT
Start: 2025-04-28

## 2025-04-29 ENCOUNTER — TELEMEDICINE (OUTPATIENT)
Dept: BEHAVIORAL/MENTAL HEALTH CLINIC | Facility: CLINIC | Age: 60
End: 2025-04-29
Payer: COMMERCIAL

## 2025-04-29 DIAGNOSIS — F12.90 MARIJUANA USE: Chronic | ICD-10-CM

## 2025-04-29 DIAGNOSIS — F31.81 MODERATE MIXED BIPOLAR II DISORDER (HCC): Primary | ICD-10-CM

## 2025-04-29 DIAGNOSIS — Z72.0 TOBACCO ABUSE: ICD-10-CM

## 2025-04-29 DIAGNOSIS — F17.210 CIGARETTE NICOTINE DEPENDENCE WITHOUT COMPLICATION: ICD-10-CM

## 2025-04-29 DIAGNOSIS — F41.1 GENERALIZED ANXIETY DISORDER: ICD-10-CM

## 2025-04-29 DIAGNOSIS — F10.11 ALCOHOL ABUSE, IN REMISSION: ICD-10-CM

## 2025-04-29 DIAGNOSIS — F43.12 CHRONIC POST-TRAUMATIC STRESS DISORDER (PTSD): ICD-10-CM

## 2025-04-29 PROCEDURE — 90834 PSYTX W PT 45 MINUTES: CPT | Performed by: COUNSELOR

## 2025-04-29 RX ORDER — TIOTROPIUM BROMIDE AND OLODATEROL 3.124; 2.736 UG/1; UG/1
SPRAY, METERED RESPIRATORY (INHALATION)
Qty: 13.333 G | Refills: 11 | Status: SHIPPED | OUTPATIENT
Start: 2025-04-29
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
## 2025-04-30 DIAGNOSIS — I50.20 SYSTOLIC HEART FAILURE, UNSPECIFIED HF CHRONICITY (HCC): ICD-10-CM

## 2025-05-06 ENCOUNTER — TELEMEDICINE (OUTPATIENT)
Dept: BEHAVIORAL/MENTAL HEALTH CLINIC | Facility: CLINIC | Age: 60
End: 2025-05-06
Payer: COMMERCIAL

## 2025-05-06 DIAGNOSIS — F12.90 MARIJUANA USE: Chronic | ICD-10-CM

## 2025-05-06 DIAGNOSIS — Z72.0 TOBACCO ABUSE: ICD-10-CM

## 2025-05-06 DIAGNOSIS — F10.11 ALCOHOL ABUSE, IN REMISSION: ICD-10-CM

## 2025-05-06 DIAGNOSIS — F43.12 CHRONIC POST-TRAUMATIC STRESS DISORDER (PTSD): ICD-10-CM

## 2025-05-06 DIAGNOSIS — F17.210 CIGARETTE NICOTINE DEPENDENCE WITHOUT COMPLICATION: ICD-10-CM

## 2025-05-06 DIAGNOSIS — F31.81 MODERATE MIXED BIPOLAR II DISORDER (HCC): Primary | ICD-10-CM

## 2025-05-06 DIAGNOSIS — F41.1 GENERALIZED ANXIETY DISORDER: ICD-10-CM

## 2025-05-06 PROCEDURE — 90834 PSYTX W PT 45 MINUTES: CPT | Performed by: COUNSELOR
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
## 2025-05-09 ENCOUNTER — TELEMEDICINE (OUTPATIENT)
Dept: BEHAVIORAL/MENTAL HEALTH CLINIC | Facility: CLINIC | Age: 60
End: 2025-05-09
Payer: COMMERCIAL

## 2025-05-09 DIAGNOSIS — F41.1 GENERALIZED ANXIETY DISORDER: ICD-10-CM

## 2025-05-09 DIAGNOSIS — F31.81 MODERATE MIXED BIPOLAR II DISORDER (HCC): Primary | ICD-10-CM

## 2025-05-09 DIAGNOSIS — F43.12 CHRONIC POST-TRAUMATIC STRESS DISORDER (PTSD): ICD-10-CM

## 2025-05-09 PROCEDURE — 90853 GROUP PSYCHOTHERAPY: CPT | Performed by: COUNSELOR

## 2025-05-10 NOTE — GROUP NOTE
Virtual Regular Visit    Verification of patient location:  Patient is located at Home in the following state in which I hold an active license [place]    The patient was identified by name and date of birth. Marie Portillo was informed that this is a telemedicine visit and that the visit is being conducted throughthe Microsoft Teams platform. She agrees to proceed..  My office door was closed. No one else was in the room.  She acknowledged consent and understanding of privacy and security of the video platform. The patient has agreed to participate and understands they can discontinue the visit at any time.     Patient is aware this is a billable service.     Behavioral Health Psychotherapy Progress Note    Goals addressed in session Goal 1     Data: Patient attended the virtual Relaxation group and was guided through tapping on trauma, diaphragmatic breathing through two different protocols, voice vibration through outbreath or humming with other Vagus nerve stimulation techniques, and progressive muscle relaxation paired with the different breathing parts. Furthermore, the attendees were guided through Mindfulness-based meditation series on topics of a Body Scan, Creating Safe Space to Deal with Difficult Emotions, Break for Self-Compassion, a Child in the [place] imagery, [place] Chakra Meditation, and ending with positive self-affirmations for self-compassion, optimism, confidence, and releasing old models.      During the Session, the Clinician used EFT, Mindfulness-based stress reduction, Internal Family Systems, and Somatic approaches were used to achieve the state of relaxation, to provide valuable information about the used methods and their effects on mental, emotional, and physical wellbeing, and to maintain the serene environment for the exercise's purpose.     No substance abuse or alcohol problems were addressed in the session. Stage of change NA      Assessment: Patient was in normal/euthymic mood and in  congruence with broad affect, followed through the guidance and completed the exercises well, showed willingness to continue learning and practicing self-care in weekly Relaxation Group as well as individual therapy. No interactions between members occur after the start of the guidance except for chat emojis and expressions of gratitude and mutual support.       No safety plan was indicated. No safety issues are reported. No presence of SI or behaviors is determined based on self-reports.     Plan: The next group is scheduled for Friday depending on the Pt's availability. Patient will practice deep breathing and other techniques based on each occasion to counter stress and will attend the next group to be able to take part in further guided exercises on chosen topics to build on the techniques learned in the beginning.     Behavioral Health Treatment Plan and Discharge Planning: Patient is aware of and agrees to continue to work on their treatment plan. They have identified and are working toward their discharge goals. Yes      Visit start and stop times:    05/09/25  Start Time: 0405  Stop Time: 0500  Total Visit Time: 55 minutes

## 2025-05-13 ENCOUNTER — TELEPHONE (OUTPATIENT)
Age: 60
End: 2025-05-13

## 2025-05-13 RX ORDER — METOPROLOL SUCCINATE 25 MG/1
25 TABLET, EXTENDED RELEASE ORAL DAILY
Qty: 90 TABLET | Refills: 1 | Status: SHIPPED | OUTPATIENT
Start: 2025-05-13

## 2025-05-16 ENCOUNTER — APPOINTMENT (OUTPATIENT)
Dept: RADIOLOGY | Facility: CLINIC | Age: 60
End: 2025-05-16
Attending: FAMILY MEDICINE
Payer: COMMERCIAL

## 2025-05-16 ENCOUNTER — OFFICE VISIT (OUTPATIENT)
Dept: OBGYN CLINIC | Facility: CLINIC | Age: 60
End: 2025-05-16
Payer: COMMERCIAL

## 2025-05-16 VITALS — WEIGHT: 107 LBS | HEART RATE: 80 BPM | TEMPERATURE: 97.4 F | BODY MASS INDEX: 21.01 KG/M2 | HEIGHT: 60 IN

## 2025-05-16 DIAGNOSIS — G89.29 CHRONIC LEFT-SIDED LOW BACK PAIN WITH LEFT-SIDED SCIATICA: ICD-10-CM

## 2025-05-16 DIAGNOSIS — M54.16 LUMBAR RADICULOPATHY: ICD-10-CM

## 2025-05-16 DIAGNOSIS — M25.552 PAIN IN LEFT HIP: ICD-10-CM

## 2025-05-16 DIAGNOSIS — M25.552 PAIN IN LEFT HIP: Primary | ICD-10-CM

## 2025-05-16 DIAGNOSIS — M54.42 CHRONIC LEFT-SIDED LOW BACK PAIN WITH LEFT-SIDED SCIATICA: ICD-10-CM

## 2025-05-16 PROCEDURE — 99214 OFFICE O/P EST MOD 30 MIN: CPT | Performed by: FAMILY MEDICINE

## 2025-05-16 PROCEDURE — 73502 X-RAY EXAM HIP UNI 2-3 VIEWS: CPT

## 2025-05-16 PROCEDURE — 72100 X-RAY EXAM L-S SPINE 2/3 VWS: CPT
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
## 2025-05-19 ENCOUNTER — TELEPHONE (OUTPATIENT)
Age: 60
End: 2025-05-19

## 2025-05-19 NOTE — TELEPHONE ENCOUNTER
Called the primary number, but Marie was not at home. They suggested she was looking for results of a hip xray. Advised this office/Dr. Chowdhury had not ordered and suggested they have her call the ordering doc or her PCP. They saidthey would give her the message. Call dropped 3x while calling the primary number.     Called the mobile number listed and spoke with Marie. Reviewed call above. She had spoken with the doctor this morning, but is looking to speak with Isabel. Advised the message will be forwarded to her.

## 2025-05-20 ENCOUNTER — TELEMEDICINE (OUTPATIENT)
Dept: BEHAVIORAL/MENTAL HEALTH CLINIC | Facility: CLINIC | Age: 60
End: 2025-05-20
Payer: COMMERCIAL

## 2025-05-20 DIAGNOSIS — F10.11 ALCOHOL ABUSE, IN REMISSION: ICD-10-CM

## 2025-05-20 DIAGNOSIS — F31.81 MODERATE MIXED BIPOLAR II DISORDER (HCC): Primary | ICD-10-CM

## 2025-05-20 DIAGNOSIS — F12.90 MARIJUANA USE: Chronic | ICD-10-CM

## 2025-05-20 DIAGNOSIS — F17.210 CIGARETTE NICOTINE DEPENDENCE WITHOUT COMPLICATION: ICD-10-CM

## 2025-05-20 DIAGNOSIS — Z72.0 TOBACCO ABUSE: ICD-10-CM

## 2025-05-20 DIAGNOSIS — F43.12 CHRONIC POST-TRAUMATIC STRESS DISORDER (PTSD): ICD-10-CM

## 2025-05-20 DIAGNOSIS — F41.1 GENERALIZED ANXIETY DISORDER: ICD-10-CM

## 2025-05-20 PROCEDURE — 90834 PSYTX W PT 45 MINUTES: CPT | Performed by: COUNSELOR

## 2025-05-20 NOTE — PSYCH
"Virtual Regular VisitName: Marie Portillo      : 1965      MRN: [id number]  Encounter Provider: Isabel Watts LPC  Encounter Date: 2025   Encounter department: [facility] THERAPIST [place]  :  Assessment & Plan  Moderate mixed bipolar II disorder (HCC)         Generalized anxiety disorder         Chronic post-traumatic stress disorder (PTSD)         Tobacco abuse         Cigarette nicotine dependence without complication         Marijuana use         Alcohol abuse, in remission         Moderate mixed bipolar II disorder (HCC)         Generalized anxiety disorder         Chronic post-traumatic stress disorder (PTSD)         Tobacco abuse         Cigarette nicotine dependence without complication         Marijuana use         Alcohol abuse, in remission               Goals addressed in session: Goal 1 and Goal 2     DATA: Marie addressed deep concerns about \"cancer diagnoses\" and was encouraged to talk to her doctors due to mixed up information in her chart about her calls and call returns that she was upset about, saying that she does not make up stories and is convinced that they called her with a question about cancer history, and she denied, but then, they supposedly told her that they have \"found something\" that \"would not be the original cancer but the metastasis.\" Marie was encouraged to focus on something else before she knows for sure from her scheduled appointment with her PCP in two days, but was informed that she is referred for PT and home exercises with a check up in 6 weeks, which she knew.     During this session, this clinician used the following therapeutic modalities: Client-centered Therapy, Cognitive Behavioral Therapy, Mindfulness-based Strategies, and Supportive Psychotherapy    Substance Abuse was not addressed during this session. If the client is diagnosed with a co-occurring substance use disorder, please indicate any changes in the frequency or " "amount of use: NA. Stage of change for addressing substance use diagnoses: Pre-contemplation    ASSESSMENT:  Marie presents with a Euthymic/ normal, Anxious, and Depressed mood. Marie's affect is Normal range and intensity, Constricted, and Tearful, which is congruent, with their mood and the content of the session. The client has not made progress on their goals as evidenced by reportedly missing information and being confused with daily experiences when she has nothing to refer to- omitting taking notes.    Marie presents with a low risk of suicide, low risk of self-harm, and none risk of harm to others.    For any risk assessment that surpasses a \"low\" rating, a safety plan must be developed.    A safety plan was indicated: no  If yes, describe in detail NA    PLAN: Between sessions, Marie will attend . At the next session, the therapist will use Client-centered Therapy, Cognitive Behavioral Therapy, Mindfulness-based Strategies, and Supportive Psychotherapy to address focusing on topics that would avoid dwelling in negative self-talk.    Behavioral Health Treatment Plan [facility]: Diagnosis and Treatment Plan explained to Marie, Marie relates understanding diagnosis and is agreeable to Treatment Plan. Yes     Depression Follow-up Plan Completed: Not applicable     Reason for visit is   Chief Complaint   Patient presents with    Virtual Regular Visit      Recent Visits  Date Type Provider Dept   05/19/25 Telephone Isabel Watts LPC [facility]   Showing recent visits within past 7 days and meeting all other requirements  Today's Visits  Date Type Provider Dept   05/20/25 Telemedicine Isabel Watts LPC [facility] [place]   Showing today's visits and meeting all other requirements  Future Appointments  No visits were found meeting these conditions.  Showing future appointments within next 150 days and meeting all other requirements     History of Present Illness     HPI    Past " Medical History   Past Medical History:   Diagnosis Date    Bilateral carpal tunnel syndrome 08/20/2019    Cardiomyopathy (HCC)     Carpal tunnel syndrome of right wrist 10/30/2019    Added automatically from request for surgery [id number]    Chlamydia     COPD (chronic obstructive pulmonary disease) (HCC)     Depression     Hypertension     Varicella      Past Surgical History:   Procedure Laterality Date    BACK SURGERY      BREAST BIOPSY Left 20yrs ago benign    CERVICAL SPINE SURGERY      EGD      HERNIA REPAIR      OOPHORECTOMY Right     [facility] WRST SURG W/RLS TRANSVRS CARPL LIGM Right 01/21/2020    Procedure: RELEASE CARPAL TUNNEL ENDOSCOPIC;  Surgeon: Sam Nguyen MD;  [facility];  Service: Orthopedics    [facility] WRST SURG W/RLS TRANSVRS CARPL LIGM Left 01/28/2020    Procedure: RELEASE CARPAL TUNNEL ENDOSCOPIC;  Surgeon: Sam Nguyen MD;  [facility];  Service: Orthopedics     Current Outpatient Medications   Medication Instructions    Acetaminophen 500 MG 2 capsules, Every 4 hours PRN    albuterol (PROVENTIL HFA,VENTOLIN HFA) 90 mcg/act inhaler inhale 2 puffs by mouth and INTO THE LUNGS every 6 hours IF NEEDED FOR WHEEZING.    betamethasone dipropionate (DIPROSONE) 0.05 % cream Topical, 2 times daily    buPROPion (WELLBUTRIN XL) 300 mg, Oral, Daily    cholecalciferol (VITAMIN D3) 400 Units, Daily    fluticasone (FLONASE) 50 mcg/act nasal spray 2 sprays, Nasal, Daily    guaiFENesin (MUCINEX) 1,200 mg, Oral, Every 12 hours scheduled    ipratropium-albuterol (DUO-NEB) 0.5-2.5 mg/3 mL nebulizer solution 3 mL, Nebulization, 4 times daily    lamoTRIgine (LAMICTAL) 50 mg, Oral, Daily, Take 2 tablets (50 mg) by mouth daily with one 200 mg tablet for daily total of 250 mg.    lamoTRIgine (LAMICTAL) 200 mg, Oral, Daily, Take 2 tablets (50 mg) by mouth daily with one 200 mg tablet for daily total of 250 mg    levocetirizine (XYZAL) 5 mg, Oral, Every evening    losartan (COZAAR) 25 mg, Oral,  Daily    metoprolol succinate (TOPROL-XL) 25 mg, Oral, Daily    ofloxacin (OCUFLOX) 0.3 % ophthalmic solution 1 drop, Left Eye, 4 times daily    omeprazole (PRILOSEC) 20 mg, Oral, 2 times daily    senna-docusate sodium (SENOKOT-S) 8.6-50 mg per tablet 3 tablets, As needed    Stiolto Respimat 2.5-2.5 MCG/ACT inhaler inhale 2 puffs by mouth and INTO THE LUNGS once daily     No Known Allergies    Objective   LMP  (LMP Unknown)     Video Exam  Physical Exam     Administrative Statements   Encounter provider Isabel Watts LPC    The Patient is located at Home and in the following state in which I hold an active license [place].    The patient was identified by name and date of birth. Marie Portillo was informed that this is a telemedicine visit and that the visit is being conducted through the [employer] platform. She agrees to proceed..  My office door was closed. No one else was in the room.  She acknowledged consent and understanding of privacy and security of the video platform. The patient has agreed to participate and understands they can discontinue the visit at any time.    I have spent a total time of 43 minutes in caring for this patient on the day of the visit/encounter including Counseling / Coordination of care, not including the time spent for establishing the audio/video connection.    Visit Time  Start Time: 0205  Stop Time: 0248  Total Visit Time: 43 minutes

## 2025-05-22 ENCOUNTER — OFFICE VISIT (OUTPATIENT)
Dept: FAMILY MEDICINE CLINIC | Facility: CLINIC | Age: 60
End: 2025-05-22
Payer: COMMERCIAL

## 2025-05-22 ENCOUNTER — RA CDI HCC (OUTPATIENT)
Dept: OTHER | Facility: HOSPITAL | Age: 60
End: 2025-05-22

## 2025-05-22 VITALS
HEIGHT: 60 IN | TEMPERATURE: 98.3 F | HEART RATE: 78 BPM | OXYGEN SATURATION: 99 % | RESPIRATION RATE: 18 BRPM | BODY MASS INDEX: 21.6 KG/M2 | WEIGHT: 110 LBS | DIASTOLIC BLOOD PRESSURE: 88 MMHG | SYSTOLIC BLOOD PRESSURE: 120 MMHG

## 2025-05-22 DIAGNOSIS — E78.49 OTHER HYPERLIPIDEMIA: ICD-10-CM

## 2025-05-22 DIAGNOSIS — E03.9 HYPOTHYROIDISM, UNSPECIFIED TYPE: ICD-10-CM

## 2025-05-22 DIAGNOSIS — M89.9 BONE LESION: ICD-10-CM

## 2025-05-22 DIAGNOSIS — K21.9 GASTROESOPHAGEAL REFLUX DISEASE WITHOUT ESOPHAGITIS: ICD-10-CM

## 2025-05-22 DIAGNOSIS — Z12.2 SCREENING FOR LUNG CANCER: ICD-10-CM

## 2025-05-22 DIAGNOSIS — J30.2 SEASONAL ALLERGIES: ICD-10-CM

## 2025-05-22 DIAGNOSIS — R91.8 PULMONARY NODULES: Primary | ICD-10-CM

## 2025-05-22 PROCEDURE — 99214 OFFICE O/P EST MOD 30 MIN: CPT | Performed by: NURSE PRACTITIONER

## 2025-05-22 RX ORDER — LEVOCETIRIZINE DIHYDROCHLORIDE 5 MG/1
5 TABLET, FILM COATED ORAL EVERY EVENING
Qty: 90 TABLET | Refills: 3 | Status: SHIPPED | OUTPATIENT
Start: 2025-05-22

## 2025-05-22 NOTE — PROGRESS NOTES
Name: Marie Portillo      : 1965      MRN: [id number]  Encounter Provider: CAROLINE Dorsey  Encounter Date: 2025   Encounter department: [facility] PRIMARY [facility]ARE  :  Assessment & Plan  Pulmonary nodules  Schedule lung screening due now.   Orders:  •  CT lung screening program; Future    Screening for lung cancer  I discussed with her that she is a candidate for lung cancer CT screening.     The following Shared Decision-Making points were covered:  Benefits of screening were discussed, including the rates of reduction in death from lung cancer and other causes.  Harms of screening were reviewed, including false positive tests, radiation exposure levels, risks of invasive procedures, risks of complications of screening, and risk of overdiagnosis.  I counseled on the importance of adherence to annual lung cancer LDCT screening, impact of co-morbidities, and ability or willingness to undergo diagnosis and treatment.  I counseled on the importance of maintaining abstinence as a former smoker or was counseled on the importance of smoking cessation if a current smoker    Review of Eligibility Criteria: She meets all of the criteria for Lung Cancer Screening.   She is 59 y.o.   She has 20 pack year tobacco history and is a current smoker or has quit within the past 15 years  She presents no signs or symptoms of lung cancer    After discussion, the patient decided to elect lung cancer screening.    Orders:  •  CT lung screening program; Future    Bone lesion  6 month follow up xray for sclerotic lesion seen. More for peace of mind.  Also discussed at length the importance of completing cancer screenings.   Orders:  •  XR hip/pelv 2-3 vws left if performed; Future    Other hyperlipidemia    Orders:  •  Lipid panel; Future    Gastroesophageal reflux disease without esophagitis    Orders:  •  Basic metabolic panel; Future    Hypothyroidism, unspecified type    Orders:  •  TSH, 3rd generation with  Free T4 reflex; Future    Seasonal allergies    Orders:  •  levocetirizine (XYZAL) 5 MG tablet; Take 1 tablet (5 mg total) by mouth every evening           History of Present Illness   Patient here to review results from hip xray. Was concerned because when they saw orthopedic they were discussing cancer personally and in her family.  Sclerotic bone lesion seen on imaging.       Review of Systems   Constitutional: Negative.    Respiratory: Negative.  Negative for chest tightness and shortness of breath.    Cardiovascular: Negative.  Negative for chest pain and palpitations.   Musculoskeletal:  Positive for arthralgias.   Skin:  Negative for rash.   Neurological: Negative.    Psychiatric/Behavioral:  The patient is nervous/anxious.        Objective   /88 (BP Location: Left arm, Patient Position: Sitting, Cuff Size: Standard)   Pulse 78   Temp 98.3 °F (36.8 °C) (Temporal)   Resp 18   Ht 5' (1.524 m)   Wt 49.9 kg (110 lb)   LMP  (LMP Unknown)   SpO2 99%   BMI 21.48 kg/m²      Physical Exam  Vitals and nursing note reviewed.   Constitutional:       General: She is not in acute distress.     Appearance: Normal appearance. She is well-developed. She is not diaphoretic.   HENT:      Head: Normocephalic and atraumatic.   Pulmonary:      Effort: Pulmonary effort is normal. No tachypnea or respiratory distress.      Breath sounds: Normal breath sounds.     Neurological:      Mental Status: She is alert and oriented to person, place, and time.     Psychiatric:         Speech: Speech normal.         Behavior: Behavior normal. Behavior is cooperative.         Thought Content: Thought content normal.         Judgment: Judgment normal.          RUQ abdominal pain

## 2025-05-22 NOTE — PROGRESS NOTES
HCC coding opportunities          Chart Reviewed number of suggestions sent to Provider: 1     Patients Insurance        Commercial Insurance: [employer]       [facility]     J43.2 ( Centrilobular emphysema)    Found in active problem list - please review and assess and document per MEAT if applicable for 2025

## 2025-05-23 ENCOUNTER — TELEPHONE (OUTPATIENT)
Dept: PSYCHIATRY | Facility: CLINIC | Age: 60
End: 2025-05-23

## 2025-05-27 ENCOUNTER — TELEMEDICINE (OUTPATIENT)
Dept: BEHAVIORAL/MENTAL HEALTH CLINIC | Facility: CLINIC | Age: 60
End: 2025-05-27
Payer: COMMERCIAL

## 2025-05-27 DIAGNOSIS — F43.12 CHRONIC POST-TRAUMATIC STRESS DISORDER (PTSD): ICD-10-CM

## 2025-05-27 DIAGNOSIS — F31.81 MODERATE MIXED BIPOLAR II DISORDER (HCC): Primary | ICD-10-CM

## 2025-05-27 DIAGNOSIS — Z72.0 TOBACCO ABUSE: ICD-10-CM

## 2025-05-27 DIAGNOSIS — F41.1 GENERALIZED ANXIETY DISORDER: ICD-10-CM

## 2025-05-27 DIAGNOSIS — F12.90 MARIJUANA USE: Chronic | ICD-10-CM

## 2025-05-27 PROCEDURE — 90832 PSYTX W PT 30 MINUTES: CPT | Performed by: COUNSELOR

## 2025-05-27 NOTE — PSYCH
Virtual Regular VisitName: Marie Portillo      : 1965      MRN: [id number]  Encounter Provider: Isabel Watts LPC  Encounter Date: 2025   Encounter department: [facility] [place]  :  Assessment & Plan  Moderate mixed bipolar II disorder (HCC)         Generalized anxiety disorder         Chronic post-traumatic stress disorder (PTSD)         Tobacco abuse         Marijuana use         Moderate mixed bipolar II disorder (HCC)         Generalized anxiety disorder         Chronic post-traumatic stress disorder (PTSD)         Tobacco abuse         Marijuana use         Goals addressed in session: Goal 1 and Goal 2     DATA: Marie had a shorter session duet to being with a friend helping him with yard work. She was observed having difficulties comprehending the differences between the link from [employer] through text and through email and then the link for her weekly group that is only through email since she was forced to use another email address and lost track of her passwords. She was allowed to express her frustration and was encouraged to use her relaxation skills and her breath to be able to came back present and discuss her important topics- taking notes to compensate for her cognitive decline and minimize frustration as well as using more acceptance for things that cannot be controlled.    During this session, this clinician used the following therapeutic modalities: Client-centered Therapy, Cognitive Behavioral Therapy, Mindfulness-based Strategies, and Supportive Psychotherapy    Substance Abuse was not addressed during this session. If the client is diagnosed with a co-occurring substance use disorder, please indicate any changes in the frequency or amount of use: NA. Stage of change for addressing substance use diagnoses: Pre-contemplation    ASSESSMENT:  Marie presents with a Euthymic/ normal, Anxious, and Depressed mood. Marie's affect is Normal range and  "intensity and Constricted, which is congruent, with their mood and the content of the session. The client has not made progress on their goals as evidenced by regressing in handling emotions due to cognitive decline.    Marie presents with a low risk of suicide, low risk of self-harm, and none risk of harm to others.    For any risk assessment that surpasses a \"low\" rating, a safety plan must be developed.    A safety plan was indicated: no  If yes, describe in detail NA    PLAN: Between sessions, Marie will attend weekly Mindfulness group for self-care and practicing core skills to attend to difficult emotions healthily. At the next session, the therapist will use Client-centered Therapy, Cognitive Behavioral Therapy, Mindfulness-based Strategies, and Supportive Psychotherapy to address self-care, taking notes to be able to refer to her experience to deal with cognitive decline better.    Behavioral Health Treatment Plan [facility]: Diagnosis and Treatment Plan explained to Marie, Marie relates understanding diagnosis and is agreeable to Treatment Plan. Yes     Depression Follow-up Plan Completed: Not applicable     Reason for visit is   Chief Complaint   Patient presents with    Virtual Regular Visit      Recent Visits  Date Type Provider Dept   05/22/25 Office Visit CAROLINE Dorsey [facility]   05/20/25 Telemedicine Isabel Watts LPC [facility] [place]   Showing recent visits within past 7 days and meeting all other requirements  Today's Visits  Date Type Provider Dept   05/27/25 Telemedicine Isabel Watts LPC [facility] [place]   Showing today's visits and meeting all other requirements  Future Appointments  No visits were found meeting these conditions.  Showing future appointments within next 150 days and meeting all other requirements     History of Present Illness     HPI    Past Medical History   Past Medical History:   Diagnosis Date    " Bilateral carpal tunnel syndrome 08/20/2019    Cardiomyopathy (HCC)     Carpal tunnel syndrome of right wrist 10/30/2019    Added automatically from request for surgery [id number]    Chlamydia     COPD (chronic obstructive pulmonary disease) (HCC)     Depression     Hypertension     Varicella      Past Surgical History:   Procedure Laterality Date    BACK SURGERY      BREAST BIOPSY Left 20yrs ago benign    CERVICAL SPINE SURGERY      EGD      HERNIA REPAIR      OOPHORECTOMY Right     FL NDSC WRST SURG W/RLS TRANSVRS CARPL LIGM Right 01/21/2020    Procedure: RELEASE CARPAL TUNNEL ENDOSCOPIC;  Surgeon: Sam Nguyen MD;  Location: [facility];  Service: Orthopedics    FL NDSC WRST SURG W/RLS TRANSVRS CARPL LIGM Left 01/28/2020    Procedure: RELEASE CARPAL TUNNEL ENDOSCOPIC;  Surgeon: Sam Nguyen MD;  Location: [facility];  Service: Orthopedics     Current Outpatient Medications   Medication Instructions    Acetaminophen 500 MG 2 capsules, Every 4 hours PRN    albuterol (PROVENTIL HFA,VENTOLIN HFA) 90 mcg/act inhaler inhale 2 puffs by mouth and INTO THE LUNGS every 6 hours IF NEEDED FOR WHEEZING.    betamethasone dipropionate (DIPROSONE) 0.05 % cream Topical, 2 times daily    buPROPion (WELLBUTRIN XL) 300 mg, Oral, Daily    cholecalciferol (VITAMIN D3) 400 Units, Daily    fluticasone (FLONASE) 50 mcg/act nasal spray 2 sprays, Nasal, Daily    guaiFENesin (MUCINEX) 1,200 mg, Oral, Every 12 hours scheduled    ipratropium-albuterol (DUO-NEB) 0.5-2.5 mg/3 mL nebulizer solution 3 mL, Nebulization, 4 times daily    lamoTRIgine (LAMICTAL) 50 mg, Oral, Daily, Take 2 tablets (50 mg) by mouth daily with one 200 mg tablet for daily total of 250 mg.    lamoTRIgine (LAMICTAL) 200 mg, Oral, Daily, Take 2 tablets (50 mg) by mouth daily with one 200 mg tablet for daily total of 250 mg    levocetirizine (XYZAL) 5 mg, Oral, Every evening    losartan (COZAAR) 25 mg, Oral, Daily    metoprolol succinate (TOPROL-XL) 25 mg, Oral,  Daily    ofloxacin (OCUFLOX) 0.3 % ophthalmic solution 1 drop, Left Eye, 4 times daily    omeprazole (PRILOSEC) 20 mg, Oral, 2 times daily    senna-docusate sodium (SENOKOT-S) 8.6-50 mg per tablet 3 tablets, As needed    Stiolto Respimat 2.5-2.5 MCG/ACT inhaler inhale 2 puffs by mouth and INTO THE LUNGS once daily     No Known Allergies    Objective   LMP  (LMP Unknown)     Video Exam  Physical Exam     Administrative Statements   Encounter provider Isabel Watts LPC    The Patient is located at Other and in the following state in which I hold an active license [place].    The patient was identified by name and date of birth. Marie Portillo was informed that this is a telemedicine visit and that the visit is being conducted through the Epic Embedded platform. She agrees to proceed..  My office door was closed. No one else was in the room.  She acknowledged consent and understanding of privacy and security of the video platform. The patient has agreed to participate and understands they can discontinue the visit at any time.    I have spent a total time of 24 minutes in caring for this patient on the day of the visit/encounter including Counseling / Coordination of care, not including the time spent for establishing the audio/video connection.    Visit Time  Start Time: 0205  Stop Time: 0229  Total Visit Time: 24 minutes

## 2025-05-29 ENCOUNTER — TELEMEDICINE (OUTPATIENT)
Dept: PSYCHIATRY | Facility: CLINIC | Age: 60
End: 2025-05-29
Payer: COMMERCIAL

## 2025-05-29 ENCOUNTER — TELEPHONE (OUTPATIENT)
Age: 60
End: 2025-05-29

## 2025-05-29 DIAGNOSIS — F43.10 PTSD (POST-TRAUMATIC STRESS DISORDER): ICD-10-CM

## 2025-05-29 DIAGNOSIS — F17.200 TOBACCO USE DISORDER: ICD-10-CM

## 2025-05-29 DIAGNOSIS — F41.1 GENERALIZED ANXIETY DISORDER: ICD-10-CM

## 2025-05-29 DIAGNOSIS — F17.200 NICOTINE DEPENDENCE WITH CURRENT USE: ICD-10-CM

## 2025-05-29 DIAGNOSIS — F50.819 BINGE EATING DISORDER, UNSPECIFIED SEVERITY: ICD-10-CM

## 2025-05-29 DIAGNOSIS — F12.90 CANNABIS USE DISORDER: ICD-10-CM

## 2025-05-29 DIAGNOSIS — F39 UNSPECIFIED MOOD (AFFECTIVE) DISORDER (HCC): Primary | ICD-10-CM

## 2025-05-29 DIAGNOSIS — Z86.59 HISTORY OF OCD (OBSESSIVE COMPULSIVE DISORDER): ICD-10-CM

## 2025-05-29 PROCEDURE — 90833 PSYTX W PT W E/M 30 MIN: CPT | Performed by: STUDENT IN AN ORGANIZED HEALTH CARE EDUCATION/TRAINING PROGRAM

## 2025-05-29 PROCEDURE — 99214 OFFICE O/P EST MOD 30 MIN: CPT | Performed by: STUDENT IN AN ORGANIZED HEALTH CARE EDUCATION/TRAINING PROGRAM

## 2025-05-29 RX ORDER — LAMOTRIGINE 25 MG/1
50 TABLET ORAL DAILY
Qty: 60 TABLET | Refills: 1 | Status: SHIPPED | OUTPATIENT
Start: 2025-05-29

## 2025-05-29 RX ORDER — FLUOXETINE 10 MG/1
20 CAPSULE ORAL DAILY
Qty: 60 CAPSULE | Refills: 0 | Status: SHIPPED | OUTPATIENT
Start: 2025-05-29

## 2025-05-29 RX ORDER — LAMOTRIGINE 200 MG/1
200 TABLET ORAL DAILY
Qty: 30 TABLET | Refills: 1 | Status: SHIPPED | OUTPATIENT
Start: 2025-05-29 | End: 2025-07-28

## 2025-05-29 RX ORDER — BUPROPION HYDROCHLORIDE 150 MG/1
150 TABLET ORAL DAILY
Qty: 30 TABLET | Refills: 1 | Status: SHIPPED | OUTPATIENT
Start: 2025-05-29 | End: 2025-07-28

## 2025-05-29 NOTE — ASSESSMENT & PLAN NOTE
Orders:    FLUoxetine (PROzac) 10 mg capsule; Take 2 capsules (20 mg total) by mouth daily Take 1 capsule (10 mg) by mouth daily x 1 week. THEN, take 2 capsules (20 mg total) daily.
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
## 2025-05-29 NOTE — TELEPHONE ENCOUNTER
Patient calling to request a link to be texted to her for the virtual appointment at 2:15.    Patient requesting to speak with  but call was disconnected during transfer process.

## 2025-05-30 DIAGNOSIS — I50.22 HEART FAILURE WITH MILDLY REDUCED EJECTION FRACTION (HFMREF) (HCC): Chronic | ICD-10-CM

## 2025-05-30 DIAGNOSIS — I10 ESSENTIAL HYPERTENSION: Chronic | ICD-10-CM

## 2025-06-02 ENCOUNTER — TELEPHONE (OUTPATIENT)
Dept: PULMONOLOGY | Facility: CLINIC | Age: 60
End: 2025-06-02

## 2025-06-02 ENCOUNTER — TELEPHONE (OUTPATIENT)
Dept: FAMILY MEDICINE CLINIC | Facility: CLINIC | Age: 60
End: 2025-06-02

## 2025-06-03 ENCOUNTER — TELEMEDICINE (OUTPATIENT)
Dept: BEHAVIORAL/MENTAL HEALTH CLINIC | Facility: CLINIC | Age: 60
End: 2025-06-03
Payer: COMMERCIAL

## 2025-06-03 ENCOUNTER — TELEPHONE (OUTPATIENT)
Dept: FAMILY MEDICINE CLINIC | Facility: CLINIC | Age: 60
End: 2025-06-03

## 2025-06-03 DIAGNOSIS — F10.11 ALCOHOL ABUSE, IN REMISSION: ICD-10-CM

## 2025-06-03 DIAGNOSIS — F31.81 MODERATE MIXED BIPOLAR II DISORDER (HCC): Primary | ICD-10-CM

## 2025-06-03 DIAGNOSIS — F17.210 CIGARETTE NICOTINE DEPENDENCE WITHOUT COMPLICATION: ICD-10-CM

## 2025-06-03 DIAGNOSIS — F12.90 MARIJUANA USE: Chronic | ICD-10-CM

## 2025-06-03 DIAGNOSIS — Z72.0 TOBACCO ABUSE: ICD-10-CM

## 2025-06-03 DIAGNOSIS — F41.1 GENERALIZED ANXIETY DISORDER: ICD-10-CM

## 2025-06-03 DIAGNOSIS — F43.12 CHRONIC POST-TRAUMATIC STRESS DISORDER (PTSD): ICD-10-CM

## 2025-06-03 PROCEDURE — 90834 PSYTX W PT 45 MINUTES: CPT | Performed by: COUNSELOR

## 2025-06-03 NOTE — PROGRESS NOTES
Heart Failure Outpatient Progress Note - Marie Portillo 59 y.o. female MRN: [id number]    @ Encounter: [id number]      Assessment/Plan:    Patient Active Problem List    Diagnosis Date Noted    Low grade squamous intraepithelial lesion on cytologic smear of cervix (LGSIL) 10/14/2024    Heart failure with mildly reduced ejection fraction (HFmrEF) ([facility]) 03/25/2024    Cervical stenosis of spinal canal 12/20/2023    Chiari malformation type I (HCC) 12/20/2023    Simple chronic bronchitis (HCC) 10/25/2023    Tobacco abuse 10/25/2023    HPV in female 07/13/2022    COPD with acute exacerbation ([facility]) 03/18/2022    PND (post-nasal drip) 03/18/2022    Centrilobular emphysema ([facility]) 12/17/2021    Cigarette nicotine dependence without complication 12/17/2021    Pulmonary nodules 12/17/2021    Gastroesophageal reflux disease without esophagitis 08/18/2020    Essential hypertension 04/16/2020    Other hyperlipidemia 04/16/2020    Generalized anxiety disorder 03/13/2020    Chronic post-traumatic stress disorder (PTSD) 03/13/2020    Alcohol abuse, in remission 03/13/2020    Marijuana use 03/13/2020    Moderate mixed bipolar II disorder ([facility]) 08/20/2019    Status post left breast lumpectomy 08/20/2019     HFmrEF; LVEF 40-45%  -Etiology: nonischemic.  Longstanding history of hypertension.   -she is warm on exam, euvolemic. No new/worsening HF symptoms  -under significant personal stress right now.  -routine labs per PCP    -TTE 3/25/2024: LVEF 40-45%.  Mild to moderate global hypokinesis.  Grade 1 DD.  LA mildly dilated.  Mild MR.     NM stress 5/21/24:    Stress ECG: Arrhythmias during recovery: rare PVCs. The ECG was not diagnostic due to submaximal stress.    Perfusion: There are no perfusion defects.    Stress Function: Stress ejection fraction is 55%.    Stress Combined Conclusion: Left ventricular perfusion is normal.     Pharmacotherapies / Neurohormonal Blockade:  --Beta Blocker: metoprolol succinate 25 mg daily  --ARNi /  ACEi / ARB: losartan 25 mg daily  --Aldosterone Antagonist: No  --SGLT2 Inhibitor: price check   --Diuretic: No     Sudden Cardiac Death Risk Reduction:  --ICD: LVEF 40-45%.     HTN  -controlled on GDMT  Tobacco abuse  -ongoing but cutting back  -down from 1 PPD to 8 cigs/day  COPD  Carpal tunnel syndrome, bilateral   Bipolar disorder  Chiari malformation  Marijuana use  History of heavy alcohol use      HPI:   Marie Portillo is a 58-year-old female with a PMH as above who presents for follow up. Follows with Dr. Rousseau.   Per LM: 58 year old female with PMH of Lower extremity edema, Tobacco use and IFG, palpitations who presented for a check up. She has not had any other lower extremity edema since last visit. She denies chest pain, no orthopnea, PND, lightheadedness, syncope, presyncope.   She has COPD and is short of breath  all the time. She is pretty active and walks daily. She still feels occasional palpitations. She is seeing a psychiatrist now and is on medication and she states that this is better now. Still smoking due to stress. About 2 weeks ago she had a microdiscectomy, interbody fusion due to cervical spinal stenosis.      She states from a cardiac standpoint she is feeling well.      She initially presented to our office in 2020 due to palpitations.  2 week zio from 2020 demonstrated a min HR of 67 bpm, max HR of 130 bpm, and avg HR of 99  bpm. Predominant underlying rhythm was Sinus Rhythm. Isolated SVEs were rare (<1.0%), and no SVE Couplets or SVE Triplets were present.  Isolated VEs were rare (<1.0%), VE Couplets were rare (<1.0%), and no VE Triplets were present.     Went for echo, which revealed LVEF of 40 to 45%, reduced from prior.  Instructed to schedule appointment.     4/9/24: Presents today for follow-up. Has been feeling somewhat tired and is short of breath at baseline, no change. No LE swelling. No chest pain. Occasionally lightheaded when getting up. Has had a couple episodes of spinning  dizziness since January, but thinks this may be related to her Chiari malformation.      Father has heart failure.  Patient has remote history of occasional drug use years ago as well as remote alcohol use. Still smokes tobacco and uses marijuana.  No recent history of viral infections or travel outside the country.  She denies any recent anginal symptoms.     05/24/2024 with MJ: Patient presents with her mother for follow-up. Feeling okay today. Continues with STEPHEN, at her baseline. Since last visit has had few episodes of SOB at rest occurring at random; unsure if these are anxiety-induced. Occasional palpitations/racing heart. Denies LH, CP, LE swelling, PND, and orthopnea. Reviewed results of recent labs and nuclear stress test.       6/25/24: Presents today for follow up. Feeling well, some fatigue.  Has noticed occasional episodes of anxiety, but these are intermittent.  Denies shortness of breath, STEPHEN, LE swelling, PND, orthopnea, chest pain.  Plans to follow-up with her PCP about her thyroid studies.  Reviewed sodium and fluid restrictions, as well as daily weights    6/5/2025 presents for overdue follow-up.  She was last seen about a year ago.  She reports being under significant personal/emotional stress right now. Otherwise no cardiac related symptoms to report.  She is living with some friends due to going through a divorce.  She is estranged from her daughter and grandchildren.  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    Past Medical History[1]    12 point ROS negative other than that stated in HPI    Allergies[2]  .  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  She quit on her own without any h/o withdrawal or rehab.    Drug use: Yes     Types: Marijuana     Comment: Smokes THC several times a day.  Also uses CBD OIL at times.   Used cocaine and methamphetamine  fairly steadily for a period of approx 1 year approx 36 years ago.  Tried LSD, Mushrooms.  No h/o rehab    Sexual activity: Yes     Partners: Male     Birth control/protection: Post-menopausal   Other Topics Concern    Not on file   Social History Narrative    Home: lives with 2 best friends.         Education:    Pt states she always got Ds and Fs in school and skipped classes often, but there were no formally diagnosed learning disabilities.  Pt was in alternative schooling.  She learned \"Nothing\" there and they didn't teach much.  Pt was then placed back in regular classes but could not keep up.  Pt reached childhood milestones on time as far as she knows.     Highest grade completed 11th grade    [facility] but no actual certificate     Social Drivers of Health     Financial Resource Strain: Low Risk  (1/5/2024)    Received from [facility]    Overall Financial Resource Strain (CARDIA)     Difficulty of Paying Living Expenses: Not hard at all   Food Insecurity: No Food Insecurity (1/5/2024)    Received from [facility]    Hunger Vital Sign     Within the past 12 months, you worried that your food would run out before you got the money to buy more.: Never true     " Within the past 12 months, the food you bought just didn't last and you didn't have money to get more.: Never true   Transportation Needs: No Transportation Needs (1/5/2024)    Received from [facility]    PRAPARE - Transportation     Lack of Transportation (Medical): No     Lack of Transportation (Non-Medical): No   Physical Activity: Not on file   Stress: Not on file   Social Connections: Not on file   Intimate Partner Violence: Not At Risk (1/5/2024)    Received from [facility]    Humiliation, Afraid, Rape, and Kick questionnaire     Fear of Current or Ex-Partner: No     Emotionally Abused: No     Physically Abused: No     Sexually Abused: No   Housing Stability: Low Risk  (1/5/2024)    Received from [facility]    Housing Stability Vital Sign     Unable to Pay for Housing in the Last Year: No     Number of Places Lived in the Last Year: 1     Unstable Housing in the Last Year: No       Family History[4]    Physical Exam:    Vitals: /74   Pulse 73   Wt 49 kg (108 lb)   LMP  (LMP Unknown)   SpO2 98%   BMI 21.09 kg/m²      Wt Readings from Last 3 Encounters:   05/22/25 49.9 kg (110 lb)   05/16/25 48.5 kg (107 lb)   12/11/24 48.5 kg (107 lb)         GEN: Marie Portillo appears well, alert and oriented x 3, pleasant and cooperative   HEENT: pupils equal, round, and reactive to light; extraocular muscles intact  NECK: supple, no carotid bruits   HEART: regular rhythm, normal S1 and S2, no murmurs, clicks, gallops or rubs, JVP is flat   LUNGS: clear to auscultation bilaterally; no wheezes, rales, or rhonchi   ABDOMEN: normal bowel sounds, soft, no tenderness, no distention  EXTREMITIES: peripheral pulses normal; no clubbing, cyanosis, or edema  NEURO: no focal findings   SKIN: normal without suspicious lesions on exposed skin    Labs & Results:  Lab Results   Component Value Date     06/15/2018    SODIUM 141 05/21/2024    K 4.0 05/21/2024      "05/21/2024    CO2 27 05/21/2024    ANIONGAP 12.9 06/15/2018    AGAP 9 05/21/2024    BUN 15 05/21/2024    CREATININE 0.69 05/21/2024    GLUC 105 (H) 12/28/2023    GLUF 97 05/21/2024    CALCIUM 9.7 05/21/2024    AST 15 12/28/2023    ALT 15 12/28/2023    ALKPHOS 42 12/28/2023    PROT 6.4 06/15/2018    TP 5.9 (L) 12/28/2023    BILITOT 0.3 06/15/2018    TBILI 0.3 12/28/2023    EGFR 97 05/21/2024     Lab Results   Component Value Date    WBC 7.64 03/13/2023    HGB 14.7 03/13/2023    HCT 44.8 03/13/2023    MCV 92 03/13/2023     03/13/2023     No results found for: \"BNP\"   Lab Results   Component Value Date    LDLCALC 99 05/21/2024     Lab Results   Component Value Date    CHD8OFTNKEHC 2.820 08/09/2024     Lab Results   Component Value Date    HGBA1C 5.7 (H) 04/29/2022         EKG personally reviewed by CAROLINE Mendez.   No results found for this visit on 06/05/25.     Counseling / Coordination of Care  Total face to face time spent with patient 15 minutes.  An additional 10 minutes was spent for chart/data review and visit preparation.       Thank you for the opportunity to participate in the care of this patient.    CAROLINE Mendez          [1]   Past Medical History:  Diagnosis Date    Bilateral carpal tunnel syndrome 08/20/2019    Cardiomyopathy (HCC)     Carpal tunnel syndrome of right wrist 10/30/2019    Added automatically from request for surgery [id number]    Chlamydia     COPD (chronic obstructive pulmonary disease) (HCC)     Depression     Hypertension     Varicella    [2] No Known Allergies  [3]   Current Outpatient Medications:     Acetaminophen 500 MG, Take 2 capsules by mouth every 4 (four) hours as needed for mild pain, Disp: , Rfl:     albuterol (PROVENTIL HFA,VENTOLIN HFA) 90 mcg/act inhaler, inhale 2 puffs by mouth and INTO THE LUNGS every 6 hours IF NEEDED FOR WHEEZING., Disp: 8.5 g, Rfl: 2    betamethasone dipropionate (DIPROSONE) 0.05 % cream, Apply topically 2 (two) times a day " (Patient not taking: Reported on 10/14/2024), Disp: 30 g, Rfl: 0    buPROPion (WELLBUTRIN XL) 150 mg 24 hr tablet, Take 1 tablet (150 mg total) by mouth daily, Disp: 30 tablet, Rfl: 1    cholecalciferol (VITAMIN D3) 400 units tablet, Take 400 Units by mouth in the morning., Disp: , Rfl:     FLUoxetine (PROzac) 10 mg capsule, Take 2 capsules (20 mg total) by mouth daily Take 1 capsule (10 mg) by mouth daily x 1 week. THEN, take 2 capsules (20 mg total) daily., Disp: 60 capsule, Rfl: 0    fluticasone (FLONASE) 50 mcg/act nasal spray, 2 sprays into each nostril daily, Disp: 16 g, Rfl: 11    guaiFENesin (MUCINEX) 600 mg 12 hr tablet, Take 2 tablets (1,200 mg total) by mouth every 12 (twelve) hours (Patient taking differently: Take 1,200 mg by mouth every 12 (twelve) hours As needed), Disp: 60 tablet, Rfl: 3    ipratropium-albuterol (DUO-NEB) 0.5-2.5 mg/3 mL nebulizer solution, Take 3 mL by nebulization 4 (four) times a day (Patient taking differently: Take 3 mL by nebulization in the morning and 3 mL at noon and 3 mL in the evening and 3 mL before bedtime. Doing daily.), Disp: 360 mL, Rfl: 3    lamoTRIgine (LaMICtal) 200 MG tablet, Take 1 tablet (200 mg total) by mouth daily Take 2 tablets (50 mg) by mouth daily with one 200 mg tablet for daily total of 250 mg, Disp: 30 tablet, Rfl: 1    lamoTRIgine (LaMICtal) 25 mg tablet, Take 2 tablets (50 mg total) by mouth daily Take 2 tablets (50 mg) by mouth daily with one 200 mg tablet for daily total of 250 mg., Disp: 60 tablet, Rfl: 1    levocetirizine (XYZAL) 5 MG tablet, Take 1 tablet (5 mg total) by mouth every evening, Disp: 90 tablet, Rfl: 3    losartan (COZAAR) 25 mg tablet, take 1 tablet by mouth once daily, Disp: 90 tablet, Rfl: 1    metoprolol succinate (TOPROL-XL) 25 mg 24 hr tablet, take 1 tablet by mouth once daily, Disp: 90 tablet, Rfl: 1    ofloxacin (OCUFLOX) 0.3 % ophthalmic solution, Administer 1 drop into the left eye 4 (four) times a day (Patient taking  differently: Administer 1 drop into the left eye in the morning and 1 drop at noon and 1 drop in the evening and 1 drop before bedtime. Prn.), Disp: 5 mL, Rfl: 0    omeprazole (PriLOSEC) 20 mg delayed release capsule, take 1 capsule by mouth twice a day, Disp: 180 capsule, Rfl: 1    senna-docusate sodium (SENOKOT-S) 8.6-50 mg per tablet, Take 3 tablets by mouth as needed, Disp: , Rfl:     Stiolto Respimat 2.5-2.5 MCG/ACT inhaler, inhale 2 puffs by mouth and INTO THE LUNGS once daily, Disp: 13.333 g, Rfl: 11  [4]   Family History  Problem Relation Name Age of Onset    Alcohol abuse Mother      Eating disorder Mother          anorexia and bulimia per Pt    Hodgkin's lymphoma Father Chilo Zelayayfried     Cancer Father Chilo Wilkersonfried         3rd time lymphoma, prostate, lung    Heart failure Father Chilo Wilkersonfried     No Known Problems Sister      Colon cancer Maternal Grandmother Idalmis     Alcohol abuse Maternal Grandfather      Colon cancer Paternal Grandmother vladislav     No Known Problems Daughter      Breast cancer Maternal Aunt      No Known Problems Maternal Aunt      No Known Problems Maternal Aunt      Colon cancer Maternal Uncle      Anxiety disorder Paternal Aunt      No Known Problems Paternal Aunt      Multiple sclerosis Cousin

## 2025-06-03 NOTE — PSYCH
Virtual Regular VisitName: Marie Portillo      : 1965      MRN: [id number]  Encounter Provider: Isabel Watts LPC  Encounter Date: 6/3/2025   Encounter department: [facility] THERAPIST [place]  :  Assessment & Plan  Moderate mixed bipolar II disorder (HCC)         Generalized anxiety disorder         Chronic post-traumatic stress disorder (PTSD)         Tobacco abuse         Marijuana use         Alcohol abuse, in remission         Cigarette nicotine dependence without complication         Moderate mixed bipolar II disorder (HCC)         Generalized anxiety disorder         Chronic post-traumatic stress disorder (PTSD)         Tobacco abuse         Marijuana use         Alcohol abuse, in remission         Cigarette nicotine dependence without complication               Goals addressed in session: Goal 1 and Goal 2     DATA: Marie reported difficulties remembering how and where she gets her links to the virtual appointments. She was allowed to use a couple of minutes to go over her thoughts and reminders and was encouraged to practice all the ways to connect to the session, including Teams, which would be contributing to her self-care for the weekly group meditation again. She was also commended for keeping her mental status centered, without significant emotional intensity. Marie shared that it was challenging and she needed to go out of her comfort zone to do that, especially for Teams, since her devices were broken, and she was given another email address that is not associated to Teams and is a barrier for her. Marie was reminded that working on novelties and challenging tasks is a positive activity for her cognition.  During this session, this clinician used the following therapeutic modalities: Client-centered Therapy, Cognitive Behavioral Therapy, Mindfulness-based Strategies, and Supportive Psychotherapy    Substance Abuse was not addressed during this session. If the  "client is diagnosed with a co-occurring substance use disorder, please indicate any changes in the frequency or amount of use: NA. Stage of change for addressing substance use diagnoses: Pre-contemplation    ASSESSMENT:  Marie presents with a Euthymic/ normal and Anxious mood. Marie's affect is Normal range and intensity and Constricted, which is congruent, with their mood and the content of the session. The client has made some progress on their goals as evidenced by practicing actively working on novelties, out of her comfort zone and under guidance.     Marie presents with a low risk of suicide, low risk of self-harm, and none risk of harm to others.    For any risk assessment that surpasses a \"low\" rating, a safety plan must be developed.    A safety plan was indicated: no  If yes, describe in detail NA    PLAN: Between sessions, Marie will put more effort and work with someone from home to help her with the new email address and access to Teams to be able to attend her weekly Mindfulness group . At the next session, the therapist will use Client-centered Therapy, Cognitive Behavioral Therapy, Mindfulness-based Strategies, and Supportive Psychotherapy to address self-care.    Behavioral Health Treatment Plan [facility]: Diagnosis and Treatment Plan explained to Marie, Marie relates understanding diagnosis and is agreeable to Treatment Plan. Yes     Depression Follow-up Plan Completed: Not applicable     Reason for visit is   Chief Complaint   Patient presents with    Virtual Regular Visit      Recent Visits  Date Type Provider Dept   06/02/25 Telephone CAROLINE Dorsey [facility]   05/27/25 Telemedicine Isabel Watts LPC [facility] [place]   Showing recent visits within past 7 days and meeting all other requirements  Today's Visits  Date Type Provider Dept   06/03/25 Telemedicine Isabel Watts LPC [facility] [place]   Showing today's visits and " meeting all other requirements  Future Appointments  No visits were found meeting these conditions.  Showing future appointments within next 150 days and meeting all other requirements     History of Present Illness     HPI    Past Medical History   Past Medical History:   Diagnosis Date    Bilateral carpal tunnel syndrome 08/20/2019    Cardiomyopathy (HCC)     Carpal tunnel syndrome of right wrist 10/30/2019    Added automatically from request for surgery [id number]    Chlamydia     COPD (chronic obstructive pulmonary disease) (HCC)     Depression     Hypertension     Varicella      Past Surgical History:   Procedure Laterality Date    BACK SURGERY      BREAST BIOPSY Left 20yrs ago benign    CERVICAL SPINE SURGERY      EGD      HERNIA REPAIR      OOPHORECTOMY Right     [facility] WRST SURG W/RLS TRANSVRS CARPL LIGM Right 01/21/2020    Procedure: RELEASE CARPAL TUNNEL ENDOSCOPIC;  Surgeon: Sam Nguyen MD;  [facility];  Service: Orthopedics    [facility] WRST SURG W/RLS TRANSVRS CARPL LIGM Left 01/28/2020    Procedure: RELEASE CARPAL TUNNEL ENDOSCOPIC;  Surgeon: Sam Nguyen MD;  [facility];  Service: Orthopedics     Current Outpatient Medications   Medication Instructions    Acetaminophen 500 MG 2 capsules, Every 4 hours PRN    albuterol (PROVENTIL HFA,VENTOLIN HFA) 90 mcg/act inhaler inhale 2 puffs by mouth and INTO THE LUNGS every 6 hours IF NEEDED FOR WHEEZING.    betamethasone dipropionate (DIPROSONE) 0.05 % cream Topical, 2 times daily    buPROPion (WELLBUTRIN XL) 150 mg, Oral, Daily    cholecalciferol (VITAMIN D3) 400 Units, Daily    FLUoxetine (PROZAC) 20 mg, Oral, Daily, Take 1 capsule (10 mg) by mouth daily x 1 week. THEN, take 2 capsules (20 mg total) daily.    fluticasone (FLONASE) 50 mcg/act nasal spray 2 sprays, Nasal, Daily    guaiFENesin (MUCINEX) 1,200 mg, Oral, Every 12 hours scheduled    ipratropium-albuterol (DUO-NEB) 0.5-2.5 mg/3 mL nebulizer solution 3 mL, Nebulization, 4  times daily    lamoTRIgine (LAMICTAL) 200 mg, Oral, Daily, Take 2 tablets (50 mg) by mouth daily with one 200 mg tablet for daily total of 250 mg    lamoTRIgine (LAMICTAL) 50 mg, Oral, Daily, Take 2 tablets (50 mg) by mouth daily with one 200 mg tablet for daily total of 250 mg.    levocetirizine (XYZAL) 5 mg, Oral, Every evening    losartan (COZAAR) 25 mg, Oral, Daily    metoprolol succinate (TOPROL-XL) 25 mg, Oral, Daily    ofloxacin (OCUFLOX) 0.3 % ophthalmic solution 1 drop, Left Eye, 4 times daily    omeprazole (PRILOSEC) 20 mg, Oral, 2 times daily    senna-docusate sodium (SENOKOT-S) 8.6-50 mg per tablet 3 tablets, As needed    Stiolto Respimat 2.5-2.5 MCG/ACT inhaler inhale 2 puffs by mouth and INTO THE LUNGS once daily     No Known Allergies    Objective   LMP  (LMP Unknown)     Video Exam  Physical Exam     Administrative Statements   Encounter provider Isabel Watts LPC    The Patient is located at Home and in the following state in which I hold an active license [place].    The patient was identified by name and date of birth. Marie Portillo was informed that this is a telemedicine visit and that the visit is being conducted through the Epic Embedded and [employer] platform. She agrees to proceed..  My office door was closed. No one else was in the room.  She acknowledged consent and understanding of privacy and security of the video platform. The patient has agreed to participate and understands they can discontinue the visit at any time.    I have spent a total time of 47 minutes in caring for this patient on the day of the visit/encounter including Counseling / Coordination of care, not including the time spent for establishing the audio/video connection.    Visit Time  Start Time: 0157  Stop Time: 0244  Total Visit Time: 47 minutes

## 2025-06-03 NOTE — TELEPHONE ENCOUNTER
Attempted to call patient. Patient picked up and asked who it was and then hung up. Tried calling regarding fibromyalgia paperwork that was sent to us. Unable to fill this out.

## 2025-06-05 ENCOUNTER — OFFICE VISIT (OUTPATIENT)
Dept: CARDIOLOGY CLINIC | Facility: CLINIC | Age: 60
End: 2025-06-05
Payer: COMMERCIAL

## 2025-06-05 VITALS
SYSTOLIC BLOOD PRESSURE: 132 MMHG | WEIGHT: 108 LBS | BODY MASS INDEX: 21.09 KG/M2 | OXYGEN SATURATION: 98 % | DIASTOLIC BLOOD PRESSURE: 74 MMHG | HEART RATE: 73 BPM

## 2025-06-05 DIAGNOSIS — I50.20 SYSTOLIC HEART FAILURE, UNSPECIFIED HF CHRONICITY (HCC): ICD-10-CM

## 2025-06-05 DIAGNOSIS — I10 ESSENTIAL HYPERTENSION: Chronic | ICD-10-CM

## 2025-06-05 DIAGNOSIS — I50.22 HEART FAILURE WITH MILDLY REDUCED EJECTION FRACTION (HFMREF) (HCC): Chronic | ICD-10-CM

## 2025-06-05 PROCEDURE — 99214 OFFICE O/P EST MOD 30 MIN: CPT | Performed by: NURSE PRACTITIONER

## 2025-06-05 RX ORDER — LOSARTAN POTASSIUM 25 MG/1
25 TABLET ORAL DAILY
Qty: 90 TABLET | Refills: 1 | Status: SHIPPED | OUTPATIENT
Start: 2025-06-05

## 2025-06-05 RX ORDER — METOPROLOL SUCCINATE 25 MG/1
25 TABLET, EXTENDED RELEASE ORAL DAILY
Qty: 90 TABLET | Refills: 1 | Status: SHIPPED | OUTPATIENT
Start: 2025-06-05

## 2025-06-05 RX ORDER — LOSARTAN POTASSIUM 25 MG/1
25 TABLET ORAL DAILY
Qty: 90 TABLET | Refills: 1 | OUTPATIENT
Start: 2025-06-05

## 2025-06-05 NOTE — PATIENT INSTRUCTIONS
Weigh yourself daily  If you gain 3 lbs in one day or 5 lbs in one week, please call the office at [phone] and ask for a nurse or the heart failure nurse  Keep your sodium intake to <2 grams, (2000 mg) per day, and fluids <2 Liters (2000 ml) per day. This is around 6-7, 8 oz glasses of fluid per day    Continue medications as you are  Get blood work as ordered by your PCP.

## 2025-06-06 ENCOUNTER — TELEPHONE (OUTPATIENT)
Dept: PSYCHIATRY | Facility: CLINIC | Age: 60
End: 2025-06-06

## 2025-06-06 ENCOUNTER — TELEMEDICINE (OUTPATIENT)
Dept: BEHAVIORAL/MENTAL HEALTH CLINIC | Facility: CLINIC | Age: 60
End: 2025-06-06
Payer: COMMERCIAL

## 2025-06-06 DIAGNOSIS — F43.12 CHRONIC POST-TRAUMATIC STRESS DISORDER (PTSD): ICD-10-CM

## 2025-06-06 DIAGNOSIS — F31.81 MODERATE MIXED BIPOLAR II DISORDER (HCC): Primary | ICD-10-CM

## 2025-06-06 DIAGNOSIS — F41.1 GENERALIZED ANXIETY DISORDER: ICD-10-CM

## 2025-06-06 PROCEDURE — 90853 GROUP PSYCHOTHERAPY: CPT | Performed by: COUNSELOR

## 2025-06-06 NOTE — TELEPHONE ENCOUNTER
Patient dropped off Disability forms. Patient asking if you could fill out the pulmonary impairment questionnaire. Please advise.   
Patient dropped off forms that need to be filled out, scanned into her chart blank  
Patient was r/s for sooner appointment with Brynn , patient stated she needs her forms completed  as soon as possible and couldn't wait till August . ENOC   
Patient was r/s to 6/18  
wife

## 2025-06-07 NOTE — GROUP NOTE
Virtual Regular Visit    Verification of patient location:  Patient is located at Home in the following state in which I hold an active license [place]    The patient was identified by name and date of birth. Marie Portillo was informed that this is a telemedicine visit and that the visit is being conducted throughthe Microsoft Teams platform. She agrees to proceed..  My office door was closed. No one else was in the room.  She acknowledged consent and understanding of privacy and security of the video platform. The patient has agreed to participate and understands they can discontinue the visit at any time.     Patient is aware this is a billable service.     Behavioral Health Psychotherapy Progress Note    1. Moderate mixed bipolar II disorder (HCC)        2. Generalized anxiety disorder        3. Chronic post-traumatic stress disorder (PTSD)            Goals addressed in session: Goal 1     Attendees were guided through EFT tapping, diaphragmatic breathing through two different protocols, voice vibration through outbreath or humming with other Vagus nerve stimulation techniques, and progressive muscle relaxation paired with the breathing parts. Furthermore, the attendees were guided through Mindfulness-based meditation series ending with positive self-affirmations.    Data: Patient attended the virtual Relaxation group and was guided through tapping on trauma, diaphragmatic breathing through two different protocols, voice vibration through outbreath or humming with other Vagus nerve stimulation techniques, and progressive muscle relaxation paired with the different breathing parts. Furthermore, the attendees were guided through Mindfulness-based meditation series on topics of a Seated Body Scan, [place] Kindness Meditation, Wildlife [place] visualization, Heart Chakra Meditation, and ending with positive self-affirmations for self-confidence, self-esteem, forgiveness, and embracing difficult situations a they are.           During the Session, the Clinician used EFT, Mindfulness-based stress reduction, Internal Family Systems, and Somatic approaches were used to achieve the state of relaxation, to provide valuable information about the used methods and their effects on mental, emotional, and physical wellbeing, and to maintain the serene environment for the exercise's purpose.         No substance abuse or alcohol problems were addressed in the session. Stage of change NA          Assessment: Patient was in normal/euthymic mood and in congruence with broad affect, followed through the guidance and completed the exercises well, showed willingness to continue learning and practicing self-care in weekly Relaxation Group as well as individual therapy. No interactions between members occur after the start of the guidance except for chat emojis and expressions of gratitude and mutual support.          No safety plan was indicated. No safety issues are reported. No presence of SI or behaviors is determined based on self-reports.         Plan: The next group is scheduled for Friday depending on the Pt's availability. Patient will practice deep breathing and other techniques based on each occasion to counter stress and will attend the next group to be able to take part in further guided exercises on chosen topics to build on the techniques learned in the beginning.       Behavioral Health Treatment Plan and Discharge Planning: Patient is aware of and agrees to continue to work on their treatment plan. They have identified and are working toward their discharge goals. Yes         Visit start and stop times:    06/07/25  Start Time: 0410  Stop Time: 0500  Total Visit Time: 50 minutes

## 2025-06-10 ENCOUNTER — TELEMEDICINE (OUTPATIENT)
Dept: BEHAVIORAL/MENTAL HEALTH CLINIC | Facility: CLINIC | Age: 60
End: 2025-06-10
Payer: COMMERCIAL

## 2025-06-10 DIAGNOSIS — F43.12 CHRONIC POST-TRAUMATIC STRESS DISORDER (PTSD): ICD-10-CM

## 2025-06-10 DIAGNOSIS — Z72.0 TOBACCO ABUSE: ICD-10-CM

## 2025-06-10 DIAGNOSIS — F10.11 ALCOHOL ABUSE, IN REMISSION: ICD-10-CM

## 2025-06-10 DIAGNOSIS — F31.81 MODERATE MIXED BIPOLAR II DISORDER (HCC): Primary | ICD-10-CM

## 2025-06-10 DIAGNOSIS — F41.1 GENERALIZED ANXIETY DISORDER: ICD-10-CM

## 2025-06-10 DIAGNOSIS — F17.210 CIGARETTE NICOTINE DEPENDENCE WITHOUT COMPLICATION: ICD-10-CM

## 2025-06-10 DIAGNOSIS — F12.90 MARIJUANA USE: Chronic | ICD-10-CM

## 2025-06-10 PROCEDURE — 90832 PSYTX W PT 30 MINUTES: CPT | Performed by: COUNSELOR

## 2025-06-10 NOTE — PSYCH
Virtual Regular VisitName: Marie Portillo      : 1965      MRN: [id number]  Encounter Provider: Isabel Watts LPC  Encounter Date: 6/10/2025   Encounter department: [facility]ERAP[facility] [place]  :  Assessment & Plan  Moderate mixed bipolar II disorder (HCC)         Generalized anxiety disorder         Chronic post-traumatic stress disorder (PTSD)         Tobacco abuse         Marijuana use         Cigarette nicotine dependence without complication         Alcohol abuse, in remission         Moderate mixed bipolar II disorder (HCC)         Generalized anxiety disorder         Chronic post-traumatic stress disorder (PTSD)         Tobacco abuse         Marijuana use         Cigarette nicotine dependence without complication         Alcohol abuse, in remission       Goals addressed in session: Goal 1 and Goal 2     DATA: Marie reported experiencing discomfort with the present dog in the home since two weeks. She was allowed to express herself and was validated for her concerns and commended for her hard work to maintain her wellness as much as possible and within her cognitive limitations. Marie was encouraged to take notes and to make a sticky note for a reminder to schedule PT after the session. Marie admitted that she could do better in taking notes and was commended for her openness and willingness.   During this session, this clinician used the following therapeutic modalities: Client-centered Therapy, Cognitive Behavioral Therapy, Mindfulness-based Strategies, and Supportive Psychotherapy    Substance Abuse was not addressed during this session. If the client is diagnosed with a co-occurring substance use disorder, please indicate any changes in the frequency or amount of use: NA. Stage of change for addressing substance use diagnoses: Contemplation    ASSESSMENT:  Marie presents with a Euthymic/ normal, Anxious, and Depressed mood. Marie's affect is Normal range and  "intensity, Constricted, and Tearful, at times, which is congruent, with their mood and the content of the session. The client has made some progress on their goals as evidenced by reportedly reminding herself to practice learned Mindfulness skills and attending the weekly Relaxation group that makes a difference of how she feels.    Marie presents with a minimal risk of suicide, low risk of self-harm, and none risk of harm to others.    For any risk assessment that surpasses a \"low\" rating, a safety plan must be developed.    A safety plan was indicated: no  If yes, describe in detail NA    PLAN: Between sessions, Marie will attend the weekly Relaxation group for self-care. At the next session, the therapist will use Client-centered Therapy, Cognitive Behavioral Therapy, Dialectical Behavior Therapy, Mindfulness-based Strategies, and Supportive Psychotherapy to address self-care, getting back on track of taking notes.    Behavioral Health Treatment Plan [facility]: Diagnosis and Treatment Plan explained to Marie, Marie relates understanding diagnosis and is agreeable to Treatment Plan. Yes     Depression Follow-up Plan Completed: Not applicable     Reason for visit is   Chief Complaint   Patient presents with    Virtual Regular Visit      Recent Visits  Date Type Provider Dept   06/06/25 Telemedicine Isabel Watts LPC [facility] [place]   06/03/25 Telemedicine Isabel Watts LPC [facility] [place]   Showing recent visits within past 7 days and meeting all other requirements  Today's Visits  Date Type Provider Dept   06/10/25 Telemedicine Isabel Watts LPC [facility] [place]   Showing today's visits and meeting all other requirements  Future Appointments  No visits were found meeting these conditions.  Showing future appointments within next 150 days and meeting all other requirements     History of Present Illness     HPI    Past Medical " History   Past Medical History:   Diagnosis Date    Bilateral carpal tunnel syndrome 08/20/2019    Cardiomyopathy (HCC)     Carpal tunnel syndrome of right wrist 10/30/2019    Added automatically from request for surgery [id number]    Chlamydia     COPD (chronic obstructive pulmonary disease) (HCC)     Depression     Hypertension     Varicella      Past Surgical History:   Procedure Laterality Date    BACK SURGERY      BREAST BIOPSY Left 20yrs ago benign    CERVICAL SPINE SURGERY      EGD      HERNIA REPAIR      OOPHORECTOMY Right     [facility]G W/RLS TRANSVRS CARPL LIGM Right 01/21/2020    Procedure: RELEASE CARPAL TUNNEL ENDOSCOPIC;  Surgeon: Sam Nguyen MD;  [facility];  Service: Orthopedics    [facility] WRST SURG W/RLS TRANSVRS CARPL LIGM Left 01/28/2020    Procedure: RELEASE CARPAL TUNNEL ENDOSCOPIC;  Surgeon: Sam Nguyen MD;  [facility];  Service: Orthopedics     Current Outpatient Medications   Medication Instructions    Acetaminophen 500 MG 2 capsules, Every 4 hours PRN    albuterol (PROVENTIL HFA,VENTOLIN HFA) 90 mcg/act inhaler inhale 2 puffs by mouth and INTO THE LUNGS every 6 hours IF NEEDED FOR WHEEZING.    betamethasone dipropionate (DIPROSONE) 0.05 % cream Topical, 2 times daily    buPROPion (WELLBUTRIN XL) 150 mg, Oral, Daily    cholecalciferol (VITAMIN D3) 400 Units, Daily    FLUoxetine (PROZAC) 20 mg, Oral, Daily, Take 1 capsule (10 mg) by mouth daily x 1 week. THEN, take 2 capsules (20 mg total) daily.    fluticasone (FLONASE) 50 mcg/act nasal spray 2 sprays, Nasal, Daily    guaiFENesin (MUCINEX) 1,200 mg, Oral, Every 12 hours scheduled    ipratropium-albuterol (DUO-NEB) 0.5-2.5 mg/3 mL nebulizer solution 3 mL, Nebulization, 4 times daily    lamoTRIgine (LAMICTAL) 200 mg, Oral, Daily, Take 2 tablets (50 mg) by mouth daily with one 200 mg tablet for daily total of 250 mg    lamoTRIgine (LAMICTAL) 50 mg, Oral, Daily, Take 2 tablets (50 mg) by mouth daily with one  200 mg tablet for daily total of 250 mg.    levocetirizine (XYZAL) 5 mg, Oral, Every evening    losartan (COZAAR) 25 mg, Oral, Daily    metoprolol succinate (TOPROL-XL) 25 mg, Oral, Daily    ofloxacin (OCUFLOX) 0.3 % ophthalmic solution 1 drop, Left Eye, 4 times daily    omeprazole (PRILOSEC) 20 mg, Oral, 2 times daily    senna-docusate sodium (SENOKOT-S) 8.6-50 mg per tablet 3 tablets, As needed    Stiolto Respimat 2.5-2.5 MCG/ACT inhaler inhale 2 puffs by mouth and INTO THE LUNGS once daily     No Known Allergies    Objective   LMP  (LMP Unknown)     Video Exam  Physical Exam     Administrative Statements   Encounter provider Isabel Watts LPC    The Patient is located at Home and in the following state in which I hold an active license [place].    The patient was identified by name and date of birth. Marie Portillo was informed that this is a telemedicine visit and that the visit is being conducted through the Epic Embedded platform. She agrees to proceed..  My office door was closed. No one else was in the room.  She acknowledged consent and understanding of privacy and security of the video platform. The patient has agreed to participate and understands they can discontinue the visit at any time.    I have spent a total time of 35 minutes in caring for this patient on the day of the visit/encounter including Counseling / Coordination of care, not including the time spent for establishing the audio/video connection.    Visit Time  Start Time: 0200  Stop Time: 0235  Total Visit Time: 35 minutes

## 2025-06-13 ENCOUNTER — TELEMEDICINE (OUTPATIENT)
Dept: BEHAVIORAL/MENTAL HEALTH CLINIC | Facility: CLINIC | Age: 60
End: 2025-06-13
Payer: COMMERCIAL

## 2025-06-13 DIAGNOSIS — F31.81 MODERATE MIXED BIPOLAR II DISORDER (HCC): Primary | ICD-10-CM

## 2025-06-13 DIAGNOSIS — F43.12 CHRONIC POST-TRAUMATIC STRESS DISORDER (PTSD): ICD-10-CM

## 2025-06-13 DIAGNOSIS — F41.1 GENERALIZED ANXIETY DISORDER: ICD-10-CM

## 2025-06-13 PROCEDURE — 90853 GROUP PSYCHOTHERAPY: CPT | Performed by: COUNSELOR

## 2025-06-14 NOTE — GROUP NOTE
Virtual Regular Visit    Verification of patient location:  Patient is located at Home in the following state in which I hold an active license [place]    The patient was identified by name and date of birth. Marie Portillo was informed that this is a telemedicine visit and that the visit is being conducted throughthe Microsoft Teams platform. She agrees to proceed..  My office door was closed. No one else was in the room.  She acknowledged consent and understanding of privacy and security of the video platform. The patient has agreed to participate and understands they can discontinue the visit at any time.     Patient is aware this is a billable service.     Behavioral Health Psychotherapy Progress Note    1. Moderate mixed bipolar II disorder (HCC)        2. Chronic post-traumatic stress disorder (PTSD)        3. Generalized anxiety disorder            Goals addressed in session: Goal 1     Attendees were guided through EFT tapping, diaphragmatic breathing through two different protocols, voice vibration through outbreath or humming with other Vagus nerve stimulation techniques, and progressive muscle relaxation paired with the different breathing parts. Furthermore, the attendees were guided through Mindfulness-based meditation series ending with positive self-affirmations.    Data: Patient attended the virtual Relaxation group and was guided through tapping on pain, diaphragmatic breathing through two different protocols, voice vibration through outbreath or humming with other Vagus nerve stimulation techniques, and progressive muscle relaxation paired with the different breathing parts. Furthermore, the attendees were guided through Mindfulness-based meditation series on topics of a Quick Body Scan, Working with Difficult Emotions, Inner Critique Conversation, Letting Go visualization, Throat Chakra Meditation, and ending with positive self-affirmations for compassion, transcending limitations and fears, fulfilling  one's potential, and self-worth.          During the Session, the Clinician used EFT, Mindfulness-based stress reduction, Internal Family Systems, and Somatic approaches were used to achieve the state of relaxation, to provide valuable information about the used methods and their effects on mental, emotional, and physical wellbeing, and to maintain the serene environment for the exercise's purpose.         No substance abuse or alcohol problems were addressed in the session. Stage of change NA          Assessment: Patient was in normal/euthymic mood and in congruence with broad affect, followed through the guidance and completed the exercises well, showed willingness to continue learning and practicing self-care in weekly Relaxation Group as well as individual therapy. No interactions between members occur after the start of the guidance except for chat emojis and expressions of gratitude and mutual support.          No safety plan was indicated. No safety issues are reported. No presence of SI or behaviors is determined based on self-reports.         Plan: The next group is scheduled for Friday depending on the Pt's availability. Patient will practice deep breathing and other techniques based on each occasion to counter stress and will attend the next group to be able to take part in further guided exercises on chosen topics to build on the techniques learned in the beginning.       Behavioral Health Treatment Plan and Discharge Planning: Patient is aware of and agrees to continue to work on their treatment plan. They have identified and are working toward their discharge goals. Yes         Visit start and stop times:    06/14/25  Start Time: 0420  Stop Time: 0500  Total Visit Time: 40 minutes

## 2025-06-17 ENCOUNTER — TELEMEDICINE (OUTPATIENT)
Dept: BEHAVIORAL/MENTAL HEALTH CLINIC | Facility: CLINIC | Age: 60
End: 2025-06-17
Payer: COMMERCIAL

## 2025-06-17 DIAGNOSIS — F43.12 CHRONIC POST-TRAUMATIC STRESS DISORDER (PTSD): ICD-10-CM

## 2025-06-17 DIAGNOSIS — F17.210 CIGARETTE NICOTINE DEPENDENCE WITHOUT COMPLICATION: ICD-10-CM

## 2025-06-17 DIAGNOSIS — F41.1 GENERALIZED ANXIETY DISORDER: ICD-10-CM

## 2025-06-17 DIAGNOSIS — F31.81 MODERATE MIXED BIPOLAR II DISORDER (HCC): Primary | ICD-10-CM

## 2025-06-17 DIAGNOSIS — Z72.0 TOBACCO ABUSE: ICD-10-CM

## 2025-06-17 DIAGNOSIS — F12.90 MARIJUANA USE: Chronic | ICD-10-CM

## 2025-06-17 DIAGNOSIS — F10.11 ALCOHOL ABUSE, IN REMISSION: ICD-10-CM

## 2025-06-17 PROCEDURE — 90832 PSYTX W PT 30 MINUTES: CPT | Performed by: COUNSELOR

## 2025-06-17 NOTE — PSYCH
Virtual Regular VisitName: Marie Portillo      : 1965      MRN: [id number]  Encounter Provider: Isabel Watts LPC  Encounter Date: 2025   Encounter department: [facility] [place]  :  Assessment & Plan  Moderate mixed bipolar II disorder (HCC)         Generalized anxiety disorder         Chronic post-traumatic stress disorder (PTSD)         Tobacco abuse         Marijuana use         Cigarette nicotine dependence without complication         Alcohol abuse, in remission         Moderate mixed bipolar II disorder (HCC)         Generalized anxiety disorder         Chronic post-traumatic stress disorder (PTSD)         Tobacco abuse         Marijuana use         Cigarette nicotine dependence without complication         Alcohol abuse, in remission               Goals addressed in session: Goal 1 and Goal 2     DATA: Marie reported having even further memory issues- lost her keys again that may put the whole household in some danger but also she hides that from her landlords out of embarrassment. Taking notes was encouraged after Marie admitted that she had and has difficulties remembering her appointments and encounters where she misses them. She did not remember what helped her before to get on track on taking notes, which marks a setback in her cognitive decline, too.    During this session, this clinician used the following therapeutic modalities: Client-centered Therapy, Cognitive Behavioral Therapy, Mindfulness-based Strategies, and Supportive Psychotherapy    Substance Abuse was not addressed during this session. If the client is diagnosed with a co-occurring substance use disorder, please indicate any changes in the frequency or amount of use: NA. Stage of change for addressing substance use diagnoses: Pre-contemplation    ASSESSMENT:  Marie presents with a Euthymic/ normal, Anxious, and Depressed mood. Marie's affect is Normal range and intensity, Constricted,  "and Tearful, at times, which is congruent, with their mood and the content of the session. The client has made some progress on their goals as evidenced by reportedly practicing Mindfulness skills when remembers her toolbox but also regressed by not taking notes anymore, which was encouraged throughout the session.    Marie presents with a minimal risk of suicide, minimal risk of self-harm, and none risk of harm to others.    For any risk assessment that surpasses a \"low\" rating, a safety plan must be developed.    A safety plan was indicated: no  If yes, describe in detail NA    PLAN: Between sessions, Marie will attend weekly Mindfulness group for self-care. At the next session, the therapist will use Client-centered Therapy, Cognitive Behavioral Therapy, Mindfulness-based Strategies, Motivational Interviewing, and Supportive Psychotherapy to address adequate self-care, emotion regulation, and getting back on track of taking notes.    Behavioral Health Treatment Plan [facility]: Diagnosis and Treatment Plan explained to Marie, Marie relates understanding diagnosis and is agreeable to Treatment Plan. Yes     Depression Follow-up Plan Completed: Not applicable     Reason for visit is   Chief Complaint   Patient presents with    Virtual Regular Visit      Recent Visits  Date Type Provider Dept   06/13/25 Telemedicine Isabel Watts LPC [facility]rap[facility] [place]   06/10/25 Telemedicine Isabel Watts LPC [facility] [place]   Showing recent visits within past 7 days and meeting all other requirements  Today's Visits  Date Type Provider Dept   06/17/25 Telemedicine Isabel Watts LPC [facility] [place]   Showing today's visits and meeting all other requirements  Future Appointments  No visits were found meeting these conditions.  Showing future appointments within next 150 days and meeting all other requirements     History of Present Illness "     HPI    Past Medical History   Past Medical History:   Diagnosis Date    Bilateral carpal tunnel syndrome 08/20/2019    Cardiomyopathy (HCC)     Carpal tunnel syndrome of right wrist 10/30/2019    Added automatically from request for surgery [id number]    Chlamydia     COPD (chronic obstructive pulmonary disease) (HCC)     Depression     Hypertension     Varicella      Past Surgical History:   Procedure Laterality Date    BACK SURGERY      BREAST BIOPSY Left 20yrs ago benign    CERVICAL SPINE SURGERY      EGD      HERNIA REPAIR      OOPHORECTOMY Right     OR [facility]SC WRST SURG W/RLS TRANSVRS CARPL LIGM Right 01/21/2020    Procedure: RELEASE CARPAL TUNNEL ENDOSCOPIC;  Surgeon: Sam Nguyen MD;  Location: [facility];  Service: Orthopedics    OR [facility]SC WRST SURG W/RLS TRANSVRS CARPL LIGM Left 01/28/2020    Procedure: RELEASE CARPAL TUNNEL ENDOSCOPIC;  Surgeon: Sam Nguyen MD;  Location: [facility];  Service: Orthopedics     Current Outpatient Medications   Medication Instructions    Acetaminophen 500 MG 2 capsules, Every 4 hours PRN    albuterol (PROVENTIL HFA,VENTOLIN HFA) 90 mcg/act inhaler inhale 2 puffs by mouth and INTO THE LUNGS every 6 hours IF NEEDED FOR WHEEZING.    betamethasone dipropionate (DIPROSONE) 0.05 % cream Topical, 2 times daily    buPROPion (WELLBUTRIN XL) 150 mg, Oral, Daily    cholecalciferol (VITAMIN D3) 400 Units, Daily    FLUoxetine (PROZAC) 20 mg, Oral, Daily, Take 1 capsule (10 mg) by mouth daily x 1 week. THEN, take 2 capsules (20 mg total) daily.    fluticasone (FLONASE) 50 mcg/act nasal spray 2 sprays, Nasal, Daily    guaiFENesin (MUCINEX) 1,200 mg, Oral, Every 12 hours scheduled    ipratropium-albuterol (DUO-NEB) 0.5-2.5 mg/3 mL nebulizer solution 3 mL, Nebulization, 4 times daily    lamoTRIgine (LAMICTAL) 200 mg, Oral, Daily, Take 2 tablets (50 mg) by mouth daily with one 200 mg tablet for daily total of 250 mg    lamoTRIgine (LAMICTAL) 50 mg, Oral, Daily, Take 2 tablets (50 mg)  by mouth daily with one 200 mg tablet for daily total of 250 mg.    levocetirizine (XYZAL) 5 mg, Oral, Every evening    losartan (COZAAR) 25 mg, Oral, Daily    metoprolol succinate (TOPROL-XL) 25 mg, Oral, Daily    ofloxacin (OCUFLOX) 0.3 % ophthalmic solution 1 drop, Left Eye, 4 times daily    omeprazole (PRILOSEC) 20 mg, Oral, 2 times daily    senna-docusate sodium (SENOKOT-S) 8.6-50 mg per tablet 3 tablets, As needed    Stiolto Respimat 2.5-2.5 MCG/ACT inhaler inhale 2 puffs by mouth and INTO THE LUNGS once daily     No Known Allergies    Objective   LMP  (LMP Unknown)     Video Exam  Physical Exam     Administrative Statements   Encounter provider Isabel Watts LPC    The Patient is located at Home and in the following state in which I hold an active license [place].    The patient was identified by name and date of birth. Marie Portillo was informed that this is a telemedicine visit and that the visit is being conducted through the Epic Embedded platform. She agrees to proceed..  My office door was closed. No one else was in the room.  She acknowledged consent and understanding of privacy and security of the video platform. The patient has agreed to participate and understands they can discontinue the visit at any time.    I have spent a total time of 34 minutes in caring for this patient on the day of the visit/encounter including Counseling / Coordination of care, not including the time spent for establishing the audio/video connection.    Visit Time  Start Time: 0158  Stop Time: 0232  Total Visit Time: 34 minutes

## 2025-06-18 ENCOUNTER — TELEPHONE (OUTPATIENT)
Age: 60
End: 2025-06-18

## 2025-06-18 ENCOUNTER — TELEMEDICINE (OUTPATIENT)
Dept: PSYCHIATRY | Facility: CLINIC | Age: 60
End: 2025-06-18

## 2025-06-18 ENCOUNTER — OFFICE VISIT (OUTPATIENT)
Dept: PULMONOLOGY | Facility: CLINIC | Age: 60
End: 2025-06-18
Payer: COMMERCIAL

## 2025-06-18 VITALS
RESPIRATION RATE: 18 BRPM | OXYGEN SATURATION: 99 % | WEIGHT: 109.2 LBS | HEART RATE: 74 BPM | HEIGHT: 60 IN | BODY MASS INDEX: 21.44 KG/M2 | TEMPERATURE: 97.7 F | DIASTOLIC BLOOD PRESSURE: 70 MMHG | SYSTOLIC BLOOD PRESSURE: 130 MMHG

## 2025-06-18 DIAGNOSIS — J43.2 CENTRILOBULAR EMPHYSEMA (HCC): Primary | ICD-10-CM

## 2025-06-18 DIAGNOSIS — J41.0 SIMPLE CHRONIC BRONCHITIS (HCC): ICD-10-CM

## 2025-06-18 DIAGNOSIS — Z72.0 TOBACCO ABUSE: ICD-10-CM

## 2025-06-18 DIAGNOSIS — F39 UNSPECIFIED MOOD (AFFECTIVE) DISORDER (HCC): Primary | ICD-10-CM

## 2025-06-18 PROCEDURE — PBNCHG PB NO CHARGE PLACEHOLDER: Performed by: PSYCHIATRY & NEUROLOGY

## 2025-06-18 PROCEDURE — 99214 OFFICE O/P EST MOD 30 MIN: CPT

## 2025-06-18 RX ORDER — TIOTROPIUM BROMIDE AND OLODATEROL 3.124; 2.736 UG/1; UG/1
2 SPRAY, METERED RESPIRATORY (INHALATION) DAILY
Qty: 12 G | Refills: 3 | Status: SHIPPED | OUTPATIENT
Start: 2025-06-18

## 2025-06-18 RX ORDER — IPRATROPIUM BROMIDE AND ALBUTEROL SULFATE 2.5; .5 MG/3ML; MG/3ML
3 SOLUTION RESPIRATORY (INHALATION) EVERY 6 HOURS PRN
Qty: 360 ML | Refills: 3 | Status: SHIPPED | OUTPATIENT
Start: 2025-06-18

## 2025-06-18 NOTE — TELEPHONE ENCOUNTER
Patient called in after having technical issues and being told the links the provider was sending via text were  and not receiving the email links.    Writer confirmed patients email and phone number.    Writer contacted the provider and tried to trouble shoot connecting.    Patient will need to be rescheduled in office and also for an in person [place].    Writer stated a message would be sent to the .

## 2025-06-18 NOTE — ASSESSMENT & PLAN NOTE
- Approximately 23-pack-year history with current use, smoking 9 cigarettes/day  -Patient trying to cut back on her own, declined assistance with NRT/pharmacotherapy.  Encouraged smoking cessation.  -Overdue for yearly CT lung cancer screening, patient wishes to continue screening, orders placed. Risks vs benefits of LDCT screening was discussed with patient including risks vs benefits where risks including false positive test, radiation exposure, risk of overdiagnosis and benefits included early detection in potentially malignant lesions therefore improving the rate of intervention and therefore improving overall survival/mortality.

## 2025-06-18 NOTE — ASSESSMENT & PLAN NOTE
- Continues with chronic cough, mucus, shortness of breath, and wheezing.  Lungs sound clear on exam today.  No recent exacerbations or frequent need for rescue inhaler  -PFTs in 2021 were normal.  Given continued smoking and symptoms, will update to see if patient developed any airflow obstruction  -Continue Stiolto 2 puffs daily and albuterol HFA/DuoNebs every 6 hours as needed  - Encourage smoking cessation  - Follow-up in 6 months or sooner if needed  - Disability forms completed as requested.      Orders:    ipratropium-albuterol (DUO-NEB) 0.5-2.5 mg/3 mL nebulizer solution; Take 3 mL by nebulization every 6 (six) hours as needed for wheezing or shortness of breath    Complete PFT with post Bronchodilator and Six Minute walk; Future

## 2025-06-18 NOTE — PSYCH
Per staff communication, patient unable to join virtual visit via links sent. Attempted to re-send email and text links. Staff was also available on the phone to assist navigating technological difficulties but unsuccessful. Staff will have patient reschedule with .

## 2025-06-18 NOTE — PROGRESS NOTES
Follow-up  Visit - Pulmonary Medicine   Name: Marie Portillo      : 1965      MRN: [id number]  Encounter Provider: CAROLINE Mcgregor  Encounter Date: 2025   Encounter department: [facility] [place]  :  Assessment & Plan  Centrilobular emphysema (HCC)  - Mild apical centrilobular emphysema on prior CT imaging  -PFTs in  with normal spirometry, lung volumes, and diffusion capacity.  Normal 6-minute walk test without desaturation  -Encouraged smoking cessation  -Continue inhalers as mentioned below    Orders:    tiotropium-olodaterol (Stiolto Respimat) 2.5-2.5 MCG/ACT inhaler; Inhale 2 puffs daily    Complete PFT with post Bronchodilator and Six Minute walk; Future    Simple chronic bronchitis (HCC)  - Continues with chronic cough, mucus, shortness of breath, and wheezing.  Lungs sound clear on exam today.  No recent exacerbations or frequent need for rescue inhaler  -PFTs in  were normal.  Given continued smoking and symptoms, will update to see if patient developed any airflow obstruction  -Continue Stiolto 2 puffs daily and albuterol HFA/DuoNebs every 6 hours as needed  - Encourage smoking cessation  - Follow-up in 6 months or sooner if needed  - Disability forms completed as requested.      Orders:    ipratropium-albuterol (DUO-NEB) 0.5-2.5 mg/3 mL nebulizer solution; Take 3 mL by nebulization every 6 (six) hours as needed for wheezing or shortness of breath    Complete PFT with post Bronchodilator and Six Minute walk; Future    Tobacco abuse  - Approximately 23-pack-year history with current use, smoking 9 cigarettes/day  -Patient trying to cut back on her own, declined assistance with NRT/pharmacotherapy.  Encouraged smoking cessation.  -Overdue for yearly CT lung cancer screening, patient wishes to continue screening, orders placed. Risks vs benefits of LDCT screening was discussed with patient including risks vs benefits where risks including false positive  test, radiation exposure, risk of overdiagnosis and benefits included early detection in potentially malignant lesions therefore improving the rate of intervention and therefore improving overall survival/mortality.           No follow-ups on file.    History of Present Illness   Marie Portillo is a 59 y.o. female with a past medical history of emphysema, chronic bronchitis, hypertension, bipolar disorder, alcohol abuse, marijuana use, tobacco abuse, CHF, and reflux who is here today for a follow-up visit.  Last seen in the office in October 2023.  At that time, she was maintained on Stiolto with DuoNebs/albuterol as needed.  Last CT in April 2024 showed resolution of prior 3 mm left upper lobe pulmonary nodule with additional solid stable nodules.  She is overdue for her yearly CT lung cancer screening this year which was previously ordered by her PCP.    Patient denies any exacerbations this past year requiring steroids or antibiotics.  Still has shortness of breath with exertion, chronic cough with mucus, and occasional wheezing.  Using her DuoNebs on average 1-2 times per day in addition to Stiolto daily.  Denies any chest pain or extremity swelling.  Still smoking 9 cigarettes/day, trying to cut back on her own.  She is here with disability paperwork requesting pulmonary portion completed.  Has been on disability since 2018 states mostly due to her mental health issues.    Review of Systems    Aside from what is mentioned in the HPI, ROS is otherwise negative         Medical History Reviewed by provider this encounter:     .    Objective   /70 (BP Location: Left arm, Patient Position: Sitting, Cuff Size: Standard)   Pulse 74   Temp 97.7 °F (36.5 °C) (Temporal)   Resp 18   Ht 5' (1.524 m)   Wt 49.5 kg (109 lb 3.2 oz)   LMP  (LMP Unknown)   SpO2 99%   BMI 21.33 kg/m²     Physical Exam  Vitals and nursing note reviewed.   Constitutional:       General: She is not in acute distress.     Appearance:  Normal appearance. She is well-developed.     Cardiovascular:      Rate and Rhythm: Normal rate and regular rhythm.      Heart sounds: Normal heart sounds, S1 normal and S2 normal. No murmur heard.  Pulmonary:      Effort: Pulmonary effort is normal.      Breath sounds: Normal breath sounds. No decreased breath sounds, wheezing, rhonchi or rales.     Musculoskeletal:         General: No swelling.      Right lower leg: No edema.      Left lower leg: No edema.     Neurological:      Mental Status: She is alert.     Psychiatric:         Mood and Affect: Mood and affect normal.         Behavior: Behavior normal. Behavior is cooperative.           Diagnostic Data:  Labs: I personally reviewed the most recent laboratory data pertinent to today's visit.      Radiology results:  Radiology Results Review: I have reviewed radiology reports from 4/26/2024 including: CT chest.      PFT/spirometry results: Reviewed study from 12/20/2021      Oximetry testing:      Other studies:      CAROLINE Mcgregor

## 2025-06-18 NOTE — ASSESSMENT & PLAN NOTE
- Mild apical centrilobular emphysema on prior CT imaging  -PFTs in 2021 with normal spirometry, lung volumes, and diffusion capacity.  Normal 6-minute walk test without desaturation  -Encouraged smoking cessation  -Continue inhalers as mentioned below    Orders:    tiotropium-olodaterol (Stiolto Respimat) 2.5-2.5 MCG/ACT inhaler; Inhale 2 puffs daily    Complete PFT with post Bronchodilator and Six Minute walk; Future

## 2025-06-20 ENCOUNTER — TELEMEDICINE (OUTPATIENT)
Dept: BEHAVIORAL/MENTAL HEALTH CLINIC | Facility: CLINIC | Age: 60
End: 2025-06-20
Payer: COMMERCIAL

## 2025-06-20 DIAGNOSIS — F43.12 CHRONIC POST-TRAUMATIC STRESS DISORDER (PTSD): ICD-10-CM

## 2025-06-20 DIAGNOSIS — F31.81 MODERATE MIXED BIPOLAR II DISORDER (HCC): Primary | ICD-10-CM

## 2025-06-20 DIAGNOSIS — F41.1 GENERALIZED ANXIETY DISORDER: ICD-10-CM

## 2025-06-20 PROCEDURE — 90853 GROUP PSYCHOTHERAPY: CPT | Performed by: COUNSELOR

## 2025-06-21 NOTE — GROUP NOTE
Virtual Regular Visit    Verification of patient location:  Patient is located at Home in the following state in which I hold an active license [place]    The patient was identified by name and date of birth. Marie Portillo was informed that this is a telemedicine visit and that the visit is being conducted throughthe Microsoft Teams platform. She agrees to proceed..  My office door was closed. No one else was in the room.  She acknowledged consent and understanding of privacy and security of the video platform. The patient has agreed to participate and understands they can discontinue the visit at any time.     Patient is aware this is a billable service.     Behavioral Health Psychotherapy Progress Note    1. Moderate mixed bipolar II disorder (HCC)        2. Generalized anxiety disorder        3. Chronic post-traumatic stress disorder (PTSD)            Goals addressed in session: Goal 1     Attendees were guided through EFT tapping, diaphragmatic breathing through two different protocols, voice vibration through outbreath or humming with other Vagus nerve stimulation techniques, and progressive muscle relaxation paired with the different breathing parts. Furthermore, the attendees were guided through Mindfulness-based meditation series ending with positive self-affirmations.    Data: Patient attended the virtual Relaxation group and was guided through tapping on anxiety, diaphragmatic breathing through two different protocols, voice vibration through outbreath or humming with other Vagus nerve stimulation techniques, and progressive muscle relaxation paired with the different breathing parts. Furthermore, the attendees were guided through Mindfulness-based meditation series on topics of a Seated Body Scan, Working with the Inner Critique as a Protective Part, Identifying Self-Judgment & Bringing Self-Compassion, Third Eye Chakra Meditation, and ending with positive self-affirmations for self-compassion, self-esteem,  motivation for moving on with optimism, and acceptance of the good with the bad.          During the Session, the Clinician used EFT, Mindfulness-based stress reduction, Internal Family Systems, and Somatic approaches were used to achieve the state of relaxation, to provide valuable information about the used methods and their effects on mental, emotional, and physical wellbeing, and to maintain the serene environment for the exercise's purpose.         No substance abuse or alcohol problems were addressed in the session. Stage of change NA          Assessment: Patient was in normal/euthymic mood and in congruence with broad affect, followed through the guidance and completed the exercises well, showed willingness to continue learning and practicing self-care in weekly Relaxation Group as well as individual therapy. No interactions between members occur after the start of the guidance except for chat emojis and expressions of gratitude and mutual support.          No safety plan was indicated. No safety issues are reported. No presence of SI or behaviors is determined based on self-reports.         Plan: The next group is scheduled for Friday depending on the Pt's availability. Patient will practice deep breathing and other techniques based on each occasion to counter stress and will attend the next group to be able to take part in further guided exercises on chosen topics to build on the techniques learned in the beginning.       Behavioral Health Treatment Plan and Discharge Planning: Patient is aware of and agrees to continue to work on their treatment plan. They have identified and are working toward their discharge goals. Yes      Visit start and stop times:      Start Time: 0400  Stop Time: 0500  Total Visit Time: 60 minutes

## 2025-06-24 ENCOUNTER — TELEMEDICINE (OUTPATIENT)
Dept: BEHAVIORAL/MENTAL HEALTH CLINIC | Facility: CLINIC | Age: 60
End: 2025-06-24
Payer: COMMERCIAL

## 2025-06-24 DIAGNOSIS — F17.210 CIGARETTE NICOTINE DEPENDENCE WITHOUT COMPLICATION: ICD-10-CM

## 2025-06-24 DIAGNOSIS — F12.90 MARIJUANA USE: Chronic | ICD-10-CM

## 2025-06-24 DIAGNOSIS — F41.1 GENERALIZED ANXIETY DISORDER: ICD-10-CM

## 2025-06-24 DIAGNOSIS — F31.81 MODERATE MIXED BIPOLAR II DISORDER (HCC): Primary | ICD-10-CM

## 2025-06-24 DIAGNOSIS — F43.12 CHRONIC POST-TRAUMATIC STRESS DISORDER (PTSD): ICD-10-CM

## 2025-06-24 DIAGNOSIS — Z72.0 TOBACCO ABUSE: ICD-10-CM

## 2025-06-24 DIAGNOSIS — F10.11 ALCOHOL ABUSE, IN REMISSION: ICD-10-CM

## 2025-06-24 PROCEDURE — 90834 PSYTX W PT 45 MINUTES: CPT | Performed by: COUNSELOR
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
## 2025-06-27 ENCOUNTER — TELEPHONE (OUTPATIENT)
Dept: PSYCHIATRY | Facility: CLINIC | Age: 60
End: 2025-06-27

## 2025-06-27 ENCOUNTER — TELEMEDICINE (OUTPATIENT)
Dept: BEHAVIORAL/MENTAL HEALTH CLINIC | Facility: CLINIC | Age: 60
End: 2025-06-27
Payer: COMMERCIAL

## 2025-06-27 DIAGNOSIS — F31.81 MODERATE MIXED BIPOLAR II DISORDER (HCC): Primary | ICD-10-CM

## 2025-06-27 DIAGNOSIS — F41.1 GENERALIZED ANXIETY DISORDER: ICD-10-CM

## 2025-06-27 DIAGNOSIS — F43.12 CHRONIC POST-TRAUMATIC STRESS DISORDER (PTSD): ICD-10-CM

## 2025-06-27 PROCEDURE — 90853 GROUP PSYCHOTHERAPY: CPT | Performed by: COUNSELOR

## 2025-06-27 NOTE — TELEPHONE ENCOUNTER
Left voicemail informing patient and/or parent/guardian of the Psych Encounter form needing to be signed as a requirement from the insurance company for billing purposes. Patient can access form via [employer] and sign electronically.     Please make patient aware this form must be signed for each visit as a requirement to continue future visits with provider.    Virtual Encounter form 6/24/25 call #[phone]

## 2025-06-28 NOTE — GROUP NOTE
Virtual Regular Visit    Verification of patient location:  Patient is located at Home in the following state in which I hold an active license [place]    The patient was identified by name and date of birth. Marie Portillo was informed that this is a telemedicine visit and that the visit is being conducted throughthe [employer] Teams platform. She agrees to proceed..  My office door was closed. No one else was in the room.  She acknowledged consent and understanding of privacy and security of the video platform. The patient has agreed to participate and understands they can discontinue the visit at any time.     Patient is aware this is a billable service.     Behavioral Health Psychotherapy Progress Note    1. Moderate mixed bipolar II disorder (HCC)        2. Generalized anxiety disorder        3. Chronic post-traumatic stress disorder (PTSD)            Goals addressed in session: Goal 1     Attendees were guided through EFT tapping, diaphragmatic breathing through two different protocols, voice vibration through outbreath or humming with other Vagus nerve stimulation techniques, and progressive muscle relaxation paired with the different breathing parts. Furthermore, the attendees were guided through Mindfulness-based meditation series ending with positive self-affirmations.    Data: Patient attended the Relaxation group and was guided through tapping on trauma, diaphragmatic breathing through two different protocols, voice vibration through outbreath or humming with other Vagus nerve stimulation techniques, and progressive muscle relaxation paired with the different breathing parts. Furthermore, the attendees were guided through Mindfulness-based meditation series on topics of a Quick Body Scan, Awareness of Breath, Riding the Bus & Acquainting with Inner Protectors, [place] Chakra Meditation, and ending with positive self-affirmations for self-compassion, self-esteem, motivation for moving on with optimism, and  acceptance of the good with the bad.          During the Session, the Clinician used EFT, Mindfulness-based stress reduction, Internal Family Systems, and Somatic approaches were used to achieve the state of relaxation, to provide valuable information about the used methods and their effects on mental, emotional, and physical wellbeing, and to maintain the serene environment for the exercise's purpose.         No substance abuse or alcohol problems were addressed in the session. Stage of change NA          Assessment: Patient was in normal/euthymic mood and in congruence with broad affect, followed through the guidance and completed the exercises well, showed willingness to continue learning and practicing self-care in weekly Relaxation Group as well as individual therapy. No interactions between members occur after the start of the guidance except for chat emojis and expressions of gratitude and mutual support.          No safety plan was indicated. No safety issues are reported. No presence of SI or behaviors is determined based on self-reports.         Plan: The next group is scheduled for Friday depending on the Pt's availability. Patient will practice deep breathing and other techniques based on each occasion to counter stress and will attend the next group to be able to take part in further guided exercises on chosen topics to build on the techniques learned in the beginning.       Behavioral Health Treatment Plan and Discharge Planning: Patient is aware of and agrees to continue to work on their treatment plan. They have identified and are working toward their discharge goals. Yes         Visit start and stop times:    06/28/25  Start Time: 0400  Stop Time: 0500  Total Visit Time: 60 minutes

## 2025-07-01 ENCOUNTER — TELEPHONE (OUTPATIENT)
Dept: PULMONOLOGY | Facility: CLINIC | Age: 60
End: 2025-07-01

## 2025-07-01 ENCOUNTER — TELEMEDICINE (OUTPATIENT)
Dept: BEHAVIORAL/MENTAL HEALTH CLINIC | Facility: CLINIC | Age: 60
End: 2025-07-01
Payer: COMMERCIAL

## 2025-07-01 ENCOUNTER — DOCUMENTATION (OUTPATIENT)
Dept: CARDIOLOGY CLINIC | Facility: CLINIC | Age: 60
End: 2025-07-01

## 2025-07-01 ENCOUNTER — TELEPHONE (OUTPATIENT)
Age: 60
End: 2025-07-01

## 2025-07-01 DIAGNOSIS — F43.12 CHRONIC POST-TRAUMATIC STRESS DISORDER (PTSD): ICD-10-CM

## 2025-07-01 DIAGNOSIS — F12.90 MARIJUANA USE: Chronic | ICD-10-CM

## 2025-07-01 DIAGNOSIS — F10.11 ALCOHOL ABUSE, IN REMISSION: ICD-10-CM

## 2025-07-01 DIAGNOSIS — F17.210 CIGARETTE NICOTINE DEPENDENCE WITHOUT COMPLICATION: ICD-10-CM

## 2025-07-01 DIAGNOSIS — Z72.0 TOBACCO ABUSE: ICD-10-CM

## 2025-07-01 DIAGNOSIS — F31.81 MODERATE MIXED BIPOLAR II DISORDER (HCC): Primary | ICD-10-CM

## 2025-07-01 DIAGNOSIS — F41.1 GENERALIZED ANXIETY DISORDER: ICD-10-CM

## 2025-07-01 PROCEDURE — 90834 PSYTX W PT 45 MINUTES: CPT | Performed by: COUNSELOR

## 2025-07-01 NOTE — TELEPHONE ENCOUNTER
Patient called in regarding the [employer] paperwork. Patient requesting us to fill out pulmonary section. Please advise.

## 2025-07-01 NOTE — TELEPHONE ENCOUNTER
Patent is calling to check on the status of her disability paperwork. Patient received a call last Thursday about the paperwork being ready for , but was unsure if it was from pulmonary or cardiology. As per Naye, our nurses are on lunch right now but they will take a look and call the patient back when they return from lunch in 30 minutes. I  relayed this to the patient.      Please advise.

## 2025-07-01 NOTE — TELEPHONE ENCOUNTER
Patient called in asking if we called her today at all. I advised patient that I do not see that we contacted her at recently besides when she called into us. She said it may have been Cardiology that called so she will find out.

## 2025-07-01 NOTE — PSYCH
Virtual Regular VisitName: Marie Portillo      : 1965      MRN: [id number]  Encounter Provider: Isabel Watts LPC  Encounter Date: 2025   Encounter department: [facility] THERAPIST [place]  :  Assessment & Plan  Moderate mixed bipolar II disorder (HCC)         Generalized anxiety disorder         Chronic post-traumatic stress disorder (PTSD)         Tobacco abuse         Cigarette nicotine dependence without complication         Marijuana use         Alcohol abuse, in remission         Moderate mixed bipolar II disorder (HCC)         Generalized anxiety disorder         Chronic post-traumatic stress disorder (PTSD)         Tobacco abuse         Cigarette nicotine dependence without complication         Marijuana use         Alcohol abuse, in remission               Goals addressed in session: Goal 1 and Goal 2     DATA: Marie reported that she was under an increased stress due to the dog in the house that prevents her from cleaning as usual and shared a lot of frustration from the need to have to change things that she has been doing. She was encouraged to look at this from a positive perspective of stimulatin her [facility] that adds to her longevity, motivation, willpower, motor function, and cognition. Marie was encouraged to take notes about important and motivating things like this and be able to help herself in difficult moments where she needs to do something unpleasant and challenging.    During this session, this clinician used the following therapeutic modalities: Client-centered Therapy, Cognitive Behavioral Therapy, Mindfulness-based Strategies, and Supportive Psychotherapy    Substance Abuse was addressed during this session. If the client is diagnosed with a co-occurring substance use disorder, please indicate any changes in the frequency or amount of use: 10-12 cig a day. Stage of change for addressing substance use diagnoses: Pre-contemplation    ASSESSMENT:   "Marie presents with a Euthymic/ normal, Anxious, and Depressed mood. Marie's affect is Normal range and intensity and Constricted, which is congruent, with their mood and the content of the session. The client has made some progress on their goals as evidenced by reportedly practicing her Core Mindfulness skills sometimes, on occasions when remembers to take a step back and use less judgment and labeling.    Marie presents with a low risk of suicide, low risk of self-harm, and none risk of harm to others.    For any risk assessment that surpasses a \"low\" rating, a safety plan must be developed.    A safety plan was indicated: no  If yes, describe in detail NA    PLAN: Between sessions, Marie will watch over oversleep pattern to reduce and avoid worsening of depression. At the next session, the therapist will use Client-centered Therapy, Cognitive Behavioral Therapy, Dialectical Behavior Therapy, Mindfulness-based Strategies, Supportive Psychotherapy, and Somatic strategies to address self-care and limiting oversleep.    Behavioral Health Treatment Plan [facility]: Diagnosis and Treatment Plan explained to Marie, Marie relates understanding diagnosis and is agreeable to Treatment Plan. Yes     Depression Follow-up Plan Completed: Not applicable     Reason for visit is   Chief Complaint   Patient presents with    Virtual Regular Visit      Recent Visits  Date Type Provider Dept   06/27/25 Telemedicine Isabel Watts LPC [facility]rapist [place]   06/24/25 Telemedicine Isabel Watts LPC [facility]rap[facility] [place]   Showing recent visits within past 7 days and meeting all other requirements  Today's Visits  Date Type Provider Dept   07/01/25 Telemedicine Isabel Watts LPC [facility]rap[facility] [place]   Showing today's visits and meeting all other requirements  Future Appointments  No visits were found meeting these conditions.  Showing future appointments within next 150 " days and meeting all other requirements     History of Present Illness     HPI    Past Medical History   Past Medical History:   Diagnosis Date    Bilateral carpal tunnel syndrome 08/20/2019    Cardiomyopathy (HCC)     Carpal tunnel syndrome of right wrist 10/30/2019    Added automatically from request for surgery [id number]    Chlamydia     COPD (chronic obstructive pulmonary disease) (HCC)     Depression     Hypertension     Varicella      Past Surgical History:   Procedure Laterality Date    BACK SURGERY      BREAST BIOPSY Left 20yrs ago benign    CERVICAL SPINE SURGERY      EGD      HERNIA REPAIR      OOPHORECTOMY Right     [place] NDSC WRST SURG W/RLS TRANSVRS CARPL LIGM Right 01/21/2020    Procedure: RELEASE CARPAL TUNNEL ENDOSCOPIC;  Surgeon: Sam Nguyen MD;  [facility];  Service: Orthopedics    SC NDSC WRST SURG W/RLS TRANSVRS CARPL LIGM Left 01/28/2020    Procedure: RELEASE CARPAL TUNNEL ENDOSCOPIC;  Surgeon: Sam Nguyen MD;  [facility];  Service: Orthopedics     Current Outpatient Medications   Medication Instructions    Acetaminophen 500 MG 2 capsules, Every 4 hours PRN    albuterol (PROVENTIL HFA,VENTOLIN HFA) 90 mcg/act inhaler inhale 2 puffs by mouth and INTO THE LUNGS every 6 hours IF NEEDED FOR WHEEZING.    betamethasone dipropionate (DIPROSONE) 0.05 % cream Topical, 2 times daily    buPROPion (WELLBUTRIN XL) 150 mg, Oral, Daily    cholecalciferol (VITAMIN D3) 400 Units, Daily    FLUoxetine (PROZAC) 20 mg, Oral, Daily, Take 1 capsule (10 mg) by mouth daily x 1 week. THEN, take 2 capsules (20 mg total) daily.    fluticasone (FLONASE) 50 mcg/act nasal spray 2 sprays, Nasal, Daily    guaiFENesin (MUCINEX) 1,200 mg, Oral, Every 12 hours scheduled    ipratropium-albuterol (DUO-NEB) 0.5-2.5 mg/3 mL nebulizer solution 3 mL, Nebulization, Every 6 hours PRN    lamoTRIgine (LAMICTAL) 200 mg, Oral, Daily, Take 2 tablets (50 mg) by mouth daily with one 200 mg tablet for daily total of  250 mg    lamoTRIgine (LAMICTAL) 50 mg, Oral, Daily, Take 2 tablets (50 mg) by mouth daily with one 200 mg tablet for daily total of 250 mg.    levocetirizine (XYZAL) 5 mg, Oral, Every evening    losartan (COZAAR) 25 mg, Oral, Daily    metoprolol succinate (TOPROL-XL) 25 mg, Oral, Daily    ofloxacin (OCUFLOX) 0.3 % ophthalmic solution 1 drop, Left Eye, 4 times daily    omeprazole (PRILOSEC) 20 mg, Oral, 2 times daily    senna-docusate sodium (SENOKOT-S) 8.6-50 mg per tablet 3 tablets, As needed    tiotropium-olodaterol (Stiolto Respimat) 2.5-2.5 MCG/ACT inhaler 2 puffs, Inhalation, Daily     No Known Allergies    Objective   LMP  (LMP Unknown)     Video Exam  Physical Exam     Administrative Statements   Encounter provider Isabel Watts LPC    The Patient is located at Home and in the following state in which I hold an active license [place].    The patient was identified by name and date of birth. Marie Portillo was informed that this is a telemedicine visit and that the visit is being conducted through the Epic Embedded platform. She agrees to proceed..  My office door was closed. No one else was in the room.  She acknowledged consent and understanding of privacy and security of the video platform. The patient has agreed to participate and understands they can discontinue the visit at any time.    I have spent a total time of 41 minutes in caring for this patient on the day of the visit/encounter including Counseling / Coordination of care, not including the time spent for establishing the audio/video connection.    Visit Time  Start Time: 0156  Stop Time: 0237  Total Visit Time: 41 minutes

## 2025-07-04 NOTE — PSYCH
Resolved. cardiorenal   Virtual Regular Visit    Verification of patient location:    Patient is located at Home in the following state in which I hold an active license [place]      Assessment/Plan:    Problem List Items Addressed This Visit        Other    Marijuana use (Chronic)    Moderate mixed bipolar II disorder (HCC) - Primary    Generalized anxiety disorder    PTSD (post-traumatic stress disorder)    Alcohol abuse, in remission    Cigarette nicotine dependence without complication       Goals addressed in session: Goal 1 and Goal 2          Reason for visit is No chief complaint on file  Encounter provider FARHAN Manzo [facility]    Provider located at [address] [place] [id number] [phone]      Recent Visits  Date Type Provider Dept   05/26/23 229 [facility], [address] [place] [address]   05/25/23 Telephone Donato Hewitt, [address] [facility]   05/25/23 229 [facility], [address] [place] [address]   05/23/23 Telephone Donato Hewitt, [address] Rain recent visits within past 7 days and meeting all other requirements  Today's Visits  Date Type Provider Dept   05/30/23 229 [facility], [address] [place] [address]   Showing today's visits and meeting all other requirements  Future Appointments  No visits were found meeting these conditions  Showing future appointments within next 150 days and meeting all other requirements       The patient was identified by name and date of birth  Terrie Acharya was informed that this is a telemedicine visit and that the visit is being conducted throughthe [email or web address] platform  She agrees to proceed     My office door was closed  No one else was in the room  She acknowledged consent and understanding of privacy and security of the video platform   The patient has agreed to participate and understands they can discontinue the visit at any time  Patient is aware this is a billable service  Subjective  Jay Kemp is a 56 y o  female    HPI     Past Medical History:   Diagnosis Date   • Bilateral carpal tunnel syndrome 08/20/2019   • Carpal tunnel syndrome of right wrist 10/30/2019    Added automatically from request for surgery [id number]   • COPD (chronic obstructive pulmonary disease) (HCC)    • Hypertension        Past Surgical History:   Procedure Laterality Date   • BREAST BIOPSY Left 20yrs ago benign   • OOPHORECTOMY Right    • [place] [facility] WRST SURG W/RLS TRANSVRS CARPL LIGM Right 1/21/2020    Procedure: RELEASE CARPAL TUNNEL ENDOSCOPIC;  Surgeon: Óscar Forrester MD;  [facility];  Service: Orthopedics   • [place] [facility] Fl[address] WRST SURG W/RLS TRANSVRS CARPL LIGM Left 1/28/2020    Procedure: RELEASE CARPAL TUNNEL ENDOSCOPIC;  Surgeon: Óscar Forrester MD;  [facility]: [facility];  Service: Orthopedics       Current Outpatient Medications   Medication Sig Dispense Refill   • albuterol (PROVENTIL HFA,VENTOLIN HFA) 90 mcg/act inhaler inhale 2 puffs by mouth and INTO THE LUNGS every 6 hours IF NEEDED FOR WHEEZING   8 5 g 2   • benzonatate (TESSALON) 200 MG capsule Take 1 capsule (200 mg total) by mouth 3 (three) times a day as needed for cough (Patient not taking: Reported on 4/17/2023) 20 capsule 0   • buPROPion (Wellbutrin XL) 150 mg 24 hr tablet Take 1 tablet (150 mg total) by mouth daily 30 tablet 2   • cholecalciferol (VITAMIN D3) 400 units tablet Take 400 Units by mouth daily     • FLUoxetine (PROzac) 20 mg capsule Take 2 capsules (40 mg total) by mouth daily 60 capsule 2   • fluticasone (FLONASE) 50 mcg/act nasal spray instill 1 spray into each nostril once daily 16 g 2   • lamoTRIgine (LaMICtal) 200 MG tablet Take 1 tablet (200 mg total) by mouth daily 30 tablet 2   • levocetirizine (XYZAL) 5 MG tablet take 1 tablet by mouth every evening 30 tablet 4 • naproxen (Naprosyn) 500 mg tablet Take 1 tablet (500 mg total) by mouth 2 (two) times a day with meals 30 tablet 0   • nicotine polacrilex (COMMIT) 4 MG lozenge Apply 1 lozenge (4 mg total) to the mouth or throat as needed for smoking cessation (Patient not taking: Reported on 7/13/2022) 100 each 5   • omeprazole (PriLOSEC) 20 mg delayed release capsule take 1 capsule by mouth twice a day 60 capsule 5   • predniSONE 10 mg tablet Take 3 tabs BID X 2 days, 2 tabs BID X 2 days, 1 tab BID X 2 days, 1 tab daily X 2 days (Patient not taking: Reported on 4/17/2023) 26 tablet 0   • senna-docusate sodium (SENOKOT-S) 8 6-50 mg per tablet Take 3 tablets by mouth as needed       • tiotropium-olodaterol (Stiolto Respimat) 2 5-2 5 MCG/ACT inhaler Inhale 2 puffs daily 4 g 11     No current facility-administered medications for this visit  No Known Allergies    Review of Systems    Video Exam    There were no vitals filed for this visit  Physical Exam     Behavioral Health Psychotherapy Progress Note    Psychotherapy Provided: Individual Psychotherapy     1  Moderate mixed bipolar II disorder (HCC)        2  PTSD (post-traumatic stress disorder)        3  Generalized anxiety disorder        4  Cigarette nicotine dependence without complication        5  Marijuana use        6  Alcohol abuse, in remission            Goals addressed in session: Goal 1 and Goal 2     DATA: Antonio Braswell discussed her endoscopy and some results that brought worry for her  Antonio Braswell was recommended more protein food and eat more than once daily and with better food choices  During this session, this clinician used the following therapeutic modalities: Client-centered Therapy, Cognitive Behavioral Therapy, Dialectical Behavior Therapy, Mindfulness-based Strategies and Supportive Psychotherapy    Substance Abuse was not addressed during this session   If the client is diagnosed with a co-occurring substance use disorder, please indicate any changes in "the frequency or amount of use: NA  Stage of change for addressing substance use diagnoses: Maintenance    ASSESSMENT:  Jay Kemp presents with a Euthymic/ normal and Anxious mood  her affect is Normal range and intensity, which is congruent, with her mood and the content of the session  The client has made progress on their goals  Kris Lowe seemed confident in her decisions and choices with regards to her healthcare  Jay Kemp presents with a minimal risk of suicide, none risk of self-harm, and minimal risk of harm to others  For any risk assessment that surpasses a \"low\" rating, a safety plan must be developed  A safety plan was indicated: no  If yes, describe in detail NA    PLAN: Between sessions, Jay Kemp will use Mindfulness and Emotion Regulation skills   At the next session, the therapist will use Client-centered Therapy, Cognitive Behavioral Therapy, Dialectical Behavior Therapy, Mindfulness-based Strategies and Supportive Psychotherapy to address self-care, time without her landlords at home, and options for self-care  Behavioral Health Treatment Plan and Discharge Planning: Jay Kemp is aware of and agrees to continue to work on their treatment plan  They have identified and are working toward their discharge goals   yes    Visit start and stop times:    05/30/23  Start Time: 0358  Stop Time: 3199  Total Visit Time: 41 minutes      "

## 2025-07-07 ENCOUNTER — TELEPHONE (OUTPATIENT)
Dept: PSYCHIATRY | Facility: CLINIC | Age: 60
End: 2025-07-07

## 2025-07-07 NOTE — TELEPHONE ENCOUNTER
Left voicemail informing patient and/or parent/guardian of the Psych Encounter form needing to be signed as a requirement from the insurance company for billing purposes. Patient can access form via [employer] and sign electronically.     Please make patient aware this form must be signed for each visit as a requirement to continue future visits with provider.

## 2025-07-08 ENCOUNTER — TELEMEDICINE (OUTPATIENT)
Dept: BEHAVIORAL/MENTAL HEALTH CLINIC | Facility: CLINIC | Age: 60
End: 2025-07-08
Payer: COMMERCIAL

## 2025-07-08 DIAGNOSIS — F10.11 ALCOHOL ABUSE, IN REMISSION: ICD-10-CM

## 2025-07-08 DIAGNOSIS — F12.90 MARIJUANA USE: Chronic | ICD-10-CM

## 2025-07-08 DIAGNOSIS — F41.1 GENERALIZED ANXIETY DISORDER: ICD-10-CM

## 2025-07-08 DIAGNOSIS — Z72.0 TOBACCO ABUSE: ICD-10-CM

## 2025-07-08 DIAGNOSIS — F17.210 CIGARETTE NICOTINE DEPENDENCE WITHOUT COMPLICATION: ICD-10-CM

## 2025-07-08 DIAGNOSIS — F43.12 CHRONIC POST-TRAUMATIC STRESS DISORDER (PTSD): ICD-10-CM

## 2025-07-08 DIAGNOSIS — F31.81 MODERATE MIXED BIPOLAR II DISORDER (HCC): Primary | ICD-10-CM

## 2025-07-08 PROCEDURE — 90834 PSYTX W PT 45 MINUTES: CPT | Performed by: COUNSELOR

## 2025-07-08 NOTE — PSYCH
Virtual Regular VisitName: Marie Portillo      : 1965      MRN: [id number]  Encounter Provider: Isabel Watts LPC  Encounter Date: 2025   Encounter department: [facility] [place]  :  Assessment & Plan  Moderate mixed bipolar II disorder (HCC)         Chronic post-traumatic stress disorder (PTSD)         Generalized anxiety disorder         Tobacco abuse         Cigarette nicotine dependence without complication         Marijuana use         Alcohol abuse, in remission         Moderate mixed bipolar II disorder (HCC)         Chronic post-traumatic stress disorder (PTSD)         Generalized anxiety disorder         Tobacco abuse         Cigarette nicotine dependence without complication         Marijuana use         Alcohol abuse, in remission         Goals addressed in session: Goal 1 and Goal 2       DATA: Marie stayed on Teams for a few minutes before trying again the link but outside the system this time. She discussed contacting her daughter through social media and going over the worst scenarios that she admitted would not accept (not receiving any response, for example) and processed her thoughts and emotions. Marie was allowed to externalize accumulated tension for awhile and was validated for her concerns and fears. She was encouraged to identify her activation and resource places in the body to be able to stay connected with the resource to cut off the re-traumatization.    During this session, this clinician used the following therapeutic modalities: Client-centered Therapy, Cognitive Behavioral Therapy, Dialectical Behavior Therapy, Mindfulness-based Strategies, and Supportive Psychotherapy    Substance Abuse was not addressed during this session. If the client is diagnosed with a co-occurring substance use disorder, please indicate any changes in the frequency or amount of use: NA. Stage of change for addressing substance use diagnoses:  "Pre-contemplation    ASSESSMENT:  Marie presents with a Euthymic/ normal, Anxious, and Depressed mood. Marie's affect is Normal range and intensity, Constricted, and Tearful, at times, which is congruent, with their mood and the content of the session. The client has made progress on their goals as evidenced by attending diligently the weekly Mindfulness group and practices the techniques that make a difference in how she feels.    Marie presents with a minimal risk of suicide, minimal risk of self-harm, and none risk of harm to others.    For any risk assessment that surpasses a \"low\" rating, a safety plan must be developed.    A safety plan was indicated: no  If yes, describe in detail NA    PLAN: Between sessions, Marie will attend weekly Mindfulness/Relaxation group for self-care. At the next session, the therapist will use Client-centered Therapy, Cognitive Behavioral Therapy, Dialectical Behavior Therapy, Mindfulness-based Strategies, Supportive Psychotherapy, and Somatic Experiencing to address trauma processing without re-traumatization- taking notes to minimize the affect of the cognitive decline.    Behavioral Health Treatment Plan [facility]: Diagnosis and Treatment Plan explained to Marie, Marie relates understanding diagnosis and is agreeable to Treatment Plan. Yes     Depression Follow-up Plan Completed: Not applicable     Reason for visit is   Chief Complaint   Patient presents with    Virtual Regular Visit      Recent Visits  Date Type Provider Dept   07/07/25 Telephone Isabel Watts LPC [facility] [place]   07/01/25 Telemedicine Isabel Watts LPC [facility] [place]   Showing recent visits within past 7 days and meeting all other requirements  Today's Visits  Date Type Provider Dept   07/08/25 Telemedicine Isabel Watts LPC [facility] [place]   Showing today's visits and meeting all other requirements  Future Appointments  No visits " were found meeting these conditions.  Showing future appointments within next 150 days and meeting all other requirements     History of Present Illness     HPI    Past Medical History   Past Medical History:   Diagnosis Date    Bilateral carpal tunnel syndrome 08/20/2019    Cardiomyopathy (HCC)     Carpal tunnel syndrome of right wrist 10/30/2019    Added automatically from request for surgery [id number]    Chlamydia     COPD (chronic obstructive pulmonary disease) (HCC)     Depression     Hypertension     Varicella      Past Surgical History:   Procedure Laterality Date    BACK SURGERY      BREAST BIOPSY Left 20yrs ago benign    CERVICAL SPINE SURGERY      EGD      HERNIA REPAIR      OOPHORECTOMY Right     [facility] WRST SURG W/RLS TRANSVRS CARPL LIGM Right 01/21/2020    Procedure: RELEASE CARPAL TUNNEL ENDOSCOPIC;  Surgeon: Sam Nguyen MD;  [facility];  Service: Orthopedics    [facility] WRST SURG W/RLS TRANSVRS CARPL LIGM Left 01/28/2020    Procedure: RELEASE CARPAL TUNNEL ENDOSCOPIC;  Surgeon: Sam Nguyen MD;  [facility];  Service: Orthopedics     Current Outpatient Medications   Medication Instructions    Acetaminophen 500 MG 2 capsules, Every 4 hours PRN    albuterol (PROVENTIL HFA,VENTOLIN HFA) 90 mcg/act inhaler inhale 2 puffs by mouth and INTO THE LUNGS every 6 hours IF NEEDED FOR WHEEZING.    betamethasone dipropionate (DIPROSONE) 0.05 % cream Topical, 2 times daily    buPROPion (WELLBUTRIN XL) 150 mg, Oral, Daily    cholecalciferol (VITAMIN D3) 400 Units, Daily    FLUoxetine (PROZAC) 20 mg, Oral, Daily, Take 1 capsule (10 mg) by mouth daily x 1 week. THEN, take 2 capsules (20 mg total) daily.    fluticasone (FLONASE) 50 mcg/act nasal spray 2 sprays, Nasal, Daily    guaiFENesin (MUCINEX) 1,200 mg, Oral, Every 12 hours scheduled    ipratropium-albuterol (DUO-NEB) 0.5-2.5 mg/3 mL nebulizer solution 3 mL, Nebulization, Every 6 hours PRN    lamoTRIgine (LAMICTAL) 200 mg, Oral, Daily,  Take 2 tablets (50 mg) by mouth daily with one 200 mg tablet for daily total of 250 mg    lamoTRIgine (LAMICTAL) 50 mg, Oral, Daily, Take 2 tablets (50 mg) by mouth daily with one 200 mg tablet for daily total of 250 mg.    levocetirizine (XYZAL) 5 mg, Oral, Every evening    losartan (COZAAR) 25 mg, Oral, Daily    metoprolol succinate (TOPROL-XL) 25 mg, Oral, Daily    ofloxacin (OCUFLOX) 0.3 % ophthalmic solution 1 drop, Left Eye, 4 times daily    omeprazole (PRILOSEC) 20 mg, Oral, 2 times daily    senna-docusate sodium (SENOKOT-S) 8.6-50 mg per tablet 3 tablets, As needed    tiotropium-olodaterol (Stiolto Respimat) 2.5-2.5 MCG/ACT inhaler 2 puffs, Inhalation, Daily     No Known Allergies    Objective   LMP  (LMP Unknown)     Video Exam  Physical Exam     Administrative Statements   Encounter provider Isabel Watts LPC    The Patient is located at Home and in the following state in which I hold an active license [place].    The patient was identified by name and date of birth. Marie Portillo was informed that this is a telemedicine visit and that the visit is being conducted through the Microsoft Teams and [employer] platforms. She agrees to proceed..  My office door was closed. No one else was in the room.  She acknowledged consent and understanding of privacy and security of the video platform. The patient has agreed to participate and understands they can discontinue the visit at any time.    I have spent a total time of 44 minutes in caring for this patient on the day of the visit/encounter including Counseling / Coordination of care, not including the time spent for establishing the audio/video connection.    Visit Time  Start Time: 0205  Stop Time: 0249  Total Visit Time: 44 minutes

## 2025-07-11 ENCOUNTER — TELEMEDICINE (OUTPATIENT)
Dept: BEHAVIORAL/MENTAL HEALTH CLINIC | Facility: CLINIC | Age: 60
End: 2025-07-11
Payer: COMMERCIAL

## 2025-07-11 DIAGNOSIS — F31.81 MODERATE MIXED BIPOLAR II DISORDER (HCC): Primary | ICD-10-CM

## 2025-07-11 DIAGNOSIS — F43.12 CHRONIC POST-TRAUMATIC STRESS DISORDER (PTSD): ICD-10-CM

## 2025-07-11 DIAGNOSIS — F41.1 GENERALIZED ANXIETY DISORDER: ICD-10-CM

## 2025-07-11 PROCEDURE — 90853 GROUP PSYCHOTHERAPY: CPT | Performed by: COUNSELOR

## 2025-07-12 NOTE — GROUP NOTE
Virtual Regular Visit    Verification of patient location:  Patient is located at Home in the following state in which I hold an active license [place]    The patient was identified by name and date of birth. Marie Portillo was informed that this is a telemedicine visit and that the visit is being conducted throughthe Microsoft Teams platform. She agrees to proceed..  My office door was closed. No one else was in the room.  She acknowledged consent and understanding of privacy and security of the video platform. The patient has agreed to participate and understands they can discontinue the visit at any time.     Patient is aware this is a billable service.     Behavioral Health Psychotherapy Progress Note    1. Moderate mixed bipolar II disorder (HCC)        2. Generalized anxiety disorder        3. Chronic post-traumatic stress disorder (PTSD)            Goals addressed in session: Goal 1     Attendees were guided through EFT tapping, diaphragmatic breathing through two different protocols, voice vibration through outbreath or humming with other Vagus nerve stimulation techniques, and progressive muscle relaxation paired with the breathing. Furthermore, the attendees were guided through Mindfulness-based meditation series ending with positive self-affirmations.    Data: Patient attended the Relaxation group and was guided through tapping on trauma, diaphragmatic breathing through two different protocols, voice vibration through outbreath or humming with other Vagus nerve stimulation techniques, and progressive muscle relaxation paired with the different breathing parts. Furthermore, the attendees were guided through Mindfulness-based meditation series on topics of a Deep Body Scan for Pain, Creating a Safe Place to deal with Pain, Working with Challenging Emotions, Root Chakra Meditation, and ending with positive self-affirmations for spiritual and character growth, optimism, dedication to one's wellness, and gratutude. .           During the Session, the Clinician used EFT, Mindfulness-based stress reduction, Internal Family Systems, and Somatic approaches were used to achieve the state of relaxation, to provide valuable information about the used methods and their effects on mental, emotional, and physical wellbeing, and to maintain the serene environment for the exercise's purpose.         No substance abuse or alcohol problems were addressed in the session. Stage of change NA          Assessment: Patient was in normal/euthymic mood and in congruence with broad affect, followed through the guidance and completed the exercises well, showed willingness to continue learning and practicing self-care in weekly Relaxation Group as well as individual therapy. No interactions between members occur after the start of the guidance except for chat emojis and expressions of gratitude and mutual support.          No safety plan was indicated. No safety issues are reported. No presence of SI or behaviors is determined based on self-reports.         Plan: The next group is scheduled for Friday depending on the Pt's availability. Patient will practice deep breathing and other techniques based on each occasion to counter stress and will attend the next group to be able to take part in further guided exercises on chosen topics to build on the techniques learned in the beginning.       Behavioral Health Treatment Plan and Discharge Planning: Patient is aware of and agrees to continue to work on their treatment plan. They have identified and are working toward their discharge goals. Yes  Visit start and stop times:    07/12/25  Start Time: 0400  Stop Time: 0500  Total Visit Time: 60 minutes

## 2025-07-15 ENCOUNTER — TELEMEDICINE (OUTPATIENT)
Dept: BEHAVIORAL/MENTAL HEALTH CLINIC | Facility: CLINIC | Age: 60
End: 2025-07-15
Payer: COMMERCIAL

## 2025-07-15 DIAGNOSIS — F10.11 ALCOHOL ABUSE, IN REMISSION: ICD-10-CM

## 2025-07-15 DIAGNOSIS — F12.90 MARIJUANA USE: Chronic | ICD-10-CM

## 2025-07-15 DIAGNOSIS — F43.12 CHRONIC POST-TRAUMATIC STRESS DISORDER (PTSD): ICD-10-CM

## 2025-07-15 DIAGNOSIS — F17.210 CIGARETTE NICOTINE DEPENDENCE WITHOUT COMPLICATION: ICD-10-CM

## 2025-07-15 DIAGNOSIS — F41.1 GENERALIZED ANXIETY DISORDER: ICD-10-CM

## 2025-07-15 DIAGNOSIS — Z72.0 TOBACCO ABUSE: ICD-10-CM

## 2025-07-15 DIAGNOSIS — F31.81 MODERATE MIXED BIPOLAR II DISORDER (HCC): Primary | ICD-10-CM

## 2025-07-15 PROCEDURE — 90837 PSYTX W PT 60 MINUTES: CPT | Performed by: COUNSELOR

## 2025-07-18 ENCOUNTER — TELEPHONE (OUTPATIENT)
Dept: PSYCHIATRY | Facility: CLINIC | Age: 60
End: 2025-07-18

## 2025-07-18 ENCOUNTER — TELEMEDICINE (OUTPATIENT)
Dept: BEHAVIORAL/MENTAL HEALTH CLINIC | Facility: CLINIC | Age: 60
End: 2025-07-18
Payer: COMMERCIAL

## 2025-07-18 DIAGNOSIS — F43.12 CHRONIC POST-TRAUMATIC STRESS DISORDER (PTSD): ICD-10-CM

## 2025-07-18 DIAGNOSIS — F41.1 GENERALIZED ANXIETY DISORDER: ICD-10-CM

## 2025-07-18 DIAGNOSIS — F31.81 MODERATE MIXED BIPOLAR II DISORDER (HCC): Primary | ICD-10-CM

## 2025-07-18 PROCEDURE — 90853 GROUP PSYCHOTHERAPY: CPT | Performed by: COUNSELOR

## 2025-07-18 NOTE — TELEPHONE ENCOUNTER
Left voicemail informing patient and/or parent/guardian of the Psych Encounter form needing to be signed as a requirement from the insurance company for billing purposes. Patient can access form via [employer] and sign electronically.     Please make patient aware this form must be signed for each visit as a requirement to continue future visits with provider.    Virtual ENcounter forms 7/11/25 7/15/25 call #2 #[phone]

## 2025-07-19 NOTE — GROUP NOTE
Virtual Regular Visit    Verification of patient location:  Patient is located at Home in the following state in which I hold an active license [place]    The patient was identified by name and date of birth. Marie Portillo was informed that this is a telemedicine visit and that the visit is being conducted throughthe [employer] Teams platform. She agrees to proceed..  My office door was closed. No one else was in the room.  She acknowledged consent and understanding of privacy and security of the video platform. The patient has agreed to participate and understands they can discontinue the visit at any time.     Patient is aware this is a billable service.     Behavioral Health Psychotherapy Progress Note    1. Moderate mixed bipolar II disorder (HCC)        2. Chronic post-traumatic stress disorder (PTSD)        3. Generalized anxiety disorder            Goals addressed in session: Goal 1     Attendees were guided through EFT tapping, diaphragmatic breathing through two different protocols, voice vibration through outbreath or humming with other Vagus nerve stimulation techniques, and progressive muscle relaxation paired with the different breathing parts. Furthermore, the attendees were guided through Mindfulness-based meditation series ending with positive self-affirmations.       Data: Patient attended the Relaxation group and was guided through tapping on pain, diaphragmatic breathing through two different protocols, voice vibration through outbreath or humming with other Vagus nerve stimulation techniques, and progressive muscle relaxation paired with the different breathing parts. Furthermore, the attendees were guided through Mindfulness-based meditation series on topics of a Seated Body Scan, Inner Child Reparenting, Break for Self-Compassion, Rediscovering One's True Self & Self-[place], Sacral Chakra Meditation, and ending with positive affirmations for self-confidence, self-esteem, accepting what cannot be  changed, and letting go of what no longer serves.           During the Session, the Clinician used EFT, Mindfulness-based stress reduction, Internal Family Systems, and Somatic approaches were used to achieve the state of relaxation, to provide valuable information about the used methods and their effects on mental, emotional, and physical wellbeing, and to maintain the serene environment for the exercise's purpose.         No substance abuse or alcohol problems were addressed in the session. Stage of change NA          Assessment: Patient was in normal/euthymic mood and in congruence with broad affect, followed through the guidance and completed the exercises well, showed willingness to continue learning and practicing self-care in weekly Relaxation Group as well as individual therapy. No interactions between members occur after the start of the guidance except for chat emojis and expressions of gratitude and mutual support.          No safety plan was indicated. No safety issues are reported. No presence of SI or behaviors is determined based on self-reports.         Plan: The next group is scheduled for Friday depending on the Pt's availability. Patient will practice deep breathing and other techniques based on each occasion to counter stress and will attend the next group to be able to take part in further guided exercises on chosen topics to build on the techniques learned in the beginning.       Behavioral Health Treatment Plan and Discharge Planning: Patient is aware of and agrees to continue to work on their treatment plan. They have identified and are working toward their discharge goals. Yes         Visit start and stop times:    07/19/25  Start Time: 0405  Stop Time: 0500  Total Visit Time: 55 minutes

## 2025-07-22 ENCOUNTER — TELEMEDICINE (OUTPATIENT)
Dept: BEHAVIORAL/MENTAL HEALTH CLINIC | Facility: CLINIC | Age: 60
End: 2025-07-22
Payer: COMMERCIAL

## 2025-07-22 DIAGNOSIS — Z72.0 TOBACCO ABUSE: ICD-10-CM

## 2025-07-22 DIAGNOSIS — F12.90 MARIJUANA USE: Chronic | ICD-10-CM

## 2025-07-22 DIAGNOSIS — F17.210 CIGARETTE NICOTINE DEPENDENCE WITHOUT COMPLICATION: ICD-10-CM

## 2025-07-22 DIAGNOSIS — F10.11 ALCOHOL ABUSE, IN REMISSION: ICD-10-CM

## 2025-07-22 DIAGNOSIS — F31.81 MODERATE MIXED BIPOLAR II DISORDER (HCC): Primary | ICD-10-CM

## 2025-07-22 DIAGNOSIS — F41.1 GENERALIZED ANXIETY DISORDER: ICD-10-CM

## 2025-07-22 DIAGNOSIS — F43.12 CHRONIC POST-TRAUMATIC STRESS DISORDER (PTSD): ICD-10-CM

## 2025-07-22 PROCEDURE — 90834 PSYTX W PT 45 MINUTES: CPT | Performed by: COUNSELOR

## 2025-07-22 NOTE — PSYCH
Virtual Regular VisitName: Marie Portillo      : 1965      MRN: [id number]  Encounter Provider: Isabel Watts LPC  Encounter Date: 2025   Encounter department: [facility] THERAPIST [place]  :  Assessment & Plan  Moderate mixed bipolar II disorder (HCC)         Generalized anxiety disorder         Chronic post-traumatic stress disorder (PTSD)         Cigarette nicotine dependence without complication         Tobacco abuse         Marijuana use         Alcohol abuse, in remission         Moderate mixed bipolar II disorder (HCC)         Generalized anxiety disorder         Chronic post-traumatic stress disorder (PTSD)         Cigarette nicotine dependence without complication         Tobacco abuse         Marijuana use         Alcohol abuse, in remission         Goals addressed in session: Goal 1 and Goal 2     DATA: Marie discussed similar things this time and was challenged to refer to her notes to be able to get back on track on that habit since she lost her organizer and regressed on some of her cognitive skills. She was observed searching and finding through the help of memory clues and tools that she often needs to be reminded but were productive and effective previously. She was commended for her willingness and persistence as well as handling her emotions and not allowing her frustration to take over and sabotage. Marie was also guided through the emergency breathing exercise.   During this session, this clinician used the following therapeutic modalities: Client-centered Therapy, Cognitive Behavioral Therapy, Mindfulness-based Strategies, Supportive Psychotherapy, and Somatic Experiencing    Substance Abuse was not addressed during this session. If the client is diagnosed with a co-occurring substance use disorder, please indicate any changes in the frequency or amount of use: NA. Stage of change for addressing substance use diagnoses:  "Pre-contemplation    ASSESSMENT:  Marie presents with a Euthymic/ normal, Anxious, and Depressed mood. Marie's affect is Normal range and intensity and Constricted, which is congruent, with their mood and the content of the session. The client has made some progress on their goals as evidenced by reportedly taking notes in her organizer that she found recently and got back on track of making entries and schedules again.    Marie presents with a minimal risk of suicide, low risk of self-harm, and none risk of harm to others.    For any risk assessment that surpasses a \"low\" rating, a safety plan must be developed.    A safety plan was indicated: yes  If yes, describe in detail NA    PLAN: Between sessions, Marie will attend . At the next session, the therapist will use Client-centered Therapy, Cognitive Behavioral Therapy, Mindfulness-based Strategies, Supportive Psychotherapy, and Somatic tools to address traumatic memories.    Behavioral Health Treatment Plan [facility]: Diagnosis and Treatment Plan explained to Marie, Marie relates understanding diagnosis and is agreeable to Treatment Plan. Yes     Depression Follow-up Plan Completed: Not applicable     Reason for visit is   Chief Complaint   Patient presents with    Virtual Regular Visit      Recent Visits  Date Type Provider Dept   07/18/25 Telemedicine Isabel Watts LPC [facility] Therapist [place]   07/15/25 Telemedicine Isabel Watts LPC [facility] Therapist [place]   Showing recent visits within past 7 days and meeting all other requirements  Today's Visits  Date Type Provider Dept   07/22/25 Telemedicine Isabel Watts LPC [facility] Therapist [place]   Showing today's visits and meeting all other requirements  Future Appointments  No visits were found meeting these conditions.  Showing future appointments within next 150 days and meeting all other requirements     History of Present Illness     HPI    Past " Medical History   Past Medical History:   Diagnosis Date    Bilateral carpal tunnel syndrome 08/20/2019    Cardiomyopathy (HCC)     Carpal tunnel syndrome of right wrist 10/30/2019    Added automatically from request for surgery [id number]    Chlamydia     COPD (chronic obstructive pulmonary disease) (HCC)     Depression     Hypertension     Varicella      Past Surgical History:   Procedure Laterality Date    BACK SURGERY      BREAST BIOPSY Left 20yrs ago benign    CERVICAL SPINE SURGERY      EGD      HERNIA REPAIR      OOPHORECTOMY Right     [facility] NDSC WRST SURG W/RLS TRANSVRS CARPL LIGM Right 01/21/2020    Procedure: RELEASE CARPAL TUNNEL ENDOSCOPIC;  Surgeon: Sam Nguyen MD;  Location: [facility];  Service: Orthopedics    [facility] NDSC WRST SURG W/RLS TRANSVRS CARPL LIGM Left 01/28/2020    Procedure: RELEASE CARPAL TUNNEL ENDOSCOPIC;  Surgeon: Sam Nguyen MD;  Location: [facility];  Service: Orthopedics     Current Outpatient Medications   Medication Instructions    Acetaminophen 500 MG 2 capsules, Every 4 hours PRN    albuterol (PROVENTIL HFA,VENTOLIN HFA) 90 mcg/act inhaler inhale 2 puffs by mouth and INTO THE LUNGS every 6 hours IF NEEDED FOR WHEEZING.    betamethasone dipropionate (DIPROSONE) 0.05 % cream Topical, 2 times daily    buPROPion (WELLBUTRIN XL) 150 mg, Oral, Daily    cholecalciferol (VITAMIN D3) 400 Units, Daily    FLUoxetine (PROZAC) 20 mg, Oral, Daily, Take 1 capsule (10 mg) by mouth daily x 1 week. THEN, take 2 capsules (20 mg total) daily.    fluticasone (FLONASE) 50 mcg/act nasal spray 2 sprays, Nasal, Daily    guaiFENesin (MUCINEX) 1,200 mg, Oral, Every 12 hours scheduled    ipratropium-albuterol (DUO-NEB) 0.5-2.5 mg/3 mL nebulizer solution 3 mL, Nebulization, Every 6 hours PRN    lamoTRIgine (LAMICTAL) 200 mg, Oral, Daily, Take 2 tablets (50 mg) by mouth daily with one 200 mg tablet for daily total of 250 mg    lamoTRIgine (LAMICTAL) 50 mg, Oral, Daily, Take 2 tablets (50 mg) by mouth  daily with one 200 mg tablet for daily total of 250 mg.    levocetirizine (XYZAL) 5 mg, Oral, Every evening    losartan (COZAAR) 25 mg, Oral, Daily    metoprolol succinate (TOPROL-XL) 25 mg, Oral, Daily    ofloxacin (OCUFLOX) 0.3 % ophthalmic solution 1 drop, Left Eye, 4 times daily    omeprazole (PRILOSEC) 20 mg, Oral, 2 times daily    senna-docusate sodium (SENOKOT-S) 8.6-50 mg per tablet 3 tablets, As needed    tiotropium-olodaterol (Stiolto Respimat) 2.5-2.5 MCG/ACT inhaler 2 puffs, Inhalation, Daily     No Known Allergies    Objective   LMP  (LMP Unknown)     Video Exam  Physical Exam     Administrative Statements   Encounter provider Isabel Watts LPC    The Patient is located at Home and in the following state in which I hold an active license [place].    The patient was identified by name and date of birth. Marie Portillo was informed that this is a telemedicine visit and that the visit is being conducted through the Epic Embedded platform. She agrees to proceed..  My office door was closed. No one else was in the room.  She acknowledged consent and understanding of privacy and security of the video platform. The patient has agreed to participate and understands they can discontinue the visit at any time.    I have spent a total time of 46 minutes in caring for this patient on the day of the visit/encounter including Counseling / Coordination of care, not including the time spent for establishing the audio/video connection.    Visit Time  Start Time: 0300  Stop Time: 0346  Total Visit Time: 46 minutes

## 2025-07-23 ENCOUNTER — TELEPHONE (OUTPATIENT)
Age: 60
End: 2025-07-23

## 2025-07-23 ENCOUNTER — HOSPITAL ENCOUNTER (OUTPATIENT)
Dept: PULMONOLOGY | Facility: HOSPITAL | Age: 60
Discharge: HOME | End: 2025-07-23
Payer: COMMERCIAL

## 2025-07-23 DIAGNOSIS — J41.0 SIMPLE CHRONIC BRONCHITIS (HCC): ICD-10-CM

## 2025-07-23 DIAGNOSIS — J43.2 CENTRILOBULAR EMPHYSEMA (HCC): ICD-10-CM

## 2025-07-23 PROCEDURE — 94060 EVALUATION OF WHEEZING: CPT | Performed by: INTERNAL MEDICINE

## 2025-07-23 PROCEDURE — 94726 PLETHYSMOGRAPHY LUNG VOLUMES: CPT

## 2025-07-23 PROCEDURE — 94060 EVALUATION OF WHEEZING: CPT

## 2025-07-23 PROCEDURE — 94726 PLETHYSMOGRAPHY LUNG VOLUMES: CPT | Performed by: INTERNAL MEDICINE

## 2025-07-23 PROCEDURE — 94618 PULMONARY STRESS TESTING: CPT

## 2025-07-23 PROCEDURE — 94729 DIFFUSING CAPACITY: CPT

## 2025-07-23 PROCEDURE — 94729 DIFFUSING CAPACITY: CPT | Performed by: INTERNAL MEDICINE

## 2025-07-23 PROCEDURE — 94618 PULMONARY STRESS TESTING: CPT | Performed by: INTERNAL MEDICINE

## 2025-07-23 RX ORDER — ALBUTEROL SULFATE 0.83 MG/ML
2.5 SOLUTION RESPIRATORY (INHALATION) ONCE
Status: COMPLETED | OUTPATIENT
Start: 2025-07-23 | End: 2025-07-23

## 2025-07-23 RX ADMIN — ALBUTEROL SULFATE 2.5 MG: 2.5 SOLUTION RESPIRATORY (INHALATION) at 17:13

## 2025-07-23 NOTE — TELEPHONE ENCOUNTER
Patient called in requesting to schedule a f/u mm appt.     Writer warm transferred to residents mr for further assistance.

## 2025-07-25 ENCOUNTER — TELEMEDICINE (OUTPATIENT)
Dept: BEHAVIORAL/MENTAL HEALTH CLINIC | Facility: CLINIC | Age: 60
End: 2025-07-25
Payer: COMMERCIAL

## 2025-07-25 DIAGNOSIS — F41.1 GENERALIZED ANXIETY DISORDER: ICD-10-CM

## 2025-07-25 DIAGNOSIS — F31.81 MODERATE MIXED BIPOLAR II DISORDER (HCC): Primary | ICD-10-CM

## 2025-07-25 DIAGNOSIS — F43.12 CHRONIC POST-TRAUMATIC STRESS DISORDER (PTSD): ICD-10-CM

## 2025-07-25 PROCEDURE — 90853 GROUP PSYCHOTHERAPY: CPT | Performed by: COUNSELOR

## 2025-07-26 NOTE — GROUP NOTE
Virtual Regular Visit    Verification of patient location:  Patient is located at Home in the following state in which I hold an active license [place]    The patient was identified by name and date of birth. Marie Portillo was informed that this is a telemedicine visit and that the visit is being conducted throughthe [employer] Teams platform. She agrees to proceed..  My office door was closed. No one else was in the room.  She acknowledged consent and understanding of privacy and security of the video platform. The patient has agreed to participate and understands they can discontinue the visit at any time.     Patient is aware this is a billable service.     Behavioral Health Psychotherapy Progress Note    1. Moderate mixed bipolar II disorder (HCC)        2. Generalized anxiety disorder        3. Chronic post-traumatic stress disorder (PTSD)            Goals addressed in session: Goal 1     Attendees were guided through EFT tapping, diaphragmatic breathing through two different protocols, voice vibration through outbreath or humming with other Vagus nerve stimulation techniques, and progressive muscle relaxation paired with the different breathing parts. Furthermore, the attendees were guided through Mindfulness-based meditation series ending with positive self-affirmations.    Data: Patient attended the Relaxation group and was guided through tapping on anxiety, diaphragmatic breathing through two different protocols, voice vibration through outbreath or humming with other Vagus nerve stimulation techniques. The breathing was paired then with progressive muscle relaxation. Furthermore, the attendees were guided through Mindfulness-based meditation series on topics of a Quick Body Scan, Observer's journey, Creating a Safe Place for Vulnerable Parts, [place] Road Trauma Visualization, Solar Plexus Chakra Meditation, and ending with positive affirmations for self-worth, self-forgiveness, welcoming the good and releasing  the negatives, and optimism.       During the Session, the Clinician used EFT, Mindfulness-based stress reduction, Internal Family Systems, and Somatic approaches were used to achieve the state of relaxation, to provide valuable information about the used methods and their effects on mental, emotional, and physical wellbeing, and to maintain the serene environment for the exercise's purpose.     No substance abuse or alcohol problems were addressed in the session. Stage of change NA      Assessment: Patient was in normal/euthymic mood and in congruence with broad affect, followed through the guidance and completed the exercises well, showed willingness to continue learning and practicing self-care in weekly Relaxation Group as well as individual therapy. No interactions between members occur after the start of the guidance except for chat emojis and expressions of gratitude and mutual support.      No safety plan was indicated. No safety issues are reported. No presence of SI or behaviors is determined based on self-reports.     Plan: The next group is scheduled for Friday depending on the Pt's availability. Patient will practice deep breathing and other techniques based on each occasion to counter stress and will attend the next group to be able to take part in further guided exercises on chosen topics to build on the techniques learned in the beginning.     Behavioral Health Treatment Plan and Discharge Planning: Patient is aware of and agrees to continue to work on their treatment plan. They have identified and are working toward their discharge goals. Yes    Visit start and stop times:    07/26/25  Start Time: 0415  Stop Time: 0500  Total Visit Time: 45 minutes

## 2025-07-29 ENCOUNTER — TELEMEDICINE (OUTPATIENT)
Dept: BEHAVIORAL/MENTAL HEALTH CLINIC | Facility: CLINIC | Age: 60
End: 2025-07-29
Payer: COMMERCIAL

## 2025-07-29 DIAGNOSIS — F12.90 MARIJUANA USE: Chronic | ICD-10-CM

## 2025-07-29 DIAGNOSIS — F41.1 GENERALIZED ANXIETY DISORDER: ICD-10-CM

## 2025-07-29 DIAGNOSIS — F43.12 CHRONIC POST-TRAUMATIC STRESS DISORDER (PTSD): ICD-10-CM

## 2025-07-29 DIAGNOSIS — Z72.0 TOBACCO ABUSE: ICD-10-CM

## 2025-07-29 DIAGNOSIS — F10.11 ALCOHOL ABUSE, IN REMISSION: ICD-10-CM

## 2025-07-29 DIAGNOSIS — F31.81 MODERATE MIXED BIPOLAR II DISORDER (HCC): Primary | ICD-10-CM

## 2025-07-29 DIAGNOSIS — F17.210 CIGARETTE NICOTINE DEPENDENCE WITHOUT COMPLICATION: ICD-10-CM

## 2025-07-29 PROCEDURE — 90834 PSYTX W PT 45 MINUTES: CPT | Performed by: COUNSELOR

## 2025-08-01 ENCOUNTER — TELEMEDICINE (OUTPATIENT)
Dept: BEHAVIORAL/MENTAL HEALTH CLINIC | Facility: CLINIC | Age: 60
End: 2025-08-01
Payer: COMMERCIAL

## 2025-08-01 ENCOUNTER — DOCUMENTATION (OUTPATIENT)
Dept: PSYCHIATRY | Facility: CLINIC | Age: 60
End: 2025-08-01

## 2025-08-01 ENCOUNTER — TELEPHONE (OUTPATIENT)
Age: 60
End: 2025-08-01

## 2025-08-01 DIAGNOSIS — F41.1 GENERALIZED ANXIETY DISORDER: ICD-10-CM

## 2025-08-01 DIAGNOSIS — F43.12 CHRONIC POST-TRAUMATIC STRESS DISORDER (PTSD): ICD-10-CM

## 2025-08-01 DIAGNOSIS — F31.81 MODERATE MIXED BIPOLAR II DISORDER (HCC): Primary | ICD-10-CM

## 2025-08-01 PROCEDURE — 90853 GROUP PSYCHOTHERAPY: CPT | Performed by: COUNSELOR

## 2025-08-05 ENCOUNTER — TELEMEDICINE (OUTPATIENT)
Dept: BEHAVIORAL/MENTAL HEALTH CLINIC | Facility: CLINIC | Age: 60
End: 2025-08-05
Payer: COMMERCIAL

## 2025-08-05 DIAGNOSIS — F17.210 CIGARETTE NICOTINE DEPENDENCE WITHOUT COMPLICATION: ICD-10-CM

## 2025-08-05 DIAGNOSIS — F31.81 MODERATE MIXED BIPOLAR II DISORDER (HCC): Primary | ICD-10-CM

## 2025-08-05 DIAGNOSIS — F41.1 GENERALIZED ANXIETY DISORDER: ICD-10-CM

## 2025-08-05 DIAGNOSIS — Z72.0 TOBACCO ABUSE: ICD-10-CM

## 2025-08-05 DIAGNOSIS — F43.12 CHRONIC POST-TRAUMATIC STRESS DISORDER (PTSD): ICD-10-CM

## 2025-08-05 DIAGNOSIS — F10.11 ALCOHOL ABUSE, IN REMISSION: ICD-10-CM

## 2025-08-05 DIAGNOSIS — F12.90 MARIJUANA USE: Chronic | ICD-10-CM

## 2025-08-05 PROCEDURE — 90834 PSYTX W PT 45 MINUTES: CPT | Performed by: COUNSELOR

## 2025-08-08 ENCOUNTER — TELEMEDICINE (OUTPATIENT)
Dept: BEHAVIORAL/MENTAL HEALTH CLINIC | Facility: CLINIC | Age: 60
End: 2025-08-08
Payer: COMMERCIAL

## 2025-08-08 ENCOUNTER — TELEPHONE (OUTPATIENT)
Dept: PSYCHIATRY | Facility: CLINIC | Age: 60
End: 2025-08-08

## 2025-08-08 DIAGNOSIS — F31.81 MODERATE MIXED BIPOLAR II DISORDER (HCC): Primary | ICD-10-CM

## 2025-08-08 DIAGNOSIS — F41.1 GENERALIZED ANXIETY DISORDER: ICD-10-CM

## 2025-08-08 DIAGNOSIS — F43.12 CHRONIC POST-TRAUMATIC STRESS DISORDER (PTSD): ICD-10-CM

## 2025-08-08 PROCEDURE — 90853 GROUP PSYCHOTHERAPY: CPT | Performed by: COUNSELOR

## 2025-08-12 ENCOUNTER — TELEPHONE (OUTPATIENT)
Age: 60
End: 2025-08-12

## 2025-08-12 ENCOUNTER — TELEMEDICINE (OUTPATIENT)
Dept: BEHAVIORAL/MENTAL HEALTH CLINIC | Facility: CLINIC | Age: 60
End: 2025-08-12
Payer: COMMERCIAL

## 2025-08-15 ENCOUNTER — TELEMEDICINE (OUTPATIENT)
Dept: BEHAVIORAL/MENTAL HEALTH CLINIC | Facility: CLINIC | Age: 60
End: 2025-08-15
Payer: COMMERCIAL

## 2025-08-15 ENCOUNTER — TELEMEDICINE (OUTPATIENT)
Dept: PSYCHIATRY | Facility: CLINIC | Age: 60
End: 2025-08-15

## 2025-08-15 DIAGNOSIS — F17.200 NICOTINE DEPENDENCE WITH CURRENT USE: ICD-10-CM

## 2025-08-15 DIAGNOSIS — F39 UNSPECIFIED MOOD (AFFECTIVE) DISORDER (HCC): Primary | ICD-10-CM

## 2025-08-15 DIAGNOSIS — F12.90 CANNABIS USE DISORDER: ICD-10-CM

## 2025-08-15 DIAGNOSIS — F17.200 TOBACCO USE DISORDER: ICD-10-CM

## 2025-08-15 DIAGNOSIS — F50.819 BINGE EATING DISORDER, UNSPECIFIED SEVERITY: ICD-10-CM

## 2025-08-15 DIAGNOSIS — Z86.59 HISTORY OF OCD (OBSESSIVE COMPULSIVE DISORDER): ICD-10-CM

## 2025-08-15 DIAGNOSIS — F43.10 PTSD (POST-TRAUMATIC STRESS DISORDER): ICD-10-CM

## 2025-08-15 DIAGNOSIS — F41.1 GENERALIZED ANXIETY DISORDER: ICD-10-CM

## 2025-08-15 PROCEDURE — PBNCHG PB NO CHARGE PLACEHOLDER: Performed by: PSYCHIATRY & NEUROLOGY

## 2025-08-15 RX ORDER — LAMOTRIGINE 200 MG/1
200 TABLET ORAL DAILY
Qty: 30 TABLET | Refills: 1 | Status: SHIPPED | OUTPATIENT
Start: 2025-08-15 | End: 2025-10-14

## 2025-08-15 RX ORDER — LAMOTRIGINE 25 MG/1
50 TABLET ORAL DAILY
Qty: 60 TABLET | Refills: 1 | Status: SHIPPED | OUTPATIENT
Start: 2025-08-15

## 2025-08-15 RX ORDER — FLUOXETINE 10 MG/1
20 CAPSULE ORAL DAILY
Qty: 60 CAPSULE | Refills: 0 | Status: SHIPPED | OUTPATIENT
Start: 2025-08-15

## 2025-08-15 RX ORDER — BUPROPION HYDROCHLORIDE 150 MG/1
150 TABLET ORAL DAILY
Qty: 30 TABLET | Refills: 1 | Status: SHIPPED | OUTPATIENT
Start: 2025-08-15 | End: 2025-10-14

## 2025-08-19 ENCOUNTER — APPOINTMENT (OUTPATIENT)
Dept: LAB | Facility: CLINIC | Age: 60
End: 2025-08-19
Payer: COMMERCIAL

## 2025-08-19 ENCOUNTER — OFFICE VISIT (OUTPATIENT)
Dept: OBGYN CLINIC | Facility: CLINIC | Age: 60
End: 2025-08-19
Payer: COMMERCIAL

## 2025-08-19 ENCOUNTER — TELEMEDICINE (OUTPATIENT)
Dept: BEHAVIORAL/MENTAL HEALTH CLINIC | Facility: CLINIC | Age: 60
End: 2025-08-19
Payer: COMMERCIAL

## 2025-08-19 VITALS
OXYGEN SATURATION: 98 % | TEMPERATURE: 97.8 F | WEIGHT: 106 LBS | HEART RATE: 88 BPM | BODY MASS INDEX: 20.81 KG/M2 | HEIGHT: 60 IN

## 2025-08-19 DIAGNOSIS — M54.50 CHRONIC BILATERAL LOW BACK PAIN WITHOUT SCIATICA: Primary | ICD-10-CM

## 2025-08-19 DIAGNOSIS — F10.11 ALCOHOL ABUSE, IN REMISSION: ICD-10-CM

## 2025-08-19 DIAGNOSIS — R29.898 WEAKNESS OF BOTH LOWER EXTREMITIES: ICD-10-CM

## 2025-08-19 DIAGNOSIS — Z72.0 TOBACCO ABUSE: ICD-10-CM

## 2025-08-19 DIAGNOSIS — F31.81 MODERATE MIXED BIPOLAR II DISORDER (HCC): Primary | ICD-10-CM

## 2025-08-19 DIAGNOSIS — F12.90 MARIJUANA USE: Chronic | ICD-10-CM

## 2025-08-19 DIAGNOSIS — G89.29 CHRONIC BILATERAL LOW BACK PAIN WITHOUT SCIATICA: Primary | ICD-10-CM

## 2025-08-19 DIAGNOSIS — F41.1 GENERALIZED ANXIETY DISORDER: ICD-10-CM

## 2025-08-19 DIAGNOSIS — F17.210 CIGARETTE NICOTINE DEPENDENCE WITHOUT COMPLICATION: ICD-10-CM

## 2025-08-19 DIAGNOSIS — F43.12 CHRONIC POST-TRAUMATIC STRESS DISORDER (PTSD): ICD-10-CM

## 2025-08-19 LAB
ERYTHROCYTE [SEDIMENTATION RATE] IN BLOOD: 6 MM/HOUR (ref 0–29)
FOLATE SERPL-MCNC: >22.3 NG/ML
VIT B12 SERPL-MCNC: 192 PG/ML (ref 180–914)

## 2025-08-19 PROCEDURE — 90834 PSYTX W PT 45 MINUTES: CPT | Performed by: COUNSELOR

## 2025-08-19 PROCEDURE — 82746 ASSAY OF FOLIC ACID SERUM: CPT

## 2025-08-19 PROCEDURE — 80053 COMPREHEN METABOLIC PANEL: CPT

## 2025-08-19 PROCEDURE — 99214 OFFICE O/P EST MOD 30 MIN: CPT | Performed by: FAMILY MEDICINE

## 2025-08-19 PROCEDURE — 36415 COLL VENOUS BLD VENIPUNCTURE: CPT

## 2025-08-19 PROCEDURE — 82550 ASSAY OF CK (CPK): CPT

## 2025-08-19 PROCEDURE — 86140 C-REACTIVE PROTEIN: CPT

## 2025-08-19 PROCEDURE — 85652 RBC SED RATE AUTOMATED: CPT

## 2025-08-19 PROCEDURE — 82607 VITAMIN B-12: CPT

## 2025-08-20 ENCOUNTER — OFFICE VISIT (OUTPATIENT)
Dept: FAMILY MEDICINE CLINIC | Facility: CLINIC | Age: 60
End: 2025-08-20
Payer: COMMERCIAL

## 2025-08-20 VITALS
OXYGEN SATURATION: 99 % | BODY MASS INDEX: 20.81 KG/M2 | DIASTOLIC BLOOD PRESSURE: 82 MMHG | WEIGHT: 106 LBS | HEART RATE: 76 BPM | HEIGHT: 60 IN | RESPIRATION RATE: 18 BRPM | SYSTOLIC BLOOD PRESSURE: 130 MMHG | TEMPERATURE: 98 F

## 2025-08-20 DIAGNOSIS — I10 ESSENTIAL HYPERTENSION: Chronic | ICD-10-CM

## 2025-08-20 DIAGNOSIS — Z23 ENCOUNTER FOR IMMUNIZATION: ICD-10-CM

## 2025-08-20 DIAGNOSIS — Z00.00 ANNUAL PHYSICAL EXAM: Primary | ICD-10-CM

## 2025-08-20 DIAGNOSIS — J44.1 COPD WITH ACUTE EXACERBATION (HCC): ICD-10-CM

## 2025-08-20 LAB
ALBUMIN SERPL BCG-MCNC: 4.5 G/DL (ref 3.5–5)
ALP SERPL-CCNC: 57 U/L (ref 34–104)
ALT SERPL W P-5'-P-CCNC: 16 U/L (ref 7–52)
ANION GAP SERPL CALCULATED.3IONS-SCNC: 13 MMOL/L (ref 4–13)
AST SERPL W P-5'-P-CCNC: 17 U/L (ref 13–39)
BILIRUB SERPL-MCNC: 0.51 MG/DL (ref 0.2–1)
BUN SERPL-MCNC: 12 MG/DL (ref 5–25)
CALCIUM SERPL-MCNC: 9.4 MG/DL (ref 8.4–10.2)
CHLORIDE SERPL-SCNC: 104 MMOL/L (ref 96–108)
CK SERPL-CCNC: 56 U/L (ref 26–192)
CO2 SERPL-SCNC: 26 MMOL/L (ref 21–32)
CREAT SERPL-MCNC: 0.73 MG/DL (ref 0.6–1.3)
CRP SERPL QL: <1 MG/L
GFR SERPL CREATININE-BSD FRML MDRD: 91 ML/MIN/1.73SQ M
GLUCOSE SERPL-MCNC: 108 MG/DL (ref 65–140)
POTASSIUM SERPL-SCNC: 3.9 MMOL/L (ref 3.5–5.3)
PROT SERPL-MCNC: 6.8 G/DL (ref 6.4–8.4)
SODIUM SERPL-SCNC: 143 MMOL/L (ref 135–147)

## 2025-08-20 PROCEDURE — 90471 IMMUNIZATION ADMIN: CPT | Performed by: NURSE PRACTITIONER

## 2025-08-20 PROCEDURE — 90677 PCV20 VACCINE IM: CPT | Performed by: NURSE PRACTITIONER

## 2025-08-20 PROCEDURE — 99396 PREV VISIT EST AGE 40-64: CPT | Performed by: NURSE PRACTITIONER

## 2025-08-22 ENCOUNTER — TELEMEDICINE (OUTPATIENT)
Dept: BEHAVIORAL/MENTAL HEALTH CLINIC | Facility: CLINIC | Age: 60
End: 2025-08-22
Payer: COMMERCIAL

## 2025-08-22 DIAGNOSIS — F31.81 MODERATE MIXED BIPOLAR II DISORDER (HCC): Primary | ICD-10-CM

## 2025-08-22 DIAGNOSIS — F41.1 GENERALIZED ANXIETY DISORDER: ICD-10-CM

## 2025-08-22 DIAGNOSIS — F43.12 CHRONIC POST-TRAUMATIC STRESS DISORDER (PTSD): ICD-10-CM

## 2025-08-22 PROCEDURE — 90853 GROUP PSYCHOTHERAPY: CPT | Performed by: COUNSELOR

## (undated) DEVICE — DISPOSABLE EQUIPMENT COVER: Brand: SMALL TOWEL DRAPE

## (undated) DEVICE — RETROGRADE KNIFE BOX OF 6: Brand: ECTRA

## (undated) DEVICE — SUT PROLENE 4-0 PS-2 18 IN 8682G

## (undated) DEVICE — STERILE BETHLEHEM PLASTIC HAND: Brand: CARDINAL HEALTH

## (undated) DEVICE — ACE WRAP 4 IN UNSTERILE

## (undated) DEVICE — NEEDLE 25G X 1 1/2

## (undated) DEVICE — ADHESIVE SKN CLSR HISTOACRYL FLEX 0.5ML LF

## (undated) DEVICE — INTENDED FOR TISSUE SEPARATION, AND OTHER PROCEDURES THAT REQUIRE A SHARP SURGICAL BLADE TO PUNCTURE OR CUT.: Brand: BARD-PARKER SAFETY BLADES SIZE 15, STERILE

## (undated) DEVICE — STRL COTTON TIP APPLCTR 6IN PK: Brand: CARDINAL HEALTH

## (undated) DEVICE — GAUZE SPONGES,16 PLY: Brand: CURITY

## (undated) DEVICE — OCCLUSIVE GAUZE STRIP,3% BISMUTH TRIBROMOPHENATE IN PETROLATUM BLEND: Brand: XEROFORM

## (undated) DEVICE — GLOVE INDICATOR PI UNDERGLOVE SZ 8 BLUE

## (undated) DEVICE — CUFF TOURNIQUET 18 X 4 IN QUICK CONNECT DISP 1 BLADDER

## (undated) DEVICE — GLOVE SRG BIOGEL 7.5

## (undated) DEVICE — CHLORAPREP HI-LITE 26ML ORANGE

## (undated) DEVICE — PADDING CAST 4 IN  COTTON STRL

## (undated) DEVICE — SPONGE PVP SCRUB WING STERILE